# Patient Record
Sex: MALE | Race: BLACK OR AFRICAN AMERICAN | NOT HISPANIC OR LATINO | URBAN - METROPOLITAN AREA
[De-identification: names, ages, dates, MRNs, and addresses within clinical notes are randomized per-mention and may not be internally consistent; named-entity substitution may affect disease eponyms.]

---

## 2018-09-27 ENCOUNTER — EMERGENCY (EMERGENCY)
Facility: HOSPITAL | Age: 38
LOS: 1 days | Discharge: HOME | End: 2018-09-27
Attending: EMERGENCY MEDICINE | Admitting: EMERGENCY MEDICINE
Payer: SELF-PAY

## 2018-09-27 VITALS
TEMPERATURE: 98 F | OXYGEN SATURATION: 99 % | HEART RATE: 84 BPM | DIASTOLIC BLOOD PRESSURE: 77 MMHG | SYSTOLIC BLOOD PRESSURE: 129 MMHG | RESPIRATION RATE: 18 BRPM
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
## 2018-09-27 DIAGNOSIS — F17.210 NICOTINE DEPENDENCE, CIGARETTES, UNCOMPLICATED: ICD-10-CM

## 2018-09-27 DIAGNOSIS — M79.89 OTHER SPECIFIED SOFT TISSUE DISORDERS: ICD-10-CM
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
## 2018-09-27 DIAGNOSIS — B20 HUMAN IMMUNODEFICIENCY VIRUS [HIV] DISEASE: ICD-10-CM
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
## 2018-09-27 DIAGNOSIS — L03.113 CELLULITIS OF RIGHT UPPER LIMB: ICD-10-CM

## 2018-09-27 DIAGNOSIS — L02.511 CUTANEOUS ABSCESS OF RIGHT HAND: ICD-10-CM

## 2018-09-27 LAB
ALBUMIN SERPL ELPH-MCNC: 3.3 G/DL — LOW (ref 3.4–5)
ALP SERPL-CCNC: 93 U/L — SIGNIFICANT CHANGE UP (ref 40–120)
ALT FLD-CCNC: 16 U/L — SIGNIFICANT CHANGE UP (ref 12–42)
ANION GAP SERPL CALC-SCNC: 5 MMOL/L — LOW (ref 9–16)
AST SERPL-CCNC: 13 U/L — LOW (ref 15–37)
BASOPHILS NFR BLD AUTO: 0.2 % — SIGNIFICANT CHANGE UP (ref 0–2)
BILIRUB SERPL-MCNC: 0.2 MG/DL — SIGNIFICANT CHANGE UP (ref 0.2–1.2)
BUN SERPL-MCNC: 8 MG/DL — SIGNIFICANT CHANGE UP (ref 7–23)
CALCIUM SERPL-MCNC: 8.7 MG/DL — SIGNIFICANT CHANGE UP (ref 8.5–10.5)
CHLORIDE SERPL-SCNC: 100 MMOL/L — SIGNIFICANT CHANGE UP (ref 96–108)
CO2 SERPL-SCNC: 30 MMOL/L — SIGNIFICANT CHANGE UP (ref 22–31)
CREAT SERPL-MCNC: 0.82 MG/DL — SIGNIFICANT CHANGE UP (ref 0.5–1.3)
EOSINOPHIL NFR BLD AUTO: 2.8 % — SIGNIFICANT CHANGE UP (ref 0–6)
GLUCOSE SERPL-MCNC: 117 MG/DL — HIGH (ref 70–99)
HCT VFR BLD CALC: 37 % — LOW (ref 39–50)
HGB BLD-MCNC: 11.3 G/DL — LOW (ref 13–17)
IMM GRANULOCYTES NFR BLD AUTO: 0.7 % — SIGNIFICANT CHANGE UP (ref 0–1.5)
LYMPHOCYTES # BLD AUTO: 16 % — SIGNIFICANT CHANGE UP (ref 13–44)
MCHC RBC-ENTMCNC: 26.2 PG — LOW (ref 27–34)
MCHC RBC-ENTMCNC: 30.5 G/DL — LOW (ref 32–36)
MCV RBC AUTO: 85.6 FL — SIGNIFICANT CHANGE UP (ref 80–100)
MONOCYTES NFR BLD AUTO: 4.6 % — SIGNIFICANT CHANGE UP (ref 2–14)
NEUTROPHILS NFR BLD AUTO: 75.7 % — SIGNIFICANT CHANGE UP (ref 43–77)
PLATELET # BLD AUTO: 284 K/UL — SIGNIFICANT CHANGE UP (ref 150–400)
POTASSIUM SERPL-MCNC: 3.5 MMOL/L — SIGNIFICANT CHANGE UP (ref 3.5–5.3)
POTASSIUM SERPL-SCNC: 3.5 MMOL/L — SIGNIFICANT CHANGE UP (ref 3.5–5.3)
PROT SERPL-MCNC: 8.3 G/DL — HIGH (ref 6.4–8.2)
RBC # BLD: 4.32 M/UL — SIGNIFICANT CHANGE UP (ref 4.2–5.8)
RBC # FLD: 13.9 % — SIGNIFICANT CHANGE UP (ref 10.3–14.5)
SODIUM SERPL-SCNC: 135 MMOL/L — SIGNIFICANT CHANGE UP (ref 132–145)
WBC # BLD: 6.1 K/UL — SIGNIFICANT CHANGE UP (ref 3.8–10.5)
WBC # FLD AUTO: 6.1 K/UL — SIGNIFICANT CHANGE UP (ref 3.8–10.5)

## 2018-09-27 PROCEDURE — 99284 EMERGENCY DEPT VISIT MOD MDM: CPT

## 2018-09-27 PROCEDURE — 73130 X-RAY EXAM OF HAND: CPT | Mod: 26,RT

## 2018-09-27 RX ORDER — KETOROLAC TROMETHAMINE 30 MG/ML
30 SYRINGE (ML) INJECTION ONCE
Qty: 0 | Refills: 0 | Status: DISCONTINUED | OUTPATIENT
Start: 2018-09-27 | End: 2018-09-27

## 2018-09-27 RX ORDER — VANCOMYCIN HCL 1 G
1000 VIAL (EA) INTRAVENOUS ONCE
Qty: 0 | Refills: 0 | Status: COMPLETED | OUTPATIENT
Start: 2018-09-27 | End: 2018-09-27

## 2018-09-27 RX ORDER — SODIUM CHLORIDE 9 MG/ML
1000 INJECTION INTRAMUSCULAR; INTRAVENOUS; SUBCUTANEOUS ONCE
Qty: 0 | Refills: 0 | Status: COMPLETED | OUTPATIENT
Start: 2018-09-27 | End: 2018-09-27

## 2018-09-27 RX ORDER — SODIUM CHLORIDE 9 MG/ML
3 INJECTION INTRAMUSCULAR; INTRAVENOUS; SUBCUTANEOUS ONCE
Qty: 0 | Refills: 0 | Status: COMPLETED | OUTPATIENT
Start: 2018-09-27 | End: 2018-09-27

## 2018-09-27 RX ORDER — AMPICILLIN SODIUM AND SULBACTAM SODIUM 250; 125 MG/ML; MG/ML
3 INJECTION, POWDER, FOR SUSPENSION INTRAMUSCULAR; INTRAVENOUS ONCE
Qty: 0 | Refills: 0 | Status: COMPLETED | OUTPATIENT
Start: 2018-09-27 | End: 2018-09-27

## 2018-09-27 RX ADMIN — SODIUM CHLORIDE 3 MILLILITER(S): 9 INJECTION INTRAMUSCULAR; INTRAVENOUS; SUBCUTANEOUS at 21:34

## 2018-09-27 RX ADMIN — Medication 250 MILLIGRAM(S): at 23:13

## 2018-09-27 RX ADMIN — SODIUM CHLORIDE 1000 MILLILITER(S): 9 INJECTION INTRAMUSCULAR; INTRAVENOUS; SUBCUTANEOUS at 21:33

## 2018-09-27 RX ADMIN — Medication 30 MILLIGRAM(S): at 21:47

## 2018-09-27 RX ADMIN — AMPICILLIN SODIUM AND SULBACTAM SODIUM 200 GRAM(S): 250; 125 INJECTION, POWDER, FOR SUSPENSION INTRAMUSCULAR; INTRAVENOUS at 21:34

## 2018-09-27 NOTE — ED PROVIDER NOTE - SKIN, MLM
Swelling and erythema of the right ulnar aspect of the hand predominately lateral to the 5th MCP with purulent drainage from small opening. Very mild pain with passive extension of the Right 5th digit. Good cap refil SLIT.

## 2018-09-27 NOTE — ED ADULT TRIAGE NOTE - CHIEF COMPLAINT QUOTE
Patient to ED s/p stink by either a bee or hornet 2 days PTA.  Patient with moderate swelling and redness to right hand.  Denies any respiratory involvement

## 2018-09-27 NOTE — ED PROVIDER NOTE - PROGRESS NOTE DETAILS
Discussed with Dr. Feldman, who will come to evaluate pt. I&D performed by Dr feldman, IV antibiotics given, will discharge pt with PO abx, strict return precautions, follow up with Dr Feldman as scheduled

## 2018-09-27 NOTE — CONSULT NOTE ADULT - ASSESSMENT
38 year old male HIV+ with right small finger abscess, extensor tendon tenosynovitis, cellulitis  -I+D performed  -F/u cultures  -IV Antibiotics in the ED  -Home on 10 days PO Keflex and Bactrim  -TID soaks and packing for 2-3 days  -Keep hand elevated  -F/u in my office on Wednesday  -Discussed concerning signs such as streaking redness, increased pain, increased pus formation

## 2018-09-27 NOTE — ED PROVIDER NOTE - OBJECTIVE STATEMENT
38 y o male with PMHX of HIV (VL undetectable) presents to the ED for right hand swelling s/p bee sting 2 days ago. Experienced initial pain, however noticed increased swelling, erythema, and drainage from the site of injury on the right 5th digit. Reports pain flares during digit movement. Denies any fevers, chills, hives, SOB, N/V, dizziness, facial swelling, or any other sx.

## 2018-09-27 NOTE — ED ADULT NURSE NOTE - OBJECTIVE STATEMENT
pt aox4. neurologically wnl. no sob or difficulty breathing noted. lung sounds clear bilaterally. skin appropriate for ethnicity, warm and dry. swelling and erythema noted to R hand, particularly in R last finger. cap refill < 2 secs. pulses 2+ and regular. abd rounded soft and nontender.

## 2018-09-27 NOTE — ED PROVIDER NOTE - MEDICAL DECISION MAKING DETAILS
Cellulitis and abscess of right hand. Will send labs and give abx. Consult with hand specialist. Reassess.  Currently not concerned for flexor tendonitis.

## 2018-09-27 NOTE — ED PROVIDER NOTE - CHPI ED SYMPTOMS NEG
no vomiting/no swelling of face, tongue/no blurred vision/no chills/no crying/no difficulty breathing/no body aches/no chest pain/no rash/no nausea/no dizziness

## 2018-09-27 NOTE — CONSULT NOTE ADULT - SUBJECTIVE AND OBJECTIVE BOX
38 year old Male with +HIV c/o right small finger pain, swelling, redness and pus after a bee sting 2 days ago.  Pus "exploded" out of his finger this afternoon.  Denies fevers or chills.  PMH: HIV+  All: NKDA  SH: + smoker  ROS: Negative except for Right hand pain and swelling  PE: Right hand: +swelling and erythema of the small finger, dorsal MCP  1mm opening over the dorsal ulnar border of the small finger proximal phalanx with pus expressed  +Tenderness over the extensor tendon  No Volar/flexor tendon tenderness  no significant pain with passive stretch  Able to gentle extend and flex finger with discomfort at the limits of motion  Sensation intact radial and ulnar border of the digit  Cap refill < 2 secs    Procedure:  1.) Right small finger deep tissue irrigation and excisional debridement of skin, soft tissue and bone  2.) Right small finger extensor tendon tenolysis    Under sterile conditions, 6cc 1% Lidocaine block administered to the small finger  7mm longitudinal incision made over the dorsal ulnar border of the small finger  Pus expressed superficially and sent for culture  Scissors spread deeply proximally and distally and skin, deep soft tissue and ulnar proximal phalanx debrided with the knife, no significant pus expressed deep  Scissors spread along the extensor tendon to break up soft tissue adhesions, extensor tendon tenolysis performed  Wound irrigated with betadine and sterile saline solution  Packing applied and dressing placed over finger

## 2018-09-28 VITALS
RESPIRATION RATE: 20 BRPM | DIASTOLIC BLOOD PRESSURE: 59 MMHG | OXYGEN SATURATION: 99 % | TEMPERATURE: 99 F | SYSTOLIC BLOOD PRESSURE: 124 MMHG | HEART RATE: 115 BPM

## 2018-09-29 LAB
-  AMPICILLIN/SULBACTAM: SIGNIFICANT CHANGE UP
-  CEFAZOLIN: SIGNIFICANT CHANGE UP
-  CLINDAMYCIN: SIGNIFICANT CHANGE UP
-  DAPTOMYCIN: SIGNIFICANT CHANGE UP
-  ERYTHROMYCIN: SIGNIFICANT CHANGE UP
-  GENTAMICIN: SIGNIFICANT CHANGE UP
-  LINEZOLID: SIGNIFICANT CHANGE UP
-  OXACILLIN: SIGNIFICANT CHANGE UP
-  PENICILLIN: SIGNIFICANT CHANGE UP
-  RIFAMPIN: SIGNIFICANT CHANGE UP
-  TETRACYCLINE: SIGNIFICANT CHANGE UP
-  TRIMETHOPRIM/SULFAMETHOXAZOLE: SIGNIFICANT CHANGE UP
-  VANCOMYCIN: SIGNIFICANT CHANGE UP
CULTURE RESULTS: SIGNIFICANT CHANGE UP
METHOD TYPE: SIGNIFICANT CHANGE UP
ORGANISM # SPEC MICROSCOPIC CNT: SIGNIFICANT CHANGE UP
ORGANISM # SPEC MICROSCOPIC CNT: SIGNIFICANT CHANGE UP
SPECIMEN SOURCE: SIGNIFICANT CHANGE UP

## 2018-09-29 RX ORDER — AZTREONAM 2 G
2 VIAL (EA) INJECTION
Qty: 28 | Refills: 0
Start: 2018-09-29 | End: 2018-10-05

## 2018-09-29 RX ORDER — AZTREONAM 2 G
2 VIAL (EA) INJECTION
Qty: 28 | Refills: 0 | OUTPATIENT
Start: 2018-09-29 | End: 2018-10-05

## 2018-09-29 RX ORDER — CEPHALEXIN 500 MG
1 CAPSULE ORAL
Qty: 28 | Refills: 0
Start: 2018-09-29 | End: 2018-10-05

## 2018-09-29 NOTE — ED POST DISCHARGE NOTE - RESULT SUMMARY
bactrim and keflex prescribed - wound + strep pyogenes, sensitivities pending bactrim and keflex prescribed - wound + strep pyogenes, appropriate coverage

## 2018-10-03 LAB
CULTURE RESULTS: SIGNIFICANT CHANGE UP
CULTURE RESULTS: SIGNIFICANT CHANGE UP
SPECIMEN SOURCE: SIGNIFICANT CHANGE UP
SPECIMEN SOURCE: SIGNIFICANT CHANGE UP

## 2019-08-06 ENCOUNTER — INPATIENT (INPATIENT)
Facility: HOSPITAL | Age: 39
LOS: 1 days | Discharge: HOME | DRG: 603 | End: 2019-08-08
Payer: MEDICAID

## 2019-08-06 VITALS
HEIGHT: 73 IN | DIASTOLIC BLOOD PRESSURE: 69 MMHG | OXYGEN SATURATION: 95 % | RESPIRATION RATE: 17 BRPM | TEMPERATURE: 99 F | HEART RATE: 139 BPM | WEIGHT: 160.06 LBS | SYSTOLIC BLOOD PRESSURE: 110 MMHG

## 2019-08-06 DIAGNOSIS — B20 HUMAN IMMUNODEFICIENCY VIRUS [HIV] DISEASE: ICD-10-CM

## 2019-08-06 DIAGNOSIS — F19.10 OTHER PSYCHOACTIVE SUBSTANCE ABUSE, UNCOMPLICATED: ICD-10-CM

## 2019-08-06 DIAGNOSIS — Z72.51 HIGH RISK HETEROSEXUAL BEHAVIOR: ICD-10-CM

## 2019-08-06 DIAGNOSIS — R63.8 OTHER SYMPTOMS AND SIGNS CONCERNING FOOD AND FLUID INTAKE: ICD-10-CM

## 2019-08-06 DIAGNOSIS — N50.9 DISORDER OF MALE GENITAL ORGANS, UNSPECIFIED: ICD-10-CM

## 2019-08-06 DIAGNOSIS — D64.9 ANEMIA, UNSPECIFIED: ICD-10-CM

## 2019-08-06 DIAGNOSIS — Z21 ASYMPTOMATIC HUMAN IMMUNODEFICIENCY VIRUS [HIV] INFECTION STATUS: ICD-10-CM

## 2019-08-06 DIAGNOSIS — R36.9 URETHRAL DISCHARGE, UNSPECIFIED: ICD-10-CM

## 2019-08-06 DIAGNOSIS — Z91.89 OTHER SPECIFIED PERSONAL RISK FACTORS, NOT ELSEWHERE CLASSIFIED: ICD-10-CM

## 2019-08-06 DIAGNOSIS — L03.90 CELLULITIS, UNSPECIFIED: ICD-10-CM

## 2019-08-06 DIAGNOSIS — L02.416 CUTANEOUS ABSCESS OF LEFT LOWER LIMB: ICD-10-CM

## 2019-08-06 DIAGNOSIS — R00.0 TACHYCARDIA, UNSPECIFIED: ICD-10-CM

## 2019-08-06 LAB
ALBUMIN SERPL ELPH-MCNC: 2.7 G/DL — LOW (ref 3.4–5)
ALP SERPL-CCNC: 81 U/L — SIGNIFICANT CHANGE UP (ref 40–120)
ALT FLD-CCNC: 24 U/L — SIGNIFICANT CHANGE UP (ref 12–42)
ANION GAP SERPL CALC-SCNC: 11 MMOL/L — SIGNIFICANT CHANGE UP (ref 9–16)
APPEARANCE UR: CLEAR — SIGNIFICANT CHANGE UP
APTT BLD: 21.7 SEC — LOW (ref 27.5–36.3)
AST SERPL-CCNC: 27 U/L — SIGNIFICANT CHANGE UP (ref 15–37)
BASOPHILS NFR BLD AUTO: 0.2 % — SIGNIFICANT CHANGE UP (ref 0–2)
BILIRUB SERPL-MCNC: 0.3 MG/DL — SIGNIFICANT CHANGE UP (ref 0.2–1.2)
BILIRUB UR-MCNC: NEGATIVE — SIGNIFICANT CHANGE UP
BUN SERPL-MCNC: 10 MG/DL — SIGNIFICANT CHANGE UP (ref 7–23)
C TRACH RRNA SPEC QL NAA+PROBE: SIGNIFICANT CHANGE UP
CALCIUM SERPL-MCNC: 8.4 MG/DL — LOW (ref 8.5–10.5)
CHLORIDE SERPL-SCNC: 95 MMOL/L — LOW (ref 96–108)
CO2 SERPL-SCNC: 27 MMOL/L — SIGNIFICANT CHANGE UP (ref 22–31)
COLOR SPEC: YELLOW — SIGNIFICANT CHANGE UP
CREAT SERPL-MCNC: 0.96 MG/DL — SIGNIFICANT CHANGE UP (ref 0.5–1.3)
CRP SERPL-MCNC: >12 MG/DL — HIGH (ref 0–0.9)
DIFF PNL FLD: ABNORMAL
EOSINOPHIL NFR BLD AUTO: 1.4 % — SIGNIFICANT CHANGE UP (ref 0–6)
GLUCOSE SERPL-MCNC: 129 MG/DL — HIGH (ref 70–99)
GLUCOSE UR QL: NEGATIVE — SIGNIFICANT CHANGE UP
HCT VFR BLD CALC: 34.4 % — LOW (ref 39–50)
HGB BLD-MCNC: 10.9 G/DL — LOW (ref 13–17)
IMM GRANULOCYTES NFR BLD AUTO: 0.8 % — SIGNIFICANT CHANGE UP (ref 0–1.5)
INR BLD: 1.49 — HIGH (ref 0.88–1.16)
KETONES UR-MCNC: NEGATIVE — SIGNIFICANT CHANGE UP
LACTATE SERPL-SCNC: 0.8 MMOL/L — SIGNIFICANT CHANGE UP (ref 0.4–2)
LEUKOCYTE ESTERASE UR-ACNC: ABNORMAL
LYMPHOCYTES # BLD AUTO: 13.8 % — SIGNIFICANT CHANGE UP (ref 13–44)
MCHC RBC-ENTMCNC: 25.1 PG — LOW (ref 27–34)
MCHC RBC-ENTMCNC: 31.7 G/DL — LOW (ref 32–36)
MCV RBC AUTO: 79.1 FL — LOW (ref 80–100)
MONOCYTES NFR BLD AUTO: 6.3 % — SIGNIFICANT CHANGE UP (ref 2–14)
N GONORRHOEA RRNA SPEC QL NAA+PROBE: SIGNIFICANT CHANGE UP
NEUTROPHILS NFR BLD AUTO: 77.5 % — HIGH (ref 43–77)
NITRITE UR-MCNC: POSITIVE
PCP SPEC-MCNC: SIGNIFICANT CHANGE UP
PH UR: 6.5 — SIGNIFICANT CHANGE UP (ref 5–8)
PLATELET # BLD AUTO: 241 K/UL — SIGNIFICANT CHANGE UP (ref 150–400)
POTASSIUM SERPL-MCNC: 4 MMOL/L — SIGNIFICANT CHANGE UP (ref 3.5–5.3)
POTASSIUM SERPL-SCNC: 4 MMOL/L — SIGNIFICANT CHANGE UP (ref 3.5–5.3)
PROT SERPL-MCNC: 7.9 G/DL — SIGNIFICANT CHANGE UP (ref 6.4–8.2)
PROT UR-MCNC: ABNORMAL MG/DL
PROTHROM AB SERPL-ACNC: 16.7 SEC — HIGH (ref 10–12.9)
RBC # BLD: 4.35 M/UL — SIGNIFICANT CHANGE UP (ref 4.2–5.8)
RBC # FLD: 14.9 % — HIGH (ref 10.3–14.5)
SODIUM SERPL-SCNC: 133 MMOL/L — SIGNIFICANT CHANGE UP (ref 132–145)
SP GR SPEC: 1.01 — SIGNIFICANT CHANGE UP (ref 1–1.03)
SPECIMEN SOURCE: SIGNIFICANT CHANGE UP
UROBILINOGEN FLD QL: 1 E.U./DL — SIGNIFICANT CHANGE UP
WBC # BLD: 10.3 K/UL — SIGNIFICANT CHANGE UP (ref 3.8–10.5)
WBC # FLD AUTO: 10.3 K/UL — SIGNIFICANT CHANGE UP (ref 3.8–10.5)

## 2019-08-06 PROCEDURE — 73562 X-RAY EXAM OF KNEE 3: CPT | Mod: 26,LT

## 2019-08-06 PROCEDURE — 99223 1ST HOSP IP/OBS HIGH 75: CPT | Mod: GC

## 2019-08-06 PROCEDURE — 71045 X-RAY EXAM CHEST 1 VIEW: CPT | Mod: 26

## 2019-08-06 PROCEDURE — 99291 CRITICAL CARE FIRST HOUR: CPT | Mod: 25

## 2019-08-06 PROCEDURE — 93010 ELECTROCARDIOGRAM REPORT: CPT

## 2019-08-06 RX ORDER — SODIUM CHLORIDE 9 MG/ML
2300 INJECTION INTRAMUSCULAR; INTRAVENOUS; SUBCUTANEOUS ONCE
Refills: 0 | Status: COMPLETED | OUTPATIENT
Start: 2019-08-06 | End: 2019-08-06

## 2019-08-06 RX ORDER — OXYCODONE AND ACETAMINOPHEN 5; 325 MG/1; MG/1
2 TABLET ORAL EVERY 6 HOURS
Refills: 0 | Status: DISCONTINUED | OUTPATIENT
Start: 2019-08-06 | End: 2019-08-08

## 2019-08-06 RX ORDER — CEFTRIAXONE 500 MG/1
1000 INJECTION, POWDER, FOR SOLUTION INTRAMUSCULAR; INTRAVENOUS ONCE
Refills: 0 | Status: COMPLETED | OUTPATIENT
Start: 2019-08-06 | End: 2019-08-06

## 2019-08-06 RX ORDER — HYDROMORPHONE HYDROCHLORIDE 2 MG/ML
0.5 INJECTION INTRAMUSCULAR; INTRAVENOUS; SUBCUTANEOUS ONCE
Refills: 0 | Status: DISCONTINUED | OUTPATIENT
Start: 2019-08-06 | End: 2019-08-06

## 2019-08-06 RX ORDER — IBUPROFEN 200 MG
600 TABLET ORAL EVERY 6 HOURS
Refills: 0 | Status: DISCONTINUED | OUTPATIENT
Start: 2019-08-06 | End: 2019-08-08

## 2019-08-06 RX ORDER — SODIUM CHLORIDE 9 MG/ML
1000 INJECTION, SOLUTION INTRAVENOUS
Refills: 0 | Status: DISCONTINUED | OUTPATIENT
Start: 2019-08-06 | End: 2019-08-06

## 2019-08-06 RX ORDER — AZITHROMYCIN 500 MG/1
1000 TABLET, FILM COATED ORAL ONCE
Refills: 0 | Status: COMPLETED | OUTPATIENT
Start: 2019-08-06 | End: 2019-08-06

## 2019-08-06 RX ORDER — MORPHINE SULFATE 50 MG/1
4 CAPSULE, EXTENDED RELEASE ORAL ONCE
Refills: 0 | Status: DISCONTINUED | OUTPATIENT
Start: 2019-08-06 | End: 2019-08-06

## 2019-08-06 RX ORDER — VANCOMYCIN HCL 1 G
1500 VIAL (EA) INTRAVENOUS EVERY 12 HOURS
Refills: 0 | Status: DISCONTINUED | OUTPATIENT
Start: 2019-08-07 | End: 2019-08-07

## 2019-08-06 RX ORDER — OXYCODONE AND ACETAMINOPHEN 5; 325 MG/1; MG/1
1 TABLET ORAL ONCE
Refills: 0 | Status: DISCONTINUED | OUTPATIENT
Start: 2019-08-06 | End: 2019-08-06

## 2019-08-06 RX ORDER — IBUPROFEN 200 MG
400 TABLET ORAL EVERY 6 HOURS
Refills: 0 | Status: DISCONTINUED | OUTPATIENT
Start: 2019-08-06 | End: 2019-08-08

## 2019-08-06 RX ORDER — ACETAMINOPHEN 500 MG
975 TABLET ORAL ONCE
Refills: 0 | Status: COMPLETED | OUTPATIENT
Start: 2019-08-06 | End: 2019-08-06

## 2019-08-06 RX ORDER — VANCOMYCIN HCL 1 G
1500 VIAL (EA) INTRAVENOUS ONCE
Refills: 0 | Status: COMPLETED | OUTPATIENT
Start: 2019-08-06 | End: 2019-08-06

## 2019-08-06 RX ORDER — KETOROLAC TROMETHAMINE 30 MG/ML
30 SYRINGE (ML) INJECTION ONCE
Refills: 0 | Status: DISCONTINUED | OUTPATIENT
Start: 2019-08-06 | End: 2019-08-06

## 2019-08-06 RX ADMIN — Medication 300 MILLIGRAM(S): at 03:52

## 2019-08-06 RX ADMIN — Medication 30 MILLIGRAM(S): at 04:00

## 2019-08-06 RX ADMIN — AZITHROMYCIN 1000 MILLIGRAM(S): 500 TABLET, FILM COATED ORAL at 04:00

## 2019-08-06 RX ADMIN — CEFTRIAXONE 1000 MILLIGRAM(S): 500 INJECTION, POWDER, FOR SOLUTION INTRAMUSCULAR; INTRAVENOUS at 03:51

## 2019-08-06 RX ADMIN — Medication 30 MILLIGRAM(S): at 03:33

## 2019-08-06 RX ADMIN — MORPHINE SULFATE 4 MILLIGRAM(S): 50 CAPSULE, EXTENDED RELEASE ORAL at 03:32

## 2019-08-06 RX ADMIN — Medication 300 MILLIGRAM(S): at 16:26

## 2019-08-06 RX ADMIN — Medication 975 MILLIGRAM(S): at 04:00

## 2019-08-06 RX ADMIN — SODIUM CHLORIDE 2300 MILLILITER(S): 9 INJECTION INTRAMUSCULAR; INTRAVENOUS; SUBCUTANEOUS at 03:35

## 2019-08-06 RX ADMIN — SODIUM CHLORIDE 150 MILLILITER(S): 9 INJECTION, SOLUTION INTRAVENOUS at 14:56

## 2019-08-06 RX ADMIN — OXYCODONE AND ACETAMINOPHEN 1 TABLET(S): 5; 325 TABLET ORAL at 14:56

## 2019-08-06 RX ADMIN — SODIUM CHLORIDE 150 MILLILITER(S): 9 INJECTION, SOLUTION INTRAVENOUS at 08:38

## 2019-08-06 RX ADMIN — Medication 1500 MILLIGRAM(S): at 06:00

## 2019-08-06 RX ADMIN — SODIUM CHLORIDE 2300 MILLILITER(S): 9 INJECTION INTRAMUSCULAR; INTRAVENOUS; SUBCUTANEOUS at 05:44

## 2019-08-06 RX ADMIN — Medication 975 MILLIGRAM(S): at 03:30

## 2019-08-06 RX ADMIN — MORPHINE SULFATE 4 MILLIGRAM(S): 50 CAPSULE, EXTENDED RELEASE ORAL at 07:54

## 2019-08-06 RX ADMIN — HYDROMORPHONE HYDROCHLORIDE 0.5 MILLIGRAM(S): 2 INJECTION INTRAMUSCULAR; INTRAVENOUS; SUBCUTANEOUS at 11:46

## 2019-08-06 NOTE — H&P ADULT - NSHPLABSRESULTS_GEN_ALL_CORE
.  LABS:                         10.9   10.3  )-----------( 241      ( 06 Aug 2019 03:28 )             34.4         133  |  95<L>  |  10  ----------------------------<  129<H>  4.0   |  27  |  0.96    Ca    8.4<L>      06 Aug 2019 03:28    TPro  7.9  /  Alb  2.7<L>  /  TBili  0.3  /  DBili  x   /  AST  27  /  ALT  24  /  AlkPhos  81      PT/INR - ( 06 Aug 2019 03:28 )   PT: 16.7 sec;   INR: 1.49          PTT - ( 06 Aug 2019 03:28 )  PTT:21.7 sec  Urinalysis Basic - ( 06 Aug 2019 05:25 )    Color: Yellow / Appearance: Clear / S.010 / pH: x  Gluc: x / Ketone: NEGATIVE  / Bili: NEGATIVE / Urobili: 1.0 E.U./dL   Blood: x / Protein: Trace mg/dL / Nitrite: POSITIVE   Leuk Esterase: Trace / RBC: < 5 /HPF / WBC 5-10 /HPF   Sq Epi: x / Non Sq Epi: 0-5 /HPF / Bacteria: Many /HPF      UTox: positive for amphetamines and opiates    IMAGING:  CXR 2019:   Findings/ impression: Hyperinflation. Heart, lungs, mediastinum and thorax,   unremarkable.     Left Knee X-ray 2019:   Findings:  Soft tissue swelling with no acute fracture, dislocation or   destructive lesion. Medial knee joint narrowing. LABS:              10.9   10.3  )-----------( 241      ( 06 Aug 2019 03:28 )             34.4         133  |  95<L>  |  10  ----------------------------<  129<H>  4.0   |  27  |  0.96    Ca    8.4<L>      06 Aug 2019 03:28    TPro  7.9  /  Alb  2.7<L>  /  TBili  0.3  /  DBili  x   /  AST  27  /  ALT  24  /  AlkPhos  81      PT/INR - ( 06 Aug 2019 03:28 )   PT: 16.7 sec;   INR: 1.49          PTT - ( 06 Aug 2019 03:28 )  PTT:21.7 sec  Urinalysis Basic - ( 06 Aug 2019 05:25 )    Color: Yellow / Appearance: Clear / S.010 / pH: x  Gluc: x / Ketone: NEGATIVE  / Bili: NEGATIVE / Urobili: 1.0 E.U./dL   Blood: x / Protein: Trace mg/dL / Nitrite: POSITIVE   Leuk Esterase: Trace / RBC: < 5 /HPF / WBC 5-10 /HPF   Sq Epi: x / Non Sq Epi: 0-5 /HPF / Bacteria: Many /HPF    UTox: positive for amphetamines and opiates    IMAGING:  CXR 2019:   Findings/ impression: Hyperinflation. Heart, lungs, mediastinum and thorax,   unremarkable.     Left Knee X-ray 2019:   Findings:  Soft tissue swelling with no acute fracture, dislocation or   destructive lesion. Medial knee joint narrowing.    EKG: Sinus tachycardia, no significant ST elevation (computer read) LABS:              10.9   10.3  )-----------( 241      ( 06 Aug 2019 03:28 )             34.4         133  |  95<L>  |  10  ----------------------------<  129<H>  4.0   |  27  |  0.96    Ca    8.4<L>      06 Aug 2019 03:28    TPro  7.9  /  Alb  2.7<L>  /  TBili  0.3  /  DBili  x   /  AST  27  /  ALT  24  /  AlkPhos  81      PT/INR - ( 06 Aug 2019 03:28 )   PT: 16.7 sec;   INR: 1.49          PTT - ( 06 Aug 2019 03:28 )  PTT:21.7 sec  Urinalysis Basic - ( 06 Aug 2019 05:25 )    Color: Yellow / Appearance: Clear / S.010 / pH: x  Gluc: x / Ketone: NEGATIVE  / Bili: NEGATIVE / Urobili: 1.0 E.U./dL   Blood: x / Protein: Trace mg/dL / Nitrite: POSITIVE   Leuk Esterase: Trace / RBC: < 5 /HPF / WBC 5-10 /HPF   Sq Epi: x / Non Sq Epi: 0-5 /HPF / Bacteria: Many /HPF    UTox: positive for amphetamines and opiates    IMAGING:  CXR 2019:   Findings/ impression: Hyperinflation. Heart, lungs, mediastinum and thorax,   unremarkable.     Left Knee X-ray 2019:   Findings:  Soft tissue swelling with no acute fracture, dislocation or   destructive lesion. Medial knee joint narrowing.    EKG: Sinus tachycardia, no significant ST elevation (computer read), pending repeat EKG

## 2019-08-06 NOTE — H&P ADULT - PROBLEM SELECTOR PLAN 7
Patient with microcytic anemia; states he was mild bleeding from drained abscess. Continue to monitor bleeding at the I+D site.   - f/u iron studies   - Ordered for type and screen Patient with microcytic anemia; states he was mild bleeding from drained abscess. Continue to monitor bleeding at the I+D site. Patient states that he has a hx of anemia; unknown if ever work-up. Likely multifactorial with anemia of chronic disease related to long standing untreated HIV and recent bleeding from site of abscess and cellulitis.   - f/u iron studies   - Ordered for type and screen in the AM

## 2019-08-06 NOTE — ED PROVIDER NOTE - PMH
HIV (human immunodeficiency virus infection) Abscess    HIV (human immunodeficiency virus infection) Abscess    HIV (human immunodeficiency virus infection)    IVDU (intravenous drug user)

## 2019-08-06 NOTE — H&P ADULT - PROBLEM SELECTOR PLAN 2
- diagnosed in 2003 but never treated   - patient is willing to start antiretroviral therapy Patient with a hx of HIV - never treated in the past, dx in the ED in early 2000. Open to start treatment given multiple infections and likely suppressed immune system.   - f/u VL   - f/u CD4 count   - Consult HIV in the AM  - f/u A1c, TSH, lipid panel, hepatitis prior to initiating HAART therapy   - Need genotype testing

## 2019-08-06 NOTE — H&P ADULT - PROBLEM SELECTOR PLAN 10
1) PCP Contacted on Admission: (Y/N) --> Name & Phone #: None, will like to follow with HIV team; currently in the process of getting insurance   2) Date of Contact with PCP:  3) PCP Contacted at Discharge: (Y/N)  4) Summary of Handoff Given to PCP:   5) Post-Discharge Appointment Date and Location:

## 2019-08-06 NOTE — H&P ADULT - NSICDXPASTMEDICALHX_GEN_ALL_CORE_FT
PAST MEDICAL HISTORY:  Abscess     HIV (human immunodeficiency virus infection)     IVDU (intravenous drug user) PAST MEDICAL HISTORY:  Abscess     Anemia     HIV (human immunodeficiency virus infection)     IVDU (intravenous drug user)

## 2019-08-06 NOTE — ED PROVIDER NOTE - PROGRESS NOTE DETAILS
Pt was seen immediately upon arrival due to a high probability of imminent or life-threatening deterioration secondary to sepsis 2/2 L knee abscess vs septic joint, which required my direct attention, intervention, and personal management. I have personally provided 45 minutes of critical care time exclusive of time spent on separately billable procedures. Time includes review of laboratory data, radiology results, discussion with consultants, and monitoring for potential decompensation. Interventions were performed as documented above L knee appears to be self draining with purulent dc, site milked and expressed, limited ROM, will not aspirate due to overlying cellulitis and spontaneous drainage of the site already, ?joint involvement vs atypical GC/CG of the joint vs septic joint, will consult ortho case discussed with ortho, given improved ROM s/p analgesic and pt ambulatory, no leukocytosis, unlikely septic joint, rec medicine admission and ortho consult, ortho resident Dr. 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

## 2019-08-06 NOTE — H&P ADULT - PROBLEM SELECTOR PLAN 5
Patient with testicular rash - testicle nontender but w/ skin changes on the left testicle notable for skin slough vs ulceration with no drainage, moist. No signs of Sandra gangrene.   - monitor improvement of rash   - f/u STI screening

## 2019-08-06 NOTE — CONSULT NOTE ADULT - SUBJECTIVE AND OBJECTIVE BOX
Orthopaedic Surgery Consult Note    For Surgeon: Dr. Sanchez      HPI:  39 M with PMH of HIV (never treated, dx 2003, unknown VL and CD4 count), IVDU (uses clean needles), hx of abscesses who presented to the ED with 1 week of worsening left knee pain and swelling. He first noticed the swelling 1 week ago. He believes it began as a bug bite that turned into what he describes like a small "pimple like mass". over the next day he proceeded to pick at the area and noted some pustular discharge but noted it was not significant The day after this event he had severe knee pain which continued to progress until his presentation at [facility]. At this time he has also endorsed fevers and chills. No nausea/vomiting or other signs of illness. Denies any numbness or tingling or weakness however he had difficulty with ambulation secondary to pain. Denies any trauma to the area.     Allergies    No Known Allergies    Intolerances      PAST MEDICAL & SURGICAL HISTORY:  Anemia  IVDU (intravenous drug user)  Abscess  HIV (human immunodeficiency virus infection)  No significant past surgical history    MEDICATIONS  (STANDING):    MEDICATIONS  (PRN):  ibuprofen  Tablet. 400 milliGRAM(s) Oral every 6 hours PRN Mild Pain (1 - 3)  ibuprofen  Tablet. 600 milliGRAM(s) Oral every 6 hours PRN Moderate Pain (4 - 6)  oxyCODONE    5 mG/acetaminophen 325 mG 2 Tablet(s) Oral every 6 hours PRN Severe Pain (7 - 10)      Vital Signs Last 24 Hrs  T(C): 37.1 (06 Aug 2019 21:08), Max: 37.4 (06 Aug 2019 03:05)  T(F): 98.8 (06 Aug 2019 21:08), Max: 99.4 (06 Aug 2019 03:05)  HR: 118 (06 Aug 2019 21:08) (87 - 139)  BP: 106/63 (06 Aug 2019 21:08) (106/63 - 136/86)  BP(mean): --  RR: 19 (06 Aug 2019 21:08) (16 - 20)  SpO2: 100% (06 Aug 2019 21:08) (95% - 100%)    Physical Exam:    General: In bed in NAD  Respiratory: speaking in full sentences no evidence of respiratory distress  LLE:  Swelling noted over knee.   Minimal TTP over knee.   No pain with passive stretch of toes or ankle.  some erythema noted however difficult to asses secondary to patients skin tone.   ROM 0-110 pain much improved from initial presentation  Dressing over medial aspect of knee C/D/I.   SILT over distal limb and symmetric to contralateral side.   2+ DP   5+ EHL/FHL/Ga/Ta                          10.9   10.3  )-----------( 241      ( 06 Aug 2019 03:28 )             34.4     08-06    133  |  95<L>  |  10  ----------------------------<  129<H>  4.0   |  27  |  0.96    Ca    8.4<L>      06 Aug 2019 03:28    TPro  7.9  /  Alb  2.7<L>  /  TBili  0.3  /  DBili  x   /  AST  27  /  ALT  24  /  AlkPhos  81  08-06    PT/INR - ( 06 Aug 2019 03:28 )   PT: 16.7 sec;   INR: 1.49          PTT - ( 06 Aug 2019 03:28 )  PTT:21.7 sec  Imaging:     A/P: 39yMale w a complex medical history admitted secondary to infection of knee in the setting of immunocompromised state  - Care as per primary  - Will follow patient  - Dressing change daily.   - Follow up cultures.     -Discussed with Dr. Sanchez    Ortho Pager [phone]

## 2019-08-06 NOTE — ED PROVIDER NOTE - DIAGNOSTIC INTERPRETATION
Xray (wet reads) interpreted by SIOBHAN SEBASTIAN   Xray chest - Cardiac silhouette, aortic knob, mediastinal and hilar contours appear wnl, no acute consolidation, infiltrate, effusion, or PTX. No bony abnormalities noted   Xray knee - +soft tissue swelling. no acute fx or dislocation, joint space intact, no effusion noted. No foreign body noted

## 2019-08-06 NOTE — H&P ADULT - PROBLEM SELECTOR PLAN 9
F: s/p 2.3L and LR, tolerating PO, encourage PO intake   E: replete PRN   N: Regular diet   DVT ppx: Low risk, no pharmcological ppx need   GI ppx: None   FULL CODE F: s/p 2.3L and LR, tolerating PO, encourage PO intake   E: replete PRN   N: Regular diet   DVT ppx: Low risk, no pharmacological ppx need   GI ppx: None   FULL CODE

## 2019-08-06 NOTE — ED ADULT NURSE NOTE - NSIMPLEMENTINTERV_GEN_ALL_ED
Implemented All Universal Safety Interventions:  [place] to call system. Call bell, personal items and telephone within reach. Instruct patient to call for assistance. Room bathroom lighting operational. Non-slip footwear when patient is off stretcher. Physically safe environment: no spills, clutter or unnecessary equipment. Stretcher in lowest position, wheels locked, appropriate side rails in place.

## 2019-08-06 NOTE — H&P ADULT - PROBLEM SELECTOR PLAN 6
Patient presented w/ tachycardia in the ED. Likely in the setting of pain at the knee joint. EKG: Sinus tachycardia, no significant ST elevation (computer read), possible J-point elevation, pending STAT repeat EKG  - pain control with ibuprofen  Tablet. 400 milliGRAM(s) Oral every 6 hours PRN Mild Pain (1 - 3), ibuprofen  Tablet. 600 milliGRAM(s) Oral every 6 hours PRN Moderate Pain (4 - 6), oxyCODONE    5 mG/acetaminophen 325 mG 2 Tablet(s) Oral every 6 hours PRN Severe Pain (7 - 10)  - f/u EKG

## 2019-08-06 NOTE — ED PROVIDER NOTE - PHYSICAL EXAMINATION
Gen - WDWN, NAD, comfortable and non-toxic appearing  Skin - warm, dry, intact   HEENT - AT/NC, airway patent, neck supple   CV - S1S2, R/R/R  Resp - CTAB, no r/r/w  GI - soft, ND, NT, no CVAT b/l   MS - w/w/p,  Neuro - AxOx3, ambulatory Vital Signs - nursing notes reviewed and confirmed  Gen - WDWN M, NAD, comfortable and non-toxic appearing, speaking in full sentences   Skin - warm, dry, intact  HEENT - AT/NC, PERRL, EOMI, no conjunctival injection, dry oral mucosa, TM intact b/l with good cone of lights, o/p clear with no erythema, edema, or exudate, uvula midline, airway patent, neck supple and NT, FROM  CV - S1S2, R/R/R  Resp - respiration non-labored, CTAB, symmetric bs b/l, no r/r/w  GI - NABS, soft, ND, NT, no rebound or guarding, no CVAT b/l   MS - w/w/p, L knee with +self draining abscess to the medial aspect of the patellar with fluctuance, warmth, edema, and erythema extending to suprapatellar region and proximal LLE, no streaking, compartment soft, slightly restricted ROM, +SILT, symmetric palpable distal pulses   Neuro - AxOx3, no focal neuro deficits, unable to weight bear on the L Vital Signs - nursing notes reviewed and confirmed  Gen - WDWN M, NAD, comfortable and non-toxic appearing, speaking in full sentences   Skin - warm, dry, intact  HEENT - AT/NC, PERRL, EOMI, no conjunctival injection, dry oral mucosa, TM intact b/l with good cone of lights, o/p clear with no erythema, edema, or exudate, uvula midline, airway patent, neck supple and NT, FROM  CV - S1S2, R/R/R  Resp - respiration non-labored, CTAB, symmetric bs b/l, no r/r/w  GI - NABS, soft, ND, NT, no rebound or guarding, no CVAT b/l   MS - w/w/p, L knee with +self draining abscess to the medial aspect of the patellar with fluctuance, warmth, edema, and erythema extending to suprapatellar region and proximal LLE, no streaking, compartment soft, slightly restricted ROM, +SILT, symmetric palpable distal pulses   Neuro - AxOx3, no focal neuro deficits, ambulatory with limp on the L

## 2019-08-06 NOTE — H&P ADULT - PROBLEM SELECTOR PLAN 3
Patient states that 4 sexual partners, does not use condoms, does not engage in anal receptive sex. Given empirin coverage for Chlamydia and GC with azithromycin 1g and ceftriaxone 1g in the ED.   - f/u urine Chlamydia and GC  - F/u Syphilis screen and HSV screening   - if concern for gonorrhea arthritis will need longer duration of ceftriaxone

## 2019-08-06 NOTE — H&P ADULT - PROBLEM SELECTOR PLAN 8
Patient states he uses IV crystal meth; he states he used clean needles.   - Encouraged to discontinue using medication

## 2019-08-06 NOTE — H&P ADULT - NSHPREVIEWOFSYSTEMS_GEN_ALL_CORE
REVIEW OF SYSTEMS:  CONSTITUTIONAL: No weakness, fevers or chills  EYES/ENT: No visual changes;  No vertigo or throat pain   NECK: No pain or stiffness  RESPIRATORY: No cough, wheezing, hemoptysis; No shortness of breath  CARDIOVASCULAR: No chest pain or palpitations  GASTROINTESTINAL: No abdominal or epigastric pain. No nausea, vomiting, or hematemesis; No diarrhea or constipation. No melena or hematochezia.  GENITOURINARY: No dysuria, frequency or hematuria  NEUROLOGICAL: see HPI  SKIN: see HPI As per HPI

## 2019-08-06 NOTE — ED ADULT NURSE NOTE - OBJECTIVE STATEMENT
Pt complaining of left knee pain/injury. Pt states that knee has been bothering him for about a week but today it opened and started to have drainage. Knee swollen and red with purulent drainage leaking out. Pt also has abscess noted to left upper hip that he noticed today.  Pt states "I also most likely have a STD." Pt IV drug user. Pt denies injury to site.

## 2019-08-06 NOTE — H&P ADULT - NSHPPHYSICALEXAM_GEN_ALL_CORE
.  VITAL SIGNS:  T(C): 36.7 (08-06-19 @ 17:38), Max: 37.4 (08-06-19 @ 03:05)  T(F): 98 (08-06-19 @ 17:38), Max: 99.4 (08-06-19 @ 03:05)  HR: 87 (08-06-19 @ 17:38) (87 - 139)  BP: 119/72 (08-06-19 @ 17:38) (110/69 - 136/86)  BP(mean): --  RR: 19 (08-06-19 @ 17:38) (16 - 20)  SpO2: 100% (08-06-19 @ 17:38) (95% - 100%)  Wt(kg): --    PHYSICAL EXAM:    Constitutional: WDWN, resting comfortably in bed, NAD  Head: NC/AT  Eyes: PERRLA, EOMI, clear conjunctiva  ENT: no nasal discharge  Neck: supple; no JVD or thyromegaly  Respiratory: CTA B/L; no W/R/R, no retractions  Cardiac: Tachycardic, +S1/S2; no M/R/G  Gastrointestinal: soft, NT/ND; no rebound or guarding; +BS, no hepatosplenomegaly  Extremities: WWP, left extremity swelling up to knee associated with erythema, 2cm incision site on the medial side of the L knee. no streaking, no significant joint effusion noted  Vascular: peripheral pulses 2+  Lymphatic: no submandibular or cervical LAD  Neurologic: AAOx3; CNII-XII grossly intact; no focal deficits, motor 5/5 in UE and LE  Genital exam: nontender L inguinal LAD, scrotum was normal sized, non-tender, skin changes on the left testicle notable for multiple open ulcers with no drainage VITAL SIGNS:  T(C): 36.7 (08-06-19 @ 17:38), Max: 37.4 (08-06-19 @ 03:05)  T(F): 98 (08-06-19 @ 17:38), Max: 99.4 (08-06-19 @ 03:05)  HR: 87 (08-06-19 @ 17:38) (87 - 139)  BP: 119/72 (08-06-19 @ 17:38) (110/69 - 136/86)  BP(mean): --  RR: 19 (08-06-19 @ 17:38) (16 - 20)  SpO2: 100% (08-06-19 @ 17:38) (95% - 100%)  Wt(kg): --    PHYSICAL EXAM:  Constitutional: WDWN, resting comfortably in bed, NAD  Head: NC/AT  Eyes: PERRLA, EOMI, clear conjunctiva  ENT: no nasal discharge  Neck: supple; no JVD or thyromegaly  Respiratory: CTA B/L; no W/R/R, no retractions  Cardiac: Tachycardic, +S1/S2; no M/R/G  Gastrointestinal: soft, NT/ND; no rebound or guarding; +BS, no hepatosplenomegaly  Extremities: WWP, left extremity swelling up to knee associated with erythema, 2cm incision site on the medial side of the L knee. no streaking, no significant joint effusion noted  Vascular: peripheral pulses 2+  Lymphatic: no submandibular or cervical LAD  Neurologic: AAOx3; CNII-XII grossly intact; no focal deficits, motor 5/5 in UE and LE  Genital exam: nontender L inguinal LAD, scrotum was normal sized, non-tender, skin changes on the left testicle notable for skin slough vs ulceration with no drainage VITAL SIGNS:  T(C): 36.7 (08-06-19 @ 17:38), Max: 37.4 (08-06-19 @ 03:05)  T(F): 98 (08-06-19 @ 17:38), Max: 99.4 (08-06-19 @ 03:05)  HR: 87 (08-06-19 @ 17:38) (87 - 139)  BP: 119/72 (08-06-19 @ 17:38) (110/69 - 136/86)  BP(mean): --  RR: 19 (08-06-19 @ 17:38) (16 - 20)  SpO2: 100% (08-06-19 @ 17:38) (95% - 100%)  Wt(kg): --    PHYSICAL EXAM:  Constitutional: WDWN, resting comfortably in bed, NAD  Head: NC/AT  Eyes: PERRLA, EOMI, clear conjunctiva  ENT: no nasal discharge  Neck: supple; no JVD or thyromegaly  Respiratory: CTA B/L; no W/R/R, no retractions  Cardiac: Tachycardic, +S1/S2; no M/R/G  Gastrointestinal: soft, NT/ND; no rebound or guarding; +BS, no hepatosplenomegaly  Extremities: WWP, left extremity swelling up to knee associated with erythema, 2cm incision site on the medial side of the L knee w/packing in place and purulent drainage.   Vascular: peripheral pulses 2+  Lymphatic: +Left sided inguinal lymphadenopathy   Neurologic: AAOx3; CNII-XII grossly intact; no focal deficits, motor 5/5 in UE and LE  Genital exam: No penile discharge noted, scrotum was normal sized, testicle nontender but w/ skin changes on the left testicle notable for skin slough vs ulceration with no drainage, moist

## 2019-08-06 NOTE — ED PROVIDER NOTE - CLINICAL SUMMARY MEDICAL DECISION MAKING FREE TEXT BOX
pt p/w atraumatic L knee pain, swelling, redness, and now with spontaneous drainage from the region plus surrounding cellulitic skin changes, low grade temp, tachycardiac to 140 on arrival, normotensive, non toxic appearing, sepsis protocol initiated, site milked and expressed with wound cx obtained, empirically covered with vanco/ceftriaxone/azithro (including atypical GC/CG cause of monoarticular involvement), xray with no gas patterns, case discussed with ortho and medicine pt p/w atraumatic L knee pain, swelling, redness, and now with spontaneous drainage from the region plus surrounding cellulitic skin changes, low grade temp, tachycardiac to 140 on arrival, normotensive, non toxic appearing, sepsis protocol initiated, site milked and expressed with wound cx obtained, empirically covered with vanco/ceftriaxone/azithro (including atypical GC/CG cause of monoarticular involvement), xray with no gas patterns, case discussed with ortho and medicine, given low suspicious for septic joint and pt has good ROM s/p analgesics, rec medicine admission and ortho consultation, accepted by  under Dr. Aviles pt p/w atraumatic L knee pain, swelling, redness, and now with spontaneous drainage from the region plus surrounding cellulitic skin changes, low grade temp, tachycardiac to 140 on arrival, normotensive, non toxic appearing, sepsis protocol initiated, site milked and expressed with wound cx obtained, empirically covered with vanco/ceftriaxone/azithro (including atypical GC/CG cause of monoarticular involvement), xray with no gas patterns, presentation concerning for disseminated gonococcal infection, case discussed with ortho and medicine, given low suspicious for septic joint and pt has good ROM s/p analgesics, rec medicine admission and ortho consultation, accepted by  under Dr. Aviles

## 2019-08-06 NOTE — H&P ADULT - PROBLEM SELECTOR PLAN 4
Patient states that he was having penile discharge and pain with ejaculation. Denies dysuria. U/A positive for nitrates and LE.   - f/u urine culture Patient states that he was having penile discharge and pain with ejaculation. Denies dysuria. U/A positive for nitrates and LE.   - f/u urine culture    ADDENDUM: #left inguinal LAD: noted on exam, pt apparently tried to hennessy LAD w/ needle, monitor for signs of infection, appears to have healing abrasion, nontender

## 2019-08-06 NOTE — H&P ADULT - NSHPSOCIALHISTORY_GEN_ALL_CORE
Marital Status:  single  Recent Travel: No recent travel  Occupation:   Mobility: fully mobile   Substance Use (street drugs): meth, GHB, marijuana  Tobacco Usage:  current smoker 1 pack/3 days for 5 years  Alcohol Usage: None Marital Status:  single, sexually active with multiple male partners  Recent Travel: No recent travel  Occupation:   Mobility: fully mobile   Substance Use (street drugs): meth, GHB, marijuana  Tobacco Usage:  current smoker 1 pack/3 days for 5 years  Alcohol Usage: None Sexual hx:  single, sexually active with multiple male partners, does not use protection   Recent Travel: No recent travel  Occupation:   Mobility: fully mobile   Substance Use (street drugs): meth, GHB, marijuana  Tobacco Usage:  current smoker 1 pack/3 days for 5 years  Alcohol Usage: None

## 2019-08-06 NOTE — H&P ADULT - ASSESSMENT
39 y.o. M with PMH of untreated HIV with unknown CD4 and viral load admitted for management of left knee abscess, possible septic arthritis, and possible sepsis. Patient is afebrile with normal white count, and knee pain is much improved after I&D in the ER. 38 yo M with PMH of HIV (never treated, dx 2003, unknown VL and CD4 count), IVDU (uses clean needles), hx of abscesses who presented to the ED with 1 week of worsening left knee pain and swelling. Admitted to the regional medical floors for further work-up. 40 yo M with PMH of HIV (never treated, dx 2003, unknown VL and CD4 count), IVDU (uses clean needles), hx of abscesses who presented to the ED with 1 week of worsening left knee pain and swelling. Admitted to the regional medical floors for further work-up.     Patient presenting with worsening left knee pain and swelling associated with redness, purulent discharge w/ associated sxs fevers and chills. Ambulate with a cane and has full range of motion. X-ray of the left knee showed soft tissue swelling.

## 2019-08-06 NOTE — H&P ADULT - ATTENDING COMMENTS
patient seen and examined  reviewed pertinent data, h&p  pe as above, pt w/ nontender scrotal ulcerations. also w/ nontender Left inguinal LAD w/ evidence of pt piercing LAD w/ needle, noted to have healing abrasion, no signs of infection   medical decision making : high complexity     1. left knee cellulitis / abscess, on vancomycin, followup ctxs, daily dressing changes  2. HIV : followup labs, HIV consult as pt interested in starting HARRT.   3. followup STI testing, monitor scrotal and Left inguinal lesions    rest of plan as above

## 2019-08-06 NOTE — H&P ADULT - PROBLEM SELECTOR PLAN 1
- f/u on culture results  - daily dressing changes Patient presenting with worsening left knee pain and swelling associated with redness, purulent discharge w/ associated sxs fevers and chills. Ambulate with a cane and has full range of motion. X-ray of the left knee showed soft tissue swelling.    - c/w vancomycin 1500mg BID, next trough on 8/7 at 16:00   - f/u orthopedic rec's   - f/u wound care nursing; need daily packing change and patient teaching for packing change Patient presenting with worsening left knee pain and swelling associated with redness, purulent discharge w/ associated sxs fevers and chills. Ambulate with a cane and has full range of motion. X-ray of the left knee showed soft tissue swelling.  - c/w vancomycin 1500mg BID, next trough on 8/7 at 16:00   - f/u orthopedic rec's   - f/u wound care nursing; need daily packing change and patient teaching for packing change  - f/u culture data

## 2019-08-06 NOTE — H&P ADULT - HISTORY OF PRESENT ILLNESS
Patient is a 39 y,o IVDU M with PMH of HIV (last viral load and CD4 count done 3 months ago, unknown), hx of abscesses who presented to the ED with 1 week of worsening left knee pain and swelling. He first noticed the swelling 1 week ago with no precipitating event. At the time pain was 3/10 and swelling associated with a small amount of clear discharge. Pain has progressively gotten worse over the last week, now 8/10 and associated with around 1 cup of purulent discharge with occasional blood clots. Last night he also experienced fevers and chills. Pain is not alleviated with advil and is worsened by movement and touch. He is able to ambulate with a cane and has full range of motion. Denies other joint pain, tingling, trauma, recent travel, recent URI, history of previous knee injury or similar episodes. For the past three days he has also had penile discharge and testicular rash. Denies dysuria but endorses pain with ejaculation. Patient is a 39 y,o IVDU M with PMH of HIV (last viral load and CD4 count done 3 months ago, unknown), hx of abscesses who presented to the ED with 1 week of worsening left knee pain and swelling. He first noticed the swelling 1 week ago with no precipitating event. At the time pain was 3/10 and swelling associated with a small amount of clear discharge. Pain has progressively gotten worse over the last week, now 8/10, swelling extended to his entire left leg with redness, and associated with around 1 cup of purulent discharge with occasional blood clots. Last night he also experienced fevers and chills. Pain is not alleviated with advil and is worsened by movement and touch. He is able to ambulate with a cane and has full range of motion. Denies other joint pain, tingling, numbness, weakness, trauma, recent travel, recent URI, history of previous knee injury or similar episodes. For the past three days he has also had penile discharge and testicular rash. Denies dysuria but endorses pain with ejaculation. Patient is a 39 y,o IVDU M with PMH of HIV (last viral load and CD4 count done 3 months ago, unknown), hx of abscesses who presented to the ED with 1 week of worsening left knee pain and swelling. He first noticed the swelling 1 week ago with no precipitating event. At the time pain was 3/10 and swelling associated with a small amount of clear discharge. Pain has progressively gotten worse over the last week, now 8/10, swelling extended to his entire left leg with redness, and associated with around 1 cup of purulent discharge with occasional blood clots. Last night he also experienced fevers and chills. Pain is not alleviated with advil and is worsened by movement and touch. He is able to ambulate with a cane and has full range of motion. Denies other joint pain, tingling, numbness, weakness, trauma, recent travel, recent URI, history of previous knee injury or similar episodes. For the past three days he has also had penile discharge and testicular rash. Denies dysuria but endorses pain with ejaculation.     In the ED he was tachycardic , T 99.4, /69, RR 17. He received 1L of LR and 2300mL of NS, 1g of ceftriaxone and azithromycin for presumed GC/chlamydia infection, 2 doses of 1500mg of vancomycin for suspected sepsis. CXR showed no acute pulmonary disease, x-ray of the left knee showed soft tissue swelling. Labs showed elevated CRP (>12), no WBC, and hgb of 10.9. He was admitted to 38 yo M with PMH of HIV (never treated, dx 2003, unknown VL and CD4 count), IVDU (uses clean needles), hx of abscesses who presented to the ED with 1 week of worsening left knee pain and swelling. He first noticed the swelling 1 week ago with no precipitating event. At the time pain was 3/10 and swelling associated with a small amount of clear discharge. Pain has progressively gotten worse over the last week, now 8/10, swelling extended to his entire left leg with redness, and associated with around 1 cup of purulent discharge with occasional blood clots. Last night he also experienced fevers and chills. Pain is not alleviated with Advil and is worsened by movement and touch. He is able to ambulate with a cane and has full range of motion. Denies other joint pain, tingling, numbness, weakness, trauma, recent travel, recent URI, history of previous knee injury or similar episodes. For the past three days he has also had penile discharge and testicular rash. Denies dysuria but endorses pain with ejaculation.  Patient is MSM,     In the ED he was tachycardic , T 99.4, /69, RR 17. He received 1L of LR and 2300mL of NS, 1g of ceftriaxone and azithromycin for presumed GC/chlamydia infection, 2 doses of 1500mg of vancomycin for suspected sepsis. CXR showed no acute pulmonary disease, x-ray of the left knee showed soft tissue swelling. Labs showed elevated CRP (>12), no WBC, and hgb of 10.9. He was admitted to 38 yo M with PMH of HIV (never treated, dx 2003, unknown VL and CD4 count), IVDU (uses clean needles), hx of abscesses who presented to the ED with 1 week of worsening left knee pain and swelling. He first noticed the swelling 1 week ago with no precipitating event. At the time pain was 3/10 and swelling associated with a small amount of clear discharge. Pain has progressively gotten worse over the last week, now 8/10, swelling extended to his entire left leg with redness, and associated with around 1 cup of purulent discharge with occasional blood clots. Last night he also experienced fevers and chills. Pain is not alleviated with Advil and is worsened by movement and touch. He is able to ambulate with a cane and has full range of motion. Denies other joint pain, tingling, numbness, weakness, trauma, recent travel, recent URI, history of previous knee injury or similar episodes. For the past three days he has also had penile discharge and testicular rash. Denies dysuria but endorses pain with ejaculation.     In the ED he was tachycardic , T 99.4, /69, RR 17. He received 1L of LR and 2300mL of NS, 1g of ceftriaxone and azithromycin for presumed GC/chlamydia infection, 2 doses of 1500mg of vancomycin for suspected sepsis. CXR showed no acute pulmonary disease, x-ray of the left knee showed soft tissue swelling. Labs showed elevated CRP (>12), no WBC, and hgb of 10.9. 38 yo M with PMH of HIV (never treated, dx 2003, unknown VL and CD4 count), IVDU (uses clean needles), hx of abscesses who presented to the ED with 1 week of worsening left knee pain and swelling. He first noticed the swelling 1 week ago with no precipitating event. At the time pain was 3/10 and swelling associated with a small amount of clear discharge. Pain has progressively gotten worse over the last week, now 8/10, swelling extended to his entire left leg with redness, and associated with around 1 cup of purulent discharge with occasional blood clots. Last night he also experienced fevers and chills. Pain is not alleviated with Advil and is worsened by movement and touch. He is able to ambulate with a cane and has full range of motion. Denies other joint pain, tingling, numbness, weakness, trauma, recent travel, recent URI, history of previous knee injury or similar episodes. For the past three days he has also had penile discharge and testicular rash. Denies dysuria but endorses pain with ejaculation. In the ED he was tachycardic -139, T 99.4, /69, RR 17. He received 2300mL of NS, started on LR @150cc/hr, given 1g of ceftriaxone and azithromycin for presumed GC/chlamydia infection, 2 doses of 1500mg of vancomycin for purulent cellulitis associated with abscess. CXR showed no acute pulmonary disease, x-ray of the left knee showed soft tissue swelling. Labs showed elevated CRP (>12), no WBC, and hgb of 10.9.

## 2019-08-06 NOTE — ED PROVIDER NOTE - DATE/TIME 4
Okay, that's fine! Thank you! I will make sure she signs a release of records at her appointment.
Patient called, said that she does not want to do the same testing over and over again that she knows that she was positive for HPV 6 months ago in [place] when she follow GYN and they did a colposcopy, they advised her to follow up with them in 6 months. I advised patient that the last note I see on the results states that  will discuss these results further at her next visit, that she may be having an ultrasound in the office. But all of this will be decided and discussed at that time. I also advised patient that she should sign a record release at that appointment so that we can obtain those past records so that dr has all of the information of her past and current condition so that all the pieces of the puzzle fit. Patient was more comfortable with this and said that she will discuss all of this at the appointment.
Thank you, Hope Martinez! Once I receive the patient's records, I will have a better understanding of patient's medical conditions, especially in regards to her gynecologic health. She is scheduled for an appointment on Sept. 21 for a transvaginal US with Dr. Karlene Castillo and me for irregular menstrual bleeding. Pap results show low-grade squamous intraepithelial lesion and mild dysplasia. I will discuss patient's concerns with her at her Sept. 21 appointment. If possible, will patient be able to come in before her appointment to sign a release of records? I would like to have as much of her medical record available to me for her next appointment. Thank you!
06-Aug-2019 05:40

## 2019-08-06 NOTE — ED PROVIDER NOTE - CHIEF COMPLAINT
The patient is a 39y Male complaining of knee pain The patient is a 39y Male complaining of L knee pain

## 2019-08-06 NOTE — ED PROVIDER NOTE - OBJECTIVE STATEMENT
40 yo M with PMHx of HIV, last CD4/VL 40 yo M with PMHx of HIV, last CD4/VL unknown, never treated, polysubstance abuse, presenting c/o worsening L knee pain and swelling. Pt reports having atraumatic L knee pain x 1 wk with progressive worsening redness and swelling. Noted site spontaneously opened up today with purulent drainage with worsening pain and subjective fever and chills. Denies trauma, fall, FB sensation, change in sensation, paresthesia, bleeding, insect bite, N/V, HA, dizziness, LOC, CP, SOB, rash, urinary sx, malaise and focal weakness 38 yo M with PMHx of HIV, last CD4/VL unknown, never treated, polysubstance abuse, presenting c/o worsening L knee pain and swelling. Pt reports having atraumatic L knee pain x 1 wk with progressive worsening redness and swelling. Noted site spontaneously opened up today with purulent drainage with worsening pain and subjective fever and chills. Denies trauma, fall, FB sensation, change in sensation, paresthesia, bleeding, insect bite, N/V, HA, dizziness, LOC, CP, SOB, rash, urinary sx, malaise and focal weakness. Last IVDU 2d ago - crystal meth

## 2019-08-07 ENCOUNTER — TRANSCRIPTION ENCOUNTER (OUTPATIENT)
Age: 39
End: 2019-08-07

## 2019-08-07 LAB
4/8 RATIO: 0.46 RATIO — LOW (ref 0.9–3.6)
ABS CD8: 553 /UL — SIGNIFICANT CHANGE UP (ref 142–740)
ALBUMIN SERPL ELPH-MCNC: 2.9 G/DL — LOW (ref 3.3–5)
ALP SERPL-CCNC: 73 U/L — SIGNIFICANT CHANGE UP (ref 40–120)
ALT FLD-CCNC: 20 U/L — SIGNIFICANT CHANGE UP (ref 10–45)
ANION GAP SERPL CALC-SCNC: 9 MMOL/L — SIGNIFICANT CHANGE UP (ref 5–17)
AST SERPL-CCNC: 18 U/L — SIGNIFICANT CHANGE UP (ref 10–40)
BILIRUB SERPL-MCNC: 0.3 MG/DL — SIGNIFICANT CHANGE UP (ref 0.2–1.2)
BUN SERPL-MCNC: 5 MG/DL — LOW (ref 7–23)
CALCIUM SERPL-MCNC: 8.3 MG/DL — LOW (ref 8.4–10.5)
CD16+CD56+ CELLS NFR BLD: 5 % — SIGNIFICANT CHANGE UP (ref 5–23)
CD16+CD56+ CELLS NFR SPEC: 57 /UL — LOW (ref 71–410)
CD19 BLASTS SPEC-ACNC: 11 % — SIGNIFICANT CHANGE UP (ref 6–24)
CD19 BLASTS SPEC-ACNC: 113 /UL — SIGNIFICANT CHANGE UP (ref 84–469)
CD3 BLASTS SPEC-ACNC: 82 % — SIGNIFICANT CHANGE UP (ref 59–83)
CD3 BLASTS SPEC-ACNC: 843 /UL — SIGNIFICANT CHANGE UP (ref 672–1870)
CD4 %: 25 % — LOW (ref 30–62)
CD8 %: 55 % — HIGH (ref 12–36)
CHLORIDE SERPL-SCNC: 98 MMOL/L — SIGNIFICANT CHANGE UP (ref 96–108)
CHOLEST SERPL-MCNC: 82 MG/DL — SIGNIFICANT CHANGE UP (ref 10–199)
CO2 SERPL-SCNC: 27 MMOL/L — SIGNIFICANT CHANGE UP (ref 22–31)
CREAT SERPL-MCNC: 0.67 MG/DL — SIGNIFICANT CHANGE UP (ref 0.5–1.3)
CRP SERPL-MCNC: 11.3 MG/DL — HIGH (ref 0–0.4)
CULTURE RESULTS: NO GROWTH — SIGNIFICANT CHANGE UP
ERYTHROCYTE [SEDIMENTATION RATE] IN BLOOD: 95 MM/HR — HIGH
FERRITIN SERPL-MCNC: 160 NG/ML — SIGNIFICANT CHANGE UP (ref 30–400)
GLUCOSE SERPL-MCNC: 123 MG/DL — HIGH (ref 70–99)
GRAM STN FLD: SIGNIFICANT CHANGE UP
HAV IGM SER-ACNC: SIGNIFICANT CHANGE UP
HBA1C BLD-MCNC: 5 % — SIGNIFICANT CHANGE UP (ref 4–5.6)
HBV CORE IGM SER-ACNC: SIGNIFICANT CHANGE UP
HBV SURFACE AG SER-ACNC: SIGNIFICANT CHANGE UP
HCT VFR BLD CALC: 31.6 % — LOW (ref 39–50)
HCV AB S/CO SERPL IA: 0.1 S/CO — SIGNIFICANT CHANGE UP
HCV AB SERPL-IMP: SIGNIFICANT CHANGE UP
HDLC SERPL-MCNC: 22 MG/DL — LOW
HGB BLD-MCNC: 9.3 G/DL — LOW (ref 13–17)
HSV1 IGG SER-ACNC: 24.8 INDEX — HIGH
HSV1 IGG SERPL QL IA: POSITIVE
HSV2 IGG FLD-ACNC: 0.28 INDEX — SIGNIFICANT CHANGE UP
HSV2 IGG SERPL QL IA: NEGATIVE — SIGNIFICANT CHANGE UP
IRON SATN MFR SERPL: 14 UG/DL — LOW (ref 45–165)
IRON SATN MFR SERPL: 7 % — LOW (ref 16–55)
LIPID PNL WITH DIRECT LDL SERPL: 49 MG/DL — SIGNIFICANT CHANGE UP
MAGNESIUM SERPL-MCNC: 2.1 MG/DL — SIGNIFICANT CHANGE UP (ref 1.6–2.6)
MCHC RBC-ENTMCNC: 24.9 PG — LOW (ref 27–34)
MCHC RBC-ENTMCNC: 29.4 GM/DL — LOW (ref 32–36)
MCV RBC AUTO: 84.5 FL — SIGNIFICANT CHANGE UP (ref 80–100)
NRBC # BLD: 0 /100 WBCS — SIGNIFICANT CHANGE UP (ref 0–0)
PHOSPHATE SERPL-MCNC: 3.5 MG/DL — SIGNIFICANT CHANGE UP (ref 2.5–4.5)
PLATELET # BLD AUTO: 302 K/UL — SIGNIFICANT CHANGE UP (ref 150–400)
POTASSIUM SERPL-MCNC: 3.9 MMOL/L — SIGNIFICANT CHANGE UP (ref 3.5–5.3)
POTASSIUM SERPL-SCNC: 3.9 MMOL/L — SIGNIFICANT CHANGE UP (ref 3.5–5.3)
PROT SERPL-MCNC: 7 G/DL — SIGNIFICANT CHANGE UP (ref 6–8.3)
RBC # BLD: 3.74 M/UL — LOW (ref 4.2–5.8)
RBC # FLD: 14.9 % — HIGH (ref 10.3–14.5)
SODIUM SERPL-SCNC: 134 MMOL/L — LOW (ref 135–145)
SPECIMEN SOURCE: SIGNIFICANT CHANGE UP
SPECIMEN SOURCE: SIGNIFICANT CHANGE UP
T-CELL CD4 SUBSET PNL BLD: 253 /UL — LOW (ref 489–1457)
TIBC SERPL-MCNC: 187 UG/DL — LOW (ref 220–430)
TOTAL CHOLESTEROL/HDL RATIO MEASUREMENT: 3.7 RATIO — SIGNIFICANT CHANGE UP (ref 3.4–9.6)
TRANSFERRIN SERPL-MCNC: 146 MG/DL — LOW (ref 200–360)
TRIGL SERPL-MCNC: 55 MG/DL — SIGNIFICANT CHANGE UP (ref 10–149)
TSH SERPL-MCNC: 2.29 UIU/ML — SIGNIFICANT CHANGE UP (ref 0.35–4.94)
UIBC SERPL-MCNC: 173 UG/DL — SIGNIFICANT CHANGE UP (ref 110–370)
VANCOMYCIN TROUGH SERPL-MCNC: 5.8 UG/ML — LOW (ref 10–20)
WBC # BLD: 6.72 K/UL — SIGNIFICANT CHANGE UP (ref 3.8–10.5)
WBC # FLD AUTO: 6.72 K/UL — SIGNIFICANT CHANGE UP (ref 3.8–10.5)

## 2019-08-07 PROCEDURE — 99233 SBSQ HOSP IP/OBS HIGH 50: CPT | Mod: GC

## 2019-08-07 PROCEDURE — 99232 SBSQ HOSP IP/OBS MODERATE 35: CPT | Mod: GC

## 2019-08-07 RX ORDER — VANCOMYCIN HCL 1 G
2000 VIAL (EA) INTRAVENOUS EVERY 12 HOURS
Refills: 0 | Status: DISCONTINUED | OUTPATIENT
Start: 2019-08-07 | End: 2019-08-08

## 2019-08-07 RX ORDER — FERROUS SULFATE 325(65) MG
325 TABLET ORAL DAILY
Refills: 0 | Status: DISCONTINUED | OUTPATIENT
Start: 2019-08-07 | End: 2019-08-08

## 2019-08-07 RX ADMIN — OXYCODONE AND ACETAMINOPHEN 2 TABLET(S): 5; 325 TABLET ORAL at 09:05

## 2019-08-07 RX ADMIN — Medication 250 MILLIGRAM(S): at 18:36

## 2019-08-07 RX ADMIN — OXYCODONE AND ACETAMINOPHEN 2 TABLET(S): 5; 325 TABLET ORAL at 16:43

## 2019-08-07 RX ADMIN — Medication 325 MILLIGRAM(S): at 15:40

## 2019-08-07 RX ADMIN — Medication 300 MILLIGRAM(S): at 04:27

## 2019-08-07 RX ADMIN — OXYCODONE AND ACETAMINOPHEN 2 TABLET(S): 5; 325 TABLET ORAL at 10:05

## 2019-08-07 RX ADMIN — OXYCODONE AND ACETAMINOPHEN 2 TABLET(S): 5; 325 TABLET ORAL at 00:55

## 2019-08-07 RX ADMIN — OXYCODONE AND ACETAMINOPHEN 2 TABLET(S): 5; 325 TABLET ORAL at 15:43

## 2019-08-07 RX ADMIN — OXYCODONE AND ACETAMINOPHEN 2 TABLET(S): 5; 325 TABLET ORAL at 00:13
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
## 2019-08-07 NOTE — PROGRESS NOTE ADULT - PROBLEM SELECTOR PLAN 2
Patient with a hx of HIV - never treated in the past, dx in the ED in early 2000. Open to start treatment given multiple infections and likely suppressed immune system.   - f/u VL   - f/u CD4 count   - Consult HIV in the AM  - f/u A1c, TSH, lipid panel, hepatitis prior to initiating HAART therapy   - Need genotype testing Patient with a hx of HIV - never treated in the past, dx in the ED in early 2000. Open to start treatment given multiple infections and likely suppressed immune system. A1C 5.0, TSH  2.295, lipid panel WNL except HDL 22, and hepatitis panel negative.   A1c, TSH, lipid panel, hepatitis prior to initiating HAART therapy   - f/u with viral load  - f/u CD4 count   - f/u genotype testing

## 2019-08-07 NOTE — PROGRESS NOTE ADULT - PROBLEM SELECTOR PLAN 6
Patient presented w/ tachycardia in the ED. Likely in the setting of pain at the knee joint. EKG: Sinus tachycardia, no significant ST elevation (computer read), possible J-point elevation, pending STAT repeat EKG  - pain control with ibuprofen  Tablet. 400 milliGRAM(s) Oral every 6 hours PRN Mild Pain (1 - 3), ibuprofen  Tablet. 600 milliGRAM(s) Oral every 6 hours PRN Moderate Pain (4 - 6), oxyCODONE    5 mG/acetaminophen 325 mG 2 Tablet(s) Oral every 6 hours PRN Severe Pain (7 - 10)  - f/u EKG Patient with microcytic anemia; states he was mild bleeding from drained abscess. Continue to monitor bleeding at the I+D site. Patient states that he has a hx of anemia. Likely multifactorial with anemia of chronic disease related to long standing untreated HIV v recent bleeding from site of abscess and cellulitis.  - continues to trend CBC

## 2019-08-07 NOTE — PROGRESS NOTE ADULT - PROBLEM SELECTOR PLAN 1
Had worsening left knee pain and swelling associated with redness, purulent discharge w/ associated sxs fevers and chills. Ambulate with a cane and has full range of motion. X-ray of the left knee showed soft tissue swelling.  - c/w vancomycin 1500mg BID, next trough on 8/7 at 16:00   - f/u orthopedic rec's   - f/u wound care nursing; need daily packing change and patient teaching for packing change  - f/u culture data Had worsening left knee pain and swelling associated with redness, purulent discharge w/ associated sxs fevers and chills. Ambulate with a cane and has full range of motion. X-ray of the left knee showed soft tissue swelling.  - c/w vancomycin 1500mg BID, next trough on 8/7 at 16:00, no more vanc troughs if therapeutic   - f/u orthopedic rec's   - f/u wound care nursing; need daily packing change and patient teaching for packing change  - f/u culture data (blood cx, urine cx, and aspirate cx)

## 2019-08-07 NOTE — DISCHARGE NOTE PROVIDER - CARE PROVIDER_API CALL
Jimena Rowell)  Internal Medicine  [address] [address]  Phone: [phone]  Fax: [phone]  Follow Up Time:

## 2019-08-07 NOTE — PROGRESS NOTE ADULT - PROBLEM SELECTOR PLAN 9
F: s/p 2.3L and LR, tolerating PO, encourage PO intake   E: replete PRN   N: Regular diet   DVT ppx: Low risk, no pharmacological ppx need   GI ppx: None   FULL CODE 1) PCP Contacted on Admission: (Y/N) --> Name & Phone #: None, will like to follow with HIV team; currently in the process of getting insurance   2) Date of Contact with PCP:  3) PCP Contacted at Discharge: (Y/N)  4) Summary of Handoff Given to PCP:   5) Post-Discharge Appointment Date and Location:

## 2019-08-07 NOTE — PROGRESS NOTE ADULT - ASSESSMENT
40 yo M with PMH of HIV (never treated, dx 2003, unknown VL and CD4 count), IVDU (uses clean needles), hx of abscesses, who presented with worsening left knee pain and swelling, s/p I&D, and admitted to the regional medical floors for abx treatment.

## 2019-08-07 NOTE — CONSULT NOTE ADULT - SUBJECTIVE AND OBJECTIVE BOX
HIV CONSULT NOTE  CHIEF COMPLAINT:  Patient is a 39y old  Male who presents with a chief complaint of Left knee pain (07 Aug 2019 08:52)    HPI:  RAMBO ALBERTS is a 39y Male with Hx of HIV (dx , never on medication, VL/CD4 unknown, no history OI's), IVDU with hx of abscess, GC+chlamydia+syphilis 2 years ago, presenting for 1 week of worsening R knee pain accompanied with fluctuance and drainage. Patient also is complaining of three days of testicular pain, penile ulcer, and rash. He has history of syphilis and gonorrhea/chlamydia diagnosed 2 years ago, all of which he states were treated. He has never been on antiretroviral therapy, stating "my friends got worse after they started," but is now willing to begin therapy and follow up.     Outpatient HIV Provider: None  Year of HIV Diagnosis:   T cell vamsi: Unknown  Highest Viral Load: Unknown  Current ARV regimen: None  ARV adherence: None  Previous ARV regimens: N/a  Hx of Past Oppurtunistic Infections: Denies    PAST MEDICAL & SURGICAL HISTORY:  Anemia  IVDU (intravenous drug user)  Abscess  HIV (human immunodeficiency virus infection)  No significant past surgical history      Sexual history:  MSM, 4 partners in last year, intermittent condom use, history of STD's inc gc+chlamydia+syphilis.       REVIEW OF SYSTEMS:  Constitutional: [ ] fevers [x] chills [ ] fatigue [x] malaise [ ] myalgias [ ] arthralgias [x] weight loss  Eyes: [ ] double vision [ ] eye pain  [ ] visual changes  ENT: [ ] sore throat [ ] odynophagia [ ] mouth pain [ ] rhinorrhea  CV: [ ] chest pain [ ] shortness of breath  [ ] edema  Respiratory:  [ ] cough [ ] sputum production [ ] wheezing [ ] shortness of breath  GI: [ ] abdominal pain [ ] nausea [ ] vomiting [ ]  constipation [ ] diarrhea  : [ ] suprapubic pain [ ] dysuria [ ] polyuria [ ] penile/vaginal discharge [x] genital lesions  Extremities: [x] open wound, knee pain  Heme/Lymph: [ ] easy bleeding [ ] swollen lymph nodes  Skin: [ ] rashes [ ] skin lesions  Neuro: [ ] headache [ ] neck tenderness [ ] focal motor weakness [ ] sensory changes [ ] paresthesias    PHYSICAL EXAM:    Vital Signs Last 24 Hrs  T(C): 36.7 (07 Aug 2019 09:07), Max: 37.6 (07 Aug 2019 01:39)  T(F): 98.1 (07 Aug 2019 09:07), Max: 99.7 (07 Aug 2019 01:39)  HR: 100 (07 Aug 2019 09:07) (87 - 118)  BP: 113/72 (07 Aug 2019 09:07) (102/63 - 119/72)  BP(mean): --  RR: 18 (07 Aug 2019 09:07) (18 - 19)  SpO2: 100% (07 Aug 2019 09:07) (95% - 100%)    General: AOx3 thin appearing male in no acute distress lying in bed  HEENT: PERRL/ EOMI, no scleral icterus, MMM, no JVD, no thyromegaly, good dentition, no oropharyngeal lesions, no thrush  Respiratory: CTA b/l, no wheezes, rales or rhonchi  Cardiovascular: Regular rate and rhythm, +S1 + S2, no murmurs rubs or gallops  Abdomen: Soft, NTND, normoactive bowel sounds, no rebound, no guarding, no suprapubic tenderness  : No warts, vesicles, ulcerations, genital lesions, urethral discharge  Rectal: No anal warts, external hemorrhoids, good rectal tone, prostate normal in size and consistency, Stool normal in color and consistency, blood in the vault.  Extremities: No cyanosis, no clubbing, no edema, pulses equal, no calf tenderness  Skin: No rashes, skin lesions, palmar rash, or plantar rash  Lymphatic: No cervical/supraclavicular LAD  Lymph: No lymphadenopathy, enlargement or tenderness detected in the submandibular nodes, anterior cervical nodes, posterior cervical nodes, supraclavicular node, axillary nodes, inguinal nodes  Neurological: CN II-XII grossly intact, follows commands, moves all extremities        MEDICATIONS  (STANDING):  ferrous    sulfate 325 milliGRAM(s) Oral daily  vancomycin  IVPB 1500 milliGRAM(s) IV Intermittent every 12 hours    MEDICATIONS  (PRN):  ibuprofen  Tablet. 400 milliGRAM(s) Oral every 6 hours PRN Mild Pain (1 - 3)  ibuprofen  Tablet. 600 milliGRAM(s) Oral every 6 hours PRN Moderate Pain (4 - 6)  oxyCODONE    5 mG/acetaminophen 325 mG 2 Tablet(s) Oral every 6 hours PRN Severe Pain (7 - 10)      Allergies    No Known Allergies    Intolerances      LABS:                        9.3    6.72  )-----------( 302      ( 07 Aug 2019 05:54 )             31.6                           9.3    6.72  )-----------( 302      ( 07 Aug 2019 05:54 )             31.6     Neutrophils:77.5   Bands:--   Lymphocytes:13.8   Monocytes:6.3   Eosinophils:1.4   Basophils:0.2   - @ 03:28    08-    134<L>  |  98  |  5<L>  ----------------------------<  123<H>  3.9   |  27  |  0.67    Ca    8.3<L>      07 Aug 2019 05:54  Phos  3.5     08-  Mg     2.1     08-    TPro  7.0  /  Alb  2.9<L>  /  TBili  0.3  /  DBili  x   /  AST  18  /  ALT  20  /  AlkPhos  73  08-07    PT/INR - ( 06 Aug 2019 03:28 )   PT: 16.7 sec;   INR: 1.49          PTT - ( 06 Aug 2019 03:28 )  PTT:21.7 sec  Urinalysis Basic - ( 06 Aug 2019 05:25 )    Color: Yellow / Appearance: Clear / S.010 / pH: x  Gluc: x / Ketone: NEGATIVE  / Bili: NEGATIVE / Urobili: 1.0 E.U./dL   Blood: x / Protein: Trace mg/dL / Nitrite: POSITIVE   Leuk Esterase: Trace / RBC: < 5 /HPF / WBC 5-10 /HPF   Sq Epi: x / Non Sq Epi: 0-5 /HPF / Bacteria: Many /HPF        25 %  253 /uL      MICROBIOLOGY:      RADIOLOGY: HIV CONSULT NOTE  CHIEF COMPLAINT:  Patient is a 39y old  Male who presents with a chief complaint of Left knee pain (07 Aug 2019 08:52)    HPI:  RAMBO ALBERTS is a 39y Male with Hx of HIV (dx , never on medication, VL/CD4 unknown, no history OI's), IVDU with hx of abscess, GC+chlamydia+syphilis 2 years ago, presenting for 1 week of worsening R knee pain accompanied with fluctuance and drainage. Patient also is complaining of three days of testicular pain, penile ulcer, and rash. He has history of syphilis and gonorrhea/chlamydia diagnosed 2 years ago, all of which he states were treated. He has never been on antiretroviral therapy, stating "my friends got worse after they started," but is now willing to begin therapy and follow up.     Outpatient HIV Provider: None  Year of HIV Diagnosis:   T cell vamsi: Unknown  Highest Viral Load: Unknown  Current ARV regimen: None  ARV adherence: None  Previous ARV regimens: N/a  Hx of Past Oppurtunistic Infections: Denies    PAST MEDICAL & SURGICAL HISTORY:  Anemia  IVDU (intravenous drug user)  Abscess  HIV (human immunodeficiency virus infection)  No significant past surgical history      Sexual history:  MSM, 4 partners in last year, intermittent condom use, history of STD's inc gc+chlamydia+syphilis.       REVIEW OF SYSTEMS:  Constitutional: [ ] fevers [x] chills [ ] fatigue [x] malaise [ ] myalgias [ ] arthralgias [x] weight loss  Eyes: [ ] double vision [ ] eye pain  [ ] visual changes  ENT: [ ] sore throat [ ] odynophagia [ ] mouth pain [ ] rhinorrhea  CV: [ ] chest pain [ ] shortness of breath  [ ] edema  Respiratory:  [ ] cough [ ] sputum production [ ] wheezing [ ] shortness of breath  GI: [ ] abdominal pain [ ] nausea [ ] vomiting [ ]  constipation [ ] diarrhea  : [ ] suprapubic pain [ ] dysuria [ ] polyuria [ ] penile/vaginal discharge [x] genital lesions  Extremities: [x] open wound, knee pain  Heme/Lymph: [ ] easy bleeding [ ] swollen lymph nodes  Skin: [ ] rashes [ ] skin lesions  Neuro: [ ] headache [ ] neck tenderness [ ] focal motor weakness [ ] sensory changes [ ] paresthesias    PHYSICAL EXAM:    Vital Signs Last 24 Hrs  T(C): 36.7 (07 Aug 2019 09:07), Max: 37.6 (07 Aug 2019 01:39)  T(F): 98.1 (07 Aug 2019 09:07), Max: 99.7 (07 Aug 2019 01:39)  HR: 100 (07 Aug 2019 09:07) (87 - 118)  BP: 113/72 (07 Aug 2019 09:07) (102/63 - 119/72)  BP(mean): --  RR: 18 (07 Aug 2019 09:07) (18 - 19)  SpO2: 100% (07 Aug 2019 09:07) (95% - 100%)    General: AOx3 thin appearing male in no acute distress lying in bed  HEENT: PERRL/ EOMI, no scleral icterus, MMM, no JVD, no thyromegaly, good dentition, no oropharyngeal lesions, no thrush  Respiratory: CTA b/l, no wheezes, rales or rhonchi  Cardiovascular: Regular rate and rhythm, +S1 + S2, no murmurs rubs or gallops  Abdomen: Soft, NTND, normoactive bowel sounds, no rebound, no guarding, no suprapubic tenderness  : Left testicular ulceration which is nontender and without purulence, surrounded by skin changes, penis is tender to palpation without rash, no active discharge. No lymphadenopathy noted.   Extremities: No cyanosis, no clubbing, no edema, pulses equal, no calf tenderness. L knee with 5agn8uv open wound with small purulent drainage, tender, faint surrounding erythema, without fluctuance.   Skin: No palmar rash, or plantar rash. No hyperpigmented areas.   Lymphatic: No cervical/supraclavicular LAD  Lymph: No lymphadenopathy, enlargement or tenderness detected in the submandibular nodes, anterior cervical nodes, posterior cervical nodes, supraclavicular node, axillary nodes, inguinal nodes  Neurological: CN II-XII grossly intact, follows commands, moves all extremities        MEDICATIONS  (STANDING):  ferrous    sulfate 325 milliGRAM(s) Oral daily  vancomycin  IVPB 1500 milliGRAM(s) IV Intermittent every 12 hours    MEDICATIONS  (PRN):  ibuprofen  Tablet. 400 milliGRAM(s) Oral every 6 hours PRN Mild Pain (1 - 3)  ibuprofen  Tablet. 600 milliGRAM(s) Oral every 6 hours PRN Moderate Pain (4 - 6)  oxyCODONE    5 mG/acetaminophen 325 mG 2 Tablet(s) Oral every 6 hours PRN Severe Pain (7 - 10)      Allergies    No Known Allergies    Intolerances      LABS:                        9.3    6.72  )-----------( 302      ( 07 Aug 2019 05:54 )             31.6                           9.3    6.72  )-----------( 302      ( 07 Aug 2019 05:54 )             31.6     Neutrophils:77.5   Bands:--   Lymphocytes:13.8   Monocytes:6.3   Eosinophils:1.4   Basophils:0.2   08- @ 03:28    08-    134<L>  |  98  |  5<L>  ----------------------------<  123<H>  3.9   |  27  |  0.67    Ca    8.3<L>      07 Aug 2019 05:54  Phos  3.5     08-  Mg     2.1     08-    TPro  7.0  /  Alb  2.9<L>  /  TBili  0.3  /  DBili  x   /  AST  18  /  ALT  20  /  AlkPhos  73  08-07    PT/INR - ( 06 Aug 2019 03:28 )   PT: 16.7 sec;   INR: 1.49          PTT - ( 06 Aug 2019 03:28 )  PTT:21.7 sec  Urinalysis Basic - ( 06 Aug 2019 05:25 )    Color: Yellow / Appearance: Clear / S.010 / pH: x  Gluc: x / Ketone: NEGATIVE  / Bili: NEGATIVE / Urobili: 1.0 E.U./dL   Blood: x / Protein: Trace mg/dL / Nitrite: POSITIVE   Leuk Esterase: Trace / RBC: < 5 /HPF / WBC 5-10 /HPF   Sq Epi: x / Non Sq Epi: 0-5 /HPF / Bacteria: Many /HPF        25 %  253 /uL      MICROBIOLOGY:      RADIOLOGY:

## 2019-08-07 NOTE — PROGRESS NOTE ADULT - PROBLEM SELECTOR PLAN 3
Patient states that 4 sexual partners, does not use condoms, does not engage in anal receptive sex. Given empirin coverage for Chlamydia and GC with azithromycin 1g and ceftriaxone 1g in the ED.   - f/u urine Chlamydia and GC  - F/u Syphilis screen and HSV screening   - if concern for gonorrhea arthritis will need longer duration of ceftriaxone Patient states that 4 sexual partners, does not use condoms, does not engage in anal receptive sex. Given empiric coverage for Chlamydia and GC with azithromycin 1g and ceftriaxone 1g in the ED. Urine Chlamydia and GC amplification is negative.   - F/u Syphilis screen and HSV screening   - if concern for gonorrhea arthritis will need longer duration of ceftriaxone

## 2019-08-07 NOTE — PROGRESS NOTE ADULT - PROBLEM SELECTOR PLAN 4
Patient states that he was having penile discharge and pain with ejaculation. Denies dysuria. U/A positive for nitrates and LE.   - f/u urine culture Patient states that he was having penile discharge and pain with ejaculation. Denies dysuria. U/A positive for nitrates and LE.   - f/u urine culture  - urine gonorrhea and chlamydia negative; consult ID about whether a triple screen is necessary

## 2019-08-07 NOTE — DISCHARGE NOTE PROVIDER - NSDCCPCAREPLAN_GEN_ALL_CORE_FT
PRINCIPAL DISCHARGE DIAGNOSIS  Diagnosis: Abscess of knee, left  Assessment and Plan of Treatment: You came in with left knee pain and you left knee swollen with purulent drainage; an incision and drainage performed in the ED; wound is currently packed and wrapped. Blood cultures, urine cultures, and aspirate cultures sent with no growth to date. You were given a dose of ceftriaxone and azithromycin in ED. You received two days of vancomycin and you will continue with bactrim after discharge.      SECONDARY DISCHARGE DIAGNOSES  Diagnosis: Sepsis  Assessment and Plan of Treatment: PRINCIPAL DISCHARGE DIAGNOSIS  Diagnosis: Abscess of knee, left  Assessment and Plan of Treatment: You came in with left knee pain and you left knee swollen with purulent drainage; an incision and drainage performed in the ED; wound is currently packed and wrapped. Blood cultures, urine cultures, and aspirate cultures sent with no growth to date. You were given a dose of ceftriaxone and azithromycin in ED. You received two days of vancomycin and you will continue with bactrim after discharge. Continue wound changes and packing as per nurse instructions.      SECONDARY DISCHARGE DIAGNOSES  Diagnosis: HIV disease  Assessment and Plan of Treatment: Follow up outpatient with Dr. Rowell.

## 2019-08-07 NOTE — DISCHARGE NOTE PROVIDER - HOSPITAL COURSE
40 yo M with PMH of HIV (never treated, dx 2003, unknown VL and CD4 count), IVDU (uses clean needles), hx of abscesses, who presented with worsening left knee pain and swelling, s/p I&D, and admitted to the regional medical floors for abx treatment.         Cellulitis and abscess. Had left knee swollen with purulent drainage; I&D performed in the ED; wound is currently packed and wrapped. Blood cultures, urine cultures, and aspirate cultures sent with no growth to date. Given dose of ceftriaxone and azithromycin in ED. Currently on vancomycin. Afebrile during this admission.         HIV disease.  Was diagnosed with HIV in 2003. No CD4 or VL in the past. Current Cd4 253. ID consult is following and may possibly start patient on HAART therapy.         High risk sexual behavior.  Has 4 sexual partners, does not use condoms, does not engage in anal receptive sex. Given empiric coverage for Chlamydia and GC with azithromycin 1g and ceftriaxone 1g in the ED. Urine Chlamydia and GC amplification is negative. F/u Syphilis screen and HSV screening.          Penile discharge. Patient states that he was having penile discharge and pain with ejaculation. Denies dysuria. U/A positive for nitrates and LE. F/u urine culture.         Testicular abnormality.  Came in with testicular rash. The testicle was nontender, but the scrotum had red lesions that were painful when palpated.         Anemia. Patient has microcytic anemia. There was bleeding at the site of the I&D. Anemia is most likely multifactorial with anemia of chronic disease related to long standing untreated HIV v recent bleeding from site of abscess and cellulitis.        IV drug abuse. Patient states he uses IV crystal meth; he states he used clean needles.         New medications:     Labs to be followed outpatient:     Exam to be followed outpatient: 40 yo M with PMH of HIV (never treated, dx 2003, unknown VL and CD4 count), IVDU (uses clean needles), hx of abscesses, who presented with worsening left knee pain and swelling, s/p I&D, and admitted to the regional medical floors for abx treatment.         Cellulitis and abscess. Had left knee swollen with purulent drainage; I&D performed in the ED; wound is currently packed and wrapped. Blood cultures, urine cultures, and aspirate cultures sent with no growth to date. Given dose of ceftriaxone and azithromycin in ED. Currently on vancomycin. Afebrile during this admission.         HIV disease.  Was diagnosed with HIV in 2003. No CD4 or VL in the past. Current Cd4 253. ID consult is following and may possibly start patient on HAART therapy.         High risk sexual behavior.  Has 4 sexual partners, does not use condoms, does not engage in anal receptive sex. Given empiric coverage for Chlamydia and GC with azithromycin 1g and ceftriaxone 1g in the ED. Urine Chlamydia and GC amplification is negative. F/u Syphilis screen and HSV screening.          Penile discharge. Patient states that he was having penile discharge and pain with ejaculation. Denies dysuria. U/A positive for nitrates and LE. F/u urine culture.         Testicular abnormality.  Came in with testicular rash. The testicle was nontender, but the scrotum had red lesions that were painful when palpated.         Anemia. Patient has microcytic anemia. There was bleeding at the site of the I&D. Anemia is most likely multifactorial with anemia of chronic disease related to long standing untreated HIV v recent bleeding from site of abscess and cellulitis.        IV drug abuse. Patient states he uses IV crystal meth; he states he used clean needles.          New medications:     Labs to be followed outpatient:     Exam to be followed outpatient: 38 yo M with PMH of HIV (never treated, dx 2003, unknown VL and CD4 count), IVDU (uses clean needles), hx of abscesses, who presented with worsening left knee pain and swelling, s/p I&D, and admitted to the regional medical floors for abx treatment.         Cellulitis and abscess. Had left knee swollen with purulent drainage; I&D performed in the ED; wound is currently packed and wrapped. Blood cultures, urine cultures, and aspirate cultures sent with no growth to date. Given dose of ceftriaxone and azithromycin in ED. Currently on vancomycin. Afebrile during this admission.         HIV disease.  Was diagnosed with HIV in 2003. No CD4 or VL in the past. Current Cd4 253. ID consult is following and may possibly start patient on HAART therapy.         High risk sexual behavior.  Has 4 sexual partners, does not use condoms, does not engage in anal receptive sex. Given empiric coverage for Chlamydia and GC with azithromycin 1g and ceftriaxone 1g in the ED. Urine Chlamydia and GC amplification is negative. F/u Syphilis screen and HSV screening.          Penile discharge. Patient states that he was having penile discharge and pain with ejaculation. Denies dysuria. U/A positive for nitrates and LE. F/u urine culture.         Testicular abnormality.  Came in with testicular rash. The testicle was nontender, but the scrotum had red lesions that were painful when palpated.         Anemia. Patient has microcytic anemia. There was bleeding at the site of the I&D. Anemia is most likely multifactorial with anemia of chronic disease related to long standing untreated HIV v recent bleeding from site of abscess and cellulitis.        IV drug abuse. Patient states he uses IV crystal meth; he states he used clean needles.          New medications: Bactrim DS 1 tab twice a day, ferrous sulfate (iron) daily, tylenol for pain as needed    Labs to be followed outpatient:     Exam to be followed outpatient: 40 yo M with PMH of HIV (never treated, dx 2003, unknown VL and CD4 count), IVDU (uses clean needles), hx of abscesses, who presented with worsening left knee pain and swelling, s/p I&D, and admitted to the regional medical floors for abx treatment.         Cellulitis and abscess. Had left knee swollen with purulent drainage; I&D performed in the ED; wound is currently packed and wrapped. Blood cultures, urine cultures, and aspirate cultures sent with no growth to date. Given dose of ceftriaxone and azithromycin in ED. Currently on vancomycin. Afebrile during this admission.         HIV disease.  Was diagnosed with HIV in 2003. No CD4 or VL in the past. Current Cd4 253. Viral Load 43,696. ID consult is following and may possibly start patient on HAART therapy.         High risk sexual behavior.  Has 4 sexual partners, does not use condoms, does not engage in anal receptive sex. Given empiric coverage for Chlamydia and GC with azithromycin 1g and ceftriaxone 1g in the ED. Urine Chlamydia and GC amplification is negative. F/u Syphilis screen and HSV screening.          Penile discharge. Patient states that he was having penile discharge and pain with ejaculation. Denies dysuria. U/A positive for nitrates and LE. F/u urine culture.         Testicular abnormality.  Came in with testicular rash. The testicle was nontender, but the scrotum had red lesions that were painful when palpated.         Anemia. Patient has microcytic anemia. There was bleeding at the site of the I&D. Anemia is most likely multifactorial with anemia of chronic disease related to long standing untreated HIV v recent bleeding from site of abscess and cellulitis.        IV drug abuse. Patient states he uses IV crystal meth; he states he used clean needles.          New medications: Bactrim DS 1 tab twice a day, ferrous sulfate (iron) daily, tylenol for pain as needed    Labs to be followed outpatient:     Exam to be followed outpatient: 40 yo M with PMH of HIV (never treated, dx 2003, unknown VL and CD4 count), IVDU (uses clean needles), hx of abscesses, who presented with worsening left knee pain and swelling, s/p I&D, and admitted to the regional medical floors for abx treatment.         Cellulitis and abscess. Had left knee swollen with purulent drainage; I&D performed in the ED; wound is currently packed and wrapped. Blood cultures, urine cultures, and aspirate cultures sent with no growth to date. Given dose of ceftriaxone and azithromycin in ED. Currently on vancomycin. Afebrile during this admission.         HIV disease.  Was diagnosed with HIV in 2003. No CD4 or VL in the past. Current Cd4 253. Viral Load 43,696. ID consult is following and may possibly start patient on HAART therapy.         High risk sexual behavior.  Has 4 sexual partners, does not use condoms, does not engage in anal receptive sex. Given empiric coverage for Chlamydia and GC with azithromycin 1g and ceftriaxone 1g in the ED. Urine Chlamydia and GC amplification is negative. F/u Syphilis screen and HSV screening.          Penile discharge. Patient states that he was having penile discharge and pain with ejaculation. Denies dysuria. U/A positive for nitrates and LE. F/u urine culture.         Testicular abnormality.  Came in with testicular rash. The testicle was nontender, but the scrotum had red lesions that were painful when palpated.         Anemia. Patient has microcytic anemia. There was bleeding at the site of the I&D. Anemia is most likely multifactorial with anemia of chronic disease related to long standing untreated HIV v recent bleeding from site of abscess and cellulitis.        IV drug abuse. Patient states he uses IV crystal meth; he states he used clean needles.          New medications: Bactrim DS 1 tab twice a day, ferrous sulfate (iron) daily, tylenol for pain as needed    Labs to be followed outpatient: None    Exam to be followed outpatient: None

## 2019-08-07 NOTE — PROGRESS NOTE ADULT - PROBLEM SELECTOR PLAN 8
Patient states he uses IV crystal meth; he states he used clean needles.   - Encouraged to discontinue using medication F: s/p 2.3L and LR, tolerating PO, encourage PO intake   E: replete PRN   N: Regular diet   DVT ppx: Low risk, no pharmacological ppx need   GI ppx: None   FULL CODE

## 2019-08-07 NOTE — DISCHARGE NOTE PROVIDER - NSFOLLOWUPCLINICS_GEN_ALL_ED_FT
[facility]  HIV/AIDS Research & Treatment  [address] [place] [address]  Phone: [phone]  Fax:   Follow Up Time:

## 2019-08-07 NOTE — CONSULT NOTE ADULT - ATTENDING COMMENTS
Saw and examined the patient at bedside  I agree with above  Information provided to FU with us at C upon discharge  3 site STI testing was recommended

## 2019-08-07 NOTE — PROGRESS NOTE ADULT - PROBLEM SELECTOR PLAN 7
Patient with microcytic anemia; states he was mild bleeding from drained abscess. Continue to monitor bleeding at the I+D site. Patient states that he has a hx of anemia; unknown if ever work-up. Likely multifactorial with anemia of chronic disease related to long standing untreated HIV and recent bleeding from site of abscess and cellulitis.   - f/u iron studies   - Ordered for type and screen in the AM Patient states he uses IV crystal meth; he states he used clean needles.   - Encouraged to discontinue using crystal meth

## 2019-08-07 NOTE — PROGRESS NOTE ADULT - ATTENDING COMMENTS
Patient was seen and examined with the resident team today.  I agree with Dr. Triana's assessment and plan with the following exceptions/additions:     Briefly, this is a 38yo gentleman with a PMH of HIV (2003, RF: IVDU, MSM, never treated, unknown VL and CD4 count), active IVDU, hx of Syphilis s/p treatment and skin abscesses who p/w acute onset L knee swelling associated with abscess formation, as well as genital, painful ulcers and inconsistent reporting of penile discharge.  DDx c/f SSTI 2/2 MRSA given reports of purulent discharge; less suspicious for septic joint given localization of abscess and erythema.  As for his gential ulcers, given his risk factors, appears and painful nature, would suspect Chlamydia > Syphilis, less likely HSV as pt denies vesicles.  History and exam less consistent with DGI; however, would consider given the aforemention.    -- c/w Vancomycin with trough this afternoon  -- Ortho following, appreciate assistance  -- f/u microdata, including STI panel   -- would discuss with HIV service GC treatment purely on presumption  -- s/p Azithromycin 1g IV x1 for CT  -- f/u CD4 and VL   -- HIV consult following, appreciate assistance   -- agree with Fe supplementation for iron deficiency anemia on labs   -- DVT PPx - ambulatory  -- Dispo - pending improvement in cellulitis/abscess    Courtney Vail  [phone]

## 2019-08-07 NOTE — PROGRESS NOTE ADULT - PROBLEM SELECTOR PLAN 5
Patient with testicular rash - testicle nontender but w/ skin changes on the left testicle notable for skin slough vs ulceration with no drainage, moist. No signs of Sandra gangrene.   - monitor improvement of rash   - f/u STI screening Patient with testicular rash - testicle nontender but w/ skin changes on the left testicle notable for skin slough vs ulceration with no drainage, moist. No signs of Sandra gangrene.   - monitor improvement of rash   - f/u STI screening  - start HAART therapy pending ID recs

## 2019-08-08 ENCOUNTER — TRANSCRIPTION ENCOUNTER (OUTPATIENT)
Age: 39
End: 2019-08-08

## 2019-08-08 VITALS
DIASTOLIC BLOOD PRESSURE: 79 MMHG | RESPIRATION RATE: 18 BRPM | SYSTOLIC BLOOD PRESSURE: 115 MMHG | HEART RATE: 96 BPM | OXYGEN SATURATION: 100 %

## 2019-08-08 LAB
-  AMPICILLIN/SULBACTAM: SIGNIFICANT CHANGE UP
-  AMPICILLIN/SULBACTAM: SIGNIFICANT CHANGE UP
-  CEFAZOLIN: SIGNIFICANT CHANGE UP
-  CEFAZOLIN: SIGNIFICANT CHANGE UP
-  CLINDAMYCIN: SIGNIFICANT CHANGE UP
-  CLINDAMYCIN: SIGNIFICANT CHANGE UP
-  ERYTHROMYCIN: SIGNIFICANT CHANGE UP
-  ERYTHROMYCIN: SIGNIFICANT CHANGE UP
-  GENTAMICIN: SIGNIFICANT CHANGE UP
-  GENTAMICIN: SIGNIFICANT CHANGE UP
-  OXACILLIN: SIGNIFICANT CHANGE UP
-  OXACILLIN: SIGNIFICANT CHANGE UP
-  RIFAMPIN: SIGNIFICANT CHANGE UP
-  RIFAMPIN: SIGNIFICANT CHANGE UP
-  TETRACYCLINE: SIGNIFICANT CHANGE UP
-  TETRACYCLINE: SIGNIFICANT CHANGE UP
-  TRIMETHOPRIM/SULFAMETHOXAZOLE: SIGNIFICANT CHANGE UP
-  TRIMETHOPRIM/SULFAMETHOXAZOLE: SIGNIFICANT CHANGE UP
-  VANCOMYCIN: SIGNIFICANT CHANGE UP
-  VANCOMYCIN: SIGNIFICANT CHANGE UP
ANION GAP SERPL CALC-SCNC: 10 MMOL/L — SIGNIFICANT CHANGE UP (ref 5–17)
BUN SERPL-MCNC: 6 MG/DL — LOW (ref 7–23)
C TRACH RRNA SPEC QL NAA+PROBE: SIGNIFICANT CHANGE UP
CALCIUM SERPL-MCNC: 8.8 MG/DL — SIGNIFICANT CHANGE UP (ref 8.4–10.5)
CHLORIDE SERPL-SCNC: 103 MMOL/L — SIGNIFICANT CHANGE UP (ref 96–108)
CO2 SERPL-SCNC: 26 MMOL/L — SIGNIFICANT CHANGE UP (ref 22–31)
CREAT SERPL-MCNC: 0.66 MG/DL — SIGNIFICANT CHANGE UP (ref 0.5–1.3)
CRP SERPL-MCNC: 6.3 MG/DL — HIGH (ref 0–0.4)
GLUCOSE SERPL-MCNC: 190 MG/DL — HIGH (ref 70–99)
HCT VFR BLD CALC: 34.6 % — LOW (ref 39–50)
HGB BLD-MCNC: 10.1 G/DL — LOW (ref 13–17)
HIV-1 VIRAL LOAD RESULT: ABNORMAL
HIV1 RNA # SERPL NAA+PROBE: SIGNIFICANT CHANGE UP
HIV1 RNA SER-IMP: SIGNIFICANT CHANGE UP
HIV1 RNA SERPL NAA+PROBE-ACNC: ABNORMAL
HIV1 RNA SERPL NAA+PROBE-LOG#: 4.64 — SIGNIFICANT CHANGE UP
MAGNESIUM SERPL-MCNC: 2 MG/DL — SIGNIFICANT CHANGE UP (ref 1.6–2.6)
MCHC RBC-ENTMCNC: 24.6 PG — LOW (ref 27–34)
MCHC RBC-ENTMCNC: 29.2 GM/DL — LOW (ref 32–36)
MCV RBC AUTO: 84.4 FL — SIGNIFICANT CHANGE UP (ref 80–100)
METHOD TYPE: SIGNIFICANT CHANGE UP
METHOD TYPE: SIGNIFICANT CHANGE UP
NRBC # BLD: 0 /100 WBCS — SIGNIFICANT CHANGE UP (ref 0–0)
PHOSPHATE SERPL-MCNC: 3.5 MG/DL — SIGNIFICANT CHANGE UP (ref 2.5–4.5)
PLATELET # BLD AUTO: 348 K/UL — SIGNIFICANT CHANGE UP (ref 150–400)
POTASSIUM SERPL-MCNC: 3.6 MMOL/L — SIGNIFICANT CHANGE UP (ref 3.5–5.3)
POTASSIUM SERPL-SCNC: 3.6 MMOL/L — SIGNIFICANT CHANGE UP (ref 3.5–5.3)
RBC # BLD: 4.1 M/UL — LOW (ref 4.2–5.8)
RBC # FLD: 14.6 % — HIGH (ref 10.3–14.5)
SODIUM SERPL-SCNC: 139 MMOL/L — SIGNIFICANT CHANGE UP (ref 135–145)
SPECIMEN SOURCE: SIGNIFICANT CHANGE UP
T PALLIDUM AB TITR SER: POSITIVE
WBC # BLD: 5.34 K/UL — SIGNIFICANT CHANGE UP (ref 3.8–10.5)
WBC # FLD AUTO: 5.34 K/UL — SIGNIFICANT CHANGE UP (ref 3.8–10.5)

## 2019-08-08 PROCEDURE — 83036 HEMOGLOBIN GLYCOSYLATED A1C: CPT

## 2019-08-08 PROCEDURE — 87205 SMEAR GRAM STAIN: CPT

## 2019-08-08 PROCEDURE — 86850 RBC ANTIBODY SCREEN: CPT

## 2019-08-08 PROCEDURE — 80053 COMPREHEN METABOLIC PANEL: CPT

## 2019-08-08 PROCEDURE — 84100 ASSAY OF PHOSPHORUS: CPT

## 2019-08-08 PROCEDURE — 86592 SYPHILIS TEST NON-TREP QUAL: CPT

## 2019-08-08 PROCEDURE — 80307 DRUG TEST PRSMV CHEM ANLYZR: CPT

## 2019-08-08 PROCEDURE — 80202 ASSAY OF VANCOMYCIN: CPT

## 2019-08-08 PROCEDURE — 86140 C-REACTIVE PROTEIN: CPT

## 2019-08-08 PROCEDURE — 71045 X-RAY EXAM CHEST 1 VIEW: CPT

## 2019-08-08 PROCEDURE — 84466 ASSAY OF TRANSFERRIN: CPT

## 2019-08-08 PROCEDURE — 86780 TREPONEMA PALLIDUM: CPT

## 2019-08-08 PROCEDURE — 99239 HOSP IP/OBS DSCHRG MGMT >30: CPT | Mod: GC

## 2019-08-08 PROCEDURE — 99285 EMERGENCY DEPT VISIT HI MDM: CPT | Mod: 25

## 2019-08-08 PROCEDURE — 83735 ASSAY OF MAGNESIUM: CPT

## 2019-08-08 PROCEDURE — 85730 THROMBOPLASTIN TIME PARTIAL: CPT

## 2019-08-08 PROCEDURE — 87536 HIV-1 QUANT&REVRSE TRNSCRPJ: CPT

## 2019-08-08 PROCEDURE — 85610 PROTHROMBIN TIME: CPT

## 2019-08-08 PROCEDURE — 85027 COMPLETE CBC AUTOMATED: CPT

## 2019-08-08 PROCEDURE — 82728 ASSAY OF FERRITIN: CPT

## 2019-08-08 PROCEDURE — 87070 CULTURE OTHR SPECIMN AEROBIC: CPT

## 2019-08-08 PROCEDURE — 96375 TX/PRO/DX INJ NEW DRUG ADDON: CPT

## 2019-08-08 PROCEDURE — 96376 TX/PRO/DX INJ SAME DRUG ADON: CPT

## 2019-08-08 PROCEDURE — 87491 CHLMYD TRACH DNA AMP PROBE: CPT

## 2019-08-08 PROCEDURE — 83605 ASSAY OF LACTIC ACID: CPT

## 2019-08-08 PROCEDURE — 86695 HERPES SIMPLEX TYPE 1 TEST: CPT

## 2019-08-08 PROCEDURE — 84443 ASSAY THYROID STIM HORMONE: CPT

## 2019-08-08 PROCEDURE — 87086 URINE CULTURE/COLONY COUNT: CPT

## 2019-08-08 PROCEDURE — 87591 N.GONORRHOEAE DNA AMP PROB: CPT

## 2019-08-08 PROCEDURE — 85652 RBC SED RATE AUTOMATED: CPT

## 2019-08-08 PROCEDURE — 81001 URINALYSIS AUTO W/SCOPE: CPT

## 2019-08-08 PROCEDURE — 83540 ASSAY OF IRON: CPT

## 2019-08-08 PROCEDURE — 87040 BLOOD CULTURE FOR BACTERIA: CPT

## 2019-08-08 PROCEDURE — 36415 COLL VENOUS BLD VENIPUNCTURE: CPT

## 2019-08-08 PROCEDURE — 80048 BASIC METABOLIC PNL TOTAL CA: CPT

## 2019-08-08 PROCEDURE — 86901 BLOOD TYPING SEROLOGIC RH(D): CPT

## 2019-08-08 PROCEDURE — 93005 ELECTROCARDIOGRAM TRACING: CPT

## 2019-08-08 PROCEDURE — 96366 THER/PROPH/DIAG IV INF ADDON: CPT

## 2019-08-08 PROCEDURE — 80061 LIPID PANEL: CPT

## 2019-08-08 PROCEDURE — 96365 THER/PROPH/DIAG IV INF INIT: CPT

## 2019-08-08 PROCEDURE — 86900 BLOOD TYPING SEROLOGIC ABO: CPT

## 2019-08-08 PROCEDURE — 86696 HERPES SIMPLEX TYPE 2 TEST: CPT

## 2019-08-08 PROCEDURE — 73562 X-RAY EXAM OF KNEE 3: CPT

## 2019-08-08 PROCEDURE — 86593 SYPHILIS TEST NON-TREP QUANT: CPT

## 2019-08-08 PROCEDURE — 85025 COMPLETE CBC W/AUTO DIFF WBC: CPT

## 2019-08-08 PROCEDURE — 80074 ACUTE HEPATITIS PANEL: CPT

## 2019-08-08 PROCEDURE — 87186 SC STD MICRODIL/AGAR DIL: CPT

## 2019-08-08 PROCEDURE — 83550 IRON BINDING TEST: CPT

## 2019-08-08 RX ORDER — FERROUS SULFATE 325(65) MG
1 TABLET ORAL
Qty: 30 | Refills: 0
Start: 2019-08-08 | End: 2019-09-06

## 2019-08-08 RX ORDER — POTASSIUM CHLORIDE 20 MEQ
40 PACKET (EA) ORAL ONCE
Refills: 0 | Status: COMPLETED | OUTPATIENT
Start: 2019-08-08 | End: 2019-08-08

## 2019-08-08 RX ORDER — ACETAMINOPHEN 500 MG
2 TABLET ORAL
Qty: 120 | Refills: 0
Start: 2019-08-08 | End: 2019-08-22

## 2019-08-08 RX ORDER — POTASSIUM PHOSPHATE, MONOBASIC POTASSIUM PHOSPHATE, DIBASIC 236; 224 MG/ML; MG/ML
15 INJECTION, SOLUTION INTRAVENOUS ONCE
Refills: 0 | Status: COMPLETED | OUTPATIENT
Start: 2019-08-08 | End: 2019-08-08

## 2019-08-08 RX ORDER — HYDROMORPHONE HYDROCHLORIDE 2 MG/ML
0.25 INJECTION INTRAMUSCULAR; INTRAVENOUS; SUBCUTANEOUS ONCE
Refills: 0 | Status: DISCONTINUED | OUTPATIENT
Start: 2019-08-08 | End: 2019-08-08

## 2019-08-08 RX ADMIN — OXYCODONE AND ACETAMINOPHEN 2 TABLET(S): 5; 325 TABLET ORAL at 03:50

## 2019-08-08 RX ADMIN — Medication 1 TABLET(S): at 19:01

## 2019-08-08 RX ADMIN — HYDROMORPHONE HYDROCHLORIDE 0.25 MILLIGRAM(S): 2 INJECTION INTRAMUSCULAR; INTRAVENOUS; SUBCUTANEOUS at 06:38

## 2019-08-08 RX ADMIN — Medication 250 MILLIGRAM(S): at 06:39

## 2019-08-08 RX ADMIN — OXYCODONE AND ACETAMINOPHEN 2 TABLET(S): 5; 325 TABLET ORAL at 03:20

## 2019-08-08 RX ADMIN — POTASSIUM PHOSPHATE, MONOBASIC POTASSIUM PHOSPHATE, DIBASIC 63.75 MILLIMOLE(S): 236; 224 INJECTION, SOLUTION INTRAVENOUS at 12:08

## 2019-08-08 RX ADMIN — Medication 325 MILLIGRAM(S): at 12:07

## 2019-08-08 RX ADMIN — Medication 40 MILLIEQUIVALENT(S): at 12:07

## 2019-08-08 NOTE — DISCHARGE NOTE NURSING/CASE MANAGEMENT/SOCIAL WORK - NSDCPEEMAIL_GEN_ALL_CORE
[facility] for Tobacco Control email [email or web address]

## 2019-08-08 NOTE — DISCHARGE NOTE NURSING/CASE MANAGEMENT/SOCIAL WORK - NSDCDPATPORTLINK_GEN_ALL_CORE
You can access the [facility] Patient Portal, offered by [facility], by registering with the following website: [email or web address]

## 2019-08-08 NOTE — DISCHARGE NOTE NURSING/CASE MANAGEMENT/SOCIAL WORK - NSDCPEWEB_GEN_ALL_CORE
[place] website --- [email or web address] for Tobacco Control website --- [email or web address]

## 2019-08-08 NOTE — PROGRESS NOTE ADULT - SUBJECTIVE AND OBJECTIVE BOX
Ortho Note    Pt comfortable without complaints, pain controlled  Denies CP, SOB, N/V, numbness/tingling   Dressing changed and wound re-packed.     Vital Signs Last 24 Hrs  T(C): 36.6 (08-07-19 @ 05:39), Max: 37.6 (08-07-19 @ 01:39)  T(F): 97.9 (08-07-19 @ 05:39), Max: 99.7 (08-07-19 @ 01:39)  HR: 91 (08-07-19 @ 05:39) (91 - 116)  BP: 102/63 (08-07-19 @ 05:39) (102/63 - 104/53)  BP(mean): --  RR: 18 (08-07-19 @ 05:39) (18 - 19)  SpO2: 98% (08-07-19 @ 05:39) (95% - 98%)      General: Pt Alert and oriented, NAD  Wound purulent and still daining pus like fluid over medial portion of knee  Pulses: 2+ DP   Sensation: SILT over distal limb and symmetric to contralateral side.   Motor: 5+ EHL/FHL/TA/GS                          10.9   10.3  )-----------( 241      ( 06 Aug 2019 03:28 )             34.4   06 Aug 2019 03:28    133    |  95     |  10     ----------------------------<  129    4.0     |  27     |  0.96       TPro  7.9    /  Alb  2.7    /  TBili  0.3    /  DBili  x      /  AST  27     /  ALT  24     /  AlkPhos  81     06 Aug 2019 03:28      A/P: 39yMale w a complex medical history admitted for treatment of sepsis, HIV and knee infection/abscess  - Care as per primary  - Will follow daily  - dressing change daily.     Ortho Pager [phone]
Ortho Note    Pt comfortable without complaints, pain controlled  Denies CP, SOB, N/V, numbness/tingling   Dressing changed this AM. packed wound with Iodoform, 4x4s and kerlexed wrapped.     Vital Signs Last 24 Hrs  T(C): 36.4 (08-08-19 @ 09:11), Max: 36.4 (08-08-19 @ 09:11)  T(F): 97.5 (08-08-19 @ 09:11), Max: 97.5 (08-08-19 @ 09:11)  HR: 83 (08-08-19 @ 09:11) (83 - 83)  BP: 119/71 (08-08-19 @ 09:11) (119/71 - 119/71)  BP(mean): --  RR: 18 (08-08-19 @ 09:11) (18 - 18)  SpO2: 100% (08-08-19 @ 09:11) (100% - 100%)      General: Pt Alert and oriented, NAD  DSG C/D/I  Pulses: 2+ DP  Sensation: SILT over distal limb and symmetric to contralateral side.   Motor: 5+ EHL/FHL/TA/GS                          10.1   5.34  )-----------( 348      ( 08 Aug 2019 05:41 )             34.6   08 Aug 2019 05:41    139    |  103    |  6      ----------------------------<  190    3.6     |  26     |  0.66     Ca    8.8        08 Aug 2019 05:41  Phos  3.5       08 Aug 2019 05:41  Mg     2.0       08 Aug 2019 05:41    TPro  7.0    /  Alb  2.9    /  TBili  0.3    /  DBili  x      /  AST  18     /  ALT  20     /  AlkPhos  73     07 Aug 2019 05:54      A/P: 39yMale s/p Bedside I&D of medial portion of L knee  - Care as per primary team  - will follow pt daily and change dressing.   Ortho Pager [phone]
RAMBO ALBERTS  39y  Male      Patient is a 39y old  Male who presents with a chief complaint of Left knee pain (07 Aug 2019 08:52)      INTERVAL HPI/OVERNIGHT EVENTS: Seen at bedside this morning.No events overnight. Patient says he was feeling subjective fevers last night. No acute events overnight.     REVIEW OF SYSTEMS: No CP, SOB, n/v/d/c, dysuria, fever/chills, headaches, vision changes. Endorses pain in left knee after having dressing changed. Pain at base of scrotum. Pain at base of penis.     Vital Signs Last 24 Hrs  T(C): 36.7 (07 Aug 2019 09:07), Max: 37.6 (07 Aug 2019 01:39)  T(F): 98.1 (07 Aug 2019 09:07), Max: 99.7 (07 Aug 2019 01:39)  HR: 100 (07 Aug 2019 09:07) (87 - 118)  BP: 113/72 (07 Aug 2019 09:07) (102/63 - 136/86)  BP(mean): --  RR: 18 (07 Aug 2019 09:07) (16 - 19)  SpO2: 100% (07 Aug 2019 09:07) (95% - 100%)    PHYSICAL EXAM:  GENERAL: NAD, well-groomed, well-developed  HEAD:  Atraumatic, Normocephalic  EYES: EOMI, PERRLA, conjunctiva and sclera clear  ENMT: Moist mucous membranes, Good dentition   NECK: Supple, No JVD   NERVOUS SYSTEM:  A&Ox3, Good concentration; Motor Strength 5/5 B/L upper and lower extremities; cranial nerves intact  CHEST/LUNG: Clear to auscultation bilaterally; No rales, rhonchi, wheezing, or rubs  HEART: Regular rate and rhythm; No murmurs, rubs, or gallops  ABDOMEN: Soft, Nontender, Nondistended; Bowel sounds present  : red lesion on base of penis, red lesions on base of scrotum, both penis and testicles are painful to movement, lesion overlying inguinal lymph node on left  EXTREMITIES:  2+ Peripheral Pulses, No clubbing, cyanosis. Wrapped left knee s/p incision and drainage. Right knee non-erythematous, non-swollen.   LYMPH: No lymphadenopathy noted  SKIN: No rashes or lesions      LABS:                        9.3    6.72  )-----------( 302      ( 07 Aug 2019 05:54 )             31.6     08-07    134<L>  |  98  |  5<L>  ----------------------------<  123<H>  3.9   |  27  |  0.67    Ca    8.3<L>      07 Aug 2019 05:54  Phos  3.5     -  Mg     2.1     08-    TPro  7.0  /  Alb  2.9<L>  /  TBili  0.3  /  DBili  x   /  AST  18  /  ALT  20  /  AlkPhos  73  08-07    PT/INR - ( 06 Aug 2019 03:28 )   PT: 16.7 sec;   INR: 1.49          PTT - ( 06 Aug 2019 03:28 )  PTT:21.7 sec  Urinalysis Basic - ( 06 Aug 2019 05:25 )    Color: Yellow / Appearance: Clear / S.010 / pH: x  Gluc: x / Ketone: NEGATIVE  / Bili: NEGATIVE / Urobili: 1.0 E.U./dL   Blood: x / Protein: Trace mg/dL / Nitrite: POSITIVE   Leuk Esterase: Trace / RBC: < 5 /HPF / WBC 5-10 /HPF   Sq Epi: x / Non Sq Epi: 0-5 /HPF / Bacteria: Many /HPF        CAPILLARY BLOOD GLUCOSE          RADIOLOGY & ADDITIONAL TESTS: Reviewed

## 2019-08-09 LAB
HSV1 AB FLD QL: SIGNIFICANT CHANGE UP TITER
HSV2 AB FLD-ACNC: SIGNIFICANT CHANGE UP TITER

## 2019-08-09 RX ORDER — AZTREONAM 2 G
1 VIAL (EA) INJECTION
Qty: 10 | Refills: 0
Start: 2019-08-09 | End: 2019-08-13

## 2019-08-10 LAB
CULTURE RESULTS: SIGNIFICANT CHANGE UP
ORGANISM # SPEC MICROSCOPIC CNT: SIGNIFICANT CHANGE UP
ORGANISM # SPEC MICROSCOPIC CNT: SIGNIFICANT CHANGE UP
SPECIMEN SOURCE: SIGNIFICANT CHANGE UP

## 2019-08-13 DIAGNOSIS — F15.10 OTHER STIMULANT ABUSE, UNCOMPLICATED: ICD-10-CM

## 2019-08-13 DIAGNOSIS — L03.115 CELLULITIS OF RIGHT LOWER LIMB: ICD-10-CM

## 2019-08-13 DIAGNOSIS — R36.9 URETHRAL DISCHARGE, UNSPECIFIED: ICD-10-CM

## 2019-08-13 DIAGNOSIS — R21 RASH AND OTHER NONSPECIFIC SKIN ERUPTION: ICD-10-CM

## 2019-08-13 DIAGNOSIS — B20 HUMAN IMMUNODEFICIENCY VIRUS [HIV] DISEASE: ICD-10-CM

## 2019-08-13 DIAGNOSIS — D50.9 IRON DEFICIENCY ANEMIA, UNSPECIFIED: ICD-10-CM

## 2019-08-13 DIAGNOSIS — L02.416 CUTANEOUS ABSCESS OF LEFT LOWER LIMB: ICD-10-CM

## 2020-01-26 ENCOUNTER — EMERGENCY (EMERGENCY)
Facility: HOSPITAL | Age: 40
LOS: 1 days | Discharge: HOME | End: 2020-01-26
Attending: EMERGENCY MEDICINE | Admitting: EMERGENCY MEDICINE
Payer: MEDICAID

## 2020-01-26 VITALS
DIASTOLIC BLOOD PRESSURE: 67 MMHG | SYSTOLIC BLOOD PRESSURE: 132 MMHG | TEMPERATURE: 98 F | OXYGEN SATURATION: 99 % | RESPIRATION RATE: 16 BRPM | HEART RATE: 90 BPM

## 2020-01-26 VITALS
DIASTOLIC BLOOD PRESSURE: 129 MMHG | OXYGEN SATURATION: 92 % | RESPIRATION RATE: 108 BRPM | SYSTOLIC BLOOD PRESSURE: 129 MMHG | HEART RATE: 88 BPM | HEIGHT: 74 IN | TEMPERATURE: 98 F | WEIGHT: 190.04 LBS

## 2020-01-26 LAB
ALBUMIN SERPL ELPH-MCNC: 2.8 G/DL — LOW (ref 3.4–5)
ALP SERPL-CCNC: 80 U/L — SIGNIFICANT CHANGE UP (ref 40–120)
ALT FLD-CCNC: 19 U/L — SIGNIFICANT CHANGE UP (ref 12–42)
ANION GAP SERPL CALC-SCNC: 12 MMOL/L — SIGNIFICANT CHANGE UP (ref 9–16)
AST SERPL-CCNC: 24 U/L — SIGNIFICANT CHANGE UP (ref 15–37)
BASOPHILS # BLD AUTO: 0.02 K/UL — SIGNIFICANT CHANGE UP (ref 0–0.2)
BASOPHILS NFR BLD AUTO: 0.2 % — SIGNIFICANT CHANGE UP (ref 0–2)
BILIRUB SERPL-MCNC: 0.2 MG/DL — SIGNIFICANT CHANGE UP (ref 0.2–1.2)
BUN SERPL-MCNC: 12 MG/DL — SIGNIFICANT CHANGE UP (ref 7–23)
CALCIUM SERPL-MCNC: 9.1 MG/DL — SIGNIFICANT CHANGE UP (ref 8.5–10.5)
CHLORIDE SERPL-SCNC: 102 MMOL/L — SIGNIFICANT CHANGE UP (ref 96–108)
CO2 SERPL-SCNC: 25 MMOL/L — SIGNIFICANT CHANGE UP (ref 22–31)
CREAT SERPL-MCNC: 0.96 MG/DL — SIGNIFICANT CHANGE UP (ref 0.5–1.3)
EOSINOPHIL # BLD AUTO: 0.03 K/UL — SIGNIFICANT CHANGE UP (ref 0–0.5)
EOSINOPHIL NFR BLD AUTO: 0.3 % — SIGNIFICANT CHANGE UP (ref 0–6)
ETHANOL SERPL-MCNC: <3 MG/DL — SIGNIFICANT CHANGE UP
GLUCOSE SERPL-MCNC: 92 MG/DL — SIGNIFICANT CHANGE UP (ref 70–99)
HCT VFR BLD CALC: 32.8 % — LOW (ref 39–50)
HGB BLD-MCNC: 9.7 G/DL — LOW (ref 13–17)
IMM GRANULOCYTES NFR BLD AUTO: 0.6 % — SIGNIFICANT CHANGE UP (ref 0–1.5)
LYMPHOCYTES # BLD AUTO: 1.07 K/UL — SIGNIFICANT CHANGE UP (ref 1–3.3)
LYMPHOCYTES # BLD AUTO: 10.9 % — LOW (ref 13–44)
MAGNESIUM SERPL-MCNC: 2.3 MG/DL — SIGNIFICANT CHANGE UP (ref 1.6–2.6)
MCHC RBC-ENTMCNC: 23 PG — LOW (ref 27–34)
MCHC RBC-ENTMCNC: 29.6 GM/DL — LOW (ref 32–36)
MCV RBC AUTO: 77.9 FL — LOW (ref 80–100)
MONOCYTES # BLD AUTO: 0.49 K/UL — SIGNIFICANT CHANGE UP (ref 0–0.9)
MONOCYTES NFR BLD AUTO: 5 % — SIGNIFICANT CHANGE UP (ref 2–14)
NEUTROPHILS # BLD AUTO: 8.17 K/UL — HIGH (ref 1.8–7.4)
NEUTROPHILS NFR BLD AUTO: 83 % — HIGH (ref 43–77)
NRBC # BLD: 0 /100 WBCS — SIGNIFICANT CHANGE UP (ref 0–0)
PLATELET # BLD AUTO: 291 K/UL — SIGNIFICANT CHANGE UP (ref 150–400)
POTASSIUM SERPL-MCNC: 3.8 MMOL/L — SIGNIFICANT CHANGE UP (ref 3.5–5.3)
POTASSIUM SERPL-SCNC: 3.8 MMOL/L — SIGNIFICANT CHANGE UP (ref 3.5–5.3)
PROT SERPL-MCNC: 8.9 G/DL — HIGH (ref 6.4–8.2)
RBC # BLD: 4.21 M/UL — SIGNIFICANT CHANGE UP (ref 4.2–5.8)
RBC # FLD: 17.1 % — HIGH (ref 10.3–14.5)
SODIUM SERPL-SCNC: 139 MMOL/L — SIGNIFICANT CHANGE UP (ref 132–145)
WBC # BLD: 9.84 K/UL — SIGNIFICANT CHANGE UP (ref 3.8–10.5)
WBC # FLD AUTO: 9.84 K/UL — SIGNIFICANT CHANGE UP (ref 3.8–10.5)

## 2020-01-26 PROCEDURE — 99284 EMERGENCY DEPT VISIT MOD MDM: CPT

## 2020-01-26 RX ORDER — SODIUM CHLORIDE 9 MG/ML
1000 INJECTION INTRAMUSCULAR; INTRAVENOUS; SUBCUTANEOUS ONCE
Refills: 0 | Status: COMPLETED | OUTPATIENT
Start: 2020-01-26 | End: 2020-01-26

## 2020-01-26 RX ADMIN — SODIUM CHLORIDE 1000 MILLILITER(S): 9 INJECTION INTRAMUSCULAR; INTRAVENOUS; SUBCUTANEOUS at 18:23

## 2020-01-26 NOTE — ED PROVIDER NOTE - OBJECTIVE STATEMENT
40 y/o male with PMHx of HIV (patient admits to being non-compliant with meds, has not taken meds for a while and is unsure of what he was on) BIBA to ED for AMS. Patient's brother is present and assisting with providing history. Patient's brother states that he and the patient have been staying at a hotel for the past few days. Notes that they were checking out of the hotel today and that patient went down to the lobby about a few hours PTA. However, patient's brother reports that patient was found by a hotel worker unconscious on the floor of a hallway and was brought to the ED by EMS. Patient admits to using GHB and crystal meth for the past few days. Currently reports lower back pain, is unsure if he fell. Patient denies any other complaints at this time, no CP, SOB, or abd pain.

## 2020-01-26 NOTE — ED PROVIDER NOTE - PROGRESS NOTE DETAILS
Patient felling better, fully alert and oriented and tolerating PO. Labs are unremarkable. Encouraged patient to stop doing drugs. Patient stable for discharge.

## 2020-01-26 NOTE — ED ADULT NURSE NOTE - CHIEF COMPLAINT QUOTE
BIBA from hotel, called by family for AMS/unresponsiveness known GHB use PTA. Responds to noxious stimuli, able to converse with staff before returning to sleep. BGL on scene 114, PIV 18guage angio to left hand.

## 2020-01-26 NOTE — ED PROVIDER NOTE - CONSTITUTIONAL, MLM
normal... Alert although somnolent, provides history. Oriented to person, place, time/situation and in no apparent distress. Alert although somnolent, provides history. In no apparent distress.

## 2020-01-26 NOTE — ED PROVIDER NOTE - PATIENT PORTAL LINK FT
You can access the FollowMyHealth Patient Portal offered by [facility] by registering at the following website: [email or web address]. By joining [employer]’s FollowMyHealth portal, you will also be able to view your health information using other applications (apps) compatible with our system.

## 2020-01-26 NOTE — ED ADULT NURSE NOTE - CHPI ED NUR SYMPTOMS NEG
no abdominal distension/no weakness/no confusion/no pain/no chills/no fever/no abdominal pain/no vomiting/no disorientation/no nausea

## 2020-01-26 NOTE — ED PROVIDER NOTE - CLINICAL SUMMARY MEDICAL DECISION MAKING FREE TEXT BOX
38 y/o male BIBA for AMS, admits to using crystal meth and GHB for the past few days. Patient is somnolent but responsive to questions, follows commands. Will provide supportive treatment for possible polysubstance abuse, possible dehydration after crystal meth binge for some days. Patient is protecting airway and in no distress.

## 2020-01-26 NOTE — ED PROVIDER NOTE - NSSTREETDRUGTY_GEN_ALL_CORE_SD
GHB, crystal meth I will STOP taking the medications listed below when I get home from the hospital:    calcium acetate 667 mg oral tablet  -- 3 tab(s) by mouth 3 times a day

## 2020-01-26 NOTE — ED ADULT TRIAGE NOTE - CHIEF COMPLAINT QUOTE
BIBA from hotel, called by family for AMS/unresponsiveness known GHB use PTA. Responds to noxious stimuli. BGL on scene 114, PIV 18guage angio to left hand. BIBA from hotel, called by family for AMS/unresponsiveness known GHB use PTA. Responds to noxious stimuli, able to converse with staff before returning to sleep. BGL on scene 114, PIV 18guage angio to left hand.

## 2020-01-27 PROBLEM — F19.90 OTHER PSYCHOACTIVE SUBSTANCE USE, UNSPECIFIED, UNCOMPLICATED: Chronic | Status: ACTIVE | Noted: 2019-08-06

## 2020-01-27 PROBLEM — L02.91 CUTANEOUS ABSCESS, UNSPECIFIED: Chronic | Status: ACTIVE | Noted: 2019-08-06

## 2020-01-27 PROBLEM — D64.9 ANEMIA, UNSPECIFIED: Chronic | Status: ACTIVE | Noted: 2019-08-06

## 2020-02-01 DIAGNOSIS — R41.82 ALTERED MENTAL STATUS, UNSPECIFIED: ICD-10-CM

## 2020-02-01 DIAGNOSIS — F19.10 OTHER PSYCHOACTIVE SUBSTANCE ABUSE, UNCOMPLICATED: ICD-10-CM

## 2020-02-01 DIAGNOSIS — M54.5 LOW BACK PAIN: ICD-10-CM

## 2020-02-01 DIAGNOSIS — F15.10 OTHER STIMULANT ABUSE, UNCOMPLICATED: ICD-10-CM

## 2021-04-27 NOTE — PROGRESS NOTE ADULT - PROBLEM SELECTOR PROBLEM 5
Routing refill request to provider for review/approval because:  Drug not on the [facility] refill protocol     Gillian OCONNELLN, RN     Testicular abnormality

## 2021-07-04 ENCOUNTER — EMERGENCY (EMERGENCY)
Facility: HOSPITAL | Age: 41
LOS: 1 days | Discharge: HOME | End: 2021-07-04
Attending: EMERGENCY MEDICINE | Admitting: EMERGENCY MEDICINE
Payer: MEDICAID

## 2021-07-04 VITALS
TEMPERATURE: 98 F | DIASTOLIC BLOOD PRESSURE: 84 MMHG | HEART RATE: 130 BPM | RESPIRATION RATE: 16 BRPM | OXYGEN SATURATION: 95 % | WEIGHT: 162.92 LBS | HEIGHT: 74 IN | SYSTOLIC BLOOD PRESSURE: 131 MMHG

## 2021-07-04 VITALS
HEART RATE: 98 BPM | DIASTOLIC BLOOD PRESSURE: 86 MMHG | SYSTOLIC BLOOD PRESSURE: 122 MMHG | OXYGEN SATURATION: 97 % | TEMPERATURE: 99 F | RESPIRATION RATE: 16 BRPM

## 2021-07-04 DIAGNOSIS — Z21 ASYMPTOMATIC HUMAN IMMUNODEFICIENCY VIRUS [HIV] INFECTION STATUS: ICD-10-CM

## 2021-07-04 DIAGNOSIS — Z87.898 PERSONAL HISTORY OF OTHER SPECIFIED CONDITIONS: ICD-10-CM

## 2021-07-04 DIAGNOSIS — R21 RASH AND OTHER NONSPECIFIC SKIN ERUPTION: ICD-10-CM

## 2021-07-04 DIAGNOSIS — Z20.2 CONTACT WITH AND (SUSPECTED) EXPOSURE TO INFECTIONS WITH A PREDOMINANTLY SEXUAL MODE OF TRANSMISSION: ICD-10-CM

## 2021-07-04 DIAGNOSIS — N48.9 DISORDER OF PENIS, UNSPECIFIED: ICD-10-CM

## 2021-07-04 PROCEDURE — 99284 EMERGENCY DEPT VISIT MOD MDM: CPT

## 2021-07-04 RX ORDER — CEFTRIAXONE 500 MG/1
500 INJECTION, POWDER, FOR SOLUTION INTRAMUSCULAR; INTRAVENOUS ONCE
Refills: 0 | Status: COMPLETED | OUTPATIENT
Start: 2021-07-04 | End: 2021-07-04

## 2021-07-04 RX ORDER — PENICILLIN G BENZATHINE 1200000 [IU]/2ML
2.4 INJECTION, SUSPENSION INTRAMUSCULAR ONCE
Refills: 0 | Status: COMPLETED | OUTPATIENT
Start: 2021-07-04 | End: 2021-07-04

## 2021-07-04 RX ORDER — VALACYCLOVIR 500 MG/1
1000 TABLET, FILM COATED ORAL ONCE
Refills: 0 | Status: COMPLETED | OUTPATIENT
Start: 2021-07-04 | End: 2021-07-04

## 2021-07-04 RX ORDER — VALACYCLOVIR 500 MG/1
1 TABLET, FILM COATED ORAL
Qty: 7 | Refills: 0
Start: 2021-07-04 | End: 2021-07-10

## 2021-07-04 RX ADMIN — Medication 100 MILLIGRAM(S): at 21:48

## 2021-07-04 RX ADMIN — VALACYCLOVIR 1000 MILLIGRAM(S): 500 TABLET, FILM COATED ORAL at 21:48

## 2021-07-04 RX ADMIN — CEFTRIAXONE 500 MILLIGRAM(S): 500 INJECTION, POWDER, FOR SOLUTION INTRAMUSCULAR; INTRAVENOUS at 22:14

## 2021-07-04 RX ADMIN — PENICILLIN G BENZATHINE 2.4 MILLION UNIT(S): 1200000 INJECTION, SUSPENSION INTRAMUSCULAR at 21:48

## 2021-07-04 NOTE — ED ADULT NURSE NOTE - OBJECTIVE STATEMENT
40 yo M c/o rash. Pt reports a rash began at the base of his penis on Thursday, and there has been some "pus-like" drainage. Pt reports difficulty urinating began today. Denies hematuria or blood in ejaculate. Denies CP, SOB, N/V/D, headache, dizziness, fever/chills, numbness/tingling. Pt speaking in full complete sentences, ambulatory with steady gait.

## 2021-07-04 NOTE — ED PROVIDER NOTE - CLINICAL SUMMARY MEDICAL DECISION MAKING FREE TEXT BOX
plan to empirically treat for all STI, start po doxy, and advised strict follow up with wound check either in ed, or at [facility] or [address], as he currently has no pmd. patient currently with stage one ulceration at penis, likely secondary to syphilis but with vesicles, rule out hsv, started on valtrex and doxy.

## 2021-07-04 NOTE — ED PROVIDER NOTE - CARE PROVIDER_API CALL
Roderick Nails ()  [place] [address], [place] [address]  Phone: [phone]  Fax: [phone]  Follow Up Time:     Lois Gregory  Internal Medicine  [address] [place] [address]  Phone: [phone]  Fax: [phone]  Follow Up Time:

## 2021-07-04 NOTE — ED PROVIDER NOTE - NSFOLLOWUPCLINICS_GEN_ALL_ED_FT
[facility]  HIV/AIDS Research & Treatment  [address] [place] [address]  Phone: [phone]  Fax:

## 2021-07-04 NOTE — ED PROVIDER NOTE - OBJECTIVE STATEMENT
42 yo M, hx of HIV not on HAART, last CD4 counts done 6 months ago were low, hx of multiple STI treated, MSM, Hx IVDA, last use one week ago -crystal meth, notes unprotected anal intercourse one week ago. noticed a painful, burning lesion to R base of penis 3 days ago which is now worsening and oozing yellowish discharge, with painful blisters. denies dysuria. denies hematuria, penile DC, testicular pain or swelling. denies groin pain or swelling. denies abd pain, N/V/D. denies fever, chills or rash elsewhere. denies numbness or paresthesias.

## 2021-07-04 NOTE — ED PROVIDER NOTE - PENIS
circular stage 1 ulceration of R base of penis approximately 3 x 4 cm wide, with ulcerated borders, fibrinous base, and scant yelowish discharge, vesicles visible, nontender on palpation, no crepitus, gangrene, or streaking. no induration or fluctuance./uncircumcised/LESIONS/ULCERATION

## 2021-07-04 NOTE — ED PROVIDER NOTE - PATIENT PORTAL LINK FT
You can access the FollowMyHealth Patient Portal offered by [facility] by registering at the following website: [email or web address]. By joining [employer]’s FollowMyHealth portal, you will also be able to view your health information using other applications (apps) compatible with our system.

## 2021-07-04 NOTE — ED PROVIDER NOTE - CARE PROVIDERS DIRECT ADDRESSES
[email or web address]

## 2021-07-05 LAB
HSV+VZV DNA SPEC QL NAA+PROBE: SIGNIFICANT CHANGE UP
SPECIMEN SOURCE: SIGNIFICANT CHANGE UP

## 2021-10-31 VITALS
HEIGHT: 74 IN | WEIGHT: 160.06 LBS | RESPIRATION RATE: 18 BRPM | DIASTOLIC BLOOD PRESSURE: 80 MMHG | SYSTOLIC BLOOD PRESSURE: 126 MMHG | HEART RATE: 147 BPM | OXYGEN SATURATION: 97 % | TEMPERATURE: 99 F

## 2021-10-31 PROCEDURE — 93010 ELECTROCARDIOGRAM REPORT: CPT

## 2021-10-31 PROCEDURE — 99291 CRITICAL CARE FIRST HOUR: CPT

## 2021-10-31 RX ORDER — SODIUM CHLORIDE 9 MG/ML
2300 INJECTION INTRAMUSCULAR; INTRAVENOUS; SUBCUTANEOUS ONCE
Refills: 0 | Status: COMPLETED | OUTPATIENT
Start: 2021-10-31 | End: 2021-10-31

## 2021-10-31 RX ORDER — CEFTRIAXONE 500 MG/1
1000 INJECTION, POWDER, FOR SOLUTION INTRAMUSCULAR; INTRAVENOUS ONCE
Refills: 0 | Status: COMPLETED | OUTPATIENT
Start: 2021-10-31 | End: 2021-10-31

## 2021-10-31 RX ORDER — VANCOMYCIN HCL 1 G
1250 VIAL (EA) INTRAVENOUS ONCE
Refills: 0 | Status: COMPLETED | OUTPATIENT
Start: 2021-10-31 | End: 2021-10-31

## 2021-10-31 RX ORDER — ACETAMINOPHEN 500 MG
650 TABLET ORAL ONCE
Refills: 0 | Status: COMPLETED | OUTPATIENT
Start: 2021-10-31 | End: 2021-10-31

## 2021-10-31 NOTE — ED PROVIDER NOTE - CRITICAL CARE ATTENDING CONTRIBUTION TO CARE
The patient was seen immediately upon arrival due to a high probability of imminent or life-threatening deterioration secondary to sepsis, which required my direct attention, intervention, and personal management at the bedside. I have personally provided critical care time exclusive of time spent on separately billable procedures. Time includes review of laboratory data, radiology results, discussion with consultants, discussion with the patient's family, and monitoring for potential decompensation.

## 2021-10-31 NOTE — ED PROVIDER NOTE - PHYSICAL EXAMINATION
VITAL SIGNS: I have reviewed nursing notes and confirm.  CONSTITUTIONAL: Thin male in NAD.   SKIN: ~4x4cm abscess with central fluctuance and surrounding induration and surrounding erythema c/w cellulitis.    HEAD: Normocephalic; atraumatic.  EYES: PERRL, EOM intact; conjunctiva and sclera clear.  ENT: No nasal discharge; airway clear.  NECK: Supple; non tender.  CARD: S1, S2 normal; no murmurs, gallops, or rubs. +Tachycardia - regular.   RESP: No wheezes, rales or rhonchi.  ABD: Normal bowel sounds; soft; non-distended; non-tender; no hepatosplenomegaly; see Skin exam.   MSK: Normal ROM. No clubbing, cyanosis or edema.  NEURO: Alert, oriented. Grossly unremarkable.  PSYCH: Cooperative, appropriate.

## 2021-10-31 NOTE — ED PROVIDER NOTE - NSICDXPASTMEDICALHX_GEN_ALL_CORE_FT
PAST MEDICAL HISTORY:  Abscess     Anemia     HIV (human immunodeficiency virus infection)     HIV (human immunodeficiency virus infection)     IVDU (intravenous drug user)

## 2021-10-31 NOTE — ED PROVIDER NOTE - RAPID ASSESSMENT
Pt presents with fever and tachycardia and abd cellulitis/abscess, ED sepsis protocol initiated. Pt presents with fever and tachycardia and abd cellulitis/abscess, ED sepsis protocol initiated.  Pt HIV + for approx 17 years but has not been on meds.  Pt with abscess to ant abd wall for approx 10 days, worsening.

## 2021-10-31 NOTE — ED PROVIDER NOTE - CLINICAL SUMMARY MEDICAL DECISION MAKING FREE TEXT BOX
Pt's VS triggered sepsis alert.  RA performed by evening MD and orders placed.  Will perform cultures, labs, and lactate.  Will performed CT of abd and pelvis to see extent of abscess.  Will drain if superficial.  Pt will require admission given sepsis and immunocompromised state.

## 2021-10-31 NOTE — ED PROVIDER NOTE - PROGRESS NOTE DETAILS
Abscess drained.  Wound culture sent.  See procedure note for details.  Case discussed with Dr. Fall and he accepts case for admission.  Would like admission under Dr. Osorio.

## 2021-10-31 NOTE — ED ADULT NURSE NOTE - OBJECTIVE STATEMENT
abscess to middle abdomen  x 10days unsure of origin. scant purulent drainage on gauze dressing that was placed in triage.  temp 100.4 oral Pt refused rectal temp.

## 2021-10-31 NOTE — ED PROVIDER NOTE - OBJECTIVE STATEMENT
Pt is a 40yo M with a h/o HIV (not currently on HAART) and p/w fevers and abs wall abscess/cellulitis that has been worsening over the past 10 days.  Pt noted small pimple like lesion to LUQ abd wall about ten days ago.  Has progressed to an enlarged abscess that intermittently is draining yellow pus.  +Pain, no radiation, worse with any palpation.  Associated with fevers, chills, and rigors.

## 2021-10-31 NOTE — ED ADULT TRIAGE NOTE - CHIEF COMPLAINT QUOTE
Pt. walked in c/o abscess to abdomen. States started 10 days ago and now abscess is swollen, red with yellowish discharge. Denies fever. Endorsing chills.

## 2021-11-01 ENCOUNTER — INPATIENT (INPATIENT)
Facility: HOSPITAL | Age: 41
LOS: 0 days | Discharge: HOME HEALTH | DRG: 975 | End: 2021-11-02
Payer: MEDICAID

## 2021-11-01 DIAGNOSIS — F19.20 OTHER PSYCHOACTIVE SUBSTANCE DEPENDENCE, UNCOMPLICATED: ICD-10-CM

## 2021-11-01 DIAGNOSIS — Z72.0 TOBACCO USE: ICD-10-CM

## 2021-11-01 DIAGNOSIS — A41.9 SEPSIS, UNSPECIFIED ORGANISM: ICD-10-CM

## 2021-11-01 DIAGNOSIS — L02.211 CUTANEOUS ABSCESS OF ABDOMINAL WALL: ICD-10-CM

## 2021-11-01 DIAGNOSIS — B20 HUMAN IMMUNODEFICIENCY VIRUS [HIV] DISEASE: ICD-10-CM

## 2021-11-01 DIAGNOSIS — Z29.9 ENCOUNTER FOR PROPHYLACTIC MEASURES, UNSPECIFIED: ICD-10-CM

## 2021-11-01 LAB
4/8 RATIO: 0.23 RATIO — LOW (ref 0.9–3.6)
ABS CD8: 566 /UL — SIGNIFICANT CHANGE UP (ref 142–740)
ALBUMIN SERPL ELPH-MCNC: 3.2 G/DL — LOW (ref 3.4–5)
ALP SERPL-CCNC: 92 U/L — SIGNIFICANT CHANGE UP (ref 40–120)
ALT FLD-CCNC: 16 U/L — SIGNIFICANT CHANGE UP (ref 12–42)
ANION GAP SERPL CALC-SCNC: 7 MMOL/L — LOW (ref 9–16)
ANION GAP SERPL CALC-SCNC: 9 MMOL/L — SIGNIFICANT CHANGE UP (ref 5–17)
APPEARANCE UR: CLEAR — SIGNIFICANT CHANGE UP
APTT BLD: 34.1 SEC — SIGNIFICANT CHANGE UP (ref 27.5–35.5)
AST SERPL-CCNC: 14 U/L — LOW (ref 15–37)
BACTERIA # UR AUTO: PRESENT /HPF
BASOPHILS # BLD AUTO: 0.02 K/UL — SIGNIFICANT CHANGE UP (ref 0–0.2)
BASOPHILS NFR BLD AUTO: 0.3 % — SIGNIFICANT CHANGE UP (ref 0–2)
BILIRUB SERPL-MCNC: 0.2 MG/DL — SIGNIFICANT CHANGE UP (ref 0.2–1.2)
BILIRUB UR-MCNC: NEGATIVE — SIGNIFICANT CHANGE UP
BUN SERPL-MCNC: 13 MG/DL — SIGNIFICANT CHANGE UP (ref 7–23)
BUN SERPL-MCNC: 9 MG/DL — SIGNIFICANT CHANGE UP (ref 7–23)
CALCIUM SERPL-MCNC: 8.4 MG/DL — SIGNIFICANT CHANGE UP (ref 8.4–10.5)
CALCIUM SERPL-MCNC: 8.7 MG/DL — SIGNIFICANT CHANGE UP (ref 8.5–10.5)
CD16+CD56+ CELLS NFR BLD: 3 % — LOW (ref 5–23)
CD16+CD56+ CELLS NFR SPEC: 30 /UL — LOW (ref 71–410)
CD19 BLASTS SPEC-ACNC: 140 /UL — SIGNIFICANT CHANGE UP (ref 84–469)
CD19 BLASTS SPEC-ACNC: 15 % — SIGNIFICANT CHANGE UP (ref 6–24)
CD3 BLASTS SPEC-ACNC: 724 /UL — SIGNIFICANT CHANGE UP (ref 672–1870)
CD3 BLASTS SPEC-ACNC: 82 % — SIGNIFICANT CHANGE UP (ref 59–83)
CD4 %: 15 % — LOW (ref 30–62)
CD8 %: 66 % — HIGH (ref 12–36)
CHLORIDE SERPL-SCNC: 103 MMOL/L — SIGNIFICANT CHANGE UP (ref 96–108)
CHLORIDE SERPL-SCNC: 98 MMOL/L — SIGNIFICANT CHANGE UP (ref 96–108)
CO2 SERPL-SCNC: 26 MMOL/L — SIGNIFICANT CHANGE UP (ref 22–31)
CO2 SERPL-SCNC: 34 MMOL/L — HIGH (ref 22–31)
COLOR SPEC: YELLOW — SIGNIFICANT CHANGE UP
COMMENT - URINE: SIGNIFICANT CHANGE UP
CREAT SERPL-MCNC: 0.7 MG/DL — SIGNIFICANT CHANGE UP (ref 0.5–1.3)
CREAT SERPL-MCNC: 1.05 MG/DL — SIGNIFICANT CHANGE UP (ref 0.5–1.3)
DIFF PNL FLD: ABNORMAL
EOSINOPHIL # BLD AUTO: 0.11 K/UL — SIGNIFICANT CHANGE UP (ref 0–0.5)
EOSINOPHIL NFR BLD AUTO: 1.6 % — SIGNIFICANT CHANGE UP (ref 0–6)
EPI CELLS # UR: SIGNIFICANT CHANGE UP /HPF (ref 0–5)
GLUCOSE SERPL-MCNC: 139 MG/DL — HIGH (ref 70–99)
GLUCOSE SERPL-MCNC: 162 MG/DL — HIGH (ref 70–99)
GLUCOSE UR QL: NEGATIVE — SIGNIFICANT CHANGE UP
HAV IGM SER-ACNC: SIGNIFICANT CHANGE UP
HBV CORE AB SER-ACNC: REACTIVE
HBV CORE IGM SER-ACNC: SIGNIFICANT CHANGE UP
HBV SURFACE AB SER-ACNC: SIGNIFICANT CHANGE UP
HBV SURFACE AG SER-ACNC: SIGNIFICANT CHANGE UP
HCT VFR BLD CALC: 36.8 % — LOW (ref 39–50)
HCT VFR BLD CALC: 41 % — SIGNIFICANT CHANGE UP (ref 39–50)
HCV AB S/CO SERPL IA: 0.04 S/CO — SIGNIFICANT CHANGE UP
HCV AB SERPL-IMP: SIGNIFICANT CHANGE UP
HGB BLD-MCNC: 11 G/DL — LOW (ref 13–17)
HGB BLD-MCNC: 12.4 G/DL — LOW (ref 13–17)
IMM GRANULOCYTES NFR BLD AUTO: 0.4 % — SIGNIFICANT CHANGE UP (ref 0–1.5)
INR BLD: 1.26 — HIGH (ref 0.88–1.16)
KETONES UR-MCNC: NEGATIVE — SIGNIFICANT CHANGE UP
LACTATE SERPL-SCNC: 1.7 MMOL/L — SIGNIFICANT CHANGE UP (ref 0.4–2)
LEUKOCYTE ESTERASE UR-ACNC: NEGATIVE — SIGNIFICANT CHANGE UP
LYMPHOCYTES # BLD AUTO: 1.4 K/UL — SIGNIFICANT CHANGE UP (ref 1–3.3)
LYMPHOCYTES # BLD AUTO: 20.5 % — SIGNIFICANT CHANGE UP (ref 13–44)
MCHC RBC-ENTMCNC: 25.5 PG — LOW (ref 27–34)
MCHC RBC-ENTMCNC: 25.8 PG — LOW (ref 27–34)
MCHC RBC-ENTMCNC: 29.9 GM/DL — LOW (ref 32–36)
MCHC RBC-ENTMCNC: 30.2 GM/DL — LOW (ref 32–36)
MCV RBC AUTO: 85.2 FL — SIGNIFICANT CHANGE UP (ref 80–100)
MCV RBC AUTO: 85.2 FL — SIGNIFICANT CHANGE UP (ref 80–100)
MONOCYTES # BLD AUTO: 0.36 K/UL — SIGNIFICANT CHANGE UP (ref 0–0.9)
MONOCYTES NFR BLD AUTO: 5.3 % — SIGNIFICANT CHANGE UP (ref 2–14)
NEUTROPHILS # BLD AUTO: 4.9 K/UL — SIGNIFICANT CHANGE UP (ref 1.8–7.4)
NEUTROPHILS NFR BLD AUTO: 71.9 % — SIGNIFICANT CHANGE UP (ref 43–77)
NITRITE UR-MCNC: NEGATIVE — SIGNIFICANT CHANGE UP
NRBC # BLD: 0 /100 WBCS — SIGNIFICANT CHANGE UP (ref 0–0)
NRBC # BLD: 0 /100 WBCS — SIGNIFICANT CHANGE UP (ref 0–0)
PH UR: 6 — SIGNIFICANT CHANGE UP (ref 5–8)
PLATELET # BLD AUTO: 211 K/UL — SIGNIFICANT CHANGE UP (ref 150–400)
PLATELET # BLD AUTO: 243 K/UL — SIGNIFICANT CHANGE UP (ref 150–400)
POTASSIUM SERPL-MCNC: 3.6 MMOL/L — SIGNIFICANT CHANGE UP (ref 3.5–5.3)
POTASSIUM SERPL-MCNC: 3.8 MMOL/L — SIGNIFICANT CHANGE UP (ref 3.5–5.3)
POTASSIUM SERPL-SCNC: 3.6 MMOL/L — SIGNIFICANT CHANGE UP (ref 3.5–5.3)
POTASSIUM SERPL-SCNC: 3.8 MMOL/L — SIGNIFICANT CHANGE UP (ref 3.5–5.3)
PROT SERPL-MCNC: 8.5 G/DL — HIGH (ref 6.4–8.2)
PROT UR-MCNC: NEGATIVE MG/DL — SIGNIFICANT CHANGE UP
PROTHROM AB SERPL-ACNC: 14.7 SEC — HIGH (ref 10.6–13.6)
RBC # BLD: 4.32 M/UL — SIGNIFICANT CHANGE UP (ref 4.2–5.8)
RBC # BLD: 4.81 M/UL — SIGNIFICANT CHANGE UP (ref 4.2–5.8)
RBC # FLD: 13.6 % — SIGNIFICANT CHANGE UP (ref 10.3–14.5)
RBC # FLD: 13.8 % — SIGNIFICANT CHANGE UP (ref 10.3–14.5)
RBC CASTS # UR COMP ASSIST: < 5 /HPF — SIGNIFICANT CHANGE UP
SARS-COV-2 RNA SPEC QL NAA+PROBE: SIGNIFICANT CHANGE UP
SODIUM SERPL-SCNC: 138 MMOL/L — SIGNIFICANT CHANGE UP (ref 135–145)
SODIUM SERPL-SCNC: 139 MMOL/L — SIGNIFICANT CHANGE UP (ref 132–145)
SP GR SPEC: 1.02 — SIGNIFICANT CHANGE UP (ref 1–1.03)
T-CELL CD4 SUBSET PNL BLD: 130 /UL — LOW (ref 489–1457)
UROBILINOGEN FLD QL: 0.2 E.U./DL — SIGNIFICANT CHANGE UP
WBC # BLD: 3.92 K/UL — SIGNIFICANT CHANGE UP (ref 3.8–10.5)
WBC # BLD: 6.82 K/UL — SIGNIFICANT CHANGE UP (ref 3.8–10.5)
WBC # FLD AUTO: 3.92 K/UL — SIGNIFICANT CHANGE UP (ref 3.8–10.5)
WBC # FLD AUTO: 6.82 K/UL — SIGNIFICANT CHANGE UP (ref 3.8–10.5)
WBC UR QL: < 5 /HPF — SIGNIFICANT CHANGE UP

## 2021-11-01 PROCEDURE — 99223 1ST HOSP IP/OBS HIGH 75: CPT | Mod: GC

## 2021-11-01 PROCEDURE — 71045 X-RAY EXAM CHEST 1 VIEW: CPT | Mod: 26

## 2021-11-01 PROCEDURE — 74177 CT ABD & PELVIS W/CONTRAST: CPT | Mod: 26

## 2021-11-01 RX ORDER — ACETAMINOPHEN 500 MG
650 TABLET ORAL EVERY 6 HOURS
Refills: 0 | Status: DISCONTINUED | OUTPATIENT
Start: 2021-11-01 | End: 2021-11-02

## 2021-11-01 RX ORDER — LANOLIN ALCOHOL/MO/W.PET/CERES
3 CREAM (GRAM) TOPICAL AT BEDTIME
Refills: 0 | Status: DISCONTINUED | OUTPATIENT
Start: 2021-11-01 | End: 2021-11-02

## 2021-11-01 RX ORDER — NICOTINE POLACRILEX 2 MG
1 GUM BUCCAL DAILY
Refills: 0 | Status: DISCONTINUED | OUTPATIENT
Start: 2021-11-01 | End: 2021-11-02

## 2021-11-01 RX ORDER — INFLUENZA VIRUS VACCINE 15; 15; 15; 15 UG/.5ML; UG/.5ML; UG/.5ML; UG/.5ML
0.5 SUSPENSION INTRAMUSCULAR ONCE
Refills: 0 | Status: DISCONTINUED | OUTPATIENT
Start: 2021-11-01 | End: 2021-11-02

## 2021-11-01 RX ORDER — LIDOCAINE HCL 20 MG/ML
20 VIAL (ML) INJECTION ONCE
Refills: 0 | Status: COMPLETED | OUTPATIENT
Start: 2021-11-01 | End: 2021-11-01

## 2021-11-01 RX ORDER — BICTEGRAVIR SODIUM, EMTRICITABINE, AND TENOFOVIR ALAFENAMIDE FUMARATE 30; 120; 15 MG/1; MG/1; MG/1
1 TABLET ORAL DAILY
Refills: 0 | Status: DISCONTINUED | OUTPATIENT
Start: 2021-11-01 | End: 2021-11-02

## 2021-11-01 RX ORDER — ONDANSETRON 8 MG/1
4 TABLET, FILM COATED ORAL EVERY 8 HOURS
Refills: 0 | Status: DISCONTINUED | OUTPATIENT
Start: 2021-11-01 | End: 2021-11-02

## 2021-11-01 RX ORDER — VANCOMYCIN HCL 1 G
1000 VIAL (EA) INTRAVENOUS EVERY 8 HOURS
Refills: 0 | Status: DISCONTINUED | OUTPATIENT
Start: 2021-11-01 | End: 2021-11-01

## 2021-11-01 RX ORDER — VANCOMYCIN HCL 1 G
1000 VIAL (EA) INTRAVENOUS ONCE
Refills: 0 | Status: COMPLETED | OUTPATIENT
Start: 2021-11-01 | End: 2021-11-01

## 2021-11-01 RX ORDER — PIPERACILLIN AND TAZOBACTAM 4; .5 G/20ML; G/20ML
4.5 INJECTION, POWDER, LYOPHILIZED, FOR SOLUTION INTRAVENOUS EVERY 6 HOURS
Refills: 0 | Status: DISCONTINUED | OUTPATIENT
Start: 2021-11-01 | End: 2021-11-01

## 2021-11-01 RX ADMIN — Medication 650 MILLIGRAM(S): at 00:01

## 2021-11-01 RX ADMIN — SODIUM CHLORIDE 2300 MILLILITER(S): 9 INJECTION INTRAMUSCULAR; INTRAVENOUS; SUBCUTANEOUS at 00:01

## 2021-11-01 RX ADMIN — Medication 250 MILLIGRAM(S): at 18:12

## 2021-11-01 RX ADMIN — Medication 1 TABLET(S): at 21:56

## 2021-11-01 RX ADMIN — Medication 1 PATCH: at 18:09

## 2021-11-01 RX ADMIN — PIPERACILLIN AND TAZOBACTAM 200 GRAM(S): 4; .5 INJECTION, POWDER, LYOPHILIZED, FOR SOLUTION INTRAVENOUS at 05:58

## 2021-11-01 RX ADMIN — CEFTRIAXONE 100 MILLIGRAM(S): 500 INJECTION, POWDER, FOR SOLUTION INTRAMUSCULAR; INTRAVENOUS at 00:01

## 2021-11-01 RX ADMIN — Medication 650 MILLIGRAM(S): at 12:15

## 2021-11-01 RX ADMIN — Medication 1 PATCH: at 12:00

## 2021-11-01 RX ADMIN — BICTEGRAVIR SODIUM, EMTRICITABINE, AND TENOFOVIR ALAFENAMIDE FUMARATE 1 TABLET(S): 30; 120; 15 TABLET ORAL at 13:37

## 2021-11-01 RX ADMIN — Medication 250 MILLIGRAM(S): at 08:00

## 2021-11-01 RX ADMIN — Medication 20 MILLILITER(S): at 13:13

## 2021-11-01 RX ADMIN — Medication 650 MILLIGRAM(S): at 11:27

## 2021-11-01 RX ADMIN — Medication 166.67 MILLIGRAM(S): at 00:14

## 2021-11-01 NOTE — H&P ADULT - PROBLEM SELECTOR PLAN 2
Sepsis for fever and tachycardia 2/2 SSTI with abscess. S/p 2.3L NS in ED, tolerating PO  - abx as above  - PO hydration, if tachycardia worsens trial of LR bolus vs adding empiric fungal/viral coverage

## 2021-11-01 NOTE — H&P ADULT - PROBLEM SELECTOR PLAN 3
HIV acquired sexually 17 years ago on no meds, follows with no providers. MSM with 10-12 HIV positive sexual partners so pt potentially at risk for resistant strain.  - f/u CD4 and quantitative RNA  - HIV consult in AM  - discuss follow-up at C  - f/u RPR (has had syphilis before), G/C in urine  - f/u hepatitis panel  - decision to send genotyping per HIV consult  - decision for further treponemal testing or triple site per HIV consult

## 2021-11-01 NOTE — H&P ADULT - HISTORY OF PRESENT ILLNESS
41 MSM with PMHx HIV on no meds [id number], hydradenitis suppurativa, tobacco and polysubstance use presented to [facility] ED for worsening draining fluid collection on abdomen. He noted a lump that was tender to touch around 10 days ago. He has been checking his temperature since then and noted no fevers, but 2 days prior to admission he started having orthostatic hypotension and the day prior to admission he noted the wound to be draining yellow pus with streaks of blood. Around that time he also noted chills, night sweats.  He has had 2 subcutaneous abscesses in the past, both around his buttocks and were drained in the ED then he was given PO antibiotics. The most recent one was in 2016.  He thinks the wound started when he shaved his abdomen, which he does regularly. No other injuries or recent illnesses. No palpitations, dyspnea, cough, LOC, urinary symptoms. He does not that he has had intermittent single episodes of diarrhea off and on for the past week with last one 3 days prior to admission and never more than 2 episodes of diarrhea/day.  Not on HIV medications, when asked why he shrugged. He has not seen a doctor in years, but does occasionally go to free clinics to get tested for STIs. He has had gonorrhea, chlamydia, syphilis. Has not had hepatitis before.  MSM with 10-12 sexual partners in the last year, always men. He has penetrative anal sex but not receptive. He reports that all his partners know about his status and are also HIV positive, and he "sometimes" uses protection.  Smokes 3 cigarettes/day for 10 years, smokes or snorts crystal meth, MDMA, ketamine. Has injected in the past but not for several years.    ED: 38.0, 247->102, 126/80, 18, 97% RA  CT A/P with 5.2cm abscess in subcutaneous tissue.  I&D done in ED with copious output: sent for culture and wound packed  2.3L NS, 1g ceftriaxone, 1.25g vancomycin  EKG: sinus tach with possible right atrial enlargement  CXR: lightly widened aortic knob, increased hilar markings, otherwise unremarkable

## 2021-11-01 NOTE — PROGRESS NOTE ADULT - PROBLEM SELECTOR PLAN 2
HIV acquired sexually 17 years ago on no meds, follows with no providers. MSM with 10-12 HIV positive sexual partners so pt potentially at risk for resistant strain.  - f/u CD4 and quantitative RNA  - f/u RPR (has had syphilis before), G/C in urine, hepatitis panel  - triple site testing ordered per HIV consult   - HIV consulted, waiting for recs HIV acquired sexually 17 years ago on no meds, follows with no providers. MSM with 10-12 HIV positive sexual partners so pt potentially at risk for resistant strain.  - Biktarvy started per HIV consult   - f/u CD4 and quantitative RNA  - f/u RPR (has had syphilis before), G/C in urine, hepatitis panel

## 2021-11-01 NOTE — H&P ADULT - NSHPLABSRESULTS_GEN_ALL_CORE
LABS:                         12.4   6.82  )-----------( 243      ( 2021 00:12 )             41.0         139  |  98  |  13  ----------------------------<  139<H>  3.6   |  34<H>  |  1.05    Ca    8.7      2021 00:12    TPro  8.5<H>  /  Alb  3.2<L>  /  TBili  0.2  /  DBili  x   /  AST  14<L>  /  ALT  16  /  AlkPhos  92      PT/INR - ( 2021 00:12 )   PT: 14.7 sec;   INR: 1.26          PTT - ( 2021 00:12 )  PTT:34.1 sec  Urinalysis Basic - ( 2021 00:05 )    Color: Yellow / Appearance: Clear / S.020 / pH: x  Gluc: x / Ketone: NEGATIVE  / Bili: NEGATIVE / Urobili: 0.2 E.U./dL   Blood: x / Protein: NEGATIVE mg/dL / Nitrite: NEGATIVE   Leuk Esterase: NEGATIVE / RBC: < 5 /HPF / WBC < 5 /HPF   Sq Epi: x / Non Sq Epi: 0-5 /HPF / Bacteria: Present /HPF            Lactate, Blood: 1.7 mmoL/L ( @ 00:12)      RADIOLOGY, EKG & ADDITIONAL TESTS: Reviewed.

## 2021-11-01 NOTE — H&P ADULT - NSHPREVIEWOFSYSTEMS_GEN_ALL_CORE
REVIEW OF SYSTEMS:  CONSTITUTIONAL: No weakness, fevers, chills, changes in weight  EYES/ENT: No visual changes;  No vertigo or throat pain   NECK: No pain or stiffness  RESPIRATORY: No cough, wheezing, hemoptysis; No shortness of breath  CARDIOVASCULAR: no chest pain or palpitations  GASTROINTESTINAL: + abdominal pain. No nausea, vomiting, or hematemesis; No diarrhea or constipation. No melena or hematochezia.  GENITOURINARY: No dysuria, frequency or hematuria  NEUROLOGICAL: No numbness or weakness  SKIN: No itching, rashes

## 2021-11-01 NOTE — PROGRESS NOTE ADULT - SUBJECTIVE AND OBJECTIVE BOX
INTERVAL HPI/OVERNIGHT EVENTS:  Patient was seen and examined at bedside this AM. Patient resting comfortably. He notes feeling dehydrated but otherwise in no pain. Patient denies weakness, HA, changes in vision, CP, palpitations, SOB, cough, N/V/D/C, dysuria, changes in bowel movements, LE edema. ROS otherwise negative.      VITAL SIGNS:  T(F): 98.1 (21 @ 04:40)  HR: 101 (21 @ 04:40)  BP: 120/75 (21 @ 04:40)  RR: 18 (21 @ 04:40)  SpO2: 97% (21 @ 04:40)  Wt(kg): --    PHYSICAL EXAM:    Constitutional: lying in bed comfortably, NAD  HEENT: PERRL, EOMI, sclera anicteric, neck supple, trachea midline, no masses, no JVD, MMM, good dentition  Respiratory: CTA b/l, good air entry b/l; no wheezing, no rhonchi, no rales; without accessory muscle use, no intercostal retractions  Cardiovascular: RRR, normal S1, S2; no M/R/G  Gastrointestinal: soft, NT/ND; no masses palpable; BS normoactive x4 quadrants  Extremities: Warm and well perfused; 2+ pulses equal bilateral upper and lower extremities; no edema, cyanosis, or clubbing  Neurological: AOx3, CN II-XII grossly intact, no focal deficits   Skin: drainage site covered by bandaging, not oozing. Previous drainage site on buttock look well healed. Normal temperature, warm, dry; no rashes noted    MEDICATIONS  (STANDING):  influenza   Vaccine 0.5 milliLiter(s) IntraMuscular once  nicotine -   7 mG/24Hr(s) Patch 1 patch Transdermal daily  vancomycin  IVPB 1000 milliGRAM(s) IV Intermittent every 8 hours    MEDICATIONS  (PRN):  acetaminophen     Tablet .. 650 milliGRAM(s) Oral every 6 hours PRN Temp greater or equal to 38C (100.4F), Mild Pain (1 - 3)  aluminum hydroxide/magnesium hydroxide/simethicone Suspension 30 milliLiter(s) Oral every 4 hours PRN Dyspepsia  melatonin 3 milliGRAM(s) Oral at bedtime PRN Insomnia  ondansetron Injectable 4 milliGRAM(s) IV Push every 8 hours PRN Nausea and/or Vomiting      Allergies    No Known Allergies    Intolerances        LABS:                        11.0   3.92  )-----------( 211      ( 2021 11:05 )             36.8     11    138  |  103  |  9   ----------------------------<  162<H>  3.8   |  26  |  0.70    Ca    8.4      2021 11:05    TPro  8.5<H>  /  Alb  3.2<L>  /  TBili  0.2  /  DBili  x   /  AST  14<L>  /  ALT  16  /  AlkPhos  92      PT/INR - ( 2021 00:12 )   PT: 14.7 sec;   INR: 1.26          PTT - ( 2021 00:12 )  PTT:34.1 sec  Urinalysis Basic - ( 2021 00:05 )    Color: Yellow / Appearance: Clear / S.020 / pH: x  Gluc: x / Ketone: NEGATIVE  / Bili: NEGATIVE / Urobili: 0.2 E.U./dL   Blood: x / Protein: NEGATIVE mg/dL / Nitrite: NEGATIVE   Leuk Esterase: NEGATIVE / RBC: < 5 /HPF / WBC < 5 /HPF   Sq Epi: x / Non Sq Epi: 0-5 /HPF / Bacteria: Present /HPF      CAPILLARY BLOOD GLUCOSE                 INTERVAL HPI/OVERNIGHT EVENTS:  Patient was seen and examined at bedside this AM. Patient resting comfortably. He notes feeling dehydrated but otherwise in no pain. Abdominal incision is causing no discomfort. Patient denies weakness, HA, changes in vision, CP, palpitations, SOB, cough, N/V/D/C, dysuria, changes in bowel movements, LE edema. ROS otherwise negative.      VITAL SIGNS:  T(F): 98.1 (21 @ 04:40)  HR: 101 (21 @ 04:40)  BP: 120/75 (21 @ 04:40)  RR: 18 (21 @ 04:40)  SpO2: 97% (21 @ 04:40)  Wt(kg): --    PHYSICAL EXAM:    Constitutional: lying in bed comfortably, NAD  HEENT: PERRL, EOMI, sclera anicteric, neck supple, trachea midline, no masses, no JVD, MMM, good dentition  Respiratory: CTA b/l, good air entry b/l; no wheezing, no rhonchi, no rales; without accessory muscle use, no intercostal retractions  Cardiovascular: RRR, normal S1, S2; no M/R/G  Gastrointestinal: soft, NT/ND; no masses palpable; BS normoactive x4 quadrants  Extremities: Warm and well perfused; 2+ pulses equal bilateral upper and lower extremities; no edema, cyanosis, or clubbing  Neurological: AOx3, CN II-XII grossly intact, no focal deficits   Skin: Drainage site covered by bandaging, not oozing. Previous drainage site on buttock look well healed. Normal temperature, warm, dry; no rashes noted    MEDICATIONS  (STANDING):  influenza   Vaccine 0.5 milliLiter(s) IntraMuscular once  nicotine -   7 mG/24Hr(s) Patch 1 patch Transdermal daily  vancomycin  IVPB 1000 milliGRAM(s) IV Intermittent every 8 hours    MEDICATIONS  (PRN):  acetaminophen     Tablet .. 650 milliGRAM(s) Oral every 6 hours PRN Temp greater or equal to 38C (100.4F), Mild Pain (1 - 3)  aluminum hydroxide/magnesium hydroxide/simethicone Suspension 30 milliLiter(s) Oral every 4 hours PRN Dyspepsia  melatonin 3 milliGRAM(s) Oral at bedtime PRN Insomnia  ondansetron Injectable 4 milliGRAM(s) IV Push every 8 hours PRN Nausea and/or Vomiting      Allergies    No Known Allergies    Intolerances        LABS:                        11.0   3.92  )-----------( 211      ( 2021 11:05 )             36.8         138  |  103  |  9   ----------------------------<  162<H>  3.8   |  26  |  0.70    Ca    8.4      2021 11:05    TPro  8.5<H>  /  Alb  3.2<L>  /  TBili  0.2  /  DBili  x   /  AST  14<L>  /  ALT  16  /  AlkPhos  92      PT/INR - ( 2021 00:12 )   PT: 14.7 sec;   INR: 1.26          PTT - ( 2021 00:12 )  PTT:34.1 sec  Urinalysis Basic - ( 2021 00:05 )    Color: Yellow / Appearance: Clear / S.020 / pH: x  Gluc: x / Ketone: NEGATIVE  / Bili: NEGATIVE / Urobili: 0.2 E.U./dL   Blood: x / Protein: NEGATIVE mg/dL / Nitrite: NEGATIVE   Leuk Esterase: NEGATIVE / RBC: < 5 /HPF / WBC < 5 /HPF   Sq Epi: x / Non Sq Epi: 0-5 /HPF / Bacteria: Present /HPF      CAPILLARY BLOOD GLUCOSE                 INTERVAL HPI/OVERNIGHT EVENTS:  Patient was seen and examined at bedside this AM. Patient resting comfortably. He notes feeling dehydrated but otherwise in no pain. Abdominal incision causing no discomfort. Patient denies weakness, HA, changes in vision, CP, palpitations, SOB, cough, N/V/D/C, dysuria, changes in bowel movements, LE edema. ROS otherwise negative.      VITAL SIGNS:  T(F): 98.1 (21 @ 04:40)  HR: 101 (21 @ 04:40)  BP: 120/75 (21 @ 04:40)  RR: 18 (21 @ 04:40)  SpO2: 97% (21 @ 04:40)  Wt(kg): --    PHYSICAL EXAM:    Constitutional: lying in bed comfortably, NAD  HEENT: PERRL, EOMI, sclera anicteric, neck supple, trachea midline, no masses, no JVD, MMM, good dentition  Respiratory: CTA b/l, good air entry b/l; no wheezing, no rhonchi, no rales; without accessory muscle use, no intercostal retractions  Cardiovascular: RRR, normal S1, S2; no M/R/G  Gastrointestinal: soft, NT/ND; no masses palpable; BS normoactive x4 quadrants  Extremities: Warm and well perfused; 2+ pulses equal bilateral upper and lower extremities; no edema, cyanosis, or clubbing  Neurological: AOx3, CN II-XII grossly intact, no focal deficits   Skin: Drainage site covered by bandaging, not oozing. Previous drainage site on buttock look well healed. Normal temperature, warm, dry; no rashes noted    MEDICATIONS  (STANDING):  influenza   Vaccine 0.5 milliLiter(s) IntraMuscular once  nicotine -   7 mG/24Hr(s) Patch 1 patch Transdermal daily  vancomycin  IVPB 1000 milliGRAM(s) IV Intermittent every 8 hours    MEDICATIONS  (PRN):  acetaminophen     Tablet .. 650 milliGRAM(s) Oral every 6 hours PRN Temp greater or equal to 38C (100.4F), Mild Pain (1 - 3)  aluminum hydroxide/magnesium hydroxide/simethicone Suspension 30 milliLiter(s) Oral every 4 hours PRN Dyspepsia  melatonin 3 milliGRAM(s) Oral at bedtime PRN Insomnia  ondansetron Injectable 4 milliGRAM(s) IV Push every 8 hours PRN Nausea and/or Vomiting      Allergies    No Known Allergies    Intolerances        LABS:                        11.0   3.92  )-----------( 211      ( 2021 11:05 )             36.8         138  |  103  |  9   ----------------------------<  162<H>  3.8   |  26  |  0.70    Ca    8.4      2021 11:05    TPro  8.5<H>  /  Alb  3.2<L>  /  TBili  0.2  /  DBili  x   /  AST  14<L>  /  ALT  16  /  AlkPhos  92      PT/INR - ( 2021 00:12 )   PT: 14.7 sec;   INR: 1.26          PTT - ( 2021 00:12 )  PTT:34.1 sec  Urinalysis Basic - ( 2021 00:05 )    Color: Yellow / Appearance: Clear / S.020 / pH: x  Gluc: x / Ketone: NEGATIVE  / Bili: NEGATIVE / Urobili: 0.2 E.U./dL   Blood: x / Protein: NEGATIVE mg/dL / Nitrite: NEGATIVE   Leuk Esterase: NEGATIVE / RBC: < 5 /HPF / WBC < 5 /HPF   Sq Epi: x / Non Sq Epi: 0-5 /HPF / Bacteria: Present /HPF      CAPILLARY BLOOD GLUCOSE

## 2021-11-01 NOTE — H&P ADULT - PROBLEM SELECTOR PLAN 4
#Tobacco use  1 pack per week smoker x10 years, smokes or snorts crystal meth, MDMA, ketamine. No opiate use recently. Has injected in the past but not for several years.  - nicotine patch  - counselling in AM

## 2021-11-01 NOTE — H&P ADULT - NSHPPHYSICALEXAM_GEN_ALL_CORE
VITAL SIGNS:  T(C): 36.7 (11-01-21 @ 04:40), Max: 38 (10-31-21 @ 23:40)  T(F): 98.1 (11-01-21 @ 04:40), Max: 100.4 (10-31-21 @ 23:40)  HR: 101 (11-01-21 @ 04:40) (101 - 147)  BP: 120/75 (11-01-21 @ 04:40) (112/69 - 126/80)  BP(mean): --  RR: 18 (11-01-21 @ 04:40) (18 - 18)  SpO2: 97% (11-01-21 @ 04:40) (97% - 99%)  Wt(kg): --    PHYSICAL EXAM:  Constitutional: well groomed young man resting comfortably in bed; NAD  Eyes: PER, anicteric sclera  ENT: no nasal discharge; uvula midline, no oropharyngeal erythema or exudates; MMM; no oral thrush appreciated  Respiratory: CTA B/L; no W/R/R, no retractions  Cardiac: +S1/S2; RRR; no M/R/G  Gastrointestinal: slightly distended, tender incision in epigastrum with packing, nonadherant gauze, and 4x4 gauze partially saturated with off-white purulent material with streaks of blood; other areas tender only to deep palpation with pain referred to incision  Extremities: WWP, no clubbing or cyanosis; no peripheral edema  Musculoskeletal: no joint swelling, tenderness or erythema  Vascular: 2+ radial, DP pulses B/L  Dermatologic: skin warm, dry and intact; no rashes, wounds, or scars  Neurologic: AAOx3; CNII-XII grossly intact; no focal deficits  Psychiatric: affect and characteristics of appearance, verbalizations, behaviors are appropriate

## 2021-11-01 NOTE — PROGRESS NOTE ADULT - PROBLEM SELECTOR PLAN 3
#Tobacco use  1 pack per week smoker x10 years, smokes or snorts crystal meth, MDMA, ketamine. No opiate use recently. Has injected in the past but not for several years. Willing to quit/wean off.   - Nicotine patch ordered   - Awaiting counseling #Tobacco use 1 pack per week smoker x10 years, smokes or snorts crystal meth, MDMA, ketamine. No opiate use recently. Has injected in the past but not for several years. Willing to quit/wean off.   - Nicotine patch ordered   - Awaiting counseling

## 2021-11-01 NOTE — ED ADULT NURSE REASSESSMENT NOTE - NS ED NURSE REASSESS COMMENT FT1
Pt rcvd from LASHA[facility] Reed.  Pt pending labs w/ fluids and abx in progress.  Patient provided for rounding. Patient in no apparent distress. Resting comfortably. Patient provided for emotional support, comfort, safety, and review of plan of care. Will continue to monitor.

## 2021-11-01 NOTE — H&P ADULT - PROBLEM SELECTOR PLAN 1
10d of progressive tender fluid collection now s/p drainage in ED 11/1/21. Given HIV status on no meds, broad spectrum indicated covering MRSA and pseudomonas. Patient thinks he had small superficial injury while shaving, so more likely dermatologic invasion rather than hematologic spread.  - s/p vanc and ceftriaxone in ED  - vanc 1g q8, trough prior to 4th dose  - zosyn 4.5g q6 hours  - change packing daily  - f/u culture data

## 2021-11-01 NOTE — PROCEDURE NOTE - ADDITIONAL PROCEDURE DETAILS
Further incision and drainage of LUQ abdominal wall abscess, cruciate horizontal incision extended, vertical incision created and packing placed.

## 2021-11-01 NOTE — CONSULT NOTE ADULT - ASSESSMENT
Plan discussed with attending and chief resident.   ____________________________________________________  KP Portelli - Resident   Surgery  41 M with PMH HIV not on medication, polysubstance use, and multiple abscesses s/p bedside I&D who presented with 10d worsening LUQ pain, abscess associated with drainage. In ED febrile and tachycardic, CT with 5.c cm abscess superior to L rectus muscle s/p I&D by ED provider with improvement in sx but found to have persistent fluctuance and expression of purulence with surrounding cellulitis. Further I&D performed bedside by surgery team 11/1, wound repacked.     S/p bedside I&D 11/1 cruciate incision, dressing packed, wound check in AM   Continue IV abx   Management immune deficiency per primary team   Team 4c will continue to follow     Plan discussed with attending and chief resident.   ____________________________________________________   Mili - Resident   Surgery  41 M with PMH HIV not on medication, polysubstance use, and multiple abscesses s/p bedside I&D who presented with 10d worsening LUQ pain, abscess associated with drainage. In ED febrile and tachycardic, CT with 5.c cm abscess superior to L rectus muscle s/p I&D by ED provider with improvement in sx but found to have persistent fluctuance and expression of purulence with surrounding cellulitis. Further I&D performed bedside by surgery team 11/1, wound repacked.     S/p bedside I&D 11/1 cruciate incision, dressing packed, wound check in AM   Continue IV abx   Management immune deficiency per primary team   Team 4c will continue to follow     Plan discussed with attending and chief resident.   ____________________________________________________  JOANNA Garces - Resident   Surgery       senior resident addendum:  agree with assessment as above, requires additional I&D for completion to allow remaining abscess to drain, which was done at bedside by Dr. Garces. follow up cultures from ED.

## 2021-11-01 NOTE — H&P ADULT - ATTENDING COMMENTS
Patient was seen and examined with the resident team today.  I agree with Dr. Wilson's assessment and plan with the following exceptions/additions:     Briefly, this is a 40yo gentleman with a PMH of uncontrolled HIV (RF: MSM, not on meds, OI's: syphilis), tobacco dependence and PSA who p/w an abdominal wall abscess, now s/p I&D.    #Abdominal wall abscess - stop Zosyn and c/w Vancomycin, f/u cultures, Surgery consult for management of wound and evaluation of source control  #HIV - not on meds but interested in starting, HIV consult, f/u CD4 count and VL   #Tobacco dependence - c/w nicotine patch  #PSA - monitor for any withdrawal symptoms   #DVT PPx - SCD  #Dispo - home within the next 48 hours    Courtney Vail  [phone] Patient was seen and examined with the resident team today.  I agree with Dr. Wilson's assessment and plan with the following exceptions/additions:     Briefly, this is a 42yo gentleman with a PMH of uncontrolled HIV (RF: MSM, not on meds, OI's: syphilis), tobacco dependence and PSA who p/w an abdominal wall abscess c/b sepsis, now s/p I&D.    #Abdominal wall abscess c/b sepsis- stop Zosyn and c/w Vancomycin, f/u cultures, Surgery consult for management of wound and evaluation of source control  #HIV - not on meds but interested in starting, HIV consult, f/u CD4 count and VL   #Tobacco dependence - c/w nicotine patch  #PSA - monitor for any withdrawal symptoms   #DVT PPx - SCD  #Dispo - home within the next 48 hours    Courtney Vail  [phone]

## 2021-11-01 NOTE — H&P ADULT - NSHPSOCIALHISTORY_GEN_ALL_CORE
Unemployed, lives in apartment now but soon moving  Smokes 3 cigarettes for past 10 years  snorts/smokes crystal meth, MDMA, ketamine, injected in the past but not recently  insertive anal sex with 10-12 male partners in last year with intermittent protection

## 2021-11-01 NOTE — H&P ADULT - NSICDXFAMILYHX_GEN_ALL_CORE_FT
FAMILY HISTORY:  No family history of cardiovascular disease, father and mother    Sibling  Still living? Unknown  FH: HIV infection, Age at diagnosis: Age Unknown

## 2021-11-01 NOTE — PROGRESS NOTE ADULT - ASSESSMENT
41 M with PMHx HIV (without antiretroviral medications), hydradenitis, tobacco and polysubstance use admitted for sepsis 2/2 abdominal abscess.  41 M with PMHx HIV (without previous antiretroviral medications, MSM), hydradenitis, tobacco and polysubstance use admitted for sepsis 2/2 abdominal abscess.

## 2021-11-01 NOTE — CONSULT NOTE ADULT - SUBJECTIVE AND OBJECTIVE BOX
HPI:  41 MSM with PMHx HIV on no meds [id number], hydradenitis suppurativa, tobacco and polysubstance use presented to [facility] ED for worsening draining fluid collection on abdomen. He noted a lump that was tender to touch around 10 days ago. He has been checking his temperature since then and noted no fevers, but 2 days prior to admission he started having orthostatic hypotension and the day prior to admission he noted the wound to be draining yellow pus with streaks of blood. Around that time he also noted chills, night sweats.  He has had 2 subcutaneous abscesses in the past, both around his buttocks and were drained in the ED then he was given PO antibiotics. The most recent one was in 2016.  He thinks the wound started when he shaved his abdomen, which he does regularly. No other injuries or recent illnesses. No palpitations, dyspnea, cough, LOC, urinary symptoms. He does not that he has had intermittent single episodes of diarrhea off and on for the past week with last one 3 days prior to admission and never more than 2 episodes of diarrhea/day.  Not on HIV medications, when asked why he shrugged. He has not seen a doctor in years, but does occasionally go to free clinics to get tested for STIs. He has had gonorrhea, chlamydia, syphilis. Has not had hepatitis before.  MSM with 10-12 sexual partners in the last year, always men. He has penetrative anal sex but not receptive. He reports that all his partners know about his status and are also HIV positive, and he "sometimes" uses protection.  Smokes 3 cigarettes/day for 10 years, smokes or snorts crystal meth, MDMA, ketamine. Has injected in the past but not for several years.    ED: 38.0, 247->102, 126/80, 18, 97% RA  CT A/P with 5.2cm abscess in subcutaneous tissue.  I&D done in ED with copious output: sent for culture and wound packed  2.3L NS, 1g ceftriaxone, 1.25g vancomycin  EKG: sinus tach with possible right atrial enlargement  CXR: lightly widened aortic knob, increased hilar markings, otherwise unremarkable (2021 05:23)    SURGERY ADDENDUM:   41 MSM with PMH HIV not on medicationmeds [id number], hydradenitis suppurativa, tobacco and polysubstance use presented to [facility] ED for worsening draining fluid collection on abdomen. He noted a lump that was tender to touch around 10 days ago. He has been checking his temperature since then and noted no fevers, but 2 days prior to admission he started having orthostatic hypotension and the day prior to admission he noted the wound to be draining yellow pus with streaks of blood. Around that time he also noted chills, night sweats.  He has had 2 subcutaneous abscesses in the past, both around his buttocks and were drained in the ED then he was given PO antibiotics. The most recent one was in 2016.  He thinks the wound started when he shaved his abdomen, which he does regularly. No other injuries or recent illnesses. No palpitations, dyspnea, cough, LOC, urinary symptoms. He does not that he has had intermittent single episodes of diarrhea off and on for the past week with last one 3 days prior to admission and never more than 2 episodes of diarrhea/day.  Not on HIV medications, when asked why he shrugged. He has not seen a doctor in years, but does occasionally go to free clinics to get tested for STIs. He has had gonorrhea, chlamydia, syphilis. Has not had hepatitis before.  MSM with 10-12 sexual partners in the last year, always men. He has penetrative anal sex but not receptive. He reports that all his partners know about his status and are also HIV positive, and he "sometimes" uses protection.  Smokes 3 cigarettes/day for 10 years, smokes or snorts crystal meth, MDMA, ketamine. Has injected in the past but not for several years.      PAST MEDICAL & SURGICAL HISTORY:  HIV (human immunodeficiency virus infection)    Abscess    IVDU (intravenous drug user)    Anemia    HIV (human immunodeficiency virus infection)    No significant past surgical history        REVIEW OF SYSTEMS  Negative except as per HPI.       MEDICATIONS  (STANDING):  bictegravir 50 mG/emtricitabine 200 mG/tenofovir alafenamide 25 mG (BIKTARVY) 1 Tablet(s) Oral daily  influenza   Vaccine 0.5 milliLiter(s) IntraMuscular once  lidocaine 1% Injectable 20 milliLiter(s) Local Injection once  nicotine -   7 mG/24Hr(s) Patch 1 patch Transdermal daily  vancomycin  IVPB 1000 milliGRAM(s) IV Intermittent every 8 hours    MEDICATIONS  (PRN):  acetaminophen     Tablet .. 650 milliGRAM(s) Oral every 6 hours PRN Temp greater or equal to 38C (100.4F), Mild Pain (1 - 3)  aluminum hydroxide/magnesium hydroxide/simethicone Suspension 30 milliLiter(s) Oral every 4 hours PRN Dyspepsia  melatonin 3 milliGRAM(s) Oral at bedtime PRN Insomnia  ondansetron Injectable 4 milliGRAM(s) IV Push every 8 hours PRN Nausea and/or Vomiting      Allergies    No Known Allergies    Intolerances        SOCIAL HISTORY:    FAMILY HISTORY:  No family history of cardiovascular disease  father and mother    FH: HIV infection (Sibling)        Vital Signs Last 24 Hrs  T(C): 36.8 (2021 12:02), Max: 38 (31 Oct 2021 23:40)  T(F): 98.3 (2021 12:02), Max: 100.4 (31 Oct 2021 23:40)  HR: 105 (2021 12:02) (101 - 147)  BP: 149/81 (2021 12:02) (112/69 - 149/81)  BP(mean): --  RR: 16 (2021 12:02) (16 - 18)  SpO2: 97% (2021 12:02) (97% - 99%)    PHYSICAL EXAM:   Gen: NAD, resting comfortably in bed  CV: NSR  Pulm: no respiratory distress on RA   Abd: soft, ND, 8cm area of induration and erythema with central opening, actively draining pus, I&D site with sloughed tissue, TTP around I&D and cellulitis, no TTP remainder of abdomen, no rebound or guarding   Ext: WWP, no edema   Neuro: motor/sensory grossly intact     LABS:                        11.0   3.92  )-----------( 211      ( 2021 11:05 )             36.8     11    138  |  103  |  9   ----------------------------<  162<H>  3.8   |  26  |  0.70    Ca    8.4      2021 11:05    TPro  8.5<H>  /  Alb  3.2<L>  /  TBili  0.2  /  DBili  x   /  AST  14<L>  /  ALT  16  /  AlkPhos  92      LIVER FUNCTIONS - ( 2021 00:12 )  Alb: 3.2 g/dL / Pro: 8.5 g/dL / ALK PHOS: 92 U/L / ALT: 16 U/L / AST: 14 U/L / GGT: x           PT/INR - ( 2021 00:12 )   PT: 14.7 sec;   INR: 1.26          PTT - ( 2021 00:12 )  PTT:34.1 sec      CAPILLARY BLOOD GLUCOSE        Urinalysis Basic - ( 2021 00:05 )    Color: Yellow / Appearance: Clear / S.020 / pH: x  Gluc: x / Ketone: NEGATIVE  / Bili: NEGATIVE / Urobili: 0.2 E.U./dL   Blood: x / Protein: NEGATIVE mg/dL / Nitrite: NEGATIVE   Leuk Esterase: NEGATIVE / RBC: < 5 /HPF / WBC < 5 /HPF   Sq Epi: x / Non Sq Epi: 0-5 /HPF / Bacteria: Present /HPF           RADIOLOGY & ADDITIONAL STUDIES:  < from: CT Abdomen and Pelvis w/ IV Cont (21 @ 01:33) >    EXAM:  CT ABDOMEN AND PELVIS IC                           PROCEDURE DATE:  2021          INTERPRETATION:  CT SCAN OF ABDOMEN AND PELVIS    History: Abscess in the anterior upper abdominal wall.    Technique: CT scan of abdomen and pelvis was performed from lung bases through symphysis pubis. 100 cc of Omnipaque 350 administered intravenously. No oral contrast given. Axial, sagittal and coronal reformatted images were reviewed.    Comparison: None.    Findings:    Lower chest: Normal.    Liver:  Few subcentimeter left lobe hypodensities are too small to characterize.    Gallbladder: No radiopaque stones.    BIle Ducts: Normal.    Spleen:  Normal.    Pancreas:  Normal.    Adrenal glands:  Normal.    Kidneys: Subcentimeter right lower pole hypodensity is too small to characterize but most likely represents a cyst. Kidneys are otherwise normal.    Adenopathy:  No lymphadenopathy in abdomen or pelvis.    Ascites: None.    Gastrointestinal tract: Limited evaluation without oral contrast. Normal bowel caliber. Normal appendix. No pneumoperitoneum. Small hiatal hernia.    Vessels: Normal.    Pelvic organs: Normal bladder and prostate.    Soft tissues: 4.0 x 1.6 x 5.2 cm (trv x ap x cc) rim-enhancing fluid collection within the left upper anterior abdominal wall subcutaneous fat abuts the edematous musculature. Intramuscular extension is not excluded. No intra-abdominal extension. Tiny left inguinal canal lipoma.    Bones: Mild S-shaped thoracolumbar scoliosis.      Impression:  5.2 cm abscess within the left upper ventral wall subcutaneous tissues.        --- End of Report ---          Thank you for the opportunity to participate in the care of this patient.    JOSE MANUEL OSULLIVAN MD; Resident Radiologist  This document has been electronically signed.  MERRITT LOPEZ MD; Attending Radiologist  This document has been electronically signed. 2021  2:18AM    < end of copied text >   HPI:  41 MSM with PMHx HIV on no meds [id number], hydradenitis suppurativa, tobacco and polysubstance use presented to [facility] ED for worsening draining fluid collection on abdomen. He noted a lump that was tender to touch around 10 days ago. He has been checking his temperature since then and noted no fevers, but 2 days prior to admission he started having orthostatic hypotension and the day prior to admission he noted the wound to be draining yellow pus with streaks of blood. Around that time he also noted chills, night sweats.  He has had 2 subcutaneous abscesses in the past, both around his buttocks and were drained in the ED then he was given PO antibiotics. The most recent one was in 2016.  He thinks the wound started when he shaved his abdomen, which he does regularly. No other injuries or recent illnesses. No palpitations, dyspnea, cough, LOC, urinary symptoms. He does not that he has had intermittent single episodes of diarrhea off and on for the past week with last one 3 days prior to admission and never more than 2 episodes of diarrhea/day.  Not on HIV medications, when asked why he shrugged. He has not seen a doctor in years, but does occasionally go to free clinics to get tested for STIs. He has had gonorrhea, chlamydia, syphilis. Has not had hepatitis before.  MSM with 10-12 sexual partners in the last year, always men. He has penetrative anal sex but not receptive. He reports that all his partners know about his status and are also HIV positive, and he "sometimes" uses protection.  Smokes 3 cigarettes/day for 10 years, smokes or snorts crystal meth, MDMA, ketamine. Has injected in the past but not for several years.    ED: 38.0, 247->102, 126/80, 18, 97% RA  CT A/P with 5.2cm abscess in subcutaneous tissue.  I&D done in ED with copious output: sent for culture and wound packed  2.3L NS, 1g ceftriaxone, 1.25g vancomycin  EKG: sinus tach with possible right atrial enlargement  CXR: lightly widened aortic knob, increased hilar markings, otherwise unremarkable (2021 05:23)    SURGERY ADDENDUM:   41 M with PMH HIV not on medication, polysubstance use, and multiple abscesses s/p bedside I&D who presented with 10d worsening LUQ pain, abscess associated with drainage. Presented to [facility] ED with drainage for LUQ and surrounding cellulitis, fever and tachycardia s/p I&D in ED and admission to medicine for further work-up and management of chronic immune deficiency. General surgery consult called for assessment of I&D site and further management. Upon seeing patient, he states the pain is improved, 5/10 when moving or straining the area, continued mild purulent drainage, no fevers overnight. Denies N/V, CP, SOB, palpitations, diaphoresis, changes in bowel/urinary habits.     I&D site inspected and notable for persistent fluctuance and expression of purulence with surrounding cellulitis.     PMH: HIV  PSH: I&D bedside knee abscess, I&D bedside buttock abscess  Meds: None   All: NKDA   FHX: None known, no cancer   SHX: current smoker, 1pack per week, polysubstance use (included meth, marijuana), lives in [place]     PAST MEDICAL & SURGICAL HISTORY:  HIV (human immunodeficiency virus infection)    Abscess    IVDU (intravenous drug user)    Anemia    HIV (human immunodeficiency virus infection)    No significant past surgical history        REVIEW OF SYSTEMS  Negative except as per HPI.       MEDICATIONS  (STANDING):  bictegravir 50 mG/emtricitabine 200 mG/tenofovir alafenamide 25 mG (BIKTARVY) 1 Tablet(s) Oral daily  influenza   Vaccine 0.5 milliLiter(s) IntraMuscular once  lidocaine 1% Injectable 20 milliLiter(s) Local Injection once  nicotine -   7 mG/24Hr(s) Patch 1 patch Transdermal daily  vancomycin  IVPB 1000 milliGRAM(s) IV Intermittent every 8 hours    MEDICATIONS  (PRN):  acetaminophen     Tablet .. 650 milliGRAM(s) Oral every 6 hours PRN Temp greater or equal to 38C (100.4F), Mild Pain (1 - 3)  aluminum hydroxide/magnesium hydroxide/simethicone Suspension 30 milliLiter(s) Oral every 4 hours PRN Dyspepsia  melatonin 3 milliGRAM(s) Oral at bedtime PRN Insomnia  ondansetron Injectable 4 milliGRAM(s) IV Push every 8 hours PRN Nausea and/or Vomiting      Allergies    No Known Allergies    Intolerances        SOCIAL HISTORY:    FAMILY HISTORY:  No family history of cardiovascular disease  father and mother    FH: HIV infection (Sibling)        Vital Signs Last 24 Hrs  T(C): 36.8 (2021 12:02), Max: 38 (31 Oct 2021 23:40)  T(F): 98.3 (2021 12:02), Max: 100.4 (31 Oct 2021 23:40)  HR: 105 (2021 12:02) (101 - 147)  BP: 149/81 (2021 12:02) (112/69 - 149/81)  BP(mean): --  RR: 16 (2021 12:02) (16 - 18)  SpO2: 97% (2021 12:02) (97% - 99%)    PHYSICAL EXAM:   Gen: NAD, resting comfortably in bed  CV: NSR  Pulm: no respiratory distress on RA   Abd: soft, ND, 8cm area of induration and erythema with central opening, actively draining pus, I&D site with sloughed tissue, TTP around I&D and cellulitis, no TTP remainder of abdomen, no rebound or guarding   Ext: WWP, no edema   Neuro: motor/sensory grossly intact     LABS:                        11.0   3.92  )-----------( 211      ( 2021 11:05 )             36.8     11-    138  |  103  |  9   ----------------------------<  162<H>  3.8   |  26  |  0.70    Ca    8.4      2021 11:05    TPro  8.5<H>  /  Alb  3.2<L>  /  TBili  0.2  /  DBili  x   /  AST  14<L>  /  ALT  16  /  AlkPhos  92  11-    LIVER FUNCTIONS - ( 2021 00:12 )  Alb: 3.2 g/dL / Pro: 8.5 g/dL / ALK PHOS: 92 U/L / ALT: 16 U/L / AST: 14 U/L / GGT: x           PT/INR - ( 2021 00:12 )   PT: 14.7 sec;   INR: 1.26          PTT - ( 2021 00:12 )  PTT:34.1 sec      CAPILLARY BLOOD GLUCOSE        Urinalysis Basic - ( 2021 00:05 )    Color: Yellow / Appearance: Clear / S.020 / pH: x  Gluc: x / Ketone: NEGATIVE  / Bili: NEGATIVE / Urobili: 0.2 E.U./dL   Blood: x / Protein: NEGATIVE mg/dL / Nitrite: NEGATIVE   Leuk Esterase: NEGATIVE / RBC: < 5 /HPF / WBC < 5 /HPF   Sq Epi: x / Non Sq Epi: 0-5 /HPF / Bacteria: Present /HPF           RADIOLOGY & ADDITIONAL STUDIES:  < from: CT Abdomen and Pelvis w/ IV Cont (21 @ 01:33) >    EXAM:  CT ABDOMEN AND PELVIS IC                           PROCEDURE DATE:  2021          INTERPRETATION:  CT SCAN OF ABDOMEN AND PELVIS    History: Abscess in the anterior upper abdominal wall.    Technique: CT scan of abdomen and pelvis was performed from lung bases through symphysis pubis. 100 cc of Omnipaque 350 administered intravenously. No oral contrast given. Axial, sagittal and coronal reformatted images were reviewed.    Comparison: None.    Findings:    Lower chest: Normal.    Liver:  Few subcentimeter left lobe hypodensities are too small to characterize.    Gallbladder: No radiopaque stones.    BIle Ducts: Normal.    Spleen:  Normal.    Pancreas:  Normal.    Adrenal glands:  Normal.    Kidneys: Subcentimeter right lower pole hypodensity is too small to characterize but most likely represents a cyst. Kidneys are otherwise normal.    Adenopathy:  No lymphadenopathy in abdomen or pelvis.    Ascites: None.    Gastrointestinal tract: Limited evaluation without oral contrast. Normal bowel caliber. Normal appendix. No pneumoperitoneum. Small hiatal hernia.    Vessels: Normal.    Pelvic organs: Normal bladder and prostate.    Soft tissues: 4.0 x 1.6 x 5.2 cm (trv x ap x cc) rim-enhancing fluid collection within the left upper anterior abdominal wall subcutaneous fat abuts the edematous musculature. Intramuscular extension is not excluded. No intra-abdominal extension. Tiny left inguinal canal lipoma.    Bones: Mild S-shaped thoracolumbar scoliosis.      Impression:  5.2 cm abscess within the left upper ventral wall subcutaneous tissues.        --- End of Report ---          Thank you for the opportunity to participate in the care of this patient.    JOSE MANUEL OSULLIVAN MD; Resident Radiologist  This document has been electronically signed.  MERRITT LOPEZ MD; Attending Radiologist  This document has been electronically signed. 2021  2:18AM    < end of copied text >

## 2021-11-01 NOTE — PROGRESS NOTE ADULT - PROBLEM SELECTOR PLAN 1
Sepsis 2/2 abdominal abscess with fever (38.0 c) and tachycardia. 10d of progressive tender fluid collection now s/p drainage in ED 11/1/21. Patient thinks he had small superficial injury while shaving, so more likely dermatologic invasion rather than hematologic spread. S/p 2.3L NS in ED, tolerating PO and s/p Vanc and ceftriaxone in ED  - Continued on vanc 1g q8  - f/u culture data  - PO hydration +/- LR bolus for tachycardia   - GS consulted for abscess Sepsis 2/2 abdominal abscess with fever (38.0 c) and tachycardia. 10d of progressive tender fluid collection now s/p drainage in ED 11/1/21. Patient thinks he had small superficial injury while shaving, so more likely dermatologic invasion rather than hematologic spread. S/p 2.3L NS in ED, tolerating PO and s/p Vanc and ceftriaxone in ED  - Continued on vanc 1g q8  - f/u culture data  - PO hydration +/- LR bolus for tachycardia   - GS re-drained abscess Sepsis 2/2 abdominal abscess with fever (38.0 c) and tachycardia. 10d of progressive tender fluid collection now s/p drainage in ED 11/1/21. Patient thinks he had small superficial injury while shaving, so more likely dermatologic invasion rather than hematologic spread. S/p 2.3L NS in ED, tolerating PO and s/p Vanc and ceftriaxone in ED  - Continued on vanc 1g q12 (switched from q8)   - f/u culture data  - PO hydration +/- LR bolus for tachycardia   - General Surgery re-drained abscess, will change dressing tomorrow Sepsis 2/2 abdominal abscess with fever (38.0 c) and tachycardia. 10d of progressive tender fluid collection now s/p drainage in ED 11/1/21. Patient thinks he had small superficial injury while shaving, so more likely dermatologic invasion rather than hematologic spread. S/p 2.3L NS in ED, tolerating PO and s/p Vanc and ceftriaxone in ED  - Continued on vanc 1g q12 (switched from q8)   - f/u culture data  - PO hydration +/- LR bolus for tachycardia   - General Surgery redrained and repacked abscess, will change dressing tomorrow

## 2021-11-01 NOTE — CHART NOTE - NSCHARTNOTEFT_GEN_A_CORE
HIV INITIAL CONSULT NOTE:     HPI:   41 MSM with PMH of HIV on no meds x17 years, hydradenitis suppurativa, tobacco and polysubstance use, who initially presented to [facility] for a worsening draining fluid collection on his abdomen. Patient noted a lump that was tender to touch around 10 days ago. He has been checking his temperature since then and noted no fevers, but 2 days prior to admission he started having orthostatic hypotension and the day prior to admission he noted the wound to be draining yellow pus with streaks of blood. Around that time he also developed chills and night sweats. Patient described he has had 2 subcutaneous abscesses in the past, both around his buttocks and were drained in the ED then he was given PO antibiotics. The most recent one was in . He thinks the wound started when he shaved his abdomen, which he does regularly. No other injuries or recent illnesses. No palpitations, dyspnea, cough, LOC, urinary symptoms. Also complained of intermittent single episodes of diarrhea off and on for the past week with last one 3 days prior to admission and never more than 2 episodes of diarrhea/day.    Subjective:   Patient seen and examined at bedside. When asked why he has not been on HIV medications, patient explains that he has not seen a doctor in years. [facility]. Has had gonorrhea, chlamydia, syphilis in the past but never been diagnosed with hepatitis. MSM with 10-12 sexual partners in the last year, always men. He has penetrative anal sex but not receptive. He reports that all his partners know about his status and are also HIV positive, and he "sometimes" uses protection. Smokes 3 cigarettes/day for 10 years, smokes or snorts crystal meth, MDMA, ketamine. Has injected in the past but not for several years. Currently denies n/v/c, fever, chills, SOB, CP, dysuria, or increased urinary frequency.       PAST MEDICAL & SURGICAL HISTORY:  HIV (human immunodeficiency virus infection)    Abscess  IVDU (intravenous drug user)  Anemia  HIV (human immunodeficiency virus infection)  No significant past surgical history    Social Hx:  Sexual History:    Sexual Activity:  Condom Use:  Number of Current Partners:  Number of Lifetime Partners:  History of STI's and Treatments:     REVIEW OF SYSTEMS:  ROS as stated above.     PHYSICAL EXAM:  Vital Signs Last 24 Hrs  T(C): 36.7 (2021 04:40), Max: 38 (31 Oct 2021 23:40)  T(F): 98.1 (2021 04:40), Max: 100.4 (31 Oct 2021 23:40)  HR: 101 (2021 04:40) (101 - 147)  BP: 120/75 (2021 04:40) (112/69 - 126/80)  BP(mean): --  RR: 18 (2021 04:40) (18 - 18)  SpO2: 97% (2021 04:40) (97% - 99%)        General: AO x 3, NAD, Comfortable, Pleasant, Anxious, Agitated, Ill, Frail, Cachectic, Resp distress  HEENT: PERRL/ EOMI, no scleral icterus, MMM, no JVD, no thyromegaly, good dentition, no oropharyngeal lesions, no thrush  Respiratory: CTA b/l, no wheezes, rales or rhonchi  Cardiovascular: Regular rate and rhythm, +S1 + S2, no murmurs rubs or gallops  Abdomen: Soft, NTND, normoactive bowel sounds, no rebound, no guarding, no suprapubic tenderness  : No warts, vesicles, ulcerations, genital lesions, urethral discharge  Rectal: No anal warts, external hemorrhoids, good rectal tone, prostate normal in size and consistency, Stool normal in color and consistency, blood in the vault.  Extremities: No cyanosis, no clubbing, no edema, pulses equal, no calf tenderness  Skin: No rashes, skin lesions, palmar rash, or plantar rash  Lymphatic: No cervical/supraclavicular LAD  Lymph: No lymphadenopathy, enlargement or tenderness detected in the submandibular nodes, anterior cervical nodes, posterior cervical nodes, supraclavicular node, axillary nodes, inguinal nodes  Neurological: CN II-XII grossly intact, follows commands, moves all extremities    MEDICATIONS  (STANDING):  influenza   Vaccine 0.5 milliLiter(s) IntraMuscular once  nicotine -   7 mG/24Hr(s) Patch 1 patch Transdermal daily  vancomycin  IVPB 1000 milliGRAM(s) IV Intermittent every 8 hours    MEDICATIONS  (PRN):  acetaminophen     Tablet .. 650 milliGRAM(s) Oral every 6 hours PRN Temp greater or equal to 38C (100.4F), Mild Pain (1 - 3)  aluminum hydroxide/magnesium hydroxide/simethicone Suspension 30 milliLiter(s) Oral every 4 hours PRN Dyspepsia  melatonin 3 milliGRAM(s) Oral at bedtime PRN Insomnia  ondansetron Injectable 4 milliGRAM(s) IV Push every 8 hours PRN Nausea and/or Vomiting      Allergies    No Known Allergies    Intolerances      LABS:                        11.0   3.92  )-----------( 211      ( 2021 11:05 )             36.8                           11.0   3.92  )-----------( 211      ( 2021 11:05 )             36.8     Neutrophils:71.9   Bands:--   Lymphocytes:20.5   Monocytes:5.3   Eosinophils:1.6   Basophils:0.3    @ 00:12    11    139  |  98  |  13  ----------------------------<  139<H>  3.6   |  34<H>  |  1.05    Ca    8.7      2021 00:12    TPro  8.5<H>  /  Alb  3.2<L>  /  TBili  0.2  /  DBili  x   /  AST  14<L>  /  ALT  16  /  AlkPhos  92      PT/INR - ( 2021 00:12 )   PT: 14.7 sec;   INR: 1.26          PTT - ( 2021 00:12 )  PTT:34.1 sec  Urinalysis Basic - ( 2021 00:05 )    Color: Yellow / Appearance: Clear / S.020 / pH: x  Gluc: x / Ketone: NEGATIVE  / Bili: NEGATIVE / Urobili: 0.2 E.U./dL   Blood: x / Protein: NEGATIVE mg/dL / Nitrite: NEGATIVE   Leuk Esterase: NEGATIVE / RBC: < 5 /HPF / WBC < 5 /HPF   Sq Epi: x / Non Sq Epi: 0-5 /HPF / Bacteria: Present /HPF      INCOMPLETE NOTE!     ASSESSMENT:   41 MSM with PMH of HIV on no meds x17 years, hydradenitis suppurativa, tobacco and polysubstance use, who initially presented to [facility] for a worsening draining fluid collection on his abdomen. Admitted for cellulitis 2/2 to skin abscess and now s/p I&D. HIV team consulted for ARV and STI testing recommendations.     RECOMMENDATIONS:   #HIV:   Does not recall last CD4 or VL. Has not been on any ARV at home. Diagnosed 17 years ago.   - f/u CD4 and VL (studies pending)   - f/u hepatitis panel and RPR (studies pending); send urine gonorrhea/chlamydia     #Abdominal abscess s/p I&D:   - continue with vancomycin 1g IV q8 per primary team; f/u trough before 4th dose   - f/u wound abscess I&D culture   - f/u blood cultures x2 and urine culture HIV INITIAL CONSULT NOTE:     HPI:   41 MSM with PMH of HIV on no meds x17 years, hydradenitis suppurativa, tobacco and polysubstance use, who initially presented to [facility] for a worsening draining fluid collection on his abdomen. Patient noted a lump that was tender to touch around 10 days ago. He has been checking his temperature since then and noted no fevers, but 2 days prior to admission he started having orthostatic hypotension and the day prior to admission he noted the wound to be draining yellow pus with streaks of blood. Around that time he also developed chills and night sweats. Patient described he has had 2 subcutaneous abscesses in the past, both around his buttocks and were drained in the ED then he was given PO antibiotics. The most recent one was in 2016. He thinks the wound started when he shaved his abdomen, which he does regularly. No other injuries or recent illnesses. No palpitations, dyspnea, cough, LOC, urinary symptoms. Also complained of intermittent single episodes of diarrhea off and on for the past week with last one 3 days prior to admission and never more than 2 episodes of diarrhea/day.    Subjective:   Patient seen and examined at bedside. When asked why he has not been on HIV medications, patient explains that he has not seen a doctor in years. [facility]. Has had gonorrhea, chlamydia, syphilis in the past but never been diagnosed with hepatitis. MSM with 10-12 sexual partners in the last year, always men. He has penetrative anal sex but not receptive. He reports that all his partners know about his status and are also HIV positive, and he uses protection <50% of the time. Smokes 3 cigarettes/day for 10 years, smokes or snorts crystal meth, MDMA, ketamine. Has injected in the past but not for several years. Currently denies n/v/c, fever, chills, SOB, CP, dysuria, or increased urinary frequency. Expressing that he wishes to establish care with an HIV provider at this time and feels it would be a good time for him to start treatment with ARV. Has been reluctant to do so in the past, but no longer feels this way [place]       PAST MEDICAL & SURGICAL HISTORY:  HIV (human immunodeficiency virus infection)    Abscess  IVDU (intravenous drug user)  Anemia  HIV (human immunodeficiency virus infection)  No significant past surgical history    Social Hx:  Sexual History:    Sexual Activity:  Condom Use:  Number of Current Partners:  Number of Lifetime Partners:  History of STI's and Treatments:     REVIEW OF SYSTEMS:  ROS as stated above.     PHYSICAL EXAM:  Vital Signs Last 24 Hrs  T(C): 36.7 (2021 04:40), Max: 38 (31 Oct 2021 23:40)  T(F): 98.1 (2021 04:40), Max: 100.4 (31 Oct 2021 23:40)  HR: 101 (2021 04:40) (101 - 147)  BP: 120/75 (2021 04:40) (112/69 - 126/80)  BP(mean): --  RR: 18 (2021 04:40) (18 - 18)  SpO2: 97% (2021 04:40) (97% - 99%)    General: AAOx3, pleasant, resting comfortably in bed, +diaphoretic   HEENT: PERRL/ EOMI, no scleral icterus, MMM, no JVD, no thyromegaly, good dentition, no oropharyngeal lesions, no thrush  Respiratory: CTA b/l, no wheezes, rales or rhonchi  Cardiovascular: Regular rate and rhythm, +S1, +S2, no murmurs rubs or gallops  Abdomen: Soft, non-distended, tender to palpation in center of abdomen, dressing c/d/i  Extremities: No cyanosis, no clubbing, no edema, pulses equal, no calf tenderness  Skin: No rashes, skin lesions, palmar rash, or plantar rash  Lymphatic: No cervical/supraclavicular LAD  Lymph: No lymphadenopathy, enlargement or tenderness detected in the submandibular nodes, anterior cervical nodes, posterior cervical nodes, supraclavicular node, axillary nodes, inguinal nodes  Neurological: CN II-XII grossly intact, follows commands, moves all extremities, strength WNL b/l     MEDICATIONS  (STANDING):  influenza   Vaccine 0.5 milliLiter(s) IntraMuscular once  nicotine -   7 mG/24Hr(s) Patch 1 patch Transdermal daily  vancomycin  IVPB 1000 milliGRAM(s) IV Intermittent every 8 hours    MEDICATIONS  (PRN):  acetaminophen     Tablet .. 650 milliGRAM(s) Oral every 6 hours PRN Temp greater or equal to 38C (100.4F), Mild Pain (1 - 3)  aluminum hydroxide/magnesium hydroxide/simethicone Suspension 30 milliLiter(s) Oral every 4 hours PRN Dyspepsia  melatonin 3 milliGRAM(s) Oral at bedtime PRN Insomnia  ondansetron Injectable 4 milliGRAM(s) IV Push every 8 hours PRN Nausea and/or Vomiting      Allergies    No Known Allergies    Intolerances      LABS:                        11.0   3.92  )-----------( 211      ( 2021 11:05 )             36.8                           11.0   3.92  )-----------( 211      ( 2021 11:05 )             36.8     Neutrophils:71.9   Bands:--   Lymphocytes:20.5   Monocytes:5.3   Eosinophils:1.6   Basophils:0.3    @ 00:12    11    139  |  98  |  13  ----------------------------<  139<H>  3.6   |  34<H>  |  1.05    Ca    8.7      2021 00:12    TPro  8.5<H>  /  Alb  3.2<L>  /  TBili  0.2  /  DBili  x   /  AST  14<L>  /  ALT  16  /  AlkPhos  92      PT/INR - ( 2021 00:12 )   PT: 14.7 sec;   INR: 1.26          PTT - ( 2021 00:12 )  PTT:34.1 sec  Urinalysis Basic - ( 2021 00:05 )    Color: Yellow / Appearance: Clear / S.020 / pH: x  Gluc: x / Ketone: NEGATIVE  / Bili: NEGATIVE / Urobili: 0.2 E.U./dL   Blood: x / Protein: NEGATIVE mg/dL / Nitrite: NEGATIVE   Leuk Esterase: NEGATIVE / RBC: < 5 /HPF / WBC < 5 /HPF   Sq Epi: x / Non Sq Epi: 0-5 /HPF / Bacteria: Present /HPF        ASSESSMENT:   41 MSM with PMH of HIV on no meds x17 years, hydradenitis suppurativa, tobacco and polysubstance use, who initially presented to [facility] for a worsening draining fluid collection on his abdomen. Admitted for cellulitis 2/2 to skin abscess and now s/p I&D. HIV team consulted for ARV and STI testing recommendations.     RECOMMENDATIONS:   #HIV:   Does not recall last CD4 or VL. Has not been on any ARV at home, so is ARV naive at this time. Diagnosed 17 years ago and believes he acquired HIV sexually (MSM). Labs in 2019 when patient was previously admitted showed an HIV VL 43,000 and CD4 253. No history of opportunistic infections but has had multiple STIs in the past per patient (gonorrhea, chlamydia, syphilis).   - start Biktarvy PO daily today   - f/u CD4 and VL (studies pending)   - f/u hepatitis panel and RPR (studies pending); send urine gonorrhea/chlamydia   - f/u HIV genotype (ordered with AM labs on )   - spoke with patient about [facility], and patient stating he wishes to establish care there (can arrange for triple site testing out-patient)     #Abdominal abscess s/p I&D:   Planned with surgery for I&D incision site to be expanded today bedside.   - surgery consulted, recommendations appreciated   - continue with vancomycin 1g IV q8 per primary team; f/u trough before 4th dose   - f/u wound abscess I&D culture   - f/u blood cultures x2 and urine culture    HIV team will continue to follow. HIV INITIAL CONSULT NOTE:     HPI:   41 MSM with PMH of HIV on no meds x17 years, hydradenitis suppurativa, tobacco and polysubstance use, who initially presented to [facility] for a worsening draining fluid collection on his abdomen. Patient noted a lump that was tender to touch around 10 days ago. He has been checking his temperature since then and noted no fevers, but 2 days prior to admission he started having orthostatic hypotension and the day prior to admission he noted the wound to be draining yellow pus with streaks of blood. Around that time he also developed chills and night sweats. Patient described he has had 2 subcutaneous abscesses in the past, both around his buttocks and were drained in the ED then he was given PO antibiotics. The most recent one was in 2016. He thinks the wound started when he shaved his abdomen, which he does regularly. No other injuries or recent illnesses. No palpitations, dyspnea, cough, LOC, urinary symptoms. Also complained of intermittent single episodes of diarrhea off and on for the past week with last one 3 days prior to admission and never more than 2 episodes of diarrhea/day.    Subjective:   Patient seen and examined at bedside. When asked why he has not been on HIV medications, patient explains that he has not seen a doctor in years. [facility] be [facility] for [facility]Is. Has had gonorrhea, chlamydia, syphilis in the past but never been diagnosed with hepatitis. MSM with 10-12 sexual partners in the last year, always men. He has penetrative anal sex but not receptive. He reports that all his partners know about his status and are also HIV positive, and he uses protection <50% of the time. Smokes 3 cigarettes/day for 10 years, smokes or snorts crystal meth, MDMA, ketamine. Has injected in the past but not for several years. Currently denies n/v/c, fever, chills, SOB, CP, dysuria, or increased urinary frequency. Expressing that he wishes to establish care with an HIV provider at this time and feels it would be a good time for him to start treatment with ARV. Has been reluctant to do so in the past, but no longer feels this way [place]       PAST MEDICAL & SURGICAL HISTORY:  HIV (human immunodeficiency virus infection)    Abscess  IVDU (intravenous drug user)  Anemia  HIV (human immunodeficiency virus infection)  No significant past surgical history    Social Hx:  Sexual History:    Sexual Activity:  Condom Use:  Number of Current Partners:  Number of Lifetime Partners:  History of STI's and Treatments:     REVIEW OF SYSTEMS:  ROS as stated above.     PHYSICAL EXAM:  Vital Signs Last 24 Hrs  T(C): 36.7 (2021 04:40), Max: 38 (31 Oct 2021 23:40)  T(F): 98.1 (2021 04:40), Max: 100.4 (31 Oct 2021 23:40)  HR: 101 (2021 04:40) (101 - 147)  BP: 120/75 (2021 04:40) (112/69 - 126/80)  BP(mean): --  RR: 18 (2021 04:40) (18 - 18)  SpO2: 97% (2021 04:40) (97% - 99%)    General: AAOx3, pleasant, resting comfortably in bed, +diaphoretic   HEENT: PERRL/ EOMI, no scleral icterus, MMM, no JVD, no thyromegaly, good dentition, no oropharyngeal lesions, no thrush  Respiratory: CTA b/l, no wheezes, rales or rhonchi  Cardiovascular: Regular rate and rhythm, +S1, +S2, no murmurs rubs or gallops  Abdomen: Soft, non-distended, tender to palpation in center of abdomen, dressing c/d/i  Extremities: No cyanosis, no clubbing, no edema, pulses equal, no calf tenderness  Skin: No rashes, skin lesions, palmar rash, or plantar rash  Lymphatic: No cervical/supraclavicular LAD  Lymph: No lymphadenopathy, enlargement or tenderness detected in the submandibular nodes, anterior cervical nodes, posterior cervical nodes, supraclavicular node, axillary nodes, inguinal nodes  Neurological: CN II-XII grossly intact, follows commands, moves all extremities, strength WNL b/l     MEDICATIONS  (STANDING):  influenza   Vaccine 0.5 milliLiter(s) IntraMuscular once  nicotine -   7 mG/24Hr(s) Patch 1 patch Transdermal daily  vancomycin  IVPB 1000 milliGRAM(s) IV Intermittent every 8 hours    MEDICATIONS  (PRN):  acetaminophen     Tablet .. 650 milliGRAM(s) Oral every 6 hours PRN Temp greater or equal to 38C (100.4F), Mild Pain (1 - 3)  aluminum hydroxide/magnesium hydroxide/simethicone Suspension 30 milliLiter(s) Oral every 4 hours PRN Dyspepsia  melatonin 3 milliGRAM(s) Oral at bedtime PRN Insomnia  ondansetron Injectable 4 milliGRAM(s) IV Push every 8 hours PRN Nausea and/or Vomiting      Allergies    No Known Allergies    Intolerances      LABS:                        11.0   3.92  )-----------( 211      ( 2021 11:05 )             36.8                           11.0   3.92  )-----------( 211      ( 2021 11:05 )             36.8     Neutrophils:71.9   Bands:--   Lymphocytes:20.5   Monocytes:5.3   Eosinophils:1.6   Basophils:0.3    @ 00:12    11    139  |  98  |  13  ----------------------------<  139<H>  3.6   |  34<H>  |  1.05    Ca    8.7      2021 00:12    TPro  8.5<H>  /  Alb  3.2<L>  /  TBili  0.2  /  DBili  x   /  AST  14<L>  /  ALT  16  /  AlkPhos  92      PT/INR - ( 2021 00:12 )   PT: 14.7 sec;   INR: 1.26          PTT - ( 2021 00:12 )  PTT:34.1 sec  Urinalysis Basic - ( 2021 00:05 )    Color: Yellow / Appearance: Clear / S.020 / pH: x  Gluc: x / Ketone: NEGATIVE  / Bili: NEGATIVE / Urobili: 0.2 E.U./dL   Blood: x / Protein: NEGATIVE mg/dL / Nitrite: NEGATIVE   Leuk Esterase: NEGATIVE / RBC: < 5 /HPF / WBC < 5 /HPF   Sq Epi: x / Non Sq Epi: 0-5 /HPF / Bacteria: Present /HPF        ASSESSMENT:   41 MSM with PMH of HIV on no meds x17 years, hydradenitis suppurativa, tobacco and polysubstance use, who initially presented to [facility] for a worsening draining fluid collection on his abdomen. Admitted for cellulitis 2/2 to skin abscess and now s/p I&D. HIV team consulted for ARV and STI testing recommendations.     RECOMMENDATIONS:   #HIV:   Does not recall last CD4 or VL. Has not been on any ARV at home, so is ARV naive at this time. Diagnosed 17 years ago and believes he acquired HIV sexually (MSM). Labs in 2019 when patient was previously admitted showed an HIV VL 43,000 and CD4 253. No history of opportunistic infections but has had multiple STIs in the past per patient (gonorrhea, chlamydia, syphilis).   - start Biktarvy PO daily today   - f/u CD4 and VL (studies pending)   - f/u hepatitis panel and RPR (studies pending); send urine gonorrhea/chlamydia   - f/u HIV genotype (ordered with AM labs on )   - spoke with patient about [facility], and patient stating he wishes to establish care there (can arrange for triple site testing out-patient)     #Abdominal abscess s/p I&D:   Planned with surgery for I&D incision site to be expanded today bedside.   - surgery consulted, recommendations appreciated   - continue with vancomycin 1g IV q8 per primary team; f/u trough before 4th dose   - f/u wound abscess I&D culture   - f/u blood cultures x2 and urine culture    HIV team will continue to follow.    I saw and evaluated the patient at bedside with   I agree with above    Patient low risk for IRIS  CD4- VL pending    HIV genotype  Start Biktarvy  Follow up at [facility]

## 2021-11-01 NOTE — H&P ADULT - PROBLEM SELECTOR PLAN 5
F: none  E: replete K<4.0, Mg<2.0, Phos<2.5  N: Full  DVT prophylaxis: SCDs  Access: PIV  Code Status: full

## 2021-11-01 NOTE — ED PROCEDURE NOTE - CPROC ED INFORMED CONSENT1
Follow-up with your primary care physician, call for an appointment  Return with worsening or persistent symptoms, pain, fever, vomiting, poor oral intake, dizziness, chest pain, shortness of breath, swelling, redness, difficulty moving finger or with any medical concerns  Motrin 600 mg every 6-8 hours as needed for pain, take with food  Tylenol 650 mg every 4-6 hours as needed for pain  Cephalexin 1 tablet 4 times a day for 7 days  Ice and elevate affected extremity 10 to 15 minutes 2-3 times a day       Benefits, risks, and possible complications of procedure explained to patient/caregiver who verbalized understanding and gave verbal consent.

## 2021-11-01 NOTE — PROGRESS NOTE ADULT - ATTENDING COMMENTS
Please see my addendum to today's H&P for additional information and details.    Courtney Vail MD  Hospitalist Attending  [phone]

## 2021-11-02 ENCOUNTER — TRANSCRIPTION ENCOUNTER (OUTPATIENT)
Age: 41
End: 2021-11-02

## 2021-11-02 VITALS
RESPIRATION RATE: 18 BRPM | SYSTOLIC BLOOD PRESSURE: 118 MMHG | HEART RATE: 110 BPM | TEMPERATURE: 98 F | DIASTOLIC BLOOD PRESSURE: 76 MMHG | OXYGEN SATURATION: 98 %

## 2021-11-02 LAB
ANION GAP SERPL CALC-SCNC: 9 MMOL/L — SIGNIFICANT CHANGE UP (ref 5–17)
BUN SERPL-MCNC: 8 MG/DL — SIGNIFICANT CHANGE UP (ref 7–23)
C TRACH RRNA SPEC QL NAA+PROBE: SIGNIFICANT CHANGE UP
CALCIUM SERPL-MCNC: 8.9 MG/DL — SIGNIFICANT CHANGE UP (ref 8.4–10.5)
CHLORIDE SERPL-SCNC: 105 MMOL/L — SIGNIFICANT CHANGE UP (ref 96–108)
CO2 SERPL-SCNC: 25 MMOL/L — SIGNIFICANT CHANGE UP (ref 22–31)
CREAT SERPL-MCNC: 0.71 MG/DL — SIGNIFICANT CHANGE UP (ref 0.5–1.3)
CULTURE RESULTS: SIGNIFICANT CHANGE UP
GLUCOSE SERPL-MCNC: 109 MG/DL — HIGH (ref 70–99)
HBV E AB SER-ACNC: SIGNIFICANT CHANGE UP
HBV E AG SER-ACNC: SIGNIFICANT CHANGE UP
HBV SURFACE AB SER-ACNC: SIGNIFICANT CHANGE UP
HCT VFR BLD CALC: 39.8 % — SIGNIFICANT CHANGE UP (ref 39–50)
HGB BLD-MCNC: 11.8 G/DL — LOW (ref 13–17)
HIV-1 VIRAL LOAD RESULT: ABNORMAL
HIV1 RNA # SERPL NAA+PROBE: SIGNIFICANT CHANGE UP
HIV1 RNA SER-IMP: SIGNIFICANT CHANGE UP
HIV1 RNA SERPL NAA+PROBE-ACNC: ABNORMAL
HIV1 RNA SERPL NAA+PROBE-LOG#: 5.77 — SIGNIFICANT CHANGE UP
MAGNESIUM SERPL-MCNC: 1.9 MG/DL — SIGNIFICANT CHANGE UP (ref 1.6–2.6)
MCHC RBC-ENTMCNC: 25.2 PG — LOW (ref 27–34)
MCHC RBC-ENTMCNC: 29.6 GM/DL — LOW (ref 32–36)
MCV RBC AUTO: 84.9 FL — SIGNIFICANT CHANGE UP (ref 80–100)
N GONORRHOEA RRNA SPEC QL NAA+PROBE: SIGNIFICANT CHANGE UP
NRBC # BLD: 0 /100 WBCS — SIGNIFICANT CHANGE UP (ref 0–0)
PLATELET # BLD AUTO: 236 K/UL — SIGNIFICANT CHANGE UP (ref 150–400)
POTASSIUM SERPL-MCNC: 4 MMOL/L — SIGNIFICANT CHANGE UP (ref 3.5–5.3)
POTASSIUM SERPL-SCNC: 4 MMOL/L — SIGNIFICANT CHANGE UP (ref 3.5–5.3)
RBC # BLD: 4.69 M/UL — SIGNIFICANT CHANGE UP (ref 4.2–5.8)
RBC # FLD: 13.8 % — SIGNIFICANT CHANGE UP (ref 10.3–14.5)
RPR SER-TITR: (no result)
RPR SERPL-ACNC: REACTIVE
SODIUM SERPL-SCNC: 139 MMOL/L — SIGNIFICANT CHANGE UP (ref 135–145)
SPECIMEN SOURCE: SIGNIFICANT CHANGE UP
SPECIMEN SOURCE: SIGNIFICANT CHANGE UP
T PALLIDUM AB TITR SER: POSITIVE
VANCOMYCIN FLD-MCNC: 4.1 UG/ML — SIGNIFICANT CHANGE UP
WBC # BLD: 3.03 K/UL — LOW (ref 3.8–10.5)
WBC # FLD AUTO: 3.03 K/UL — LOW (ref 3.8–10.5)

## 2021-11-02 PROCEDURE — 87340 HEPATITIS B SURFACE AG IA: CPT

## 2021-11-02 PROCEDURE — 85610 PROTHROMBIN TIME: CPT

## 2021-11-02 PROCEDURE — 81001 URINALYSIS AUTO W/SCOPE: CPT

## 2021-11-02 PROCEDURE — 86360 T CELL ABSOLUTE COUNT/RATIO: CPT

## 2021-11-02 PROCEDURE — 96375 TX/PRO/DX INJ NEW DRUG ADDON: CPT

## 2021-11-02 PROCEDURE — 85027 COMPLETE CBC AUTOMATED: CPT

## 2021-11-02 PROCEDURE — 86709 HEPATITIS A IGM ANTIBODY: CPT

## 2021-11-02 PROCEDURE — 87075 CULTR BACTERIA EXCEPT BLOOD: CPT

## 2021-11-02 PROCEDURE — 87906 NFCT AGT GNTYP ALYS HIV1: CPT

## 2021-11-02 PROCEDURE — 87591 N.GONORRHOEAE DNA AMP PROB: CPT

## 2021-11-02 PROCEDURE — 87086 URINE CULTURE/COLONY COUNT: CPT

## 2021-11-02 PROCEDURE — 87186 SC STD MICRODIL/AGAR DIL: CPT

## 2021-11-02 PROCEDURE — 85025 COMPLETE CBC W/AUTO DIFF WBC: CPT

## 2021-11-02 PROCEDURE — 85730 THROMBOPLASTIN TIME PARTIAL: CPT

## 2021-11-02 PROCEDURE — 86780 TREPONEMA PALLIDUM: CPT

## 2021-11-02 PROCEDURE — 86704 HEP B CORE ANTIBODY TOTAL: CPT

## 2021-11-02 PROCEDURE — 87070 CULTURE OTHR SPECIMN AEROBIC: CPT

## 2021-11-02 PROCEDURE — 10060 I&D ABSCESS SIMPLE/SINGLE: CPT

## 2021-11-02 PROCEDURE — 99232 SBSQ HOSP IP/OBS MODERATE 35: CPT | Mod: GC

## 2021-11-02 PROCEDURE — 87536 HIV-1 QUANT&REVRSE TRNSCRPJ: CPT

## 2021-11-02 PROCEDURE — 87901 NFCT AGT GNTYP ALYS HIV1 REV: CPT

## 2021-11-02 PROCEDURE — 36415 COLL VENOUS BLD VENIPUNCTURE: CPT

## 2021-11-02 PROCEDURE — 87040 BLOOD CULTURE FOR BACTERIA: CPT

## 2021-11-02 PROCEDURE — 86705 HEP B CORE ANTIBODY IGM: CPT

## 2021-11-02 PROCEDURE — 80053 COMPREHEN METABOLIC PANEL: CPT

## 2021-11-02 PROCEDURE — 87517 HEPATITIS B DNA QUANT: CPT

## 2021-11-02 PROCEDURE — 86357 NK CELLS TOTAL COUNT: CPT

## 2021-11-02 PROCEDURE — 86706 HEP B SURFACE ANTIBODY: CPT

## 2021-11-02 PROCEDURE — 86355 B CELLS TOTAL COUNT: CPT

## 2021-11-02 PROCEDURE — 86592 SYPHILIS TEST NON-TREP QUAL: CPT

## 2021-11-02 PROCEDURE — 99285 EMERGENCY DEPT VISIT HI MDM: CPT

## 2021-11-02 PROCEDURE — 87205 SMEAR GRAM STAIN: CPT

## 2021-11-02 PROCEDURE — 74177 CT ABD & PELVIS W/CONTRAST: CPT

## 2021-11-02 PROCEDURE — 83605 ASSAY OF LACTIC ACID: CPT

## 2021-11-02 PROCEDURE — 86803 HEPATITIS C AB TEST: CPT

## 2021-11-02 PROCEDURE — 96374 THER/PROPH/DIAG INJ IV PUSH: CPT

## 2021-11-02 PROCEDURE — 80048 BASIC METABOLIC PNL TOTAL CA: CPT

## 2021-11-02 PROCEDURE — 71045 X-RAY EXAM CHEST 1 VIEW: CPT

## 2021-11-02 PROCEDURE — 87635 SARS-COV-2 COVID-19 AMP PRB: CPT

## 2021-11-02 PROCEDURE — 99239 HOSP IP/OBS DSCHRG MGMT >30: CPT | Mod: GC

## 2021-11-02 PROCEDURE — 86769 SARS-COV-2 COVID-19 ANTIBODY: CPT

## 2021-11-02 PROCEDURE — 86593 SYPHILIS TEST NON-TREP QUANT: CPT

## 2021-11-02 PROCEDURE — 87491 CHLMYD TRACH DNA AMP PROBE: CPT

## 2021-11-02 PROCEDURE — 83735 ASSAY OF MAGNESIUM: CPT

## 2021-11-02 PROCEDURE — 80202 ASSAY OF VANCOMYCIN: CPT

## 2021-11-02 PROCEDURE — 86359 T CELLS TOTAL COUNT: CPT

## 2021-11-02 PROCEDURE — 87900 PHENOTYPE INFECT AGENT DRUG: CPT

## 2021-11-02 PROCEDURE — 86707 HEPATITIS BE ANTIBODY: CPT

## 2021-11-02 PROCEDURE — 87350 HEPATITIS BE AG IA: CPT

## 2021-11-02 RX ORDER — OXYCODONE AND ACETAMINOPHEN 5; 325 MG/1; MG/1
1 TABLET ORAL EVERY 6 HOURS
Refills: 0 | Status: DISCONTINUED | OUTPATIENT
Start: 2021-11-02 | End: 2021-11-02

## 2021-11-02 RX ORDER — LIDOCAINE 4 G/100G
1 CREAM TOPICAL
Refills: 0 | Status: DISCONTINUED | OUTPATIENT
Start: 2021-11-02 | End: 2021-11-02

## 2021-11-02 RX ORDER — BICTEGRAVIR SODIUM, EMTRICITABINE, AND TENOFOVIR ALAFENAMIDE FUMARATE 30; 120; 15 MG/1; MG/1; MG/1
1 TABLET ORAL
Qty: 30 | Refills: 0
Start: 2021-11-02 | End: 2021-12-01

## 2021-11-02 RX ORDER — PETROLATUM,WHITE
1 JELLY (GRAM) TOPICAL
Refills: 0 | Status: DISCONTINUED | OUTPATIENT
Start: 2021-11-02 | End: 2021-11-02

## 2021-11-02 RX ADMIN — Medication 1 APPLICATION(S): at 06:12

## 2021-11-02 RX ADMIN — BICTEGRAVIR SODIUM, EMTRICITABINE, AND TENOFOVIR ALAFENAMIDE FUMARATE 1 TABLET(S): 30; 120; 15 TABLET ORAL at 11:29

## 2021-11-02 RX ADMIN — Medication 1 TABLET(S): at 10:05

## 2021-11-02 RX ADMIN — Medication 650 MILLIGRAM(S): at 00:11

## 2021-11-02 RX ADMIN — LIDOCAINE 1 APPLICATION(S): 4 CREAM TOPICAL at 06:10

## 2021-11-02 RX ADMIN — Medication 1 APPLICATION(S): at 11:32

## 2021-11-02 RX ADMIN — LIDOCAINE 1 APPLICATION(S): 4 CREAM TOPICAL at 11:32

## 2021-11-02 RX ADMIN — LIDOCAINE 1 APPLICATION(S): 4 CREAM TOPICAL at 02:51

## 2021-11-02 RX ADMIN — Medication 1 APPLICATION(S): at 02:51

## 2021-11-02 RX ADMIN — Medication 650 MILLIGRAM(S): at 02:00

## 2021-11-02 RX ADMIN — Medication 1 PATCH: at 11:30

## 2021-11-02 NOTE — DISCHARGE NOTE PROVIDER - NSDCCPCAREPLAN_GEN_ALL_CORE_FT
PRINCIPAL DISCHARGE DIAGNOSIS  Diagnosis: Abdominal wall abscess  Assessment and Plan of Treatment:       SECONDARY DISCHARGE DIAGNOSES  Diagnosis: AIDS  Assessment and Plan of Treatment:     Diagnosis: Polysubstance (excluding opioids) dependence, daily use  Assessment and Plan of Treatment:      PRINCIPAL DISCHARGE DIAGNOSIS  Diagnosis: Abdominal wall abscess  Assessment and Plan of Treatment: You came into the ED with a large abdomenal absess that had been growing for the past 10 days. In the ED we made an incision and drained this abscess to expel the contents. We also gave you antibiotics to cover for a systemic infection coming from this abscess. When we admitted you we drained the abscess again and packed it so it will heal nicely. We then switched you from IV antibotics, Vancomycin, to oral antibiotics, Bactrim, that you can take home with you. You will also have an at home nurse to help change the packing and dressing of your abdomenal abscess.      SECONDARY DISCHARGE DIAGNOSES  Diagnosis: AIDS  Assessment and Plan of Treatment: You came into the hospital with a 17 year history of HIV. You mentioned that you have not taken any medications for this before, and while you were here we checked you CD4 count. The results were a CD4 count of 130. This number being below 200 means you are defined to have AIDS, which stands for aquired immunodeficent syndrome. An HIV consultant of ours spoke to you and started you on Biktarvy, a triple therapy medication plan for HIV treatment. Please follow up at an outpatient HIV clinic _________.    Diagnosis: Polysubstance (excluding opioids) dependence, daily use  Assessment and Plan of Treatment: You came into the hospital noting you use tobacco, MDMA, ketamine, crystal meth.     PRINCIPAL DISCHARGE DIAGNOSIS  Diagnosis: Abdominal wall abscess  Assessment and Plan of Treatment: You came into the ED with a large abdomenal absess that had been growing for the past 10 days. In the ED we made an incision and drained this abscess to expel the contents. We also gave you antibiotics to cover for a systemic infection coming from this abscess. When we admitted you we drained the abscess again and packed it so it will heal nicely. We then switched you from IV antibotics, Vancomycin, to oral antibiotics, Bactrim, that you can take home with you. You will also have an at home nurse to help change the packing and dressing of your abdomenal abscess. You have a follow up appointment with  on 11/16.      SECONDARY DISCHARGE DIAGNOSES  Diagnosis: AIDS  Assessment and Plan of Treatment: You came into the hospital with a 17 year history of HIV. You mentioned that you have not taken any medications for this before, and while you were here we checked you CD4 count. The results were a CD4 count of 130. This number being below 200 means you are defined to have AIDS, which stands for aquired immunodeficent syndrome. An HIV consultant of ours spoke to you and started you on Biktarvy, a triple therapy medication plan for HIV treatment. Please follow up at an outpatient HIV clinic  with Dr. Rowell in 1 week.    Diagnosis: Polysubstance (excluding opioids) dependence, daily use  Assessment and Plan of Treatment: You came into the hospital noting you use tobacco, MDMA, ketamine, crystal meth. We gavey ou a nicotine patch here for any tobacco withdrawl.     PRINCIPAL DISCHARGE DIAGNOSIS  Diagnosis: Abdominal wall abscess  Assessment and Plan of Treatment: You came into the ED with a large abdomenal absess that had been growing for the past 10 days. In the ED we made an incision and drained this abscess to expel the contents. We also gave you antibiotics to cover for a systemic infection coming from this abscess. When we admitted you we drained the abscess again and packed it so it will heal nicely. We then switched you from IV antibotics, Vancomycin, to oral antibiotics, Bactrim, that you can take home with you. You will also have an at home nurse to help change the packing and dressing of your abdomenal abscess. You have a follow up appointment with  on 11/23.      SECONDARY DISCHARGE DIAGNOSES  Diagnosis: AIDS  Assessment and Plan of Treatment: You came into the hospital with a 17 year history of HIV. You mentioned that you have not taken any medications for this before, and while you were here we checked you CD4 count. The results were a CD4 count of 130. This number being below 200 means you are defined to have AIDS, which stands for aquired immunodeficent syndrome. An HIV consultant of ours spoke to you and started you on Biktarvy, a triple therapy medication plan for HIV treatment. You have an outpatient appointment with Dr. Rowell on 11/16 to discuss further management going forward.    Diagnosis: Polysubstance (excluding opioids) dependence, daily use  Assessment and Plan of Treatment: You came into the hospital noting you use tobacco, MDMA, ketamine, crystal meth. We gavey ou a nicotine patch here for any tobacco withdrawl.     PRINCIPAL DISCHARGE DIAGNOSIS  Diagnosis: Abdominal wall abscess  Assessment and Plan of Treatment: You came into the ED with a large abdomenal absess that had been growing for the past 10 days. In the ED we made an incision and drained this abscess to expel the contents. We also gave you IV antibiotics to cover for a systemic infection coming from this abscess. When we admitted you we drained the abscess again and packed it so it will heal nicely. We then switched you from IV antibotics, Vancomycin, to an oral antibiotic, Bactrim, that you can take home with you. You will also have an at home nurse to help change the packing of your abdomenal abscess. You have a follow up appointment with  on 11/23 to further evaluate this wound.      SECONDARY DISCHARGE DIAGNOSES  Diagnosis: AIDS  Assessment and Plan of Treatment: You came into the hospital with a 17 year history of HIV. You mentioned that you have not taken any medications for this before, and while you were here we checked you CD4 count. The results showed a CD4 count of 130. This number is less than 200, meeting the defining criteria for AIDs. This stands for aquired immunodeficent syndrome and can be very serious without treatment. An HIV consultant of ours spoke to you and started you on Biktarvy, a triple therapy medication plan for HIV/AIDs treatment. You have an outpatient appointment with Dr. Rowell on 11/16 to discuss further management going forward. Please make sure to bring your ID, insurance card and discharge summary to this apppointment.    Diagnosis: Polysubstance (excluding opioids) dependence, daily use  Assessment and Plan of Treatment: You came into the hospital noting you use tobacco, MDMA, ketamine, crystal meth. We gave you a nicotine patch here for any tobacco withdrawl. You expressed openness to quitting these substances. You can discuss this plan in further detail with Dr. Rowell during your outpatient appointment on 11/16.    Diagnosis: Hepatitis B core antibody positive  Assessment and Plan of Treatment: Your screening tests during admission showed a positive hepatitis B core antibody. This means you have been exposed to hepatitis B at some point. Please follow up with Dr. Rowell to get further screening and discuss avaliable treatment options.     PRINCIPAL DISCHARGE DIAGNOSIS  Diagnosis: Abdominal wall abscess  Assessment and Plan of Treatment: You came into the ED with a large abdominal abscess that had been growing for the past week. In the ED, the doctors performed an incision and drainage of the abscess to expel the contents. We also treated your abscess antibiotics given through an intravenous (IV) tube until we were able to transition you to an oral antibiotic pill. We asked our general surgery team to see you because of how large/deep the abscess looked on CT imaging, and they performed a second incision and drainage and packed it to facilitate healing. Please continue taking your antibiotic pill (Bactrim) as prescribed. You will take the pill twice a day for 6 more days (through 11/8). After that, please continue taking Bactrim once a day for prevention of infections associated with HIV. You will have a visiting nurse to help change the packing of your abdominal abscess. Please go to your follow up appointment with Dr. Andre on 11/23 to check the progress and healing of the wound.      SECONDARY DISCHARGE DIAGNOSES  Diagnosis: AIDS  Assessment and Plan of Treatment: You were diagnosed with HIV in the past. You mentioned that you have not taken any medications for this before, and while you were here we checked you CD4 count. The results showed a CD4 count of 130. This number is less than 200, which does meet criteria for AIDS, which stands for acquired immunodeficent syndrome and can be very serious without treatment. An HIV consultant of ours spoke to you and started you on Biktarvy, a triple therapy medication plan for HIV/AIDs treatment. You have an outpatient appointment with Dr. Rowell on 11/16 to discuss further management going forward. Please make sure to bring your ID, insurance card and discharge summary to this apppointment.    Diagnosis: Polysubstance (excluding opioids) dependence, daily use  Assessment and Plan of Treatment: You came into the hospital noting you use tobacco, MDMA, ketamine, crystal meth. We gave you a nicotine patch here for any tobacco withdrawl. You expressed openness to quitting these substances. You can discuss this plan in further detail with Dr. Rowell during your outpatient appointment on 11/16.    Diagnosis: Hepatitis B core antibody positive  Assessment and Plan of Treatment: Your hepatitis screening tests came back as positive for the hepatitis B core antibody. This most likely means you have been exposed to hepatitis B at some point. Please follow up with Dr. Rowell to discuss your test results and possible treatment options, which are very similar to HIV treatment.

## 2021-11-02 NOTE — DISCHARGE NOTE PROVIDER - CARE PROVIDERS DIRECT ADDRESSES
,DirectAddress_Unknown ,DirectAddress_Unknown,[email or web address]

## 2021-11-02 NOTE — PROGRESS NOTE ADULT - ASSESSMENT
41 M with PMH HIV not on medication, polysubstance use, and multiple abscesses s/p bedside I&D who presented with 10d worsening LUQ pain, abscess associated with drainage. In ED febrile and tachycardic, CT with 5.c cm abscess superior to L rectus muscle s/p I&D by ED provider with improvement in sx but found to have persistent fluctuance and expression of purulence with surrounding cellulitis. Further I&D performed bedside by surgery team 11/1    Patient afebrile, hemodynamically stable. Wound repacked today still purulent.     Recommendation:   - Wound packing WTD 1-2 a day   - Follow up with Dr. Andre in 2 weeks. Phone [phone]. Please schedule.   - Continue IV abx   - Establish management for  immune deficiency per primary team     Plan discussed with attending and chief resident.  41 M with PMH HIV not on medication, polysubstance use, and multiple abscesses s/p bedside I&D who presented with 10d worsening LUQ pain, abscess associated with drainage. In ED febrile and tachycardic, CT with 5.c cm abscess superior to L rectus muscle s/p I&D by ED provider with improvement in sx but found to have persistent fluctuance and expression of purulence with surrounding cellulitis. Further I&D performed bedside by surgery team 11/1    Patient afebrile, hemodynamically stable. Wound repacked today still purulent.     Recommendation:   - Wound packing WTD 1-2 a day   - Follow up with [facility] in 2 weeks. Phone [phone]. Please schedule.   - Continue IV abx   - Establish management for  immune deficiency per primary team     Plan discussed with attending and chief resident.

## 2021-11-02 NOTE — DISCHARGE NOTE PROVIDER - PROVIDER TOKENS
PROVIDER:[TOKEN:[[id number]],SCHEDULEDAPPT:[11/16/2021],SCHEDULEDAPPTTIME:[02:30 PM],ESTABLISHEDPATIENT:[T]] PROVIDER:[TOKEN:[[id number]],SCHEDULEDAPPT:[11/16/2021],SCHEDULEDAPPTTIME:[02:30 PM],ESTABLISHEDPATIENT:[T]],PROVIDER:[TOKEN:[[id number]],FOLLOWUP:[1 week]] PROVIDER:[TOKEN:[[id number]],SCHEDULEDAPPT:[11/23/2021],SCHEDULEDAPPTTIME:[11:45 PM],ESTABLISHEDPATIENT:[T]],PROVIDER:[TOKEN:[[id number]],SCHEDULEDAPPT:[11/16/2021],SCHEDULEDAPPTTIME:[12:00 PM]]

## 2021-11-02 NOTE — DISCHARGE NOTE PROVIDER - NSDCFUADDAPPT_GEN_ALL_CORE_FT
Dr. Andre: General Surgery  11/23/21 at 11:45AM    : Internal Medicine/Infectious Disease  11/16/21 at 12:00PM   You should arrive 15 minutes early and please bring your ID, insurance card and discharge paperwork. Please come by yourself and wearing a mask.

## 2021-11-02 NOTE — DISCHARGE NOTE PROVIDER - CARE PROVIDER_API CALL
Martha Andre  SURGERY  [address], [address]  [place] [address]  Phone: [phone]  Fax: [phone]  Established Patient  Scheduled Appointment: 11/16/2021 02:30 PM   Martha Andre  SURGERY  [address], [address]  [place] [address]  Phone: [phone]  Fax: [phone]  Established Patient  Scheduled Appointment: 11/16/2021 02:30 PM    Jimena Rowell)  Internal Medicine  [address] [address]  Phone: [phone]  Fax: [phone]  Follow Up Time: 1 week   Martha Andre  SURGERY  [address], [address]  [place] [address]  Phone: [phone]  Fax: [phone]  Established Patient  Scheduled Appointment: 11/23/2021 11:45 PM    Jimena Rowell)  Internal Medicine  [address] [address]  Phone: [phone]  Fax: [phone]  Scheduled Appointment: 11/16/2021 12:00 PM

## 2021-11-02 NOTE — DISCHARGE NOTE PROVIDER - HOSPITAL COURSE
#Discharge; do not delete     Patient is ____ y/o M/F with past medical history of _____.     Presented with ____, found to have ____.     Inpatient Treatment Course:     Problem List/Main Diagnoses:     Patient was discharged to:     New Medications:     Change to old medications:     Medications that were stopped:     Items to follow up as outpatient:     Physcial exam at time of discharge:      #Discharge; do not delete     Patient is ____ y/o M/F with past medical history of _____.     Patient is a 42 y/o MSM with PMHx HIV, IVDU tobacco, polysubstance use. He presented with sepsis 2/2 abdominal abscess.     Inpatient Treatment Course:     He first underwent I&D #1, wound cx sent, given vanc+ctx and 2.3LNS in ED. Then he had an HIV consult, started on biktarvy. consulted gen surg for further eval of large abd abscess, underwent I&D #2 by gen surg. isolated Hep B core total Abs positive, will repeat hep B panel and send hep B e Ag/Abs, hep B PCR . Started on PCP ppx given CD4 cound of 130.  ON: Patient with draining perianal abscesses- prescribed lidocaine, petrolleum ointments     Problem List/Main Diagnoses:     Patient was discharged to:     New Medications:     Change to old medications:     Medications that were stopped:     Items to follow up as outpatient:     Physcial exam at time of discharge:      #Discharge; do not delete     Patient is a 40 y/o MSM with PMHx HIV, IVDU tobacco, polysubstance use. He presented with sepsis 2/2 abdominal abscess.     Hospital Course (by problem):       Patient was discharged to: home    New Medications: Bactrim BID    Change to old medications: none    Medications that were stopped: none     Items to follow up as outpatient:     Physcial exam at time of discharge:     Constitutional: lying in bed comfortably, NAD  HEENT: PERRL, EOMI, sclera anicteric, neck supple, trachea midline, no masses, no JVD, MMM, good dentition  Respiratory: CTA b/l, good air entry b/l; no wheezing, no rhonchi, no rales; without accessory muscle use, no intercostal retractions  Cardiovascular: RRR, normal S1, S2; no M/R/G  Gastrointestinal: soft, NT/ND; no masses palpable; BS normoactive x4 quadrants  Extremities: Warm and well perfused; 2+ pulses equal bilateral upper and lower extremities; no edema, cyanosis, or clubbing  Neurological: AOx3, CN II-XII grossly intact, no focal deficits   Skin: Drainage site covered by bandaging, not oozing. Previous drainage site on buttock look well healed. Normal temperature, warm, dry; no rashes noted     #Discharge; do not delete     Patient is a 40 y/o MSM with PMHx HIV, IVDU tobacco, polysubstance use. He presented with sepsis 2/2 abdominal abscess.     Hospital Course (by problem):     #Sepsis  Sepsis 2/2 abdominal abscess with fever (38.0 c) and tachycardia. 10d of progressive tender fluid collection now s/p drainage in ED 11/1/21. Patient thinks he had small superficial injury while shaving, so more likely dermatologic invasion rather than hematologic spread. S/p 2.3L NS in ED, tolerating PO  - s/p Vanc 1g q12 and ceftriaxone 2g   -switched to Bactrim 800 BID   - PO hydration +/- LR bolus for tachycardia   - General Surgery redrained and repacked abscess and changed dressing in AM       #AIDS disease  HIV acquired sexually 17 years ago on no meds, follows with no providers. MSM with 10-12 HIV positive sexual partners so pt potentially at risk for resistant strain (MRSA)  CD4 count is 130 as of 11/1  - Biktarvy started per HIV consult   - HIV consult     #HBV       #Polysubstance (excluding opioids) dependence, daily use.   Tobacco use 1 pack per week smoker x10 years, smokes or snorts crystal meth, MDMA, ketamine. No opiate use recently. Has injected in the past but not for several years. Willing to quit/wean off  - Nicotine patch ordered   -outpatient follow up     #Perianal fistula  -General Surgery       Patient was discharged to: home    New Medications: Bactrim BID    Change to old medications: none    Medications that were stopped: none     Items to follow up as outpatient:     Physcial exam at time of discharge:     Constitutional: lying in bed comfortably, NAD  HEENT: PERRL, EOMI, sclera anicteric, neck supple, trachea midline, no masses, no JVD, MMM, good dentition  Respiratory: CTA b/l, good air entry b/l; no wheezing, no rhonchi, no rales; without accessory muscle use, no intercostal retractions  Cardiovascular: RRR, normal S1, S2; no M/R/G  Gastrointestinal: soft, NT/ND; no masses palpable; BS normoactive x4 quadrants  Extremities: Warm and well perfused; 2+ pulses equal bilateral upper and lower extremities; no edema, cyanosis, or clubbing  Neurological: AOx3, CN II-XII grossly intact, no focal deficits   Skin: Drainage site covered by bandaging, not oozing. Previous drainage site on buttock look well healed. Normal temperature, warm, dry; no rashes noted     #Discharge; do not delete     Patient is a 42 y/o MSM with PMHx HIV, IVDU tobacco, polysubstance use. He presented with sepsis 2/2 abdominal abscess.     Hospital Course (by problem):     #Sepsis  Sepsis 2/2 abdominal abscess with fever (38.0 c) and tachycardia. 10d of progressive tender fluid collection now s/p drainage in ED 11/1/21. Patient thinks he had small superficial injury while shaving, so more likely dermatologic invasion rather than hematologic spread. S/p 2.3L NS in ED, tolerating PO  - s/p Vanc 1g q12 and ceftriaxone 2g   -switched to Bactrim 800 BID   - PO hydration +/- LR bolus for tachycardia   - General Surgery redrained and repacked abscess and changed dressing in AM       #AIDS disease  HIV acquired sexually 17 years ago on no meds, follows with no providers. MSM with 10-12 HIV positive sexual partners so pt potentially at risk for resistant strain (MRSA)  CD4 count is 130 as of 11/1  - Biktarvy started per HIV consult   - HIV clinic ____    #HBV       #Polysubstance (excluding opioids) dependence, daily use.   Tobacco use 1 pack per week smoker x10 years, smokes or snorts crystal meth, MDMA, ketamine. No opiate use recently. Has injected in the past but not for several years. Willing to quit/wean off  - Nicotine patch ordered   -outpatient follow up     #Perianal fistula  -General Surgery ___      Patient was discharged to: home    New Medications: Bactrim BID    Change to old medications: none    Medications that were stopped: none     Items to follow up as outpatient:     Physcial exam at time of discharge:     Constitutional: lying in bed comfortably, NAD  HEENT: PERRL, EOMI, sclera anicteric, neck supple, trachea midline, no masses, no JVD, MMM, good dentition  Respiratory: CTA b/l, good air entry b/l; no wheezing, no rhonchi, no rales; without accessory muscle use, no intercostal retractions  Cardiovascular: RRR, normal S1, S2; no M/R/G  Gastrointestinal: soft, NT/ND; no masses palpable; BS normoactive x4 quadrants  Extremities: Warm and well perfused; 2+ pulses equal bilateral upper and lower extremities; no edema, cyanosis, or clubbing  Neurological: AOx3, CN II-XII grossly intact, no focal deficits   Skin: Drainage site covered by bandaging, not oozing. Previous drainage site on buttock look well healed. Normal temperature, warm, dry; no rashes noted     #Discharge; do not delete     Patient is a 42 y/o MSM with PMHx HIV, IVDU tobacco, polysubstance use. He presented with sepsis 2/2 abdominal abscess.     Hospital Course (by problem):     #Sepsis  Sepsis 2/2 abdominal abscess with fever (38.0 c) and tachycardia. 10d of progressive tender fluid collection now s/p drainage in ED 11/1/21. Patient thinks he had small superficial injury while shaving, so more likely dermatologic invasion rather than hematologic spread. S/p 2.3L NS in ED, tolerating PO  - s/p Vanc 1g q12 and ceftriaxone 2g   -switched to Bactrim 800 BID   - PO hydration +/- LR bolus for tachycardia   - General Surgery redrained and repacked abscess and changed dressing in AM       #AIDS disease  HIV acquired sexually 17 years ago on no meds, follows with no providers. MSM with 10-12 HIV positive sexual partners so pt potentially at risk for resistant strain (MRSA)  CD4 count is 130 as of 11/1  - Biktarvy started per HIV consult   - HIV clinic follow up ____    #HBV   - had reactive Hbc antibody on hepatitis panel, but all other relevant testing was nonreactive  -follow up at outpatient HIV clinic   -Biktarvy covers for HBV infection as well    #Polysubstance (excluding opioids) dependence, daily use.   Tobacco use 1 pack per week smoker x10 years, smokes or snorts crystal meth, MDMA, ketamine. No opiate use recently. Has injected in the past but not for several years. Willing to quit/wean off  - Nicotine patch ordered   -outpatient follow up     #Perianal fistula  -given petroleum jelly and covered with dressing due to yellow and bloody drainage with a bowel movement   -General Surgery made aware and _____      Patient was discharged to: home    New Medications: Bactrim BID    Change to old medications: none    Medications that were stopped: none     Items to follow up as outpatient:     Physcial exam at time of discharge:     Constitutional: lying in bed comfortably, NAD  HEENT: PERRL, EOMI, sclera anicteric, neck supple, trachea midline, no masses, no JVD, MMM, good dentition  Respiratory: CTA b/l, good air entry b/l; no wheezing, no rhonchi, no rales; without accessory muscle use, no intercostal retractions  Cardiovascular: RRR, normal S1, S2; no M/R/G  Gastrointestinal: soft, NT/ND; no masses palpable; BS normoactive x4 quadrants  Extremities: Warm and well perfused; 2+ pulses equal bilateral upper and lower extremities; no edema, cyanosis, or clubbing  Neurological: AOx3, CN II-XII grossly intact, no focal deficits   Skin: Abdomenal abscess site in LR to L renal quadrant is raised and er with clean open incision and well packed. Previous drainage site on buttock bandages and not draining excessively. Normal temperature, warm, dry; no rashes noted     #Discharge; do not delete     Patient is a 40 y/o MSM with PMHx HIV, IVDU tobacco, polysubstance use. He presented with sepsis 2/2 abdominal abscess.     Hospital Course (by problem):     #Sepsis  Sepsis 2/2 abdominal abscess with fever (38.0 c) and tachycardia. 10d of progressive tender fluid collection now s/p drainage in ED 11/1/21. Patient thinks he had small superficial injury while shaving, so more likely dermatologic invasion rather than hematologic spread. S/p 2.3L NS in ED, tolerating PO  - s/p Vanc 1g q12 and ceftriaxone 2g   -switched to Bactrim 800 BID   - PO hydration +/- LR bolus for tachycardia   - General Surgery redrained and repacked abscess and changed dressing in AM   -outpatient nursing home care to repack 1x per day per GS      #AIDS disease  HIV acquired sexually 17 years ago on no meds, follows with no providers. MSM with 10-12 HIV positive sexual partners so pt potentially at risk for resistant strain (MRSA)  CD4 count is 130 as of 11/1  - Biktarvy started per HIV consult   - HIV clinic follow up ____    #HBV   - had reactive Hbc antibody on hepatitis panel, but all other relevant testing was nonreactive  -follow up at outpatient HIV clinic   -Biktarvy covers for HBV infection as well    #Polysubstance (excluding opioids) dependence, daily use.   Tobacco use 1 pack per week smoker x10 years, smokes or snorts crystal meth, MDMA, ketamine. No opiate use recently. Has injected in the past but not for several years. Willing to quit/wean off  - Nicotine patch ordered   -outpatient follow up     #Perianal fistula  -given petroleum jelly and covered with dressing due to yellow and bloody drainage with a bowel movement   -General Surgery made aware and recommended follow up with colorectal       Patient was discharged to: home    New Medications: Bactrim 800mg BID    Change to old medications: none    Medications that were stopped: none     Items to follow up as outpatient:     Physcial exam at time of discharge:     Constitutional: lying in bed comfortably, NAD  HEENT: PERRL, EOMI, sclera anicteric, neck supple, trachea midline, no masses, no JVD, MMM, good dentition  Respiratory: CTA b/l, good air entry b/l; no wheezing, no rhonchi, no rales; without accessory muscle use, no intercostal retractions  Cardiovascular: RRR, normal S1, S2; no M/R/G  Gastrointestinal: soft, NT/ND; no masses palpable; BS normoactive x4 quadrants  Extremities: Warm and well perfused; 2+ pulses equal bilateral upper and lower extremities; no edema, cyanosis, or clubbing  Neurological: AOx3, CN II-XII grossly intact, no focal deficits   Skin: Abdomenal abscess site in LR to L renal quadrant is raised and er with clean open incision and well packed. Previous drainage site on buttock bandages and not draining excessively. Normal temperature, warm, dry; no rashes noted     #Discharge; do not delete     Patient is a 40 y/o MSM with PMHx HIV, IVDU tobacco, polysubstance use. He presented with sepsis 2/2 abdominal abscess.     Hospital Course (by problem):     #Sepsis  Sepsis 2/2 abdominal abscess with fever (38.0 c) and tachycardia. 10d of progressive tender fluid collection now s/p drainage in ED 11/1/21. Patient thinks he had small superficial injury while shaving, so more likely dermatologic invasion rather than hematologic spread. S/p 2.3L NS in ED, tolerating PO  - s/p Vanc 1g q12 and ceftriaxone 2g   -switched to Bactrim 800 BID   - PO hydration +/- LR bolus for tachycardia   - General Surgery redrained and repacked abscess and changed dressing in AM   -outpatient nursing home care to repack 1x per day per GS      #AIDS disease  HIV acquired sexually 17 years ago on no meds, follows with no providers. MSM with 10-12 HIV positive sexual partners so pt potentially at risk for resistant strain (MRSA)  CD4 count is 130 as of 11/1  - Biktarvy started per HIV consult   - HIV clinic follow up with Dr. Rowell     #HBV   - had reactive Hbc antibody on hepatitis panel, but all other relevant testing was nonreactive  -follow up at outpatient HIV clinic   -Biktarvy covers for HBV infection as well    #Polysubstance (excluding opioids) dependence, daily use.   Tobacco use 1 pack per week smoker x10 years, smokes or snorts crystal meth, MDMA, ketamine. No opiate use recently. Has injected in the past but not for several years. Willing to quit/wean off  - Nicotine patch ordered   -outpatient follow up     #Perianal fistula  -given petroleum jelly and covered with dressing due to yellow and bloody drainage with a bowel movement   -General Surgery made aware and recommended follow up with colorectal   -F/u appoint is made with  on 11/16       Patient was discharged to: home    New Medications: Bactrim 800mg BID    Change to old medications: none    Medications that were stopped: none     Items to follow up as outpatient:     Physcial exam at time of discharge:     Constitutional: lying in bed comfortably, NAD  HEENT: PERRL, EOMI, sclera anicteric, neck supple, trachea midline, no masses, no JVD, MMM, good dentition  Respiratory: CTA b/l, good air entry b/l; no wheezing, no rhonchi, no rales; without accessory muscle use, no intercostal retractions  Cardiovascular: RRR, normal S1, S2; no M/R/G  Gastrointestinal: soft, NT/ND; no masses palpable; BS normoactive x4 quadrants  Extremities: Warm and well perfused; 2+ pulses equal bilateral upper and lower extremities; no edema, cyanosis, or clubbing  Neurological: AOx3, CN II-XII grossly intact, no focal deficits   Skin: Abdomenal abscess site in LR to L renal quadrant is raised and er with clean open incision and well packed. Previous drainage site on buttock bandages and not draining excessively. Normal temperature, warm, dry; no rashes noted     #Discharge; do not delete     Patient is a 42 y/o MSM with PMHx HIV, IVDU tobacco, polysubstance use. He presented with sepsis 2/2 abdominal abscess.     Hospital Course (by problem):     #Sepsis  Sepsis 2/2 abdominal abscess with fever (38.0 c) and tachycardia. 10d of progressive tender fluid collection now s/p drainage in ED 11/1/21. Patient thinks he had small superficial injury while shaving, so more likely dermatologic invasion rather than hematologic spread. S/p 2.3L NS in ED, tolerating PO  - s/p Vanc 1g q12 and ceftriaxone 2g   -switched to Bactrim 800 BID x 7 days   - PO hydration +/- LR bolus for tachycardia   - General Surgery redrained and repacked abscess and changed dressing in AM   -outpatient nursing home care to repack 1x per day per GS      #AIDS disease  HIV acquired sexually 17 years ago on no meds, follows with no providers. MSM with 10-12 HIV positive sexual partners so pt potentially at risk for resistant strain (MRSA)  CD4 count is 130 as of 11/1  - Biktarvy started per HIV consult   - HIV clinic follow up with Dr. Rowell     #HBV   - had reactive Hbc antibody on hepatitis panel, but all other relevant testing was nonreactive  -follow up at outpatient HIV clinic   -Biktarvy covers for HBV infection as well    #Polysubstance (excluding opioids) dependence, daily use.   Tobacco use 1 pack per week smoker x10 years, smokes or snorts crystal meth, MDMA, ketamine. No opiate use recently. Has injected in the past but not for several years. Willing to quit/wean off  - Nicotine patch ordered   -outpatient follow up     #Perianal fistula  -given petroleum jelly and covered with dressing due to yellow and bloody drainage with a bowel movement   -General Surgery made aware and recommended follow up with colorectal   -F/u appoint is made with  on 11/16       Patient was discharged to: home    New Medications: Bactrim 800mg BID x 7 days then qd    Change to old medications: none    Medications that were stopped: none     Items to follow up as outpatient:     Physcial exam at time of discharge:     Constitutional: lying in bed comfortably, NAD  HEENT: PERRL, EOMI, sclera anicteric, neck supple, trachea midline, no masses, no JVD, MMM, good dentition  Respiratory: CTA b/l, good air entry b/l; no wheezing, no rhonchi, no rales; without accessory muscle use, no intercostal retractions  Cardiovascular: RRR, normal S1, S2; no M/R/G  Gastrointestinal: soft, NT/ND; no masses palpable; BS normoactive x4 quadrants  Extremities: Warm and well perfused; 2+ pulses equal bilateral upper and lower extremities; no edema, cyanosis, or clubbing  Neurological: AOx3, CN II-XII grossly intact, no focal deficits   Skin: Abdomenal abscess site in LR to L renal quadrant is raised and er with clean open incision and well packed. Previous drainage site on buttock bandages and not draining excessively. Normal temperature, warm, dry; no rashes noted     #Discharge; do not delete     Patient is a 40 y/o MSM with PMHx HIV, IVDU tobacco, polysubstance use. He presented with sepsis 2/2 abdominal abscess.     Hospital Course (by problem):     #Sepsis  Sepsis 2/2 abdominal abscess with fever (38.0 c) and tachycardia. 10d of progressive tender fluid collection now s/p drainage in ED 11/1/21. Patient thinks he had small superficial injury while shaving, so more likely dermatologic invasion rather than hematologic spread. S/p 2.3L NS in ED, tolerating PO  - s/p Vanc 1g q12 and ceftriaxone 2g   -switched to Bactrim 800 BID x 7 days   - PO hydration +/- LR bolus for tachycardia   - General Surgery redrained and repacked abscess and changed dressing in AM   -outpatient nursing home care to repack 1-2x with WTD dressing per day per GS    #AIDS disease  HIV acquired sexually 17 years ago on no meds, follows with no providers. MSM with 10-12 HIV positive sexual partners so pt potentially at risk for resistant strain (MRSA)  CD4 count is 130 as of 11/1  - Biktarvy started per HIV consult   - HIV clinic follow up with Dr. Rowell     #HBV   - had reactive Hbc antibody on hepatitis panel, but all other relevant testing was nonreactive  -follow up at outpatient HIV clinic   -Biktarvy covers for HBV infection as well    #Polysubstance (excluding opioids) dependence, daily use.   Tobacco use 1 pack per week smoker x10 years, smokes or snorts crystal meth, MDMA, ketamine. No opiate use recently. Has injected in the past but not for several years. Willing to quit/wean off  - Nicotine patch ordered   -outpatient follow up     #Perianal fistula  -given petroleum jelly and covered with dressing due to yellow and bloody drainage with a bowel movement   -General Surgery made aware and recommended follow up with colorectal   -F/u appoint is made with  on 11/16       Patient was discharged to: home    New Medications: Bactrim 800mg BID x 7 days then qd    Change to old medications: none    Medications that were stopped: none     Items to follow up as outpatient:     Physcial exam at time of discharge:     Constitutional: lying in bed comfortably, NAD  HEENT: PERRL, EOMI, sclera anicteric, neck supple, trachea midline, no masses, no JVD, MMM, good dentition  Respiratory: CTA b/l, good air entry b/l; no wheezing, no rhonchi, no rales; without accessory muscle use, no intercostal retractions  Cardiovascular: RRR, normal S1, S2; no M/R/G  Gastrointestinal: soft, NT/ND; no masses palpable; BS normoactive x4 quadrants  Extremities: Warm and well perfused; 2+ pulses equal bilateral upper and lower extremities; no edema, cyanosis, or clubbing  Neurological: AOx3, CN II-XII grossly intact, no focal deficits   Skin: Abdomenal abscess site in LR to L renal quadrant is raised and er with clean open incision and well packed. Previous drainage site on buttock bandages and not draining excessively. Normal temperature, warm, dry; no rashes noted     #Discharge; do not delete     Patient is a 40 y/o MSM with PMHx HIV, IVDU tobacco, polysubstance use. He presented with sepsis 2/2 abdominal abscess.     Hospital Course (by problem):     #Sepsis  Sepsis 2/2 abdominal abscess with fever (38.0 c) and tachycardia. 10d of progressive tender fluid collection now s/p drainage in ED 11/1/21. Patient thinks he had small superficial injury while shaving, so more likely dermatologic invasion rather than hematologic spread. S/p 2.3L NS in ED, tolerating PO  - s/p Vanc 1g q12 and ceftriaxone 2g   - switched to Bactrim 800 BID PO   - PO hydration +/- LR bolus for tachycardia   - General Surgery redrained and repacked abscess and changed dressing in AM   -outpatient nursing home care to repack 1-2x with WTD dressing per day per GS    #AIDS disease  HIV acquired sexually 17 years ago on no meds, follows with no providers. MSM with 10-12 HIV positive sexual partners so pt potentially at risk for resistant strain (MRSA)  CD4 count is 130 as of 11/1 defines pt as newly diagnosed AIDS  - Biktarvy started per HIV consult   - PCP ppx with Bactrim PO daily after 7 day treatment course   - HIV clinic follow up with Dr. Rowell     #HBV   - had reactive Hbc antibody on hepatitis panel, but all other relevant testing was nonreactive  - follow up at outpatient HIV clinic   - Biktarvy covers for HBV infection     #Polysubstance (excluding opioids) dependence, daily use.   Tobacco use 1 pack per week smoker x10 years, smokes or snorts crystal meth, MDMA, ketamine. No opiate use recently. Has injected in the past but not for several years. Willing to quit/wean off  - Nicotine patch ordered   -outpatient follow up     #Perianal fistula  -given petroleum jelly and covered with dressing due to yellow and bloody drainage with a bowel movement   -General Surgery made aware and recommended follow up with colorectal   -F/u appoint is made with  on 11/16       Patient was discharged to: home    New Medications: Bactrim 800mg BID x 7 days then continue on Bactrim PO daily   Change to old medications: none    Medications that were stopped: none     Items to follow up as outpatient: follow up with Surgery and Internal Medicine    Physcial exam at time of discharge:     Constitutional: lying in bed comfortably, NAD  HEENT: PERRL, EOMI, sclera anicteric, neck supple, trachea midline, no masses, no JVD, MMM, good dentition  Respiratory: CTA b/l, good air entry b/l; no wheezing, no rhonchi, no rales; without accessory muscle use, no intercostal retractions  Cardiovascular: RRR, normal S1, S2; no M/R/G  Gastrointestinal: soft, NT/ND; no masses palpable; BS normoactive x4 quadrants  Extremities: Warm and well perfused; 2+ pulses equal bilateral upper and lower extremities; no edema, cyanosis, or clubbing  Neurological: AOx3, CN II-XII grossly intact, no focal deficits   Skin: Abdominal abscess site is raised and erythematous with clean open incision and well packed. Perianal fistula drainage site on buttock bandaged and not currently draining. Normal temperature, warm, dry; no rashes noted

## 2021-11-02 NOTE — PROGRESS NOTE ADULT - SUBJECTIVE AND OBJECTIVE BOX
SUBJECTIVE: Patient seen and examined bedside. Decreased discomfort in the abdomen. No fever. Tolerated diet.     bictegravir 50 mG/emtricitabine 200 mG/tenofovir alafenamide 25 mG (BIKTARVY) 1 Tablet(s) Oral daily  trimethoprim  160 mG/sulfamethoxazole 800 mG 1 Tablet(s) Oral every 12 hours      Vital Signs Last 24 Hrs  T(C): 36.8 (2021 12:04), Max: 36.8 (2021 12:04)  T(F): 98.2 (2021 12:04), Max: 98.2 (2021 12:04)  HR: 110 (2021 12:04) (87 - 110)  BP: 118/76 (2021 12:04) (110/73 - 118/76)  BP(mean): --  RR: 18 (2021 12:04) (17 - 18)  SpO2: 98% (2021 12:04) (96% - 98%)  I&O's Detail    2021 07:01  -  2021 07:00  --------------------------------------------------------  IN:  Total IN: 0 mL    OUT:    Voided (mL): 520 mL  Total OUT: 520 mL    Total NET: -520 mL    Gen: NAD, resting comfortably in bed  CV: NSR  Pulm: no respiratory distress on RA   Abd: soft, ND, I&D site removed packing and observed moist base of the wound with some membranous devitalized tissue. Decreased cellulitis.   Ext: WWP, no edema   Neuro: motor/sensory grossly intact         LABS:                        11.8   3.03  )-----------( 236      ( 2021 07:33 )             39.8     11-    139  |  105  |  8   ----------------------------<  109<H>  4.0   |  25  |  0.71    Ca    8.9      2021 07:33  Mg     1.9     11    TPro  8.5<H>  /  Alb  3.2<L>  /  TBili  0.2  /  DBili  x   /  AST  14<L>  /  ALT  16  /  AlkPhos  92      PT/INR - ( 2021 00:12 )   PT: 14.7 sec;   INR: 1.26          PTT - ( 2021 00:12 )  PTT:34.1 sec  Urinalysis Basic - ( 2021 00:05 )    Color: Yellow / Appearance: Clear / S.020 / pH: x  Gluc: x / Ketone: NEGATIVE  / Bili: NEGATIVE / Urobili: 0.2 E.U./dL   Blood: x / Protein: NEGATIVE mg/dL / Nitrite: NEGATIVE   Leuk Esterase: NEGATIVE / RBC: < 5 /HPF / WBC < 5 /HPF   Sq Epi: x / Non Sq Epi: 0-5 /HPF / Bacteria: Present /HPF        RADIOLOGY & ADDITIONAL STUDIES:

## 2021-11-02 NOTE — DISCHARGE NOTE NURSING/CASE MANAGEMENT/SOCIAL WORK - PATIENT PORTAL LINK FT
You can access the FollowMyHealth Patient Portal offered by [facility] by registering at the following website: [email or web address]. By joining [employer]’s FollowMyHealth portal, you will also be able to view your health information using other applications (apps) compatible with our system.

## 2021-11-03 LAB
COVID-19 SPIKE DOMAIN AB INTERP: POSITIVE
COVID-19 SPIKE DOMAIN ANTIBODY RESULT: 171 U/ML — HIGH
HBV DNA # SERPL NAA+PROBE: SIGNIFICANT CHANGE UP IU/ML
HBV DNA SERPL NAA+PROBE-LOG#: SIGNIFICANT CHANGE UP LOG10IU/ML
SARS-COV-2 IGG+IGM SERPL QL IA: 171 U/ML — HIGH
SARS-COV-2 IGG+IGM SERPL QL IA: POSITIVE

## 2021-11-04 LAB
-  AMPICILLIN/SULBACTAM: SIGNIFICANT CHANGE UP
-  CEFAZOLIN: SIGNIFICANT CHANGE UP
-  CLINDAMYCIN: SIGNIFICANT CHANGE UP
-  DAPTOMYCIN: SIGNIFICANT CHANGE UP
-  ERYTHROMYCIN: SIGNIFICANT CHANGE UP
-  GENTAMICIN: SIGNIFICANT CHANGE UP
-  LINEZOLID: SIGNIFICANT CHANGE UP
-  OXACILLIN: SIGNIFICANT CHANGE UP
-  PENICILLIN: SIGNIFICANT CHANGE UP
-  RIFAMPIN: SIGNIFICANT CHANGE UP
-  TETRACYCLINE: SIGNIFICANT CHANGE UP
-  TRIMETHOPRIM/SULFAMETHOXAZOLE: SIGNIFICANT CHANGE UP
-  VANCOMYCIN: SIGNIFICANT CHANGE UP
METHOD TYPE: SIGNIFICANT CHANGE UP

## 2021-11-06 DIAGNOSIS — B20 HUMAN IMMUNODEFICIENCY VIRUS [HIV] DISEASE: ICD-10-CM

## 2021-11-06 DIAGNOSIS — F16.20 HALLUCINOGEN DEPENDENCE, UNCOMPLICATED: ICD-10-CM

## 2021-11-06 DIAGNOSIS — F15.20 OTHER STIMULANT DEPENDENCE, UNCOMPLICATED: ICD-10-CM

## 2021-11-06 DIAGNOSIS — Z86.19 PERSONAL HISTORY OF OTHER INFECTIOUS AND PARASITIC DISEASES: ICD-10-CM

## 2021-11-06 DIAGNOSIS — B96.89 OTHER SPECIFIED BACTERIAL AGENTS AS THE CAUSE OF DISEASES CLASSIFIED ELSEWHERE: ICD-10-CM

## 2021-11-06 DIAGNOSIS — Z79.899 OTHER LONG TERM (CURRENT) DRUG THERAPY: ICD-10-CM

## 2021-11-06 DIAGNOSIS — F17.210 NICOTINE DEPENDENCE, CIGARETTES, UNCOMPLICATED: ICD-10-CM

## 2021-11-06 DIAGNOSIS — R76.0 RAISED ANTIBODY TITER: ICD-10-CM

## 2021-11-06 DIAGNOSIS — L02.11 CUTANEOUS ABSCESS OF NECK: ICD-10-CM

## 2021-11-06 DIAGNOSIS — L73.2 HIDRADENITIS SUPPURATIVA: ICD-10-CM

## 2021-11-06 DIAGNOSIS — K61.0 ANAL ABSCESS: ICD-10-CM

## 2021-11-06 DIAGNOSIS — A41.9 SEPSIS, UNSPECIFIED ORGANISM: ICD-10-CM

## 2021-11-06 DIAGNOSIS — L02.211 CUTANEOUS ABSCESS OF ABDOMINAL WALL: ICD-10-CM

## 2021-11-06 DIAGNOSIS — E86.0 DEHYDRATION: ICD-10-CM

## 2021-11-11 PROBLEM — Z00.00 ENCOUNTER FOR PREVENTIVE HEALTH EXAMINATION: Status: ACTIVE | Noted: 2021-11-11

## 2021-11-15 ENCOUNTER — APPOINTMENT (OUTPATIENT)
Dept: SURGERY | Facility: CLINIC | Age: 41
End: 2021-11-15

## 2021-11-16 ENCOUNTER — MED ADMIN CHARGE (OUTPATIENT)
Age: 41
End: 2021-11-16

## 2021-11-16 ENCOUNTER — APPOINTMENT (OUTPATIENT)
Dept: INFECTIOUS DISEASE | Facility: CLINIC | Age: 41
End: 2021-11-16
Payer: MEDICAID

## 2021-11-16 ENCOUNTER — TRANSCRIPTION ENCOUNTER (OUTPATIENT)
Age: 41
End: 2021-11-16

## 2021-11-16 ENCOUNTER — OUTPATIENT (OUTPATIENT)
Dept: OUTPATIENT SERVICES | Facility: HOSPITAL | Age: 41
LOS: 1 days | End: 2021-11-16

## 2021-11-16 VITALS
RESPIRATION RATE: 14 BRPM | OXYGEN SATURATION: 98 % | TEMPERATURE: 97.2 F | WEIGHT: 160 LBS | BODY MASS INDEX: 21.2 KG/M2 | HEART RATE: 110 BPM | HEIGHT: 73 IN | SYSTOLIC BLOOD PRESSURE: 122 MMHG | DIASTOLIC BLOOD PRESSURE: 78 MMHG

## 2021-11-16 DIAGNOSIS — F15.20 OTHER STIMULANT DEPENDENCE, UNCOMPLICATED: ICD-10-CM

## 2021-11-16 DIAGNOSIS — Z72.51 HIGH RISK HETEROSEXUAL BEHAVIOR: ICD-10-CM

## 2021-11-16 DIAGNOSIS — Z72.0 TOBACCO USE: ICD-10-CM

## 2021-11-16 DIAGNOSIS — K60.3 ANAL FISTULA: ICD-10-CM

## 2021-11-16 DIAGNOSIS — Z86.19 PERSONAL HISTORY OF OTHER INFECTIOUS AND PARASITIC DISEASES: ICD-10-CM

## 2021-11-16 DIAGNOSIS — B20 HUMAN IMMUNODEFICIENCY VIRUS [HIV] DISEASE: ICD-10-CM

## 2021-11-16 DIAGNOSIS — Z83.3 FAMILY HISTORY OF DIABETES MELLITUS: ICD-10-CM

## 2021-11-16 DIAGNOSIS — Z87.2 PERSONAL HISTORY OF DISEASES OF THE SKIN AND SUBCUTANEOUS TISSUE: ICD-10-CM

## 2021-11-16 DIAGNOSIS — Z23 ENCOUNTER FOR IMMUNIZATION: ICD-10-CM

## 2021-11-16 DIAGNOSIS — Z82.49 FAMILY HISTORY OF ISCHEMIC HEART DISEASE AND OTHER DISEASES OF THE CIRCULATORY SYSTEM: ICD-10-CM

## 2021-11-16 LAB
A1C WITH ESTIMATED AVERAGE GLUCOSE RESULT: 5.3 % — SIGNIFICANT CHANGE UP (ref 4–5.6)
ALBUMIN SERPL ELPH-MCNC: 4.1 G/DL — SIGNIFICANT CHANGE UP (ref 3.3–5)
ALP SERPL-CCNC: 93 U/L — SIGNIFICANT CHANGE UP (ref 40–120)
ALT FLD-CCNC: 13 U/L — SIGNIFICANT CHANGE UP (ref 10–45)
ANION GAP SERPL CALC-SCNC: 11 MMOL/L — SIGNIFICANT CHANGE UP (ref 5–17)
APPEARANCE UR: CLEAR — SIGNIFICANT CHANGE UP
AST SERPL-CCNC: 15 U/L — SIGNIFICANT CHANGE UP (ref 10–40)
BASOPHILS # BLD AUTO: 0.01 K/UL — SIGNIFICANT CHANGE UP (ref 0–0.2)
BASOPHILS NFR BLD AUTO: 0.2 % — SIGNIFICANT CHANGE UP (ref 0–2)
BILIRUB SERPL-MCNC: 0.2 MG/DL — SIGNIFICANT CHANGE UP (ref 0.2–1.2)
BILIRUB UR-MCNC: ABNORMAL
BUN SERPL-MCNC: 10 MG/DL — SIGNIFICANT CHANGE UP (ref 7–23)
CALCIUM SERPL-MCNC: 9.2 MG/DL — SIGNIFICANT CHANGE UP (ref 8.4–10.5)
CHLORIDE SERPL-SCNC: 101 MMOL/L — SIGNIFICANT CHANGE UP (ref 96–108)
CHOLEST SERPL-MCNC: 130 MG/DL — SIGNIFICANT CHANGE UP
CO2 SERPL-SCNC: 25 MMOL/L — SIGNIFICANT CHANGE UP (ref 22–31)
COLOR SPEC: YELLOW — SIGNIFICANT CHANGE UP
CREAT SERPL-MCNC: 0.99 MG/DL — SIGNIFICANT CHANGE UP (ref 0.5–1.3)
DIFF PNL FLD: NEGATIVE — SIGNIFICANT CHANGE UP
EOSINOPHIL # BLD AUTO: 0.07 K/UL — SIGNIFICANT CHANGE UP (ref 0–0.5)
EOSINOPHIL NFR BLD AUTO: 1.6 % — SIGNIFICANT CHANGE UP (ref 0–6)
ESTIMATED AVERAGE GLUCOSE: 105 MG/DL — SIGNIFICANT CHANGE UP (ref 68–114)
GLUCOSE SERPL-MCNC: 129 MG/DL — HIGH (ref 70–99)
GLUCOSE UR QL: NEGATIVE — SIGNIFICANT CHANGE UP
HCT VFR BLD CALC: 38 % — LOW (ref 39–50)
HCV AB S/CO SERPL IA: 0.04 S/CO — SIGNIFICANT CHANGE UP
HCV AB SERPL-IMP: SIGNIFICANT CHANGE UP
HDLC SERPL-MCNC: 32 MG/DL — LOW
HGB BLD-MCNC: 11.4 G/DL — LOW (ref 13–17)
IMM GRANULOCYTES NFR BLD AUTO: 0.2 % — SIGNIFICANT CHANGE UP (ref 0–1.5)
KETONES UR-MCNC: ABNORMAL MG/DL
LEUKOCYTE ESTERASE UR-ACNC: NEGATIVE — SIGNIFICANT CHANGE UP
LIPID PNL WITH DIRECT LDL SERPL: 71 MG/DL — SIGNIFICANT CHANGE UP
LYMPHOCYTES # BLD AUTO: 1.17 K/UL — SIGNIFICANT CHANGE UP (ref 1–3.3)
LYMPHOCYTES # BLD AUTO: 26.6 % — SIGNIFICANT CHANGE UP (ref 13–44)
MCHC RBC-ENTMCNC: 25.9 PG — LOW (ref 27–34)
MCHC RBC-ENTMCNC: 30 GM/DL — LOW (ref 32–36)
MCV RBC AUTO: 86.4 FL — SIGNIFICANT CHANGE UP (ref 80–100)
MONOCYTES # BLD AUTO: 0.34 K/UL — SIGNIFICANT CHANGE UP (ref 0–0.9)
MONOCYTES NFR BLD AUTO: 7.7 % — SIGNIFICANT CHANGE UP (ref 2–14)
NEUTROPHILS # BLD AUTO: 2.8 K/UL — SIGNIFICANT CHANGE UP (ref 1.8–7.4)
NEUTROPHILS NFR BLD AUTO: 63.7 % — SIGNIFICANT CHANGE UP (ref 43–77)
NITRITE UR-MCNC: NEGATIVE — SIGNIFICANT CHANGE UP
NON HDL CHOLESTEROL: 98 MG/DL — SIGNIFICANT CHANGE UP
NRBC # BLD: 0 /100 WBCS — SIGNIFICANT CHANGE UP (ref 0–0)
PH UR: 6 — SIGNIFICANT CHANGE UP (ref 5–8)
PLATELET # BLD AUTO: 347 K/UL — SIGNIFICANT CHANGE UP (ref 150–400)
POTASSIUM SERPL-MCNC: 4.2 MMOL/L — SIGNIFICANT CHANGE UP (ref 3.5–5.3)
POTASSIUM SERPL-SCNC: 4.2 MMOL/L — SIGNIFICANT CHANGE UP (ref 3.5–5.3)
PROT SERPL-MCNC: 8.3 G/DL — SIGNIFICANT CHANGE UP (ref 6–8.3)
PROT UR-MCNC: NEGATIVE MG/DL — SIGNIFICANT CHANGE UP
RBC # BLD: 4.4 M/UL — SIGNIFICANT CHANGE UP (ref 4.2–5.8)
RBC # FLD: 14.4 % — SIGNIFICANT CHANGE UP (ref 10.3–14.5)
SODIUM SERPL-SCNC: 137 MMOL/L — SIGNIFICANT CHANGE UP (ref 135–145)
SP GR SPEC: >=1.03 — SIGNIFICANT CHANGE UP (ref 1–1.03)
TRIGL SERPL-MCNC: 135 MG/DL — SIGNIFICANT CHANGE UP
TSH SERPL-MCNC: 0.44 UIU/ML — SIGNIFICANT CHANGE UP (ref 0.27–4.2)
UROBILINOGEN FLD QL: 1 E.U./DL — SIGNIFICANT CHANGE UP
WBC # BLD: 4.4 K/UL — SIGNIFICANT CHANGE UP (ref 3.8–10.5)
WBC # FLD AUTO: 4.4 K/UL — SIGNIFICANT CHANGE UP (ref 3.8–10.5)

## 2021-11-16 PROCEDURE — 99215 OFFICE O/P EST HI 40 MIN: CPT

## 2021-11-16 RX ORDER — BICTEGRAVIR SODIUM, EMTRICITABINE, AND TENOFOVIR ALAFENAMIDE FUMARATE 50; 200; 25 MG/1; MG/1; MG/1
50-200-25 TABLET ORAL
Qty: 30 | Refills: 1 | Status: ACTIVE | COMMUNITY
Start: 2021-11-02 | End: 1900-01-01

## 2021-11-17 DIAGNOSIS — F32.1 MAJOR DEPRESSIVE DISORDER, SINGLE EPISODE, MODERATE: ICD-10-CM

## 2021-11-17 PROBLEM — K60.3 PERIANAL FISTULA: Status: ACTIVE | Noted: 2021-11-17

## 2021-11-17 PROBLEM — F15.20 AMPHETAMINE ADDICTION: Status: ACTIVE | Noted: 2021-11-17

## 2021-11-17 LAB
24R-OH-CALCIDIOL SERPL-MCNC: 15.2 NG/ML — LOW (ref 30–80)
4/8 RATIO: 0.28 RATIO — LOW (ref 0.9–3.6)
ABS CD8: 554 /UL — SIGNIFICANT CHANGE UP (ref 142–740)
C TRACH RRNA SPEC QL NAA+PROBE: SIGNIFICANT CHANGE UP
CD3 BLASTS SPEC-ACNC: 733 /UL — SIGNIFICANT CHANGE UP (ref 672–1870)
CD3 BLASTS SPEC-ACNC: 78 % — SIGNIFICANT CHANGE UP (ref 59–83)
CD4 %: 17 % — LOW (ref 30–62)
CD8 %: 59 % — HIGH (ref 12–36)
HIV-1 VIRAL LOAD RESULT: ABNORMAL
HIV1 RNA # SERPL NAA+PROBE: 117 — SIGNIFICANT CHANGE UP
HIV1 RNA SER-IMP: SIGNIFICANT CHANGE UP
HIV1 RNA SERPL NAA+PROBE-ACNC: ABNORMAL
HIV1 RNA SERPL NAA+PROBE-LOG#: 2.07 — SIGNIFICANT CHANGE UP
N GONORRHOEA RRNA SPEC QL NAA+PROBE: SIGNIFICANT CHANGE UP
SPECIMEN SOURCE: SIGNIFICANT CHANGE UP
T-CELL CD4 SUBSET PNL BLD: 157 /UL — LOW (ref 489–1457)

## 2021-11-18 ENCOUNTER — TRANSCRIPTION ENCOUNTER (OUTPATIENT)
Age: 41
End: 2021-11-18

## 2021-11-18 DIAGNOSIS — Z86.19 PERSONAL HISTORY OF OTHER INFECTIOUS AND PARASITIC DISEASES: ICD-10-CM

## 2021-11-18 DIAGNOSIS — F15.20 OTHER STIMULANT DEPENDENCE, UNCOMPLICATED: ICD-10-CM

## 2021-11-18 DIAGNOSIS — Z82.49 FAMILY HISTORY OF ISCHEMIC HEART DISEASE AND OTHER DISEASES OF THE CIRCULATORY SYSTEM: ICD-10-CM

## 2021-11-18 DIAGNOSIS — K60.3 ANAL FISTULA: ICD-10-CM

## 2021-11-18 DIAGNOSIS — Z72.0 TOBACCO USE: ICD-10-CM

## 2021-11-18 DIAGNOSIS — Z83.3 FAMILY HISTORY OF DIABETES MELLITUS: ICD-10-CM

## 2021-11-18 DIAGNOSIS — L73.2 HIDRADENITIS SUPPURATIVA: ICD-10-CM

## 2021-11-18 DIAGNOSIS — R00.0 TACHYCARDIA, UNSPECIFIED: ICD-10-CM

## 2021-11-18 DIAGNOSIS — Z72.51 HIGH RISK HETEROSEXUAL BEHAVIOR: ICD-10-CM

## 2021-11-18 DIAGNOSIS — D64.9 ANEMIA, UNSPECIFIED: ICD-10-CM

## 2021-11-18 DIAGNOSIS — B20 HUMAN IMMUNODEFICIENCY VIRUS [HIV] DISEASE: ICD-10-CM

## 2021-11-18 LAB
GAMMA INTERFERON BACKGROUND BLD IA-ACNC: 0.01 IU/ML — SIGNIFICANT CHANGE UP
M TB IFN-G BLD-IMP: NEGATIVE — SIGNIFICANT CHANGE UP
M TB IFN-G CD4+ BCKGRND COR BLD-ACNC: 0 IU/ML — SIGNIFICANT CHANGE UP
M TB IFN-G CD4+CD8+ BCKGRND COR BLD-ACNC: 0 IU/ML — SIGNIFICANT CHANGE UP
QUANT TB PLUS MITOGEN MINUS NIL: 1.19 IU/ML — SIGNIFICANT CHANGE UP
RPR SER-TITR: (no result)
RPR SERPL-ACNC: REACTIVE
T PALLIDUM AB TITR SER: POSITIVE

## 2021-11-22 ENCOUNTER — TRANSCRIPTION ENCOUNTER (OUTPATIENT)
Age: 41
End: 2021-11-22

## 2021-11-23 ENCOUNTER — TRANSCRIPTION ENCOUNTER (OUTPATIENT)
Age: 41
End: 2021-11-23

## 2021-11-29 LAB
BICTEGRAVIR RESISTANCE: SIGNIFICANT CHANGE UP
DOLUTEGRAVIR ISLT GENOTYP: SIGNIFICANT CHANGE UP
ELVITEGRAVIR ISLT GENOTYP: SIGNIFICANT CHANGE UP
HIV 1 RNA IN SERPLBLD-SEQ: SIGNIFICANT CHANGE UP
HIV 1 RNA RT + PR + IN SERPLBLD-SEQ: SIGNIFICANT CHANGE UP
HIV RT+PR MUT TESTED ISLT: SIGNIFICANT CHANGE UP
HIV-1 GENOTYPE DRUG RESISTANCE - RESULT: SIGNIFICANT CHANGE UP
HIV-1 INTEGRASE GENOTYPE - RESULT: SIGNIFICANT CHANGE UP
RALTEGRAVIR ISLT GENOTYP: SIGNIFICANT CHANGE UP
VIRAL LOAD DATE: SIGNIFICANT CHANGE UP

## 2021-11-30 ENCOUNTER — NON-APPOINTMENT (OUTPATIENT)
Age: 41
End: 2021-11-30

## 2021-11-30 ENCOUNTER — TRANSCRIPTION ENCOUNTER (OUTPATIENT)
Age: 41
End: 2021-11-30

## 2021-12-06 ENCOUNTER — NON-APPOINTMENT (OUTPATIENT)
Age: 41
End: 2021-12-06

## 2021-12-06 ENCOUNTER — TRANSCRIPTION ENCOUNTER (OUTPATIENT)
Age: 41
End: 2021-12-06

## 2021-12-07 ENCOUNTER — NON-APPOINTMENT (OUTPATIENT)
Age: 41
End: 2021-12-07

## 2021-12-08 ENCOUNTER — TRANSCRIPTION ENCOUNTER (OUTPATIENT)
Age: 41
End: 2021-12-08

## 2021-12-10 ENCOUNTER — TRANSCRIPTION ENCOUNTER (OUTPATIENT)
Age: 41
End: 2021-12-10

## 2021-12-21 ENCOUNTER — APPOINTMENT (OUTPATIENT)
Dept: INFECTIOUS DISEASE | Facility: CLINIC | Age: 41
End: 2021-12-21

## 2021-12-21 ENCOUNTER — NON-APPOINTMENT (OUTPATIENT)
Age: 41
End: 2021-12-21

## 2021-12-27 ENCOUNTER — TRANSCRIPTION ENCOUNTER (OUTPATIENT)
Age: 41
End: 2021-12-27

## 2022-01-06 ENCOUNTER — RX RENEWAL (OUTPATIENT)
Age: 42
End: 2022-01-06

## 2022-01-21 ENCOUNTER — NON-APPOINTMENT (OUTPATIENT)
Age: 42
End: 2022-01-21

## 2022-02-14 ENCOUNTER — RX RENEWAL (OUTPATIENT)
Age: 42
End: 2022-02-14

## 2022-04-25 ENCOUNTER — NON-APPOINTMENT (OUTPATIENT)
Age: 42
End: 2022-04-25

## 2022-04-25 ENCOUNTER — TRANSCRIPTION ENCOUNTER (OUTPATIENT)
Age: 42
End: 2022-04-25

## 2022-06-16 ENCOUNTER — NON-APPOINTMENT (OUTPATIENT)
Age: 42
End: 2022-06-16

## 2022-07-20 ENCOUNTER — NON-APPOINTMENT (OUTPATIENT)
Age: 42
End: 2022-07-20

## 2022-08-01 ENCOUNTER — NON-APPOINTMENT (OUTPATIENT)
Age: 42
End: 2022-08-01

## 2023-01-12 NOTE — ED ADULT NURSE NOTE - NSSEPSISNEWALTERMENTAL_ED_A_ED
Echocardiogram   Exercise stress test   Pantoprazole 40 mg daily x 30 days  Monitor blood pressure intermittently
No

## 2023-03-17 ENCOUNTER — EMERGENCY (EMERGENCY)
Facility: HOSPITAL | Age: 43
LOS: 1 days | Discharge: HOME | End: 2023-03-17
Attending: EMERGENCY MEDICINE | Admitting: EMERGENCY MEDICINE
Payer: MEDICAID

## 2023-03-17 VITALS
SYSTOLIC BLOOD PRESSURE: 150 MMHG | HEART RATE: 115 BPM | DIASTOLIC BLOOD PRESSURE: 100 MMHG | OXYGEN SATURATION: 98 % | RESPIRATION RATE: 20 BRPM | TEMPERATURE: 98 F

## 2023-03-17 VITALS
HEART RATE: 109 BPM | TEMPERATURE: 98 F | SYSTOLIC BLOOD PRESSURE: 144 MMHG | RESPIRATION RATE: 19 BRPM | OXYGEN SATURATION: 99 % | DIASTOLIC BLOOD PRESSURE: 98 MMHG

## 2023-03-17 LAB
AMPHET UR-MCNC: POSITIVE
BARBITURATES UR SCN-MCNC: NEGATIVE — SIGNIFICANT CHANGE UP
BENZODIAZ UR-MCNC: NEGATIVE — SIGNIFICANT CHANGE UP
C TRACH RRNA SPEC QL NAA+PROBE: DETECTED
COCAINE METAB.OTHER UR-MCNC: NEGATIVE — SIGNIFICANT CHANGE UP
METHADONE UR-MCNC: NEGATIVE — SIGNIFICANT CHANGE UP
N GONORRHOEA RRNA SPEC QL NAA+PROBE: SIGNIFICANT CHANGE UP
OPIATES UR-MCNC: NEGATIVE — SIGNIFICANT CHANGE UP
PCP SPEC-MCNC: SIGNIFICANT CHANGE UP
PCP UR-MCNC: NEGATIVE — SIGNIFICANT CHANGE UP
RPR SER-TITR: (no result)
RPR SERPL-ACNC: REACTIVE
SPECIMEN SOURCE: SIGNIFICANT CHANGE UP
T PALLIDUM AB TITR SER: POSITIVE
THC UR QL: NEGATIVE — SIGNIFICANT CHANGE UP

## 2023-03-17 PROCEDURE — 99284 EMERGENCY DEPT VISIT MOD MDM: CPT

## 2023-03-17 RX ORDER — CEFTRIAXONE 500 MG/1
500 INJECTION, POWDER, FOR SOLUTION INTRAMUSCULAR; INTRAVENOUS ONCE
Refills: 0 | Status: COMPLETED | OUTPATIENT
Start: 2023-03-17 | End: 2023-03-17

## 2023-03-17 RX ADMIN — Medication 100 MILLIGRAM(S): at 02:42

## 2023-03-17 RX ADMIN — CEFTRIAXONE 500 MILLIGRAM(S): 500 INJECTION, POWDER, FOR SOLUTION INTRAMUSCULAR; INTRAVENOUS at 02:42

## 2023-03-17 NOTE — ED PROVIDER NOTE - PATIENT PORTAL LINK FT
You can access the FollowMyHealth Patient Portal offered by [facility] by registering at the following website: [email or web address]. By joining [employer]’s FollowMyHealth portal, you will also be able to view your health information using other applications (apps) compatible with our system.

## 2023-03-17 NOTE — ED PROVIDER NOTE - CLINICAL SUMMARY MEDICAL DECISION MAKING FREE TEXT BOX
Patient with penile discharge.  Has male sex partner.  Uses condoms sometimes.  No fever or chills, no back pain.  Takes Biktarvy for HIV, occasional alcohol and smokes.    gc and ctrachomatis and treponema screen sent.  Paper Rx for 2 weeks doxy and IM ceftriax 500mg ordered.  Follow up with PMD.

## 2023-03-17 NOTE — ED PROVIDER NOTE - NSFOLLOWUPINSTRUCTIONS_ED_ALL_ED_FT
Please take the doxycycline 100mg every 12 hours for 2 weeks.  No sex for 2 weeks.  Follow up with your doctor in 1-2 weeks.  Return at any time if you have concerns.

## 2023-03-17 NOTE — ED ADULT TRIAGE NOTE - CHIEF COMPLAINT QUOTE
Walk in pt with complaints of green/white penile discharge x 2 days. Requesting sti testing. Pt tachycardiac and hypertensive in triage, reports he has white coat syndrome and is afraid of needles.

## 2023-03-17 NOTE — ED PROVIDER NOTE - OBJECTIVE STATEMENT
Patient with penile discharge.  Has male sex partner.  Uses condoms sometimes.  No fever or chills, no back pain.  Takes Biktarvy for HIV, occasional alcohol and smokes.

## 2023-03-18 NOTE — ED POST DISCHARGE NOTE - DETAILS
called pt's home and current number. no VM available. discussed results + syphillis and chlamydia. states he has been treated for syphillis in the past but states he lost his most recent prescription. will resend prescription and advised retesting for clearance with his doctor. advised abstinence/barrier protection until cleared. all questions answered.

## 2023-03-20 DIAGNOSIS — B20 HUMAN IMMUNODEFICIENCY VIRUS [HIV] DISEASE: ICD-10-CM

## 2023-03-20 DIAGNOSIS — F17.210 NICOTINE DEPENDENCE, CIGARETTES, UNCOMPLICATED: ICD-10-CM

## 2023-03-20 DIAGNOSIS — N34.2 OTHER URETHRITIS: ICD-10-CM

## 2023-03-20 DIAGNOSIS — R30.0 DYSURIA: ICD-10-CM

## 2023-03-20 DIAGNOSIS — Z86.2 PERSONAL HISTORY OF DISEASES OF THE BLOOD AND BLOOD-FORMING ORGANS AND CERTAIN DISORDERS INVOLVING THE IMMUNE MECHANISM: ICD-10-CM

## 2023-09-27 NOTE — ED ADULT NURSE NOTE - NSSEPSISSUSPECTED_ED_A_ED
Physical Therapy Visit    Visit Type: Daily Treatment Note  Visit: 4  Referring Provider: Latanya De La Vega PA-C  Medical Diagnosis (from order): Diagnosis Information    Diagnosis  M24.272 (ICD-10-CM) - Ankle ligament laxity, left         SUBJECTIVE                                                                                                               Patient feel good. Has taken off boot around house, uses crutches. Will wear boot for longer distances. Working on weight bearing in boot       OBJECTIVE                                                                                                                                       Treatment     Therapeutic Exercise  PROM PF/DF   Gastroc stretch   Hip abd on left in standing  Hip ext on left in standing       Gait Training  Weight shifting side to side  Weight shifting AP bilateral   Discussion on gait, mechanics, process of weaning from boot     Skilled input: verbal instruction/cues and as detailed above    Writer verbally educated and received verbal consent for hand placement, positioning of patient, and techniques to be performed today from patient for clothing adjustments for techniques, hand placement and palpation for techniques and therapist position for techniques as described above and how they are pertinent to the patient's plan of care.  Home Exercise Program  Access Code: [email or web address]  URL: [email or web address]  Date: 09/20/2023  Prepared by: Olivia Carrillo    Exercises  - Seated Ankle Pumps  - 2 x daily - 2 sets - 10 reps  - Seated Ankle Circles  - 2 x daily - 2 sets - 10 reps  - Seated Ankle Alphabet  - 2 x daily - 2 sets - 10 reps        ASSESSMENT                                                                                                            Patient able to tolerate partial weight bearing through right. Discussion on shoes, walking mechanics, therapy plan of care. Will continue to benefit from progressing  weight bearing and process of weaning from the boot.   Education:   - Results of above outlined education: Verbalizes understanding    PLAN                                                                                                                           Suggestions for next session as indicated: Progress per plan of care       Therapy procedure time and total treatment time can be found documented on the Time Entry flowsheet     Yes

## 2023-09-28 ENCOUNTER — EMERGENCY (EMERGENCY)
Facility: HOSPITAL | Age: 43
LOS: 1 days | Discharge: HOME | End: 2023-09-28
Admitting: EMERGENCY MEDICINE
Payer: MEDICAID

## 2023-09-28 VITALS
RESPIRATION RATE: 14 BRPM | HEART RATE: 96 BPM | SYSTOLIC BLOOD PRESSURE: 139 MMHG | OXYGEN SATURATION: 95 % | DIASTOLIC BLOOD PRESSURE: 58 MMHG | TEMPERATURE: 98 F

## 2023-09-28 VITALS
TEMPERATURE: 99 F | HEART RATE: 135 BPM | HEIGHT: 71 IN | RESPIRATION RATE: 18 BRPM | DIASTOLIC BLOOD PRESSURE: 91 MMHG | WEIGHT: 145.06 LBS | SYSTOLIC BLOOD PRESSURE: 134 MMHG | OXYGEN SATURATION: 99 %

## 2023-09-28 DIAGNOSIS — M79.651 PAIN IN RIGHT THIGH: ICD-10-CM

## 2023-09-28 DIAGNOSIS — M25.571 PAIN IN RIGHT ANKLE AND JOINTS OF RIGHT FOOT: ICD-10-CM

## 2023-09-28 DIAGNOSIS — R00.0 TACHYCARDIA, UNSPECIFIED: ICD-10-CM

## 2023-09-28 DIAGNOSIS — N39.0 URINARY TRACT INFECTION, SITE NOT SPECIFIED: ICD-10-CM

## 2023-09-28 DIAGNOSIS — Z21 ASYMPTOMATIC HUMAN IMMUNODEFICIENCY VIRUS [HIV] INFECTION STATUS: ICD-10-CM

## 2023-09-28 LAB
ALBUMIN SERPL ELPH-MCNC: 3.3 G/DL — LOW (ref 3.4–5)
ALP SERPL-CCNC: 76 U/L — SIGNIFICANT CHANGE UP (ref 40–120)
ALT FLD-CCNC: 17 U/L — SIGNIFICANT CHANGE UP (ref 12–42)
ANION GAP SERPL CALC-SCNC: 11 MMOL/L — SIGNIFICANT CHANGE UP (ref 9–16)
APPEARANCE UR: ABNORMAL
AST SERPL-CCNC: 18 U/L — SIGNIFICANT CHANGE UP (ref 15–37)
BACTERIA # UR AUTO: PRESENT /HPF — SIGNIFICANT CHANGE UP
BILIRUB SERPL-MCNC: 0.1 MG/DL — LOW (ref 0.2–1.2)
BILIRUB UR-MCNC: NEGATIVE — SIGNIFICANT CHANGE UP
BUN SERPL-MCNC: 7 MG/DL — SIGNIFICANT CHANGE UP (ref 7–23)
CALCIUM SERPL-MCNC: 9.3 MG/DL — SIGNIFICANT CHANGE UP (ref 8.5–10.5)
CHLORIDE SERPL-SCNC: 99 MMOL/L — SIGNIFICANT CHANGE UP (ref 96–108)
CO2 SERPL-SCNC: 26 MMOL/L — SIGNIFICANT CHANGE UP (ref 22–31)
COLOR SPEC: YELLOW — SIGNIFICANT CHANGE UP
CREAT SERPL-MCNC: 0.76 MG/DL — SIGNIFICANT CHANGE UP (ref 0.5–1.3)
DIFF PNL FLD: NEGATIVE — SIGNIFICANT CHANGE UP
EGFR: 116 ML/MIN/1.73M2 — SIGNIFICANT CHANGE UP
GLUCOSE SERPL-MCNC: 103 MG/DL — HIGH (ref 70–99)
GLUCOSE UR QL: NEGATIVE MG/DL — SIGNIFICANT CHANGE UP
HCT VFR BLD CALC: 32.2 % — LOW (ref 39–50)
HGB BLD-MCNC: 9.7 G/DL — LOW (ref 13–17)
KETONES UR-MCNC: NEGATIVE MG/DL — SIGNIFICANT CHANGE UP
LEUKOCYTE ESTERASE UR-ACNC: ABNORMAL
MCHC RBC-ENTMCNC: 24.9 PG — LOW (ref 27–34)
MCHC RBC-ENTMCNC: 30.1 GM/DL — LOW (ref 32–36)
MCV RBC AUTO: 82.8 FL — SIGNIFICANT CHANGE UP (ref 80–100)
NITRITE UR-MCNC: NEGATIVE — SIGNIFICANT CHANGE UP
NRBC # BLD: 0 /100 WBCS — SIGNIFICANT CHANGE UP (ref 0–0)
PH UR: 6 — SIGNIFICANT CHANGE UP (ref 5–8)
PLATELET # BLD AUTO: 517 K/UL — HIGH (ref 150–400)
POTASSIUM SERPL-MCNC: 3.9 MMOL/L — SIGNIFICANT CHANGE UP (ref 3.5–5.3)
POTASSIUM SERPL-SCNC: 3.9 MMOL/L — SIGNIFICANT CHANGE UP (ref 3.5–5.3)
PROT SERPL-MCNC: 8.1 G/DL — SIGNIFICANT CHANGE UP (ref 6.4–8.2)
PROT UR-MCNC: NEGATIVE MG/DL — SIGNIFICANT CHANGE UP
RBC # BLD: 3.89 M/UL — LOW (ref 4.2–5.8)
RBC # FLD: 15.3 % — HIGH (ref 10.3–14.5)
RBC CASTS # UR COMP ASSIST: 0 /HPF — SIGNIFICANT CHANGE UP (ref 0–4)
SODIUM SERPL-SCNC: 136 MMOL/L — SIGNIFICANT CHANGE UP (ref 132–145)
SP GR SPEC: 1.01 — SIGNIFICANT CHANGE UP (ref 1–1.03)
TROPONIN I, HIGH SENSITIVITY RESULT: 4.2 NG/L — SIGNIFICANT CHANGE UP
UROBILINOGEN FLD QL: 1 MG/DL — SIGNIFICANT CHANGE UP (ref 0.2–1)
WBC # BLD: 8.95 K/UL — SIGNIFICANT CHANGE UP (ref 3.8–10.5)
WBC # FLD AUTO: 8.95 K/UL — SIGNIFICANT CHANGE UP (ref 3.8–10.5)
WBC UR QL: SIGNIFICANT CHANGE UP /HPF (ref 0–5)

## 2023-09-28 PROCEDURE — 73552 X-RAY EXAM OF FEMUR 2/>: CPT | Mod: 26,RT

## 2023-09-28 PROCEDURE — 73610 X-RAY EXAM OF ANKLE: CPT | Mod: 26,RT

## 2023-09-28 PROCEDURE — 99285 EMERGENCY DEPT VISIT HI MDM: CPT

## 2023-09-28 RX ORDER — KETOROLAC TROMETHAMINE 30 MG/ML
15 SYRINGE (ML) INJECTION ONCE
Refills: 0 | Status: DISCONTINUED | OUTPATIENT
Start: 2023-09-28 | End: 2023-09-28

## 2023-09-28 RX ORDER — SODIUM CHLORIDE 9 MG/ML
1000 INJECTION INTRAMUSCULAR; INTRAVENOUS; SUBCUTANEOUS ONCE
Refills: 0 | Status: COMPLETED | OUTPATIENT
Start: 2023-09-28 | End: 2023-09-28

## 2023-09-28 RX ADMIN — Medication 15 MILLIGRAM(S): at 16:13

## 2023-09-28 RX ADMIN — SODIUM CHLORIDE 1000 MILLILITER(S): 9 INJECTION INTRAMUSCULAR; INTRAVENOUS; SUBCUTANEOUS at 16:53

## 2023-09-28 NOTE — ED PROVIDER NOTE - OBJECTIVE STATEMENT
43-year-old male, past medical history of HIV on Biktarvy, presenting to the emergency room with 2 complaints: 1.  Right thigh and ankle pain after a fall 1 month ago.  2.  Dark pungent smelling urine with foam for the past month.  Patient denies any abdominal pain, fevers, chills, dysuria, back pain or headache. 43-year-old male, past medical history of HIV on Biktarvy, presenting to the emergency room with 2 complaints: 1.  Right thigh and ankle pain after a fall 1 month ago.  2.  Dark pungent smelling urine with foam for the past month.  Patient denies any CP, abdominal pain, fevers, chills, dysuria, back pain or headache.

## 2023-09-28 NOTE — ED PROVIDER NOTE - PHYSICAL EXAMINATION
VITAL SIGNS: I have reviewed nursing notes and confirm.  CONSTITUTIONAL: Well-developed; well-nourished; in no acute distress.  SKIN: No acute rash.  HEAD: Normocephalic; atraumatic.  CARD: No extremity cyanosis.  RESP: Speaks in full, clear sentences.  EXT: +mild ttp along the R mid thigh, FROM of the ankle w/ mild lateral malleolus ttp, no swelling. DPI.  NEURO: Alert, oriented. Grossly unremarkable. No focal deficits. Fluent speech.   PSYCH: Cooperative, appropriate. Mood and affect wnl. VITAL SIGNS: I have reviewed nursing notes and confirm.  CONSTITUTIONAL: Well-developed; well-nourished; in no acute distress.  SKIN: No acute rash.  HEAD: Normocephalic; atraumatic.  CARD: No extremity cyanosis. +Tachy rate, s1 s2 noted.  RESP: Speaks in full, clear sentences.  EXT: +mild ttp along the R mid thigh, FROM of the ankle w/ mild lateral malleolus ttp, no swelling. DPI.  NEURO: Alert, oriented. Grossly unremarkable. No focal deficits. Fluent speech.   PSYCH: Cooperative, appropriate. Mood and affect wnl.

## 2023-09-28 NOTE — ED ADULT NURSE NOTE - OBJECTIVE STATEMENT
reports pain with urination and urine consistency to be "soapy and syrupy" x 5 weeks. reports right leg pain x1 month - ambulatory.

## 2023-09-28 NOTE — ED ADULT NURSE NOTE - NSFALLUNIVINTERV_ED_ALL_ED
Bed/Stretcher in lowest position, wheels locked, appropriate side rails in place/Call bell, personal items and telephone in reach/Instruct patient to call for assistance before getting out of bed/chair/stretcher/Non-slip footwear applied when patient is off stretcher/[place] to call system/Physically safe environment - no spills, clutter or unnecessary equipment/Purposeful proactive rounding/Room/bathroom lighting operational, light cord in reach

## 2023-09-28 NOTE — ED PROVIDER NOTE - CLINICAL SUMMARY MEDICAL DECISION MAKING FREE TEXT BOX
43-year-old male, past medical history of HIV on Biktarvy, presenting to the emergency room with 2 complaints: 1.  Right thigh and ankle pain after a fall 1 month ago.  2.  Dark pungent smelling urine with foam for the past month.     +XRs of femur and ankle  +UA, STD panel, UCx  +Toradol for pain relief  Will reassess. 43-year-old male, past medical history of HIV on Biktarvy, presenting to the emergency room with 2 complaints: 1.  Right thigh and ankle pain after a fall 1 month ago.  2.  Dark pungent smelling urine with foam for the past month.     +XRs of femur and ankle  +UA, STD panel, UCx  +Toradol for pain relief  +Tachycardia in triage, denies CP or SOB -- will obtain EKG, IVF, and labs    Will reassess.

## 2023-09-28 NOTE — ED PROVIDER NOTE - PROGRESS NOTE DETAILS
Acute UTI noted on labs.  Patient's heart rate improved after receiving IV fluids.  Denies any chest pain or respiratory symptoms.  We will plan to discharge with Bactrim to treat the UTI.  Patient's STD panel has been sent out and is currently pending.  Patient is stable on discharge and leaves in no acute distress.

## 2023-09-28 NOTE — ED PROVIDER NOTE - CARE PROVIDER_API CALL
Silverio Arias  Orthopaedic Surgery  [address] [place] [address], [address]  [place] [address]  Phone: [phone]  Fax: [phone]  Follow Up Time: 4-6 Days

## 2023-09-28 NOTE — ED ADULT TRIAGE NOTE - CHIEF COMPLAINT QUOTE
Pt walked in c/o right leg pain x 1 month s/p trip and fall. Pt also c/o "pungent" smelling urine that appears soapy. Denies PMH, pt tachycardic in triage.

## 2023-09-28 NOTE — ED PROVIDER NOTE - PATIENT PORTAL LINK FT
You can access the FollowMyHealth Patient Portal offered by Health system by registering at the following website: [email or web address]. By joining [employer]’s FollowMyHealth portal, you will also be able to view your health information using other applications (apps) compatible with our system.

## 2023-09-28 NOTE — ED PROVIDER NOTE - NS ED ROS FT
+foul smelling urine  +leg pain, ankle pain  Denies fevers, chills, nausea, vomiting, diarrhea, constipation, abdominal pain, chest pain, palpitations, shortness of breath, dyspnea on  exertion, syncope/near syncope, cough/URI symptoms, headache, weakness, numbness, focal deficits, visual changes, dizziness

## 2023-09-28 NOTE — ED PROVIDER NOTE - NSFOLLOWUPINSTRUCTIONS_ED_ALL_ED_FT
Urinary Tract Infection    A urinary tract infection (UTI) is an infection of any part of the urinary tract, which includes the kidneys, ureters, bladder, and urethra. Risk factors include ignoring your need to urinate, wiping back to front if female, being an uncircumcised male, and having diabetes or a weak immune system. Symptoms include frequent urination, pain or burning with urination, foul smelling urine, cloudy urine, pain in the lower abdomen, blood in the urine, and fever. If you were prescribed an antibiotic medicine, take it as told by your health care provider. Do not stop taking the antibiotic even if you start to feel better.    SEEK IMMEDIATE MEDICAL CARE IF YOU HAVE ANY OF THE FOLLOWING SYMPTOMS: severe back or abdominal pain, fever, inability to keep fluids or medicine down, dizziness/lightheadedness, or a change in mental status.     Musculoskeletal Pain  Musculoskeletal pain refers to aches and pains in your bones, joints, muscles, and the tissues that surround them. This pain can occur in any part of the body. It can last for a short time (acute) or a long time (chronic).    A physical exam, lab tests, and imaging studies may be done to find the cause of your musculoskeletal pain.    Follow these instructions at home:  Lifestyle    Try to control or lower your stress levels. Stress increases muscle tension and can worsen musculoskeletal pain. It is important to recognize when you are anxious or stressed and learn ways to manage it. This may include:  Meditation or yoga.  Cognitive or behavioral therapy.  Acupuncture or massage therapy.  You may continue all activities unless the activities cause more pain. When the pain gets better, slowly resume your normal activities. Gradually increase the intensity and duration of your activities or exercise.  Managing pain, stiffness, and swelling        Treatment may include medicines for pain and inflammation that are taken by mouth or applied to the skin. Take over-the-counter and prescription medicines only as told by your health care provider.  When your pain is severe, bed rest may be helpful. Lie or sit in any position that is comfortable, but get out of bed and walk around at least every couple of hours.  If directed, apply heat to the affected area as often as told by your health care provider. Use the heat source that your health care provider recommends, such as a moist heat pack or a heating pad.  Place a towel between your skin and the heat source.  Leave the heat on for 20–30 minutes.  Remove the heat if your skin turns bright red. This is especially important if you are unable to feel pain, heat, or cold. You may have a greater risk of getting burned.  If directed, put ice on the painful area. To do this:  Put ice in a plastic bag.  Place a towel between your skin and the bag.  Leave the ice on for 20 minutes, 2–3 times a day.  Remove the ice if your skin turns bright red. This is very important. If you cannot feel pain, heat, or cold, you have a greater risk of damage to the area.  General instructions    Your health care provider may recommend that you see a physical therapist. This person can help you come up with a safe exercise program.  If told by your health care provider, do physical therapy exercises to improve movement and strength in the affected area.  Keep all follow-up visits. This is important. This includes any physical therapy visits.  Contact a health care provider if:  Your pain gets worse.  Medicines do not help ease your pain.  You cannot use the part of your body that hurts, such as your arm, leg, or neck.  You have trouble sleeping.  You have trouble doing your normal activities.  Get help right away if:  You have a new injury and your pain is worse or different.  You feel numb or you have tingling in the painful area.  Summary  Musculoskeletal pain refers to aches and pains in your bones, joints, muscles, and the tissues that surround them.  This pain can occur in any part of the body.  Your health care provider may recommend that you see a physical therapist. This person can help you come up with a safe exercise program. Do any exercises as told by your physical therapist.  Lower your stress level. Stress can worsen musculoskeletal pain. Ways to lower stress may include meditation, yoga, cognitive or behavioral therapy, acupuncture, and massage therapy.  This information is not intended to replace advice given to you by your health care provider. Make sure you discuss any questions you have with your health care provider.

## 2023-09-29 LAB
C TRACH RRNA SPEC QL NAA+PROBE: SIGNIFICANT CHANGE UP
HBV CORE AB SER-ACNC: SIGNIFICANT CHANGE UP
HBV SURFACE AB SER-ACNC: SIGNIFICANT CHANGE UP
HBV SURFACE AG SER-ACNC: SIGNIFICANT CHANGE UP
HCV AB S/CO SERPL IA: 0.14 S/CO — SIGNIFICANT CHANGE UP (ref 0–0.99)
HCV AB SERPL-IMP: SIGNIFICANT CHANGE UP
N GONORRHOEA RRNA SPEC QL NAA+PROBE: SIGNIFICANT CHANGE UP
RPR SER-TITR: (no result)
RPR SERPL-ACNC: REACTIVE
SPECIMEN SOURCE: SIGNIFICANT CHANGE UP
T PALLIDUM AB TITR SER: POSITIVE

## 2023-11-02 ENCOUNTER — APPOINTMENT (OUTPATIENT)
Dept: UROLOGY | Facility: CLINIC | Age: 43
End: 2023-11-02

## 2023-11-09 ENCOUNTER — APPOINTMENT (OUTPATIENT)
Dept: PHYSICAL MEDICINE AND REHAB | Facility: CLINIC | Age: 43
End: 2023-11-09

## 2023-12-09 ENCOUNTER — INPATIENT (INPATIENT)
Facility: HOSPITAL | Age: 43
LOS: 4 days | Discharge: HOME | DRG: 607 | End: 2023-12-14
Attending: STUDENT IN AN ORGANIZED HEALTH CARE EDUCATION/TRAINING PROGRAM | Admitting: STUDENT IN AN ORGANIZED HEALTH CARE EDUCATION/TRAINING PROGRAM
Payer: MEDICAID

## 2023-12-09 ENCOUNTER — EMERGENCY (EMERGENCY)
Facility: HOSPITAL | Age: 43
LOS: 1 days | Discharge: ANOTHER HOSPITAL | End: 2023-12-09
Attending: EMERGENCY MEDICINE | Admitting: EMERGENCY MEDICINE
Payer: MEDICAID

## 2023-12-09 VITALS
SYSTOLIC BLOOD PRESSURE: 128 MMHG | DIASTOLIC BLOOD PRESSURE: 80 MMHG | RESPIRATION RATE: 16 BRPM | HEART RATE: 104 BPM | HEIGHT: 73 IN | OXYGEN SATURATION: 100 % | TEMPERATURE: 98 F | WEIGHT: 149.91 LBS

## 2023-12-09 VITALS
WEIGHT: 164.91 LBS | RESPIRATION RATE: 16 BRPM | DIASTOLIC BLOOD PRESSURE: 82 MMHG | OXYGEN SATURATION: 97 % | SYSTOLIC BLOOD PRESSURE: 129 MMHG | TEMPERATURE: 98 F | HEIGHT: 73 IN | HEART RATE: 130 BPM

## 2023-12-09 VITALS
TEMPERATURE: 98 F | RESPIRATION RATE: 16 BRPM | OXYGEN SATURATION: 98 % | HEART RATE: 103 BPM | SYSTOLIC BLOOD PRESSURE: 121 MMHG | DIASTOLIC BLOOD PRESSURE: 77 MMHG

## 2023-12-09 DIAGNOSIS — L03.317 CELLULITIS OF BUTTOCK: ICD-10-CM

## 2023-12-09 DIAGNOSIS — K61.1 RECTAL ABSCESS: ICD-10-CM

## 2023-12-09 DIAGNOSIS — B20 HUMAN IMMUNODEFICIENCY VIRUS [HIV] DISEASE: ICD-10-CM

## 2023-12-09 DIAGNOSIS — L02.31 CUTANEOUS ABSCESS OF BUTTOCK: ICD-10-CM

## 2023-12-09 DIAGNOSIS — Z29.9 ENCOUNTER FOR PROPHYLACTIC MEASURES, UNSPECIFIED: ICD-10-CM

## 2023-12-09 DIAGNOSIS — D64.9 ANEMIA, UNSPECIFIED: ICD-10-CM

## 2023-12-09 DIAGNOSIS — Z21 ASYMPTOMATIC HUMAN IMMUNODEFICIENCY VIRUS [HIV] INFECTION STATUS: ICD-10-CM

## 2023-12-09 DIAGNOSIS — F19.10 OTHER PSYCHOACTIVE SUBSTANCE ABUSE, UNCOMPLICATED: ICD-10-CM

## 2023-12-09 DIAGNOSIS — A53.0 LATENT SYPHILIS, UNSPECIFIED AS EARLY OR LATE: ICD-10-CM

## 2023-12-09 DIAGNOSIS — R20.0 ANESTHESIA OF SKIN: ICD-10-CM

## 2023-12-09 DIAGNOSIS — K61.0 ANAL ABSCESS: ICD-10-CM

## 2023-12-09 LAB
ALBUMIN SERPL ELPH-MCNC: 2.9 G/DL — LOW (ref 3.4–5)
ALBUMIN SERPL ELPH-MCNC: 2.9 G/DL — LOW (ref 3.4–5)
ALP SERPL-CCNC: 63 U/L — SIGNIFICANT CHANGE UP (ref 40–120)
ALP SERPL-CCNC: 63 U/L — SIGNIFICANT CHANGE UP (ref 40–120)
ALT FLD-CCNC: 13 U/L — SIGNIFICANT CHANGE UP (ref 12–42)
ALT FLD-CCNC: 13 U/L — SIGNIFICANT CHANGE UP (ref 12–42)
ANION GAP SERPL CALC-SCNC: 8 MMOL/L — LOW (ref 9–16)
ANION GAP SERPL CALC-SCNC: 8 MMOL/L — LOW (ref 9–16)
APPEARANCE UR: CLEAR — SIGNIFICANT CHANGE UP
APPEARANCE UR: CLEAR — SIGNIFICANT CHANGE UP
AST SERPL-CCNC: 14 U/L — LOW (ref 15–37)
AST SERPL-CCNC: 14 U/L — LOW (ref 15–37)
BASOPHILS # BLD AUTO: 0.01 K/UL — SIGNIFICANT CHANGE UP (ref 0–0.2)
BASOPHILS # BLD AUTO: 0.01 K/UL — SIGNIFICANT CHANGE UP (ref 0–0.2)
BASOPHILS NFR BLD AUTO: 0.1 % — SIGNIFICANT CHANGE UP (ref 0–2)
BASOPHILS NFR BLD AUTO: 0.1 % — SIGNIFICANT CHANGE UP (ref 0–2)
BILIRUB SERPL-MCNC: 0.2 MG/DL — SIGNIFICANT CHANGE UP (ref 0.2–1.2)
BILIRUB SERPL-MCNC: 0.2 MG/DL — SIGNIFICANT CHANGE UP (ref 0.2–1.2)
BILIRUB UR-MCNC: NEGATIVE — SIGNIFICANT CHANGE UP
BILIRUB UR-MCNC: NEGATIVE — SIGNIFICANT CHANGE UP
BUN SERPL-MCNC: 11 MG/DL — SIGNIFICANT CHANGE UP (ref 7–23)
BUN SERPL-MCNC: 11 MG/DL — SIGNIFICANT CHANGE UP (ref 7–23)
CALCIUM SERPL-MCNC: 9 MG/DL — SIGNIFICANT CHANGE UP (ref 8.5–10.5)
CALCIUM SERPL-MCNC: 9 MG/DL — SIGNIFICANT CHANGE UP (ref 8.5–10.5)
CHLORIDE SERPL-SCNC: 100 MMOL/L — SIGNIFICANT CHANGE UP (ref 96–108)
CHLORIDE SERPL-SCNC: 100 MMOL/L — SIGNIFICANT CHANGE UP (ref 96–108)
CO2 SERPL-SCNC: 28 MMOL/L — SIGNIFICANT CHANGE UP (ref 22–31)
CO2 SERPL-SCNC: 28 MMOL/L — SIGNIFICANT CHANGE UP (ref 22–31)
COLOR SPEC: YELLOW — SIGNIFICANT CHANGE UP
COLOR SPEC: YELLOW — SIGNIFICANT CHANGE UP
CREAT SERPL-MCNC: 0.95 MG/DL — SIGNIFICANT CHANGE UP (ref 0.5–1.3)
CREAT SERPL-MCNC: 0.95 MG/DL — SIGNIFICANT CHANGE UP (ref 0.5–1.3)
DIFF PNL FLD: NEGATIVE — SIGNIFICANT CHANGE UP
DIFF PNL FLD: NEGATIVE — SIGNIFICANT CHANGE UP
EGFR: 103 ML/MIN/1.73M2 — SIGNIFICANT CHANGE UP
EGFR: 103 ML/MIN/1.73M2 — SIGNIFICANT CHANGE UP
EOSINOPHIL # BLD AUTO: 0.11 K/UL — SIGNIFICANT CHANGE UP (ref 0–0.5)
EOSINOPHIL # BLD AUTO: 0.11 K/UL — SIGNIFICANT CHANGE UP (ref 0–0.5)
EOSINOPHIL NFR BLD AUTO: 1 % — SIGNIFICANT CHANGE UP (ref 0–6)
EOSINOPHIL NFR BLD AUTO: 1 % — SIGNIFICANT CHANGE UP (ref 0–6)
GLUCOSE SERPL-MCNC: 125 MG/DL — HIGH (ref 70–99)
GLUCOSE SERPL-MCNC: 125 MG/DL — HIGH (ref 70–99)
GLUCOSE UR QL: NEGATIVE MG/DL — SIGNIFICANT CHANGE UP
GLUCOSE UR QL: NEGATIVE MG/DL — SIGNIFICANT CHANGE UP
HCT VFR BLD CALC: 28.2 % — LOW (ref 39–50)
HCT VFR BLD CALC: 28.2 % — LOW (ref 39–50)
HGB BLD-MCNC: 8.4 G/DL — LOW (ref 13–17)
HGB BLD-MCNC: 8.4 G/DL — LOW (ref 13–17)
IMM GRANULOCYTES NFR BLD AUTO: 0.3 % — SIGNIFICANT CHANGE UP (ref 0–0.9)
IMM GRANULOCYTES NFR BLD AUTO: 0.3 % — SIGNIFICANT CHANGE UP (ref 0–0.9)
KETONES UR-MCNC: ABNORMAL MG/DL
KETONES UR-MCNC: ABNORMAL MG/DL
LEUKOCYTE ESTERASE UR-ACNC: ABNORMAL
LEUKOCYTE ESTERASE UR-ACNC: ABNORMAL
LYMPHOCYTES # BLD AUTO: 1 K/UL — SIGNIFICANT CHANGE UP (ref 1–3.3)
LYMPHOCYTES # BLD AUTO: 1 K/UL — SIGNIFICANT CHANGE UP (ref 1–3.3)
LYMPHOCYTES # BLD AUTO: 9.5 % — LOW (ref 13–44)
LYMPHOCYTES # BLD AUTO: 9.5 % — LOW (ref 13–44)
MCHC RBC-ENTMCNC: 23.8 PG — LOW (ref 27–34)
MCHC RBC-ENTMCNC: 23.8 PG — LOW (ref 27–34)
MCHC RBC-ENTMCNC: 29.8 GM/DL — LOW (ref 32–36)
MCHC RBC-ENTMCNC: 29.8 GM/DL — LOW (ref 32–36)
MCV RBC AUTO: 79.9 FL — LOW (ref 80–100)
MCV RBC AUTO: 79.9 FL — LOW (ref 80–100)
MONOCYTES # BLD AUTO: 0.58 K/UL — SIGNIFICANT CHANGE UP (ref 0–0.9)
MONOCYTES # BLD AUTO: 0.58 K/UL — SIGNIFICANT CHANGE UP (ref 0–0.9)
MONOCYTES NFR BLD AUTO: 5.5 % — SIGNIFICANT CHANGE UP (ref 2–14)
MONOCYTES NFR BLD AUTO: 5.5 % — SIGNIFICANT CHANGE UP (ref 2–14)
NEUTROPHILS # BLD AUTO: 8.77 K/UL — HIGH (ref 1.8–7.4)
NEUTROPHILS # BLD AUTO: 8.77 K/UL — HIGH (ref 1.8–7.4)
NEUTROPHILS NFR BLD AUTO: 83.6 % — HIGH (ref 43–77)
NEUTROPHILS NFR BLD AUTO: 83.6 % — HIGH (ref 43–77)
NITRITE UR-MCNC: NEGATIVE — SIGNIFICANT CHANGE UP
NITRITE UR-MCNC: NEGATIVE — SIGNIFICANT CHANGE UP
NRBC # BLD: 0 /100 WBCS — SIGNIFICANT CHANGE UP (ref 0–0)
NRBC # BLD: 0 /100 WBCS — SIGNIFICANT CHANGE UP (ref 0–0)
PH UR: 6 — SIGNIFICANT CHANGE UP (ref 5–8)
PH UR: 6 — SIGNIFICANT CHANGE UP (ref 5–8)
PLATELET # BLD AUTO: 375 K/UL — SIGNIFICANT CHANGE UP (ref 150–400)
PLATELET # BLD AUTO: 375 K/UL — SIGNIFICANT CHANGE UP (ref 150–400)
POTASSIUM SERPL-MCNC: 3.7 MMOL/L — SIGNIFICANT CHANGE UP (ref 3.5–5.3)
POTASSIUM SERPL-MCNC: 3.7 MMOL/L — SIGNIFICANT CHANGE UP (ref 3.5–5.3)
POTASSIUM SERPL-SCNC: 3.7 MMOL/L — SIGNIFICANT CHANGE UP (ref 3.5–5.3)
POTASSIUM SERPL-SCNC: 3.7 MMOL/L — SIGNIFICANT CHANGE UP (ref 3.5–5.3)
PROT SERPL-MCNC: 7.8 G/DL — SIGNIFICANT CHANGE UP (ref 6.4–8.2)
PROT SERPL-MCNC: 7.8 G/DL — SIGNIFICANT CHANGE UP (ref 6.4–8.2)
PROT UR-MCNC: NEGATIVE MG/DL — SIGNIFICANT CHANGE UP
PROT UR-MCNC: NEGATIVE MG/DL — SIGNIFICANT CHANGE UP
RBC # BLD: 3.53 M/UL — LOW (ref 4.2–5.8)
RBC # BLD: 3.53 M/UL — LOW (ref 4.2–5.8)
RBC # FLD: 17.2 % — HIGH (ref 10.3–14.5)
RBC # FLD: 17.2 % — HIGH (ref 10.3–14.5)
RBC CASTS # UR COMP ASSIST: 0 /HPF — SIGNIFICANT CHANGE UP (ref 0–4)
RBC CASTS # UR COMP ASSIST: 0 /HPF — SIGNIFICANT CHANGE UP (ref 0–4)
SODIUM SERPL-SCNC: 136 MMOL/L — SIGNIFICANT CHANGE UP (ref 132–145)
SODIUM SERPL-SCNC: 136 MMOL/L — SIGNIFICANT CHANGE UP (ref 132–145)
SP GR SPEC: 1.02 — SIGNIFICANT CHANGE UP (ref 1–1.03)
SP GR SPEC: 1.02 — SIGNIFICANT CHANGE UP (ref 1–1.03)
UROBILINOGEN FLD QL: 1 MG/DL — SIGNIFICANT CHANGE UP (ref 0.2–1)
UROBILINOGEN FLD QL: 1 MG/DL — SIGNIFICANT CHANGE UP (ref 0.2–1)
WBC # BLD: 10.5 K/UL — SIGNIFICANT CHANGE UP (ref 3.8–10.5)
WBC # BLD: 10.5 K/UL — SIGNIFICANT CHANGE UP (ref 3.8–10.5)
WBC # FLD AUTO: 10.5 K/UL — SIGNIFICANT CHANGE UP (ref 3.8–10.5)
WBC # FLD AUTO: 10.5 K/UL — SIGNIFICANT CHANGE UP (ref 3.8–10.5)
WBC UR QL: 5 /HPF — SIGNIFICANT CHANGE UP (ref 0–5)
WBC UR QL: 5 /HPF — SIGNIFICANT CHANGE UP (ref 0–5)

## 2023-12-09 PROCEDURE — 74177 CT ABD & PELVIS W/CONTRAST: CPT | Mod: 26

## 2023-12-09 PROCEDURE — 99223 1ST HOSP IP/OBS HIGH 75: CPT

## 2023-12-09 PROCEDURE — 99285 EMERGENCY DEPT VISIT HI MDM: CPT

## 2023-12-09 RX ORDER — KETOROLAC TROMETHAMINE 30 MG/ML
15 SYRINGE (ML) INJECTION ONCE
Refills: 0 | Status: DISCONTINUED | OUTPATIENT
Start: 2023-12-09 | End: 2023-12-09

## 2023-12-09 RX ORDER — CEFTRIAXONE 500 MG/1
500 INJECTION, POWDER, FOR SOLUTION INTRAMUSCULAR; INTRAVENOUS ONCE
Refills: 0 | Status: COMPLETED | OUTPATIENT
Start: 2023-12-09 | End: 2023-12-09

## 2023-12-09 RX ORDER — BICTEGRAVIR SODIUM, EMTRICITABINE, AND TENOFOVIR ALAFENAMIDE FUMARATE 30; 120; 15 MG/1; MG/1; MG/1
1 TABLET ORAL DAILY
Refills: 0 | Status: DISCONTINUED | OUTPATIENT
Start: 2023-12-09 | End: 2023-12-14

## 2023-12-09 RX ORDER — ACETAMINOPHEN 500 MG
650 TABLET ORAL ONCE
Refills: 0 | Status: COMPLETED | OUTPATIENT
Start: 2023-12-09 | End: 2023-12-09

## 2023-12-09 RX ORDER — SODIUM CHLORIDE 9 MG/ML
1000 INJECTION INTRAMUSCULAR; INTRAVENOUS; SUBCUTANEOUS ONCE
Refills: 0 | Status: COMPLETED | OUTPATIENT
Start: 2023-12-09 | End: 2023-12-09

## 2023-12-09 RX ORDER — PIPERACILLIN AND TAZOBACTAM 4; .5 G/20ML; G/20ML
4.5 INJECTION, POWDER, LYOPHILIZED, FOR SOLUTION INTRAVENOUS EVERY 8 HOURS
Refills: 0 | Status: DISCONTINUED | OUTPATIENT
Start: 2023-12-09 | End: 2023-12-10

## 2023-12-09 RX ORDER — ONDANSETRON 8 MG/1
4 TABLET, FILM COATED ORAL EVERY 8 HOURS
Refills: 0 | Status: DISCONTINUED | OUTPATIENT
Start: 2023-12-09 | End: 2023-12-14

## 2023-12-09 RX ORDER — VANCOMYCIN HCL 1 G
1000 VIAL (EA) INTRAVENOUS EVERY 12 HOURS
Refills: 0 | Status: DISCONTINUED | OUTPATIENT
Start: 2023-12-09 | End: 2023-12-10

## 2023-12-09 RX ORDER — VANCOMYCIN HCL 1 G
1250 VIAL (EA) INTRAVENOUS ONCE
Refills: 0 | Status: COMPLETED | OUTPATIENT
Start: 2023-12-09 | End: 2023-12-09

## 2023-12-09 RX ORDER — PIPERACILLIN AND TAZOBACTAM 4; .5 G/20ML; G/20ML
3.38 INJECTION, POWDER, LYOPHILIZED, FOR SOLUTION INTRAVENOUS ONCE
Refills: 0 | Status: COMPLETED | OUTPATIENT
Start: 2023-12-09 | End: 2023-12-09

## 2023-12-09 RX ORDER — PENICILLIN G BENZATHINE 1200000 [IU]/2ML
2.4 INJECTION, SUSPENSION INTRAMUSCULAR ONCE
Refills: 0 | Status: COMPLETED | OUTPATIENT
Start: 2023-12-09 | End: 2023-12-09

## 2023-12-09 RX ORDER — MORPHINE SULFATE 50 MG/1
4 CAPSULE, EXTENDED RELEASE ORAL ONCE
Refills: 0 | Status: DISCONTINUED | OUTPATIENT
Start: 2023-12-09 | End: 2023-12-09

## 2023-12-09 RX ORDER — ACETAMINOPHEN 500 MG
650 TABLET ORAL EVERY 6 HOURS
Refills: 0 | Status: DISCONTINUED | OUTPATIENT
Start: 2023-12-09 | End: 2023-12-14

## 2023-12-09 RX ORDER — ENOXAPARIN SODIUM 100 MG/ML
40 INJECTION SUBCUTANEOUS EVERY 24 HOURS
Refills: 0 | Status: DISCONTINUED | OUTPATIENT
Start: 2023-12-09 | End: 2023-12-10

## 2023-12-09 RX ORDER — LANOLIN ALCOHOL/MO/W.PET/CERES
3 CREAM (GRAM) TOPICAL AT BEDTIME
Refills: 0 | Status: DISCONTINUED | OUTPATIENT
Start: 2023-12-09 | End: 2023-12-13

## 2023-12-09 RX ADMIN — Medication 650 MILLIGRAM(S): at 19:29

## 2023-12-09 RX ADMIN — Medication 650 MILLIGRAM(S): at 20:29

## 2023-12-09 RX ADMIN — Medication 250 MILLIGRAM(S): at 22:56

## 2023-12-09 RX ADMIN — Medication 166.67 MILLIGRAM(S): at 08:47

## 2023-12-09 RX ADMIN — PIPERACILLIN AND TAZOBACTAM 25 GRAM(S): 4; .5 INJECTION, POWDER, LYOPHILIZED, FOR SOLUTION INTRAVENOUS at 19:29

## 2023-12-09 RX ADMIN — Medication 15 MILLIGRAM(S): at 23:48

## 2023-12-09 RX ADMIN — ENOXAPARIN SODIUM 40 MILLIGRAM(S): 100 INJECTION SUBCUTANEOUS at 22:55

## 2023-12-09 RX ADMIN — PIPERACILLIN AND TAZOBACTAM 200 GRAM(S): 4; .5 INJECTION, POWDER, LYOPHILIZED, FOR SOLUTION INTRAVENOUS at 10:39

## 2023-12-09 RX ADMIN — PENICILLIN G BENZATHINE 2.4 MILLION UNIT(S): 1200000 INJECTION, SUSPENSION INTRAMUSCULAR at 08:40

## 2023-12-09 RX ADMIN — MORPHINE SULFATE 4 MILLIGRAM(S): 50 CAPSULE, EXTENDED RELEASE ORAL at 10:58

## 2023-12-09 RX ADMIN — CEFTRIAXONE 500 MILLIGRAM(S): 500 INJECTION, POWDER, FOR SOLUTION INTRAMUSCULAR; INTRAVENOUS at 08:45

## 2023-12-09 RX ADMIN — SODIUM CHLORIDE 1000 MILLILITER(S): 9 INJECTION INTRAMUSCULAR; INTRAVENOUS; SUBCUTANEOUS at 10:39

## 2023-12-09 RX ADMIN — Medication 15 MILLIGRAM(S): at 22:55

## 2023-12-09 RX ADMIN — Medication 1 TABLET(S): at 08:01

## 2023-12-09 RX ADMIN — Medication 15 MILLIGRAM(S): at 06:05

## 2023-12-09 RX ADMIN — Medication 100 MILLIGRAM(S): at 08:39

## 2023-12-09 NOTE — H&P ADULT - NSHPLABSRESULTS_GEN_ALL_CORE
LABS:                         8.4    10.50 )-----------( 375      ( 09 Dec 2023 05:49 )             28.2         136  |  100  |  11  ----------------------------<  125<H>  3.7   |  28  |  0.95    Ca    9.0      09 Dec 2023 05:49    TPro  7.8  /  Alb  2.9<L>  /  TBili  0.2  /  DBili  x   /  AST  14<L>  /  ALT  13  /  AlkPhos  63        Urinalysis Basic - ( 09 Dec 2023 10:36 )    Color: Yellow / Appearance: Clear / S.025 / pH: x  Gluc: x / Ketone: Trace mg/dL  / Bili: Negative / Urobili: 1.0 mg/dL   Blood: x / Protein: Negative mg/dL / Nitrite: Negative   Leuk Esterase: Trace / RBC: 0 /HPF / WBC 5 /HPF   Sq Epi: x / Non Sq Epi: x / Bacteria: x      RADIOLOGY, EKG & ADDITIONAL TESTS:

## 2023-12-09 NOTE — CONSULT NOTE ADULT - ASSESSMENT
The patient is a 43y year old Male with a PMHx of HIV on Biktarvy, anemia and recurrent perianal abscesses with spontaneous drainage and requiring I&Ds and OR drainage who presented with spontaneously draining perianal abscess.   Surgery was consulted for abscess drainage  On exam, perianal tenderness to palpation, draining abscess noted with thick, malodorous purulent fluid, with surrounding induration tracking posteriorly.   Labs with WBC 10 with left shift, Hgb 8.4; CT with Large enhancing phlegmonous structure arising from the right   posterolateral aspect of the thickened proximal and distal rectum, extending through the right sciatic notch and right piriformis muscle into the right gluteus yovanny muscle medially. 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     Recommendations  No acute surgical intervention at this time - abscess is draining spontaneously  Admission to medicine   Med Onc Consult  Would recommend eventual EUA, tissue biopsy, and likely diversion  Rest of care per primary team  Plan discussed with attending and chief resident.   ____________________________________________________

## 2023-12-09 NOTE — ED PROVIDER NOTE - PROGRESS NOTE DETAILS
The patient CT scan shows a large phlegmon in the area that is eroding into the piriformis muscle as well as the sacrum.  The radiologist is also concerned that there could be an underlying malignancy.  The results were explained to the patient.  I discussed the case with Dr. Hernandez of surgery who requested that the patient be transferred to the emergency department at [facility] for surgical evaluation.  He may need to go to the OR today for further debridement.  The patient is agreeable to transfer.  He was accepted for transfer to the emergency department by Dr. Dorantes.  KASSANDRA Duncan MD The patient CT scan shows a large phlegmon in the area that is eroding into the piriformis muscle as well as the sacrum.  The radiologist is also concerned that there could be an underlying malignancy.  The results were explained to the patient.  I discussed the case with Dr. Hernandez of surgery who requested that the patient be transferred to the emergency department at [facility] for surgical evaluation.  He may need to go to the OR today for further debridement.  The patient is agreeable to transfer.  He was accepted for transfer to the emergency department by Dr. Dorantes.  KASSANDRA Duncan MD

## 2023-12-09 NOTE — ED PROVIDER NOTE - CLINICAL SUMMARY MEDICAL DECISION MAKING FREE TEXT BOX
43-year-old male presenting as transfer from [facility], possible gluteal abscess versus mass, surgery aware, already received antibiotics, blood work  at [facility].  Pending surgical evaluation.  Patient otherwise stable. 43-year-old male presenting as transfer from [facility], possible gluteal abscess versus mass, surgery aware, already received antibiotics, blood work  at [facility].  Pending surgical evaluation.  Patient otherwise stable.

## 2023-12-09 NOTE — ED ADULT TRIAGE NOTE - NS ED TRIAGE AVPU SCALE
pcp Alert-The patient is alert, awake and responds to voice. The patient is oriented to time, place, and person. The triage nurse is able to obtain subjective information.

## 2023-12-09 NOTE — ED ADULT NURSE NOTE - OBJECTIVE STATEMENT
Pt is a 43y male c/o abscess. Pt states R buttock abscess [id number] with yellow drainage starting two days ago. reports increasing pain radiating to R leg and inability to sit. Denies fever/chills. Upon exam abscess noted to b/l buttocks with green pus present. PMH HIV.

## 2023-12-09 NOTE — H&P ADULT - PROBLEM SELECTOR PLAN 7
Fluids: None  Electrolytes: Replete to keep K>4 and Mg>2  Nutrition: Regular diet  DVT ppx:   GI ppx: None  Dispo: F Fluids: s/p 1L NS in ED  Electrolytes: Replete to keep K>4 and Mg>2  Nutrition: Regular diet  DVT ppx: Lovenox  GI ppx: None  Dispo: RMF

## 2023-12-09 NOTE — H&P ADULT - PROBLEM SELECTOR PLAN 4
Hb 8.4, baseline 9-11. No active bleed and no blood in stool or from abscess. Reports taking iron supplement once monthly for AARTI    - iron profile  - Transfuse for Hb<7  - Maintain active T&S  - continue iron supplementation Patient had history of syphilis s/p treatment. Denies symptoms. Physical exam elicits RLE numbness from hip to ankle. S/p penicillin G in ED.     - f/u Syphilis screen Patient had history of syphilis s/p treatment. Denies symptoms. Physical exam elicits RLE numbness from hip to ankle. S/p penicillin G in ED.   - f/u Syphilis screen

## 2023-12-09 NOTE — H&P ADULT - NSICDXFAMILYHX_GEN_ALL_CORE_FT
FAMILY HISTORY:  Sibling  Still living? Unknown  FH: HIV infection, Age at diagnosis: Age Unknown

## 2023-12-09 NOTE — CONSULT NOTE ADULT - SUBJECTIVE AND OBJECTIVE BOX
HPI:  43-year-old MSM with past medical history of HIV (Biktarvy, last known CD4  was 130, VL [id number]), polysubstance abuse, syphilis, anemia, and abdominal MRSA , who presented with spontaneous drainage and mild pain from bilateral gluteal abscesses. The discharge and pain started 1 month ago, but worsened over the past few days, now with yellow, smelly discharge flowing spontaneously especially when he urinates or has bowel movements. Also endorses pain with defecation due to the gluteal abscesses. Of note patient has had recurrent perianal I&D in [facility] and [facility], Patient denies fever, change in bowel movement, urinary issue. Patient also reports numbness on the right leg, from perineal area to ankle, sparing the right foot, which is new compared to previous perianal drainage episodes.     ED Course:  Vitals: 98.1F, , /82, RR 16, SpO2 97% on RA  Labs: Hb 8.4, glucose 125, albumin 2.9. 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 Patient states he noticed that drainage yesterday and therefore came to ED for antibiotics and further treatments. He endorses perianal pain to palpation and with defecation, as well as mild lower abdominal pain. He denies any changes in PO intake, n/v, fever/chills, chest pain/shortness of breath, changes in bowel movements - no constipation or diarrhea, no blood with BMs. Patient states since it is already draining he would not like to be "nicked" to open it more at this time.     In the ED he was initially tachycardic to 130, resolved to 104 and then 90s, afebrile, normotensive, satting well on RA.   On exam, perianal tenderness to palpation, draining abscess noted with thick, malodorous purulent fluid, with surrounding induration tracking posteriorly.   Labs with WBC 10 with left shift, Hgb 8.4; CT with Large enhancing phlegmonous structure arising from the right   posterolateral aspect of the thickened proximal and distal rectum, extending through the right sciatic notch and right piriformis muscle into the right gluteus yovanny muscle medially. Lytic destruction of the adjacent sacrum, coccyx, and right ischial spine.     PAST MEDICAL & SURGICAL HISTORY:  HIV (human immunodeficiency virus infection)      Abscess      IVDU (intravenous drug user)      Anemia      No significant past surgical history      MEDICATIONS  (STANDING):  bictegravir 50 mG/emtricitabine 200 mG/tenofovir alafenamide 25 mG (BIKTARVY) 1 Tablet(s) Oral daily  enoxaparin Injectable 40 milliGRAM(s) SubCutaneous every 24 hours  piperacillin/tazobactam IVPB.. 4.5 Gram(s) IV Intermittent every 8 hours  vancomycin  IVPB 1000 milliGRAM(s) IV Intermittent every 12 hours    MEDICATIONS  (PRN):      Allergies    No Known Allergies    Intolerances        SOCIAL HISTORY:    FAMILY HISTORY:  FH: HIV infection (Sibling)        Vital Signs Last 24 Hrs  T(C): 36.7 (09 Dec 2023 14:17), Max: 36.9 (09 Dec 2023 09:45)  T(F): 98 (09 Dec 2023 14:17), Max: 98.4 (09 Dec 2023 09:45)  HR: 99 (09 Dec 2023 14:17) (99 - 130)  BP: 120/66 (09 Dec 2023 14:17) (120/66 - 138/87)  BP(mean): --  RR: 18 (09 Dec 2023 14:17) (16 - 18)  SpO2: 99% (09 Dec 2023 14:17) (97% - 100%)    Parameters below as of 09 Dec 2023 14:17  Patient On (Oxygen Delivery Method): room air        PHYSICAL EXAM:   General: Patient is doing well and lying in bed comfortably  Constitutional: alert and oriented   Pulm: Nonlabored breathing, no respiratory distress  CV: Regular rate and rhythm, normal sinus rhythm  Abd:  soft, nontender, nondistended. No rebound, no guarding.   Rectal: moderate pain to palpation. Spontaneous drainage of malodorous, purulent fluid from approx 8mm opening at right posterior region with induration tracking posteriorly. Unable to perform YOSEPH due to pain.   Extremities: warm, well perfused, no edema    LABS:                        8.4    10.50 )-----------( 375      ( 09 Dec 2023 05:49 )             28.2         136  |  100  |  11  ----------------------------<  125<H>  3.7   |  28  |  0.95    Ca    9.0      09 Dec 2023 05:49    TPro  7.8  /  Alb  2.9<L>  /  TBili  0.2  /  DBili  x   /  AST  14<L>  /  ALT  13  /  AlkPhos  63        Urinalysis Basic - ( 09 Dec 2023 10:36 )    Color: Yellow / Appearance: Clear / S.025 / pH: x  Gluc: x / Ketone: Trace mg/dL  / Bili: Negative / Urobili: 1.0 mg/dL   Blood: x / Protein: Negative mg/dL / Nitrite: Negative   Leuk Esterase: Trace / RBC: 0 /HPF / WBC 5 /HPF   Sq Epi: x / Non Sq Epi: x / Bacteria: x        RADIOLOGY & ADDITIONAL STUDIES:    CT Abdomen and Pelvis w/ IV Cont:   ACC: [id number] EXAM:  CT ABDOMEN AND PELVIS IC   ORDERED BY: DAYNA MAGAÑA     PROCEDURE DATE:  2023          INTERPRETATION:  CLINICAL INFORMATION: Status post drainage of perirectal   abscess. HIV, IVDU.    COMPARISON: 2021.    CONTRAST/COMPLICATIONS:  IV Contrast: Omnipaque 350  100 cc administered   0 cc discarded  Oral Contrast: NONE  Complications: None reported at time of study completion    PROCEDURE:  CT of the Abdomen and Pelvis was performed with the patient prone   position.  Sagittal and coronal reformats were performed.    FINDINGS:  LOWER CHEST: Within normal limits.    LIVER: Again are noted tiny hepatic cysts. Otherwise, within normal   limits.  BILE DUCTS: Normal caliber.  GALLBLADDER: Within normal limits.  SPLEEN: Within normal limits.  PANCREAS: Within normal limits.  ADRENALS: Within normal limits.  KIDNEYS/URETERS: Small bilateral renal cysts. Otherwise, within normal   limits.    BLADDER: Within normal limits.  REPRODUCTIVE ORGANS: The prostate is not enlarged.    BOWEL: No bowel obstruction. Appendix is normal.    Again are noted left and right perianal fistulous tracts extending to the   medial medial buttocks. There is a 9.0 x 8.3 x 10.3 cm phlegmonous   enhancing structure with central irregular fluid density in the right   posterior perirectal space, inseparable from the proximal to mid rectum,   extending through the right sciatic notch and right piriformis muscle   into the right gluteus yovanny muscle medially.  There is a separate   enhancing phlegmonous tract extending into the left ischioanal space at   site of prior fistulous tract. There is moderate concentric wall   thickening of the proximal and mid rectum with severe luminal narrowing.   There is no perirectal adenopathy.  PERITONEUM: No ascites.  VESSELS: Within normal limits.  RETROPERITONEUM/LYMPH NODES: No lymphadenopathy.  ABDOMINAL WALL: Within normal limits.  BONES: There is lytic destruction of the right S4 and S5 levels, coccyx,   and right ischial spine.    IMPRESSION: Large enhancing phlegmonous structure arising from the right   posterolateral aspect of the thickened proximal and distal rectum,   extending through the right sciatic notch and right piriformis muscle   into the right gluteus yovanny muscle medially. Lytic destruction of the   adjacent sacrum, coccyx, and right ischial spine. While these findings   may represent a festering infection related to chronic perianal fistulas,   the mass-like extension, bony destruction, and lack of discrete drainable   abscess, with associated rectal wall thickening, are suspicious for   development of an infiltrating anal squamous cell carcinoma. Results   discussed with Dr. Hughes at time of interpretation.    --- End of Report ---            WILBER GIL MD; Attending Radiologist  This document has been electronically signed. Dec  9 2023  8:22AM (23 @ 07:19)       HPI:  43-year-old MSM with past medical history of HIV (Biktarvy, last known CD4  was 130, VL [id number]), polysubstance abuse, syphilis, anemia, and abdominal MRSA , who presented with spontaneous drainage and mild pain from bilateral gluteal abscesses. The discharge and pain started 1 month ago, but worsened over the past few days, now with yellow, smelly discharge flowing spontaneously especially when he urinates or has bowel movements. Also endorses pain with defecation due to the gluteal abscesses. Of note patient has had recurrent perianal I&D in [facility] and [facility], Patient denies fever, change in bowel movement, urinary issue. Patient also reports numbness on the right leg, from perineal area to ankle, sparing the right foot, which is new compared to previous perianal drainage episodes.     ED Course:  Vitals: 98.1F, , /82, RR 16, SpO2 97% on RA  Labs: Hb 8.4, glucose 125, albumin 2.9. 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 Patient states he noticed that drainage yesterday and therefore came to ED for antibiotics and further treatments. He endorses perianal pain to palpation and with defecation, as well as mild lower abdominal pain. He denies any changes in PO intake, n/v, fever/chills, chest pain/shortness of breath, changes in bowel movements - no constipation or diarrhea, no blood with BMs. Patient states since it is already draining he would not like to be "nicked" to open it more at this time.     In the ED he was initially tachycardic to 130, resolved to 104 and then 90s, afebrile, normotensive, satting well on RA.   On exam, perianal tenderness to palpation, draining abscess noted with thick, malodorous purulent fluid, with surrounding induration tracking posteriorly.   Labs with WBC 10 with left shift, Hgb 8.4; CT with Large enhancing phlegmonous structure arising from the right   posterolateral aspect of the thickened proximal and distal rectum, extending through the right sciatic notch and right piriformis muscle into the right gluteus yovanny muscle medially. Lytic destruction of the adjacent sacrum, coccyx, and right ischial spine.     PAST MEDICAL & SURGICAL HISTORY:  HIV (human immunodeficiency virus infection)      Abscess      IVDU (intravenous drug user)      Anemia      No significant past surgical history      MEDICATIONS  (STANDING):  bictegravir 50 mG/emtricitabine 200 mG/tenofovir alafenamide 25 mG (BIKTARVY) 1 Tablet(s) Oral daily  enoxaparin Injectable 40 milliGRAM(s) SubCutaneous every 24 hours  piperacillin/tazobactam IVPB.. 4.5 Gram(s) IV Intermittent every 8 hours  vancomycin  IVPB 1000 milliGRAM(s) IV Intermittent every 12 hours    MEDICATIONS  (PRN):      Allergies    No Known Allergies    Intolerances        SOCIAL HISTORY:    FAMILY HISTORY:  FH: HIV infection (Sibling)        Vital Signs Last 24 Hrs  T(C): 36.7 (09 Dec 2023 14:17), Max: 36.9 (09 Dec 2023 09:45)  T(F): 98 (09 Dec 2023 14:17), Max: 98.4 (09 Dec 2023 09:45)  HR: 99 (09 Dec 2023 14:17) (99 - 130)  BP: 120/66 (09 Dec 2023 14:17) (120/66 - 138/87)  BP(mean): --  RR: 18 (09 Dec 2023 14:17) (16 - 18)  SpO2: 99% (09 Dec 2023 14:17) (97% - 100%)    Parameters below as of 09 Dec 2023 14:17  Patient On (Oxygen Delivery Method): room air        PHYSICAL EXAM:   General: Patient is doing well and lying in bed comfortably  Constitutional: alert and oriented   Pulm: Nonlabored breathing, no respiratory distress  CV: Regular rate and rhythm, normal sinus rhythm  Abd:  soft, nontender, nondistended. No rebound, no guarding.   Rectal: moderate pain to palpation. Spontaneous drainage of malodorous, purulent fluid from approx 8mm opening at right posterior region with induration tracking posteriorly. Unable to perform YOSEPH due to pain.   Extremities: warm, well perfused, no edema    LABS:                        8.4    10.50 )-----------( 375      ( 09 Dec 2023 05:49 )             28.2         136  |  100  |  11  ----------------------------<  125<H>  3.7   |  28  |  0.95    Ca    9.0      09 Dec 2023 05:49    TPro  7.8  /  Alb  2.9<L>  /  TBili  0.2  /  DBili  x   /  AST  14<L>  /  ALT  13  /  AlkPhos  63        Urinalysis Basic - ( 09 Dec 2023 10:36 )    Color: Yellow / Appearance: Clear / S.025 / pH: x  Gluc: x / Ketone: Trace mg/dL  / Bili: Negative / Urobili: 1.0 mg/dL   Blood: x / Protein: Negative mg/dL / Nitrite: Negative   Leuk Esterase: Trace / RBC: 0 /HPF / WBC 5 /HPF   Sq Epi: x / Non Sq Epi: x / Bacteria: x        RADIOLOGY & ADDITIONAL STUDIES:    CT Abdomen and Pelvis w/ IV Cont:   ACC: [id number] EXAM:  CT ABDOMEN AND PELVIS IC   ORDERED BY: DAYNA MAGAÑA     PROCEDURE DATE:  2023          INTERPRETATION:  CLINICAL INFORMATION: Status post drainage of perirectal   abscess. HIV, IVDU.    COMPARISON: 2021.    CONTRAST/COMPLICATIONS:  IV Contrast: Omnipaque 350  100 cc administered   0 cc discarded  Oral Contrast: NONE  Complications: None reported at time of study completion    PROCEDURE:  CT of the Abdomen and Pelvis was performed with the patient prone   position.  Sagittal and coronal reformats were performed.    FINDINGS:  LOWER CHEST: Within normal limits.    LIVER: Again are noted tiny hepatic cysts. Otherwise, within normal   limits.  BILE DUCTS: Normal caliber.  GALLBLADDER: Within normal limits.  SPLEEN: Within normal limits.  PANCREAS: Within normal limits.  ADRENALS: Within normal limits.  KIDNEYS/URETERS: Small bilateral renal cysts. Otherwise, within normal   limits.    BLADDER: Within normal limits.  REPRODUCTIVE ORGANS: The prostate is not enlarged.    BOWEL: No bowel obstruction. Appendix is normal.    Again are noted left and right perianal fistulous tracts extending to the   medial medial buttocks. There is a 9.0 x 8.3 x 10.3 cm phlegmonous   enhancing structure with central irregular fluid density in the right   posterior perirectal space, inseparable from the proximal to mid rectum,   extending through the right sciatic notch and right piriformis muscle   into the right gluteus yovanny muscle medially.  There is a separate   enhancing phlegmonous tract extending into the left ischioanal space at   site of prior fistulous tract. There is moderate concentric wall   thickening of the proximal and mid rectum with severe luminal narrowing.   There is no perirectal adenopathy.  PERITONEUM: No ascites.  VESSELS: Within normal limits.  RETROPERITONEUM/LYMPH NODES: No lymphadenopathy.  ABDOMINAL WALL: Within normal limits.  BONES: There is lytic destruction of the right S4 and S5 levels, coccyx,   and right ischial spine.    IMPRESSION: Large enhancing phlegmonous structure arising from the right   posterolateral aspect of the thickened proximal and distal rectum,   extending through the right sciatic notch and right piriformis muscle   into the right gluteus yovanny muscle medially. Lytic destruction of the   adjacent sacrum, coccyx, and right ischial spine. While these findings   may represent a festering infection related to chronic perianal fistulas,   the mass-like extension, bony destruction, and lack of discrete drainable   abscess, with associated rectal wall thickening, are suspicious for   development of an infiltrating anal squamous cell carcinoma. Results   discussed with Dr. Hughes at time of interpretation.    --- End of Report ---            WILBER GIL MD; Attending Radiologist  This document has been electronically signed. Dec  9 2023  8:22AM (23 @ 07:19)

## 2023-12-09 NOTE — ED ADULT NURSE NOTE - NSFALLUNIVINTERV_ED_ALL_ED
Bed/Stretcher in lowest position, wheels locked, appropriate side rails in place/Call bell, personal items and telephone in reach/Instruct patient to call for assistance before getting out of bed/chair/stretcher/Non-slip footwear applied when patient is off stretcher/[place] to call system/Physically safe environment - no spills, clutter or unnecessary equipment/Purposeful proactive rounding/Room/bathroom lighting operational, light cord in reach Bed/Stretcher in lowest position, wheels locked, appropriate side rails in place/Call bell, personal items and telephone in reach/Instruct patient to call for assistance before getting out of bed/chair/stretcher/Non-slip footwear applied when patient is off stretcher/[place] to call system/Physically safe environment - no spills, clutter or unnecessary equipment/Purposeful proactive rounding/Room/bathroom lighting operational, light cord in reach

## 2023-12-09 NOTE — ED ADULT NURSE NOTE - CHIEF COMPLAINT QUOTE
Pt bibems from [facility] for surgery consult. Pt c/o worsening abscess on the perineum Pt received 4mg of Morphine, PTA. NS IVF infusing. Pt bibems from [facility] for surgery consult. Pt c/o worsening abscess on the perineum Pt received 4mg of Morphine, PTA. NS IVF infusing.

## 2023-12-09 NOTE — ED PROVIDER NOTE - OBJECTIVE STATEMENT
43-year-old chief complaint of abscess to his gluteus/rectum.  Patient notes that he has been having increasing drainage coming out of his buttock region.  Patient denies having any fever associated with his symptoms is endorsing that he is having drainage though that is started today.  Patient is also having painful bowel movements secondary due to the abscess.  Patient stated that he is also having numbness on the right leg as well but otherwise neurovascular intact

## 2023-12-09 NOTE — CONSULT NOTE ADULT - ATTENDING COMMENTS
Patient is a 43 year old gentleman with history significant for HIV (Biktarvy), anemia, history of recurrent perianal abscesses who presented now to [facility] with complaints of recurrent perianal disease with drainage of purulence concerning for complex perirectal abscess. At time of evaluation, afebrile, hemodynamically stable, sinus tachycardia, perianal tenderness and induration noted with odorous purulent drainage. CT imaging was obtained that demonstrates concern for a large complex phlegmon / abscess adjacent to rectum with extension in surrounding soft tissue and bony structures including gluteus muscle, sacrum, coccyx, and right ischial spine. These findings are concerning for an underlying malignancy including anal squamous cell carcinoma. At this time, there is no role for surgical debridement as the necrotic tissue is spontaneously draining. Patient will likely require exam under anesthesia and tissue biopsy to confirm diagnosis as this will dictate appropriate treatment. Pending findings, may also need to discuss diversion. Will continue to follow and will discuss with Colorectal surgery to determine appropriate timing. Late entry, date of service 12/9/23. Patient is a 43 year old gentleman with history significant for HIV (Biktarvy), anemia, history of recurrent perianal abscesses who presented now to [facility] with complaints of recurrent perianal disease with drainage of purulence concerning for complex perirectal abscess. At time of evaluation, afebrile, hemodynamically stable, sinus tachycardia, perianal tenderness and induration noted with odorous purulent drainage. CT imaging was obtained that demonstrates concern for a large complex phlegmon / abscess adjacent to rectum with extension in surrounding soft tissue and bony structures including gluteus muscle, sacrum, coccyx, and right ischial spine. These findings are concerning for an underlying malignancy including anal squamous cell carcinoma. At this time, there is no role for surgical debridement as the necrotic tissue is spontaneously draining. Patient will likely require exam under anesthesia and tissue biopsy to confirm diagnosis as this will dictate appropriate treatment. Pending findings, may also need to discuss diversion. Will continue to follow and will discuss with Colorectal surgery to determine appropriate timing. Late entry, date of service 12/9/23.

## 2023-12-09 NOTE — ED ADULT NURSE NOTE - NSFALLUNIVINTERV_ED_ALL_ED
Bed/Stretcher in lowest position, wheels locked, appropriate side rails in place/Call bell, personal items and telephone in reach/Instruct patient to call for assistance before getting out of bed/chair/stretcher/Non-slip footwear applied when patient is off stretcher/[place] to call system/Physically safe environment - no spills, clutter or unnecessary equipment/Purposeful proactive rounding/Room/bathroom lighting operational, light cord in reach Bed/Stretcher in lowest position, wheels locked, appropriate side rails in place/Call bell, personal items and telephone in reach/Instruct patient to call for assistance before getting out of bed/chair/stretcher/Non-slip footwear applied when patient is off stretcher/[place] to call system/Physically safe environment - no spills, clutter or unnecessary equipment/Purposeful proactive rounding/Room/bathroom lighting operational, light cord in reach

## 2023-12-09 NOTE — ED PROVIDER NOTE - PHYSICAL EXAMINATION
general: Well appearing, in no acute distress  HEENT: Normocephalic, atraumatic, extraocular movements intact  CV: Regular rate  Pulm: No respiratory distress, no tachypnea  Abd: Flat, no gross distension  Ext: warm and well perfused  Skin: No gross rashes or lesions, gluteal region with purulent discharge  Neuro: Alert and oriented, moving all extremities

## 2023-12-09 NOTE — ED PROVIDER NOTE - PHYSICAL EXAMINATION
GENERAL: Awake, alert, NAD  ABDOMEN: Soft, , non tender, non distended, no rebound, no guarding  rectal exam: chaperone samreen b/l abscess noted also around rectum w/ purulence   EXT: No edema, no calf tenderness, 2+ DP pulses bilaterally, no deformities.  NEURO: A&Ox3. Moving all extremities.  SKIN: Warm and dry. No rash.  PSYCH: Normal affect.

## 2023-12-09 NOTE — H&P ADULT - ATTENDING COMMENTS
Patient seen and examined today at bedside. Chart, labs, vitals, radiology reviewed. Above H&P/notes reviewed and edited where appropriate. Agree with history and physical exam. Agree with assessment and plan.   Briefly, 43-year-old MSM with past medical history of HIV (Biktarvy, last known CD4 2021 was 130, VL [id number]), polysubstance abuse, syphilis, anemia, and abdominal MRSA 2021, who presented with spontaneous drainage and mild pain from bilateral gluteal abscesses. No fevers/chills; Sinus Tachycardia.  Of note patient has had recurrent perianal I&D in [facility] and [facility].   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   #Perianal abscess self drained; Per surgery no further intervention is warranted. Pain control. Gen/surg to follow.   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 May get MRI and GenSurg/oncology.   #Leg numbness no weakness; unclear etiology, can get A1C and repeat syphillis.    #Microcytic anemia, likely in the setting of chronic disease vs/ r/o AARTI and obtain vitamin B12/folic acid.   #Hx HIV cont biktarvy and get VL and CD4/8 .    Mary Copeland MD. Patient seen and examined today at bedside. Chart, labs, vitals, radiology reviewed. Above H&P/notes reviewed and edited where appropriate. Agree with history and physical exam. Agree with assessment and plan.   Briefly, 43-year-old MSM with past medical history of HIV (Biktarvy, last known CD4 2021 was 130, VL [id number]), polysubstance abuse, syphilis, anemia, and abdominal MRSA 2021, who presented with spontaneous drainage and mild pain from bilateral gluteal abscesses. No fevers/chills; Sinus Tachycardia.  Of note patient has had recurrent perianal I&D in [facility] and [facility].   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   #Perianal abscess self drained; Per surgery no further intervention is warranted. Pain control. Gen/surg to follow.   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 May get MRI and GenSurg/oncology.   #Leg numbness no weakness; unclear etiology, can get A1C and repeat syphillis.    #Microcytic anemia, likely in the setting of chronic disease vs/ r/o AARTI and obtain vitamin B12/folic acid.   #Hx HIV cont biktarvy and get VL and CD4/8 .    Mary Copeland MD.

## 2023-12-09 NOTE — H&P ADULT - PROBLEM SELECTOR PLAN 5
Reports taking meth (smokes), alcohol (socially), marijuana    - social work assistance for rehab Hb 8.4, baseline 9-11. No active bleed and no blood in stool or from abscess. Reports taking iron supplement once monthly for AARTI    - iron profile, B12, folic acid  - Transfuse for Hb<7  - Maintain active T&S  - continue iron supplementation Hb 8.4, baseline 9-11. No active bleed and no blood in stool or from abscess. Reports taking iron supplement once monthly for AARTI  - iron profile, B12, folic acid level  - Transfuse for Hb<7  - Maintain active T&S  - continue iron supplementation

## 2023-12-09 NOTE — H&P ADULT - NSHPPHYSICALEXAM_GEN_ALL_CORE
Constitutional: NAD, comfortable in bed.  HEENT: NC/AT, PERRLA, EOMI, no conjunctival pallor or scleral icterus, MMM  Neck: Supple, no JVD  Respiratory: CTA B/L. No w/r/r.   Cardiovascular: RRR, normal S1 and S2, no m/r/g.   Gastrointestinal: +BS, soft NTND, no guarding or rebound tenderness, no palpable masses   Extremities: wwp; no cyanosis, clubbing or edema.   Vascular: Pulses equal and strong throughout.   Neurological: AAOx3, no CN deficits, strength and sensation intact throughout.   Skin: fluctuance and induration to R medial gluteal fold, purulent drainage, no crepitus Constitutional: NAD, comfortable in bed.  HEENT: NC/AT, PERRLA, EOMI, no conjunctival pallor or scleral icterus, MMM  Neck: Supple, no JVD  Respiratory: CTA B/L. No w/r/r.   Cardiovascular: RRR, normal S1 and S2, no m/r/g.   Gastrointestinal: +BS, soft NTND, no guarding or rebound tenderness, no palpable masses   Extremities: wwp; no cyanosis, clubbing or edema.   Vascular: Pulses equal and strong throughout.   Neurological: AAOx3, no CN deficits, RLE numbness from hip to ankle, 5/5 strength in 4 extremities  Skin: fluctuance and induration to R medial gluteal fold, purulent drainage, no crepitus

## 2023-12-09 NOTE — ED PROVIDER NOTE - HIV OFFER
Billy Tinsley received a viral test for COVID-19. They were educated on isolation and quarantine as appropriate. For any symptoms, they were directed to seek care from their PCP, given contact information to establish with a doctor, directed to an urgent care or the emergency room.
Previously Declined (within the last year)

## 2023-12-09 NOTE — ED PROVIDER NOTE - ATTENDING CONTRIBUTION TO CARE
Patient with multiple I&D with perirectal and perianal abscess. Presents with bilateral gluteal abscesses There is region of fluctuance and induration to R medial gluteal fold, able to express copious purelent drainage. There is no crepitus. self draining bilateral gluteal abscess with overlying cellulitis.     Agree with plan for CT A/P IVC imaging, patient will likely require admission if extensive.     Notes well controlled HIV with last unprotected sexual encounter msm weeks ago. amenable to empiric treatment with rocephin, pcn miya, and doxy.    signed out to dr tate pending ct read. I have discussed the case with the resident/mid level provider. I have personally performed a history, physical exam, and my own medical decision making. I have reviewed the note and agree with the findings and plan    Patient with multiple I&D with perirectal and perianal abscess. Presents with bilateral gluteal abscesses There is region of fluctuance and induration to R medial gluteal fold, able to express copious purelent drainage. There is no crepitus. self draining bilateral gluteal abscess with overlying cellulitis.     Agree with plan for CT A/P IVC imaging, patient will likely require admission if extensive.     Notes well controlled HIV with last unprotected sexual encounter msm weeks ago. amenable to empiric treatment with rocephin, pcn miya, and doxy.    signed out to dr tate pending ct read.

## 2023-12-09 NOTE — H&P ADULT - NSHPSOCIALHISTORY_GEN_ALL_CORE
Last unprotected sexual encounter MSM weeks ago Last unprotected sexual encounter MSM 2-3 months ago

## 2023-12-09 NOTE — H&P ADULT - HISTORY OF PRESENT ILLNESS
43-year-old MSM with past medical history of HIV (Biktarvy and Bactrim, last known CD4 2021 was 130, VL [id number]), polysubstance abuse, syphilis, anemia, and abdominal MRSA 2021, who presented with spontaneous drainage and mild pain from bilateral gluteal abscesses. Also endorses pain with defecation due to the gluteal abscesses. Of note patient has had recurrent perianal I&D (in [facility]) years ago, Patient denies fever, change in bowel movement, urinary issue. Patient also reports numbness on the right leg.      amenable to empiric treatment with rocephin, pcn miya, and doxy.    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  EKG:  Consults:  Interventions:   43-year-old MSM with past medical history of HIV (Biktarvy and Bactrim, last known CD4 2021 was 130, VL [id number]), polysubstance abuse, syphilis, anemia, and abdominal MRSA 2021, who presented with spontaneous drainage and mild pain from bilateral gluteal abscesses. Also endorses pain with defecation due to the gluteal abscesses. Of note patient has had recurrent perianal I&D (in [facility]) years ago, Patient denies fever, change in bowel movement, urinary issue. Patient also reports numbness on the right leg.      amenable to empiric treatment with rocephin, pcn miya, and doxy.    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  EKG:  Consults:  Interventions:   43-year-old MSM with past medical history of HIV (Biktarvy and Bactrim, last known CD4 2021 was 130, VL [id number]), polysubstance abuse, syphilis, anemia, and abdominal MRSA 2021, who presented with spontaneous drainage and mild pain from bilateral gluteal abscesses. Also endorses pain with defecation due to the gluteal abscesses. Of note patient has had recurrent perianal I&D (in [facility]) years ago, Patient denies fever, change in bowel movement, urinary issue. Patient also reports numbness on the right leg.      amenable to empiric treatment with rocephin, pcn miya, and doxy.    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  EKG: sinus tachycardia, LVH  Consults: general surgery  Interventions: 1L NS, vanc, zosyn, CTX, penicillin G, doxycyclin, augmentin   43-year-old MSM with past medical history of HIV (Biktarvy and Bactrim, last known CD4 2021 was 130, VL 220641), polysubstance abuse, syphilis, anemia, and abdominal MRSA 2021, who presented with spontaneous drainage and mild pain from bilateral gluteal abscesses. Also endorses pain with defecation due to the gluteal abscesses. Of note patient has had recurrent perianal I&D (in [facility]) years ago, Patient denies fever, change in bowel movement, urinary issue. Patient also reports numbness on the right leg.      amenable to empiric treatment with rocephin, pcn miya, and doxy.    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  EKG: sinus tachycardia, LVH  Consults: general surgery  Interventions: 1L NS, vanc, zosyn, CTX, penicillin G, doxycyclin, augmentin   43-year-old MSM with past medical history of HIV (Biktarvy, last known CD4 2021 was 130, VL [id number]), polysubstance abuse, syphilis, anemia, and abdominal MRSA 2021, who presented with spontaneous drainage and mild pain from bilateral gluteal abscesses. The discharge and pain started 1 month ago, but worsened over the past few days, now with yellow, smelly discharge flowing spontaneously especially when he urinates or has bowel movements. Also endorses pain with defecation due to the gluteal abscesses. Of note patient has had recurrent perianal I&D in [facility] and [facility], Patient denies fever, change in bowel movement, urinary issue. Patient also reports numbness on the right leg, from perineal area to ankle, sparing the right foot, which is new compared to previous perianal drainage episodes.     ED Course:  Vitals: 98.1F, , /82, RR 16, SpO2 97% on RA  Labs: Hb 8.4, glucose 125, albumin 2.9. 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  EKG: sinus tachycardia, LVH  Consults: general surgery  Interventions: 1L NS, vanc, zosyn, CTX, penicillin G, doxycycline, Augmentin   43-year-old MSM with past medical history of HIV (Biktarvy, last known CD4 2021 was 130, VL [id number]), polysubstance abuse, syphilis, anemia, and abdominal MRSA 2021, who presented with spontaneous drainage and mild pain from bilateral gluteal abscesses. The discharge and pain started 1 month ago, but worsened over the past few days, now with yellow, smelly discharge flowing spontaneously especially when he urinates or has bowel movements. Also endorses pain with defecation due to the gluteal abscesses. Of note patient has had recurrent perianal I&D in [facility] and [facility], Patient denies fever, change in bowel movement, urinary issue. Patient also reports numbness on the right leg, from perineal area to ankle, sparing the right foot, which is new compared to previous perianal drainage episodes.     ED Course:  Vitals: 98.1F, , /82, RR 16, SpO2 97% on RA  Labs: Hb 8.4, glucose 125, albumin 2.9. 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  EKG: sinus tachycardia, LVH  Consults: general surgery  Interventions: 1L NS, vanc, zosyn, CTX, penicillin G, doxycycline, Augmentin

## 2023-12-09 NOTE — ED ADULT TRIAGE NOTE - CHIEF COMPLAINT QUOTE
Pt bibems from [facility] for surgery consult. Pt c/o worsening abscess on the perineum Pt received 4mg of Morphine, PTA. NS IVF infusing. Pt bibems from [facility] for surgery consult. Pt c/o worsening abscess on the perineum Pt received 4mg of Morphine, PTA. NS IVF infusing.

## 2023-12-09 NOTE — ED PROVIDER NOTE - NS ED ROS FT
Constitutional: No fever or chills  Eyes: No discharge or drainage  Ears, Nose, Mouth, Throat: No nasal discharge, no sore throat  Cardiovascular: No chest pain, no palpitations  Respiratory: No shortness of breath, no cough  Gastrointestinal: No nausea or vomiting, no abdominal pain, no diarrhea or constipation  Musculoskeletal: No joint pain, no swelling  Skin: + discharge, buttock pain  Neurological: No numbness, weakness, tingling, no headache

## 2023-12-09 NOTE — CHART NOTE - NSCHARTNOTEFT_GEN_A_CORE
Senior surgical resident addendum:    43M PMH HIV (Biktarvy, last known CD4 2021 was 150), anemia, PSHx recurrent perianal i&d (in [facility]) several  yr ago presents w/ spontaneous drainage from perianal area with mild pain. Normal BMs, no blood, pain with defecation. VS mostly wnl, tachy low 100s on arrival. Perianal area moderately ttp, not tolerating rectal exam, draining perianal lesion from opening roughly 1 cm. Pt reports that he does not want debridement at this time. WBC 10. 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 Admit to medicine, med onc consult, no acute surgical intervention. Will need EUA, tissue biopsy, diversion eventually. Sx most likely related to necrotic tumor, currently draining. Seen and discussed w/ Dr. Hernandez, team 4C    Chante Tidwell, PGY-4  General Surgery Senior surgical resident addendum:    43M PMH HIV (Biktarvy, last known CD4 2021 was 150), anemia, PSHx recurrent perianal i&d (in [facility]) several  yr ago presents w/ spontaneous drainage from perianal area with mild pain. Normal BMs, no blood, pain with defecation. VS mostly wnl, tachy low 100s on arrival. Perianal area moderately ttp, not tolerating rectal exam, draining perianal lesion from opening roughly 1 cm. Pt reports that he does not want debridement at this time. WBC 10. 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 Admit to medicine, med onc consult, no acute surgical intervention. Will need EUA, tissue biopsy, diversion eventually. Sx most likely related to necrotic tumor, currently draining. Seen and discussed w/ Dr. Hernandez, team 4C    Chante Tidwell, PGY-4  General Surgery

## 2023-12-09 NOTE — H&P ADULT - PROBLEM SELECTOR PLAN 1
1 month ago gradually developed progressively worsening abscess in right gluteal region now s/p spontaneous drainage. Non-toxic appearing, meets 1/4 SIRS criteria (tachycardic). Initially transferred from [facility] for surgical eval for debridement however no acute surgical intervention per surgery. Received vanc, zosyn, ceftriaxone, penicillin, augmentin and had wound culture collected at [facility] ED. History of MRSA abscess of abd wall    - surgery following, f/u recs  - f/u wound culture, MRSA isolation for now  - c/w vanc, zosyn. Obtain vanc trough 1 month ago gradually developed progressively worsening abscess in right gluteal region now s/p spontaneous drainage. Non-toxic appearing, meets 1/4 SIRS criteria (tachycardic). Initially transferred from [facility] for surgical eval for debridement however no acute surgical intervention per surgery. Received vanc, zosyn, ceftriaxone, penicillin, augmentin and had wound culture collected at [facility] ED. History of MRSA abscess of abd wall    - surgery following, f/u recs  - f/u wound culture, MRSA isolation for now  - c/w vanc, zosyn. Obtain vanc trough 1 month ago gradually developed progressively worsening abscess in right gluteal region now s/p spontaneous drainage. Non-toxic appearing, meets 1/4 SIRS criteria (tachycardic). Initially transferred from [facility] for surgical eval for debridement however no acute surgical intervention per surgery. Received vanc, zosyn, ceftriaxone, penicillin, augmentin and had wound culture collected at [facility] ED. History of MRSA abscess of abd wall. Per gen surg, will need EUA, tissue biopsy, diversion eventually    - surgery following, f/u recs  - f/u wound culture, MRSA isolation for now  - c/w vanc, zosyn. Obtain vanc trough  - MRI pelvis/anus/rectum w/wo contrast 1 month ago gradually developed progressively worsening abscess in right gluteal region now s/p spontaneous drainage. Non-toxic appearing, meets 1/4 SIRS criteria (tachycardic). Initially transferred from [facility] for surgical eval for debridement however no acute surgical intervention per surgery. Received vanc, zosyn, ceftriaxone, penicillin, augmentin and had wound culture collected at [facility] ED. History of MRSA abscess of abd wall. Per gen surg, will need EUA, tissue biopsy, diversion eventually    - surgery following, f/u recs  - f/u wound culture, MRSA isolation for now  - c/w vanc, zosyn. Obtain vanc trough  - MRI pelvis/anus/rectum w/wo contrast 1 month ago gradually developed progressively worsening abscess in right gluteal region now s/p spontaneous drainage. Non-toxic appearing, meets 1/4 SIRS criteria (tachycardic). Initially transferred from [facility] for surgical eval for debridement however no acute surgical intervention per surgery. Received vanc, zosyn, ceftriaxone, penicillin, augmentin and had wound culture collected at [facility] ED. History of MRSA abscess of abd wall. Per gen surg, will need EUA, tissue biopsy, diversion eventually  - surgery following, f/u recs  - f/u wound culture  - MRSA isolation for now  - c/w vanc, zosyn. Obtain vanc trough  - f/u MRI pelvis/anus/rectum w/wo contrast 1 month ago gradually developed progressively worsening abscess in right gluteal region now s/p spontaneous drainage. Non-toxic appearing, meets 1/4 SIRS criteria (tachycardic). Initially transferred from [facility] for surgical eval for debridement however no acute surgical intervention per surgery. Received vanc, zosyn, ceftriaxone, penicillin, augmentin and had wound culture collected at [facility] ED. History of MRSA abscess of abd wall. Per gen surg, will need EUA, tissue biopsy, diversion eventually  - surgery following, f/u recs  - f/u wound culture  - MRSA isolation for now  - c/w vanc, zosyn. Obtain vanc trough  - f/u MRI pelvis/anus/rectum w/wo contrast

## 2023-12-09 NOTE — ED ADULT NURSE REASSESSMENT NOTE - NS ED NURSE REASSESS COMMENT FT1
Pt. is AAOx4. Reports feeling very sleepy and tired. Denies cp/palpitations, SOB, dizziness, HA, n/v/d or abd pain. Reports some relief after drainage of abscess. Pending provider disposition.

## 2023-12-09 NOTE — ED ADULT NURSE NOTE - OBJECTIVE STATEMENT
43M, hx HIV (VL undetectable), presents as transfer from [facility] for surgical evaluation of [place]. As per patient, has had increasing abscess from buttock/ rectum along with painful bowel movements. Denies fevers or chills. appears to be in no obvious distress, resting comfortably in side line position. . Received pain medication prior to transfer 43M, hx HIV (VL undetectable), presents as transfer from [facility] for surgical evaluation of [place]. As per patient, has had increasing abscess from buttock/ rectum along with painful bowel movements. Denies fevers or chills. appears to be in no obvious distress, resting comfortably in side line position. . Received pain medication prior to transfer

## 2023-12-09 NOTE — ED ADULT TRIAGE NOTE - NS ED TRIAGE AVPU SCALE
Addended by: ALEX SAMUEL on: 2/11/2019 01:34 PM     Modules accepted: Orders     Alert-The patient is alert, awake and responds to voice. The patient is oriented to time, place, and person. The triage nurse is able to obtain subjective information.

## 2023-12-09 NOTE — H&P ADULT - PROBLEM SELECTOR PLAN 3
Last MSM encounter 2-3 months ago. Reportedly goes to [facility] HIV clinic q6 months, last checked 6 months ago and reportedly VLUD and CD4 >200. Compliant with Biktarvy and no longer on bactrim    - c/w biktarvy daily  - VL and CD4 Last MSM encounter 2-3 months ago. Reportedly goes to [facility] HIV clinic q6 months, last checked 6 months ago and reportedly VLUD and CD4 >200. Compliant with Biktarvy and no longer on bactrim    - c/w biktarvy daily  - VL and CD4 Last MSM encounter 2-3 months ago. Reportedly goes to [facility] HIV clinic q6 months, last checked 6 months ago and reportedly VLUD and CD4 >200. Compliant with Biktarvy and no longer on bactrim    - c/w Biktarvy daily  - VL and CD4 Last MSM encounter 2-3 months ago. Reportedly goes to [facility] HIV clinic q6 months, last checked 6 months ago and reportedly VLUD and CD4 >200. Compliant with Biktarvy and no longer on bactrim    - c/w Biktarvy daily  - VL and CD4 Last MSM encounter 2-3 months ago. Reportedly goes to [facility] HIV clinic q6 months, last checked 6 months ago and reportedly VLUD and CD4 >200. Compliant with Biktarvy and no longer on bactrim  - c/w Biktarvy daily  - VL and CD4 Last MSM encounter 2-3 months ago. Reportedly goes to [facility] HIV clinic q6 months, last checked 6 months ago and reportedly VLUD and CD4 >200. Compliant with Biktarvy and no longer on bactrim  - c/w Biktarvy daily  - VL and CD4

## 2023-12-09 NOTE — H&P ADULT - ASSESSMENT
43M with PMHx of HIV, presented with  43-year-old MSM with past medical history of HIV (Biktarvy and Bactrim, last known CD4 2021 was 130, VL [id number]), polysubstance abuse, syphilis, anemia, and abdominal MRSA 2021, who presented with spontaneous drainage and mild pain from bilateral gluteal abscesses.  43-year-old MSM with past medical history of HIV (Biktarvy and Bactrim, last known CD4 2021 was 130, VL [id number]), polysubstance abuse, syphilis, anemia, and abdominal MRSA 2021, who presented with spontaneous drainage and mild pain from bilateral gluteal abscesses.  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

## 2023-12-09 NOTE — ED ADULT NURSE NOTE - NSFALLCONCLUSION_ED_ALL_ED
[facility] Initial Evaluation  Subjective:  The history is provided by the patient.   Type of problem:  Lumbar    This is a chronic condition   Problem details: Pt reports a 10 year history of LBP and bilat LE symptoms. Started in 2008 randomly and has been persisting ever since. Currently describes pain in low back across belt line and gets numbness/tingling down lateral thighs in both legs along with hypersensitivity and a lot of tightness in hamstrings. Pain worse with standing within 5-10 min, walking within short distances,  (parking lot to building), sitting for long periods of time, sleeping (wakes about 6x/night), avoids lifting/bending. Pain better with steroid dose pack, doing pool therapy/walking, e-stim. 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             and reported as 7/10 on pain scale. General health as reported by patient is fair. Pertinent medical history includes:  Depression, high blood pressure, mental illness, numbness/tingling, overweight and sleep disorder/apnea.      Current medications:  Anti-depressants, high blood pressure medication, pain medication and steroids.              Patient is Teacher/.                           Objective:  System         Lumbar/SI Evaluation  ROM:    AROM Lumbar:   Flexion:          Mid shin, 6-7/10 pain  Ext:                    100% ROM, 6-7/10   Side Bend:        Left:  To knee, 3-4/10 pain    Right:  To knee, no pain  Rotation:           Left:     Right:   Side Glide:        Left:     Right:           Lumbar Myotomes:  normal                Lumbar Dermtomes:              L5 Left:  Hypo-light touch     L5 Right:  Hypo-light touch    Neural Tension/Mobility:    Left side:  Slump positive.   Right side:   Slump positive.                                                       General     ROS    Assessment/Plan:    Patient is a 44 year old male with lumbar complaints.    Patient has the following significant findings with  corresponding treatment plan.                Diagnosis 1:  LBP  Pain -  hot/cold therapy, US, electric stimulation, mechanical traction, manual therapy, splint/taping/bracing/orthotics, self management, education, directional preference exercise and home program  Decreased ROM/flexibility - manual therapy, therapeutic exercise, therapeutic activity and home program  Decreased joint mobility - manual therapy, therapeutic exercise, therapeutic activity and home program  Decreased strength - therapeutic exercise, therapeutic activities and home program  Impaired muscle performance - neuro re-education and home program  Decreased function - therapeutic activities and home program  Diagnosis 2:  BLE radiculopathy   Pain -  hot/cold therapy, US, electric stimulation, mechanical traction, manual therapy, splint/taping/bracing/orthotics, self management, education, directional preference exercise and home program  Decreased ROM/flexibility - manual therapy, therapeutic exercise, therapeutic activity and home program  Decreased joint mobility - manual therapy, therapeutic exercise, therapeutic activity and home program  Decreased strength - therapeutic exercise, therapeutic activities and home program  Impaired muscle performance - neuro re-education and home program  Decreased function - therapeutic activities and home program    Therapy Evaluation Codes:   1) History comprised of:   Personal factors that impact the plan of care:      None.    Comorbidity factors that impact the plan of care are:      Depression, High blood pressure, Mental illness, Numbness/tingling, Overweight and Sleep disorder/apnea.     Medications impacting care: Anti-depressant, High blood pressure, Pain and Steroids.  2) Examination of Body Systems comprised of:   Body structures and functions that impact the plan of care:      Lumbar spine.   Activity limitations that impact the plan of care are:      Bathing, Driving, Lifting, Sitting,  Squatting/kneeling, Stairs, Standing, Walking, Sleeping and Laying down.  3) Clinical presentation characteristics are:   Stable/Uncomplicated.  4) Decision-Making    Low complexity using standardized patient assessment instrument and/or measureable assessment of functional outcome.  Cumulative Therapy Evaluation is: Low complexity.    Previous and current functional limitations:  (See Goal Flow Sheet for this information)    Short term and Long term goals: (See Goal Flow Sheet for this information)     Communication ability:  Patient appears to be able to clearly communicate and understand verbal and written communication and follow directions correctly.  Treatment Explanation - The following has been discussed with the patient:   RX ordered/plan of care  Anticipated outcomes  Possible risks and side effects  This patient would benefit from PT intervention to resume normal activities.   Rehab potential is good.    Frequency:  1 X week, once daily  Duration:  for 8 weeks  Discharge Plan:  Achieve all LTG.  Independent in home treatment program.  Reach maximal therapeutic benefit.    Please refer to the daily flowsheet for treatment today, total treatment time and time spent performing 1:1 timed codes.        Universal Safety Interventions

## 2023-12-09 NOTE — H&P ADULT - NSICDXPASTMEDICALHX_GEN_ALL_CORE_FT
PAST MEDICAL HISTORY:  Abscess     Anemia     HIV (human immunodeficiency virus infection)     IVDU (intravenous drug user)

## 2023-12-09 NOTE — H&P ADULT - PROBLEM SELECTOR PLAN 6
Fluids: None  Electrolytes: Replete to keep K>4 and Mg>2  Nutrition: Regular diet  DVT ppx:   GI ppx: None  Dispo: F Reports taking meth (smokes), alcohol (socially), marijuana    - social work assistance for rehab  - SBIRT  - [facility] Reports taking meth (smokes), alcohol (socially), marijuana    - social work assistance for rehab  - SBIRT  - [facility] Reports taking meth (smokes), alcohol (socially), marijuana  - social work assistance for rehab  - SBIRT  - [facility] Reports taking meth (smokes), alcohol (socially), marijuana  - social work assistance for rehab  - SBIRT  - [facility]

## 2023-12-09 NOTE — ED PROVIDER NOTE - CLINICAL SUMMARY MEDICAL DECISION MAKING FREE TEXT BOX
Given history and physical we will need to obtain CT scan for tracking into the abdomen/colon.  Patient otherwise HIV positive undetectable levels we will also probably give antibiotics as well given location of abscesses.

## 2023-12-09 NOTE — ED PROVIDER NOTE - OBJECTIVE STATEMENT
43-year-old M with history of HIV, VL undetectable, presenting with possible gluteal/rectal abscess. Pt transfer from [facility]. Endorsing increased drainage from his buttock region, has had multiple abscesses and fistulas in the past. Denies fever, chills. No trauma. Some pain with BMs. Had labs at [facility], CT with phlegmon, possible mass, received augmentin, doxy, IM penicillin, IM ceftriaxone, Vanco, Zosyn at [facility] ED. [facility] discussed with Dr. Hernandez who told pt to be transferred to  ER for surgical evaluation. ROS as above. 43-year-old M with history of HIV, VL undetectable, presenting with possible gluteal/rectal abscess. Pt transfer from [facility]. Endorsing increased drainage from his buttock region, has had multiple abscesses and fistulas in the past. Denies fever, chills. No trauma. Some pain with BMs. Had labs at [facility], CT with phlegmon, possible mass, received augmentin, doxy, IM penicillin, IM ceftriaxone, Vanco, Zosyn at [facility] ED. [facility] discussed with Dr. Hernandez who told pt to be transferred to  ER for surgical evaluation. ROS as above.

## 2023-12-10 LAB
ALBUMIN SERPL ELPH-MCNC: 3.3 G/DL — SIGNIFICANT CHANGE UP (ref 3.3–5)
ALBUMIN SERPL ELPH-MCNC: 3.3 G/DL — SIGNIFICANT CHANGE UP (ref 3.3–5)
ALP SERPL-CCNC: 70 U/L — SIGNIFICANT CHANGE UP (ref 40–120)
ALP SERPL-CCNC: 70 U/L — SIGNIFICANT CHANGE UP (ref 40–120)
ALT FLD-CCNC: 11 U/L — SIGNIFICANT CHANGE UP (ref 10–45)
ALT FLD-CCNC: 11 U/L — SIGNIFICANT CHANGE UP (ref 10–45)
ANION GAP SERPL CALC-SCNC: 9 MMOL/L — SIGNIFICANT CHANGE UP (ref 5–17)
ANION GAP SERPL CALC-SCNC: 9 MMOL/L — SIGNIFICANT CHANGE UP (ref 5–17)
AST SERPL-CCNC: 14 U/L — SIGNIFICANT CHANGE UP (ref 10–40)
AST SERPL-CCNC: 14 U/L — SIGNIFICANT CHANGE UP (ref 10–40)
BASOPHILS # BLD AUTO: 0.04 K/UL — SIGNIFICANT CHANGE UP (ref 0–0.2)
BASOPHILS # BLD AUTO: 0.04 K/UL — SIGNIFICANT CHANGE UP (ref 0–0.2)
BASOPHILS NFR BLD AUTO: 0.6 % — SIGNIFICANT CHANGE UP (ref 0–2)
BASOPHILS NFR BLD AUTO: 0.6 % — SIGNIFICANT CHANGE UP (ref 0–2)
BILIRUB SERPL-MCNC: <0.2 MG/DL — SIGNIFICANT CHANGE UP (ref 0.2–1.2)
BILIRUB SERPL-MCNC: <0.2 MG/DL — SIGNIFICANT CHANGE UP (ref 0.2–1.2)
BLD GP AB SCN SERPL QL: NEGATIVE — SIGNIFICANT CHANGE UP
BLD GP AB SCN SERPL QL: NEGATIVE — SIGNIFICANT CHANGE UP
BUN SERPL-MCNC: 10 MG/DL — SIGNIFICANT CHANGE UP (ref 7–23)
BUN SERPL-MCNC: 10 MG/DL — SIGNIFICANT CHANGE UP (ref 7–23)
CALCIUM SERPL-MCNC: 8.9 MG/DL — SIGNIFICANT CHANGE UP (ref 8.4–10.5)
CALCIUM SERPL-MCNC: 8.9 MG/DL — SIGNIFICANT CHANGE UP (ref 8.4–10.5)
CHLORIDE SERPL-SCNC: 102 MMOL/L — SIGNIFICANT CHANGE UP (ref 96–108)
CHLORIDE SERPL-SCNC: 102 MMOL/L — SIGNIFICANT CHANGE UP (ref 96–108)
CO2 SERPL-SCNC: 27 MMOL/L — SIGNIFICANT CHANGE UP (ref 22–31)
CO2 SERPL-SCNC: 27 MMOL/L — SIGNIFICANT CHANGE UP (ref 22–31)
CREAT SERPL-MCNC: 0.76 MG/DL — SIGNIFICANT CHANGE UP (ref 0.5–1.3)
CREAT SERPL-MCNC: 0.76 MG/DL — SIGNIFICANT CHANGE UP (ref 0.5–1.3)
CULTURE RESULTS: SIGNIFICANT CHANGE UP
CULTURE RESULTS: SIGNIFICANT CHANGE UP
EGFR: 116 ML/MIN/1.73M2 — SIGNIFICANT CHANGE UP
EGFR: 116 ML/MIN/1.73M2 — SIGNIFICANT CHANGE UP
EOSINOPHIL # BLD AUTO: 0.27 K/UL — SIGNIFICANT CHANGE UP (ref 0–0.5)
EOSINOPHIL # BLD AUTO: 0.27 K/UL — SIGNIFICANT CHANGE UP (ref 0–0.5)
EOSINOPHIL NFR BLD AUTO: 3.8 % — SIGNIFICANT CHANGE UP (ref 0–6)
EOSINOPHIL NFR BLD AUTO: 3.8 % — SIGNIFICANT CHANGE UP (ref 0–6)
FERRITIN SERPL-MCNC: 109 NG/ML — SIGNIFICANT CHANGE UP (ref 30–400)
FERRITIN SERPL-MCNC: 109 NG/ML — SIGNIFICANT CHANGE UP (ref 30–400)
FOLATE SERPL-MCNC: 5.5 NG/ML — SIGNIFICANT CHANGE UP
FOLATE SERPL-MCNC: 5.5 NG/ML — SIGNIFICANT CHANGE UP
GLUCOSE SERPL-MCNC: 133 MG/DL — HIGH (ref 70–99)
GLUCOSE SERPL-MCNC: 133 MG/DL — HIGH (ref 70–99)
HCT VFR BLD CALC: 28.6 % — LOW (ref 39–50)
HCT VFR BLD CALC: 28.6 % — LOW (ref 39–50)
HGB BLD-MCNC: 8.4 G/DL — LOW (ref 13–17)
HGB BLD-MCNC: 8.4 G/DL — LOW (ref 13–17)
IMM GRANULOCYTES NFR BLD AUTO: 0.4 % — SIGNIFICANT CHANGE UP (ref 0–0.9)
IMM GRANULOCYTES NFR BLD AUTO: 0.4 % — SIGNIFICANT CHANGE UP (ref 0–0.9)
IRON SATN MFR SERPL: 10 % — LOW (ref 16–55)
IRON SATN MFR SERPL: 10 % — LOW (ref 16–55)
IRON SATN MFR SERPL: 19 UG/DL — LOW (ref 45–165)
IRON SATN MFR SERPL: 19 UG/DL — LOW (ref 45–165)
LYMPHOCYTES # BLD AUTO: 1.11 K/UL — SIGNIFICANT CHANGE UP (ref 1–3.3)
LYMPHOCYTES # BLD AUTO: 1.11 K/UL — SIGNIFICANT CHANGE UP (ref 1–3.3)
LYMPHOCYTES # BLD AUTO: 15.8 % — SIGNIFICANT CHANGE UP (ref 13–44)
LYMPHOCYTES # BLD AUTO: 15.8 % — SIGNIFICANT CHANGE UP (ref 13–44)
MAGNESIUM SERPL-MCNC: 2 MG/DL — SIGNIFICANT CHANGE UP (ref 1.6–2.6)
MAGNESIUM SERPL-MCNC: 2 MG/DL — SIGNIFICANT CHANGE UP (ref 1.6–2.6)
MCHC RBC-ENTMCNC: 23.5 PG — LOW (ref 27–34)
MCHC RBC-ENTMCNC: 23.5 PG — LOW (ref 27–34)
MCHC RBC-ENTMCNC: 29.4 GM/DL — LOW (ref 32–36)
MCHC RBC-ENTMCNC: 29.4 GM/DL — LOW (ref 32–36)
MCV RBC AUTO: 79.9 FL — LOW (ref 80–100)
MCV RBC AUTO: 79.9 FL — LOW (ref 80–100)
MONOCYTES # BLD AUTO: 0.54 K/UL — SIGNIFICANT CHANGE UP (ref 0–0.9)
MONOCYTES # BLD AUTO: 0.54 K/UL — SIGNIFICANT CHANGE UP (ref 0–0.9)
MONOCYTES NFR BLD AUTO: 7.7 % — SIGNIFICANT CHANGE UP (ref 2–14)
MONOCYTES NFR BLD AUTO: 7.7 % — SIGNIFICANT CHANGE UP (ref 2–14)
NEUTROPHILS # BLD AUTO: 5.05 K/UL — SIGNIFICANT CHANGE UP (ref 1.8–7.4)
NEUTROPHILS # BLD AUTO: 5.05 K/UL — SIGNIFICANT CHANGE UP (ref 1.8–7.4)
NEUTROPHILS NFR BLD AUTO: 71.7 % — SIGNIFICANT CHANGE UP (ref 43–77)
NEUTROPHILS NFR BLD AUTO: 71.7 % — SIGNIFICANT CHANGE UP (ref 43–77)
NRBC # BLD: 0 /100 WBCS — SIGNIFICANT CHANGE UP (ref 0–0)
NRBC # BLD: 0 /100 WBCS — SIGNIFICANT CHANGE UP (ref 0–0)
PHOSPHATE SERPL-MCNC: 3.7 MG/DL — SIGNIFICANT CHANGE UP (ref 2.5–4.5)
PHOSPHATE SERPL-MCNC: 3.7 MG/DL — SIGNIFICANT CHANGE UP (ref 2.5–4.5)
PLATELET # BLD AUTO: 372 K/UL — SIGNIFICANT CHANGE UP (ref 150–400)
PLATELET # BLD AUTO: 372 K/UL — SIGNIFICANT CHANGE UP (ref 150–400)
POTASSIUM SERPL-MCNC: 3.7 MMOL/L — SIGNIFICANT CHANGE UP (ref 3.5–5.3)
POTASSIUM SERPL-MCNC: 3.7 MMOL/L — SIGNIFICANT CHANGE UP (ref 3.5–5.3)
POTASSIUM SERPL-SCNC: 3.7 MMOL/L — SIGNIFICANT CHANGE UP (ref 3.5–5.3)
POTASSIUM SERPL-SCNC: 3.7 MMOL/L — SIGNIFICANT CHANGE UP (ref 3.5–5.3)
PROT SERPL-MCNC: 7 G/DL — SIGNIFICANT CHANGE UP (ref 6–8.3)
PROT SERPL-MCNC: 7 G/DL — SIGNIFICANT CHANGE UP (ref 6–8.3)
RBC # BLD: 3.58 M/UL — LOW (ref 4.2–5.8)
RBC # FLD: 16.8 % — HIGH (ref 10.3–14.5)
RBC # FLD: 16.8 % — HIGH (ref 10.3–14.5)
RETICS #: 63 K/UL — SIGNIFICANT CHANGE UP (ref 25–125)
RETICS #: 63 K/UL — SIGNIFICANT CHANGE UP (ref 25–125)
RETICS/RBC NFR: 1.8 % — SIGNIFICANT CHANGE UP (ref 0.5–2.5)
RETICS/RBC NFR: 1.8 % — SIGNIFICANT CHANGE UP (ref 0.5–2.5)
RH IG SCN BLD-IMP: POSITIVE — SIGNIFICANT CHANGE UP
RH IG SCN BLD-IMP: POSITIVE — SIGNIFICANT CHANGE UP
SODIUM SERPL-SCNC: 138 MMOL/L — SIGNIFICANT CHANGE UP (ref 135–145)
SODIUM SERPL-SCNC: 138 MMOL/L — SIGNIFICANT CHANGE UP (ref 135–145)
SPECIMEN SOURCE: SIGNIFICANT CHANGE UP
SPECIMEN SOURCE: SIGNIFICANT CHANGE UP
TIBC SERPL-MCNC: 183 UG/DL — LOW (ref 220–430)
TIBC SERPL-MCNC: 183 UG/DL — LOW (ref 220–430)
UIBC SERPL-MCNC: 164 UG/DL — SIGNIFICANT CHANGE UP (ref 110–370)
UIBC SERPL-MCNC: 164 UG/DL — SIGNIFICANT CHANGE UP (ref 110–370)
VIT B12 SERPL-MCNC: 832 PG/ML — SIGNIFICANT CHANGE UP (ref 232–1245)
VIT B12 SERPL-MCNC: 832 PG/ML — SIGNIFICANT CHANGE UP (ref 232–1245)
WBC # BLD: 7.04 K/UL — SIGNIFICANT CHANGE UP (ref 3.8–10.5)
WBC # BLD: 7.04 K/UL — SIGNIFICANT CHANGE UP (ref 3.8–10.5)
WBC # FLD AUTO: 7.04 K/UL — SIGNIFICANT CHANGE UP (ref 3.8–10.5)
WBC # FLD AUTO: 7.04 K/UL — SIGNIFICANT CHANGE UP (ref 3.8–10.5)

## 2023-12-10 PROCEDURE — 99233 SBSQ HOSP IP/OBS HIGH 50: CPT | Mod: GC

## 2023-12-10 PROCEDURE — 99231 SBSQ HOSP IP/OBS SF/LOW 25: CPT

## 2023-12-10 RX ORDER — OXYCODONE HYDROCHLORIDE 5 MG/1
5 TABLET ORAL ONCE
Refills: 0 | Status: DISCONTINUED | OUTPATIENT
Start: 2023-12-10 | End: 2023-12-10

## 2023-12-10 RX ORDER — ENOXAPARIN SODIUM 100 MG/ML
40 INJECTION SUBCUTANEOUS EVERY 24 HOURS
Refills: 0 | Status: COMPLETED | OUTPATIENT
Start: 2023-12-10 | End: 2023-12-10

## 2023-12-10 RX ADMIN — BICTEGRAVIR SODIUM, EMTRICITABINE, AND TENOFOVIR ALAFENAMIDE FUMARATE 1 TABLET(S): 30; 120; 15 TABLET ORAL at 12:40

## 2023-12-10 RX ADMIN — OXYCODONE HYDROCHLORIDE 5 MILLIGRAM(S): 5 TABLET ORAL at 02:00

## 2023-12-10 RX ADMIN — Medication 650 MILLIGRAM(S): at 02:32

## 2023-12-10 RX ADMIN — Medication 650 MILLIGRAM(S): at 11:30

## 2023-12-10 RX ADMIN — Medication 650 MILLIGRAM(S): at 10:10

## 2023-12-10 RX ADMIN — OXYCODONE HYDROCHLORIDE 5 MILLIGRAM(S): 5 TABLET ORAL at 19:07

## 2023-12-10 RX ADMIN — PIPERACILLIN AND TAZOBACTAM 25 GRAM(S): 4; .5 INJECTION, POWDER, LYOPHILIZED, FOR SOLUTION INTRAVENOUS at 01:05

## 2023-12-10 RX ADMIN — Medication 250 MILLIGRAM(S): at 10:10

## 2023-12-10 RX ADMIN — ENOXAPARIN SODIUM 40 MILLIGRAM(S): 100 INJECTION SUBCUTANEOUS at 18:10

## 2023-12-10 RX ADMIN — Medication 650 MILLIGRAM(S): at 03:48

## 2023-12-10 NOTE — PROGRESS NOTE ADULT - SUBJECTIVE AND OBJECTIVE BOX
SUBJECTIVE:  Patient was evaluated at bedside by the general surgery team. Patient states he continues to pass flatus and have BMs. Patient reports bright red blood occasionally on toilet paper. Patient denies fevers, chills, nausea, vomiting. Patient denies pain.    MEDICATIONS  (STANDING):  bictegravir 50 mG/emtricitabine 200 mG/tenofovir alafenamide 25 mG (BIKTARVY) 1 Tablet(s) Oral daily  enoxaparin Injectable 40 milliGRAM(s) SubCutaneous every 24 hours    MEDICATIONS  (PRN):  acetaminophen     Tablet .. 650 milliGRAM(s) Oral every 6 hours PRN Temp greater or equal to 38C (100.4F), Mild Pain (1 - 3)  aluminum hydroxide/magnesium hydroxide/simethicone Suspension 30 milliLiter(s) Oral every 4 hours PRN Dyspepsia  melatonin 3 milliGRAM(s) Oral at bedtime PRN Insomnia  ondansetron Injectable 4 milliGRAM(s) IV Push every 8 hours PRN Nausea and/or Vomiting      Vital Signs Last 24 Hrs  T(C): 36.8 (10 Dec 2023 05:50), Max: 36.8 (09 Dec 2023 11:47)  T(F): 98.3 (10 Dec 2023 05:50), Max: 98.3 (10 Dec 2023 05:50)  HR: 94 (10 Dec 2023 05:50) (94 - 110)  BP: 130/80 (10 Dec 2023 05:50) (119/78 - 131/89)  BP(mean): 97 (10 Dec 2023 05:50) (97 - 97)  RR: 18 (10 Dec 2023 05:50) (16 - 18)  SpO2: 98% (10 Dec 2023 05:50) (97% - 100%)    Parameters below as of 10 Dec 2023 05:50  Patient On (Oxygen Delivery Method): room air        Physical Exam:  General: NAD, resting comfortably in bed  Pulmonary: Nonlabored breathing, no respiratory distress  Cardiovascular: NSR  Abdominal: soft, NT/ND  : Right perianal region has abscess draining site with stool and mild mucous around area; draining site tracks posteriorly to indurated space; no skin changes or edema noted  Extremities: WWP, normal strength  Neuro: A/O x 3, CNs II-XII grossly intact, no focal deficits, normal motor/sensation; lack of sensitivity at overlying skin or perianal abscess  Pulses: palpable distal pulses    I&O's Summary      LABS:                        8.4    7.04  )-----------( 372      ( 10 Dec 2023 05:30 )             28.6     12-10    138  |  102  |  10  ----------------------------<  133<H>  3.7   |  27  |  0.76    Ca    8.9      10 Dec 2023 05:30  Phos  3.7     12-10  Mg     2.0     12-10    TPro  7.0  /  Alb  3.3  /  TBili  <0.2  /  DBili  x   /  AST  14  /  ALT  11  /  AlkPhos  70  12-10      Urinalysis Basic - ( 10 Dec 2023 05:30 )    Color: x / Appearance: x / SG: x / pH: x  Gluc: 133 mg/dL / Ketone: x  / Bili: x / Urobili: x   Blood: x / Protein: x / Nitrite: x   Leuk Esterase: x / RBC: x / WBC x   Sq Epi: x / Non Sq Epi: x / Bacteria: x      CAPILLARY BLOOD GLUCOSE        LIVER FUNCTIONS - ( 10 Dec 2023 05:30 )  Alb: 3.3 g/dL / Pro: 7.0 g/dL / ALK PHOS: 70 U/L / ALT: 11 U/L / AST: 14 U/L / GGT: x             RADIOLOGY & ADDITIONAL STUDIES:

## 2023-12-10 NOTE — PROGRESS NOTE ADULT - ASSESSMENT
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

## 2023-12-10 NOTE — PROGRESS NOTE ADULT - ASSESSMENT
The patient is a 43y year old Male with a PMHx of HIV on Biktarvy, anemia and recurrent perianal abscesses with spontaneous drainage and requiring I&Ds and OR drainage who presented with spontaneously draining perianal abscess.   Surgery was consulted for abscess drainage  On exam, perianal tenderness to palpation, draining abscess noted with thick, malodorous purulent fluid, with surrounding induration tracking posteriorly.   Labs with WBC 10 with left shift, Hgb 8.4; CT with Large enhancing phlegmonous structure arising from the right   posterolateral aspect of the thickened proximal and distal rectum, extending through the right sciatic notch and right piriformis muscle into the right gluteus yovanny muscle medially. 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     Recommendations    Med Onc Consult  Would recommend eventual EUA, tissue biopsy, and likely diversion  Rest of care per primary team  Plan discussed with attending and chief resident.

## 2023-12-10 NOTE — PROGRESS NOTE ADULT - ATTENDING COMMENTS
Patient is a 43 year old gentleman with history significant for HIV (Biktarvy), anemia, history of recurrent perianal abscesses who presented now to [facility] with complaints of recurrent perianal disease with drainage of purulence concerning for complex perirectal abscess. At time of evaluation, afebrile, hemodynamically stable, sinus tachycardia, perianal tenderness and induration noted with odorous purulent drainage. CT imaging was obtained that demonstrates concern for a large complex phlegmon / abscess adjacent to rectum with extension in surrounding soft tissue and bony structures including gluteus muscle, sacrum, coccyx, and right ischial spine. These findings are concerning for an underlying malignancy including anal squamous cell carcinoma. At this time, there is no role for surgical debridement as the necrotic tissue is spontaneously draining.     Discussed with Colorectal surgery  Plan for exam under anesthesia and tissue biopsy Tuesday 12/12/23  Please keep NPO after midnight 12/11/23 Patient is a 43 year old gentleman with history significant for HIV (Biktarvy), anemia, history of recurrent perianal abscesses who presented now to [facility] with complaints of recurrent perianal disease with drainage of purulence concerning for complex perirectal abscess. At time of evaluation, afebrile, hemodynamically stable, sinus tachycardia, perianal tenderness and induration noted with odorous purulent drainage. CT imaging was obtained that demonstrates concern for a large complex phlegmon / abscess adjacent to rectum with extension in surrounding soft tissue and bony structures including gluteus muscle, sacrum, coccyx, and right ischial spine. These findings are concerning for an underlying malignancy including anal squamous cell carcinoma. At this time, there is no role for surgical debridement as the necrotic tissue is spontaneously draining.     Discussed with Colorectal surgery  Plan for exam under anesthesia and tissue biopsy Tuesday 12/12/23  Please keep NPO after midnight 12/11/23

## 2023-12-10 NOTE — PROGRESS NOTE ADULT - SUBJECTIVE AND OBJECTIVE BOX
Patient is a 43y old  Male who presents with a chief complaint of Perianal drainage (10 Dec 2023 11:27)    INTERVAL EVENTS: NATALIE    SUBJECTIVE:  Patient was seen and examined at bedside. Reports pain. Currently 10/10    Review of systems: Patient denies: fever, chills, dizziness, HA, Changes in vision, CP, dyspnea, nausea or vomiting, dysuria, changes in bowel movements, LE edema. Rest of 12 point Review of systems negative unless otherwise documented elsewhere in note.     Diet, NPO after Midnight:      NPO Start Date: 10-Dec-2023,   NPO Start Time: 23:59  Except Medications (12-10-23 @ 13:23) [Active]  Diet, Regular (12-09-23 @ 13:21) [Active]      MEDICATIONS:  MEDICATIONS  (STANDING):  bictegravir 50 mG/emtricitabine 200 mG/tenofovir alafenamide 25 mG (BIKTARVY) 1 Tablet(s) Oral daily  enoxaparin Injectable 40 milliGRAM(s) SubCutaneous every 24 hours    MEDICATIONS  (PRN):  acetaminophen     Tablet .. 650 milliGRAM(s) Oral every 6 hours PRN Temp greater or equal to 38C (100.4F), Mild Pain (1 - 3)  aluminum hydroxide/magnesium hydroxide/simethicone Suspension 30 milliLiter(s) Oral every 4 hours PRN Dyspepsia  melatonin 3 milliGRAM(s) Oral at bedtime PRN Insomnia  ondansetron Injectable 4 milliGRAM(s) IV Push every 8 hours PRN Nausea and/or Vomiting      Allergies    No Known Allergies    Intolerances        OBJECTIVE:  Vital Signs Last 24 Hrs  T(C): 37.2 (10 Dec 2023 13:10), Max: 37.2 (10 Dec 2023 13:10)  T(F): 99 (10 Dec 2023 13:10), Max: 99 (10 Dec 2023 13:10)  HR: 107 (10 Dec 2023 13:10) (94 - 108)  BP: 121/77 (10 Dec 2023 13:10) (121/77 - 131/89)  BP(mean): 97 (10 Dec 2023 05:50) (97 - 97)  RR: 18 (10 Dec 2023 13:10) (18 - 18)  SpO2: 99% (10 Dec 2023 13:10) (98% - 100%)    Parameters below as of 10 Dec 2023 05:50  Patient On (Oxygen Delivery Method): room air      I&O's Summary      PHYSICAL EXAM:  Gen: Reclining in bed at time of exam, appears stated age  HEENT: NCAT, MMM, clear OP  Neck: supple, trachea at midline  CV: RRR, +S1/S2  Pulm: adequate respiratory effort, no increase in work of breathing  Abd: soft, NTND  Skin: warm and dry, no new rashes vs prior report, drainage of purulent fluid from approx 8mm opening at right posterior region with induration tracking posteriorly  Ext: WWP, no LE edema  Neuro: AOx3, no gross focal neurological deficits  Psych: affect and behavior appropriate, pleasant at time of interview    LABS:                        8.4    7.04  )-----------( 372      ( 10 Dec 2023 05:30 )             28.6     12-10    138  |  102  |  10  ----------------------------<  133<H>  3.7   |  27  |  0.76    Ca    8.9      10 Dec 2023 05:30  Phos  3.7     12-10  Mg     2.0     12-10    TPro  7.0  /  Alb  3.3  /  TBili  <0.2  /  DBili  x   /  AST  14  /  ALT  11  /  AlkPhos  70  12-10    LIVER FUNCTIONS - ( 10 Dec 2023 05:30 )  Alb: 3.3 g/dL / Pro: 7.0 g/dL / ALK PHOS: 70 U/L / ALT: 11 U/L / AST: 14 U/L / GGT: x             CAPILLARY BLOOD GLUCOSE        Urinalysis Basic - ( 10 Dec 2023 05:30 )    Color: x / Appearance: x / SG: x / pH: x  Gluc: 133 mg/dL / Ketone: x  / Bili: x / Urobili: x   Blood: x / Protein: x / Nitrite: x   Leuk Esterase: x / RBC: x / WBC x   Sq Epi: x / Non Sq Epi: x / Bacteria: x        MICRODATA:      RADIOLOGY/OTHER STUDIES:

## 2023-12-10 NOTE — PROGRESS NOTE ADULT - PROBLEM SELECTOR PLAN 1
CT showing with possible malignancy  -Need to d/w surgery regarding possible biopsy  -Oncology/Surg Onc consult s/p procedure

## 2023-12-10 NOTE — PROGRESS NOTE ADULT - PROBLEM SELECTOR PLAN 3
Last MSM encounter 2-3 months ago. Reportedly [place] [facility] [place], last checked 6 months ago and reportedly VLUD and CD4 >200. Compliant with Biktarvy and no longer on bactrim  - c/w Biktarvy daily  - VL and CD4 Last MSM encounter 2-3 months ago. Reportedly goes to [facility] HIV clinic q6 months, last checked 6 months ago and reportedly VLUD and CD4 >200. Compliant with Biktarvy and no longer on bactrim  - c/w Biktarvy daily  - VL and CD4

## 2023-12-10 NOTE — PROGRESS NOTE ADULT - PROBLEM SELECTOR PLAN 6
Reports taking meth (smokes), alcohol (socially), marijuana  - social work assistance for rehab  - SBIRT  - [facility] Reports taking meth (smokes), alcohol (socially), marijuana  - social work assistance for rehab  - SBIRT  - [facility]

## 2023-12-11 ENCOUNTER — TRANSCRIPTION ENCOUNTER (OUTPATIENT)
Age: 43
End: 2023-12-11

## 2023-12-11 LAB
-  AMPICILLIN/SULBACTAM: SIGNIFICANT CHANGE UP
-  AMPICILLIN/SULBACTAM: SIGNIFICANT CHANGE UP
-  CEFAZOLIN: SIGNIFICANT CHANGE UP
-  CEFAZOLIN: SIGNIFICANT CHANGE UP
-  CLINDAMYCIN: SIGNIFICANT CHANGE UP
-  CLINDAMYCIN: SIGNIFICANT CHANGE UP
-  ERYTHROMYCIN: SIGNIFICANT CHANGE UP
-  ERYTHROMYCIN: SIGNIFICANT CHANGE UP
-  GENTAMICIN: SIGNIFICANT CHANGE UP
-  GENTAMICIN: SIGNIFICANT CHANGE UP
-  OXACILLIN: SIGNIFICANT CHANGE UP
-  OXACILLIN: SIGNIFICANT CHANGE UP
-  PENICILLIN: SIGNIFICANT CHANGE UP
-  PENICILLIN: SIGNIFICANT CHANGE UP
-  RIFAMPIN: SIGNIFICANT CHANGE UP
-  RIFAMPIN: SIGNIFICANT CHANGE UP
-  TETRACYCLINE: SIGNIFICANT CHANGE UP
-  TETRACYCLINE: SIGNIFICANT CHANGE UP
-  TRIMETHOPRIM/SULFAMETHOXAZOLE: SIGNIFICANT CHANGE UP
-  TRIMETHOPRIM/SULFAMETHOXAZOLE: SIGNIFICANT CHANGE UP
-  VANCOMYCIN: SIGNIFICANT CHANGE UP
-  VANCOMYCIN: SIGNIFICANT CHANGE UP
4/8 RATIO: 0.45 RATIO — LOW (ref 0.9–3.6)
4/8 RATIO: 0.45 RATIO — LOW (ref 0.9–3.6)
ABS CD8: 591 CELLS/UL — SIGNIFICANT CHANGE UP (ref 142–740)
ABS CD8: 591 CELLS/UL — SIGNIFICANT CHANGE UP (ref 142–740)
ANION GAP SERPL CALC-SCNC: 12 MMOL/L — SIGNIFICANT CHANGE UP (ref 5–17)
ANION GAP SERPL CALC-SCNC: 12 MMOL/L — SIGNIFICANT CHANGE UP (ref 5–17)
APTT BLD: 31.1 SEC — SIGNIFICANT CHANGE UP (ref 24.5–35.6)
APTT BLD: 31.1 SEC — SIGNIFICANT CHANGE UP (ref 24.5–35.6)
BASOPHILS # BLD AUTO: 0.03 K/UL — SIGNIFICANT CHANGE UP (ref 0–0.2)
BASOPHILS # BLD AUTO: 0.03 K/UL — SIGNIFICANT CHANGE UP (ref 0–0.2)
BASOPHILS NFR BLD AUTO: 0.3 % — SIGNIFICANT CHANGE UP (ref 0–2)
BASOPHILS NFR BLD AUTO: 0.3 % — SIGNIFICANT CHANGE UP (ref 0–2)
BLD GP AB SCN SERPL QL: NEGATIVE — SIGNIFICANT CHANGE UP
BLD GP AB SCN SERPL QL: NEGATIVE — SIGNIFICANT CHANGE UP
BUN SERPL-MCNC: 6 MG/DL — LOW (ref 7–23)
BUN SERPL-MCNC: 6 MG/DL — LOW (ref 7–23)
C TRACH RRNA SPEC QL NAA+PROBE: SIGNIFICANT CHANGE UP
C TRACH RRNA SPEC QL NAA+PROBE: SIGNIFICANT CHANGE UP
CALCIUM SERPL-MCNC: 9.1 MG/DL — SIGNIFICANT CHANGE UP (ref 8.4–10.5)
CALCIUM SERPL-MCNC: 9.1 MG/DL — SIGNIFICANT CHANGE UP (ref 8.4–10.5)
CD3 BLASTS SPEC-ACNC: 80 % — SIGNIFICANT CHANGE UP (ref 59–83)
CD3 BLASTS SPEC-ACNC: 80 % — SIGNIFICANT CHANGE UP (ref 59–83)
CD3 BLASTS SPEC-ACNC: 873 CELLS/UL — SIGNIFICANT CHANGE UP (ref 672–1870)
CD3 BLASTS SPEC-ACNC: 873 CELLS/UL — SIGNIFICANT CHANGE UP (ref 672–1870)
CD4 %: 24 % — LOW (ref 30–62)
CD4 %: 24 % — LOW (ref 30–62)
CD8 %: 54 % — HIGH (ref 12–36)
CD8 %: 54 % — HIGH (ref 12–36)
CHLORIDE SERPL-SCNC: 103 MMOL/L — SIGNIFICANT CHANGE UP (ref 96–108)
CHLORIDE SERPL-SCNC: 103 MMOL/L — SIGNIFICANT CHANGE UP (ref 96–108)
CO2 SERPL-SCNC: 22 MMOL/L — SIGNIFICANT CHANGE UP (ref 22–31)
CO2 SERPL-SCNC: 22 MMOL/L — SIGNIFICANT CHANGE UP (ref 22–31)
CREAT SERPL-MCNC: 0.71 MG/DL — SIGNIFICANT CHANGE UP (ref 0.5–1.3)
CREAT SERPL-MCNC: 0.71 MG/DL — SIGNIFICANT CHANGE UP (ref 0.5–1.3)
CULTURE RESULTS: ABNORMAL
CULTURE RESULTS: ABNORMAL
EGFR: 118 ML/MIN/1.73M2 — SIGNIFICANT CHANGE UP
EGFR: 118 ML/MIN/1.73M2 — SIGNIFICANT CHANGE UP
EOSINOPHIL # BLD AUTO: 0.2 K/UL — SIGNIFICANT CHANGE UP (ref 0–0.5)
EOSINOPHIL # BLD AUTO: 0.2 K/UL — SIGNIFICANT CHANGE UP (ref 0–0.5)
EOSINOPHIL NFR BLD AUTO: 1.7 % — SIGNIFICANT CHANGE UP (ref 0–6)
EOSINOPHIL NFR BLD AUTO: 1.7 % — SIGNIFICANT CHANGE UP (ref 0–6)
GLUCOSE SERPL-MCNC: 88 MG/DL — SIGNIFICANT CHANGE UP (ref 70–99)
GLUCOSE SERPL-MCNC: 88 MG/DL — SIGNIFICANT CHANGE UP (ref 70–99)
HCT VFR BLD CALC: 29.4 % — LOW (ref 39–50)
HCT VFR BLD CALC: 29.4 % — LOW (ref 39–50)
HGB BLD-MCNC: 8.7 G/DL — LOW (ref 13–17)
HGB BLD-MCNC: 8.7 G/DL — LOW (ref 13–17)
HIV-1 VIRAL LOAD RESULT: ABNORMAL
HIV-1 VIRAL LOAD RESULT: ABNORMAL
HIV1 RNA # SERPL NAA+PROBE: ABNORMAL COPIES/ML
HIV1 RNA # SERPL NAA+PROBE: ABNORMAL COPIES/ML
HIV1 RNA SER-IMP: SIGNIFICANT CHANGE UP
HIV1 RNA SER-IMP: SIGNIFICANT CHANGE UP
HIV1 RNA SERPL NAA+PROBE-ACNC: ABNORMAL
HIV1 RNA SERPL NAA+PROBE-ACNC: ABNORMAL
HIV1 RNA SERPL NAA+PROBE-LOG#: ABNORMAL LG COP/ML
HIV1 RNA SERPL NAA+PROBE-LOG#: ABNORMAL LG COP/ML
IMM GRANULOCYTES NFR BLD AUTO: 0.5 % — SIGNIFICANT CHANGE UP (ref 0–0.9)
IMM GRANULOCYTES NFR BLD AUTO: 0.5 % — SIGNIFICANT CHANGE UP (ref 0–0.9)
INR BLD: 1.23 — HIGH (ref 0.85–1.18)
INR BLD: 1.23 — HIGH (ref 0.85–1.18)
LYMPHOCYTES # BLD AUTO: 1.47 K/UL — SIGNIFICANT CHANGE UP (ref 1–3.3)
LYMPHOCYTES # BLD AUTO: 1.47 K/UL — SIGNIFICANT CHANGE UP (ref 1–3.3)
LYMPHOCYTES # BLD AUTO: 12.7 % — LOW (ref 13–44)
LYMPHOCYTES # BLD AUTO: 12.7 % — LOW (ref 13–44)
MAGNESIUM SERPL-MCNC: 2 MG/DL — SIGNIFICANT CHANGE UP (ref 1.6–2.6)
MAGNESIUM SERPL-MCNC: 2 MG/DL — SIGNIFICANT CHANGE UP (ref 1.6–2.6)
MCHC RBC-ENTMCNC: 23.8 PG — LOW (ref 27–34)
MCHC RBC-ENTMCNC: 23.8 PG — LOW (ref 27–34)
MCHC RBC-ENTMCNC: 29.6 GM/DL — LOW (ref 32–36)
MCHC RBC-ENTMCNC: 29.6 GM/DL — LOW (ref 32–36)
MCV RBC AUTO: 80.3 FL — SIGNIFICANT CHANGE UP (ref 80–100)
MCV RBC AUTO: 80.3 FL — SIGNIFICANT CHANGE UP (ref 80–100)
METHOD TYPE: SIGNIFICANT CHANGE UP
METHOD TYPE: SIGNIFICANT CHANGE UP
MONOCYTES # BLD AUTO: 0.58 K/UL — SIGNIFICANT CHANGE UP (ref 0–0.9)
MONOCYTES # BLD AUTO: 0.58 K/UL — SIGNIFICANT CHANGE UP (ref 0–0.9)
MONOCYTES NFR BLD AUTO: 5 % — SIGNIFICANT CHANGE UP (ref 2–14)
MONOCYTES NFR BLD AUTO: 5 % — SIGNIFICANT CHANGE UP (ref 2–14)
N GONORRHOEA RRNA SPEC QL NAA+PROBE: SIGNIFICANT CHANGE UP
N GONORRHOEA RRNA SPEC QL NAA+PROBE: SIGNIFICANT CHANGE UP
NEUTROPHILS # BLD AUTO: 9.22 K/UL — HIGH (ref 1.8–7.4)
NEUTROPHILS # BLD AUTO: 9.22 K/UL — HIGH (ref 1.8–7.4)
NEUTROPHILS NFR BLD AUTO: 79.8 % — HIGH (ref 43–77)
NEUTROPHILS NFR BLD AUTO: 79.8 % — HIGH (ref 43–77)
NRBC # BLD: 0 /100 WBCS — SIGNIFICANT CHANGE UP (ref 0–0)
NRBC # BLD: 0 /100 WBCS — SIGNIFICANT CHANGE UP (ref 0–0)
ORGANISM # SPEC MICROSCOPIC CNT: ABNORMAL
ORGANISM # SPEC MICROSCOPIC CNT: ABNORMAL
ORGANISM # SPEC MICROSCOPIC CNT: SIGNIFICANT CHANGE UP
ORGANISM # SPEC MICROSCOPIC CNT: SIGNIFICANT CHANGE UP
PHOSPHATE SERPL-MCNC: 4.3 MG/DL — SIGNIFICANT CHANGE UP (ref 2.5–4.5)
PHOSPHATE SERPL-MCNC: 4.3 MG/DL — SIGNIFICANT CHANGE UP (ref 2.5–4.5)
PLATELET # BLD AUTO: 382 K/UL — SIGNIFICANT CHANGE UP (ref 150–400)
PLATELET # BLD AUTO: 382 K/UL — SIGNIFICANT CHANGE UP (ref 150–400)
POTASSIUM SERPL-MCNC: 4.2 MMOL/L — SIGNIFICANT CHANGE UP (ref 3.5–5.3)
POTASSIUM SERPL-MCNC: 4.2 MMOL/L — SIGNIFICANT CHANGE UP (ref 3.5–5.3)
POTASSIUM SERPL-SCNC: 4.2 MMOL/L — SIGNIFICANT CHANGE UP (ref 3.5–5.3)
POTASSIUM SERPL-SCNC: 4.2 MMOL/L — SIGNIFICANT CHANGE UP (ref 3.5–5.3)
PROTHROM AB SERPL-ACNC: 13.9 SEC — HIGH (ref 9.5–13)
PROTHROM AB SERPL-ACNC: 13.9 SEC — HIGH (ref 9.5–13)
RBC # BLD: 3.66 M/UL — LOW (ref 4.2–5.8)
RBC # BLD: 3.66 M/UL — LOW (ref 4.2–5.8)
RBC # FLD: 17.2 % — HIGH (ref 10.3–14.5)
RBC # FLD: 17.2 % — HIGH (ref 10.3–14.5)
RH IG SCN BLD-IMP: POSITIVE — SIGNIFICANT CHANGE UP
RH IG SCN BLD-IMP: POSITIVE — SIGNIFICANT CHANGE UP
SODIUM SERPL-SCNC: 137 MMOL/L — SIGNIFICANT CHANGE UP (ref 135–145)
SODIUM SERPL-SCNC: 137 MMOL/L — SIGNIFICANT CHANGE UP (ref 135–145)
SPECIMEN SOURCE: SIGNIFICANT CHANGE UP
T-CELL CD4 SUBSET PNL BLD: 266 CELLS/UL — LOW (ref 489–1457)
T-CELL CD4 SUBSET PNL BLD: 266 CELLS/UL — LOW (ref 489–1457)
WBC # BLD: 11.56 K/UL — HIGH (ref 3.8–10.5)
WBC # BLD: 11.56 K/UL — HIGH (ref 3.8–10.5)
WBC # FLD AUTO: 11.56 K/UL — HIGH (ref 3.8–10.5)
WBC # FLD AUTO: 11.56 K/UL — HIGH (ref 3.8–10.5)

## 2023-12-11 PROCEDURE — 99233 SBSQ HOSP IP/OBS HIGH 50: CPT | Mod: GC

## 2023-12-11 RX ORDER — PIPERACILLIN AND TAZOBACTAM 4; .5 G/20ML; G/20ML
3.38 INJECTION, POWDER, LYOPHILIZED, FOR SOLUTION INTRAVENOUS EVERY 8 HOURS
Refills: 0 | Status: DISCONTINUED | OUTPATIENT
Start: 2023-12-12 | End: 2023-12-13

## 2023-12-11 RX ORDER — KETOROLAC TROMETHAMINE 30 MG/ML
15 SYRINGE (ML) INJECTION EVERY 6 HOURS
Refills: 0 | Status: DISCONTINUED | OUTPATIENT
Start: 2023-12-11 | End: 2023-12-12

## 2023-12-11 RX ORDER — KETOROLAC TROMETHAMINE 30 MG/ML
15 SYRINGE (ML) INJECTION EVERY 6 HOURS
Refills: 0 | Status: DISCONTINUED | OUTPATIENT
Start: 2023-12-11 | End: 2023-12-11

## 2023-12-11 RX ORDER — VANCOMYCIN HCL 1 G
1000 VIAL (EA) INTRAVENOUS EVERY 12 HOURS
Refills: 0 | Status: DISCONTINUED | OUTPATIENT
Start: 2023-12-11 | End: 2023-12-13

## 2023-12-11 RX ORDER — PIPERACILLIN AND TAZOBACTAM 4; .5 G/20ML; G/20ML
3.38 INJECTION, POWDER, LYOPHILIZED, FOR SOLUTION INTRAVENOUS ONCE
Refills: 0 | Status: COMPLETED | OUTPATIENT
Start: 2023-12-11 | End: 2023-12-11

## 2023-12-11 RX ORDER — KETOROLAC TROMETHAMINE 30 MG/ML
15 SYRINGE (ML) INJECTION ONCE
Refills: 0 | Status: DISCONTINUED | OUTPATIENT
Start: 2023-12-11 | End: 2023-12-11

## 2023-12-11 RX ORDER — INFLUENZA VIRUS VACCINE 15; 15; 15; 15 UG/.5ML; UG/.5ML; UG/.5ML; UG/.5ML
0.5 SUSPENSION INTRAMUSCULAR ONCE
Refills: 0 | Status: DISCONTINUED | OUTPATIENT
Start: 2023-12-11 | End: 2023-12-14

## 2023-12-11 RX ORDER — ENOXAPARIN SODIUM 100 MG/ML
40 INJECTION SUBCUTANEOUS ONCE
Refills: 0 | Status: COMPLETED | OUTPATIENT
Start: 2023-12-11 | End: 2023-12-11

## 2023-12-11 RX ORDER — KETOROLAC TROMETHAMINE 30 MG/ML
15 SYRINGE (ML) INJECTION EVERY 8 HOURS
Refills: 0 | Status: DISCONTINUED | OUTPATIENT
Start: 2023-12-11 | End: 2023-12-11

## 2023-12-11 RX ADMIN — Medication 650 MILLIGRAM(S): at 05:55

## 2023-12-11 RX ADMIN — BICTEGRAVIR SODIUM, EMTRICITABINE, AND TENOFOVIR ALAFENAMIDE FUMARATE 1 TABLET(S): 30; 120; 15 TABLET ORAL at 12:14

## 2023-12-11 RX ADMIN — Medication 650 MILLIGRAM(S): at 05:56

## 2023-12-11 RX ADMIN — Medication 15 MILLIGRAM(S): at 16:06

## 2023-12-11 RX ADMIN — Medication 250 MILLIGRAM(S): at 18:03

## 2023-12-11 RX ADMIN — Medication 650 MILLIGRAM(S): at 20:00

## 2023-12-11 RX ADMIN — Medication 650 MILLIGRAM(S): at 19:37

## 2023-12-11 RX ADMIN — PIPERACILLIN AND TAZOBACTAM 25 GRAM(S): 4; .5 INJECTION, POWDER, LYOPHILIZED, FOR SOLUTION INTRAVENOUS at 19:33

## 2023-12-11 RX ADMIN — Medication 15 MILLIGRAM(S): at 10:11

## 2023-12-11 RX ADMIN — PIPERACILLIN AND TAZOBACTAM 200 GRAM(S): 4; .5 INJECTION, POWDER, LYOPHILIZED, FOR SOLUTION INTRAVENOUS at 16:36

## 2023-12-11 RX ADMIN — ENOXAPARIN SODIUM 40 MILLIGRAM(S): 100 INJECTION SUBCUTANEOUS at 12:18

## 2023-12-11 RX ADMIN — Medication 15 MILLIGRAM(S): at 10:30

## 2023-12-11 RX ADMIN — Medication 3 MILLIGRAM(S): at 01:07

## 2023-12-11 RX ADMIN — Medication 15 MILLIGRAM(S): at 15:51

## 2023-12-11 NOTE — CONSULT NOTE ADULT - SUBJECTIVE AND OBJECTIVE BOX
HPI:  43-year-old MSM with past medical history of HIV (Biktarvy, last known CD4 2021 was 130, VL [id number]), polysubstance abuse, syphilis, anemia, and abdominal MRSA 2021, who presented with spontaneous drainage and mild pain from bilateral gluteal abscesses. The discharge and pain started 1 month ago, but worsened over the past few days, now with yellow, smelly discharge flowing spontaneously especially when he urinates or has bowel movements. Also endorses pain with defecation due to the gluteal abscesses. Of note patient has had recurrent perianal I&D in [facility] and Replaced by [facility], Patient denies fever, change in bowel movement, urinary issue. Patient also reports numbness on the right leg, from perineal area to ankle, sparing the right foot, which is new compared to previous perianal drainage episodes.     Colorectal Surgery consulted for rectal mass and need for exam under anesthesia/possible biopsy.      PAST MEDICAL & SURGICAL HISTORY:  HIV (human immunodeficiency virus infection)      Abscess      IVDU (intravenous drug user)      Anemia      No significant past surgical history          MEDICATIONS  (STANDING):  bictegravir 50 mG/emtricitabine 200 mG/tenofovir alafenamide 25 mG (BIKTARVY) 1 Tablet(s) Oral daily  influenza   Vaccine 0.5 milliLiter(s) IntraMuscular once    MEDICATIONS  (PRN):  acetaminophen     Tablet .. 650 milliGRAM(s) Oral every 6 hours PRN Temp greater or equal to 38C (100.4F), Mild Pain (1 - 3)  aluminum hydroxide/magnesium hydroxide/simethicone Suspension 30 milliLiter(s) Oral every 4 hours PRN Dyspepsia  ketorolac   Injectable 15 milliGRAM(s) IV Push every 8 hours PRN Severe Pain (7 - 10)  melatonin 3 milliGRAM(s) Oral at bedtime PRN Insomnia  ondansetron Injectable 4 milliGRAM(s) IV Push every 8 hours PRN Nausea and/or Vomiting      Allergies    No Known Allergies    Intolerances        SOCIAL HISTORY:    FAMILY HISTORY:  FH: HIV infection (Sibling)            Physical Exam:  General: NAD, resting comfortably  HEENT: NC/AT, EOMI, normal hearing, no oral lesions, no LAD, neck supple  Pulmonary: normal resp effort, CTA-B  Cardiovascular: NSR, no murmurs  Abdominal: soft, ND/NT, no organomegaly  Rectum: mass palpable on YOSEPH, friable w/ bleeding on palpation   Extremities: WWP, normal strength, no clubbing/cyanosis/edema  Neuro: A/O x 3, CNs II-XII grossly intact, normal sensation, no focal deficits  Pulses: palpable distal pulses    Vital Signs Last 24 Hrs  T(C): 36.9 (11 Dec 2023 06:53), Max: 37.3 (11 Dec 2023 00:55)  T(F): 98.5 (11 Dec 2023 06:53), Max: 99.1 (11 Dec 2023 00:55)  HR: 115 (11 Dec 2023 06:53) (107 - 116)  BP: 134/79 (11 Dec 2023 06:53) (121/77 - 147/84)  BP(mean): --  RR: 17 (11 Dec 2023 06:53) (17 - 18)  SpO2: 98% (11 Dec 2023 06:53) (98% - 100%)    Parameters below as of 11 Dec 2023 06:53  Patient On (Oxygen Delivery Method): room air        I&O's Summary          LABS:                        8.7    11.56 )-----------( 382      ( 11 Dec 2023 05:30 )             29.4     12-11    137  |  103  |  6<L>  ----------------------------<  88  4.2   |  22  |  0.71    Ca    9.1      11 Dec 2023 05:30  Phos  4.3     12-11  Mg     2.0     12-11    TPro  7.0  /  Alb  3.3  /  TBili  <0.2  /  DBili  x   /  AST  14  /  ALT  11  /  AlkPhos  70  12-10    PT/INR - ( 11 Dec 2023 05:30 )   PT: 13.9 sec;   INR: 1.23          PTT - ( 11 Dec 2023 05:30 )  PTT:31.1 sec  Urinalysis Basic - ( 11 Dec 2023 05:30 )    Color: x / Appearance: x / SG: x / pH: x  Gluc: 88 mg/dL / Ketone: x  / Bili: x / Urobili: x   Blood: x / Protein: x / Nitrite: x   Leuk Esterase: x / RBC: x / WBC x   Sq Epi: x / Non Sq Epi: x / Bacteria: x      CAPILLARY BLOOD GLUCOSE        LIVER FUNCTIONS - ( 10 Dec 2023 05:30 )  Alb: 3.3 g/dL / Pro: 7.0 g/dL / ALK PHOS: 70 U/L / ALT: 11 U/L / AST: 14 U/L / GGT: x             Cultures:      RADIOLOGY & ADDITIONAL STUDIES:      Plan:           HPI:  43-year-old MSM with past medical history of HIV (Biktarvy, last known CD4 2021 was 130, VL [id number]), polysubstance abuse, syphilis, anemia, and abdominal MRSA 2021, who presented with spontaneous drainage and mild pain from bilateral gluteal abscesses. The discharge and pain started 1 month ago, but worsened over the past few days, now with yellow, smelly discharge flowing spontaneously especially when he urinates or has bowel movements. Also endorses pain with defecation due to the gluteal abscesses. Of note patient has had [facility], Patient denies fever, change in bowel movement, urinary issue. Patient also reports numbness on the right leg, from perineal area to ankle, sparing the right foot, which is new compared to previous perianal drainage episodes.     Colorectal Surgery consulted for rectal mass and need for exam under anesthesia/possible biopsy.      PAST MEDICAL & SURGICAL HISTORY:  HIV (human immunodeficiency virus infection)      Abscess      IVDU (intravenous drug user)      Anemia      No significant past surgical history          MEDICATIONS  (STANDING):  bictegravir 50 mG/emtricitabine 200 mG/tenofovir alafenamide 25 mG (BIKTARVY) 1 Tablet(s) Oral daily  influenza   Vaccine 0.5 milliLiter(s) IntraMuscular once    MEDICATIONS  (PRN):  acetaminophen     Tablet .. 650 milliGRAM(s) Oral every 6 hours PRN Temp greater or equal to 38C (100.4F), Mild Pain (1 - 3)  aluminum hydroxide/magnesium hydroxide/simethicone Suspension 30 milliLiter(s) Oral every 4 hours PRN Dyspepsia  ketorolac   Injectable 15 milliGRAM(s) IV Push every 8 hours PRN Severe Pain (7 - 10)  melatonin 3 milliGRAM(s) Oral at bedtime PRN Insomnia  ondansetron Injectable 4 milliGRAM(s) IV Push every 8 hours PRN Nausea and/or Vomiting      Allergies    No Known Allergies    Intolerances        SOCIAL HISTORY:    FAMILY HISTORY:  FH: HIV infection (Sibling)            Physical Exam:  General: NAD, resting comfortably  HEENT: NC/AT, EOMI, normal hearing, no oral lesions, no LAD, neck supple  Pulmonary: normal resp effort, CTA-B  Cardiovascular: NSR, no murmurs  Abdominal: soft, ND/NT, no organomegaly  Rectum: mass palpable on YOSEPH, friable w/ bleeding on palpation   Extremities: WWP, normal strength, no clubbing/cyanosis/edema  Neuro: A/O x 3, CNs II-XII grossly intact, normal sensation, no focal deficits  Pulses: palpable distal pulses    Vital Signs Last 24 Hrs  T(C): 36.9 (11 Dec 2023 06:53), Max: 37.3 (11 Dec 2023 00:55)  T(F): 98.5 (11 Dec 2023 06:53), Max: 99.1 (11 Dec 2023 00:55)  HR: 115 (11 Dec 2023 06:53) (107 - 116)  BP: 134/79 (11 Dec 2023 06:53) (121/77 - 147/84)  BP(mean): --  RR: 17 (11 Dec 2023 06:53) (17 - 18)  SpO2: 98% (11 Dec 2023 06:53) (98% - 100%)    Parameters below as of 11 Dec 2023 06:53  Patient On (Oxygen Delivery Method): room air        I&O's Summary          LABS:                        8.7    11.56 )-----------( 382      ( 11 Dec 2023 05:30 )             29.4     12-11    137  |  103  |  6<L>  ----------------------------<  88  4.2   |  22  |  0.71    Ca    9.1      11 Dec 2023 05:30  Phos  4.3     12-11  Mg     2.0     12-11    TPro  7.0  /  Alb  3.3  /  TBili  <0.2  /  DBili  x   /  AST  14  /  ALT  11  /  AlkPhos  70  12-10    PT/INR - ( 11 Dec 2023 05:30 )   PT: 13.9 sec;   INR: 1.23          PTT - ( 11 Dec 2023 05:30 )  PTT:31.1 sec  Urinalysis Basic - ( 11 Dec 2023 05:30 )    Color: x / Appearance: x / SG: x / pH: x  Gluc: 88 mg/dL / Ketone: x  / Bili: x / Urobili: x   Blood: x / Protein: x / Nitrite: x   Leuk Esterase: x / RBC: x / WBC x   Sq Epi: x / Non Sq Epi: x / Bacteria: x      CAPILLARY BLOOD GLUCOSE        LIVER FUNCTIONS - ( 10 Dec 2023 05:30 )  Alb: 3.3 g/dL / Pro: 7.0 g/dL / ALK PHOS: 70 U/L / ALT: 11 U/L / AST: 14 U/L / GGT: x             Cultures:      RADIOLOGY & ADDITIONAL STUDIES:      Plan:

## 2023-12-11 NOTE — CONSULT NOTE ADULT - ASSESSMENT
43-year-old MSM with past medical history of HIV (Biktarvy, last known CD4 2021 was 130, VL [id number]), polysubstance abuse, syphilis, anemia, and abdominal MRSA 2021, who presented with spontaneous drainage and mild pain from bilateral gluteal abscesses. The discharge and pain started 1 month ago, but worsened over the past few days, now with yellow, smelly discharge flowing spontaneously especially when he urinates or has bowel movements. Also endorses pain with defecation due to the gluteal abscesses. Of note patient has had recurrent perianal I&D in [facility] and [facility], Patient denies fever, change in bowel movement, urinary issue. Patient also reports numbness on the right leg, from perineal area to ankle, sparing the right foot, which is new compared to previous perianal drainage episodes.     Will add-on for rectal exam under anesthesia w/ possible biopsy   preop assessment for OR tomorrow   rest of care per primary team   thank you for the consult, appreciate excellent care by primary team  43-year-old MSM with past medical history of HIV (Biktarvy, last known CD4 2021 was 130, VL [id number]), polysubstance abuse, syphilis, anemia, and abdominal MRSA 2021, who presented with spontaneous drainage and mild pain from bilateral gluteal abscesses. The discharge and pain started 1 month ago, but worsened over the past few days, now with yellow, smelly discharge flowing spontaneously especially when he urinates or has bowel movements. Also endorses pain with defecation due to the gluteal abscesses. Of note patient has had recurrent perianal I&D in [facility] and [facility], Patient denies fever, change in bowel movement, urinary issue. Patient also reports numbness on the right leg, from perineal area to ankle, sparing the right foot, which is new compared to previous perianal drainage episodes.     Will add-on for rectal exam under anesthesia w/ possible biopsy   preop assessment for OR tomorrow   rest of care per primary team   thank you for the consult, appreciate excellent care by primary team

## 2023-12-11 NOTE — PROGRESS NOTE ADULT - PROBLEM SELECTOR PLAN 1
-CT showing with possible malignancy  -Surgery consulted, plan to do evaluation under anesthesia, tissue biopsy tomorrow. Pt will be NPO at midnight.  -Oncology/Surg Onc consult s/p procedure.

## 2023-12-11 NOTE — PROGRESS NOTE ADULT - ASSESSMENT
The patient is a 43y year old Male with a PMHx of HIV on Biktarvy, anemia and recurrent perianal abscesses with spontaneous drainage and requiring I&Ds and OR drainage who presented with spontaneously draining perianal abscess.   Surgery was consulted for abscess drainage  On exam, perianal tenderness to palpation, draining abscess noted with thick, malodorous purulent fluid, with surrounding induration tracking posteriorly.   Labs with WBC 10 with left shift, Hgb 8.4; CT with Large enhancing phlegmonous structure arising from the right   posterolateral aspect of the thickened proximal and distal rectum, extending through the right sciatic notch and right piriformis muscle into the right gluteus yovanny muscle medially. 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     Recommendations    Med Onc Consult  EUA, with tissue biopsy to be done in the OR 12/12, please keep NPO at midnight, full preoperative labs the morning of with active type and screen and coagulation panel  Rest of care per primary team  Team 4c will continue to follow, please call if any further questions or concers [phone]  Plan discussed with attending and chief resident.  The patient is a 43y year old Male with a PMHx of HIV on Biktarvy, anemia and recurrent perianal abscesses with spontaneous drainage and requiring I&Ds and OR drainage who presented with spontaneously draining perianal abscess.   Surgery was consulted for abscess drainage  On exam, perianal tenderness to palpation, draining abscess noted with thick, malodorous purulent fluid, with surrounding induration tracking posteriorly.   Labs with WBC 10 with left shift, Hgb 8.4; CT with Large enhancing phlegmonous structure arising from the right   posterolateral aspect of the thickened proximal and distal rectum, extending through the right sciatic notch and right piriformis muscle into the right gluteus yovanny muscle medially. 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     Recommendations    Med Onc Consult  EUA, with tissue biopsy to be done in the OR 12/12, please keep NPO at midnight, full preoperative labs the morning of with active type and screen and coagulation panel  Rest of care per primary team  Team 4c will continue to follow, please call if any further questions or concers [phone]  Plan discussed with attending and chief resident.  The patient is a 43y year old Male with a PMHx of HIV on Biktarvy, anemia and recurrent perianal abscesses with spontaneous drainage and requiring I&Ds and OR drainage who presented with spontaneously draining perianal abscess.   Surgery was consulted for abscess drainage  On exam, perianal tenderness to palpation, draining abscess noted with thick, malodorous purulent fluid, with surrounding induration tracking posteriorly.   Labs with WBC 10 with left shift, Hgb 8.4; CT with Large enhancing phlegmonous structure arising from the right   posterolateral aspect of the thickened proximal and distal rectum, extending through the right sciatic notch and right piriformis muscle into the right gluteus yovanny muscle medially. 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     Recommendations    Med Onc Consult  EUA, with tissue biopsy to be done in the OR 12/12 by Dr. Cortez, please keep NPO at midnight, full preoperative labs the morning of with active type and screen and coagulation panel  Rest of care per primary team  Team 4c will continue to follow, please call if any further questions or concers [phone]  Plan discussed with attending and chief resident.  The patient is a 43y year old Male with a PMHx of HIV on Biktarvy, anemia and recurrent perianal abscesses with spontaneous drainage and requiring I&Ds and OR drainage who presented with spontaneously draining perianal abscess.   Surgery was consulted for abscess drainage  On exam, perianal tenderness to palpation, draining abscess noted with thick, malodorous purulent fluid, with surrounding induration tracking posteriorly.   Labs with WBC 10 with left shift, Hgb 8.4; CT with Large enhancing phlegmonous structure arising from the right   posterolateral aspect of the thickened proximal and distal rectum, extending through the right sciatic notch and right piriformis muscle into the right gluteus yovanny muscle medially. 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     Recommendations    Med Onc Consult  EUA, with tissue biopsy to be done in the OR 12/12 by Dr. Cortez, please keep NPO at midnight, full preoperative labs the morning of with active type and screen and coagulation panel  Rest of care per primary team  Team 4c will continue to follow, please call if any further questions or concers [phone]  Plan discussed with attending and chief resident.

## 2023-12-11 NOTE — PATIENT PROFILE ADULT - AGENT'S NAME
Vitals  Signs   Recorded: 09Oct2018 05:08PM   Height: 5 ft   Weight: 166 lb   BMI Calculated: 32.42  BSA Calculated: 1.72  Blood Pressure: 110 / 78, LUE, Sitting  Heart Rate: 51    Reason For Visit    Patient presents for follow up 6 month .   Accompanied By: alone.       Referred By: Dr Rahul Arora,      Quality    IVD/CAD/CHF CI Height documented, No tobacco use, has feelings of hopelessness (PHQ-2), anhedonia (PHQ-2), not taking medication for depression and no surrogate decision maker documented.      History of Present Illness    He is here for cardiac follow-up. Since the last visit he feels well and denies any major complaints. He did mention that over the weekend he had one episode of sharp left-sided chest discomfort when he was sitting down. This was nonexertional nature and was described as a lancinating type of pain. 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 He is still able to walk and exercise without difficulty. He otherwise appears comfortable in the office.      Review of Systems    Systemic: no fever, no chills and no recent weight change.   Cardiovascular: chest pain . as noted in HPI.   Pulmonary: no chronic cough, no hemoptysis, no new dyspnea and no sputum.   Gastrointestinal: no melena, no bleeding and no hematemesis.   Genitourinary: no hematuria, no urinary urgency and no dysuria.   Hematologic and Lymphatic: no tendency for easy bruising and no tendency for easy bleeding.   Musculoskeletal: no diffuse joint pain and no myalgias.   Neurological: no headache, no dizziness, no fainting,  and no seizures.   Psychiatric: no feelings of hopelessness, no anhedonia and no depression.   Integumentary and Breasts: no rashes, no skin lesions and no skin ulcer.      Past Medical History   1. History of Coronary artery disease (I25.10)   2. History of Acute UTI (N39.0)   3. History of Atypical chest pain (R07.89)   4. History of Atypical chest pain (R07.89)   5. History of  Conjunctival hemorrhage of right eye (H11.31)   6. History of Exacerbation of asthma (J45.901)   7. History of abnormal electrocardiography (Z86.79)   8. History of balanitis (Z87.438)   9. History of blurred vision (Z86.69)   10. History of cystitis (Z87.440)   11. History of dysuria (Z87.898)   12. History of influenza vaccination (Z92.29)   13. History of left inguinal hernia (Z98.890)   14. History of muscle spasm (Z87.39)   15. History of urinary tract infection (Z87.440)   16. History of Intermittent chest pain (R07.9)   17. History of Left foot pain (M79.672)   18. History of Need for Tdap vaccination (Z23)   19. History of Positional vertigo    Surgical History   1. History of Cardiac Cath Lesion 1, 1st Adjunct Treat Device: Stent   2. History of Cholecystectomy Laparoscopic   3. History of Colonoscopy (Fiberoptic)   4. History of Inguinal Hernia Repair    Family History   1. Family history of Diabetes : Mother   2. Family history of Myocardial infarction : Mother    Social History   · Caffeine use (Z78.9)   · Full-time employment   · Lack of exercise (Z72.3)   · Low cholesterol diet   · Low fat diet   ·    · Never a smoker   · No alcohol use   · Well-balanced diet    Allergies   1. No Known Drug Allergies    Presenting Meds   1. Aspirin EC Low Dose 81 MG Oral Tablet Delayed Release; TAKE 1 TABLET BY MOUTH   DAILY;   Therapy: 72Mjg6702 to (Evaluate:42Ckq9927)  Requested for: 84Bwi7196; Last   Rx:[id number] Ordered   2. Atorvastatin Calcium 40 MG Oral Tablet; TAKE 1 TABLET AT BEDTIME;   Therapy: 61Hso2006 to (Evaluate:31Cye9342)  Requested for: 09Gke6841; Last   Rx:[id number] Ordered   3. Clopidogrel Bisulfate 75 MG Oral Tablet; TAKE 1 TABLET BY MOUTH EVERY DAY;   Therapy: 14Qds7501 to (Evaluate:38Nhw1489)  Requested for: 67Nsg0062; Last   Rx:[id number] Ordered   4. Flovent Diskus 50 MCG/BLIST Inhalation Aerosol Powder Breath Activated; INHALE 1   PUFF BY MOUTH TWICE DAILY;   Therapy: 75Akk0593 to  (Evaluate:07Jun2019)  Requested for: 63Hpa0200; Last   Rx:[id number] Ordered   5. Hydrocortisone 2.5 % External Ointment; APPLY SPARINGLY TO THE AFFECTED   AREA(S) TWICE DAILY;   Therapy: 21Jan2017 to (Evaluate:79Lox3782)  Requested for: 21Jan2017; Last   Rx:21Jan2017 Ordered   6. Ibuprofen 600 MG Oral Tablet; TAKE 1 TABLET BY MOUTH THREE TIMES DAILY AS   NEEDED;   Therapy: 87Rpt5393 to (Evaluate:69Oxv0130)  Requested for: 12Nov2017; Last   Rx:12Nov2017 Ordered   7. Lisinopril 20 MG Oral Tablet; TAKE 1 TABLET BY MOUTH TWICE DAILY;   Therapy: 19Ggi8000 to (Evaluate:81Kvu4385)  Requested for: 86Htt2692; Last   Rx:[id number] Ordered   8. Metoprolol Succinate  MG Oral Tablet Extended Release 24 Hour; TAKE 1 TABLET   BY MOUTH DAILY;   Therapy: 48Epi0415 to (Evaluate:02Qpi9927)  Requested for: 42Cfl7731; Last   Rx:[id number] Ordered   9. Montelukast Sodium 10 MG Oral Tablet; TAKE 1 TABLET BY MOUTH DAILY;   Therapy: 38Dgc9609 to (Evaluate:91Xwp0612)  Requested for: 12Ubd7046; Last   Rx:14Ick0796 Ordered   10. Montelukast Sodium 10 MG Oral Tablet;    Therapy: (Recorded:75Cwg6919) to Recorded   11. ProAir  (90 Base) MCG/ACT Inhalation Aerosol Solution; INHALE 2 PUFFS BY    MOUTH EVERY 6 HOURS AS NEEDED;    Therapy: 17Mar2015 to (Evaluate:01Jun2018)  Requested for: 37Eci8972; Last    Rx:[id number] Ordered   12. Tamsulosin HCl - 0.4 MG Oral Capsule; TAKE 1 CAPSULE BEDTIME;    Therapy: 27Mar2018 to (Evaluate:22Mar2019)  Requested for: 27Mar2018; Last    Rx:27Mar2018 Ordered     Verified meds verbally with pt.      Review    Past medical history, problem list, family medical history, surgical history and social history reviewed.      Physical Exam    General Appearance   Not in acute distress.    Head   No trauma, normocephalic.    Neck   No elevated JVP.    Eyes   Conjunctivae not injected, no xanthelasma.    Lungs   Full expansion and clear to auscultation    Cardiovascular   Auscultation of heart: Regular rhythm,  normal S1,S2 without S3. No pathological murmurs.    Carotid pulses: Normal without bruits.    Pedal pulses: Normal.    Examination of extremities for edema and/or varicosities: No peripheral edema.    Abdomen   No masses, tenderness or hepatosplenomegaly.    Rectal Exam: Deferred   Musculoskeletal   Normal gait, normal muscle tone.    Neurologic   Oriented to person, place and time. Normal affect.    Skin   No rashes, lesions or ulcers.       Assessment   1. Atypical chest pain (R07.89)   · Although his symptoms are highly atypical, given his cardiac history I will perform a      routine cardiac workup. This will include a stress test and echocardiogram. EKG in the      office demonstrated sinus bradycardia with no new ischemic ST or T wave changes.   2. Hyperlipidemia (E78.5)   · 2018 - , HDL 43, Tri 88, LDL 47   · Patients lipids are at goal. Continue statin.   3. History of Coronary artery disease (I25.10)   · Stable.   4. Atherosclerotic heart disease of native coronary artery without angina pectoris (I25.10)   · Drug-eluting stent PCI RCA 2015.   · As above.   5. Essential (primary) hypertension (I10)   · Blood pressure is well controlled   6. Obesity (BMI 30-39.9) (E66.9)   · Encourage weight loss, diet and exercise.   7. Prediabetes (R73.03)   · Per PCP.    Orders  Atypical chest pain    · ECHOCARDIOGRAM COMPLETE ADULT; Status:Active; Requested for:09Oct2018;    Perform:In Office; Due:08Nov2018;Ordered; For:Atypical chest pain; Ordered By:CELIA SHEEHAN;   · Follow-up visit in 6 months Follow Up  Follow-up  Status: Active  Requested for:  09Apr2019   Ordered;For: Atypical chest pain; Ordered By: CELIA SHEEHAN Performed:  Due: 09May2019   · NUCLEAR STRESS TEST, EXERCISE MARILEE PROTOCOL; Status:Active; Requested  for:09Oct2018;    Perform:In Office; Due:08Nov2018;Ordered; For:Atypical chest pain; Ordered By:CELIA SHEEHAN;  Hyperlipidemia    · HEPATIC FUNCTION PNL; Status:Active; Requested for:09Oct2018;     Perform:ACL; Due:08Nov2018;Ordered; For:Hyperlipidemia; Ordered By:CELIA SHEEHAN;   · LIPID PNL; Status:Active; Requested for:09Oct2018;    Perform:ACL; Due:08Nov2018; Marked Important;Ordered; For:Hyperlipidemia; Ordered By:CELIA SHEEHAN;   · prc EKG ROUTINE 12 LEAD W/INTERPRETATION & REPORT; Status:Complete;   Done:  09Oct2018   Perform:In Office; Due:08Nov2018; Last Updated By:Nae Merlos; 10/9/2018 5:08:38 PM;Ordered; For:Hyperlipidemia; Ordered By:CELIA SHEEHAN;    Discussion/Summary    I will continue to see him regularly.      Signatures   Electronically signed by : CELIA SHEEHAN MD; Oct  9 2018  6:39PM CST     Alexy Velasquez

## 2023-12-11 NOTE — PATIENT PROFILE ADULT - FALL HARM RISK - HARM RISK INTERVENTIONS
Communicate Risk of Fall with Harm to all staff/Reinforce activity limits and safety measures with patient and family/Tailored Fall Risk Interventions/Visual Cue: Yellow wristband and red socks/Bed in lowest position, wheels locked, appropriate side rails in place/Call bell, personal items and telephone in reach/Instruct patient to call for assistance before getting out of bed or chair/Non-slip footwear when patient is out of bed/[place] to call system/Physically safe environment - no spills, clutter or unnecessary equipment/Purposeful Proactive Rounding/Room/bathroom lighting operational, light cord in reach Communicate Risk of Fall with Harm to all staff/Reinforce activity limits and safety measures with patient and family/Tailored Fall Risk Interventions/Visual Cue: Yellow wristband and red socks/Bed in lowest position, wheels locked, appropriate side rails in place/Call bell, personal items and telephone in reach/Instruct patient to call for assistance before getting out of bed or chair/Non-slip footwear when patient is out of bed/[place] to call system/Physically safe environment - no spills, clutter or unnecessary equipment/Purposeful Proactive Rounding/Room/bathroom lighting operational, light cord in reach

## 2023-12-11 NOTE — PATIENT PROFILE ADULT - CAREGIVER ADDRESS
[address] [place] [address], [address] [address] [address] [place] [address], [address] [address]

## 2023-12-11 NOTE — PROGRESS NOTE ADULT - ATTENDING COMMENTS
Pt is 44 yo man with HIV, AIDS, history of perirectal fistula, admitted for evaluation of perirectal numbness and discharge. Imaging indicated presence of large rectal mass and subcutaneous collection. Imaging reviewed with Radiology and case will be discussed with colorectal surgeons. Pt is planned for rectal mass biopsy tomorrow with possible SC collection drainage. Will start pt on broad spectrum antibiotics as rectal mass can be an infected tumor vs large collection. Pt is 42 yo man with HIV, AIDS, history of perirectal fistula, admitted for evaluation of perirectal numbness and discharge. Imaging indicated presence of large rectal mass and subcutaneous collection. Imaging reviewed with Radiology and case will be discussed with colorectal surgeons. Pt is planned for rectal mass biopsy tomorrow with possible SC collection drainage. Will start pt on broad spectrum antibiotics as rectal mass can be an infected tumor vs large collection.

## 2023-12-11 NOTE — SBIRT NOTE ADULT - NSSBIRTUNABLESCR_GEN_A_CORE
Patient endorsed use of drug and reported that his drug of choice was crystal meth. Patient detailed that he has not use meth in 2-3 years and [facility] for his drug use and said that it really helped him. Patient expressed that he does not need any additional resources for drugs at this time and declined screen./Patient refused Patient endorsed use of drug and reported that his drug of choice was crystal meth. Patient detailed that he has not use meth in 2-3 years and [facility] in [facility] for his drug use and said that it really helped him. Patient expressed that he does not need any additional resources for drugs at this time and declined screen./Patient refused

## 2023-12-11 NOTE — PATIENT PROFILE ADULT - CAREGIVER PHONE NUMBER
Problem: Patient Care Overview  Goal: Plan of Care Review  Saturations were as noted in charting.  No signs of respiratory distress noted.  Will continue to monitor.       [phone]

## 2023-12-11 NOTE — PROGRESS NOTE ADULT - PROBLEM SELECTOR PLAN 6
- Hb 8.4, baseline 9-11. No active bleed and no blood in stool or from abscess. Reports taking iron supplement once monthly for AARTI  - iron profile, B12, folic acid level  - Transfuse for Hb<7  - Maintain active T&S  - continue iron supplementation.

## 2023-12-11 NOTE — PROGRESS NOTE ADULT - SUBJECTIVE AND OBJECTIVE BOX
LEXUS RAMBO  43y  Male    Patient is a 43y old  Male who presents with a chief complaint of Perianal drainage (11 Dec 2023 12:18)    INTERVAL HPI/OVERNIGHT EVENTS: Pt endorses some pain overnight, reportedly previously well controlled with Ketorlac. Pt otherwise offers no complaints or overnight events.    REVIEW OF SYSTEMS:  CONSTITUTIONAL: No fever, weight loss, or fatigue  EYES: No eye pain, visual disturbances, or discharge  RESPIRATORY: No cough, wheezing, chills or hemoptysis; No shortness of breath  CARDIOVASCULAR: No chest pain, palpitations, dizziness, or leg swelling  GASTROINTESTINAL: No abdominal or epigastric pain. No nausea, vomiting, or hematemesis; No diarrhea or constipation. No melena or hematochezia.  GENITOURINARY: No dysuria, frequency, hematuria, or incontinence  NEUROLOGICAL: No headaches, memory loss, loss of strength, numbness, or tremors  SKIN: Perianal Abscess present bilaterally   LYMPH NODES: No enlarged glands  ENDOCRINE: No heat or cold intolerance; No hair loss  MUSCULOSKELETAL: No joint pain or swelling; No muscle, back, or extremity pain  PSYCHIATRIC: No depression, anxiety, mood swings, or difficulty sleeping  HEME/LYMPH: No easy bruising, or bleeding gums  ALLEGRY AND IMMUNOLOGIC: No hives or eczema  FAMILY HISTORY:  HIV infection (Sibling)    No Known Allergies    Objective:  Vitals:  T(C): 36.7 (12-11-23 @ 12:15), Max: 37.3 (12-11-23 @ 00:55)  HR: 99 (12-11-23 @ 12:15) (99 - 116)  BP: 133/75 (12-11-23 @ 12:15) (121/77 - 147/84)  RR: 18 (12-11-23 @ 12:15) (17 - 18)  SpO2: 99% (12-11-23 @ 12:15) (98% - 100%)  Wt(kg): --Vital Signs Last 24 Hrs  T(C): 36.7 (11 Dec 2023 12:15), Max: 37.3 (11 Dec 2023 00:55)  T(F): 98.1 (11 Dec 2023 12:15), Max: 99.1 (11 Dec 2023 00:55)  HR: 99 (11 Dec 2023 12:15) (99 - 116)  BP: 133/75 (11 Dec 2023 12:15) (121/77 - 147/84)  BP(mean): --  RR: 18 (11 Dec 2023 12:15) (17 - 18)  SpO2: 99% (11 Dec 2023 12:15) (98% - 100%)    Parameters below as of 11 Dec 2023 12:15  Patient On (Oxygen Delivery Method): room air    PHYSICAL EXAM:  GENERAL: NAD, well-groomed, well-developed  HEAD:  Atraumatic, Normocephalic  EYES: EOMI, PERRLA, conjunctiva and sclera clear  NERVOUS SYSTEM:  Alert & Oriented X3, Good concentration; Motor Strength 5/5 B/L upper and lower extremities  CHEST/LUNG: Clear to percussion bilaterally; No rales, rhonchi, wheezing, or rubs  HEART: Regular rate and rhythm; No murmurs, rubs, or gallops  ABDOMEN: Soft, Nontender, Nondistended; Bowel sounds present  EXTREMITIES:  2+ Peripheral Pulses, No clubbing, cyanosis, or edema  SKIN: No rashes or lesions    LABS:               8.7    11.56 )-----------( 382      ( 11 Dec 2023 05:30 )             29.4     12-11    137  |  103  |  6<L>  ----------------------------<  88  4.2   |  22  |  0.71    Ca    9.1      11 Dec 2023 05:30  Phos  4.3     12-11  Mg     2.0     12-11    TPro  7.0  /  Alb  3.3  /  TBili  <0.2  /  DBili  x   /  AST  14  /  ALT  11  /  AlkPhos  70  12-10    PT/INR - ( 11 Dec 2023 05:30 )   PT: 13.9 sec;   INR: 1.23     PTT - ( 11 Dec 2023 05:30 )  PTT:31.1 sec    Urinalysis Basic - ( 11 Dec 2023 05:30 )    Color: x / Appearance: x / SG: x / pH: x  Gluc: 88 mg/dL / Ketone: x  / Bili: x / Urobili: x   Blood: x / Protein: x / Nitrite: x   Leuk Esterase: x / RBC: x / WBC x   Sq Epi: x / Non Sq Epi: x / Bacteria: x    Culture - Urine (collected 09 Dec 2023 10:36)  Source: Clean Catch Clean Catch (Midstream)  Final Report (10 Dec 2023 22:41):    <10,000 CFU/mL Normal Urogenital Donna    Culture - Other (collected 09 Dec 2023 06:20)  Source: Wound Wound  Preliminary Report (10 Dec 2023 19:11):    Numerous Staphylococcus aureus    RADIOLOGY & ADDITIONAL TESTS:  ACC: [id number] EXAM:  CT ABDOMEN AND PELVIS IC   ORDERED BY: DAYNA MGAAÑA     PROCEDURE DATE:  12/09/2023      INTERPRETATION:  CLINICAL INFORMATION: Status post drainage of perirectal   abscess. HIV, IVDU.    COMPARISON: November 1, 2021.    CONTRAST/COMPLICATIONS:  IV Contrast: Omnipaque 350  100 cc administered   0 cc discarded  Oral Contrast: NONE  Complications: None reported at time of study completion    PROCEDURE:  CT of the Abdomen and Pelvis was performed with the patient prone   position.  Sagittal and coronal reformats were performed.    FINDINGS:  LOWER CHEST: Within normal limits.    LIVER: Again are noted tiny hepatic cysts. Otherwise, within normal   limits.  BILE DUCTS: Normal caliber.  GALLBLADDER: Within normal limits.  SPLEEN: Within normal limits.  PANCREAS: Within normal limits.  ADRENALS: Within normal limits.  KIDNEYS/URETERS: Small bilateral renal cysts. Otherwise, within normal   limits.    BLADDER: Within normal limits.  REPRODUCTIVE ORGANS: The prostate is not enlarged.    BOWEL: No bowel obstruction. Appendix is normal.    Again are noted left and right perianal fistulous tracts extending to the   medial medial buttocks. There is a 9.0 x 8.3 x 10.3 cm phlegmonous   enhancing structure with central irregular fluid density in the right   posterior perirectal space, inseparable from the proximal to mid rectum,   extending through the right sciatic notch and right piriformis muscle   into the right gluteus yovanny muscle medially.  There is a separate   enhancing phlegmonous tract extending into the left ischioanal space at   site of prior fistulous tract. There is moderate concentric wall   thickening of the proximal and mid rectum with severe luminal narrowing.   There is no perirectal adenopathy.  PERITONEUM: No ascites.  VESSELS: Within normal limits.  RETROPERITONEUM/LYMPH NODES: No lymphadenopathy.  ABDOMINAL WALL: Within normal limits.  BONES: There is lytic destruction of the right S4 and S5 levels, coccyx,   and right ischial spine.    IMPRESSION: Large enhancing phlegmonous structure arising from the right   posterolateral aspect of the thickened proximal and distal rectum,   extending through the right sciatic notch and right piriformis muscle   into the right gluteus yovanny muscle medially. Lytic destruction of the   adjacent sacrum, coccyx, and right ischial spine. While these findings   may represent a festering infection related to chronic perianal fistulas,   the mass-like extension, bony destruction, and lack of discrete drainable   abscess, with associated rectal wall thickening, are suspicious for   development of an infiltrating anal squamous cell carcinoma. Results   discussed with Dr. Hughes at time of interpretation.    --- End of Report ---  WILBER GIL MD; Attending Radiologist  This document has been electronically signed. Dec  9 2023  8:22AM    Imaging Personally Reviewed:  [ ] YES  [ ] NO  acetaminophen     Tablet .. 650 milliGRAM(s) Oral every 6 hours PRN  aluminum hydroxide/magnesium hydroxide/simethicone Suspension 30 milliLiter(s) Oral every 4 hours PRN  bictegravir 50 mG/emtricitabine 200 mG/tenofovir alafenamide 25 mG (BIKTARVY) 1 Tablet(s) Oral daily  influenza   Vaccine 0.5 milliLiter(s) IntraMuscular once  ketorolac   Injectable 15 milliGRAM(s) IV Push every 8 hours PRN  melatonin 3 milliGRAM(s) Oral at bedtime PRN  ondansetron Injectable 4 milliGRAM(s) IV Push every 8 hours PRN      HEALTH ISSUES - PROBLEM Dx:  Perianal abscess    Anemia    HIV (human immunodeficiency virus infection)    Polysubstance abuse    Prophylactic measure    Right leg numbness    Syphilis, latent           LEXUS RAMBO  43y  Male    Patient is a 43y old  Male who presents with a chief complaint of Perianal drainage (11 Dec 2023 12:18)    INTERVAL HPI/OVERNIGHT EVENTS: Pt endorses some pain overnight, reportedly previously well controlled with Ketorlac. Pt otherwise offers no complaints or overnight events.    REVIEW OF SYSTEMS:  CONSTITUTIONAL: No fever, weight loss, or fatigue  EYES: No eye pain, visual disturbances, or discharge  RESPIRATORY: No cough, wheezing, chills or hemoptysis; No shortness of breath  CARDIOVASCULAR: No chest pain, palpitations, dizziness, or leg swelling  GASTROINTESTINAL: No abdominal or epigastric pain. No nausea, vomiting, or hematemesis; No diarrhea or constipation. No melena or hematochezia.  GENITOURINARY: No dysuria, frequency, hematuria, or incontinence  NEUROLOGICAL: No headaches, memory loss, loss of strength, numbness, or tremors  SKIN: Perianal Abscess present bilaterally   LYMPH NODES: No enlarged glands  ENDOCRINE: No heat or cold intolerance; No hair loss  MUSCULOSKELETAL: No joint pain or swelling; No muscle, back, or extremity pain  PSYCHIATRIC: No depression, anxiety, mood swings, or difficulty sleeping  HEME/LYMPH: No easy bruising, or bleeding gums  ALLEGRY AND IMMUNOLOGIC: No hives or eczema  FAMILY HISTORY:  HIV infection (Sibling)    No Known Allergies    Objective:  Vitals:  T(C): 36.7 (12-11-23 @ 12:15), Max: 37.3 (12-11-23 @ 00:55)  HR: 99 (12-11-23 @ 12:15) (99 - 116)  BP: 133/75 (12-11-23 @ 12:15) (121/77 - 147/84)  RR: 18 (12-11-23 @ 12:15) (17 - 18)  SpO2: 99% (12-11-23 @ 12:15) (98% - 100%)  Wt(kg): --Vital Signs Last 24 Hrs  T(C): 36.7 (11 Dec 2023 12:15), Max: 37.3 (11 Dec 2023 00:55)  T(F): 98.1 (11 Dec 2023 12:15), Max: 99.1 (11 Dec 2023 00:55)  HR: 99 (11 Dec 2023 12:15) (99 - 116)  BP: 133/75 (11 Dec 2023 12:15) (121/77 - 147/84)  BP(mean): --  RR: 18 (11 Dec 2023 12:15) (17 - 18)  SpO2: 99% (11 Dec 2023 12:15) (98% - 100%)    Parameters below as of 11 Dec 2023 12:15  Patient On (Oxygen Delivery Method): room air    PHYSICAL EXAM:  GENERAL: NAD, well-groomed, well-developed  HEAD:  Atraumatic, Normocephalic  EYES: EOMI, PERRLA, conjunctiva and sclera clear  NERVOUS SYSTEM:  Alert & Oriented X3, Good concentration; Motor Strength 5/5 B/L upper and lower extremities  CHEST/LUNG: Clear to percussion bilaterally; No rales, rhonchi, wheezing, or rubs  HEART: Regular rate and rhythm; No murmurs, rubs, or gallops  ABDOMEN: Soft, Nontender, Nondistended; Bowel sounds present  EXTREMITIES:  2+ Peripheral Pulses, No clubbing, cyanosis, or edema  SKIN: No rashes or lesions    LABS:               8.7    11.56 )-----------( 382      ( 11 Dec 2023 05:30 )             29.4     12-11    137  |  103  |  6<L>  ----------------------------<  88  4.2   |  22  |  0.71    Ca    9.1      11 Dec 2023 05:30  Phos  4.3     12-11  Mg     2.0     12-11    TPro  7.0  /  Alb  3.3  /  TBili  <0.2  /  DBili  x   /  AST  14  /  ALT  11  /  AlkPhos  70  12-10    PT/INR - ( 11 Dec 2023 05:30 )   PT: 13.9 sec;   INR: 1.23     PTT - ( 11 Dec 2023 05:30 )  PTT:31.1 sec    Urinalysis Basic - ( 11 Dec 2023 05:30 )    Color: x / Appearance: x / SG: x / pH: x  Gluc: 88 mg/dL / Ketone: x  / Bili: x / Urobili: x   Blood: x / Protein: x / Nitrite: x   Leuk Esterase: x / RBC: x / WBC x   Sq Epi: x / Non Sq Epi: x / Bacteria: x    Culture - Urine (collected 09 Dec 2023 10:36)  Source: Clean Catch Clean Catch (Midstream)  Final Report (10 Dec 2023 22:41):    <10,000 CFU/mL Normal Urogenital Donna    Culture - Other (collected 09 Dec 2023 06:20)  Source: Wound Wound  Preliminary Report (10 Dec 2023 19:11):    Numerous Staphylococcus aureus    RADIOLOGY & ADDITIONAL TESTS:  ACC: [id number] EXAM:  CT ABDOMEN AND PELVIS IC   ORDERED BY: DAYNA MAGAÑA     PROCEDURE DATE:  12/09/2023      INTERPRETATION:  CLINICAL INFORMATION: Status post drainage of perirectal   abscess. HIV, IVDU.    COMPARISON: November 1, 2021.    CONTRAST/COMPLICATIONS:  IV Contrast: Omnipaque 350  100 cc administered   0 cc discarded  Oral Contrast: NONE  Complications: None reported at time of study completion    PROCEDURE:  CT of the Abdomen and Pelvis was performed with the patient prone   position.  Sagittal and coronal reformats were performed.    FINDINGS:  LOWER CHEST: Within normal limits.    LIVER: Again are noted tiny hepatic cysts. Otherwise, within normal   limits.  BILE DUCTS: Normal caliber.  GALLBLADDER: Within normal limits.  SPLEEN: Within normal limits.  PANCREAS: Within normal limits.  ADRENALS: Within normal limits.  KIDNEYS/URETERS: Small bilateral renal cysts. Otherwise, within normal   limits.    BLADDER: Within normal limits.  REPRODUCTIVE ORGANS: The prostate is not enlarged.    BOWEL: No bowel obstruction. Appendix is normal.    Again are noted left and right perianal fistulous tracts extending to the   medial medial buttocks. There is a 9.0 x 8.3 x 10.3 cm phlegmonous   enhancing structure with central irregular fluid density in the right   posterior perirectal space, inseparable from the proximal to mid rectum,   extending through the right sciatic notch and right piriformis muscle   into the right gluteus yovanny muscle medially.  There is a separate   enhancing phlegmonous tract extending into the left ischioanal space at   site of prior fistulous tract. There is moderate concentric wall   thickening of the proximal and mid rectum with severe luminal narrowing.   There is no perirectal adenopathy.  PERITONEUM: No ascites.  VESSELS: Within normal limits.  RETROPERITONEUM/LYMPH NODES: No lymphadenopathy.  ABDOMINAL WALL: Within normal limits.  BONES: There is lytic destruction of the right S4 and S5 levels, coccyx,   and right ischial spine.    IMPRESSION: Large enhancing phlegmonous structure arising from the right   posterolateral aspect of the thickened proximal and distal rectum,   extending through the right sciatic notch and right piriformis muscle   into the right gluteus yovanny muscle medially. Lytic destruction of the   adjacent sacrum, coccyx, and right ischial spine. While these findings   may represent a festering infection related to chronic perianal fistulas,   the mass-like extension, bony destruction, and lack of discrete drainable   abscess, with associated rectal wall thickening, are suspicious for   development of an infiltrating anal squamous cell carcinoma. Results   discussed with Dr. Hughes at time of interpretation.    --- End of Report ---  WILBER GIL MD; Attending Radiologist  This document has been electronically signed. Dec  9 2023  8:22AM    Imaging Personally Reviewed:  [ ] YES  [ ] NO  acetaminophen     Tablet .. 650 milliGRAM(s) Oral every 6 hours PRN  aluminum hydroxide/magnesium hydroxide/simethicone Suspension 30 milliLiter(s) Oral every 4 hours PRN  bictegravir 50 mG/emtricitabine 200 mG/tenofovir alafenamide 25 mG (BIKTARVY) 1 Tablet(s) Oral daily  influenza   Vaccine 0.5 milliLiter(s) IntraMuscular once  ketorolac   Injectable 15 milliGRAM(s) IV Push every 8 hours PRN  melatonin 3 milliGRAM(s) Oral at bedtime PRN  ondansetron Injectable 4 milliGRAM(s) IV Push every 8 hours PRN      HEALTH ISSUES - PROBLEM Dx:  Perianal abscess    Anemia    HIV (human immunodeficiency virus infection)    Polysubstance abuse    Prophylactic measure    Right leg numbness    Syphilis, latent

## 2023-12-11 NOTE — PROGRESS NOTE ADULT - PROBLEM SELECTOR PLAN 3
-Reportedly goes to [facility] clinic q6 months, last checked 6 months ago and reportedly VLUD and CD4 >200. Compliant with Biktarvy and no longer on bactrim  - c/w Biktarvy daily  - CD4 266 -Reportedly goes to [facility] HIV clinic q6 months, last checked 6 months ago and reportedly VLUD and CD4 >200. Compliant with Biktarvy and no longer on bactrim  - c/w Biktarvy daily  - CD4 266

## 2023-12-11 NOTE — PROGRESS NOTE ADULT - SUBJECTIVE AND OBJECTIVE BOX
SUBJECTIVE:  This morning, he feels well; endorses pain and drainage, however less  No nausea or vomiting. Passing flatus and having BMs. No acute complaints. Endorses anxiousness about his possible procedure       MEDICATIONS  (STANDING):  bictegravir 50 mG/emtricitabine 200 mG/tenofovir alafenamide 25 mG (BIKTARVY) 1 Tablet(s) Oral daily  enoxaparin Injectable 40 milliGRAM(s) SubCutaneous once  influenza   Vaccine 0.5 milliLiter(s) IntraMuscular once    MEDICATIONS  (PRN):  acetaminophen     Tablet .. 650 milliGRAM(s) Oral every 6 hours PRN Temp greater or equal to 38C (100.4F), Mild Pain (1 - 3)  aluminum hydroxide/magnesium hydroxide/simethicone Suspension 30 milliLiter(s) Oral every 4 hours PRN Dyspepsia  ketorolac   Injectable 15 milliGRAM(s) IV Push every 8 hours PRN Severe Pain (7 - 10)  melatonin 3 milliGRAM(s) Oral at bedtime PRN Insomnia  ondansetron Injectable 4 milliGRAM(s) IV Push every 8 hours PRN Nausea and/or Vomiting      Vital Signs Last 24 Hrs  T(C): 36.9 (11 Dec 2023 06:53), Max: 37.3 (11 Dec 2023 00:55)  T(F): 98.5 (11 Dec 2023 06:53), Max: 99.1 (11 Dec 2023 00:55)  HR: 115 (11 Dec 2023 06:53) (107 - 116)  BP: 134/79 (11 Dec 2023 06:53) (121/77 - 147/84)  BP(mean): --  RR: 17 (11 Dec 2023 06:53) (17 - 18)  SpO2: 98% (11 Dec 2023 06:53) (98% - 100%)    Parameters below as of 11 Dec 2023 06:53  Patient On (Oxygen Delivery Method): room air    Physical Exam:  Physical Exam:  General: NAD, resting comfortably in bed  Pulmonary: Nonlabored breathing, no respiratory distress  Cardiovascular: NSR  Abdominal: soft, NT/ND  : Right perianal region has abscess draining site with stool and mild mucous around area; draining site tracks posteriorly to indurated space; no skin changes or edema noted  Extremities: WWP, normal strength  Neuro: A/O x 3, CNs II-XII grossly intact, no focal deficits, normal motor/sensation; lack of sensitivity at overlying skin or perianal abscess  Pulses: palpable distal pulses      I&O's Summary      LABS:                        8.7    11.56 )-----------( 382      ( 11 Dec 2023 05:30 )             29.4     12-11    137  |  103  |  6<L>  ----------------------------<  88  4.2   |  22  |  0.71    Ca    9.1      11 Dec 2023 05:30  Phos  4.3     12-11  Mg     2.0     12-11    TPro  7.0  /  Alb  3.3  /  TBili  <0.2  /  DBili  x   /  AST  14  /  ALT  11  /  AlkPhos  70  12-10    PT/INR - ( 11 Dec 2023 05:30 )   PT: 13.9 sec;   INR: 1.23          PTT - ( 11 Dec 2023 05:30 )  PTT:31.1 sec  Urinalysis Basic - ( 11 Dec 2023 05:30 )    Color: x / Appearance: x / SG: x / pH: x  Gluc: 88 mg/dL / Ketone: x  / Bili: x / Urobili: x   Blood: x / Protein: x / Nitrite: x   Leuk Esterase: x / RBC: x / WBC x   Sq Epi: x / Non Sq Epi: x / Bacteria: x      CAPILLARY BLOOD GLUCOSE        LIVER FUNCTIONS - ( 10 Dec 2023 05:30 )  Alb: 3.3 g/dL / Pro: 7.0 g/dL / ALK PHOS: 70 U/L / ALT: 11 U/L / AST: 14 U/L / GGT: x             RADIOLOGY & ADDITIONAL STUDIES:

## 2023-12-12 ENCOUNTER — TRANSCRIPTION ENCOUNTER (OUTPATIENT)
Age: 43
End: 2023-12-12

## 2023-12-12 LAB
ANION GAP SERPL CALC-SCNC: 11 MMOL/L — SIGNIFICANT CHANGE UP (ref 5–17)
ANION GAP SERPL CALC-SCNC: 11 MMOL/L — SIGNIFICANT CHANGE UP (ref 5–17)
APTT BLD: 30.3 SEC — SIGNIFICANT CHANGE UP (ref 24.5–35.6)
APTT BLD: 30.3 SEC — SIGNIFICANT CHANGE UP (ref 24.5–35.6)
BLD GP AB SCN SERPL QL: NEGATIVE — SIGNIFICANT CHANGE UP
BLD GP AB SCN SERPL QL: NEGATIVE — SIGNIFICANT CHANGE UP
BUN SERPL-MCNC: 10 MG/DL — SIGNIFICANT CHANGE UP (ref 7–23)
BUN SERPL-MCNC: 10 MG/DL — SIGNIFICANT CHANGE UP (ref 7–23)
CALCIUM SERPL-MCNC: 9.2 MG/DL — SIGNIFICANT CHANGE UP (ref 8.4–10.5)
CALCIUM SERPL-MCNC: 9.2 MG/DL — SIGNIFICANT CHANGE UP (ref 8.4–10.5)
CHLORIDE SERPL-SCNC: 103 MMOL/L — SIGNIFICANT CHANGE UP (ref 96–108)
CHLORIDE SERPL-SCNC: 103 MMOL/L — SIGNIFICANT CHANGE UP (ref 96–108)
CO2 SERPL-SCNC: 23 MMOL/L — SIGNIFICANT CHANGE UP (ref 22–31)
CO2 SERPL-SCNC: 23 MMOL/L — SIGNIFICANT CHANGE UP (ref 22–31)
CREAT SERPL-MCNC: 0.73 MG/DL — SIGNIFICANT CHANGE UP (ref 0.5–1.3)
CREAT SERPL-MCNC: 0.73 MG/DL — SIGNIFICANT CHANGE UP (ref 0.5–1.3)
EGFR: 117 ML/MIN/1.73M2 — SIGNIFICANT CHANGE UP
EGFR: 117 ML/MIN/1.73M2 — SIGNIFICANT CHANGE UP
GLUCOSE SERPL-MCNC: 98 MG/DL — SIGNIFICANT CHANGE UP (ref 70–99)
GLUCOSE SERPL-MCNC: 98 MG/DL — SIGNIFICANT CHANGE UP (ref 70–99)
HCT VFR BLD CALC: 32.3 % — LOW (ref 39–50)
HCT VFR BLD CALC: 32.3 % — LOW (ref 39–50)
HGB BLD-MCNC: 9.2 G/DL — LOW (ref 13–17)
HGB BLD-MCNC: 9.2 G/DL — LOW (ref 13–17)
INR BLD: 1.19 — HIGH (ref 0.85–1.18)
INR BLD: 1.19 — HIGH (ref 0.85–1.18)
MAGNESIUM SERPL-MCNC: 2.3 MG/DL — SIGNIFICANT CHANGE UP (ref 1.6–2.6)
MAGNESIUM SERPL-MCNC: 2.3 MG/DL — SIGNIFICANT CHANGE UP (ref 1.6–2.6)
MCHC RBC-ENTMCNC: 23.3 PG — LOW (ref 27–34)
MCHC RBC-ENTMCNC: 23.3 PG — LOW (ref 27–34)
MCHC RBC-ENTMCNC: 28.5 GM/DL — LOW (ref 32–36)
MCHC RBC-ENTMCNC: 28.5 GM/DL — LOW (ref 32–36)
MCV RBC AUTO: 81.8 FL — SIGNIFICANT CHANGE UP (ref 80–100)
MCV RBC AUTO: 81.8 FL — SIGNIFICANT CHANGE UP (ref 80–100)
NRBC # BLD: 0 /100 WBCS — SIGNIFICANT CHANGE UP (ref 0–0)
NRBC # BLD: 0 /100 WBCS — SIGNIFICANT CHANGE UP (ref 0–0)
PHOSPHATE SERPL-MCNC: 4.5 MG/DL — SIGNIFICANT CHANGE UP (ref 2.5–4.5)
PHOSPHATE SERPL-MCNC: 4.5 MG/DL — SIGNIFICANT CHANGE UP (ref 2.5–4.5)
PLATELET # BLD AUTO: 294 K/UL — SIGNIFICANT CHANGE UP (ref 150–400)
PLATELET # BLD AUTO: 294 K/UL — SIGNIFICANT CHANGE UP (ref 150–400)
POTASSIUM SERPL-MCNC: 4.2 MMOL/L — SIGNIFICANT CHANGE UP (ref 3.5–5.3)
POTASSIUM SERPL-MCNC: 4.2 MMOL/L — SIGNIFICANT CHANGE UP (ref 3.5–5.3)
POTASSIUM SERPL-SCNC: 4.2 MMOL/L — SIGNIFICANT CHANGE UP (ref 3.5–5.3)
POTASSIUM SERPL-SCNC: 4.2 MMOL/L — SIGNIFICANT CHANGE UP (ref 3.5–5.3)
PROTHROM AB SERPL-ACNC: 13.5 SEC — HIGH (ref 9.5–13)
PROTHROM AB SERPL-ACNC: 13.5 SEC — HIGH (ref 9.5–13)
RBC # BLD: 3.95 M/UL — LOW (ref 4.2–5.8)
RBC # BLD: 3.95 M/UL — LOW (ref 4.2–5.8)
RBC # FLD: 17.2 % — HIGH (ref 10.3–14.5)
RBC # FLD: 17.2 % — HIGH (ref 10.3–14.5)
RH IG SCN BLD-IMP: POSITIVE — SIGNIFICANT CHANGE UP
RH IG SCN BLD-IMP: POSITIVE — SIGNIFICANT CHANGE UP
RPR SER-TITR: (no result)
RPR SER-TITR: (no result)
RPR SERPL-ACNC: REACTIVE
RPR SERPL-ACNC: REACTIVE
SODIUM SERPL-SCNC: 137 MMOL/L — SIGNIFICANT CHANGE UP (ref 135–145)
SODIUM SERPL-SCNC: 137 MMOL/L — SIGNIFICANT CHANGE UP (ref 135–145)
T PALLIDUM AB TITR SER: POSITIVE
T PALLIDUM AB TITR SER: POSITIVE
WBC # BLD: 6.93 K/UL — SIGNIFICANT CHANGE UP (ref 3.8–10.5)
WBC # BLD: 6.93 K/UL — SIGNIFICANT CHANGE UP (ref 3.8–10.5)
WBC # FLD AUTO: 6.93 K/UL — SIGNIFICANT CHANGE UP (ref 3.8–10.5)
WBC # FLD AUTO: 6.93 K/UL — SIGNIFICANT CHANGE UP (ref 3.8–10.5)

## 2023-12-12 PROCEDURE — 99233 SBSQ HOSP IP/OBS HIGH 50: CPT | Mod: GC

## 2023-12-12 PROCEDURE — 88305 TISSUE EXAM BY PATHOLOGIST: CPT | Mod: 26

## 2023-12-12 PROCEDURE — 99221 1ST HOSP IP/OBS SF/LOW 40: CPT | Mod: 57

## 2023-12-12 PROCEDURE — 46040 I&D ISCHIORCT&/PERIRCT ABSC: CPT | Mod: GC

## 2023-12-12 DEVICE — SURGICEL FIBRILLAR 4 X 4": Type: IMPLANTABLE DEVICE | Status: FUNCTIONAL

## 2023-12-12 RX ORDER — LANOLIN ALCOHOL/MO/W.PET/CERES
10 CREAM (GRAM) TOPICAL AT BEDTIME
Refills: 0 | Status: DISCONTINUED | OUTPATIENT
Start: 2023-12-12 | End: 2023-12-14

## 2023-12-12 RX ORDER — PSYLLIUM SEED (WITH DEXTROSE)
1 POWDER (GRAM) ORAL DAILY
Refills: 0 | Status: DISCONTINUED | OUTPATIENT
Start: 2023-12-12 | End: 2023-12-14

## 2023-12-12 RX ORDER — KETOROLAC TROMETHAMINE 30 MG/ML
15 SYRINGE (ML) INJECTION ONCE
Refills: 0 | Status: DISCONTINUED | OUTPATIENT
Start: 2023-12-12 | End: 2023-12-12

## 2023-12-12 RX ADMIN — Medication 10 MILLIGRAM(S): at 23:31

## 2023-12-12 RX ADMIN — Medication 15 MILLIGRAM(S): at 12:20

## 2023-12-12 RX ADMIN — Medication 15 MILLIGRAM(S): at 23:32

## 2023-12-12 RX ADMIN — Medication 15 MILLIGRAM(S): at 06:32

## 2023-12-12 RX ADMIN — BICTEGRAVIR SODIUM, EMTRICITABINE, AND TENOFOVIR ALAFENAMIDE FUMARATE 1 TABLET(S): 30; 120; 15 TABLET ORAL at 12:22

## 2023-12-12 RX ADMIN — Medication 15 MILLIGRAM(S): at 00:55

## 2023-12-12 RX ADMIN — Medication 15 MILLIGRAM(S): at 02:18

## 2023-12-12 RX ADMIN — Medication 650 MILLIGRAM(S): at 22:38

## 2023-12-12 RX ADMIN — Medication 250 MILLIGRAM(S): at 18:39

## 2023-12-12 RX ADMIN — Medication 15 MILLIGRAM(S): at 07:32

## 2023-12-12 RX ADMIN — PIPERACILLIN AND TAZOBACTAM 25 GRAM(S): 4; .5 INJECTION, POWDER, LYOPHILIZED, FOR SOLUTION INTRAVENOUS at 13:50

## 2023-12-12 RX ADMIN — Medication 650 MILLIGRAM(S): at 22:08

## 2023-12-12 RX ADMIN — Medication 250 MILLIGRAM(S): at 06:33

## 2023-12-12 RX ADMIN — PIPERACILLIN AND TAZOBACTAM 25 GRAM(S): 4; .5 INJECTION, POWDER, LYOPHILIZED, FOR SOLUTION INTRAVENOUS at 22:08

## 2023-12-12 RX ADMIN — PIPERACILLIN AND TAZOBACTAM 25 GRAM(S): 4; .5 INJECTION, POWDER, LYOPHILIZED, FOR SOLUTION INTRAVENOUS at 06:32

## 2023-12-12 RX ADMIN — Medication 15 MILLIGRAM(S): at 12:24

## 2023-12-12 NOTE — DIETITIAN INITIAL EVALUATION ADULT - PROBLEM SELECTOR PLAN 4
Patient had history of syphilis s/p treatment. Denies symptoms. Physical exam elicits RLE numbness from hip to ankle. S/p penicillin G in ED.   - f/u Syphilis screen

## 2023-12-12 NOTE — PROGRESS NOTE ADULT - SUBJECTIVE AND OBJECTIVE BOX
POST-OPERATIVE NOTE    Procedure: exam under anesthesia + rectal biopsy    Diagnosis/Indication: rectal mass    Surgeon: Dr. Cortez    S: Pt states he is feeling tired but otherwise feeling well. Denies CP, SOB, N/V. Pain controlled with medication.    O:  T(C): 36.1 (12-12-23 @ 19:00), Max: 36.4 (12-12-23 @ 18:01)  T(F): 97 (12-12-23 @ 19:00), Max: 97.6 (12-12-23 @ 18:01)  HR: 77 (12-12-23 @ 19:15) (77 - 96)  BP: 119/77 (12-12-23 @ 19:15) (111/66 - 119/77)  RR: 15 (12-12-23 @ 19:15) (13 - 19)  SpO2: 96% (12-12-23 @ 19:15) (95% - 100%)  Wt(kg): --                        9.2    6.93  )-----------( 294      ( 12 Dec 2023 05:30 )             32.3     12-12    137  |  103  |  10  ----------------------------<  98  4.2   |  23  |  0.73    Ca    9.2      12 Dec 2023 05:30  Phos  4.5     12-12  Mg     2.3     12-12        Gen: NAD, resting comfortably in bed  C/V: NSR  Pulm: Nonlabored breathing, no respiratory distress, on room air  Abd: soft, NT/ND  Rectal: dressing in place -saturated w/ some sang/serosang fluid, no evidence of active bleeding  Extrem: WWP, no edema      A/P: 43y Male s/p above procedure  Diet: Regular  pain/nausea control  sitz baths TID and after every BM  psyllium once a day  hold sqh until am  care per primary team

## 2023-12-12 NOTE — DISCHARGE NOTE PROVIDER - ATTENDING DISCHARGE PHYSICAL EXAMINATION:
I have read and agree with the resident Discharge Note above.  Patient seen and discussed with resident team on the day of discharge.     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 Patient to complete cefpodoxime and flagyl for empiric treatment of possible superinfection per ID recs and follow up with colorectal surgery and oncology for treatment.  Planned for MRI but patient frustrated with repeated delays so will have it performed as outpatient if necessary     Attending exam on day of discharge:   Gen: NAD, lying comfortable in bed  HEENT: NCAT, MMM  Neck: supple, trachea at midline  CV: RRR, no m/g/r appreciated  Pulm: CTA B, no w/r/r, normal work of breathing  Abd: +BS, soft, NTND  : deferred  Ext: WWP, no c/c/e  Neuro: AOx3, no change from baseline  Psych: pleasant, conversant and appropriate    I was physically present for the evaluation and management services provided. I agree with the included history, physical, and plan which I reviewed and edited where appropriate. I spent > 30 minutes with the patient on direct patient care and discharge planning with more than 50% of the visit spent on counseling and/or coordination of care.     Chema Swann  [phone] I have read and agree with the resident Discharge Note above.  Patient seen and discussed with resident team on the day of discharge.     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 Patient to complete cefpodoxime and flagyl for empiric treatment of possible superinfection per ID recs and follow up with colorectal surgery and oncology for treatment.  Planned for MRI but patient frustrated with repeated delays so will have it performed as outpatient if necessary     Attending exam on day of discharge:   Gen: NAD, lying comfortable in bed  HEENT: NCAT, MMM  Neck: supple, trachea at midline  CV: RRR, no m/g/r appreciated  Pulm: CTA B, no w/r/r, normal work of breathing  Abd: +BS, soft, NTND  : deferred  Ext: WWP, no c/c/e  Neuro: AOx3, no change from baseline  Psych: pleasant, conversant and appropriate    I was physically present for the evaluation and management services provided. I agree with the included history, physical, and plan which I reviewed and edited where appropriate. I spent > 30 minutes with the patient on direct patient care and discharge planning with more than 50% of the visit spent on counseling and/or coordination of care.     Chema Swann  [phone]

## 2023-12-12 NOTE — DISCHARGE NOTE PROVIDER - NSDCCPTREATMENT_GEN_ALL_CORE_FT
PRINCIPAL PROCEDURE  Procedure: Anal surgical biopsy  Findings and Treatment: Rectal exam under anesthesia w/ biopsy. Pt placed in prone, armando-knife position. Prepped and draped in sterile fashion. YOSEPH performed w/ notable decreased sphincter tone and partially obliterated internal sphincter. Rigid proctoscope utilized; visualized necrotic mass vs chronic ulceration 6-8cm from anal verge. Biopsy taken w/ laparoscopic tenaculum. Chronic fistula vs mass noted 2cm from anal verge at 9-oclock. Mass vs fistula tract opening excised for biopsy as a 2x3x1.5cm sample w/ electrocautery; hemostasis achieved. Tract irrigated w/ Trevor Luu retractor in anus; no evidence of communication of tract w/ rectum. Rectum and wound packed w/ fibrillar, dry gauze placed over top.

## 2023-12-12 NOTE — DIETITIAN INITIAL EVALUATION ADULT - PROBLEM SELECTOR PLAN 7
Fluids: s/p 1L NS in ED  Electrolytes: Replete to keep K>4 and Mg>2  Nutrition: Regular diet  DVT ppx: Lovenox  GI ppx: None  Dispo: [facility]

## 2023-12-12 NOTE — DIETITIAN INITIAL EVALUATION ADULT - NS FNS DIET ORDER
He can use lovenox 100 mg s.c injection the day of the trip.   Diet, NPO after Midnight:      NPO Start Date: 11-Dec-2023,   NPO Start Time: 23:59  Except Medications (12-11-23 @ 21:26)

## 2023-12-12 NOTE — PROGRESS NOTE ADULT - SUBJECTIVE AND OBJECTIVE BOX
INTERVAL HPI/OVERNIGHT EVENTS:  Patient was seen and examined at bedside. As per nurse and patient, no o/n events, patient resting comfortably. No complaints at this time. Patient denies: fever, chills, lightheadedness, weakness, CP, palpitations, SOB, cough, N/V. ROS otherwise negative.    VITAL SIGNS:  T(F): 98 (12-12-23 @ 06:47)  HR: 100 (12-12-23 @ 06:47)  BP: 158/90 (12-12-23 @ 06:47)  RR: 18 (12-12-23 @ 06:47)  SpO2: 99% (12-12-23 @ 06:47)  Wt(kg): --      12-11-23 @ 07:01  -  12-12-23 @ 07:00  --------------------------------------------------------  IN: 300 mL / OUT: 0 mL / NET: 300 mL    12-12-23 @ 07:01  -  12-12-23 @ 08:57  --------------------------------------------------------  IN: 25 mL / OUT: 0 mL / NET: 25 mL        PHYSICAL EXAM:    Constitutional: resting comfortably in bed; NAD  HEENT: NC/AT, PER, anicteric sclera, no nasal discharge; MMM  Neck: supple; no JVD or thyromegaly  Respiratory: CTA B/L; no W/R/R, no retractions  Cardiac: +S1/S2; RRR; no M/R/G  Gastrointestinal: soft, NT/ND; no rebound or guarding  Back: spine midline, no bony tenderness or step-offs; no CVAT B/L  Extremities: WWP, no clubbing or cyanosis; no peripheral edema  Musculoskeletal: NROM x4; no joint swelling, tenderness or erythema  Vascular: 2+ radial, DP/PT pulses B/L  Dermatologic: skin warm, dry and intact; no rashes, wounds, or scars  Neurologic: AAOx3; CNII-XII grossly intact; no focal deficits  Psychiatric: affect and characteristics of appearance, verbalizations, behaviors are appropriate    MEDICATIONS  (STANDING):  bictegravir 50 mG/emtricitabine 200 mG/tenofovir alafenamide 25 mG (BIKTARVY) 1 Tablet(s) Oral daily  influenza   Vaccine 0.5 milliLiter(s) IntraMuscular once  ketorolac   Injectable 15 milliGRAM(s) IV Push every 6 hours  piperacillin/tazobactam IVPB.. 3.375 Gram(s) IV Intermittent every 8 hours  vancomycin  IVPB 1000 milliGRAM(s) IV Intermittent every 12 hours    MEDICATIONS  (PRN):  acetaminophen     Tablet .. 650 milliGRAM(s) Oral every 6 hours PRN Temp greater or equal to 38C (100.4F), Mild Pain (1 - 3)  aluminum hydroxide/magnesium hydroxide/simethicone Suspension 30 milliLiter(s) Oral every 4 hours PRN Dyspepsia  melatonin 3 milliGRAM(s) Oral at bedtime PRN Insomnia  ondansetron Injectable 4 milliGRAM(s) IV Push every 8 hours PRN Nausea and/or Vomiting      Allergies    No Known Allergies    Intolerances        LABS:                        9.2    6.93  )-----------( 294      ( 12 Dec 2023 05:30 )             32.3     12-12    137  |  103  |  10  ----------------------------<  98  4.2   |  23  |  0.73    Ca    9.2      12 Dec 2023 05:30  Phos  4.5     12-12  Mg     2.3     12-12      PT/INR - ( 12 Dec 2023 05:30 )   PT: 13.5 sec;   INR: 1.19          PTT - ( 12 Dec 2023 05:30 )  PTT:30.3 sec  Urinalysis Basic - ( 12 Dec 2023 05:30 )    Color: x / Appearance: x / SG: x / pH: x  Gluc: 98 mg/dL / Ketone: x  / Bili: x / Urobili: x   Blood: x / Protein: x / Nitrite: x   Leuk Esterase: x / RBC: x / WBC x   Sq Epi: x / Non Sq Epi: x / Bacteria: x        RADIOLOGY & ADDITIONAL TESTS:  Reviewed

## 2023-12-12 NOTE — DIETITIAN INITIAL EVALUATION ADULT - PROBLEM SELECTOR PLAN 1
1 month ago gradually developed progressively worsening abscess in right gluteal region now s/p spontaneous drainage. Non-toxic appearing, meets 1/4 SIRS criteria (tachycardic). Initially transferred from [facility] for surgical eval for debridement however no acute surgical intervention per surgery. Received vanc, zosyn, ceftriaxone, penicillin, augmentin and had wound culture collected at [facility] ED. History of MRSA abscess of abd wall. Per gen surg, will need EUA, tissue biopsy, diversion eventually  - surgery following, f/u recs  - f/u wound culture  - MRSA isolation for now  - c/w vanc, zosyn. Obtain vanc trough  - f/u MRI pelvis/anus/rectum w/wo contrast 1 month ago gradually developed progressively worsening abscess in right gluteal region now s/p spontaneous drainage. Non-toxic appearing, meets 1/4 SIRS criteria (tachycardic). Initially transferred from [facility] for surgical eval for debridement however no acute surgical intervention per surgery. Received vanc, zosyn, ceftriaxone, penicillin, augmentin and had wound culture collected at [facility] ED. History of MRSA abscess of abd wall. Per gen surg, will need EUA, tissue biopsy, diversion eventually  - surgery following, f/u recs  - f/u wound culture  - MRSA isolation for now  - c/w vanc, zosyn. Obtain vanc trough  - f/u MRI pelvis/anus/rectum w/wo contrast

## 2023-12-12 NOTE — DIETITIAN INITIAL EVALUATION ADULT - PROBLEM SELECTOR PLAN 5
Hb 8.4, baseline 9-11. No active bleed and no blood in stool or from abscess. Reports taking iron supplement once monthly for AARTI  - iron profile, B12, folic acid level  - Transfuse for Hb<7  - Maintain active T&S  - continue iron supplementation

## 2023-12-12 NOTE — DIETITIAN INITIAL EVALUATION ADULT - PROBLEM SELECTOR PLAN 3
Last MSM encounter 2-3 months ago. Reportedly goes to [facility] HIV clinic q6 months, last checked 6 months ago and reportedly VLUD and CD4 >200. Compliant with Biktarvy and no longer on bactrim  - c/w Biktarvy daily  - VL and CD4 Last MSM encounter 2-3 months ago. Reportedly goes to [facility] HIV clinic q6 months, last checked 6 months ago and reportedly VLUD and CD4 >200. Compliant with Biktarvy and no longer on bactrim  - c/w Biktarvy daily  - VL and CD4

## 2023-12-12 NOTE — DIETITIAN INITIAL EVALUATION ADULT - ADD RECOMMEND
1. Advance diet to regular as medically feasible, add Ensure Enlive 2x/day (350kcal, 20g protein per serving)   2. Encourage and monitor PO intake, honor preferences as able   >> Consistently meet >75% of estimated needs during admission   3. Monitor labs, wt trends, GI function, and skin integrity   4. Pain and bowel regimen per team   5. RD to remain available for additional nutrition interventions and diet edu as needed

## 2023-12-12 NOTE — BRIEF OPERATIVE NOTE - OPERATION/FINDINGS
Rectal exam under anesthesia w/ biopsy. Pt placed in prone, armando-knife position. Prepped and draped in sterile fashion. YOSEPH performed w/ notable decreased sphincter tone and partially obliterated internal sphincter. Rigid proctoscope utilized; visualized necrotic mass vs chronic ulceration 6-8cm from anal verge. Biopsy taken w/ laparoscopic tenaculum. Chronic fistula vs mass noted 2cm from anal verge at 9-oclock. Mass vs fistula tract opening excised for biopsy as a 2x3x1.5 sample w/ electrocautery; hemostasis achieved. Tract irrigated w/ Trevor Luu retractor in anus; no evidence of communication of tract w/ rectum. Rectum and wound packed w/ fibrillar, dry gauze placed over top. Rectal exam under anesthesia w/ biopsy. Pt placed in prone, armando-knife position. Prepped and draped in sterile fashion. YOSEPH performed w/ notable decreased sphincter tone and partially obliterated internal sphincter. Rigid proctoscope utilized; visualized necrotic mass vs chronic ulceration 6-8cm from anal verge. Biopsy taken w/ laparoscopic tenaculum. Chronic fistula vs mass noted 2cm from anal verge at 9-oclock. Mass vs fistula tract opening excised for biopsy as a 2x3x1.5cm sample w/ electrocautery; hemostasis achieved. Tract irrigated w/ Trevor Luu retractor in anus; no evidence of communication of tract w/ rectum. Rectum and wound packed w/ fibrillar, dry gauze placed over top.

## 2023-12-12 NOTE — DIETITIAN INITIAL EVALUATION ADULT - OTHER CALCULATIONS
Based on Standards of Care pt within % IBW (82%) thus actual body weight used for all calculations (150#). Needs adjusted for age, HIV.

## 2023-12-12 NOTE — DISCHARGE NOTE PROVIDER - NSDCFUSCHEDAPPT_GEN_ALL_CORE_FT
Juan Antonio Cortez  [facility]  [address] [facility]  Scheduled Appointment: 12/21/2023     Juan Antonio Cortez  [facility]  [address] [facility]  Scheduled Appointment: 12/21/2023

## 2023-12-12 NOTE — BRIEF OPERATIVE NOTE - NSICDXBRIEFPROCEDURE_GEN_ALL_CORE_FT
PROCEDURES:  Exam under anesthesia, rectal 12-Dec-2023 18:05:57  Omar Seals  Rectal biopsy 12-Dec-2023 18:06:03  Omar Seals

## 2023-12-12 NOTE — PROGRESS NOTE ADULT - ATTENDING COMMENTS
Pt is 42 yo man with HIV, AIDS, history of perirectal fistulas, admitted for evaluation of perirectal numbness and discharge. Imaging indicated presence of large rectal mass and subcutaneous collection.  Pt is planned for rectal mass biopsy today with possible SC collection drainage.   Pt started on Zosyn as rectal mass can be an infected tumor vs large collection. Will also order MRI of LS spine given bony destruction seen on CT to r/out OM. ID consult will be ordered once cultures from procedure are send and processed. Pt is currently on Zosyn to cover MSSA cultured from the  rectal drainage plus to cover GI tracie. Pt is 44 yo man with HIV, AIDS, history of perirectal fistulas, admitted for evaluation of perirectal numbness and discharge. Imaging indicated presence of large rectal mass and subcutaneous collection.  Pt is planned for rectal mass biopsy today with possible SC collection drainage.   Pt started on Zosyn as rectal mass can be an infected tumor vs large collection. Will also order MRI of LS spine given bony destruction seen on CT to r/out OM. ID consult will be ordered once cultures from procedure are send and processed. Pt is currently on Zosyn to cover MSSA cultured from the  rectal drainage plus to cover GI tracie.

## 2023-12-12 NOTE — DIETITIAN INITIAL EVALUATION ADULT - PERTINENT LABORATORY DATA
No 12-12    137  |  103  |  10  ----------------------------<  98  4.2   |  23  |  0.73    Ca    9.2      12 Dec 2023 05:30  Phos  4.5     12-12  Mg     2.3     12-12

## 2023-12-12 NOTE — DISCHARGE NOTE PROVIDER - NSDCCPCAREPLAN_GEN_ALL_CORE_FT
PRINCIPAL DISCHARGE DIAGNOSIS  Diagnosis: Gluteal abscess  Assessment and Plan of Treatment: Abscess  An abscess is an infected area that contains a collection of pus and debris. It can occur in almost any part of the body and occurs when the tissue gets infection. Symptoms include a painful mass that is red, warm, tender that might break open and have drainage. You were sent home with wound packing instructions and a suture removal kit, so you can use the forceps to pack gauze into the wound and cover it. This needs to be done everyday. Follow up with your pcp for further management.   SEEK MEDICAL CARE IF YOU HAVE THE FOLLOWING SYMPTOMS: chills, fever, muscle aches, or red streaking from the area.        SECONDARY DISCHARGE DIAGNOSES  Diagnosis: Syphilis  Assessment and Plan of Treatment: You were found to have latent syphillis and were treated with Penicilin G on admission. Please follow up with your PCP.     PRINCIPAL DISCHARGE DIAGNOSIS  Diagnosis: Gluteal abscess  Assessment and Plan of Treatment: Abscess  An abscess is an infected area that contains a collection of pus and debris. It can occur in almost any part of the body and occurs when the tissue gets infection. Symptoms include a painful mass that is red, warm, tender that might break open and have drainage. You were sent home with wound packing instructions and a suture removal kit, so you can use the forceps to pack gauze into the wound and cover it. This needs to be done everyday. Please follow up with your scheduled colorectal surgery appointment for your biopsy results and further management, including possibly getting an MRI.   SEEK MEDICAL CARE IF YOU HAVE THE FOLLOWING SYMPTOMS: chills, fever, muscle aches, or red streaking from the area.        SECONDARY DISCHARGE DIAGNOSES  Diagnosis: Syphilis  Assessment and Plan of Treatment: You were found to have latent syphillis and were treated with Penicilin G on admission. Please follow up with your PCP.

## 2023-12-12 NOTE — PROGRESS NOTE ADULT - ASSESSMENT
43-year-old MSM with past medical history of HIV (Biktarvy, last known CD4 2021 was 130, VL [id number]), polysubstance abuse, syphilis, anemia, and abdominal MRSA 2021, who presented with spontaneous drainage and mild pain from bilateral gluteal abscesses. The discharge and pain started 1 month ago, but worsened over the past few days, now with yellow, smelly discharge flowing spontaneously especially when he urinates or has bowel movements. Also endorses pain with defecation due to the gluteal abscesses. Of note patient has had recurrent perianal I&D in [facility] and [facility], Patient denies fever, change in bowel movement, urinary issue. Patient also reports numbness on the right leg, from perineal area to ankle, sparing the right foot, which is new compared to previous perianal drainage episodes.     OR today for rectal exam under anesthesia w/ possible biopsy   rest of care per primary team   thank you for the consult, appreciate excellent care by primary team    43-year-old MSM with past medical history of HIV (Biktarvy, last known CD4 2021 was 130, VL [id number]), polysubstance abuse, syphilis, anemia, and abdominal MRSA 2021, who presented with spontaneous drainage and mild pain from bilateral gluteal abscesses. The discharge and pain started 1 month ago, but worsened over the past few days, now with yellow, smelly discharge flowing spontaneously especially when he urinates or has bowel movements. Also endorses pain with defecation due to the gluteal abscesses. Of note patient has had recurrent perianal I&D in [facility] and [facility], Patient denies fever, change in bowel movement, urinary issue. Patient also reports numbness on the right leg, from perineal area to ankle, sparing the right foot, which is new compared to previous perianal drainage episodes.     OR today for rectal exam under anesthesia w/ possible biopsy   rest of care per primary team   thank you for the consult, appreciate excellent care by primary team

## 2023-12-12 NOTE — DIETITIAN INITIAL EVALUATION ADULT - PERTINENT MEDS FT
MEDICATIONS  (STANDING):  bictegravir 50 mG/emtricitabine 200 mG/tenofovir alafenamide 25 mG (BIKTARVY) 1 Tablet(s) Oral daily  influenza   Vaccine 0.5 milliLiter(s) IntraMuscular once  ketorolac   Injectable 15 milliGRAM(s) IV Push every 6 hours  piperacillin/tazobactam IVPB.. 3.375 Gram(s) IV Intermittent every 8 hours  vancomycin  IVPB 1000 milliGRAM(s) IV Intermittent every 12 hours    MEDICATIONS  (PRN):  acetaminophen     Tablet .. 650 milliGRAM(s) Oral every 6 hours PRN Temp greater or equal to 38C (100.4F), Mild Pain (1 - 3)  aluminum hydroxide/magnesium hydroxide/simethicone Suspension 30 milliLiter(s) Oral every 4 hours PRN Dyspepsia  melatonin 3 milliGRAM(s) Oral at bedtime PRN Insomnia  ondansetron Injectable 4 milliGRAM(s) IV Push every 8 hours PRN Nausea and/or Vomiting

## 2023-12-12 NOTE — DIETITIAN INITIAL EVALUATION ADULT - PROBLEM SELECTOR PLAN 2
Onset of numbness 1 month ago, no weakness. Suspect 2/2 large perianal abscess vs tumor compressing sciatic nerve. 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  - drainage of abscess, f/u surgery  - if persistent leg numbness, can trial gabapentin  - monitor for weakness or worsening numbness

## 2023-12-12 NOTE — DISCHARGE NOTE PROVIDER - NSDCMRMEDTOKEN_GEN_ALL_CORE_FT
amoxicillin-clavulanate 875 mg-125 mg oral tablet: 875 milligram(s) orally 2 times a day  bictegravir/emtricitabine/tenofovir 50 mg-200 mg-25 mg oral tablet: 1 tab(s) orally once a day  sulfamethoxazole-trimethoprim 800 mg-160 mg oral tablet: 1 tab(s) orally 2 times a day   bictegravir/emtricitabine/tenofovir 50 mg-200 mg-25 mg oral tablet: 1 tab(s) orally once a day  sulfamethoxazole-trimethoprim 800 mg-160 mg oral tablet: 1 tab(s) orally 2 times a day   bictegravir/emtricitabine/tenofovir 50 mg-200 mg-25 mg oral tablet: 1 tab(s) orally once a day

## 2023-12-12 NOTE — DIETITIAN INITIAL EVALUATION ADULT - OTHER INFO
43-year-old MSM with past medical history of HIV (Biktarvy, last known CD4 2021 was 130, VL [id number]), polysubstance abuse, syphilis, anemia, and abdominal MRSA 2021, who presented with spontaneous drainage and mild pain from bilateral gluteal abscesses. The discharge and pain started 1 month ago, but worsened over the past few days, now with yellow, smelly discharge flowing spontaneously especially when he urinates or has bowel movements. Also endorses pain with defecation due to the gluteal abscesses. Of note patient has had recurrent perianal I&D in [facility] and [facility], Patient denies fever, change in bowel movement, urinary issue. Patient also reports numbness on the right leg, from perineal area to ankle, sparing the right foot, which is new compared to previous perianal drainage episodes.     Patient seen at bedside for nutrition assessment. Currently NPO for biopsy today, previously ordered for regular diet. Confirms NKFA. No difficulty chewing/swallowing reported. Reports good appetite in-house and PTA. Likes to eat foods such as mac n cheese, vegetable lasagna, pasta. Reports # and denies recent weight loss, however current dosing weight 150#. Weight taken via bedscale by this RD at 155#. Suggests ~10-15# weight loss, timeframe unknown- will continue to monitor weight trends in-house. Patient denies any changes in diet PTA, weight loss may be related to increased needs related to HIV dx. NFPE notable for mild muscle wasting to temples and clavicles. Patient likely at risk for malnutrition however does not meet ASPEN criteria at this time. Discussed increased kcal and protein needs in view of HIV, patient amenable to add Ensure Enlive 2x/day. Educated patient on drinking supplement between meals to optimize PO intake of food at meal times- patient agreeable. No pressure injuries or edema documented. Endorses fluctuating diarrhea/constipation, last BM 12/11 per EMR. Nutrition-related labs WDL, glucose trending  x 48 hours. Meds: abx, biktarvy, zofran. See nutrition recommendations below.  43-year-old MSM with past medical history of HIV (Biktarvy, last known CD4 2021 was 130, VL [id number]), polysubstance abuse, syphilis, anemia, and abdominal MRSA 2021, who presented with spontaneous drainage and mild pain from bilateral gluteal abscesses. The discharge and pain started 1 month ago, but worsened over the past few days, now with yellow, smelly discharge flowing spontaneously especially when he urinates or has bowel movements. Also endorses pain with defecation due to the gluteal abscesses. Of note patient has had recurrent perianal I&D in [facility] and [facility], Patient denies fever, change in bowel movement, urinary issue. Patient also reports numbness on the right leg, from perineal area to ankle, sparing the right foot, which is new compared to previous perianal drainage episodes.     Patient seen at bedside for nutrition assessment. Currently NPO for biopsy today, previously ordered for regular diet. Confirms NKFA. No difficulty chewing/swallowing reported. Reports good appetite in-house and PTA. Likes to eat foods such as mac n cheese, vegetable lasagna, pasta. Reports # and denies recent weight loss, however current dosing weight 150#. Weight taken via bedscale by this RD at 155#. Suggests ~10-15# weight loss, timeframe unknown- will continue to monitor weight trends in-house. Patient denies any changes in diet PTA, weight loss may be related to increased needs related to HIV dx. NFPE notable for mild muscle wasting to temples and clavicles. Patient likely at risk for malnutrition however does not meet ASPEN criteria at this time. Discussed increased kcal and protein needs in view of HIV, patient amenable to add Ensure Enlive 2x/day. Educated patient on drinking supplement between meals to optimize PO intake of food at meal times- patient agreeable. No pressure injuries or edema documented. Endorses fluctuating diarrhea/constipation, last BM 12/11 per EMR. Nutrition-related labs WDL, glucose trending  x 48 hours. Meds: abx, biktarvy, zofran. See nutrition recommendations below.

## 2023-12-12 NOTE — PROGRESS NOTE ADULT - PROBLEM SELECTOR PLAN 3
-Reportedly goes to [facility] HIV clinic q6 months, last checked 6 months ago and reportedly VLUD and CD4 >200. Compliant with Biktarvy and no longer on bactrim  - c/w Biktarvy daily  - CD4 266. -Reportedly goes to [facility] HIV clinic q6 months, last checked 6 months ago and reportedly VLUD and CD4 >200. Compliant with Biktarvy and no longer on bactrim  - c/w Biktarvy daily  - CD4 266.

## 2023-12-12 NOTE — DISCHARGE NOTE PROVIDER - NSDCFUADDAPPT_GEN_ALL_CORE_FT
Juan Antonio Cortez  [facility]  [place] [id number] [address]  Scheduled Appointment: 12/21/2023 at 1:30pm  Juan Antonio Cortez  [facility]  [place] [id number] [address]  Scheduled Appointment: 12/21/2023 at 1:30pm

## 2023-12-12 NOTE — PROGRESS NOTE ADULT - SUBJECTIVE AND OBJECTIVE BOX
SUBJECTIVE:  Pt seen this AM on rounds. Pt endorses feeling well o/n. Pt aware of plan for OR today for EUA w/ poss biopsy.     MEDICATIONS  (STANDING):  bictegravir 50 mG/emtricitabine 200 mG/tenofovir alafenamide 25 mG (BIKTARVY) 1 Tablet(s) Oral daily  influenza   Vaccine 0.5 milliLiter(s) IntraMuscular once  ketorolac   Injectable 15 milliGRAM(s) IV Push every 6 hours  piperacillin/tazobactam IVPB.. 3.375 Gram(s) IV Intermittent every 8 hours  vancomycin  IVPB 1000 milliGRAM(s) IV Intermittent every 12 hours    MEDICATIONS  (PRN):  acetaminophen     Tablet .. 650 milliGRAM(s) Oral every 6 hours PRN Temp greater or equal to 38C (100.4F), Mild Pain (1 - 3)  aluminum hydroxide/magnesium hydroxide/simethicone Suspension 30 milliLiter(s) Oral every 4 hours PRN Dyspepsia  melatonin 3 milliGRAM(s) Oral at bedtime PRN Insomnia  ondansetron Injectable 4 milliGRAM(s) IV Push every 8 hours PRN Nausea and/or Vomiting      Vital Signs Last 24 Hrs  T(C): 36.7 (12 Dec 2023 06:47), Max: 36.7 (11 Dec 2023 12:15)  T(F): 98 (12 Dec 2023 06:47), Max: 98.1 (11 Dec 2023 12:15)  HR: 100 (12 Dec 2023 06:47) (97 - 100)  BP: 158/90 (12 Dec 2023 06:47) (133/75 - 158/90)  BP(mean): --  RR: 18 (12 Dec 2023 06:47) (18 - 18)  SpO2: 99% (12 Dec 2023 06:47) (99% - 99%)    Parameters below as of 12 Dec 2023 06:47  Patient On (Oxygen Delivery Method): room air        Physical Exam:  General: NAD, resting comfortably in bed  Pulmonary: Nonlabored breathing, no respiratory distress  Cardiovascular: NSR  Abdominal: soft, NT/ND  Extremities: WWP, normal strength  Neuro: A/O x 3, CNs II-XII grossly intact, no focal deficits    I&O's Summary    11 Dec 2023 07:01  -  12 Dec 2023 07:00  --------------------------------------------------------  IN: 300 mL / OUT: 0 mL / NET: 300 mL    12 Dec 2023 07:01  -  12 Dec 2023 11:41  --------------------------------------------------------  IN: 25 mL / OUT: 0 mL / NET: 25 mL        LABS:                        9.2    6.93  )-----------( 294      ( 12 Dec 2023 05:30 )             32.3     12-12    137  |  103  |  10  ----------------------------<  98  4.2   |  23  |  0.73    Ca    9.2      12 Dec 2023 05:30  Phos  4.5     12-12  Mg     2.3     12-12      PT/INR - ( 12 Dec 2023 05:30 )   PT: 13.5 sec;   INR: 1.19          PTT - ( 12 Dec 2023 05:30 )  PTT:30.3 sec  Urinalysis Basic - ( 12 Dec 2023 05:30 )    Color: x / Appearance: x / SG: x / pH: x  Gluc: 98 mg/dL / Ketone: x  / Bili: x / Urobili: x   Blood: x / Protein: x / Nitrite: x   Leuk Esterase: x / RBC: x / WBC x   Sq Epi: x / Non Sq Epi: x / Bacteria: x      CAPILLARY BLOOD GLUCOSE            RADIOLOGY & ADDITIONAL STUDIES:

## 2023-12-12 NOTE — DISCHARGE NOTE PROVIDER - HOSPITAL COURSE
#Discharge: do not delete    43-year-old MSM with past medical history of HIV (Biktarvy, last known CD4 2021 was 130, VL [id number]), polysubstance abuse, syphilis, anemia, and abdominal MRSA 2021, who presented with spontaneous drainage and mild pain from bilateral gluteal abscesses. Pt also found the have latent syphillis and treated with penicillin G.  The discharge and pain started 1 month ago, but worsened over the past few days, now with yellow, smelly discharge flowing spontaneously especially when he urinates or has bowel movements. Also endorses pain with defecation due to the gluteal abscesses. Of note patient has had recurrent [facility]ianal [facility], Patient denies fever, change in bowel movement, urinary issue. Patient also reports numbness on the right leg, from perineal area to ankle, sparing the right foot, which is new compared to previous perianal drainage episodes. Pt now s/p evaluation under anesthesia with tissue biopsy sent to pathology.           Problem/Plan - 1:  ·  Problem: Perianal abscess.   ·  Plan: -CT showing with possible malignancy  -Surgery consulted, plan to do evaluation under anesthesia, tissue biopsy tomorrow. Pt will be NPO at midnight.  -Oncology/Surg Onc consult s/p procedure.     Problem/Plan - 2:  ·  Problem: Right leg numbness.   ·  Plan: -Onset of numbness 1 month ago, no weakness. Suspect 2/2 large perianal abscess vs tumor compressing sciatic nerve. 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  - drainage of abscess, f/u surgery  - monitor for weakness or worsening numbness.     Problem/Plan - 3:  ·  Problem: HIV (human immunodeficiency virus infection).   ·  Plan: -Reportedly goes to [facility] HIV clinic q6 months, last checked 6 months ago and reportedly VLUD and CD4 >200. Compliant with Biktarvy and no longer on bactrim  - c/w Biktarvy daily  - CD4 266.     Problem/Plan - 4:  ·  Problem: Polysubstance abuse.   ·  Plan: -Reports taking meth (smokes), alcohol (socially), marijuana  - social work assistance for rehab  - SBIRT consulted, per note pt refused.     Problem/Plan - 5:  ·  Problem: Syphilis, latent.   ·  Plan: -Patient had history of syphilis s/p treatment. Denies symptoms. Physical exam elicits RLE numbness from hip to ankle. S/p penicillin G in ED.     Problem/Plan - 6:  ·  Problem: Anemia.   ·  Plan: - Hb 8.4, baseline 9-11. No active bleed and no blood in stool or from abscess. Reports taking iron supplement once monthly for AARTI  - iron profile, B12, folic acid level  - Transfuse for Hb<7  - Maintain active T&S  - continue iron supplementation.        Patient was discharged to: (home/[facility]/acute rehab/hospice, etc, and with what services – home health PT/RN? Home O2?)  New medications:  Changes to old medications:  Medications that were stopped:   Items to follow up as outpatient:  Physical exam at the time of discharge: #Discharge: do not delete    43-year-old MSM with past medical history of HIV (Biktarvy, last known CD4 2021 was 130, VL [id number]), polysubstance abuse, syphilis, anemia, and abdominal MRSA 2021, who presented with spontaneous drainage and mild pain from bilateral gluteal abscesses. Pt also found the have latent syphillis and treated with penicillin G.  The discharge and pain started 1 month ago, but worsened over the past few days, now with yellow, smelly discharge flowing spontaneously especially when he urinates or has bowel movements. Also endorses pain with defecation due to the gluteal abscesses. Of note patient has had recurrent perianal I&D in [facility] and [facility], Patient denies fever, change in bowel movement, urinary issue. Patient also reports numbness on the right leg, from perineal area to ankle, sparing the right foot, which is new compared to previous perianal drainage episodes. Pt now s/p evaluation under anesthesia with tissue biopsy sent to pathology.           Problem/Plan - 1:  ·  Problem: Perianal abscess.   ·  Plan: -CT showing with possible malignancy  -Surgery consulted, plan to do evaluation under anesthesia, tissue biopsy tomorrow. Pt will be NPO at midnight.  -Oncology/Surg Onc consult s/p procedure.     Problem/Plan - 2:  ·  Problem: Right leg numbness.   ·  Plan: -Onset of numbness 1 month ago, no weakness. Suspect 2/2 large perianal abscess vs tumor compressing sciatic nerve. 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  - drainage of abscess, f/u surgery  - monitor for weakness or worsening numbness.     Problem/Plan - 3:  ·  Problem: HIV (human immunodeficiency virus infection).   ·  Plan: -Reportedly goes to [facility] HIV clinic q6 months, last checked 6 months ago and reportedly VLUD and CD4 >200. Compliant with Biktarvy and no longer on bactrim  - c/w Biktarvy daily  - CD4 266.     Problem/Plan - 4:  ·  Problem: Polysubstance abuse.   ·  Plan: -Reports taking meth (smokes), alcohol (socially), marijuana  - social work assistance for rehab  - SBIRT consulted, per note pt refused.     Problem/Plan - 5:  ·  Problem: Syphilis, latent.   ·  Plan: -Patient had history of syphilis s/p treatment. Denies symptoms. Physical exam elicits RLE numbness from hip to ankle. S/p penicillin G in ED.     Problem/Plan - 6:  ·  Problem: Anemia.   ·  Plan: - Hb 8.4, baseline 9-11. No active bleed and no blood in stool or from abscess. Reports taking iron supplement once monthly for AARTI  - iron profile, B12, folic acid level  - Transfuse for Hb<7  - Maintain active T&S  - continue iron supplementation.        Patient was discharged to: (home/[facility]/acute rehab/hospice, etc, and with what services – home health PT/RN? Home O2?)  New medications:  Changes to old medications:  Medications that were stopped:   Items to follow up as outpatient:  Physical exam at the time of discharge: #Discharge: do not delete    43-year-old MSM with past medical history of HIV (Biktarvy, last known CD4 2021 was 130, VL [id number]), polysubstance abuse, syphilis, anemia, and abdominal MRSA 2021, who presented with spontaneous drainage and mild pain from bilateral gluteal abscesses. Pt also found the have latent syphillis and treated with penicillin G.  The discharge and pain started 1 month ago, but worsened over the past few days, now with yellow, smelly discharge flowing spontaneously especially when he urinates or has bowel movements. Also endorses pain with defecation due to the gluteal abscesses. Of note patient has had recurrent perianal I&D in [facility] and [facility], Patient denies fever, change in bowel movement, urinary issue. Patient also reports numbness on the right leg, from perineal area to ankle, sparing the right foot, which is new compared to previous perianal drainage episodes. Pt now s/p evaluation under anesthesia with tissue biopsy sent to pathology.           Problem/Plan - 1:  ·  Problem: Perianal abscess.   ·  Plan: -CT showing with possible malignancy  -Surgery consulted, plan to do evaluation under anesthesia, tissue biopsy tomorrow. Pt will be NPO at midnight.  -Oncology/Surg Onc consult s/p procedure.     Problem/Plan - 2:  ·  Problem: Right leg numbness.   ·  Plan: -Onset of numbness 1 month ago, no weakness. Suspect 2/2 large perianal abscess vs tumor compressing sciatic nerve. 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  - drainage of abscess, f/u surgery  - monitor for weakness or worsening numbness.     Problem/Plan - 3:  ·  Problem: HIV (human immunodeficiency virus infection).   ·  Plan: -Reportedly goes to [facility] HIV clinic q6 months, last checked 6 months ago and reportedly VLUD and CD4 >200. Compliant with Biktarvy and no longer on bactrim  - c/w Biktarvy daily  - CD4 266.     Problem/Plan - 4:  ·  Problem: Polysubstance abuse.   ·  Plan: -Reports taking meth (smokes), alcohol (socially), marijuana  - social work assistance for rehab  - SBIRT consulted, per note pt refused.     Problem/Plan - 5:  ·  Problem: Syphilis, latent.   ·  Plan: -Patient had history of syphilis s/p treatment. Denies symptoms. Physical exam elicits RLE numbness from hip to ankle. S/p penicillin G in ED.     Problem/Plan - 6:  ·  Problem: Anemia.   ·  Plan: - Hb 8.4, baseline 9-11. No active bleed and no blood in stool or from abscess. Reports taking iron supplement once monthly for AARTI  - iron profile, B12, folic acid level  - Transfuse for Hb<7  - Maintain active T&S  - continue iron supplementation.        Patient was discharged to: home    New medications: none  Changes to old medications: none  Medications that were stopped:     Items to follow up as outpatient: colorectal surgery for biopsy results and follow up      Physical exam at the time of discharge:  Constitutional: NAD, comfortable in bed.  Neck: Supple, no JVD  Respiratory: CTA B/L. No w/r/r.   Cardiovascular: RRR, normal S1 and S2, no m/r/g.   Gastrointestinal: +BS, soft NTND, no guarding or rebound tenderness, no palpable masses   Extremities: wwp; no cyanosis, clubbing or edema.   Neurological: AAOx3, no CN deficits, reports ongoing R leg numbness without weakness.  Skin: B/L Perianal abscess    #Discharge: do not delete    43-year-old MSM with past medical history of HIV (Biktarvy, last known CD4 2021 was 130, VL [id number]), polysubstance abuse, syphilis, anemia, and abdominal MRSA 2021, who presented with spontaneous drainage and mild pain from bilateral gluteal abscesses. Pt also found the have latent syphillis and treated with penicillin G.  The discharge and pain started 1 month ago, but worsened over the past few days, now with yellow, smelly discharge flowing spontaneously especially when he urinates or has bowel movements. Also endorses pain with defecation due to the gluteal abscesses. Of note patient has had recurrent perianal I&D in [facility] and [facility], Patient denies fever, change in bowel movement, urinary issue. Patient also reports numbness on the right leg, from perineal area to ankle, sparing the right foot, which is new compared to previous perianal drainage episodes. Pt now s/p evaluation under anesthesia with tissue biopsy sent to pathology.           Problem/Plan - 1:  ·  Problem: Perianal abscess.   ·  Plan: -CT showing with possible malignancy  -Surgery consulted, plan to do evaluation under anesthesia, tissue biopsy tomorrow. Pt will be NPO at midnight.  -Oncology/Surg Onc consult s/p procedure.     Problem/Plan - 2:  ·  Problem: Right leg numbness.   ·  Plan: -Onset of numbness 1 month ago, no weakness. Suspect 2/2 large perianal abscess vs tumor compressing sciatic nerve. 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  - drainage of abscess, f/u surgery  - monitor for weakness or worsening numbness.     Problem/Plan - 3:  ·  Problem: HIV (human immunodeficiency virus infection).   ·  Plan: -Reportedly goes to [facility] HIV clinic q6 months, last checked 6 months ago and reportedly VLUD and CD4 >200. Compliant with Biktarvy and no longer on bactrim  - c/w Biktarvy daily  - CD4 266.     Problem/Plan - 4:  ·  Problem: Polysubstance abuse.   ·  Plan: -Reports taking meth (smokes), alcohol (socially), marijuana  - social work assistance for rehab  - SBIRT consulted, per note pt refused.     Problem/Plan - 5:  ·  Problem: Syphilis, latent.   ·  Plan: -Patient had history of syphilis s/p treatment. Denies symptoms. Physical exam elicits RLE numbness from hip to ankle. S/p penicillin G in ED.     Problem/Plan - 6:  ·  Problem: Anemia.   ·  Plan: - Hb 8.4, baseline 9-11. No active bleed and no blood in stool or from abscess. Reports taking iron supplement once monthly for AARTI  - iron profile, B12, folic acid level  - Transfuse for Hb<7  - Maintain active T&S  - continue iron supplementation.        Patient was discharged to: home    New medications: none  Changes to old medications: none  Medications that were stopped:     Items to follow up as outpatient: colorectal surgery for biopsy results and follow up      Physical exam at the time of discharge:  Constitutional: NAD, comfortable in bed.  Neck: Supple, no JVD  Respiratory: CTA B/L. No w/r/r.   Cardiovascular: RRR, normal S1 and S2, no m/r/g.   Gastrointestinal: +BS, soft NTND, no guarding or rebound tenderness, no palpable masses   Extremities: wwp; no cyanosis, clubbing or edema.   Neurological: AAOx3, no CN deficits, reports ongoing R leg numbness without weakness.  Skin: B/L Perianal abscess    #Discharge: do not delete    43-year-old MSM with past medical history of HIV (Biktarvy, last known CD4 2021 was 130, VL [id number]), polysubstance abuse, syphilis, anemia, and abdominal MRSA 2021, who presented with spontaneous drainage and mild pain from bilateral gluteal abscesses. Pt also found the have latent syphillis and treated with penicillin G.  The discharge and pain started 1 month ago, but worsened over the past few days, now with yellow, smelly discharge flowing spontaneously especially when he urinates or has bowel movements. Also endorses pain with defecation due to the gluteal abscesses. Of note patient has had recurrent perianal I&D in [facility] and [facility], Patient denies fever, change in bowel movement, urinary issue. Patient also reports numbness on the right leg, from perineal area to ankle, sparing the right foot, which is new compared to previous perianal drainage episodes. Pt now s/p evaluation under anesthesia with tissue biopsy sent to pathology.            Perianal abscess.   ·  Plan: -CT showing with possible malignancy  -MRI to r/o osteomyelitis pending.   -Surgery consulted, Biopsy & EUA performed 12/13.   -Pt requesting to follow up biopsy results and follow with oncology outpatient  -12/13 started toradol 15mg q8 for pain control.     Problem/Plan - 2:  ·  Problem: Right leg numbness.   ·  Plan: -Onset of numbness 1 month ago, no weakness. Suspect 2/2 large perianal abscess vs tumor compressing sciatic nerve. 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  - drainage of abscess, f/u surgery  - monitor for weakness or worsening numbness.     Problem/Plan - 3:  ·  Problem: HIV (human immunodeficiency virus infection).   ·  Plan: -Reportedly goes to [facility] HIV clinic q6 months, last checked 6 months ago and reportedly VLUD and CD4 >200. Compliant with Biktarvy and no longer on bactrim  - c/w Biktarvy daily  - CD4 266.     Problem/Plan - 4:  ·  Problem: Polysubstance abuse.   ·  Plan: -Reports taking meth (smokes), alcohol (socially), marijuana  - social work assistance for rehab  - SBIRT consulted, per note pt refused.     Problem/Plan - 5:  ·  Problem: Syphilis, latent.   ·  Plan: -Patient had history of syphilis s/p treatment. Denies symptoms. Physical exam elicits RLE numbness from hip to ankle. S/p penicillin G in ED.     Problem/Plan - 6:  ·  Problem: Anemia.   ·  Plan: - Hb 8.4, baseline 9-11. No active bleed and no blood in stool or from abscess. Reports taking iron supplement once monthly for AARTI  - iron profile, B12, folic acid level  - Transfuse for Hb<7  - Maintain active T&S  - continue iron supplementation.      Patient was discharged to: home    New medications: none  Changes to old medications: none  Medications that were stopped:     Items to follow up as outpatient: colorectal surgery for biopsy results and follow up      Physical exam at the time of discharge:  Constitutional: NAD, comfortable in bed.  Neck: Supple, no JVD  Respiratory: CTA B/L. No w/r/r.   Cardiovascular: RRR, normal S1 and S2, no m/r/g.   Gastrointestinal: +BS, soft NTND, no guarding or rebound tenderness, no palpable masses   Extremities: wwp; no cyanosis, clubbing or edema.   Neurological: AAOx3, no CN deficits, reports ongoing R leg numbness without weakness.  Skin: B/L Perianal abscess    #Discharge: do not delete    43-year-old MSM with past medical history of HIV (Biktarvy, last known CD4 2021 was 130, VL [id number]), polysubstance abuse, syphilis, anemia, and abdominal MRSA 2021, who presented with spontaneous drainage and mild pain from bilateral gluteal abscesses. Pt also found the have latent syphillis and treated with penicillin G.  The discharge and pain started 1 month ago, but worsened over the past few days, now with yellow, smelly discharge flowing spontaneously especially when he urinates or has bowel movements. Also endorses pain with defecation due to the gluteal abscesses. Of note patient has had recurrent perianal I&D in [facility] and [facility], Patient denies fever, change in bowel movement, urinary issue. Patient also reports numbness on the right leg, from perineal area to ankle, sparing the right foot, which is new compared to previous perianal drainage episodes. Pt now s/p evaluation under anesthesia with tissue biopsy sent to pathology.            Perianal abscess.   ·  Plan: -CT showing with possible malignancy  -MRI to r/o osteomyelitis pending.   -Surgery consulted, Biopsy & EUA performed 12/13.   -Pt requesting to follow up biopsy results and follow with oncology outpatient  -12/13 started toradol 15mg q8 for pain control.     Problem/Plan - 2:  ·  Problem: Right leg numbness.   ·  Plan: -Onset of numbness 1 month ago, no weakness. Suspect 2/2 large perianal abscess vs tumor compressing sciatic nerve. 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  - drainage of abscess, f/u surgery  - monitor for weakness or worsening numbness.     Problem/Plan - 3:  ·  Problem: HIV (human immunodeficiency virus infection).   ·  Plan: -Reportedly goes to [facility] HIV clinic q6 months, last checked 6 months ago and reportedly VLUD and CD4 >200. Compliant with Biktarvy and no longer on bactrim  - c/w Biktarvy daily  - CD4 266.     Problem/Plan - 4:  ·  Problem: Polysubstance abuse.   ·  Plan: -Reports taking meth (smokes), alcohol (socially), marijuana  - social work assistance for rehab  - SBIRT consulted, per note pt refused.     Problem/Plan - 5:  ·  Problem: Syphilis, latent.   ·  Plan: -Patient had history of syphilis s/p treatment. Denies symptoms. Physical exam elicits RLE numbness from hip to ankle. S/p penicillin G in ED.     Problem/Plan - 6:  ·  Problem: Anemia.   ·  Plan: - Hb 8.4, baseline 9-11. No active bleed and no blood in stool or from abscess. Reports taking iron supplement once monthly for AARTI  - iron profile, B12, folic acid level  - Transfuse for Hb<7  - Maintain active T&S  - continue iron supplementation.      Patient was discharged to: home    New medications: none  Changes to old medications: none  Medications that were stopped:     Items to follow up as outpatient: colorectal surgery for biopsy results and follow up      Physical exam at the time of discharge:  Constitutional: NAD, comfortable in bed.  Neck: Supple, no JVD  Respiratory: CTA B/L. No w/r/r.   Cardiovascular: RRR, normal S1 and S2, no m/r/g.   Gastrointestinal: +BS, soft NTND, no guarding or rebound tenderness, no palpable masses   Extremities: wwp; no cyanosis, clubbing or edema.   Neurological: AAOx3, no CN deficits, reports ongoing R leg numbness without weakness.  Skin: B/L Perianal abscess    #Discharge: do not delete    43-year-old MSM with past medical history of HIV (Biktarvy, last known CD4 2021 was 130, VL [id number]), polysubstance abuse, syphilis, anemia, and abdominal MRSA 2021, who presented with spontaneous drainage and mild pain from bilateral gluteal abscesses. Pt also found the have latent syphillis and treated with penicillin G.  The discharge and pain started 1 month ago, but worsened over the past few days, now with yellow, smelly discharge flowing spontaneously especially when he urinates or has bowel movements. Also endorses pain with defecation due to the gluteal abscesses. Of note patient has had recurrent perianal I&D in [facility] and [facility], Patient denies fever, change in bowel movement, urinary issue. Patient also reports numbness on the right leg, from perineal area to ankle, sparing the right foot, which is new compared to previous perianal drainage episodes. Pt now s/p evaluation under anesthesia with tissue biopsy sent to pathology.          #Perianal abscess.   CT showing with possible malignancy. Surgery consulted, Biopsy & EUA performed 12/13.   -f/u biopsy results outpatient  -f/u outpatient MRI      #Right leg numbness.   Onset of numbness 1 month ago, no weakness. Suspect 2/2 large perianal abscess vs tumor compressing sciatic nerve. 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 No weakness throughout hospitalization.  -f/u with surgery    #HIV (human immunodeficiency virus infection).   Reportedly goes to [facility] HIV clinic q6 months, last checked 6 months ago and reportedly VLUD and CD4 >200. Compliant with Biktarvy and no longer on bactrim. Continued with Biktarvy. CD4 266  - c/w Biktarvy daily    #Polysubstance abuse.   Reports taking meth (smokes), alcohol (socially), marijuana; patient refused rehab.       #Syphilis, latent.   Patient had history of syphilis s/p treatment. Denies symptoms. Physical exam elicits RLE numbness from hip to ankle. S/p penicillin G in ED.  -f/u with PCP    #Anemia  Hb 8.4, baseline 9-11. No active bleed and no blood in stool or from abscess. Reports taking iron supplement once monthly for AARTI.  -continue iron supplementation      Patient was discharged to: home    New medications: none  Changes to old medications: none  Medications that were stopped:     Items to follow up as outpatient: colorectal surgery for biopsy results and follow up      Physical exam at the time of discharge:  Constitutional: NAD, comfortable in bed.  Neck: Supple, no JVD  Respiratory: CTA B/L. No w/r/r.   Cardiovascular: RRR, normal S1 and S2, no m/r/g.   Gastrointestinal: +BS, soft NTND, no guarding or rebound tenderness, no palpable masses   Extremities: wwp; no cyanosis, clubbing or edema.   Neurological: AAOx3, no CN deficits, reports ongoing R leg numbness without weakness.  Skin: B/L Perianal abscess    #Discharge: do not delete    43-year-old MSM with past medical history of HIV (Biktarvy, last known CD4 2021 was 130, VL 432136), polysubstance abuse, syphilis, anemia, and abdominal MRSA 2021, who presented with spontaneous drainage and mild pain from bilateral gluteal abscesses. Pt also found the have latent syphillis and treated with penicillin G.  The discharge and pain started 1 month ago, but worsened over the past few days, now with yellow, smelly discharge flowing spontaneously especially when he urinates or has bowel movements. Also endorses pain with defecation due to the gluteal abscesses. Of note patient has had recurrent perianal I&D in [facility] and [facility], Patient denies fever, change in bowel movement, urinary issue. Patient also reports numbness on the right leg, from perineal area to ankle, sparing the right foot, which is new compared to previous perianal drainage episodes. Pt now s/p evaluation under anesthesia with tissue biopsy sent to pathology.          #Perianal abscess.   CT showing with possible malignancy. Surgery consulted, Biopsy & EUA performed 12/13.   -f/u biopsy results outpatient  -f/u outpatient MRI      #Right leg numbness.   Onset of numbness 1 month ago, no weakness. Suspect 2/2 large perianal abscess vs tumor compressing sciatic nerve. 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 No weakness throughout hospitalization.  -f/u with surgery    #HIV (human immunodeficiency virus infection).   Reportedly goes to [facility] HIV clinic q6 months, last checked 6 months ago and reportedly VLUD and CD4 >200. Compliant with Biktarvy and no longer on bactrim. Continued with Biktarvy. CD4 266  - c/w Biktarvy daily    #Polysubstance abuse.   Reports taking meth (smokes), alcohol (socially), marijuana; patient refused rehab.       #Syphilis, latent.   Patient had history of syphilis s/p treatment. Denies symptoms. Physical exam elicits RLE numbness from hip to ankle. S/p penicillin G in ED.  -f/u with PCP    #Anemia  Hb 8.4, baseline 9-11. No active bleed and no blood in stool or from abscess. Reports taking iron supplement once monthly for AARTI.  -continue iron supplementation      Patient was discharged to: home    New medications: none  Changes to old medications: none  Medications that were stopped:     Items to follow up as outpatient: colorectal surgery for biopsy results and follow up      Physical exam at the time of discharge:  Constitutional: NAD, comfortable in bed.  Neck: Supple, no JVD  Respiratory: CTA B/L. No w/r/r.   Cardiovascular: RRR, normal S1 and S2, no m/r/g.   Gastrointestinal: +BS, soft NTND, no guarding or rebound tenderness, no palpable masses   Extremities: wwp; no cyanosis, clubbing or edema.   Neurological: AAOx3, no CN deficits, reports ongoing R leg numbness without weakness.  Skin: B/L Perianal abscess    #Discharge: do not delete    43-year-old MSM with past medical history of HIV (Biktarvy, last known CD4 2021 was 130, VL [id number]), polysubstance abuse, syphilis, anemia, and abdominal MRSA 2021, who presented with spontaneous drainage and mild pain from bilateral gluteal abscesses. Pt also found the have latent syphillis and treated with penicillin G.  The discharge and pain started 1 month ago, but worsened over the past few days, now with yellow, smelly discharge flowing spontaneously especially when he urinates or has bowel movements. Also endorses pain with defecation due to the gluteal abscesses. Of note patient has had recurrent perianal I&D in [facility] and [facility], Patient denies fever, change in bowel movement, urinary issue. Patient also reports numbness on the right leg, from perineal area to ankle, sparing the right foot, which is new compared to previous perianal drainage episodes. Pt now s/p evaluation under anesthesia with tissue biopsy sent to pathology.          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   CT showing with possible malignancy. Surgery consulted, Biopsy & EUA performed 12/13.   -f/u with colorectal surgery  -f/u outpatient MRI      #Right leg numbness.   Onset of numbness 1 month ago, no weakness. Suspect 2/2 large perianal abscess vs tumor compressing sciatic nerve. 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 No weakness throughout hospitalization.  -f/u with surgery    #HIV (human immunodeficiency virus infection).   Reportedly goes to [facility] HIV clinic q6 months, last checked 6 months ago and reportedly VLUD and CD4 >200. Compliant with Biktarvy and no longer on bactrim. Continued with Biktarvy. CD4 266  - c/w Biktarvy daily    #Polysubstance abuse.   Reports taking meth (smokes), alcohol (socially), marijuana; patient refused rehab.       #Syphilis, latent.   Patient had history of syphilis s/p treatment. Denies symptoms. Physical exam elicits RLE numbness from hip to ankle. S/p penicillin G in ED.  -f/u with PCP    #Anemia  Hb 8.4, baseline 9-11. No active bleed and no blood in stool or from abscess. Reports taking iron supplement once monthly for AARTI.  -continue iron supplementation      Patient was discharged to: home    New medications: none  Changes to old medications: none  Medications that were stopped:     Items to follow up as outpatient: colorectal surgery for biopsy results and follow up      Physical exam at the time of discharge:  Constitutional: NAD, comfortable in bed.  Neck: Supple, no JVD  Respiratory: CTA B/L. No w/r/r.   Cardiovascular: RRR, normal S1 and S2, no m/r/g.   Gastrointestinal: +BS, soft NTND, no guarding or rebound tenderness, no palpable masses   Extremities: wwp; no cyanosis, clubbing or edema.   Neurological: AAOx3, no CN deficits, reports ongoing R leg numbness without weakness.  Skin: B/L Perianal abscess    #Discharge: do not delete    43-year-old MSM with past medical history of HIV (Biktarvy, last known CD4 2021 was 130, VL [id number]), polysubstance abuse, syphilis, anemia, and abdominal MRSA 2021, who presented with spontaneous drainage and mild pain from bilateral gluteal abscesses. Pt also found the have latent syphillis and treated with penicillin G.  The discharge and pain started 1 month ago, but worsened over the past few days, now with yellow, smelly discharge flowing spontaneously especially when he urinates or has bowel movements. Also endorses pain with defecation due to the gluteal abscesses. Of note patient has had recurrent perianal I&D in [facility] and [facility], Patient denies fever, change in bowel movement, urinary issue. Patient also reports numbness on the right leg, from perineal area to ankle, sparing the right foot, which is new compared to previous perianal drainage episodes. Pt now s/p evaluation under anesthesia with tissue biopsy sent to pathology.          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   CT showing with possible malignancy. Surgery consulted, Biopsy & EUA performed 12/13.   -f/u with colorectal surgery  -f/u outpatient MRI      #Right leg numbness.   Onset of numbness 1 month ago, no weakness. Suspect 2/2 large perianal abscess vs tumor compressing sciatic nerve. 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 No weakness throughout hospitalization.  -f/u with surgery    #HIV (human immunodeficiency virus infection).   Reportedly goes to [facility] HIV clinic q6 months, last checked 6 months ago and reportedly VLUD and CD4 >200. Compliant with Biktarvy and no longer on bactrim. Continued with Biktarvy. CD4 266  - c/w Biktarvy daily    #Polysubstance abuse.   Reports taking meth (smokes), alcohol (socially), marijuana; patient refused rehab.       #Syphilis, latent.   Patient had history of syphilis s/p treatment. Denies symptoms. Physical exam elicits RLE numbness from hip to ankle. S/p penicillin G in ED.  -f/u with PCP    #Anemia  Hb 8.4, baseline 9-11. No active bleed and no blood in stool or from abscess. Reports taking iron supplement once monthly for AARTI.  -continue iron supplementation      Patient was discharged to: home    New medications: none  Changes to old medications: none  Medications that were stopped:     Items to follow up as outpatient: colorectal surgery for biopsy results and follow up      Physical exam at the time of discharge:  Constitutional: NAD, comfortable in bed.  Neck: Supple, no JVD  Respiratory: CTA B/L. No w/r/r.   Cardiovascular: RRR, normal S1 and S2, no m/r/g.   Gastrointestinal: +BS, soft NTND, no guarding or rebound tenderness, no palpable masses   Extremities: wwp; no cyanosis, clubbing or edema.   Neurological: AAOx3, no CN deficits, reports ongoing R leg numbness without weakness.  Skin: B/L Perianal abscess

## 2023-12-13 LAB
ALBUMIN SERPL ELPH-MCNC: 3.3 G/DL — SIGNIFICANT CHANGE UP (ref 3.3–5)
ALBUMIN SERPL ELPH-MCNC: 3.3 G/DL — SIGNIFICANT CHANGE UP (ref 3.3–5)
ALP SERPL-CCNC: 69 U/L — SIGNIFICANT CHANGE UP (ref 40–120)
ALP SERPL-CCNC: 69 U/L — SIGNIFICANT CHANGE UP (ref 40–120)
ALT FLD-CCNC: 14 U/L — SIGNIFICANT CHANGE UP (ref 10–45)
ALT FLD-CCNC: 14 U/L — SIGNIFICANT CHANGE UP (ref 10–45)
ANION GAP SERPL CALC-SCNC: 8 MMOL/L — SIGNIFICANT CHANGE UP (ref 5–17)
ANION GAP SERPL CALC-SCNC: 8 MMOL/L — SIGNIFICANT CHANGE UP (ref 5–17)
AST SERPL-CCNC: 15 U/L — SIGNIFICANT CHANGE UP (ref 10–40)
AST SERPL-CCNC: 15 U/L — SIGNIFICANT CHANGE UP (ref 10–40)
BILIRUB SERPL-MCNC: <0.2 MG/DL — SIGNIFICANT CHANGE UP (ref 0.2–1.2)
BILIRUB SERPL-MCNC: <0.2 MG/DL — SIGNIFICANT CHANGE UP (ref 0.2–1.2)
BUN SERPL-MCNC: 11 MG/DL — SIGNIFICANT CHANGE UP (ref 7–23)
BUN SERPL-MCNC: 11 MG/DL — SIGNIFICANT CHANGE UP (ref 7–23)
CALCIUM SERPL-MCNC: 8.6 MG/DL — SIGNIFICANT CHANGE UP (ref 8.4–10.5)
CALCIUM SERPL-MCNC: 8.6 MG/DL — SIGNIFICANT CHANGE UP (ref 8.4–10.5)
CHLORIDE SERPL-SCNC: 104 MMOL/L — SIGNIFICANT CHANGE UP (ref 96–108)
CHLORIDE SERPL-SCNC: 104 MMOL/L — SIGNIFICANT CHANGE UP (ref 96–108)
CO2 SERPL-SCNC: 27 MMOL/L — SIGNIFICANT CHANGE UP (ref 22–31)
CO2 SERPL-SCNC: 27 MMOL/L — SIGNIFICANT CHANGE UP (ref 22–31)
CREAT SERPL-MCNC: 0.88 MG/DL — SIGNIFICANT CHANGE UP (ref 0.5–1.3)
CREAT SERPL-MCNC: 0.88 MG/DL — SIGNIFICANT CHANGE UP (ref 0.5–1.3)
EGFR: 111 ML/MIN/1.73M2 — SIGNIFICANT CHANGE UP
EGFR: 111 ML/MIN/1.73M2 — SIGNIFICANT CHANGE UP
GLUCOSE SERPL-MCNC: 173 MG/DL — HIGH (ref 70–99)
GLUCOSE SERPL-MCNC: 173 MG/DL — HIGH (ref 70–99)
HCT VFR BLD CALC: 29.4 % — LOW (ref 39–50)
HCT VFR BLD CALC: 29.4 % — LOW (ref 39–50)
HGB BLD-MCNC: 8.6 G/DL — LOW (ref 13–17)
HGB BLD-MCNC: 8.6 G/DL — LOW (ref 13–17)
MAGNESIUM SERPL-MCNC: 2.3 MG/DL — SIGNIFICANT CHANGE UP (ref 1.6–2.6)
MAGNESIUM SERPL-MCNC: 2.3 MG/DL — SIGNIFICANT CHANGE UP (ref 1.6–2.6)
MCHC RBC-ENTMCNC: 23.4 PG — LOW (ref 27–34)
MCHC RBC-ENTMCNC: 23.4 PG — LOW (ref 27–34)
MCHC RBC-ENTMCNC: 29.3 GM/DL — LOW (ref 32–36)
MCHC RBC-ENTMCNC: 29.3 GM/DL — LOW (ref 32–36)
MCV RBC AUTO: 79.9 FL — LOW (ref 80–100)
MCV RBC AUTO: 79.9 FL — LOW (ref 80–100)
NRBC # BLD: 0 /100 WBCS — SIGNIFICANT CHANGE UP (ref 0–0)
NRBC # BLD: 0 /100 WBCS — SIGNIFICANT CHANGE UP (ref 0–0)
PHOSPHATE SERPL-MCNC: 3.6 MG/DL — SIGNIFICANT CHANGE UP (ref 2.5–4.5)
PHOSPHATE SERPL-MCNC: 3.6 MG/DL — SIGNIFICANT CHANGE UP (ref 2.5–4.5)
PLATELET # BLD AUTO: 436 K/UL — HIGH (ref 150–400)
PLATELET # BLD AUTO: 436 K/UL — HIGH (ref 150–400)
POTASSIUM SERPL-MCNC: 4.2 MMOL/L — SIGNIFICANT CHANGE UP (ref 3.5–5.3)
POTASSIUM SERPL-MCNC: 4.2 MMOL/L — SIGNIFICANT CHANGE UP (ref 3.5–5.3)
POTASSIUM SERPL-SCNC: 4.2 MMOL/L — SIGNIFICANT CHANGE UP (ref 3.5–5.3)
POTASSIUM SERPL-SCNC: 4.2 MMOL/L — SIGNIFICANT CHANGE UP (ref 3.5–5.3)
PROT SERPL-MCNC: 7.1 G/DL — SIGNIFICANT CHANGE UP (ref 6–8.3)
PROT SERPL-MCNC: 7.1 G/DL — SIGNIFICANT CHANGE UP (ref 6–8.3)
RBC # BLD: 3.68 M/UL — LOW (ref 4.2–5.8)
RBC # BLD: 3.68 M/UL — LOW (ref 4.2–5.8)
RBC # FLD: 16.8 % — HIGH (ref 10.3–14.5)
RBC # FLD: 16.8 % — HIGH (ref 10.3–14.5)
SODIUM SERPL-SCNC: 139 MMOL/L — SIGNIFICANT CHANGE UP (ref 135–145)
SODIUM SERPL-SCNC: 139 MMOL/L — SIGNIFICANT CHANGE UP (ref 135–145)
VANCOMYCIN TROUGH SERPL-MCNC: 5.1 UG/ML — LOW (ref 10–20)
VANCOMYCIN TROUGH SERPL-MCNC: 5.1 UG/ML — LOW (ref 10–20)
WBC # BLD: 12.18 K/UL — HIGH (ref 3.8–10.5)
WBC # BLD: 12.18 K/UL — HIGH (ref 3.8–10.5)
WBC # FLD AUTO: 12.18 K/UL — HIGH (ref 3.8–10.5)
WBC # FLD AUTO: 12.18 K/UL — HIGH (ref 3.8–10.5)

## 2023-12-13 PROCEDURE — 99222 1ST HOSP IP/OBS MODERATE 55: CPT

## 2023-12-13 PROCEDURE — 99233 SBSQ HOSP IP/OBS HIGH 50: CPT | Mod: GC

## 2023-12-13 RX ORDER — KETOROLAC TROMETHAMINE 30 MG/ML
15 SYRINGE (ML) INJECTION EVERY 8 HOURS
Refills: 0 | Status: DISCONTINUED | OUTPATIENT
Start: 2023-12-13 | End: 2023-12-14

## 2023-12-13 RX ORDER — METRONIDAZOLE 500 MG
500 TABLET ORAL EVERY 12 HOURS
Refills: 0 | Status: DISCONTINUED | OUTPATIENT
Start: 2023-12-13 | End: 2023-12-14

## 2023-12-13 RX ORDER — ENOXAPARIN SODIUM 100 MG/ML
40 INJECTION SUBCUTANEOUS EVERY 24 HOURS
Refills: 0 | Status: DISCONTINUED | OUTPATIENT
Start: 2023-12-13 | End: 2023-12-14

## 2023-12-13 RX ORDER — VANCOMYCIN HCL 1 G
1250 VIAL (EA) INTRAVENOUS EVERY 12 HOURS
Refills: 0 | Status: DISCONTINUED | OUTPATIENT
Start: 2023-12-13 | End: 2023-12-13

## 2023-12-13 RX ORDER — CEFPODOXIME PROXETIL 100 MG
200 TABLET ORAL EVERY 12 HOURS
Refills: 0 | Status: DISCONTINUED | OUTPATIENT
Start: 2023-12-13 | End: 2023-12-14

## 2023-12-13 RX ORDER — VANCOMYCIN HCL 1 G
250 VIAL (EA) INTRAVENOUS ONCE
Refills: 0 | Status: DISCONTINUED | OUTPATIENT
Start: 2023-12-13 | End: 2023-12-13

## 2023-12-13 RX ADMIN — Medication 15 MILLIGRAM(S): at 08:54

## 2023-12-13 RX ADMIN — BICTEGRAVIR SODIUM, EMTRICITABINE, AND TENOFOVIR ALAFENAMIDE FUMARATE 1 TABLET(S): 30; 120; 15 TABLET ORAL at 13:40

## 2023-12-13 RX ADMIN — Medication 650 MILLIGRAM(S): at 09:44

## 2023-12-13 RX ADMIN — Medication 500 MILLIGRAM(S): at 18:41

## 2023-12-13 RX ADMIN — Medication 15 MILLIGRAM(S): at 09:10

## 2023-12-13 RX ADMIN — Medication 200 MILLIGRAM(S): at 18:41

## 2023-12-13 RX ADMIN — PIPERACILLIN AND TAZOBACTAM 25 GRAM(S): 4; .5 INJECTION, POWDER, LYOPHILIZED, FOR SOLUTION INTRAVENOUS at 07:24

## 2023-12-13 RX ADMIN — Medication 250 MILLIGRAM(S): at 06:10

## 2023-12-13 RX ADMIN — Medication 15 MILLIGRAM(S): at 00:32

## 2023-12-13 RX ADMIN — Medication 650 MILLIGRAM(S): at 08:44

## 2023-12-13 RX ADMIN — Medication 15 MILLIGRAM(S): at 17:33

## 2023-12-13 RX ADMIN — Medication 15 MILLIGRAM(S): at 17:18

## 2023-12-13 RX ADMIN — Medication 1 PACKET(S): at 13:40

## 2023-12-13 RX ADMIN — PIPERACILLIN AND TAZOBACTAM 25 GRAM(S): 4; .5 INJECTION, POWDER, LYOPHILIZED, FOR SOLUTION INTRAVENOUS at 13:40

## 2023-12-13 RX ADMIN — ENOXAPARIN SODIUM 40 MILLIGRAM(S): 100 INJECTION SUBCUTANEOUS at 21:06

## 2023-12-13 RX ADMIN — Medication 10 MILLIGRAM(S): at 21:06

## 2023-12-13 NOTE — PROGRESS NOTE ADULT - PROBLEM SELECTOR PLAN 3
-Reportedly goes to [facility] HIV clinic q6 months, last checked 6 months ago and reportedly VLUD and CD4 >200. Compliant with Biktarvy and no longer on bactrim  - c/w Biktarvy daily  - CD4 266. -Reportedly goes to [facility] HIV clinic q6 months, last checked 6 months ago and reportedly VLUD and CD4 >200. Compliant with Biktarvy and no longer on bactrim  - c/w Biktarvy daily  - CD4 266.

## 2023-12-13 NOTE — CONSULT NOTE ADULT - TIME BILLING
rectal mass, perianal fistula
evaluation and management of perianal soft tissue infection vs. necrotic malignancy, review of labs and imaging, discussion with consultants, documentation

## 2023-12-13 NOTE — PROGRESS NOTE ADULT - ATTENDING COMMENTS
Pt is 42 yo man with HIV, AIDS, history of perirectal fistulas, admitted for evaluation of perirectal numbness and discharge. Imaging indicated presence of large rectal mass and subcutaneous collection.  Pt is planned for rectal mass biopsy today with possible SC collection drainage.   Pt started on Zosyn as rectal mass can be an infected tumor vs large collection.  MRI of LS spine ordered given bony destruction seen on CT to r/out OM. ID consult will be ordered Pt is currently on Zosyn to cover MSSA cultured from the  rectal drainage plus to cover GI tracie but if mass does not appear to be infectious antibiotics can be changed to Unasyn/Augmentin.   Biopsy of the rectal mass was done on 12/12 and accession #[id number]. Currently attempting to find out if tissue can be also send for culture vs preliminary results regarding atypical/malignant cells seen on biopsy. Pt is 44 yo man with HIV, AIDS, history of perirectal fistulas, admitted for evaluation of perirectal numbness and discharge. Imaging indicated presence of large rectal mass and subcutaneous collection.  Pt is planned for rectal mass biopsy today with possible SC collection drainage.   Pt started on Zosyn as rectal mass can be an infected tumor vs large collection.  MRI of LS spine ordered given bony destruction seen on CT to r/out OM. ID consult will be ordered Pt is currently on Zosyn to cover MSSA cultured from the  rectal drainage plus to cover GI tracie but if mass does not appear to be infectious antibiotics can be changed to Unasyn/Augmentin.   Biopsy of the rectal mass was done on 12/12 and accession #[id number]. Currently attempting to find out if tissue can be also send for culture vs preliminary results regarding atypical/malignant cells seen on biopsy.

## 2023-12-13 NOTE — PROGRESS NOTE ADULT - SUBJECTIVE AND OBJECTIVE BOX
SUBJECTIVE:  Pt seen this AM on rounds. Pt tolerating regular diet and pain is well controlled under the current regimen. Denies nausea/vomiting.     MEDICATIONS  (STANDING):  bictegravir 50 mG/emtricitabine 200 mG/tenofovir alafenamide 25 mG (BIKTARVY) 1 Tablet(s) Oral daily  cefpodoxime 200 milliGRAM(s) Oral every 12 hours  enoxaparin Injectable 40 milliGRAM(s) SubCutaneous every 24 hours  influenza   Vaccine 0.5 milliLiter(s) IntraMuscular once  ketorolac   Injectable 15 milliGRAM(s) IV Push every 8 hours  melatonin 10 milliGRAM(s) Oral at bedtime  metroNIDAZOLE    Tablet 500 milliGRAM(s) Oral every 12 hours  psyllium Powder 1 Packet(s) Oral daily    MEDICATIONS  (PRN):  acetaminophen     Tablet .. 650 milliGRAM(s) Oral every 6 hours PRN Temp greater or equal to 38C (100.4F), Mild Pain (1 - 3)  ondansetron Injectable 4 milliGRAM(s) IV Push every 8 hours PRN Nausea and/or Vomiting  oxycodone    5 mG/acetaminophen 325 mG 1 Tablet(s) Oral every 4 hours PRN Moderate Pain (4 - 6)      Vital Signs Last 24 Hrs  T(C): 36.3 (13 Dec 2023 12:31), Max: 37 (13 Dec 2023 06:19)  T(F): 97.3 (13 Dec 2023 12:31), Max: 98.6 (13 Dec 2023 06:19)  HR: 95 (13 Dec 2023 12:31) (77 - 104)  BP: 131/75 (13 Dec 2023 12:31) (109/69 - 137/81)  BP(mean): 93 (12 Dec 2023 19:15) (75 - 93)  RR: 18 (13 Dec 2023 12:31) (13 - 19)  SpO2: 99% (13 Dec 2023 12:31) (95% - 100%)    Parameters below as of 13 Dec 2023 12:31  Patient On (Oxygen Delivery Method): room air        Physical Exam:  General: NAD, resting comfortably in bed  Pulmonary: Nonlabored breathing, no respiratory distress  Cardiovascular: NSR  Abdominal: soft, NT/ND  Rectum: wound clean, no bleeding, dressing changed  Extremities: WWP, normal strength  Neuro: A/O x 3, CNs II-XII grossly intact, no focal deficits    I&O's Summary    12 Dec 2023 07:01  -  13 Dec 2023 07:00  --------------------------------------------------------  IN: 25 mL / OUT: 600 mL / NET: -575 mL        LABS:                        8.6    12.18 )-----------( 436      ( 13 Dec 2023 05:30 )             29.4     12-13    139  |  104  |  11  ----------------------------<  173<H>  4.2   |  27  |  0.88    Ca    8.6      13 Dec 2023 05:30  Phos  3.6     12-13  Mg     2.3     12-13    TPro  7.1  /  Alb  3.3  /  TBili  <0.2  /  DBili  x   /  AST  15  /  ALT  14  /  AlkPhos  69  12-13    PT/INR - ( 12 Dec 2023 05:30 )   PT: 13.5 sec;   INR: 1.19          PTT - ( 12 Dec 2023 05:30 )  PTT:30.3 sec  Urinalysis Basic - ( 13 Dec 2023 05:30 )    Color: x / Appearance: x / SG: x / pH: x  Gluc: 173 mg/dL / Ketone: x  / Bili: x / Urobili: x   Blood: x / Protein: x / Nitrite: x   Leuk Esterase: x / RBC: x / WBC x   Sq Epi: x / Non Sq Epi: x / Bacteria: x      CAPILLARY BLOOD GLUCOSE        LIVER FUNCTIONS - ( 13 Dec 2023 05:30 )  Alb: 3.3 g/dL / Pro: 7.1 g/dL / ALK PHOS: 69 U/L / ALT: 14 U/L / AST: 15 U/L / GGT: x             RADIOLOGY & ADDITIONAL STUDIES:

## 2023-12-13 NOTE — PROGRESS NOTE ADULT - PROBLEM SELECTOR PLAN 1
-CT showing with possible malignancy  -Surgery consulted, plan to do evaluation under anesthesia, tissue biopsy tomorrow. Pt will be NPO at midnight.  -Oncology/Surg Onc consult s/p procedure.  -12/13 started toradol 15mg q8 for pain control

## 2023-12-13 NOTE — PROGRESS NOTE ADULT - NS ATTEST RISK PROBLEM GEN_ALL_CORE FT
Rectal tumor vs perirectal collection

## 2023-12-13 NOTE — PROGRESS NOTE ADULT - SUBJECTIVE AND OBJECTIVE BOX
OVERNIGHT EVENTS: one time 15mg Tordadol given for pain      SUBJECTIVE:  Patient seen and examined at bedside. Patient endorsing increased anal pain after the biopsy and requesting better pain control. Denies lightheadedness, chest pain, shortness of breath, abdominal pain.    Vital Signs Last 12 Hrs  T(F): 98.6 (12-13-23 @ 06:19), Max: 98.6 (12-13-23 @ 06:19)  HR: 95 (12-13-23 @ 06:19) (95 - 95)  BP: 137/81 (12-13-23 @ 06:19) (137/81 - 137/81)  BP(mean): --  RR: 18 (12-13-23 @ 06:19) (18 - 18)  SpO2: 98% (12-13-23 @ 06:19) (98% - 98%)  I&O's Summary    12 Dec 2023 07:01  -  13 Dec 2023 07:00  --------------------------------------------------------  IN: 25 mL / OUT: 600 mL / NET: -575 mL        PHYSICAL EXAM:  Constitutional: NAD, comfortable in bed.  HEENT: NC/AT, PERRLA, EOMI, no conjunctival pallor or scleral icterus, MMM  Neck: Supple  Respiratory: CTA B/L. No w/r/r.   Cardiovascular: RRR, normal S1 and S2, no m/r/g.   Gastrointestinal: +BS, soft NTND, no guarding or rebound tenderness, no palpable masses   Extremities: wwp; no cyanosis, clubbing or edema; IV site on L arm oozing blood  Vascular: Pulses equal and strong throughout.   Neurological: AAOx3, no CN deficits grossly  Skin: No gross skin abnormalities or rashes        LABS:                        8.6    12.18 )-----------( 436      ( 13 Dec 2023 05:30 )             29.4     12-13    139  |  104  |  11  ----------------------------<  173<H>  4.2   |  27  |  0.88    Ca    8.6      13 Dec 2023 05:30  Phos  3.6     12-13  Mg     2.3     12-13    TPro  7.1  /  Alb  3.3  /  TBili  <0.2  /  DBili  x   /  AST  15  /  ALT  14  /  AlkPhos  69  12-13    PT/INR - ( 12 Dec 2023 05:30 )   PT: 13.5 sec;   INR: 1.19          PTT - ( 12 Dec 2023 05:30 )  PTT:30.3 sec  Urinalysis Basic - ( 13 Dec 2023 05:30 )    Color: x / Appearance: x / SG: x / pH: x  Gluc: 173 mg/dL / Ketone: x  / Bili: x / Urobili: x   Blood: x / Protein: x / Nitrite: x   Leuk Esterase: x / RBC: x / WBC x   Sq Epi: x / Non Sq Epi: x / Bacteria: x          RADIOLOGY & ADDITIONAL TESTS:    MEDICATIONS  (STANDING):  bictegravir 50 mG/emtricitabine 200 mG/tenofovir alafenamide 25 mG (BIKTARVY) 1 Tablet(s) Oral daily  influenza   Vaccine 0.5 milliLiter(s) IntraMuscular once  ketorolac   Injectable 15 milliGRAM(s) IV Push every 8 hours  melatonin 10 milliGRAM(s) Oral at bedtime  piperacillin/tazobactam IVPB.. 3.375 Gram(s) IV Intermittent every 8 hours  psyllium Powder 1 Packet(s) Oral daily  vancomycin  IVPB 1250 milliGRAM(s) IV Intermittent every 12 hours  vancomycin  IVPB 250 milliGRAM(s) IV Intermittent once    MEDICATIONS  (PRN):  acetaminophen     Tablet .. 650 milliGRAM(s) Oral every 6 hours PRN Temp greater or equal to 38C (100.4F), Mild Pain (1 - 3)  melatonin 3 milliGRAM(s) Oral at bedtime PRN Insomnia  ondansetron Injectable 4 milliGRAM(s) IV Push every 8 hours PRN Nausea and/or Vomiting

## 2023-12-13 NOTE — CONSULT NOTE ADULT - SUBJECTIVE AND OBJECTIVE BOX
INFECTIOUS DISEASES INITIAL CONSULT NOTE    HPI:  44yo MSM h/o well controlled HIV (LR6=458, 24%, VL<30 in 12/2023), hidradenitis supprativa with recurrent perianal abscess (per chart review), former IVDU p/w perianal pain x 1 month. Patient says he has had recurrent perianal abscess and perianal fistula for 6 years. In the past 1 month, he started to have worsening perianal pain.  Reports 10-15lb unintentional weight loss over 6 months. Has chronic perianal discharge which is not new but thinks more than before. Denied fever/chills, night sweats, n/v/d, abdominal pain, dysuria. Upon arrival, afebrile, tachy, VSS. Lab notable for WBC 10.5, Hgb 8.4, Cr 0.95, UA neg. CT a/p showed Large enhancing phlegmonous structure arising from the right posterolateral aspect of the thickened proximal and distal rectum, extending through the right sciatic notch and right piriformis muscle   into the right gluteus yovanny muscle medially. Lytic destruction of the adjacent sacrum, coccyx, and right ischial spine. While these findings may represent a festering infection related to chronic perianal fistulas, the mass-like extension, bony destruction, and lack of discrete drainable abscess, with associated rectal wall thickening, are suspicious for development of an infiltrating anal squamous cell carcinoma.  Zosyn started, and colorectal surgery was consulted. patient underwent rectal biopsy by Dr. iglesias on 12/12. ID was consulted for abx rec.       PAST MEDICAL & SURGICAL HISTORY:  HIV (human immunodeficiency virus infection)      Abscess      IVDU (intravenous drug user)      Anemia      No significant past surgical history            Review of Systems:   Constitutional, eyes, ENT, cardiovascular, respiratory, gastrointestinal, genitourinary, integumentary, neurological, psychiatric and heme/lymph are otherwise negative other than noted above       ANTIBIOTICS:  MEDICATIONS  (STANDING):  bictegravir 50 mG/emtricitabine 200 mG/tenofovir alafenamide 25 mG (BIKTARVY) 1 Tablet(s) Oral daily  cefpodoxime 200 milliGRAM(s) Oral every 12 hours  enoxaparin Injectable 40 milliGRAM(s) SubCutaneous every 24 hours  influenza   Vaccine 0.5 milliLiter(s) IntraMuscular once  ketorolac   Injectable 15 milliGRAM(s) IV Push every 8 hours  melatonin 10 milliGRAM(s) Oral at bedtime  metroNIDAZOLE    Tablet 500 milliGRAM(s) Oral every 12 hours  psyllium Powder 1 Packet(s) Oral daily    MEDICATIONS  (PRN):  acetaminophen     Tablet .. 650 milliGRAM(s) Oral every 6 hours PRN Temp greater or equal to 38C (100.4F), Mild Pain (1 - 3)  ondansetron Injectable 4 milliGRAM(s) IV Push every 8 hours PRN Nausea and/or Vomiting  oxycodone    5 mG/acetaminophen 325 mG 1 Tablet(s) Oral every 4 hours PRN Moderate Pain (4 - 6)      Allergies    No Known Allergies    Intolerances        SOCIAL HISTORY:  Born and grew up in [place].  Moved to [place] 17 yrs ago.  Works as a .  Former IVDU. Smokes cig, denied EtOH and current illicit. Lives with roommate.  HIV/PMD is Dr. Lang at [facility].       FAMILY HISTORY:  FH: HIV infection (Sibling)     no FH leading to current infection    Vital Signs Last 24 Hrs  T(C): 36.3 (13 Dec 2023 12:31), Max: 37 (13 Dec 2023 06:19)  T(F): 97.3 (13 Dec 2023 12:31), Max: 98.6 (13 Dec 2023 06:19)  HR: 95 (13 Dec 2023 12:31) (95 - 95)  BP: 131/75 (13 Dec 2023 12:31) (131/75 - 137/81)  BP(mean): --  RR: 18 (13 Dec 2023 12:31) (18 - 18)  SpO2: 99% (13 Dec 2023 12:31) (98% - 99%)    Parameters below as of 13 Dec 2023 12:31  Patient On (Oxygen Delivery Method): room air        12-12-23 @ 07:01  -  12-13-23 @ 07:00  --------------------------------------------------------  IN: 25 mL / OUT: 600 mL / NET: -575 mL        PHYSICAL EXAM:  Constitutional: alert, NAD  Eyes: the sclera and conjunctiva were normal.   ENT: the ears and nose were normal in appearance.   Neck: the appearance of the neck was normal and the neck was supple.   Pulmonary: no respiratory distress and lungs were clear to auscultation bilaterally.   Heart: heart rate was normal and rhythm regular, normal S1 and S2  Vascular:. there was no peripheral edema  Abdomen: normal bowel sounds, soft, non-tender, anal fistula       LABS:                        8.6    12.18 )-----------( 436      ( 13 Dec 2023 05:30 )             29.4     12-13    139  |  104  |  11  ----------------------------<  173<H>  4.2   |  27  |  0.88    Ca    8.6      13 Dec 2023 05:30  Phos  3.6     12-13  Mg     2.3     12-13    TPro  7.1  /  Alb  3.3  /  TBili  <0.2  /  DBili  x   /  AST  15  /  ALT  14  /  AlkPhos  69  12-13    PT/INR - ( 12 Dec 2023 05:30 )   PT: 13.5 sec;   INR: 1.19          PTT - ( 12 Dec 2023 05:30 )  PTT:30.3 sec  Urinalysis Basic - ( 13 Dec 2023 05:30 )    Color: x / Appearance: x / SG: x / pH: x  Gluc: 173 mg/dL / Ketone: x  / Bili: x / Urobili: x   Blood: x / Protein: x / Nitrite: x   Leuk Esterase: x / RBC: x / WBC x   Sq Epi: x / Non Sq Epi: x / Bacteria: x        MICROBIOLOGY:  HIV VL <30  GC/chlamydia neg  WCx MSSA    RADIOLOGY & ADDITIONAL STUDIES:  CT a/p   IMPRESSION: Large enhancing phlegmonous structure arising from the right   posterolateral aspect of the thickened proximal and distal rectum,   extending through the right sciatic notch and right piriformis muscle   into the right gluteus yovanny muscle medially. Lytic destruction of the   adjacent sacrum, coccyx, and right ischial spine. While these findings   may represent a festering infection related to chronic perianal fistulas,   the mass-like extension, bony destruction, and lack of discrete drainable   abscess, with associated rectal wall thickening, are suspicious for   development of an infiltrating anal squamous cell carcinoma. Results   discussed with Dr. Hughes at time of interpretation.       INFECTIOUS DISEASES INITIAL CONSULT NOTE    HPI:  44yo MSM h/o well controlled HIV (PP3=221, 24%, VL<30 in 12/2023), hidradenitis supprativa with recurrent perianal abscess (per chart review), former IVDU p/w perianal pain x 1 month. Patient says he has had recurrent perianal abscess and perianal fistula for 6 years. In the past 1 month, he started to have worsening perianal pain.  Reports 10-15lb unintentional weight loss over 6 months. Has chronic perianal discharge which is not new but thinks more than before. Denied fever/chills, night sweats, n/v/d, abdominal pain, dysuria. Upon arrival, afebrile, tachy, VSS. Lab notable for WBC 10.5, Hgb 8.4, Cr 0.95, UA neg. CT a/p showed Large enhancing phlegmonous structure arising from the right posterolateral aspect of the thickened proximal and distal rectum, extending through the right sciatic notch and right piriformis muscle   into the right gluteus yovanny muscle medially. Lytic destruction of the adjacent sacrum, coccyx, and right ischial spine. While these findings may represent a festering infection related to chronic perianal fistulas, the mass-like extension, bony destruction, and lack of discrete drainable abscess, with associated rectal wall thickening, are suspicious for development of an infiltrating anal squamous cell carcinoma.  Zosyn started, and colorectal surgery was consulted. patient underwent rectal biopsy by Dr. iglesias on 12/12. ID was consulted for abx rec.       PAST MEDICAL & SURGICAL HISTORY:  HIV (human immunodeficiency virus infection)      Abscess      IVDU (intravenous drug user)      Anemia      No significant past surgical history            Review of Systems:   Constitutional, eyes, ENT, cardiovascular, respiratory, gastrointestinal, genitourinary, integumentary, neurological, psychiatric and heme/lymph are otherwise negative other than noted above       ANTIBIOTICS:  MEDICATIONS  (STANDING):  bictegravir 50 mG/emtricitabine 200 mG/tenofovir alafenamide 25 mG (BIKTARVY) 1 Tablet(s) Oral daily  cefpodoxime 200 milliGRAM(s) Oral every 12 hours  enoxaparin Injectable 40 milliGRAM(s) SubCutaneous every 24 hours  influenza   Vaccine 0.5 milliLiter(s) IntraMuscular once  ketorolac   Injectable 15 milliGRAM(s) IV Push every 8 hours  melatonin 10 milliGRAM(s) Oral at bedtime  metroNIDAZOLE    Tablet 500 milliGRAM(s) Oral every 12 hours  psyllium Powder 1 Packet(s) Oral daily    MEDICATIONS  (PRN):  acetaminophen     Tablet .. 650 milliGRAM(s) Oral every 6 hours PRN Temp greater or equal to 38C (100.4F), Mild Pain (1 - 3)  ondansetron Injectable 4 milliGRAM(s) IV Push every 8 hours PRN Nausea and/or Vomiting  oxycodone    5 mG/acetaminophen 325 mG 1 Tablet(s) Oral every 4 hours PRN Moderate Pain (4 - 6)      Allergies    No Known Allergies    Intolerances        SOCIAL HISTORY:  Born and grew up in [place].  Moved to [place] 17 yrs ago.  Works as a .  Former IVDU. Smokes cig, denied EtOH and current illicit. Lives with roommate.  HIV/PMD is Dr. Lang at [facility].       FAMILY HISTORY:  FH: HIV infection (Sibling)     no FH leading to current infection    Vital Signs Last 24 Hrs  T(C): 36.3 (13 Dec 2023 12:31), Max: 37 (13 Dec 2023 06:19)  T(F): 97.3 (13 Dec 2023 12:31), Max: 98.6 (13 Dec 2023 06:19)  HR: 95 (13 Dec 2023 12:31) (95 - 95)  BP: 131/75 (13 Dec 2023 12:31) (131/75 - 137/81)  BP(mean): --  RR: 18 (13 Dec 2023 12:31) (18 - 18)  SpO2: 99% (13 Dec 2023 12:31) (98% - 99%)    Parameters below as of 13 Dec 2023 12:31  Patient On (Oxygen Delivery Method): room air        12-12-23 @ 07:01  -  12-13-23 @ 07:00  --------------------------------------------------------  IN: 25 mL / OUT: 600 mL / NET: -575 mL        PHYSICAL EXAM:  Constitutional: alert, NAD  Eyes: the sclera and conjunctiva were normal.   ENT: the ears and nose were normal in appearance.   Neck: the appearance of the neck was normal and the neck was supple.   Pulmonary: no respiratory distress and lungs were clear to auscultation bilaterally.   Heart: heart rate was normal and rhythm regular, normal S1 and S2  Vascular:. there was no peripheral edema  Abdomen: normal bowel sounds, soft, non-tender, anal fistula       LABS:                        8.6    12.18 )-----------( 436      ( 13 Dec 2023 05:30 )             29.4     12-13    139  |  104  |  11  ----------------------------<  173<H>  4.2   |  27  |  0.88    Ca    8.6      13 Dec 2023 05:30  Phos  3.6     12-13  Mg     2.3     12-13    TPro  7.1  /  Alb  3.3  /  TBili  <0.2  /  DBili  x   /  AST  15  /  ALT  14  /  AlkPhos  69  12-13    PT/INR - ( 12 Dec 2023 05:30 )   PT: 13.5 sec;   INR: 1.19          PTT - ( 12 Dec 2023 05:30 )  PTT:30.3 sec  Urinalysis Basic - ( 13 Dec 2023 05:30 )    Color: x / Appearance: x / SG: x / pH: x  Gluc: 173 mg/dL / Ketone: x  / Bili: x / Urobili: x   Blood: x / Protein: x / Nitrite: x   Leuk Esterase: x / RBC: x / WBC x   Sq Epi: x / Non Sq Epi: x / Bacteria: x        MICROBIOLOGY:  HIV VL <30  GC/chlamydia neg  WCx MSSA    RADIOLOGY & ADDITIONAL STUDIES:  CT a/p   IMPRESSION: Large enhancing phlegmonous structure arising from the right   posterolateral aspect of the thickened proximal and distal rectum,   extending through the right sciatic notch and right piriformis muscle   into the right gluteus yovanny muscle medially. Lytic destruction of the   adjacent sacrum, coccyx, and right ischial spine. While these findings   may represent a festering infection related to chronic perianal fistulas,   the mass-like extension, bony destruction, and lack of discrete drainable   abscess, with associated rectal wall thickening, are suspicious for   development of an infiltrating anal squamous cell carcinoma. Results   discussed with Dr. Hughes at time of interpretation.

## 2023-12-13 NOTE — CONSULT NOTE ADULT - ASSESSMENT
44yo MSM h/o well controlled HIV (AJ2=792, 24%, VL<30 in 12/2023), hidradenitis supprativa with recurrent perianal abscess (per chart review), former IVDU p/w perianal pain x 1 month, found to have large enhancing phlegmonous mass at rectum which is invading thomas muscle and adjacent bone, c/f anal SCC.  Case d/w Dr. Alvarenga (radiology attending) and Dr. Cortez. No abscess seen, known fistula tract, and large mass invading into muscle and bone, this is highly c/f anal cancer. Since patient is afebrile, no night sweats/chills and WBC normal, discharge is likely from known fistula and chronic, I do not see indication for IV abx. Current leukocytosis to 12 likely reactive in setting of rectal biopsy. Patient also reports no change in symptoms with IV abx.  Superinfection of the mass cannot be exluded; however, this cannot be sterilized with IV abx and he is not septic.     - stop IV zosyn,  - switch to cefpodoxime 200mg PO q12h + flagyl 500mg PO q12h x 2 more days to complete short empiric abx course for possible superinfection.    - f/u biopsy      Thank you for your consult.  Please re-consult us or call us with questions.  Case d/w primary team.    Miriam Zhu MD, MS  Infectious Disease attending  office phone [phone]  For any questions during evening/weekend/holiday, please page ID on call    42yo MSM h/o well controlled HIV (GC3=701, 24%, VL<30 in 12/2023), hidradenitis supprativa with recurrent perianal abscess (per chart review), former IVDU p/w perianal pain x 1 month, found to have large enhancing phlegmonous mass at rectum which is invading thomas muscle and adjacent bone, c/f anal SCC.  Case d/w Dr. Alvarenga (radiology attending) and Dr. Cortez. No abscess seen, known fistula tract, and large mass invading into muscle and bone, this is highly c/f anal cancer. Since patient is afebrile, no night sweats/chills and WBC normal, discharge is likely from known fistula and chronic, I do not see indication for IV abx. Current leukocytosis to 12 likely reactive in setting of rectal biopsy. Patient also reports no change in symptoms with IV abx.  Superinfection of the mass cannot be exluded; however, this cannot be sterilized with IV abx and he is not septic.     - stop IV zosyn,  - switch to cefpodoxime 200mg PO q12h + flagyl 500mg PO q12h x 2 more days to complete short empiric abx course for possible superinfection.    - f/u biopsy      Thank you for your consult.  Please re-consult us or call us with questions.  Case d/w primary team.    Miriam Zhu MD, MS  Infectious Disease attending  office phone [phone]  For any questions during evening/weekend/holiday, please page ID on call

## 2023-12-13 NOTE — PROGRESS NOTE ADULT - ASSESSMENT
43-year-old MSM with past medical history of HIV (Biktarvy, last known CD4 2021 was 130, VL [id number]), polysubstance abuse, syphilis, anemia, and abdominal MRSA 2021, who presented with spontaneous drainage and mild pain from bilateral gluteal abscesses. The discharge and pain started 1 month ago, but worsened over the past few days, now with yellow, smelly discharge flowing spontaneously especially when he urinates or has bowel movements. Also endorses pain with defecation due to the gluteal abscesses. Of note patient has had recurrent perianal I&D in [facility] and [facility], Patient denies fever, change in bowel movement, urinary issue. Patient also reports numbness on the right leg, from perineal area to ankle, sparing the right foot, which is new compared to previous perianal drainage episodes. s/p rectal EUA + biopsy (12/13)    f/u biopsy results  Sitz baths TID and after every BM  pain control prn   rest of care per primary team   Team 5C will continue to follow  43-year-old MSM with past medical history of HIV (Biktarvy, last known CD4 2021 was 130, VL [id number]), polysubstance abuse, syphilis, anemia, and abdominal MRSA 2021, who presented with spontaneous drainage and mild pain from bilateral gluteal abscesses. The discharge and pain started 1 month ago, but worsened over the past few days, now with yellow, smelly discharge flowing spontaneously especially when he urinates or has bowel movements. Also endorses pain with defecation due to the gluteal abscesses. Of note patient has had recurrent perianal I&D in [facility] and [facility], Patient denies fever, change in bowel movement, urinary issue. Patient also reports numbness on the right leg, from perineal area to ankle, sparing the right foot, which is new compared to previous perianal drainage episodes. s/p rectal EUA + biopsy (12/13)    f/u biopsy results  Sitz baths TID and after every BM  pain control prn   rest of care per primary team   Team 5C will continue to follow

## 2023-12-14 ENCOUNTER — INPATIENT (INPATIENT)
Facility: HOSPITAL | Age: 43
LOS: 0 days | Discharge: LEFT BEFORE DISCHARGE | DRG: 975 | End: 2023-12-15
Attending: STUDENT IN AN ORGANIZED HEALTH CARE EDUCATION/TRAINING PROGRAM | Admitting: STUDENT IN AN ORGANIZED HEALTH CARE EDUCATION/TRAINING PROGRAM
Payer: MEDICAID

## 2023-12-14 ENCOUNTER — TRANSCRIPTION ENCOUNTER (OUTPATIENT)
Age: 43
End: 2023-12-14

## 2023-12-14 VITALS
OXYGEN SATURATION: 100 % | DIASTOLIC BLOOD PRESSURE: 77 MMHG | HEART RATE: 100 BPM | RESPIRATION RATE: 18 BRPM | TEMPERATURE: 98 F | SYSTOLIC BLOOD PRESSURE: 124 MMHG

## 2023-12-14 VITALS
HEART RATE: 134 BPM | OXYGEN SATURATION: 98 % | DIASTOLIC BLOOD PRESSURE: 85 MMHG | TEMPERATURE: 99 F | SYSTOLIC BLOOD PRESSURE: 121 MMHG | HEIGHT: 73 IN | RESPIRATION RATE: 20 BRPM

## 2023-12-14 LAB
ALBUMIN SERPL ELPH-MCNC: 3 G/DL — LOW (ref 3.3–5)
ALBUMIN SERPL ELPH-MCNC: 3 G/DL — LOW (ref 3.3–5)
ALP SERPL-CCNC: 61 U/L — SIGNIFICANT CHANGE UP (ref 40–120)
ALP SERPL-CCNC: 61 U/L — SIGNIFICANT CHANGE UP (ref 40–120)
ALT FLD-CCNC: 14 U/L — SIGNIFICANT CHANGE UP (ref 10–45)
ALT FLD-CCNC: 14 U/L — SIGNIFICANT CHANGE UP (ref 10–45)
ANION GAP SERPL CALC-SCNC: 8 MMOL/L — SIGNIFICANT CHANGE UP (ref 5–17)
ANION GAP SERPL CALC-SCNC: 8 MMOL/L — SIGNIFICANT CHANGE UP (ref 5–17)
AST SERPL-CCNC: 15 U/L — SIGNIFICANT CHANGE UP (ref 10–40)
AST SERPL-CCNC: 15 U/L — SIGNIFICANT CHANGE UP (ref 10–40)
BASOPHILS # BLD AUTO: 0.04 K/UL — SIGNIFICANT CHANGE UP (ref 0–0.2)
BASOPHILS # BLD AUTO: 0.04 K/UL — SIGNIFICANT CHANGE UP (ref 0–0.2)
BASOPHILS NFR BLD AUTO: 0.5 % — SIGNIFICANT CHANGE UP (ref 0–2)
BASOPHILS NFR BLD AUTO: 0.5 % — SIGNIFICANT CHANGE UP (ref 0–2)
BILIRUB SERPL-MCNC: 0.2 MG/DL — SIGNIFICANT CHANGE UP (ref 0.2–1.2)
BILIRUB SERPL-MCNC: 0.2 MG/DL — SIGNIFICANT CHANGE UP (ref 0.2–1.2)
BUN SERPL-MCNC: 10 MG/DL — SIGNIFICANT CHANGE UP (ref 7–23)
BUN SERPL-MCNC: 10 MG/DL — SIGNIFICANT CHANGE UP (ref 7–23)
CALCIUM SERPL-MCNC: 8.5 MG/DL — SIGNIFICANT CHANGE UP (ref 8.4–10.5)
CALCIUM SERPL-MCNC: 8.5 MG/DL — SIGNIFICANT CHANGE UP (ref 8.4–10.5)
CHLORIDE SERPL-SCNC: 106 MMOL/L — SIGNIFICANT CHANGE UP (ref 96–108)
CHLORIDE SERPL-SCNC: 106 MMOL/L — SIGNIFICANT CHANGE UP (ref 96–108)
CO2 SERPL-SCNC: 25 MMOL/L — SIGNIFICANT CHANGE UP (ref 22–31)
CO2 SERPL-SCNC: 25 MMOL/L — SIGNIFICANT CHANGE UP (ref 22–31)
CREAT SERPL-MCNC: 0.76 MG/DL — SIGNIFICANT CHANGE UP (ref 0.5–1.3)
CREAT SERPL-MCNC: 0.76 MG/DL — SIGNIFICANT CHANGE UP (ref 0.5–1.3)
CRP SERPL-MCNC: 10.4 MG/L — HIGH (ref 0–4)
CRP SERPL-MCNC: 10.4 MG/L — HIGH (ref 0–4)
EGFR: 116 ML/MIN/1.73M2 — SIGNIFICANT CHANGE UP
EGFR: 116 ML/MIN/1.73M2 — SIGNIFICANT CHANGE UP
EOSINOPHIL # BLD AUTO: 0.2 K/UL — SIGNIFICANT CHANGE UP (ref 0–0.5)
EOSINOPHIL # BLD AUTO: 0.2 K/UL — SIGNIFICANT CHANGE UP (ref 0–0.5)
EOSINOPHIL NFR BLD AUTO: 2.4 % — SIGNIFICANT CHANGE UP (ref 0–6)
EOSINOPHIL NFR BLD AUTO: 2.4 % — SIGNIFICANT CHANGE UP (ref 0–6)
ERYTHROCYTE [SEDIMENTATION RATE] IN BLOOD: 55 MM/HR — HIGH
ERYTHROCYTE [SEDIMENTATION RATE] IN BLOOD: 55 MM/HR — HIGH
GLUCOSE SERPL-MCNC: 100 MG/DL — HIGH (ref 70–99)
GLUCOSE SERPL-MCNC: 100 MG/DL — HIGH (ref 70–99)
HCT VFR BLD CALC: 29 % — LOW (ref 39–50)
HCT VFR BLD CALC: 29 % — LOW (ref 39–50)
HGB BLD-MCNC: 8.4 G/DL — LOW (ref 13–17)
HGB BLD-MCNC: 8.4 G/DL — LOW (ref 13–17)
IMM GRANULOCYTES NFR BLD AUTO: 0.5 % — SIGNIFICANT CHANGE UP (ref 0–0.9)
IMM GRANULOCYTES NFR BLD AUTO: 0.5 % — SIGNIFICANT CHANGE UP (ref 0–0.9)
LYMPHOCYTES # BLD AUTO: 1.39 K/UL — SIGNIFICANT CHANGE UP (ref 1–3.3)
LYMPHOCYTES # BLD AUTO: 1.39 K/UL — SIGNIFICANT CHANGE UP (ref 1–3.3)
LYMPHOCYTES # BLD AUTO: 16.7 % — SIGNIFICANT CHANGE UP (ref 13–44)
LYMPHOCYTES # BLD AUTO: 16.7 % — SIGNIFICANT CHANGE UP (ref 13–44)
MAGNESIUM SERPL-MCNC: 2 MG/DL — SIGNIFICANT CHANGE UP (ref 1.6–2.6)
MAGNESIUM SERPL-MCNC: 2 MG/DL — SIGNIFICANT CHANGE UP (ref 1.6–2.6)
MCHC RBC-ENTMCNC: 23.5 PG — LOW (ref 27–34)
MCHC RBC-ENTMCNC: 23.5 PG — LOW (ref 27–34)
MCHC RBC-ENTMCNC: 29 GM/DL — LOW (ref 32–36)
MCHC RBC-ENTMCNC: 29 GM/DL — LOW (ref 32–36)
MCV RBC AUTO: 81 FL — SIGNIFICANT CHANGE UP (ref 80–100)
MCV RBC AUTO: 81 FL — SIGNIFICANT CHANGE UP (ref 80–100)
MONOCYTES # BLD AUTO: 0.73 K/UL — SIGNIFICANT CHANGE UP (ref 0–0.9)
MONOCYTES # BLD AUTO: 0.73 K/UL — SIGNIFICANT CHANGE UP (ref 0–0.9)
MONOCYTES NFR BLD AUTO: 8.8 % — SIGNIFICANT CHANGE UP (ref 2–14)
MONOCYTES NFR BLD AUTO: 8.8 % — SIGNIFICANT CHANGE UP (ref 2–14)
NEUTROPHILS # BLD AUTO: 5.9 K/UL — SIGNIFICANT CHANGE UP (ref 1.8–7.4)
NEUTROPHILS # BLD AUTO: 5.9 K/UL — SIGNIFICANT CHANGE UP (ref 1.8–7.4)
NEUTROPHILS NFR BLD AUTO: 71.1 % — SIGNIFICANT CHANGE UP (ref 43–77)
NEUTROPHILS NFR BLD AUTO: 71.1 % — SIGNIFICANT CHANGE UP (ref 43–77)
NRBC # BLD: 0 /100 WBCS — SIGNIFICANT CHANGE UP (ref 0–0)
NRBC # BLD: 0 /100 WBCS — SIGNIFICANT CHANGE UP (ref 0–0)
PHOSPHATE SERPL-MCNC: 4.1 MG/DL — SIGNIFICANT CHANGE UP (ref 2.5–4.5)
PHOSPHATE SERPL-MCNC: 4.1 MG/DL — SIGNIFICANT CHANGE UP (ref 2.5–4.5)
PLATELET # BLD AUTO: 426 K/UL — HIGH (ref 150–400)
PLATELET # BLD AUTO: 426 K/UL — HIGH (ref 150–400)
POTASSIUM SERPL-MCNC: 4.1 MMOL/L — SIGNIFICANT CHANGE UP (ref 3.5–5.3)
POTASSIUM SERPL-MCNC: 4.1 MMOL/L — SIGNIFICANT CHANGE UP (ref 3.5–5.3)
POTASSIUM SERPL-SCNC: 4.1 MMOL/L — SIGNIFICANT CHANGE UP (ref 3.5–5.3)
POTASSIUM SERPL-SCNC: 4.1 MMOL/L — SIGNIFICANT CHANGE UP (ref 3.5–5.3)
PROT SERPL-MCNC: 6.6 G/DL — SIGNIFICANT CHANGE UP (ref 6–8.3)
PROT SERPL-MCNC: 6.6 G/DL — SIGNIFICANT CHANGE UP (ref 6–8.3)
RBC # BLD: 3.58 M/UL — LOW (ref 4.2–5.8)
RBC # BLD: 3.58 M/UL — LOW (ref 4.2–5.8)
RBC # FLD: 17 % — HIGH (ref 10.3–14.5)
RBC # FLD: 17 % — HIGH (ref 10.3–14.5)
SODIUM SERPL-SCNC: 139 MMOL/L — SIGNIFICANT CHANGE UP (ref 135–145)
SODIUM SERPL-SCNC: 139 MMOL/L — SIGNIFICANT CHANGE UP (ref 135–145)
SURGICAL PATHOLOGY STUDY: SIGNIFICANT CHANGE UP
SURGICAL PATHOLOGY STUDY: SIGNIFICANT CHANGE UP
WBC # BLD: 8.3 K/UL — SIGNIFICANT CHANGE UP (ref 3.8–10.5)
WBC # BLD: 8.3 K/UL — SIGNIFICANT CHANGE UP (ref 3.8–10.5)
WBC # FLD AUTO: 8.3 K/UL — SIGNIFICANT CHANGE UP (ref 3.8–10.5)
WBC # FLD AUTO: 8.3 K/UL — SIGNIFICANT CHANGE UP (ref 3.8–10.5)

## 2023-12-14 PROCEDURE — 80048 BASIC METABOLIC PNL TOTAL CA: CPT

## 2023-12-14 PROCEDURE — 87536 HIV-1 QUANT&REVRSE TRNSCRPJ: CPT

## 2023-12-14 PROCEDURE — 80053 COMPREHEN METABOLIC PANEL: CPT

## 2023-12-14 PROCEDURE — 85610 PROTHROMBIN TIME: CPT

## 2023-12-14 PROCEDURE — 83735 ASSAY OF MAGNESIUM: CPT

## 2023-12-14 PROCEDURE — 86359 T CELLS TOTAL COUNT: CPT

## 2023-12-14 PROCEDURE — 80202 ASSAY OF VANCOMYCIN: CPT

## 2023-12-14 PROCEDURE — 88305 TISSUE EXAM BY PATHOLOGIST: CPT

## 2023-12-14 PROCEDURE — 85652 RBC SED RATE AUTOMATED: CPT

## 2023-12-14 PROCEDURE — 86850 RBC ANTIBODY SCREEN: CPT

## 2023-12-14 PROCEDURE — 85025 COMPLETE CBC W/AUTO DIFF WBC: CPT

## 2023-12-14 PROCEDURE — 84100 ASSAY OF PHOSPHORUS: CPT

## 2023-12-14 PROCEDURE — 99285 EMERGENCY DEPT VISIT HI MDM: CPT | Mod: 25

## 2023-12-14 PROCEDURE — 83550 IRON BINDING TEST: CPT

## 2023-12-14 PROCEDURE — 99239 HOSP IP/OBS DSCHRG MGMT >30: CPT | Mod: GC

## 2023-12-14 PROCEDURE — 82728 ASSAY OF FERRITIN: CPT

## 2023-12-14 PROCEDURE — 85045 AUTOMATED RETICULOCYTE COUNT: CPT

## 2023-12-14 PROCEDURE — 82746 ASSAY OF FOLIC ACID SERUM: CPT

## 2023-12-14 PROCEDURE — 99285 EMERGENCY DEPT VISIT HI MDM: CPT

## 2023-12-14 PROCEDURE — 85027 COMPLETE CBC AUTOMATED: CPT

## 2023-12-14 PROCEDURE — 86901 BLOOD TYPING SEROLOGIC RH(D): CPT

## 2023-12-14 PROCEDURE — 86360 T CELL ABSOLUTE COUNT/RATIO: CPT

## 2023-12-14 PROCEDURE — 82607 VITAMIN B-12: CPT

## 2023-12-14 PROCEDURE — C1889: CPT

## 2023-12-14 PROCEDURE — 85730 THROMBOPLASTIN TIME PARTIAL: CPT

## 2023-12-14 PROCEDURE — 86900 BLOOD TYPING SEROLOGIC ABO: CPT

## 2023-12-14 PROCEDURE — 86140 C-REACTIVE PROTEIN: CPT

## 2023-12-14 PROCEDURE — 36415 COLL VENOUS BLD VENIPUNCTURE: CPT

## 2023-12-14 PROCEDURE — 83540 ASSAY OF IRON: CPT

## 2023-12-14 RX ORDER — SODIUM CHLORIDE 9 MG/ML
2500 INJECTION INTRAMUSCULAR; INTRAVENOUS; SUBCUTANEOUS ONCE
Refills: 0 | Status: COMPLETED | OUTPATIENT
Start: 2023-12-14 | End: 2023-12-14

## 2023-12-14 RX ORDER — BICTEGRAVIR SODIUM, EMTRICITABINE, AND TENOFOVIR ALAFENAMIDE FUMARATE 30; 120; 15 MG/1; MG/1; MG/1
1 TABLET ORAL
Qty: 30 | Refills: 11
Start: 2023-12-14 | End: 2024-12-07

## 2023-12-14 RX ORDER — ACETAMINOPHEN 500 MG
650 TABLET ORAL ONCE
Refills: 0 | Status: COMPLETED | OUTPATIENT
Start: 2023-12-14 | End: 2023-12-14

## 2023-12-14 RX ADMIN — Medication 650 MILLIGRAM(S): at 14:22

## 2023-12-14 RX ADMIN — Medication 200 MILLIGRAM(S): at 06:03

## 2023-12-14 RX ADMIN — Medication 200 MILLIGRAM(S): at 16:11

## 2023-12-14 RX ADMIN — Medication 500 MILLIGRAM(S): at 06:03

## 2023-12-14 RX ADMIN — SODIUM CHLORIDE 2500 MILLILITER(S): 9 INJECTION INTRAMUSCULAR; INTRAVENOUS; SUBCUTANEOUS at 23:59

## 2023-12-14 RX ADMIN — Medication 15 MILLIGRAM(S): at 09:10

## 2023-12-14 RX ADMIN — Medication 15 MILLIGRAM(S): at 08:53

## 2023-12-14 RX ADMIN — BICTEGRAVIR SODIUM, EMTRICITABINE, AND TENOFOVIR ALAFENAMIDE FUMARATE 1 TABLET(S): 30; 120; 15 TABLET ORAL at 11:17

## 2023-12-14 RX ADMIN — Medication 1 PACKET(S): at 11:17

## 2023-12-14 RX ADMIN — Medication 500 MILLIGRAM(S): at 16:10

## 2023-12-14 RX ADMIN — Medication 650 MILLIGRAM(S): at 23:59

## 2023-12-14 RX ADMIN — Medication 15 MILLIGRAM(S): at 01:21

## 2023-12-14 RX ADMIN — Medication 650 MILLIGRAM(S): at 13:22

## 2023-12-14 NOTE — PROGRESS NOTE ADULT - PROBLEM SELECTOR PLAN 1
-CT showing with possible malignancy  -Surgery consulted, plan to do evaluation under anesthesia, tissue biopsy tomorrow. Pt will be NPO at midnight.  -Oncology/Surg Onc consult s/p procedure.  -12/13 started toradol 15mg q8 for pain control -CT showing with possible malignancy  -MRI to r/o osteomyelitis pending.   -Surgery consulted, Biopsy & EUA performed 12/13.   -Pt requesting to follow up biopsy results and follow with oncology outpatient  -12/13 started toradol 15mg q8 for pain control

## 2023-12-14 NOTE — DISCHARGE NOTE NURSING/CASE MANAGEMENT/SOCIAL WORK - CAREGIVER ADDRESS
[address] [place] [address], [address] [address] [address] [place] [address], [address] [address]

## 2023-12-14 NOTE — DISCHARGE NOTE NURSING/CASE MANAGEMENT/SOCIAL WORK - PATIENT PORTAL LINK FT
You can access the FollowMyHealth Patient Portal offered by [facility] by registering at the following website: [email or web address]. By joining [facility]’s FollowMyHealth portal, you will also be able to view your health information using other applications (apps) compatible with our system. You can access the FollowMyHealth Patient Portal offered by [facility] by registering at the following website: [email or web address]. By joining [facility]’s FollowMyHealth portal, you will also be able to view your health information using other applications (apps) compatible with our system.

## 2023-12-14 NOTE — PROGRESS NOTE ADULT - PROVIDER SPECIALTY LIST ADULT
Surgery
Surgery
Hospitalist
Internal Medicine
Surgery
Internal Medicine
Surgery
Surgery
Internal Medicine
Internal Medicine

## 2023-12-14 NOTE — ED ADULT TRIAGE NOTE - CHIEF COMPLAINT QUOTE
walk in pt with complaints of severe anal/rectal pain after passing tissue/mass during bm this evening. Recently dc'ed from [facility] today after having biopsy done of questionable mass in rectum 2 days ago. walk in pt with complaints of severe anal/rectal pain after passing tissue/mass during bm this evening. Recently dc'ed from [facility] today after having biopsy done of questionable mass in rectum 2 days ago.

## 2023-12-14 NOTE — PROGRESS NOTE ADULT - PROBLEM SELECTOR PLAN 7
Fluids: s/p 1L NS in ED  Electrolytes: Replete to keep K>4 and Mg>2  Nutrition: Regular diet  DVT ppx: Lovenox  GI ppx: None  Dispo: RMF
Fluids: s/p 1L NS in ED  Electrolytes: Replete to keep K>4 and Mg>2  Nutrition: Regular diet  DVT ppx: Lovenox  GI ppx: None  Dispo: RMF
Fluids: s/p 1L NS in ED  Electrolytes: Replete to keep K>4 and Mg>2  Nutrition: Regular diet  DVT ppx: Lovenox  GI ppx: None  Dispo: RMF.

## 2023-12-14 NOTE — ED ADULT NURSE NOTE - OBJECTIVE STATEMENT
44 y/o male here for eval of rectal pain w/ abscess and difficulty passing bowel movements due to possible rectal mass. patient states he was at  earlier this week and was discharge after being unable to get an MRI due to technical issues with the machine. patient admits to being discharged w/ antibiotics and being biopsied. Patient states that hr prior to arrive to ED patient had BM where foreign mass was discharged from rectum. Patient has specimen w/ him in a plastic cup. patient is alert verbal oriented x3 able to make needs known

## 2023-12-14 NOTE — ED ADULT NURSE NOTE - NSFALLUNIVINTERV_ED_ALL_ED
Bed/Stretcher in lowest position, wheels locked, appropriate side rails in place/Call bell, personal items and telephone in reach/Instruct patient to call for assistance before getting out of bed/chair/stretcher/Non-slip footwear applied when patient is off stretcher/[place] to call system/Physically safe environment - no spills, clutter or unnecessary equipment/Purposeful proactive rounding/Room/bathroom lighting operational, light cord in reach Bed/Stretcher in lowest position, wheels locked, appropriate side rails in place/Call bell, personal items and telephone in reach/Instruct patient to call for assistance before getting out of bed/chair/stretcher/Non-slip footwear applied when patient is off stretcher/[place] to call system/Physically safe environment - no spills, clutter or unnecessary equipment/Purposeful proactive rounding/Room/bathroom lighting operational, light cord in reach

## 2023-12-14 NOTE — ED ADULT NURSE NOTE - CHIEF COMPLAINT QUOTE
walk in pt with complaints of severe anal/rectal pain after passing tissue/mass during bm this evening. Recently dc'ed from [facility] today after having biopsy done of questionable mass in rectum 2 days ago. walk in pt with complaints of severe anal/rectal pain after passing tissue/mass during bm this evening. Recently dc'ed from [facility] today after having biopsy done of questionable mass in rectum 2 days ago.

## 2023-12-14 NOTE — DISCHARGE NOTE NURSING/CASE MANAGEMENT/SOCIAL WORK - NSDCFUADDAPPT_GEN_ALL_CORE_FT
Juan Antonio Cortez  [facility]  [place] [id number] [address]  Scheduled Appointment: 12/21/2023 at 1:30pm  Juan Antonio Cortez  [facility]  [place] [id number] [address]  Scheduled Appointment: 12/21/2023 at 1:30pm

## 2023-12-14 NOTE — PROGRESS NOTE ADULT - SUBJECTIVE AND OBJECTIVE BOX
**INCOMPLETE NOTE    OVERNIGHT EVENTS:    SUBJECTIVE:  Patient seen and examined at bedside.    Vital Signs Last 12 Hrs  T(F): 98.7 (12-14-23 @ 06:09), Max: 98.7 (12-14-23 @ 06:09)  HR: 90 (12-14-23 @ 06:10) (90 - 113)  BP: 127/78 (12-14-23 @ 06:09) (127/78 - 129/69)  BP(mean): --  RR: 18 (12-14-23 @ 06:09) (18 - 18)  SpO2: 99% (12-14-23 @ 06:09) (98% - 99%)  I&O's Summary      PHYSICAL EXAM:  Constitutional: NAD, comfortable in bed.  HEENT: NC/AT, PERRLA, EOMI, no conjunctival pallor or scleral icterus, MMM  Neck: Supple, no JVD  Respiratory: CTA B/L. No w/r/r.   Cardiovascular: RRR, normal S1 and S2, no m/r/g.   Gastrointestinal: +BS, soft NTND, no guarding or rebound tenderness, no palpable masses   Extremities: wwp; no cyanosis, clubbing or edema.   Vascular: Pulses equal and strong throughout.   Neurological: AAOx3, no CN deficits, strength and sensation intact throughout.   Skin: No gross skin abnormalities or rashes        LABS:                        8.4    8.30  )-----------( [id number]      ( 14 Dec 2023 05:30 )             29.0     12-14    139  |  106  |  x   ----------------------------<  x   4.1   |  x   |  x     Ca    8.6      13 Dec 2023 05:30  Phos  4.1     12-14  Mg     2.0     12-14    TPro  7.1  /  Alb  3.3  /  TBili  <0.2  /  DBili  x   /  AST  15  /  ALT  14  /  AlkPhos  69  12-13      Urinalysis Basic - ( 13 Dec 2023 05:30 )    Color: x / Appearance: x / SG: x / pH: x  Gluc: 173 mg/dL / Ketone: x  / Bili: x / Urobili: x   Blood: x / Protein: x / Nitrite: x   Leuk Esterase: x / RBC: x / WBC x   Sq Epi: x / Non Sq Epi: x / Bacteria: x          RADIOLOGY & ADDITIONAL TESTS:    MEDICATIONS  (STANDING):  bictegravir 50 mG/emtricitabine 200 mG/tenofovir alafenamide 25 mG (BIKTARVY) 1 Tablet(s) Oral daily  cefpodoxime 200 milliGRAM(s) Oral every 12 hours  enoxaparin Injectable 40 milliGRAM(s) SubCutaneous every 24 hours  influenza   Vaccine 0.5 milliLiter(s) IntraMuscular once  ketorolac   Injectable 15 milliGRAM(s) IV Push every 8 hours  melatonin 10 milliGRAM(s) Oral at bedtime  metroNIDAZOLE    Tablet 500 milliGRAM(s) Oral every 12 hours  psyllium Powder 1 Packet(s) Oral daily    MEDICATIONS  (PRN):  acetaminophen     Tablet .. 650 milliGRAM(s) Oral every 6 hours PRN Temp greater or equal to 38C (100.4F), Mild Pain (1 - 3)  ondansetron Injectable 4 milliGRAM(s) IV Push every 8 hours PRN Nausea and/or Vomiting  oxycodone    5 mG/acetaminophen 325 mG 1 Tablet(s) Oral every 4 hours PRN Moderate Pain (4 - 6)   **INCOMPLETE NOTE    Subjective:  OVERNIGHT EVENTS: Pt seen and examined at bedside this AM, resting comfortably in bed in no acute distress. Offers no complaints and reports no overnight events.     Objective:  Vitals:  Vital Signs Last 12 Hrs  T(F): 98.7 (12-14-23 @ 06:09), Max: 98.7 (12-14-23 @ 06:09)  HR: 90 (12-14-23 @ 06:10) (90 - 113)  BP: 127/78 (12-14-23 @ 06:09) (127/78 - 129/69)  BP(mean): --  RR: 18 (12-14-23 @ 06:09) (18 - 18)  SpO2: 99% (12-14-23 @ 06:09) (98% - 99%)  I&O's Summary      PHYSICAL EXAM:  Constitutional: NAD, comfortable in bed.  Neck: Supple, no JVD  Respiratory: CTA B/L. No w/r/r.   Cardiovascular: RRR, normal S1 and S2, no m/r/g.   Gastrointestinal: +BS, soft NTND, no guarding or rebound tenderness, no palpable masses   Extremities: wwp; no cyanosis, clubbing or edema.   Neurological: AAOx3, no CN deficits, reports ongoing R leg numbness without weakness.  Skin: B/L Perianal abscess     LABS:                        8.4    8.30  )-----------( 426      ( 14 Dec 2023 05:30 )             29.0     12-14    139  |  106  |  x   ----------------------------<  x   4.1   |  x   |  x     Ca    8.6      13 Dec 2023 05:30  Phos  4.1     12-14  Mg     2.0     12-14    TPro  7.1  /  Alb  3.3  /  TBili  <0.2  /  DBili  x   /  AST  15  /  ALT  14  /  AlkPhos  69  12-13      Urinalysis Basic - ( 13 Dec 2023 05:30 )    Color: x / Appearance: x / SG: x / pH: x  Gluc: 173 mg/dL / Ketone: x  / Bili: x / Urobili: x   Blood: x / Protein: x / Nitrite: x   Leuk Esterase: x / RBC: x / WBC x   Sq Epi: x / Non Sq Epi: x / Bacteria: x    RADIOLOGY & ADDITIONAL TESTS:    MEDICATIONS  (STANDING):  bictegravir 50 mG/emtricitabine 200 mG/tenofovir alafenamide 25 mG (BIKTARVY) 1 Tablet(s) Oral daily  cefpodoxime 200 milliGRAM(s) Oral every 12 hours  enoxaparin Injectable 40 milliGRAM(s) SubCutaneous every 24 hours  influenza   Vaccine 0.5 milliLiter(s) IntraMuscular once  ketorolac   Injectable 15 milliGRAM(s) IV Push every 8 hours  melatonin 10 milliGRAM(s) Oral at bedtime  metroNIDAZOLE    Tablet 500 milliGRAM(s) Oral every 12 hours  psyllium Powder 1 Packet(s) Oral daily    MEDICATIONS  (PRN):  acetaminophen     Tablet .. 650 milliGRAM(s) Oral every 6 hours PRN Temp greater or equal to 38C (100.4F), Mild Pain (1 - 3)  ondansetron Injectable 4 milliGRAM(s) IV Push every 8 hours PRN Nausea and/or Vomiting  oxycodone    5 mG/acetaminophen 325 mG 1 Tablet(s) Oral every 4 hours PRN Moderate Pain (4 - 6)

## 2023-12-14 NOTE — PROGRESS NOTE ADULT - PROBLEM SELECTOR PLAN 4
-Reports taking meth (smokes), alcohol (socially), marijuana  - social work assistance for rehab  - SBIRT consulted, per note pt refused.
-Reports taking meth (smokes), alcohol (socially), marijuana  - social work assistance for rehab  - SBIRT consulted, per note pt refused.
Patient had history of syphilis s/p treatment. Denies symptoms. Physical exam elicits RLE numbness from hip to ankle. S/p penicillin G in ED.   - f/u Syphilis screen
-Reports taking meth (smokes), alcohol (socially), marijuana  - social work assistance for rehab  - SBIRT consulted, per note pt refused.
-Reports taking meth (smokes), alcohol (socially), marijuana  - social work assistance for rehab  - SBIRT consulted, per note pt refused

## 2023-12-14 NOTE — PROGRESS NOTE ADULT - PROBLEM SELECTOR PLAN 3
-Reportedly goes to [facility] HIV clinic q6 months, last checked 6 months ago and reportedly VLUD and CD4 >200. Compliant with Biktarvy and no longer on bactrim  - c/w Biktarvy daily  - CD4 266. -Reportedly goes to [facility] HIV clinic q6 months, last checked 6 months ago and reportedly VLUD and CD4 >200. Compliant with Biktarvy and no longer on bactrim  - c/w Biktarvy daily  - CD4 266.

## 2023-12-14 NOTE — PROGRESS NOTE ADULT - PROBLEM SELECTOR PLAN 2
-Onset of numbness 1 month ago, no weakness. Suspect 2/2 large perianal abscess vs tumor compressing sciatic nerve. 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  - drainage of abscess, f/u surgery  - monitor for weakness or worsening numbness.
-Onset of numbness 1 month ago, no weakness. Suspect 2/2 large perianal abscess vs tumor compressing sciatic nerve. 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  - drainage of abscess, f/u surgery  - monitor for weakness or worsening numbness.
Onset of numbness 1 month ago, no weakness. Suspect 2/2 large perianal abscess vs tumor compressing sciatic nerve. 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  - drainage of abscess, f/u surgery  - if persistent leg numbness, can trial gabapentin  - monitor for weakness or worsening numbness
-Onset of numbness 1 month ago, no weakness. Suspect 2/2 large perianal abscess vs tumor compressing sciatic nerve. 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  - drainage of abscess, f/u surgery  - monitor for weakness or worsening numbness.
-Onset of numbness 1 month ago, no weakness. Suspect 2/2 large perianal abscess vs tumor compressing sciatic nerve. 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  - drainage of abscess, f/u surgery  - monitor for weakness or worsening numbness.

## 2023-12-14 NOTE — DISCHARGE NOTE NURSING/CASE MANAGEMENT/SOCIAL WORK - NSDCPEFALRISK_GEN_ALL_CORE
For information on Fall & Injury Prevention, visit: [email or web address] OR  [email or web address] OR  https://www.cdc.gov/steadi/patient.html For information on Fall & Injury Prevention, visit: [email or web address] OR  [email or web address] OR  https://www.cdc.gov/steadi/patient.html

## 2023-12-14 NOTE — PROGRESS NOTE ADULT - ASSESSMENT
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 Now S/P tissue biopsy and EUA with colorectal surgery, pending results, on cefpodoxime and flagyl for empiric treatment of possible superinfection per ID recs.

## 2023-12-14 NOTE — PROGRESS NOTE ADULT - REASON FOR ADMISSION
Perianal drainage

## 2023-12-14 NOTE — PROGRESS NOTE ADULT - PROBLEM SELECTOR PLAN 5
-Patient had history of syphilis s/p treatment. Denies symptoms. Physical exam elicits RLE numbness from hip to ankle. S/p penicillin G in ED.
Hb 8.4, baseline 9-11. No active bleed and no blood in stool or from abscess. Reports taking iron supplement once monthly for AARTI  - iron profile, B12, folic acid level  - Transfuse for Hb<7  - Maintain active T&S  - continue iron supplementation
-Patient had history of syphilis s/p treatment. Denies symptoms. Physical exam elicits RLE numbness from hip to ankle. S/p penicillin G in ED.

## 2023-12-15 VITALS
HEART RATE: 111 BPM | SYSTOLIC BLOOD PRESSURE: 127 MMHG | DIASTOLIC BLOOD PRESSURE: 80 MMHG | TEMPERATURE: 98 F | OXYGEN SATURATION: 100 %

## 2023-12-15 DIAGNOSIS — A53.0 LATENT SYPHILIS, UNSPECIFIED AS EARLY OR LATE: ICD-10-CM

## 2023-12-15 DIAGNOSIS — B20 HUMAN IMMUNODEFICIENCY VIRUS [HIV] DISEASE: ICD-10-CM

## 2023-12-15 DIAGNOSIS — R20.0 ANESTHESIA OF SKIN: ICD-10-CM

## 2023-12-15 DIAGNOSIS — Z29.9 ENCOUNTER FOR PROPHYLACTIC MEASURES, UNSPECIFIED: ICD-10-CM

## 2023-12-15 DIAGNOSIS — C20 MALIGNANT NEOPLASM OF RECTUM: ICD-10-CM

## 2023-12-15 LAB
ALBUMIN SERPL ELPH-MCNC: 3 G/DL — LOW (ref 3.4–5)
ALBUMIN SERPL ELPH-MCNC: 3 G/DL — LOW (ref 3.4–5)
ALP SERPL-CCNC: 69 U/L — SIGNIFICANT CHANGE UP (ref 40–120)
ALP SERPL-CCNC: 69 U/L — SIGNIFICANT CHANGE UP (ref 40–120)
ALT FLD-CCNC: 18 U/L — SIGNIFICANT CHANGE UP (ref 12–42)
ALT FLD-CCNC: 18 U/L — SIGNIFICANT CHANGE UP (ref 12–42)
ANION GAP SERPL CALC-SCNC: 10 MMOL/L — SIGNIFICANT CHANGE UP (ref 9–16)
ANION GAP SERPL CALC-SCNC: 10 MMOL/L — SIGNIFICANT CHANGE UP (ref 9–16)
APPEARANCE UR: CLEAR — SIGNIFICANT CHANGE UP
APPEARANCE UR: CLEAR — SIGNIFICANT CHANGE UP
APTT BLD: 29.9 SEC — SIGNIFICANT CHANGE UP (ref 24.5–35.6)
APTT BLD: 29.9 SEC — SIGNIFICANT CHANGE UP (ref 24.5–35.6)
AST SERPL-CCNC: 18 U/L — SIGNIFICANT CHANGE UP (ref 15–37)
AST SERPL-CCNC: 18 U/L — SIGNIFICANT CHANGE UP (ref 15–37)
BASOPHILS # BLD AUTO: 0.04 K/UL — SIGNIFICANT CHANGE UP (ref 0–0.2)
BASOPHILS # BLD AUTO: 0.04 K/UL — SIGNIFICANT CHANGE UP (ref 0–0.2)
BASOPHILS NFR BLD AUTO: 0.3 % — SIGNIFICANT CHANGE UP (ref 0–2)
BASOPHILS NFR BLD AUTO: 0.3 % — SIGNIFICANT CHANGE UP (ref 0–2)
BILIRUB SERPL-MCNC: 0.2 MG/DL — SIGNIFICANT CHANGE UP (ref 0.2–1.2)
BILIRUB SERPL-MCNC: 0.2 MG/DL — SIGNIFICANT CHANGE UP (ref 0.2–1.2)
BILIRUB UR-MCNC: NEGATIVE — SIGNIFICANT CHANGE UP
BILIRUB UR-MCNC: NEGATIVE — SIGNIFICANT CHANGE UP
BUN SERPL-MCNC: 12 MG/DL — SIGNIFICANT CHANGE UP (ref 7–23)
BUN SERPL-MCNC: 12 MG/DL — SIGNIFICANT CHANGE UP (ref 7–23)
CALCIUM SERPL-MCNC: 9.1 MG/DL — SIGNIFICANT CHANGE UP (ref 8.5–10.5)
CALCIUM SERPL-MCNC: 9.1 MG/DL — SIGNIFICANT CHANGE UP (ref 8.5–10.5)
CHLORIDE SERPL-SCNC: 102 MMOL/L — SIGNIFICANT CHANGE UP (ref 96–108)
CHLORIDE SERPL-SCNC: 102 MMOL/L — SIGNIFICANT CHANGE UP (ref 96–108)
CO2 SERPL-SCNC: 27 MMOL/L — SIGNIFICANT CHANGE UP (ref 22–31)
CO2 SERPL-SCNC: 27 MMOL/L — SIGNIFICANT CHANGE UP (ref 22–31)
COLOR SPEC: YELLOW — SIGNIFICANT CHANGE UP
COLOR SPEC: YELLOW — SIGNIFICANT CHANGE UP
CREAT SERPL-MCNC: 0.86 MG/DL — SIGNIFICANT CHANGE UP (ref 0.5–1.3)
CREAT SERPL-MCNC: 0.86 MG/DL — SIGNIFICANT CHANGE UP (ref 0.5–1.3)
DIFF PNL FLD: NEGATIVE — SIGNIFICANT CHANGE UP
DIFF PNL FLD: NEGATIVE — SIGNIFICANT CHANGE UP
EGFR: 112 ML/MIN/1.73M2 — SIGNIFICANT CHANGE UP
EGFR: 112 ML/MIN/1.73M2 — SIGNIFICANT CHANGE UP
EOSINOPHIL # BLD AUTO: 0.15 K/UL — SIGNIFICANT CHANGE UP (ref 0–0.5)
EOSINOPHIL # BLD AUTO: 0.15 K/UL — SIGNIFICANT CHANGE UP (ref 0–0.5)
EOSINOPHIL NFR BLD AUTO: 1.2 % — SIGNIFICANT CHANGE UP (ref 0–6)
EOSINOPHIL NFR BLD AUTO: 1.2 % — SIGNIFICANT CHANGE UP (ref 0–6)
FLUAV AG NPH QL: SIGNIFICANT CHANGE UP
FLUAV AG NPH QL: SIGNIFICANT CHANGE UP
FLUBV AG NPH QL: SIGNIFICANT CHANGE UP
FLUBV AG NPH QL: SIGNIFICANT CHANGE UP
GLUCOSE SERPL-MCNC: 89 MG/DL — SIGNIFICANT CHANGE UP (ref 70–99)
GLUCOSE SERPL-MCNC: 89 MG/DL — SIGNIFICANT CHANGE UP (ref 70–99)
GLUCOSE UR QL: NEGATIVE MG/DL — SIGNIFICANT CHANGE UP
GLUCOSE UR QL: NEGATIVE MG/DL — SIGNIFICANT CHANGE UP
HCT VFR BLD CALC: 29.8 % — LOW (ref 39–50)
HCT VFR BLD CALC: 29.8 % — LOW (ref 39–50)
HGB BLD-MCNC: 8.7 G/DL — LOW (ref 13–17)
HGB BLD-MCNC: 8.7 G/DL — LOW (ref 13–17)
IMM GRANULOCYTES NFR BLD AUTO: 0.7 % — SIGNIFICANT CHANGE UP (ref 0–0.9)
IMM GRANULOCYTES NFR BLD AUTO: 0.7 % — SIGNIFICANT CHANGE UP (ref 0–0.9)
INR BLD: 1.25 — HIGH (ref 0.85–1.18)
INR BLD: 1.25 — HIGH (ref 0.85–1.18)
KETONES UR-MCNC: NEGATIVE MG/DL — SIGNIFICANT CHANGE UP
KETONES UR-MCNC: NEGATIVE MG/DL — SIGNIFICANT CHANGE UP
LACTATE BLDV-MCNC: 1.4 MMOL/L — SIGNIFICANT CHANGE UP (ref 0.5–2)
LACTATE BLDV-MCNC: 1.4 MMOL/L — SIGNIFICANT CHANGE UP (ref 0.5–2)
LEUKOCYTE ESTERASE UR-ACNC: NEGATIVE — SIGNIFICANT CHANGE UP
LEUKOCYTE ESTERASE UR-ACNC: NEGATIVE — SIGNIFICANT CHANGE UP
LYMPHOCYTES # BLD AUTO: 1.14 K/UL — SIGNIFICANT CHANGE UP (ref 1–3.3)
LYMPHOCYTES # BLD AUTO: 1.14 K/UL — SIGNIFICANT CHANGE UP (ref 1–3.3)
LYMPHOCYTES # BLD AUTO: 8.7 % — LOW (ref 13–44)
LYMPHOCYTES # BLD AUTO: 8.7 % — LOW (ref 13–44)
MCHC RBC-ENTMCNC: 24.1 PG — LOW (ref 27–34)
MCHC RBC-ENTMCNC: 24.1 PG — LOW (ref 27–34)
MCHC RBC-ENTMCNC: 29.2 GM/DL — LOW (ref 32–36)
MCHC RBC-ENTMCNC: 29.2 GM/DL — LOW (ref 32–36)
MCV RBC AUTO: 82.5 FL — SIGNIFICANT CHANGE UP (ref 80–100)
MCV RBC AUTO: 82.5 FL — SIGNIFICANT CHANGE UP (ref 80–100)
MONOCYTES # BLD AUTO: 0.86 K/UL — SIGNIFICANT CHANGE UP (ref 0–0.9)
MONOCYTES # BLD AUTO: 0.86 K/UL — SIGNIFICANT CHANGE UP (ref 0–0.9)
MONOCYTES NFR BLD AUTO: 6.6 % — SIGNIFICANT CHANGE UP (ref 2–14)
MONOCYTES NFR BLD AUTO: 6.6 % — SIGNIFICANT CHANGE UP (ref 2–14)
NEUTROPHILS # BLD AUTO: 10.75 K/UL — HIGH (ref 1.8–7.4)
NEUTROPHILS # BLD AUTO: 10.75 K/UL — HIGH (ref 1.8–7.4)
NEUTROPHILS NFR BLD AUTO: 82.5 % — HIGH (ref 43–77)
NEUTROPHILS NFR BLD AUTO: 82.5 % — HIGH (ref 43–77)
NITRITE UR-MCNC: NEGATIVE — SIGNIFICANT CHANGE UP
NITRITE UR-MCNC: NEGATIVE — SIGNIFICANT CHANGE UP
NRBC # BLD: 0 /100 WBCS — SIGNIFICANT CHANGE UP (ref 0–0)
NRBC # BLD: 0 /100 WBCS — SIGNIFICANT CHANGE UP (ref 0–0)
PH UR: 7.5 — SIGNIFICANT CHANGE UP (ref 5–8)
PH UR: 7.5 — SIGNIFICANT CHANGE UP (ref 5–8)
PLATELET # BLD AUTO: 456 K/UL — HIGH (ref 150–400)
PLATELET # BLD AUTO: 456 K/UL — HIGH (ref 150–400)
POTASSIUM SERPL-MCNC: 3.7 MMOL/L — SIGNIFICANT CHANGE UP (ref 3.5–5.3)
POTASSIUM SERPL-MCNC: 3.7 MMOL/L — SIGNIFICANT CHANGE UP (ref 3.5–5.3)
POTASSIUM SERPL-SCNC: 3.7 MMOL/L — SIGNIFICANT CHANGE UP (ref 3.5–5.3)
POTASSIUM SERPL-SCNC: 3.7 MMOL/L — SIGNIFICANT CHANGE UP (ref 3.5–5.3)
PROT SERPL-MCNC: 7.7 G/DL — SIGNIFICANT CHANGE UP (ref 6.4–8.2)
PROT SERPL-MCNC: 7.7 G/DL — SIGNIFICANT CHANGE UP (ref 6.4–8.2)
PROT UR-MCNC: NEGATIVE MG/DL — SIGNIFICANT CHANGE UP
PROT UR-MCNC: NEGATIVE MG/DL — SIGNIFICANT CHANGE UP
PROTHROM AB SERPL-ACNC: 13.7 SEC — HIGH (ref 9.5–13)
PROTHROM AB SERPL-ACNC: 13.7 SEC — HIGH (ref 9.5–13)
RBC # BLD: 3.61 M/UL — LOW (ref 4.2–5.8)
RBC # BLD: 3.61 M/UL — LOW (ref 4.2–5.8)
RBC # FLD: 17.2 % — HIGH (ref 10.3–14.5)
RBC # FLD: 17.2 % — HIGH (ref 10.3–14.5)
RSV RNA NPH QL NAA+NON-PROBE: SIGNIFICANT CHANGE UP
RSV RNA NPH QL NAA+NON-PROBE: SIGNIFICANT CHANGE UP
SARS-COV-2 RNA SPEC QL NAA+PROBE: SIGNIFICANT CHANGE UP
SARS-COV-2 RNA SPEC QL NAA+PROBE: SIGNIFICANT CHANGE UP
SODIUM SERPL-SCNC: 139 MMOL/L — SIGNIFICANT CHANGE UP (ref 132–145)
SODIUM SERPL-SCNC: 139 MMOL/L — SIGNIFICANT CHANGE UP (ref 132–145)
SP GR SPEC: 1.01 — SIGNIFICANT CHANGE UP (ref 1–1.03)
SP GR SPEC: 1.01 — SIGNIFICANT CHANGE UP (ref 1–1.03)
TROPONIN I, HIGH SENSITIVITY RESULT: 4.8 NG/L — SIGNIFICANT CHANGE UP
TROPONIN I, HIGH SENSITIVITY RESULT: 4.8 NG/L — SIGNIFICANT CHANGE UP
UROBILINOGEN FLD QL: 0.2 MG/DL — SIGNIFICANT CHANGE UP (ref 0.2–1)
UROBILINOGEN FLD QL: 0.2 MG/DL — SIGNIFICANT CHANGE UP (ref 0.2–1)
WBC # BLD: 13.03 K/UL — HIGH (ref 3.8–10.5)
WBC # BLD: 13.03 K/UL — HIGH (ref 3.8–10.5)
WBC # FLD AUTO: 13.03 K/UL — HIGH (ref 3.8–10.5)
WBC # FLD AUTO: 13.03 K/UL — HIGH (ref 3.8–10.5)

## 2023-12-15 PROCEDURE — 74177 CT ABD & PELVIS W/CONTRAST: CPT | Mod: 26

## 2023-12-15 PROCEDURE — 99223 1ST HOSP IP/OBS HIGH 75: CPT | Mod: GC

## 2023-12-15 PROCEDURE — 71045 X-RAY EXAM CHEST 1 VIEW: CPT | Mod: 26

## 2023-12-15 RX ORDER — VANCOMYCIN HCL 1 G
1000 VIAL (EA) INTRAVENOUS ONCE
Refills: 0 | Status: COMPLETED | OUTPATIENT
Start: 2023-12-15 | End: 2023-12-15

## 2023-12-15 RX ORDER — PIPERACILLIN AND TAZOBACTAM 4; .5 G/20ML; G/20ML
3.38 INJECTION, POWDER, LYOPHILIZED, FOR SOLUTION INTRAVENOUS ONCE
Refills: 0 | Status: DISCONTINUED | OUTPATIENT
Start: 2023-12-15 | End: 2023-12-15

## 2023-12-15 RX ORDER — BICTEGRAVIR SODIUM, EMTRICITABINE, AND TENOFOVIR ALAFENAMIDE FUMARATE 30; 120; 15 MG/1; MG/1; MG/1
1 TABLET ORAL EVERY 24 HOURS
Refills: 0 | Status: DISCONTINUED | OUTPATIENT
Start: 2023-12-15 | End: 2023-12-15

## 2023-12-15 RX ORDER — PIPERACILLIN AND TAZOBACTAM 4; .5 G/20ML; G/20ML
3.38 INJECTION, POWDER, LYOPHILIZED, FOR SOLUTION INTRAVENOUS EVERY 8 HOURS
Refills: 0 | Status: DISCONTINUED | OUTPATIENT
Start: 2023-12-16 | End: 2023-12-15

## 2023-12-15 RX ORDER — ACETAMINOPHEN 500 MG
975 TABLET ORAL EVERY 8 HOURS
Refills: 0 | Status: DISCONTINUED | OUTPATIENT
Start: 2023-12-15 | End: 2023-12-15

## 2023-12-15 RX ORDER — KETOROLAC TROMETHAMINE 30 MG/ML
15 SYRINGE (ML) INJECTION ONCE
Refills: 0 | Status: DISCONTINUED | OUTPATIENT
Start: 2023-12-15 | End: 2023-12-15

## 2023-12-15 RX ORDER — IOHEXOL 300 MG/ML
30 INJECTION, SOLUTION INTRAVENOUS ONCE
Refills: 0 | Status: COMPLETED | OUTPATIENT
Start: 2023-12-15 | End: 2023-12-15

## 2023-12-15 RX ORDER — PIPERACILLIN AND TAZOBACTAM 4; .5 G/20ML; G/20ML
3.38 INJECTION, POWDER, LYOPHILIZED, FOR SOLUTION INTRAVENOUS ONCE
Refills: 0 | Status: COMPLETED | OUTPATIENT
Start: 2023-12-15 | End: 2023-12-15

## 2023-12-15 RX ORDER — HYDROMORPHONE HYDROCHLORIDE 2 MG/ML
0.5 INJECTION INTRAMUSCULAR; INTRAVENOUS; SUBCUTANEOUS ONCE
Refills: 0 | Status: DISCONTINUED | OUTPATIENT
Start: 2023-12-15 | End: 2023-12-15

## 2023-12-15 RX ORDER — HEPARIN SODIUM 5000 [USP'U]/ML
5000 INJECTION INTRAVENOUS; SUBCUTANEOUS EVERY 8 HOURS
Refills: 0 | Status: DISCONTINUED | OUTPATIENT
Start: 2023-12-15 | End: 2023-12-15

## 2023-12-15 RX ORDER — INFLUENZA VIRUS VACCINE 15; 15; 15; 15 UG/.5ML; UG/.5ML; UG/.5ML; UG/.5ML
0.5 SUSPENSION INTRAMUSCULAR ONCE
Refills: 0 | Status: DISCONTINUED | OUTPATIENT
Start: 2023-12-15 | End: 2023-12-15

## 2023-12-15 RX ORDER — KETOROLAC TROMETHAMINE 30 MG/ML
15 SYRINGE (ML) INJECTION EVERY 6 HOURS
Refills: 0 | Status: DISCONTINUED | OUTPATIENT
Start: 2023-12-15 | End: 2023-12-15

## 2023-12-15 RX ORDER — VANCOMYCIN HCL 1 G
1000 VIAL (EA) INTRAVENOUS EVERY 12 HOURS
Refills: 0 | Status: DISCONTINUED | OUTPATIENT
Start: 2023-12-15 | End: 2023-12-15

## 2023-12-15 RX ORDER — MORPHINE SULFATE 50 MG/1
4 CAPSULE, EXTENDED RELEASE ORAL ONCE
Refills: 0 | Status: DISCONTINUED | OUTPATIENT
Start: 2023-12-15 | End: 2023-12-15

## 2023-12-15 RX ADMIN — BICTEGRAVIR SODIUM, EMTRICITABINE, AND TENOFOVIR ALAFENAMIDE FUMARATE 1 TABLET(S): 30; 120; 15 TABLET ORAL at 14:34

## 2023-12-15 RX ADMIN — MORPHINE SULFATE 4 MILLIGRAM(S): 50 CAPSULE, EXTENDED RELEASE ORAL at 00:40

## 2023-12-15 RX ADMIN — HYDROMORPHONE HYDROCHLORIDE 0.5 MILLIGRAM(S): 2 INJECTION INTRAMUSCULAR; INTRAVENOUS; SUBCUTANEOUS at 06:23

## 2023-12-15 RX ADMIN — Medication 15 MILLIGRAM(S): at 12:21

## 2023-12-15 RX ADMIN — Medication 15 MILLIGRAM(S): at 17:45

## 2023-12-15 RX ADMIN — Medication 250 MILLIGRAM(S): at 01:42

## 2023-12-15 RX ADMIN — Medication 15 MILLIGRAM(S): at 12:35

## 2023-12-15 RX ADMIN — PIPERACILLIN AND TAZOBACTAM 200 GRAM(S): 4; .5 INJECTION, POWDER, LYOPHILIZED, FOR SOLUTION INTRAVENOUS at 00:40

## 2023-12-15 RX ADMIN — PIPERACILLIN AND TAZOBACTAM 200 GRAM(S): 4; .5 INJECTION, POWDER, LYOPHILIZED, FOR SOLUTION INTRAVENOUS at 17:28

## 2023-12-15 RX ADMIN — Medication 975 MILLIGRAM(S): at 11:00

## 2023-12-15 RX ADMIN — IOHEXOL 30 MILLILITER(S): 300 INJECTION, SOLUTION INTRAVENOUS at 00:40

## 2023-12-15 RX ADMIN — HEPARIN SODIUM 5000 UNIT(S): 5000 INJECTION INTRAVENOUS; SUBCUTANEOUS at 14:34

## 2023-12-15 RX ADMIN — Medication 15 MILLIGRAM(S): at 17:30

## 2023-12-15 RX ADMIN — Medication 975 MILLIGRAM(S): at 10:11

## 2023-12-15 NOTE — H&P ADULT - HISTORY OF PRESENT ILLNESS
43-year-old MSM with past medical history of HIV (Biktarvy, 12/10/23 CD4 226), polysubstance abuse, syphilis, anemia, and abdominal MRSA 2021, who presented with spontaneous drainage painful and malodorous discharge when urinating or defacating,The discharge and pain started about a 1 month ago. During prior admission this past week, he underwent exam under anesthesia and rectal biopsy with Dr. Cortez on 12/12 consistent with invasive squamous cell carcinoma. Infectious disease consulted and patient discharged on outpatient abx (cefpodoxime 200 mg PO q12 and flagyl  500 mg q12 for two days). Patient re-presenting to [facility] after going home and having a large volume bowel movement after which he felt he passed an unclear/indeterminate piece of matter from his rectum. Patient also reports numbness on the right leg, from perineal area to ankle, sparing the right foot, which is new compared to previous perianal drainage episodes. Presently,       ED course:  Vitals: T 99, , 121/85, RR 20, 98% RA  Labs: WBC 8.30 --> 13.03, Hemoglobin 8.7, ESR 55, CRP 10.4  EKG:   Imaging: CT A/P: Since 12/9/2023, the large enhancing structure with central irregular fluid density within the right posterior perirectal space does not appear substantially changed in size but now contains new gas foci which may be related to recent instrumentation, fistulization with the rectum, or infection. Rectum adjacent to this structure is thickened and narrowed. Rectum and colon proximally is distended with feces, greatest in the rectum, which may represent delayed transit or early obstruction. Additional findings as above.  Interventions: dilaudid 0.5 mg IV, morphine 4 mg IV, zosyn 3.375 g, vancomycin 1g, NS 2.5 L  Consults:  43-year-old MSM with past medical history of HIV (Biktarvy, 12/10/23 CD4 226), polysubstance abuse, syphilis, anemia, and abdominal MRSA 2021, who presented with spontaneous drainage painful and malodorous discharge when urinating or defacating,The discharge and pain started about a 1 month ago. During prior admission this past week, he underwent exam under anesthesia and rectal biopsy with Dr. Cortez on 12/12 consistent with invasive squamous cell carcinoma. Infectious disease consulted and patient discharged on outpatient abx (cefpodoxime 200 mg PO q12 and flagyl  500 mg q12 for two days). Patient re-presenting to [facility] after going home and having a large volume bowel movement after which he felt he passed an unclear/indeterminate piece of matter from his rectum. Patient also reports numbness on the right leg, from perineal area to ankle, sparing the right foot, which is new compared to previous perianal drainage episodes. Presently,       ED course:  Vitals: T 99, , 121/85, RR 20, 98% RA  Labs: WBC 8.30 --> 13.03, Hemoglobin 8.7, ESR 55, CRP 10.4  EKG:   Imaging: CT A/P: Since 12/9/2023, the large enhancing structure with central irregular fluid density within the right posterior perirectal space does not appear substantially changed in size but now contains new gas foci which may be related to recent instrumentation, fistulization with the rectum, or infection. Rectum adjacent to this structure is thickened and narrowed. Rectum and colon proximally is distended with feces, greatest in the rectum, which may represent delayed transit or early obstruction. Additional findings as above.  Interventions: dilaudid 0.5 mg IV, morphine 4 mg IV, zosyn 3.375 g, vancomycin 1g, NS 2.5 L  Consults:  43-year-old MSM with past medical history of HIV (Biktarvy, 12/10/23 CD4 226), polysubstance abuse, syphilis, anemia, and abdominal MRSA 2021, who presented with spontaneous drainage painful and malodorous discharge when urinating or defacating,The discharge and pain started about a 1 month ago. During prior admission this past week, he underwent exam under anesthesia and rectal biopsy with Dr. Cortez on 12/12 consistent with invasive squamous cell carcinoma. Infectious disease consulted and patient discharged on outpatient abx (cefpodoxime 200 mg PO q12 and flagyl  500 mg q12 for two days). Patient re-presenting to [facility] after going home and having a large volume bowel movement after which he felt he passed an unclear/indeterminate piece of matter from his rectum. He brought the matter with him and it appears to packing from the mass. Patient also reports numbness on the right leg, from perineal area to ankle, sparing the right foot, which was relatively new and a concern he had at his previous admission as well. He reports occasionally losing control of his bowels. He is also able to urinate but has to press on his bladder to get the final amount of urine out. Presently, endorses some abdominal pain and anal pain. Denies CP, SOB, N/V/D/C.  Last admission, although recommended patient did not want to stay for MR pelvis/rectal.     ED course:  Vitals: T 99, , 121/85, RR 20, 98% RA  Labs: WBC 8.30 --> 13.03, Hemoglobin 8.7, ESR 55, CRP 10.4  EKG:   Imaging: CT A/P: Since 12/9/2023, the large enhancing structure with central irregular fluid density within the right posterior perirectal space does not appear substantially changed in size but now contains new gas foci which may be related to recent instrumentation, fistulization with the rectum, or infection. Rectum adjacent to this structure is thickened and narrowed. Rectum and colon proximally is distended with feces, greatest in the rectum, which may represent delayed transit or early obstruction. Additional findings as above.  Interventions: Dilaudid 0.5 mg IV, morphine 4 mg IV, zosyn 3.375 g, vancomycin 1g, NS 2.5 L  Consults:  43-year-old MSM with past medical history of HIV (Biktarvy, 12/10/23 CD4 226), polysubstance abuse, syphilis, anemia, and abdominal MRSA 2021, who presented with spontaneous drainage painful and malodorous discharge when urinating or defacating,The discharge and pain started about a 1 month ago. During prior admission this past week, he underwent exam under anesthesia and rectal biopsy with Dr. Cortez on 12/12 consistent with invasive squamous cell carcinoma. Infectious disease consulted and patient discharged on outpatient abx (cefpodoxime 200 mg PO q12 and flagyl  500 mg q12 for two days). Patient re-presenting to [facility] after going home and having a large volume bowel movement after which he felt he passed an unclear/indeterminate piece of matter from his rectum. He brought the matter with him and it appears to packing from the mass. Patient also reports numbness on the right leg, from perineal area to ankle, sparing the right foot, which was relatively new and a concern he had at his previous admission as well. He reports occasionally losing control of his bowels. He is also able to urinate but has to press on his bladder to get the final amount of urine out. Presently, endorses some abdominal pain and anal pain. Denies CP, SOB, N/V/D/C.  Last admission, although recommended patient did not want to stay for MR pelvis/rectal.     ED course:  Vitals: T 99, , 121/85, RR 20, 98% RA  Labs: WBC 8.30 --> 13.03, Hemoglobin 8.7, ESR 55, CRP 10.4  EKG:   Imaging: CT A/P: Since 12/9/2023, the large enhancing structure with central irregular fluid density within the right posterior perirectal space does not appear substantially changed in size but now contains new gas foci which may be related to recent instrumentation, fistulization with the rectum, or infection. Rectum adjacent to this structure is thickened and narrowed. Rectum and colon proximally is distended with feces, greatest in the rectum, which may represent delayed transit or early obstruction. Additional findings as above.  Interventions: Dilaudid 0.5 mg IV, morphine 4 mg IV, zosyn 3.375 g, vancomycin 1g, NS 2.5 L  Consults:  43-year-old MSM with past medical history of HIV (Biktarvy, 12/10/23 CD4 226), polysubstance abuse, syphilis, anemia, and abdominal MRSA 2021, who presented with spontaneous drainage painful and malodorous discharge when urinating or defacating, The discharge and pain started about a 1 month ago. During prior admission this past week, he underwent exam under anesthesia and rectal biopsy with Dr. Cortez on 12/12 consistent with invasive squamous cell carcinoma. Infectious disease consulted and patient discharged on outpatient abx (cefpodoxime 200 mg PO q12 and flagyl  500 mg q12 for two days). Patient re-presenting to [facility] after going home and having a large volume bowel movement after which he felt he passed an unclear/indeterminate piece of matter from his rectum. He brought the matter with him and it appears to packing from the mass. Patient also reports numbness on the right leg, from perineal area to ankle, sparing the right foot, which was relatively new and a concern he had at his previous admission as well. He reports occasionally losing control of his bowels. He is also able to urinate but has to press on his bladder to get the final amount of urine out. Presently, endorses some abdominal pain and anal pain. Denies CP, SOB, N/V/D/C.  Last admission, although recommended patient did not want to stay for MR pelvis/rectal.     ED course:  Vitals: T 99, , 121/85, RR 20, 98% RA  Labs: WBC 8.30 --> 13.03, Hemoglobin 8.7, ESR 55, CRP 10.4  EKG:   Imaging: CT A/P: Since 12/9/2023, the large enhancing structure with central irregular fluid density within the right posterior perirectal space does not appear substantially changed in size but now contains new gas foci which may be related to recent instrumentation, fistulization with the rectum, or infection. Rectum adjacent to this structure is thickened and narrowed. Rectum and colon proximally is distended with feces, greatest in the rectum, which may represent delayed transit or early obstruction. Additional findings as above.  Interventions: Dilaudid 0.5 mg IV, morphine 4 mg IV, zosyn 3.375 g, vancomycin 1g, NS 2.5 L  Consults:  43-year-old MSM with past medical history of HIV (Biktarvy, 12/10/23 CD4 226), polysubstance abuse, syphilis, anemia, and abdominal MRSA 2021, who presented with spontaneous drainage painful and malodorous discharge when urinating or defacating, The discharge and pain started about a 1 month ago. During prior admission this past week, he underwent exam under anesthesia and rectal biopsy with Dr. Cortez on 12/12 consistent with invasive squamous cell carcinoma. Infectious disease consulted and patient discharged on outpatient abx (cefpodoxime 200 mg PO q12 and flagyl  500 mg q12 for two days). Patient re-presenting to [facility] after going home and having a large volume bowel movement after which he felt he passed an unclear/indeterminate piece of matter from his rectum. He brought the matter with him and it appears to packing from the mass. Patient also reports numbness on the right leg, from perineal area to ankle, sparing the right foot, which was relatively new and a concern he had at his previous admission as well. He reports occasionally losing control of his bowels. He is also able to urinate but has to press on his bladder to get the final amount of urine out. Presently, endorses some abdominal pain and anal pain. Denies CP, SOB, N/V/D/C.  Last admission, although recommended patient did not want to stay for MR pelvis/rectal.     ED course:  Vitals: T 99, , 121/85, RR 20, 98% RA  Labs: WBC 8.30 --> 13.03, Hemoglobin 8.7, ESR 55, CRP 10.4  EKG:   Imaging: CT A/P: Since 12/9/2023, the large enhancing structure with central irregular fluid density within the right posterior perirectal space does not appear substantially changed in size but now contains new gas foci which may be related to recent instrumentation, fistulization with the rectum, or infection. Rectum adjacent to this structure is thickened and narrowed. Rectum and colon proximally is distended with feces, greatest in the rectum, which may represent delayed transit or early obstruction. Additional findings as above.  Interventions: Dilaudid 0.5 mg IV, morphine 4 mg IV, zosyn 3.375 g, vancomycin 1g, NS 2.5 L  Consults:  43-year-old MSM with past medical history of HIV (Biktarvy, 12/10/23 CD4 226), polysubstance abuse, syphilis, anemia, and abdominal MRSA 2021, who presented with spontaneous drainage painful and malodorous discharge when urinating or defacating, The discharge and pain started about a 1 month ago. During prior admission this past week, he underwent exam under anesthesia and rectal biopsy with Dr. Cortez on 12/12 consistent with invasive squamous cell carcinoma. Infectious disease consulted and patient discharged on outpatient abx (cefpodoxime 200 mg PO q12 and flagyl  500 mg q12 for two days). Patient re-presenting to [facility] after going home and having a large volume bowel movement after which he felt he passed an unclear/indeterminate piece of matter from his rectum. He brought the matter with him and it appears to packing from the mass. Patient also reports numbness on the right leg, from perineal area to ankle, sparing the right foot, which was relatively new and a concern he had at his previous admission as well. He reports occasionally losing control of his bowels. He is also able to urinate but has to press on his bladder to get the final amount of urine out. Presently, endorses some abdominal pain and anal pain. Denies CP, SOB, N/V/D/C.  Last admission, although recommended patient did not want to stay for MR pelvis/rectal.     ED course:  Vitals: T 99, , 121/85, RR 20, 98% RA  Labs: WBC 8.30 --> 13.03, Hemoglobin 8.7, ESR 55, CRP 10.4  EKG:   Imaging: CT A/P: Since 12/9/2023, the large enhancing structure with central irregular fluid density within the right posterior perirectal space does not appear substantially changed in size but now contains new gas foci which may be related to recent instrumentation, fistulization with the rectum, or infection. Rectum adjacent to this structure is thickened and narrowed. Rectum and colon proximally is distended with feces, greatest in the rectum, which may represent delayed transit or early obstruction. Additional findings as above.  Interventions: Dilaudid 0.5 mg IV, morphine 4 mg IV, zosyn 3.375 g, vancomycin 1g, NS 2.5 L  Consults: hem onc 43-year-old MSM with past medical history of HIV (Biktarvy, 12/10/23 CD4 226), polysubstance abuse, syphilis, anemia, and abdominal MRSA 2021, who presented with spontaneous drainage painful and malodorous discharge when urinating or defacating, The discharge and pain started about a 1 month ago. During prior admission this past week, he underwent exam under anesthesia and rectal biopsy with Dr. Cortez on 12/12 consistent with invasive squamous cell carcinoma. Infectious disease consulted and patient discharged on outpatient abx (cefpodoxime 200 mg PO q12 and flagyl  500 mg q12 for two days). Patient re-presenting to [facility] after going home and having a large volume bowel movement after which he felt he passed an unclear/indeterminate piece of matter from his rectum. He brought the matter with him and it appears to packing from the mass. Patient also reports numbness on the right leg, from perineal area to ankle, sparing the right foot, which was relatively new and a concern he had at his previous admission as well. He reports occasionally losing control of his bowels. He is also able to urinate but has to press on his bladder to get the final amount of urine out. Presently, endorses some abdominal pain and anal pain. Denies CP, SOB, N/V/D/C.  Last admission, although recommended patient did not want to stay for MR pelvis/rectal.     ED course:  Vitals: T 99, , 121/85, RR 20, 98% RA  Labs: WBC 8.30 --> 13.03, Hemoglobin 8.7, ESR 55, CRP 10.4  EKG:   Imaging: CT A/P: Since 12/9/2023, the large enhancing structure with central irregular fluid density within the right posterior perirectal space does not appear substantially changed in size but now contains new gas foci which may be related to recent instrumentation, fistulization with the rectum, or infection. Rectum adjacent to this structure is thickened and narrowed. Rectum and colon proximally is distended with feces, greatest in the rectum, which may represent delayed transit or early obstruction. Additional findings as above.  Interventions: Dilaudid 0.5 mg IV, morphine 4 mg IV, zosyn 3.375 g, vancomycin 1g, NS 2.5 L  Consults: hem onc 43-year-old MSM with past medical history of HIV (Biktarvy, 12/10/23 CD4 266), polysubstance abuse, syphilis, anemia, and abdominal MRSA 2021, who presented with spontaneous drainage painful and malodorous discharge when urinating or defacating, The discharge and pain started about a 1 month ago. During prior admission this past week, he underwent exam under anesthesia and rectal biopsy with Dr. Cortez on 12/12 consistent with invasive squamous cell carcinoma. Infectious disease consulted and patient discharged on outpatient abx (cefpodoxime 200 mg PO q12 and flagyl  500 mg q12 for two days). Patient re-presenting to [facility] after going home and having a large volume bowel movement after which he felt he passed an unclear/indeterminate piece of matter from his rectum. He brought the matter with him and it appears to packing from the mass. Patient also reports numbness on the right leg, from perineal area to ankle, sparing the right foot, which was relatively new and a concern he had at his previous admission as well. He reports occasionally losing control of his bowels. He is also able to urinate but has to press on his bladder to get the final amount of urine out. Presently, endorses some abdominal pain and anal pain. Denies CP, SOB, N/V/D/C.  Last admission, although recommended patient did not want to stay for MR pelvis/rectal.     ED course:  Vitals: T 99, , 121/85, RR 20, 98% RA  Labs: WBC 8.30 --> 13.03, Hemoglobin 8.7, ESR 55, CRP 10.4  EKG:   Imaging: CT A/P: Since 12/9/2023, the large enhancing structure with central irregular fluid density within the right posterior perirectal space does not appear substantially changed in size but now contains new gas foci which may be related to recent instrumentation, fistulization with the rectum, or infection. Rectum adjacent to this structure is thickened and narrowed. Rectum and colon proximally is distended with feces, greatest in the rectum, which may represent delayed transit or early obstruction. Additional findings as above.  Interventions: Dilaudid 0.5 mg IV, morphine 4 mg IV, zosyn 3.375 g, vancomycin 1g, NS 2.5 L  Consults: hem onc 43-year-old MSM with past medical history of HIV (Biktarvy, 12/10/23 CD4 266), polysubstance abuse, syphilis, anemia, and abdominal MRSA 2021, who presented with spontaneous drainage painful and malodorous discharge when urinating or defacating, The discharge and pain started about a 1 month ago. During prior admission this past week, he underwent exam under anesthesia and rectal biopsy with Dr. Cortez on 12/12 consistent with invasive squamous cell carcinoma. Infectious disease consulted and patient discharged on outpatient abx (cefpodoxime 200 mg PO q12 and flagyl  500 mg q12 for two days). Patient re-presenting to [facility] after going home and having a large volume bowel movement after which he felt he passed an unclear/indeterminate piece of matter from his rectum. He brought the matter with him and it appears to packing from the mass. Patient also reports numbness on the right leg, from perineal area to ankle, sparing the right foot, which was relatively new and a concern he had at his previous admission as well. He reports occasionally losing control of his bowels. He is also able to urinate but has to press on his bladder to get the final amount of urine out. Presently, endorses some abdominal pain and anal pain. Denies CP, SOB, N/V/D/C.  Last admission, although recommended patient did not want to stay for MR pelvis/rectal.     ED course:  Vitals: T 99, , 121/85, RR 20, 98% RA  Labs: WBC 8.30 --> 13.03, Hemoglobin 8.7, ESR 55, CRP 10.4  EKG:   Imaging: CT A/P: Since 12/9/2023, the large enhancing structure with central irregular fluid density within the right posterior perirectal space does not appear substantially changed in size but now contains new gas foci which may be related to recent instrumentation, fistulization with the rectum, or infection. Rectum adjacent to this structure is thickened and narrowed. Rectum and colon proximally is distended with feces, greatest in the rectum, which may represent delayed transit or early obstruction. Additional findings as above.  Interventions: Dilaudid 0.5 mg IV, morphine 4 mg IV, zosyn 3.375 g, vancomycin 1g, NS 2.5 L  Consults: hem onc 43-year-old MSM with past medical history of HIV (Biktarvy, 12/10/23 CD4 266), polysubstance abuse, syphilis, anemia, and abdominal MRSA 2021, who presented with spontaneous drainage painful and malodorous discharge when urinating or defacating, The discharge and pain started about a 1 month ago. During prior admission this past week, he underwent exam under anesthesia and rectal biopsy with Dr. Cortez on 12/12 consistent with invasive squamous cell carcinoma. Infectious disease consulted and patient discharged on outpatient abx (cefpodoxime 200 mg PO q12 and flagyl  500 mg q12 for two days). Patient re-presenting to [facility] after going home and having a large volume bowel movement after which he felt he passed an unclear/indeterminate piece of matter from his rectum. He brought the matter with him and it appears to packing from the mass. Patient also reports numbness on the right leg, from perineal area to ankle, sparing the right foot, which was relatively new and a concern he had at his previous admission as well. He reports occasionally losing control of his bowels. He is also able to urinate but has to press on his bladder to get the final amount of urine out. Presently, endorses some abdominal pain and anal pain. Denies CP, SOB, N/V/D/C.  Last admission, although recommended patient did not want to stay for MR pelvis/rectal.     ED course:  Vitals: T 99, , 121/85, RR 20, 98% RA  Labs: WBC 8.30 --> 13.03, Hemoglobin 8.7, ESR 55, CRP 10.4  EKG:   Imaging: CT A/P: Since 12/9/2023, the large enhancing structure with central irregular fluid density within the right posterior perirectal space does not appear substantially changed in size but now contains new gas foci which may be related to recent instrumentation, fistulization with the rectum, or infection. Rectum adjacent to this structure is thickened and narrowed. Rectum and colon proximally is distended with feces, greatest in the rectum, which may represent delayed transit or early obstruction. Additional findings as above.  Interventions: Dilaudid 0.5 mg IV, morphine 4 mg IV, zosyn 3.375 g, vancomycin 1g, NS 2.5 L  Consults: hem onc and surgery 43-year-old MSM with past medical history of HIV (Biktarvy, 12/10/23 CD4 266), polysubstance abuse, syphilis, anemia, and abdominal MRSA 2021, who presented with spontaneous drainage painful and malodorous discharge when urinating or defacating, The discharge and pain started about a 1 month ago. During prior admission this past week, he underwent exam under anesthesia and rectal biopsy with Dr. Cortez on 12/12 consistent with invasive squamous cell carcinoma. Infectious disease consulted and patient discharged on outpatient abx (cefpodoxime 200 mg PO q12 and flagyl  500 mg q12 for two days). Patient re-presenting to [facility] after going home and having a large volume bowel movement after which he felt he passed an unclear/indeterminate piece of matter from his rectum. He brought the matter with him and it appears to packing from the mass. Patient also reports numbness on the right leg, from perineal area to ankle, sparing the right foot, which was relatively new and a concern he had at his previous admission as well. He reports occasionally losing control of his bowels. He is also able to urinate but has to press on his bladder to get the final amount of urine out. Presently, endorses some abdominal pain and anal pain. Denies CP, SOB, N/V/D/C.  Last admission, although recommended patient did not want to stay for MR pelvis/rectal.     ED course:  Vitals: T 99, , 121/85, RR 20, 98% RA  Labs: WBC 8.30 --> 13.03, Hemoglobin 8.7, ESR 55, CRP 10.4  EKG:   Imaging: CT A/P: Since 12/9/2023, the large enhancing structure with central irregular fluid density within the right posterior perirectal space does not appear substantially changed in size but now contains new gas foci which may be related to recent instrumentation, fistulization with the rectum, or infection. Rectum adjacent to this structure is thickened and narrowed. Rectum and colon proximally is distended with feces, greatest in the rectum, which may represent delayed transit or early obstruction. Additional findings as above.  Interventions: Dilaudid 0.5 mg IV, morphine 4 mg IV, zosyn 3.375 g, vancomycin 1g, NS 2.5 L  Consults: hem onc and surgery

## 2023-12-15 NOTE — H&P ADULT - PROBLEM SELECTOR PLAN 4
patient s/p penicillin G in ED at last admission. Patient with numbness in lower extremities from hip to ankle.

## 2023-12-15 NOTE — H&P ADULT - PROBLEM SELECTOR PROBLEM 5
Procedural Sedation Assessment  Consent  Consent for procedure and sedation obtained: Yes  Medical History (complete if no H&P available, or if H&P is greater than 30 days old)  Significant medical/surgical history: Yes  Past Complications with Sedation/Anesthesia: No  Significant Family History: No  Smoking History: No  Alcohol/Drug abuse: No(not currently)  Possible Pregnancy (LMP): Not Applicable  Cardiac History: No  Respiratory History: No  PHYSICAL EXAM (complete on day of procedure, regardless of whether valid H&P is present)  History and Physical Reviewed: H&P completed today  Airway Risk History: No previous history  Airway Anatomy : Class II  Heart : Normal  Lungs : Normal  LOC/Mental Status : Normal  Other Findings  Reviewed current medications and allergies: Yes  Pertinent lab/diagnostic test reviewed: Yes  Sedation Risk Assessment  Risk Status ASA: Class I - Normal, healthy patient  Plan for Sedation: Moderate Sedation  Indications for Procedure/Pre-Procedure Diagnosis and Planned Procedure: right shoulder dislocation reduction  EKG Monitoring: Yes     Prophylactic measure

## 2023-12-15 NOTE — CONSULT NOTE ADULT - ASSESSMENT
ASSESSMENT/ PLAN:  43M with PMHx of HIV, polysubstance abuse and anemia with recurrent perirectal abscesses s/p I&D who is s/p EUA and bx (12/12 - Dr. Cortez - bx proven invasive moderately differentiated SCC) who presents to [facility] after fibrillar packing was removed after first BM. Surgery re-consulted for evaluation of wound. No intervention currently necessary.    Recommendations:   - No acute surgical intervention at this time.  - Agree with pelvic MRI  - Appreciate heme/onc recommendations  - Surgery Team 5C will continue to follow      Attending aware and agrees with plan ASSESSMENT/ PLAN:  43M with PMHx of HIV, polysubstance abuse and anemia with recurrent perirectal abscesses s/p I&D who is s/p EUA and bx (12/12 - Dr. Cortez - bx proven invasive moderately differentiated SCC) who presents to [facility] after fibrillar packing was removed after first BM. Surgery re-consulted for evaluation of wound. No intervention currently necessary.    Recommendations:   - No acute surgical intervention at this time.  - Agree with pelvic MRI  - Appreciate heme/onc recommendations  - Surgery Team 5C will continue to follow      Attending aware and agrees with plan

## 2023-12-15 NOTE — H&P ADULT - NSHPSOCIALHISTORY_GEN_ALL_CORE
Lives with roommate named Alexy and another individual who patient does state can cause trouble sometimes (did not elaborate further); MSM and has had recent unprotected sexual intercourse. Lives with roommate named Alexy and another individual who patient does state can cause trouble sometimes (did not elaborate further); MSM and has had recent unprotected sexual intercourse, treated for syphillis.

## 2023-12-15 NOTE — H&P ADULT - PROBLEM SELECTOR PLAN 5
Plan: Fluids: s/p 2.5L NS in ED  Electrolytes: Replete to keep K>4 and Mg>2  Nutrition: Regular diet  DVT ppx: Lovenox  GI ppx: None  Dispo: [facility]. Plan: Fluids: s/p 2.5L NS in ED  Electrolytes: Replete to keep K>4 and Mg>2  Nutrition: Regular diet  DVT ppx: Lovenox 40 QD  GI ppx: None  Dispo: [facility]. Plan: Fluids: s/p 2.5L NS in ED  Electrolytes: Replete to keep K>4 and Mg>2  Nutrition: Regular diet  DVT ppx: heparin 5000 q8  GI ppx: None  Dispo: [facility].

## 2023-12-15 NOTE — H&P ADULT - PROBLEM SELECTOR PLAN 3
[place] [facility], last checked 6 months ago and reportedly VLUD and CD4 >200. Compliant with Biktarvy and no longer on bactrim; CD4 266.  - c/w Biktarvy daily eportedly goes to [facility] HIV clinic q6 months, last checked 6 months ago and reportedly VLUD and CD4 >200. Compliant with Biktarvy and no longer on bactrim; CD4 266.  - c/w Biktarvy daily

## 2023-12-15 NOTE — CONSULT NOTE ADULT - SUBJECTIVE AND OBJECTIVE BOX
SURGERY CONSULT  ==============================================================================================================  HPI:  43-year-old MSM with past medical history of HIV (Biktarvy, 12/10/23 CD4 266), polysubstance abuse, syphilis, anemia, and abdominal MRSA , who presented with spontaneous drainage painful and malodorous discharge when urinating or defacating, The discharge and pain started about a 1 month ago. During prior admission this past week, he underwent exam under anesthesia and rectal biopsy with Dr. Cortez on  consistent with invasive squamous cell carcinoma. Infectious disease consulted and patient discharged on outpatient abx (cefpodoxime 200 mg PO q12 and flagyl  500 mg q12 for two days). Patient re-presenting to [facility] after going home and having a large volume bowel movement after which he felt he passed an unclear/indeterminate piece of matter from his rectum. He brought the matter with him and it appears to packing from the mass. Patient also reports numbness on the right leg, from perineal area to ankle, sparing the right foot, which was relatively new and a concern he had at his previous admission as well. He reports occasionally losing control of his bowels. He is also able to urinate but has to press on his bladder to get the final amount of urine out. Presently, endorses some abdominal pain and anal pain. Denies CP, SOB, N/V/D/C.  Last admission, although recommended patient did not want to stay for MR pelvis/rectal.     ED course:  Vitals: T 99, , 121/85, RR 20, 98% RA  Labs: WBC 8.30 --> 13.03, Hemoglobin 8.7, ESR 55, CRP 10.4  EKG:   Imaging: CT A/P: Since 2023, the large enhancing structure with central irregular fluid density within the right posterior perirectal space does not appear substantially changed in size but now contains new gas foci which may be related to recent instrumentation, fistulization with the rectum, or infection. Rectum adjacent to this structure is thickened and narrowed. Rectum and colon proximally is distended with feces, greatest in the rectum, which may represent delayed transit or early obstruction. Additional findings as above.  Interventions: Dilaudid 0.5 mg IV, morphine 4 mg IV, zosyn 3.375 g, vancomycin 1g, NS 2.5 L  Consults: hem onc and surgery (15 Dec 2023 07:39)    Surgery Addendum:  Patient is a 43M with PMHx of HIV, polysubstance abuse and anemia with recurrent perirectal abscesses s/p I&D who is s/p EUA and bx ( - Dr. Cortez - bx proven invasive moderately differentiated SCC) who presents to [facility] after fibrillar packing was removed after first BM. Fibrillar was packed for hemostasis post procedure, but patient reports that he got anxious when he saw it with first BM and re-presented. Patient also endorsed RLE weakness from perianal area down R foot which has since resolved since admission. Patient afebrile and HD stable on presentation.  Surgery consulted for evaluation of rectum post procedure. Patient without acute complaints on subjective interview. States he has had a few, nonbloody bowel movements since procedure and denies significant pain with defaecation. Denies urinary symptoms or issues voiding. States he has not been using stool softeners or sitz baths.      PAST MEDICAL & SURGICAL HISTORY:  HIV (human immunodeficiency virus infection)      Abscess      IVDU (intravenous drug user)      Anemia      No significant past surgical history        Home Meds: Home Medications:    Allergies: Allergies    No Known Allergies    Intolerances      Soc:   Advanced Directives: Presumed Full Code     CURRENT MEDICATIONS:   --------------------------------------------------------------------------------------  Neurologic Medications  acetaminophen     Tablet .. 975 milliGRAM(s) Oral every 8 hours PRN Temp greater or equal to 38C (100.4F), Mild Pain (1 - 3)  ketorolac   Injectable 15 milliGRAM(s) IV Push every 6 hours    Respiratory Medications    Cardiovascular Medications    Gastrointestinal Medications    Genitourinary Medications    Hematologic/Oncologic Medications  heparin   Injectable 5000 Unit(s) SubCutaneous every 8 hours    Antimicrobial/Immunologic Medications  bictegravir 50 mG/emtricitabine 200 mG/tenofovir alafenamide 25 mG (BIKTARVY) 1 Tablet(s) Oral every 24 hours  piperacillin/tazobactam IVPB. 3.375 Gram(s) IV Intermittent once  piperacillin/tazobactam IVPB.- 3.375 Gram(s) IV Intermittent once  vancomycin  IVPB 1000 milliGRAM(s) IV Intermittent every 12 hours    Endocrine/Metabolic Medications    Topical/Other Medications    --------------------------------------------------------------------------------------    VITAL SIGNS, INS/OUTS (last 24 hours):  --------------------------------------------------------------------------------------  ICU Vital Signs Last 24 Hrs  T(C): 36.8 (15 Dec 2023 16:14), Max: 37.2 (14 Dec 2023 23:25)  T(F): 98.2 (15 Dec 2023 16:14), Max: 99 (14 Dec 2023 23:25)  HR: 111 (15 Dec 2023 16:14) (93 - 134)  BP: 127/80 (15 Dec 2023 16:14) (115/69 - 151/92)  RR: 18 (15 Dec 2023 10:05) (18 - 20)  SpO2: 100% (15 Dec 2023 16:14) (98% - 100%)    O2 Parameters below as of 15 Dec 2023 16:14  Patient On (Oxygen Delivery Method): room air          I&O's Summary    14 Dec 2023 07:01  -  15 Dec 2023 07:00  --------------------------------------------------------  IN: 2850 mL / OUT: 1300 mL / NET: 1550 mL      --------------------------------------------------------------------------------------    EXAM:  General: Resting in bed, NAD  Neuro: A&Ox3, no focal deficits  CV: NSR  Pulm: Equal chest wall expansion b/l, no respiratory distress  Abdomen: Soft, ND, NT  Rectal: L perirectal induration stable without erythema or drainage. R gluteal I&D site clean with fibirnous exudate - purulence, draiange or erythema. YOSEPH with mildly decreased tone. No blood on glove  Extremities: WWP, no paresthesias     LABS  --------------------------------------------------------------------------------------  Labs:  CAPILLARY BLOOD GLUCOSE                              8.7    13.03 )-----------( 456      ( 14 Dec 2023 23:36 )             29.8       Auto Neutrophil %: 82.5 % (23 @ 23:36)  Auto Immature Granulocyte %: 0.7 % (23 @ 23:36)        139  |  102  |  12  ----------------------------<  89  3.7   |  27  |  0.86      Calcium: 9.1 mg/dL (23 @ 23:36)      LFTs:             7.7  | 0.2  | 18       ------------------[69      ( 14 Dec 2023 23:36 )  3.0  | x    | 18          Lipase:x      Amylase:x         Blood Gas Venous - Lactate: 1.4 mmol/L (23 @ 23:36)      Coags:     13.7   ----< 1.25    ( 14 Dec 2023 23:36 )     29.9                Urinalysis Basic - ( 14 Dec 2023 23:36 )    Color: Yellow / Appearance: Clear / S.008 / pH: x  Gluc: 89 mg/dL / Ketone: Negative mg/dL  / Bili: Negative / Urobili: 0.2 mg/dL   Blood: x / Protein: Negative mg/dL / Nitrite: Negative   Leuk Esterase: Negative / RBC: x / WBC x   Sq Epi: x / Non Sq Epi: x / Bacteria: x             SURGERY CONSULT  ==============================================================================================================  HPI:  43-year-old MSM with past medical history of HIV (Biktarvy, 12/10/23 CD4 266), polysubstance abuse, syphilis, anemia, and abdominal MRSA , who presented with spontaneous drainage painful and malodorous discharge when urinating or defacating, The discharge and pain started about a 1 month ago. During prior admission this past week, he underwent exam under anesthesia and rectal biopsy with Dr. Cortez on  consistent with invasive squamous cell carcinoma. Infectious disease consulted and patient discharged on outpatient abx (cefpodoxime 200 mg PO q12 and flagyl  500 mg q12 for two days). Patient re-presenting to [facility] after going home and having a large volume bowel movement after which he felt he passed an unclear/indeterminate piece of matter from his rectum. He brought the matter with him and it appears to packing from the mass. Patient also reports numbness on the right leg, from perineal area to ankle, sparing the right foot, which was relatively new and a concern he had at his previous admission as well. He reports occasionally losing control of his bowels. He is also able to urinate but has to press on his bladder to get the final amount of urine out. Presently, endorses some abdominal pain and anal pain. Denies CP, SOB, N/V/D/C.  Last admission, although recommended patient did not want to stay for MR pelvis/rectal.     ED course:  Vitals: T 99, , 121/85, RR 20, 98% RA  Labs: WBC 8.30 --> 13.03, Hemoglobin 8.7, ESR 55, CRP 10.4  EKG:   Imaging: CT A/P: Since 2023, the large enhancing structure with central irregular fluid density within the right posterior perirectal space does not appear substantially changed in size but now contains new gas foci which may be related to recent instrumentation, fistulization with the rectum, or infection. Rectum adjacent to this structure is thickened and narrowed. Rectum and colon proximally is distended with feces, greatest in the rectum, which may represent delayed transit or early obstruction. Additional findings as above.  Interventions: Dilaudid 0.5 mg IV, morphine 4 mg IV, zosyn 3.375 g, vancomycin 1g, NS 2.5 L  Consults: hem onc and surgery (15 Dec 2023 07:39)    Surgery Addendum:  Patient is a 43M with PMHx of HIV, polysubstance abuse and anemia with recurrent perirectal abscesses s/p I&D who is s/p EUA and bx ( - Dr. Cortez - bx proven invasive moderately differentiated SCC) who presents to [facility] after fibrillar packing was removed after first BM. Fibrillar was packed for hemostasis post procedure, but patient reports that he got anxious when he saw it with first BM and re-presented. Patient also endorsed RLE weakness from perianal area down R foot which has since resolved since admission. Patient afebrile and HD stable on presentation.  Surgery consulted for evaluation of rectum post procedure. Patient without acute complaints on subjective interview. States he has had a few, nonbloody bowel movements since procedure and denies significant pain with defaecation. Denies urinary symptoms or issues voiding. States he has not been using stool softeners or sitz baths.      PAST MEDICAL & SURGICAL HISTORY:  HIV (human immunodeficiency virus infection)      Abscess      IVDU (intravenous drug user)      Anemia      No significant past surgical history        Home Meds: Home Medications:    Allergies: Allergies    No Known Allergies    Intolerances      Soc:   Advanced Directives: Presumed Full Code     CURRENT MEDICATIONS:   --------------------------------------------------------------------------------------  Neurologic Medications  acetaminophen     Tablet .. 975 milliGRAM(s) Oral every 8 hours PRN Temp greater or equal to 38C (100.4F), Mild Pain (1 - 3)  ketorolac   Injectable 15 milliGRAM(s) IV Push every 6 hours    Respiratory Medications    Cardiovascular Medications    Gastrointestinal Medications    Genitourinary Medications    Hematologic/Oncologic Medications  heparin   Injectable 5000 Unit(s) SubCutaneous every 8 hours    Antimicrobial/Immunologic Medications  bictegravir 50 mG/emtricitabine 200 mG/tenofovir alafenamide 25 mG (BIKTARVY) 1 Tablet(s) Oral every 24 hours  piperacillin/tazobactam IVPB. 3.375 Gram(s) IV Intermittent once  piperacillin/tazobactam IVPB.- 3.375 Gram(s) IV Intermittent once  vancomycin  IVPB 1000 milliGRAM(s) IV Intermittent every 12 hours    Endocrine/Metabolic Medications    Topical/Other Medications    --------------------------------------------------------------------------------------    VITAL SIGNS, INS/OUTS (last 24 hours):  --------------------------------------------------------------------------------------  ICU Vital Signs Last 24 Hrs  T(C): 36.8 (15 Dec 2023 16:14), Max: 37.2 (14 Dec 2023 23:25)  T(F): 98.2 (15 Dec 2023 16:14), Max: 99 (14 Dec 2023 23:25)  HR: 111 (15 Dec 2023 16:14) (93 - 134)  BP: 127/80 (15 Dec 2023 16:14) (115/69 - 151/92)  RR: 18 (15 Dec 2023 10:05) (18 - 20)  SpO2: 100% (15 Dec 2023 16:14) (98% - 100%)    O2 Parameters below as of 15 Dec 2023 16:14  Patient On (Oxygen Delivery Method): room air          I&O's Summary    14 Dec 2023 07:01  -  15 Dec 2023 07:00  --------------------------------------------------------  IN: 2850 mL / OUT: 1300 mL / NET: 1550 mL      --------------------------------------------------------------------------------------    EXAM:  General: Resting in bed, NAD  Neuro: A&Ox3, no focal deficits  CV: NSR  Pulm: Equal chest wall expansion b/l, no respiratory distress  Abdomen: Soft, ND, NT  Rectal: L perirectal induration stable without erythema or drainage. R gluteal I&D site clean with fibirnous exudate - purulence, draiange or erythema. YOSEPH with mildly decreased tone. No blood on glove  Extremities: WWP, no paresthesias     LABS  --------------------------------------------------------------------------------------  Labs:  CAPILLARY BLOOD GLUCOSE                              8.7    13.03 )-----------( 456      ( 14 Dec 2023 23:36 )             29.8       Auto Neutrophil %: 82.5 % (23 @ 23:36)  Auto Immature Granulocyte %: 0.7 % (23 @ 23:36)        139  |  102  |  12  ----------------------------<  89  3.7   |  27  |  0.86      Calcium: 9.1 mg/dL (23 @ 23:36)      LFTs:             7.7  | 0.2  | 18       ------------------[69      ( 14 Dec 2023 23:36 )  3.0  | x    | 18          Lipase:x      Amylase:x         Blood Gas Venous - Lactate: 1.4 mmol/L (23 @ 23:36)      Coags:     13.7   ----< 1.25    ( 14 Dec 2023 23:36 )     29.9                Urinalysis Basic - ( 14 Dec 2023 23:36 )    Color: Yellow / Appearance: Clear / S.008 / pH: x  Gluc: 89 mg/dL / Ketone: Negative mg/dL  / Bili: Negative / Urobili: 0.2 mg/dL   Blood: x / Protein: Negative mg/dL / Nitrite: Negative   Leuk Esterase: Negative / RBC: x / WBC x   Sq Epi: x / Non Sq Epi: x / Bacteria: x

## 2023-12-15 NOTE — H&P ADULT - ATTENDING COMMENTS
43-year-old MSM with past medical history of HIV (Biktarvy, 12/10/23 CD4 226), polysubstance abuse, syphilis, anemia, and abdominal MRSA 2021, with recent rectal biopsy with Dr. Cortez on 12/12 consistent with invasive squamous cell carcinoma and dx with IV abx for suspected Superinfection of the mass presenting to [facility] after going home and having a large volume bowel movement. found to have sepsis 2.2 perianal abscess   sepsis 2.2  perianal abscess:  met 3/4 criteria RR> 20, hr > 90 WBC > 12K . Will cw vanc and zosyn--fu repeat blood cx will extend abx to total 10 days - CT scan shows Since 12/9/2023, the large enhancing structure with central irregular fluid density within the right posterior perirectal space does not appear substantially changed in size but now contains new gas foci which may be related to recent instrumentation, fistulization with the rectum, or   infection. Rectum adjacent to this structure is thickened and narrowed.   will consult colorectal surgery   fu heme onc recs for Rectal SCC  new right leg numbness noticed on last admission will order MRI - pt left prior to getting it on last admission  dvt ppx SQH 43-year-old MSM with past medical history of HIV (Biktarvy, 12/10/23 CD4 226), polysubstance abuse, syphilis, anemia, and abdominal MRSA 2021, with recent rectal biopsy with Dr. Cortez on 12/12 consistent with invasive squamous cell carcinoma and dx with IV abx for suspected Superinfection of the mass presenting to [facility] after going home and having a large volume bowel movement. found to have sepsis 2.2 perianal abscess   sepsis 2.2  perianal abscess:  met 3/4 criteria RR> 20, hr > 90 WBC > 12K . Will cw vanc and zosyn--fu repeat blood cx will extend abx to total 10 days - CT scan shows Since 12/9/2023, the large enhancing structure with central irregular fluid density within the right posterior perirectal space does not appear substantially changed in size but now contains new gas foci which may be related to recent instrumentation, fistulization with the rectum, or   infection. Rectum adjacent to this structure is thickened and narrowed.   will consult colorectal surgery   fu heme onc recs for Rectal SCC  new right leg numbness noticed on last admission will order MRI - pt left prior to getting it on last admission  dvt ppx SQH 43-year-old MSM with past medical history of HIV (Biktarvy, 12/10/23 CD4 226), polysubstance abuse, syphilis, anemia, and abdominal MRSA 2021, with recent rectal biopsy with Dr. Cortez on 12/12 consistent with invasive squamous cell carcinoma and dx with IV abx for suspected Superinfection of the mass presenting to [facility] after going home and having a large volume bowel movement and fibrillar packing came out. found to have sepsis 2.2 perianal abscess   sepsis 2.2  perianal abscess:  met 3/4 criteria RR> 20, hr > 90 WBC > 12K . Will cw vanc and zosyn--fu repeat blood cx will extend abx to total 10 days - CT scan shows Since 12/9/2023, the large enhancing structure with central irregular fluid density within the right posterior perirectal space does not appear substantially changed in size but now contains new gas foci which may be related to recent instrumentation, fistulization with the rectum, or   infection. Rectum adjacent to this structure is thickened and narrowed.   will consult colorectal surgery   fu heme onc recs for Rectal SCC  new right leg numbness noticed on last admission will order MRI - pt left prior to getting it on last admission  dvt ppx SQH 43-year-old MSM with past medical history of HIV (Biktarvy, 12/10/23 CD4 226), polysubstance abuse, syphilis, anemia, and abdominal MRSA 2021, with recent rectal biopsy with Dr. Cortez on 12/12 consistent with invasive squamous cell carcinoma and dx with IV abx for suspected Superinfection of the mass presenting to [facility] after going home and having a large volume bowel movement and fibrillar packing came out. found to have sepsis 2.2 perianal abscess   sepsis 2.2  perianal abscess:  met 3/4 criteria RR> 20, hr > 90 WBC > 12K . Will cw vanc and zosyn--fu repeat blood cx will extend abx to total 10 days - CT scan shows Since 12/9/2023, the large enhancing structure with central irregular fluid density within the right posterior perirectal space does not appear substantially changed in size but now contains new gas foci which may be related to recent instrumentation, fistulization with the rectum, or   infection. Rectum adjacent to this structure is thickened and narrowed.   will consult colorectal surgery   fu heme onc recs for Rectal SCC  new right leg numbness noticed on last admission will order MRI - pt left prior to getting it on last admission  dvt ppx SQH

## 2023-12-15 NOTE — PATIENT PROFILE ADULT - CAREGIVER ADDRESS
[address] [place] [address], [address] [id number] [address] [place] [address], [address] [id number]

## 2023-12-15 NOTE — H&P ADULT - NSHPLABSRESULTS_GEN_ALL_CORE
.  LABS:                         8.7    13.03 )-----------( 456      ( 14 Dec 2023 23:36 )             29.8     12-14    139  |  102  |  12  ----------------------------<  89  3.7   |  27  |  0.86    Ca    9.1      14 Dec 2023 23:36  Phos  4.1     -  Mg     2.0     -    TPro  7.7  /  Alb  3.0<L>  /  TBili  0.2  /  DBili  x   /  AST  18  /  ALT  18  /  AlkPhos  69  12-14    PT/INR - ( 14 Dec 2023 23:36 )   PT: 13.7 sec;   INR: 1.25          PTT - ( 14 Dec 2023 23:36 )  PTT:29.9 sec  Urinalysis Basic - ( 14 Dec 2023 23:36 )    Color: Yellow / Appearance: Clear / S.008 / pH: x  Gluc: 89 mg/dL / Ketone: Negative mg/dL  / Bili: Negative / Urobili: 0.2 mg/dL   Blood: x / Protein: Negative mg/dL / Nitrite: Negative   Leuk Esterase: Negative / RBC: x / WBC x   Sq Epi: x / Non Sq Epi: x / Bacteria: x                RADIOLOGY, EKG & ADDITIONAL TESTS: Reviewed.

## 2023-12-15 NOTE — H&P ADULT - PROBLEM SELECTOR PLAN 1
CT A/P obtained from previous admission was concerning for malignancy, biopsy obtained from exam under anesthesia showed invasive SCC.  - f/up hem onc recs: MR pelvis/rectum. CT chest A/P, MMR testing, f/up with Dr. Julian  - c/w toradol 15 mg q8 PRN for pain control CT A/P obtained from previous admission was concerning for malignancy; biopsy obtained from exam under anesthesia showed invasive SCC; repeat CT A/P this admission shows:  the large enhancing structure with central irregular fluid density within the right posterior perirectal space does not appear substantially changed in size but now contains new gas foci which may be related to recent instrumentation, fistulization with the rectum, or infection  - f/up hem onc recs: MR pelvis/rectum. CT chest A/P, MMR testing, f/up with Dr. Julian  - c/w toradol 15 mg q8 PRN for pain control Patient presenting with elevated WBC and tachycardia. Likely 2/2 to rectal abscess/mass found to be SCC last admission. CT A/P obtained from previous admission was concerning for malignancy; biopsy obtained from exam under anesthesia showed invasive SCC; repeat CT A/P this admission shows:  the large enhancing structure with central irregular fluid density within the right posterior perirectal space does not appear substantially changed in size but now contains new gas foci which may be related to recent instrumentation, fistulization with the rectum, or infection  - f/up hem onc recs: MR pelvis/rectum. CT chest A/P, MMR testing, f/up with Dr. Julian  - f/up surgery consult  - f/up blood cultures  - c/w zosyn 3.375   - c/w vancomycin 1000 mg   - c/w toradol 15 mg q8 PRN for pain control Patient presenting with elevated WBC and tachycardia. Likely 2/2 to rectal abscess/mass found to be SCC last admission. CT A/P obtained from previous admission was concerning for malignancy; biopsy obtained from exam under anesthesia showed invasive SCC; repeat CT A/P this admission shows:  the large enhancing structure with central irregular fluid density within the right posterior perirectal space does not appear substantially changed in size but now contains new gas foci which may be related to recent instrumentation, fistulization with the rectum, or infection  - f/up hem onc recs: MR pelvis/rectum. CT chest A/P, MMR testing, f/up with Dr. Julian  - f/up surgery consult  - f/up blood cultures  - c/w zosyn 3.375 q8  - c/w vancomycin 1000 mg q12  - c/w toradol 15 mg q8 PRN for pain control

## 2023-12-15 NOTE — H&P ADULT - NSHPPHYSICALEXAM_GEN_ALL_CORE
Constitutional: NAD, pleasant and conversant   Head: NC/AT  Eyes: PERRL, EOMI, anicteric sclera  ENT: no nasal discharge; uvula midline, no oropharyngeal erythema or exudates; MMM  Neck: supple; no JVD or thyromegaly  Respiratory: CTA B/L; no W/R/R, no retractions  Cardiac: +S1/S2; RRR; no M/R/G; PMI non-displaced  Gastrointestinal: abdomen soft, mild tenderness to light palpation in the suprapubic area   Extremities: WWP, no clubbing or cyanosis; no peripheral edema, negative straight leg raise on R side  Musculoskeletal: NROM x4; no joint swelling, tenderness or erythema  Vascular: 2+ radial, DP pulses B/L  Dermatologic: skin warm, dry and intact; no rashes, wounds, or scars  Neurologic: AAOx3; CNII-XII grossly intact; no focal deficits  Psychiatric: affect and characteristics of appearance, verbalizations, behaviors are appropriate

## 2023-12-15 NOTE — PATIENT PROFILE ADULT - FALL HARM RISK - HARM RISK INTERVENTIONS
Communicate Risk of Fall with Harm to all staff/Reinforce activity limits and safety measures with patient and family/Tailored Fall Risk Interventions/Visual Cue: Yellow wristband and red socks/Bed in lowest position, wheels locked, appropriate side rails in place/Call bell, personal items and telephone in reach/Instruct patient to call for assistance before getting out of bed or chair/Non-slip footwear when patient is out of bed/[place] to call system/Physically safe environment - no spills, clutter or unnecessary equipment/Purposeful Proactive Rounding/Room/bathroom lighting operational, light cord in reach Communicate Risk of Fall with Harm to all staff/Reinforce activity limits and safety measures with patient and family/Tailored Fall Risk Interventions/Visual Cue: Yellow wristband and red socks/Bed in lowest position, wheels locked, appropriate side rails in place/Call bell, personal items and telephone in reach/Instruct patient to call for assistance before getting out of bed or chair/Non-slip footwear when patient is out of bed/[place] to call system/Physically safe environment - no spills, clutter or unnecessary equipment/Purposeful Proactive Rounding/Room/bathroom lighting operational, light cord in reach

## 2023-12-15 NOTE — ED PROVIDER NOTE - PHYSICAL EXAMINATION
Constitutional: awake and alert, uncomfortable appearing, anxious  HEENT: head normocephalic and atraumatic. moist mucous membranes  Eyes: extraocular movements intact, normal conjunctiva  Neck: supple, normal ROM  Cardiovascular: tachycardic  Pulmonary: no respiratory distress  Gastrointestinal: abdomen flat and nondistended  Rectal (chaperoned by LASHAWN Addison): perianal lesion with granulation tissue, unable to perform YOSEPH 2/2 pain  Skin: warm, dry, normal for ethnicity  Musculoskeletal: no edema, no deformity  Neurological: oriented x4, moving all extremities  Psychiatric: anxious

## 2023-12-15 NOTE — ED PROVIDER NOTE - OBJECTIVE STATEMENT
43-year-old male with HIV, recent admission to [facility] for perianal mass, status postbiopsy revealing squamous cell carcinoma, discharged today, presents with worsening perianal pain and concerns about perianal discharge.  Patient states he had not had a bowel movement since his biopsy.  When he returned home, states he finally felt he could have a bowel movement, and passed a large piece of indeterminate matter from perianal area.  Patient is unsure if this is packing from the biopsy site or tissue from the mass.  No fever or chills.  Patient was discharged with p.o. antibiotics, but he does not remember which ones. 43-year-old male with HIV, recent admission to [facility] for perianal mass, status postbiopsy revealing squamous cell carcinoma, discharged today, presents with worsening perianal pain and concerns about perianal discharge.  Patient states he had not had a bowel movement since his biopsy.  When he returned home, states he finally felt he could have a bowel movement, and passed a large piece of indeterminate matter from perianal area.  Patient is unsure if this is packing from the biopsy site or tissue from the mass.  No fever or chills.  Patient was discharged with p.o. antibiotics, but he does not remember which ones.

## 2023-12-15 NOTE — H&P ADULT - PROBLEM/PLAN-3
Subjective:   Kiara Scott is a 73 y.o. female who presents today for follow up of atrial arrhytmia    Now on flecainide 50 am and 100 pm.  Not feeling any a fib.    She stopped cardizem and started metoprolol.  Off digoxin    Past Medical History:   Diagnosis Date   • Atrial fibrillation (CMS-HCC)    • Hyperlipidemia    • Hypertension    • Pulmonary nodules      Past Surgical History:   Procedure Laterality Date   • FASCIECTOMY Right 5/24/2017    Procedure: FASCIECTOMY - RING FINGER DUPUYTRENS ;  Surgeon: Scotty Chang M.D.;  Location: SURGERY [facility];  Service:    • US-NEEDLE CORE BX-BREAST PANEL  2007   • OTHER  1995    partial face lift   • TONSILLECTOMY       Family History   Problem Relation Age of Onset   • Hyperlipidemia Mother    • Stroke Mother    • Heart Disease Mother      CHF   • Other Father 80     MS   • Hyperlipidemia Sister    • Hyperlipidemia Brother    • Other Brother      History   Smoking Status   • Former Smoker   • Packs/day: 0.50   • Years: 20.00   • Types: Cigarettes   • Quit date: 11/23/1990   Smokeless Tobacco   • Never Used     No Known Allergies  Outpatient Encounter Prescriptions as of 9/25/2017   Medication Sig Dispense Refill   • Cholecalciferol (VITAMIN D3) 2000 units Tab Take  by mouth.     • metoprolol (LOPRESSOR) 25 MG Tab Take 1 Tab by mouth 2 times a day. 60 Tab 11   • apixaban (ELIQUIS) 5 MG Tab tablet Take 1 Tab by mouth 2 Times a Day. 180 Tab 3   • Multiple Vitamins-Minerals (MULTI COMPLETE PO) Take  by mouth.     • acyclovir (ZOVIRAX) 400 MG tablet Take 1 Tab by mouth 2 times a day. 60 Tab 3   • simvastatin (ZOCOR) 20 MG Tab Take 1 Tab by mouth every evening. 90 Tab 3   • potassium chloride ER (KLOR-CON) 10 MEQ tablet Take 1 Tab by mouth 2 times a day. 60 Tab 3   • flecainide (TAMBOCOR) 100 MG Tab Take 100 mg by mouth 2 times a day.     • lisinopril-hydrochlorothiazide (PRINZIDE, ZESTORETIC) 20-25 MG per tablet Take 0.5 Tabs by mouth every day.    "  • kenney   DISPLAY PLAN FREE TEXT

## 2023-12-15 NOTE — CHART NOTE - NSCHARTNOTEFT_GEN_A_CORE
Patient was tearful and upset at his diagnosis and a potential long wait for an MRI and was keen to be home. He verbalized understanding that the risks of not continuing care for his condition included death.  He expressed that he would return to the ED if his condition deteriorated. AMA form signed and given to .
Briefly, this is a 43YM w/ pMHx HIV (on Biktarvy, CD4 266 12/10/23), IVDU, syphilis, anemia presenting w/ pain on defecation, underwent exam under anesthesia w/ bx on 12/12 - c/w invasice squamous cell ca.     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   Per NCCN guidelines, rectal ca workup includes MMR/MSI testing, colonoscopy, CT c/a/p with contrast, cea, pelvic MR w/wo contrast.   Unfortunately, Mr Carolina chose to be discharged prior to work up completion + full evaluation     Will make an appointment with Dr Nabila Julian following the weekend to expedite workup    D/w hematology oncology attending, Dr Prateek Cid

## 2023-12-15 NOTE — H&P ADULT - ASSESSMENT
43-year-old MSM with past medical history of HIV (Biktarvy, 12/10/23 CD4 226), polysubstance abuse, syphilis, anemia, and abdominal MRSA 2021, who found to have large rectal mass draining malodorous discharge, exam under anesthesia noted to be  43-year-old MSM with past medical history of HIV (Biktarvy, 12/10/23 CD4 226), polysubstance abuse, syphilis, anemia, and abdominal MRSA 2021, who found to have large rectal mass draining malodorous discharge, exam under anesthesia noted to be squamous cell carcinoma. Just discharged yesterday, re-presenting after having a BM and passing what appears to be anal packing. Hem Onc consulted.  43-year-old MSM with past medical history of HIV (Biktarvy, 12/10/23 CD4 266), polysubstance abuse, syphilis, anemia, and abdominal MRSA 2021, who found to have large rectal mass draining malodorous discharge, exam under anesthesia noted to be squamous cell carcinoma. Just discharged yesterday, re-presenting after having a BM and passing what appears to be anal packing. Hem Onc consulted.

## 2023-12-15 NOTE — H&P ADULT - NSHPADDITIONALINFOADULT_GEN_ALL_CORE
< from: CT Abdomen and Pelvis w/ Oral Cont and w/ IV Cont (12.15.23 @ 03:03) >      IMPRESSION:  Since 12/9/2023, the large enhancing structure with central irregular   fluid density within the right posterior perirectal space does not appear   substantially changed in size but now contains new gas foci which may be   related to recent instrumentation, fistulization with the rectum, or   infection. Rectum adjacent to this structure is thickened and narrowed.   Rectum and colon proximallyis distended with feces, greatest in the   rectum, which may represent delayed transit or early obstruction.   Additional findings as above.    < end of copied text >

## 2023-12-15 NOTE — ED PROVIDER NOTE - HIV CLINIC
DISCHARGE DIAGNOSIS:  ***    MEDICATIONS:  It is important that you take the medication exactly as they are prescribed. Keep your medication in the bottles provided by the pharmacist and keep a list of the medication names, dosages, and times to be taken in your wallet. Do not take other medications without consulting your doctor. Pain Management: per above medications    What to do at Home    Recommended diet:  {diet:01425}    Recommended activity: {discharge activity:32606}    If you have questions regarding the hospital related prescriptions or hospital related issues please call [address] [place] [address] at . You can always direct your questions to your primary care doctor if you are unable to reach your hospital physician; your PCP works as an extension of your hospital doctor just like your hospital doctor is an extension of your PCP for your time at the hospital [facility], [facility]).     If you experience any of the following symptoms then please call your primary care physician or return to the emergency room if you cannot get hold of your doctor:  Fever, chills, nausea, vomiting, diarrhea, change in mentation, falling, bleeding, shortness of breath, *** normal... Yes

## 2023-12-15 NOTE — ED PROVIDER NOTE - CLINICAL SUMMARY MEDICAL DECISION MAKING FREE TEXT BOX
Pt w hx of HIV, recent admission for perianal lesion s/p biopsy showing SCC, dc'ed earlier today, presents with persistent perianal pain and ejection of a clump of tissue vs. packing material.  On exam afebrile, tachycardic, uncomfortable appearing, with perirectal lesion with granulation tissue.  Labs show increased WBC from prior. Pt w hx of HIV, recent admission for perianal lesion s/p biopsy showing SCC, dc'ed earlier today, presents with persistent perianal pain and ejection of a clump of tissue vs. packing material.  On exam afebrile, tachycardic, uncomfortable appearing, with perirectal lesion with granulation tissue.  Labs show increased WBC from prior.  Pt given empiric IV vanc and zosyn.  CT a/p shows foci of gas in perianal lesion, likely post-procedural.  Requiring multiple rounds of IV analgesia for pain control.  Plan to readmit for pain control and continuation of workup.

## 2023-12-15 NOTE — PATIENT PROFILE ADULT - FALL HARM RISK - CONCLUSION
Addended by: DANITA CHAMPION on: 12/5/2022 11:55 AM     Modules accepted: Orders     Fall with Harm Risk

## 2023-12-15 NOTE — H&P ADULT - PROBLEM SELECTOR PLAN 2
Onset of numbness 1 month ago, no weakness. Suspect 2/2 large perianal abscess vs tumor compressing sciatic nerve. 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 Patient does report occasionally losing control of bowels.   - f/up MR pelvis/rectum  - - monitor for weakness or worsening numbness. Onset of numbness 1 month ago, no weakness. Suspect 2/2 large perianal abscess vs tumor compressing sciatic nerve. 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 Patient does report occasionally losing control of bowels.   - f/up MR pelvis/rectum  - monitor for weakness or worsening numbness.

## 2023-12-19 ENCOUNTER — APPOINTMENT (OUTPATIENT)
Dept: HEMATOLOGY ONCOLOGY | Facility: CLINIC | Age: 43
End: 2023-12-19

## 2023-12-19 PROCEDURE — 71045 X-RAY EXAM CHEST 1 VIEW: CPT

## 2023-12-19 PROCEDURE — 87040 BLOOD CULTURE FOR BACTERIA: CPT

## 2023-12-19 PROCEDURE — 36415 COLL VENOUS BLD VENIPUNCTURE: CPT

## 2023-12-19 PROCEDURE — 85025 COMPLETE CBC W/AUTO DIFF WBC: CPT

## 2023-12-19 PROCEDURE — 83605 ASSAY OF LACTIC ACID: CPT

## 2023-12-19 PROCEDURE — 92526 ORAL FUNCTION THERAPY: CPT

## 2023-12-19 PROCEDURE — 85730 THROMBOPLASTIN TIME PARTIAL: CPT

## 2023-12-19 PROCEDURE — 96374 THER/PROPH/DIAG INJ IV PUSH: CPT

## 2023-12-19 PROCEDURE — 81003 URINALYSIS AUTO W/O SCOPE: CPT

## 2023-12-19 PROCEDURE — 85610 PROTHROMBIN TIME: CPT

## 2023-12-19 PROCEDURE — 84484 ASSAY OF TROPONIN QUANT: CPT

## 2023-12-19 PROCEDURE — 99285 EMERGENCY DEPT VISIT HI MDM: CPT | Mod: 25

## 2023-12-19 PROCEDURE — 80053 COMPREHEN METABOLIC PANEL: CPT

## 2023-12-19 PROCEDURE — 74177 CT ABD & PELVIS W/CONTRAST: CPT

## 2023-12-19 PROCEDURE — 96375 TX/PRO/DX INJ NEW DRUG ADDON: CPT

## 2023-12-19 PROCEDURE — 87637 SARSCOV2&INF A&B&RSV AMP PRB: CPT

## 2023-12-20 DIAGNOSIS — K61.0 ANAL ABSCESS: ICD-10-CM

## 2023-12-20 DIAGNOSIS — M89.8X0 OTHER SPECIFIED DISORDERS OF BONE, MULTIPLE SITES: ICD-10-CM

## 2023-12-20 DIAGNOSIS — R20.0 ANESTHESIA OF SKIN: ICD-10-CM

## 2023-12-20 DIAGNOSIS — Z86.14 PERSONAL HISTORY OF METHICILLIN RESISTANT STAPHYLOCOCCUS AUREUS INFECTION: ICD-10-CM

## 2023-12-20 DIAGNOSIS — F12.10 CANNABIS ABUSE, UNCOMPLICATED: ICD-10-CM

## 2023-12-20 DIAGNOSIS — Z79.2 LONG TERM (CURRENT) USE OF ANTIBIOTICS: ICD-10-CM

## 2023-12-20 DIAGNOSIS — B20 HUMAN IMMUNODEFICIENCY VIRUS [HIV] DISEASE: ICD-10-CM

## 2023-12-20 DIAGNOSIS — D50.9 IRON DEFICIENCY ANEMIA, UNSPECIFIED: ICD-10-CM

## 2023-12-20 DIAGNOSIS — C44.520 SQUAMOUS CELL CARCINOMA OF ANAL SKIN: ICD-10-CM

## 2023-12-20 DIAGNOSIS — Z11.52 ENCOUNTER FOR SCREENING FOR COVID-19: ICD-10-CM

## 2023-12-20 DIAGNOSIS — Z79.899 OTHER LONG TERM (CURRENT) DRUG THERAPY: ICD-10-CM

## 2023-12-20 DIAGNOSIS — F15.10 OTHER STIMULANT ABUSE, UNCOMPLICATED: ICD-10-CM

## 2023-12-20 DIAGNOSIS — A53.0 LATENT SYPHILIS, UNSPECIFIED AS EARLY OR LATE: ICD-10-CM

## 2023-12-20 DIAGNOSIS — Z41.8 ENCOUNTER FOR OTHER PROCEDURES FOR PURPOSES OTHER THAN REMEDYING HEALTH STATE: ICD-10-CM

## 2023-12-20 DIAGNOSIS — F10.10 ALCOHOL ABUSE, UNCOMPLICATED: ICD-10-CM

## 2023-12-20 DIAGNOSIS — F19.10 OTHER PSYCHOACTIVE SUBSTANCE ABUSE, UNCOMPLICATED: ICD-10-CM

## 2023-12-20 LAB
CULTURE RESULTS: SIGNIFICANT CHANGE UP
SPECIMEN SOURCE: SIGNIFICANT CHANGE UP

## 2023-12-21 ENCOUNTER — APPOINTMENT (OUTPATIENT)
Dept: COLORECTAL SURGERY | Facility: CLINIC | Age: 43
End: 2023-12-21

## 2023-12-21 ENCOUNTER — NON-APPOINTMENT (OUTPATIENT)
Age: 43
End: 2023-12-21

## 2023-12-21 VITALS
HEART RATE: 133 BPM | HEIGHT: 73 IN | BODY MASS INDEX: 19.22 KG/M2 | DIASTOLIC BLOOD PRESSURE: 78 MMHG | SYSTOLIC BLOOD PRESSURE: 117 MMHG | TEMPERATURE: 97.7 F | WEIGHT: 145 LBS

## 2023-12-21 DIAGNOSIS — Z86.59 PERSONAL HISTORY OF OTHER MENTAL AND BEHAVIORAL DISORDERS: ICD-10-CM

## 2023-12-21 DIAGNOSIS — Z85.038 PERSONAL HISTORY OF OTHER MALIGNANT NEOPLASM OF LARGE INTESTINE: ICD-10-CM

## 2023-12-21 NOTE — PHYSICAL EXAM
[Alert] : alert [Calm] : calm [JVD] : no jugular venous distention  [de-identified] : Abd is soft, flat, nontender. B/l shotty mobile rubbery lymphadenopathy R>L [de-identified] : Well appearing male in NAD [de-identified] : No increased work of breathing [de-identified] : MMM [de-identified] : ROM WNL, lower extremity strength equal b/l [FreeTextEntry1] : The pt was examined in the prone armando-knife position with a medical assistant present for the entirety of the examination. Visual examination of the anal verge with effacement of the buttocks revealed an open surgical wound in the RP quadrant with granulation bed and without surrounding erythema induration or expressible purulence. Digital examination and anoscopy deferred given recent EUA.  The patient tolerated the exam well.

## 2023-12-21 NOTE — HISTORY OF PRESENT ILLNESS
[FreeTextEntry1] : 43 MSM with a hx of HIV (Biktarvy, 12/10/23 CD4 266), polysubstance abuse, syphilis, anemia, and abdominal MRSA 2021, who originally presented to [facility] with anorectal pain and drainage with CT imaging concerning for a large phlegmonous and infiltrative process of the right pelvic floor and sacrum. He underwent an anorectal exam under anesthesia with biopsy and drainage and the final pathology revealed an anal squamous cell carcinoma. Staging work up was initiated but not completed while hospitalized. He now presents for interval follow up. Of note pt also reports paresthesia on the right leg, from perineal area to ankle, sparing the right foot. He reports that his pain has been improved since his EUA, biopsy and drainage however does continue to have pelvic floor spasming. Warm baths have been the most helpful. Occasional blood but limited. Incontinence was worse when his BMs were liquid and immediately after the procedure however both are much better now. He mostly is able to make it to the bathroom in time and has limited soilage. Denies fevers chills.   PMH: HIV Meds: Biktarvy NKDA PSH: Prior I&D for perianal abscess FH: Denies CRC/IBD Cscope: Never

## 2023-12-21 NOTE — PLAN
[TextEntry] : - Complete staging work up, CT Chest and Pelvic MRI ordered - Needs to see medical and radiation oncology for initial consultation. - Will discuss at GI TB once work up completed. - Continue warm soaks.

## 2023-12-22 ENCOUNTER — NON-APPOINTMENT (OUTPATIENT)
Age: 43
End: 2023-12-22

## 2023-12-26 DIAGNOSIS — R20.0 ANESTHESIA OF SKIN: ICD-10-CM

## 2023-12-26 DIAGNOSIS — Z11.52 ENCOUNTER FOR SCREENING FOR COVID-19: ICD-10-CM

## 2023-12-26 DIAGNOSIS — D64.9 ANEMIA, UNSPECIFIED: ICD-10-CM

## 2023-12-26 DIAGNOSIS — A41.9 SEPSIS, UNSPECIFIED ORGANISM: ICD-10-CM

## 2023-12-26 DIAGNOSIS — B20 HUMAN IMMUNODEFICIENCY VIRUS [HIV] DISEASE: ICD-10-CM

## 2023-12-26 DIAGNOSIS — F19.10 OTHER PSYCHOACTIVE SUBSTANCE ABUSE, UNCOMPLICATED: ICD-10-CM

## 2023-12-26 DIAGNOSIS — K60.3 ANAL FISTULA: ICD-10-CM

## 2023-12-26 DIAGNOSIS — C20 MALIGNANT NEOPLASM OF RECTUM: ICD-10-CM

## 2023-12-26 DIAGNOSIS — K61.0 ANAL ABSCESS: ICD-10-CM

## 2023-12-26 DIAGNOSIS — A53.0 LATENT SYPHILIS, UNSPECIFIED AS EARLY OR LATE: ICD-10-CM

## 2023-12-26 DIAGNOSIS — K62.89 OTHER SPECIFIED DISEASES OF ANUS AND RECTUM: ICD-10-CM

## 2023-12-28 NOTE — ED POST DISCHARGE NOTE - ADDITIONAL DOCUMENTATION
[place] URF submitted electronically, # [id number] [place] Mandatory URF submitted electronically, # [id number]

## 2023-12-31 PROBLEM — Z72.51 RISK FOR SEXUALLY TRANSMITTED DISEASE: Status: ACTIVE | Noted: 2021-11-16

## 2024-01-05 RX ORDER — ALPRAZOLAM 0.25 MG
1 TABLET ORAL
Qty: 5 | Refills: 0
Start: 2024-01-05

## 2024-01-11 ENCOUNTER — NON-APPOINTMENT (OUTPATIENT)
Age: 44
End: 2024-01-11

## 2024-01-12 ENCOUNTER — NON-APPOINTMENT (OUTPATIENT)
Age: 44
End: 2024-01-12

## 2024-02-02 VITALS
OXYGEN SATURATION: 100 % | TEMPERATURE: 99 F | HEIGHT: 73 IN | HEART RATE: 135 BPM | SYSTOLIC BLOOD PRESSURE: 116 MMHG | WEIGHT: 145.06 LBS | RESPIRATION RATE: 18 BRPM | DIASTOLIC BLOOD PRESSURE: 67 MMHG

## 2024-02-02 LAB
ALBUMIN SERPL ELPH-MCNC: 3 G/DL — LOW (ref 3.4–5)
ALP SERPL-CCNC: 72 U/L — SIGNIFICANT CHANGE UP (ref 40–120)
ALT FLD-CCNC: 13 U/L — SIGNIFICANT CHANGE UP (ref 12–42)
ANION GAP SERPL CALC-SCNC: 6 MMOL/L — LOW (ref 9–16)
APTT BLD: 31.5 SEC — SIGNIFICANT CHANGE UP (ref 24.5–35.6)
AST SERPL-CCNC: 12 U/L — LOW (ref 15–37)
BASOPHILS # BLD AUTO: 0.02 K/UL — SIGNIFICANT CHANGE UP (ref 0–0.2)
BASOPHILS NFR BLD AUTO: 0.3 % — SIGNIFICANT CHANGE UP (ref 0–2)
BILIRUB SERPL-MCNC: 0.2 MG/DL — SIGNIFICANT CHANGE UP (ref 0.2–1.2)
BUN SERPL-MCNC: 12 MG/DL — SIGNIFICANT CHANGE UP (ref 7–23)
CALCIUM SERPL-MCNC: 9.3 MG/DL — SIGNIFICANT CHANGE UP (ref 8.5–10.5)
CHLORIDE SERPL-SCNC: 100 MMOL/L — SIGNIFICANT CHANGE UP (ref 96–108)
CO2 SERPL-SCNC: 32 MMOL/L — HIGH (ref 22–31)
CREAT SERPL-MCNC: 1.05 MG/DL — SIGNIFICANT CHANGE UP (ref 0.5–1.3)
EGFR: 91 ML/MIN/1.73M2 — SIGNIFICANT CHANGE UP
EOSINOPHIL # BLD AUTO: 0.09 K/UL — SIGNIFICANT CHANGE UP (ref 0–0.5)
EOSINOPHIL NFR BLD AUTO: 1.1 % — SIGNIFICANT CHANGE UP (ref 0–6)
GLUCOSE SERPL-MCNC: 96 MG/DL — SIGNIFICANT CHANGE UP (ref 70–99)
HCT VFR BLD CALC: 25.9 % — LOW (ref 39–50)
HGB BLD-MCNC: 7.6 G/DL — LOW (ref 13–17)
IMM GRANULOCYTES NFR BLD AUTO: 0.1 % — SIGNIFICANT CHANGE UP (ref 0–0.9)
INR BLD: 1.32 — HIGH (ref 0.85–1.18)
LACTATE BLDV-MCNC: 1.3 MMOL/L — SIGNIFICANT CHANGE UP (ref 0.5–2)
LYMPHOCYTES # BLD AUTO: 1.08 K/UL — SIGNIFICANT CHANGE UP (ref 1–3.3)
LYMPHOCYTES # BLD AUTO: 13.6 % — SIGNIFICANT CHANGE UP (ref 13–44)
MCHC RBC-ENTMCNC: 23.1 PG — LOW (ref 27–34)
MCHC RBC-ENTMCNC: 29.3 GM/DL — LOW (ref 32–36)
MCV RBC AUTO: 78.7 FL — LOW (ref 80–100)
MONOCYTES # BLD AUTO: 0.65 K/UL — SIGNIFICANT CHANGE UP (ref 0–0.9)
MONOCYTES NFR BLD AUTO: 8.2 % — SIGNIFICANT CHANGE UP (ref 2–14)
NEUTROPHILS # BLD AUTO: 6.08 K/UL — SIGNIFICANT CHANGE UP (ref 1.8–7.4)
NEUTROPHILS NFR BLD AUTO: 76.7 % — SIGNIFICANT CHANGE UP (ref 43–77)
NRBC # BLD: 0 /100 WBCS — SIGNIFICANT CHANGE UP (ref 0–0)
PLATELET # BLD AUTO: 430 K/UL — HIGH (ref 150–400)
POTASSIUM SERPL-MCNC: 3.8 MMOL/L — SIGNIFICANT CHANGE UP (ref 3.5–5.3)
POTASSIUM SERPL-SCNC: 3.8 MMOL/L — SIGNIFICANT CHANGE UP (ref 3.5–5.3)
PROT SERPL-MCNC: 7.7 G/DL — SIGNIFICANT CHANGE UP (ref 6.4–8.2)
PROTHROM AB SERPL-ACNC: 14.4 SEC — HIGH (ref 9.5–13)
RBC # BLD: 3.29 M/UL — LOW (ref 4.2–5.8)
RBC # FLD: 16.8 % — HIGH (ref 10.3–14.5)
SODIUM SERPL-SCNC: 138 MMOL/L — SIGNIFICANT CHANGE UP (ref 132–145)
TROPONIN I, HIGH SENSITIVITY RESULT: <4 NG/L — SIGNIFICANT CHANGE UP
WBC # BLD: 7.93 K/UL — SIGNIFICANT CHANGE UP (ref 3.8–10.5)
WBC # FLD AUTO: 7.93 K/UL — SIGNIFICANT CHANGE UP (ref 3.8–10.5)

## 2024-02-02 PROCEDURE — 74174 CTA ABD&PLVS W/CONTRAST: CPT | Mod: 26

## 2024-02-02 PROCEDURE — 99285 EMERGENCY DEPT VISIT HI MDM: CPT

## 2024-02-02 RX ORDER — IOHEXOL 300 MG/ML
30 INJECTION, SOLUTION INTRAVENOUS ONCE
Refills: 0 | Status: DISCONTINUED | OUTPATIENT
Start: 2024-02-02 | End: 2024-02-02

## 2024-02-02 RX ORDER — MORPHINE SULFATE 50 MG/1
4 CAPSULE, EXTENDED RELEASE ORAL ONCE
Refills: 0 | Status: DISCONTINUED | OUTPATIENT
Start: 2024-02-02 | End: 2024-02-02

## 2024-02-02 RX ORDER — SODIUM CHLORIDE 9 MG/ML
1000 INJECTION INTRAMUSCULAR; INTRAVENOUS; SUBCUTANEOUS ONCE
Refills: 0 | Status: COMPLETED | OUTPATIENT
Start: 2024-02-02 | End: 2024-02-02

## 2024-02-02 RX ADMIN — MORPHINE SULFATE 4 MILLIGRAM(S): 50 CAPSULE, EXTENDED RELEASE ORAL at 22:42

## 2024-02-02 RX ADMIN — SODIUM CHLORIDE 1000 MILLILITER(S): 9 INJECTION INTRAMUSCULAR; INTRAVENOUS; SUBCUTANEOUS at 20:22

## 2024-02-02 NOTE — ED ADULT TRIAGE NOTE - CHIEF COMPLAINT QUOTE
reports losing blood sporadically today while moving around to grab something. reports seeing bright red blood. hx recently diagnosed with anal cancer in december. reports that it is normal to have dark red blood in stool. reports feeling weak and fatigue.

## 2024-02-03 ENCOUNTER — INPATIENT (INPATIENT)
Facility: HOSPITAL | Age: 44
LOS: 5 days | Discharge: HOME | DRG: 375 | End: 2024-02-09
Attending: STUDENT IN AN ORGANIZED HEALTH CARE EDUCATION/TRAINING PROGRAM | Admitting: SURGERY
Payer: MEDICAID

## 2024-02-03 DIAGNOSIS — Z98.890 OTHER SPECIFIED POSTPROCEDURAL STATES: Chronic | ICD-10-CM

## 2024-02-03 LAB
ALBUMIN SERPL ELPH-MCNC: 3.1 G/DL — LOW (ref 3.3–5)
ALP SERPL-CCNC: 70 U/L — SIGNIFICANT CHANGE UP (ref 40–120)
ALT FLD-CCNC: 9 U/L — LOW (ref 10–45)
AMPHET UR-MCNC: POSITIVE
ANION GAP SERPL CALC-SCNC: 11 MMOL/L — SIGNIFICANT CHANGE UP (ref 5–17)
APPEARANCE UR: CLEAR — SIGNIFICANT CHANGE UP
AST SERPL-CCNC: 13 U/L — SIGNIFICANT CHANGE UP (ref 10–40)
BARBITURATES UR SCN-MCNC: NEGATIVE — SIGNIFICANT CHANGE UP
BENZODIAZ UR-MCNC: NEGATIVE — SIGNIFICANT CHANGE UP
BILIRUB SERPL-MCNC: <0.2 MG/DL — SIGNIFICANT CHANGE UP (ref 0.2–1.2)
BILIRUB UR-MCNC: NEGATIVE — SIGNIFICANT CHANGE UP
BUN SERPL-MCNC: 9 MG/DL — SIGNIFICANT CHANGE UP (ref 7–23)
CALCIUM SERPL-MCNC: 8.6 MG/DL — SIGNIFICANT CHANGE UP (ref 8.4–10.5)
CHLORIDE SERPL-SCNC: 102 MMOL/L — SIGNIFICANT CHANGE UP (ref 96–108)
CO2 SERPL-SCNC: 25 MMOL/L — SIGNIFICANT CHANGE UP (ref 22–31)
COCAINE METAB.OTHER UR-MCNC: NEGATIVE — SIGNIFICANT CHANGE UP
COLOR SPEC: YELLOW — SIGNIFICANT CHANGE UP
CREAT SERPL-MCNC: 0.75 MG/DL — SIGNIFICANT CHANGE UP (ref 0.5–1.3)
DIFF PNL FLD: NEGATIVE — SIGNIFICANT CHANGE UP
EGFR: 116 ML/MIN/1.73M2 — SIGNIFICANT CHANGE UP
GLUCOSE SERPL-MCNC: 99 MG/DL — SIGNIFICANT CHANGE UP (ref 70–99)
GLUCOSE UR QL: NEGATIVE MG/DL — SIGNIFICANT CHANGE UP
HCT VFR BLD CALC: 17 % — CRITICAL LOW (ref 39–50)
HCT VFR BLD CALC: 23.5 % — LOW (ref 39–50)
HCT VFR BLD CALC: 27.5 % — LOW (ref 39–50)
HGB BLD-MCNC: 4.9 G/DL — CRITICAL LOW (ref 13–17)
HGB BLD-MCNC: 7.2 G/DL — LOW (ref 13–17)
HGB BLD-MCNC: 8.1 G/DL — LOW (ref 13–17)
KETONES UR-MCNC: NEGATIVE MG/DL — SIGNIFICANT CHANGE UP
LEUKOCYTE ESTERASE UR-ACNC: NEGATIVE — SIGNIFICANT CHANGE UP
MAGNESIUM SERPL-MCNC: 2 MG/DL — SIGNIFICANT CHANGE UP (ref 1.6–2.6)
MCHC RBC-ENTMCNC: 23.2 PG — LOW (ref 27–34)
MCHC RBC-ENTMCNC: 23.3 PG — LOW (ref 27–34)
MCHC RBC-ENTMCNC: 23.6 PG — LOW (ref 27–34)
MCHC RBC-ENTMCNC: 28.8 GM/DL — LOW (ref 32–36)
MCHC RBC-ENTMCNC: 29.5 GM/DL — LOW (ref 32–36)
MCHC RBC-ENTMCNC: 30.6 GM/DL — LOW (ref 32–36)
MCV RBC AUTO: 77 FL — LOW (ref 80–100)
MCV RBC AUTO: 79 FL — LOW (ref 80–100)
MCV RBC AUTO: 80.6 FL — SIGNIFICANT CHANGE UP (ref 80–100)
METHADONE UR-MCNC: NEGATIVE — SIGNIFICANT CHANGE UP
NITRITE UR-MCNC: NEGATIVE — SIGNIFICANT CHANGE UP
NRBC # BLD: 0 /100 WBCS — SIGNIFICANT CHANGE UP (ref 0–0)
OPIATES UR-MCNC: POSITIVE
PCP SPEC-MCNC: SIGNIFICANT CHANGE UP
PCP UR-MCNC: NEGATIVE — SIGNIFICANT CHANGE UP
PH UR: 6 — SIGNIFICANT CHANGE UP (ref 5–8)
PHOSPHATE SERPL-MCNC: 4.5 MG/DL — SIGNIFICANT CHANGE UP (ref 2.5–4.5)
PLATELET # BLD AUTO: 274 K/UL — SIGNIFICANT CHANGE UP (ref 150–400)
PLATELET # BLD AUTO: 361 K/UL — SIGNIFICANT CHANGE UP (ref 150–400)
PLATELET # BLD AUTO: 428 K/UL — HIGH (ref 150–400)
POTASSIUM SERPL-MCNC: 3.9 MMOL/L — SIGNIFICANT CHANGE UP (ref 3.5–5.3)
POTASSIUM SERPL-SCNC: 3.9 MMOL/L — SIGNIFICANT CHANGE UP (ref 3.5–5.3)
PROT SERPL-MCNC: 6.8 G/DL — SIGNIFICANT CHANGE UP (ref 6–8.3)
PROT UR-MCNC: NEGATIVE MG/DL — SIGNIFICANT CHANGE UP
RBC # BLD: 2.11 M/UL — LOW (ref 4.2–5.8)
RBC # BLD: 3.05 M/UL — LOW (ref 4.2–5.8)
RBC # BLD: 3.48 M/UL — LOW (ref 4.2–5.8)
RBC # FLD: 16.8 % — HIGH (ref 10.3–14.5)
RBC # FLD: 16.8 % — HIGH (ref 10.3–14.5)
RBC # FLD: 17.1 % — HIGH (ref 10.3–14.5)
SODIUM SERPL-SCNC: 138 MMOL/L — SIGNIFICANT CHANGE UP (ref 135–145)
SP GR SPEC: 1.03 — HIGH (ref 1–1.03)
THC UR QL: POSITIVE
UROBILINOGEN FLD QL: 1 MG/DL — SIGNIFICANT CHANGE UP (ref 0.2–1)
WBC # BLD: 10.37 K/UL — SIGNIFICANT CHANGE UP (ref 3.8–10.5)
WBC # BLD: 10.54 K/UL — HIGH (ref 3.8–10.5)
WBC # BLD: 7.17 K/UL — SIGNIFICANT CHANGE UP (ref 3.8–10.5)
WBC # FLD AUTO: 10.37 K/UL — SIGNIFICANT CHANGE UP (ref 3.8–10.5)
WBC # FLD AUTO: 10.54 K/UL — HIGH (ref 3.8–10.5)
WBC # FLD AUTO: 7.17 K/UL — SIGNIFICANT CHANGE UP (ref 3.8–10.5)

## 2024-02-03 PROCEDURE — 99223 1ST HOSP IP/OBS HIGH 75: CPT

## 2024-02-03 PROCEDURE — 99222 1ST HOSP IP/OBS MODERATE 55: CPT

## 2024-02-03 RX ORDER — MORPHINE SULFATE 50 MG/1
4 CAPSULE, EXTENDED RELEASE ORAL ONCE
Refills: 0 | Status: DISCONTINUED | OUTPATIENT
Start: 2024-02-03 | End: 2024-02-03

## 2024-02-03 RX ORDER — SODIUM CHLORIDE 9 MG/ML
1000 INJECTION, SOLUTION INTRAVENOUS
Refills: 0 | Status: DISCONTINUED | OUTPATIENT
Start: 2024-02-03 | End: 2024-02-04

## 2024-02-03 RX ORDER — ONDANSETRON 8 MG/1
4 TABLET, FILM COATED ORAL EVERY 6 HOURS
Refills: 0 | Status: DISCONTINUED | OUTPATIENT
Start: 2024-02-03 | End: 2024-02-08

## 2024-02-03 RX ORDER — BICTEGRAVIR SODIUM, EMTRICITABINE, AND TENOFOVIR ALAFENAMIDE FUMARATE 30; 120; 15 MG/1; MG/1; MG/1
1 TABLET ORAL DAILY
Refills: 0 | Status: DISCONTINUED | OUTPATIENT
Start: 2024-02-03 | End: 2024-02-09

## 2024-02-03 RX ORDER — OXYCODONE HYDROCHLORIDE 5 MG/1
10 TABLET ORAL EVERY 6 HOURS
Refills: 0 | Status: DISCONTINUED | OUTPATIENT
Start: 2024-02-03 | End: 2024-02-08

## 2024-02-03 RX ORDER — ALPRAZOLAM 0.25 MG
0.5 TABLET ORAL DAILY
Refills: 0 | Status: DISCONTINUED | OUTPATIENT
Start: 2024-02-03 | End: 2024-02-09

## 2024-02-03 RX ORDER — POTASSIUM CHLORIDE 20 MEQ
20 PACKET (EA) ORAL ONCE
Refills: 0 | Status: COMPLETED | OUTPATIENT
Start: 2024-02-03 | End: 2024-02-03

## 2024-02-03 RX ORDER — ACETAMINOPHEN 500 MG
650 TABLET ORAL EVERY 6 HOURS
Refills: 0 | Status: DISCONTINUED | OUTPATIENT
Start: 2024-02-03 | End: 2024-02-09

## 2024-02-03 RX ORDER — OXYCODONE HYDROCHLORIDE 5 MG/1
5 TABLET ORAL EVERY 6 HOURS
Refills: 0 | Status: DISCONTINUED | OUTPATIENT
Start: 2024-02-03 | End: 2024-02-08

## 2024-02-03 RX ADMIN — OXYCODONE HYDROCHLORIDE 10 MILLIGRAM(S): 5 TABLET ORAL at 07:12

## 2024-02-03 RX ADMIN — BICTEGRAVIR SODIUM, EMTRICITABINE, AND TENOFOVIR ALAFENAMIDE FUMARATE 1 TABLET(S): 30; 120; 15 TABLET ORAL at 18:24

## 2024-02-03 RX ADMIN — SODIUM CHLORIDE 110 MILLILITER(S): 9 INJECTION, SOLUTION INTRAVENOUS at 06:52

## 2024-02-03 RX ADMIN — Medication 650 MILLIGRAM(S): at 18:21

## 2024-02-03 RX ADMIN — OXYCODONE HYDROCHLORIDE 10 MILLIGRAM(S): 5 TABLET ORAL at 23:59

## 2024-02-03 RX ADMIN — Medication 650 MILLIGRAM(S): at 12:23

## 2024-02-03 RX ADMIN — OXYCODONE HYDROCHLORIDE 10 MILLIGRAM(S): 5 TABLET ORAL at 13:17

## 2024-02-03 RX ADMIN — Medication 650 MILLIGRAM(S): at 18:58

## 2024-02-03 RX ADMIN — OXYCODONE HYDROCHLORIDE 10 MILLIGRAM(S): 5 TABLET ORAL at 07:59

## 2024-02-03 RX ADMIN — SODIUM CHLORIDE 40 MILLILITER(S): 9 INJECTION, SOLUTION INTRAVENOUS at 20:57

## 2024-02-03 RX ADMIN — OXYCODONE HYDROCHLORIDE 5 MILLIGRAM(S): 5 TABLET ORAL at 18:58

## 2024-02-03 RX ADMIN — SODIUM CHLORIDE 1000 MILLILITER(S): 9 INJECTION INTRAMUSCULAR; INTRAVENOUS; SUBCUTANEOUS at 00:49

## 2024-02-03 RX ADMIN — Medication 20 MILLIEQUIVALENT(S): at 13:41

## 2024-02-03 RX ADMIN — OXYCODONE HYDROCHLORIDE 5 MILLIGRAM(S): 5 TABLET ORAL at 12:30

## 2024-02-03 RX ADMIN — OXYCODONE HYDROCHLORIDE 5 MILLIGRAM(S): 5 TABLET ORAL at 18:21

## 2024-02-03 RX ADMIN — SODIUM CHLORIDE 100 MILLILITER(S): 9 INJECTION, SOLUTION INTRAVENOUS at 05:39

## 2024-02-03 RX ADMIN — Medication 650 MILLIGRAM(S): at 11:06

## 2024-02-03 RX ADMIN — OXYCODONE HYDROCHLORIDE 10 MILLIGRAM(S): 5 TABLET ORAL at 14:30

## 2024-02-03 RX ADMIN — OXYCODONE HYDROCHLORIDE 10 MILLIGRAM(S): 5 TABLET ORAL at 23:25

## 2024-02-03 RX ADMIN — OXYCODONE HYDROCHLORIDE 5 MILLIGRAM(S): 5 TABLET ORAL at 11:07

## 2024-02-03 RX ADMIN — MORPHINE SULFATE 4 MILLIGRAM(S): 50 CAPSULE, EXTENDED RELEASE ORAL at 03:34

## 2024-02-03 NOTE — CONSULT NOTE ADULT - ATTENDING COMMENTS
Delonte Armstrong is a 43 year old male with history of HIV (on biktarvy), polysubstance abuse, and locally advanced anorectal squamous cell carcinoma diagnosed in 12/2023, who is admitted with recurrent hematochezia with acute on chronic blood loss anemia. Unfortunately during his last admission, he left the hospital abruptly after finding out his biopsy was positive for SCC. He did not follow up with Dr. Julian as planned but did see Dr. Cortez and underwent additional imaging. Based on most recent imaging, he has locally advanced disease invading into multiple local structures including musculature and bone, but without invasion into other abdominal organs. There may be involvement of pre-sacral nodes but no clear evidence of para-aortic lymph node involvement or distant metastases.     We had an extensive discussion with the patient to review these findings and discuss potential treatment options, namely the combination of chemotherapy and radiation. The regimen with the most promising outcomes is the combination infusional 5FU (days 1-4 and 29-32) and mitomycin (days 1 and 29). Radiation therapy is typically administered over the course of 4-6 weeks. We explained that despite his cancer eroding into surrounding structures, he still has a reasonable chance at excellent response to combined therapies. There is significant toxicity associated with this regimen however, and we discussed some of the most common toxicities.     After this discussion, he expressed willingness to proceed with this regimen, although he did express significant concern about logistics related to traveling for daily appointments. We will discuss his case with Dr. Julian and Graciela to formulate multidisciplinary treatment plan but given his comorbidities, missed appts and interval progression since last hospitalization, we will tentatively plan to initiate therapy during inpatient hospitalization.     Plan:  -recommend IV iron given blood loss anemia  -reassess HIV status and optimize antiretroviral therapy  -will discuss potential other imaging required for RT planning with rad onc  -will tentatively plan to initiate chemoRT while admitted    We will continue to follow.
43M MSM Hx HIV (CD4 266, VLUD, on Biktarvy), latent syphilis, polysubstance abuse, AARTI, Invasive anal squamous cell carcinoma (Diagnosed 12/2023 however left AMA without further work up), presents with rectal pain and bleeding. Medicine consulted for comanagement.     #Rectal pain/bleeding - symptoms secondary to invasive anal squamous cell carcinoma, would recommend 1 unit PRBC given active bleeding and tachycardia, would also start IV iron (IV sucrose 200mg x5 days)  #Invasive rectal squamous cell carcinoma - diagnosed on biopsy 12/23, heme/onc consulted and following for further work up/management  #HIV - restart home biktarvy      Medicine will continue to follow

## 2024-02-03 NOTE — ED PROVIDER NOTE - NSICDXPASTMEDICALHX_GEN_ALL_CORE_FT
PAST MEDICAL HISTORY:  Abscess     Anemia     HIV (human immunodeficiency virus infection)     IVDU (intravenous drug user)     Rectal cancer

## 2024-02-03 NOTE — H&P ADULT - NSHPPOAPULMEMBOLUS_GEN_A_CORE
GI Physician: Dr Rader Shown    Refill Request for Medication: Pantoprazole    Dose: 40 mg    Quantity: 90    Pharmacy and Location: [facility] no

## 2024-02-03 NOTE — PROGRESS NOTE ADULT - ASSESSMENT
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 He does note he has been having intermittent stabbing rectal pain over the last month. Patient reports at baseline he is tacy to the 120-130s. Labs significant for Hgb 7.6, negative UA, postive THC, amphetamines, and opiates.CT showed No signs of active bleeding. Large lobulated rectal tumor dissecting into adjacent soft tissues and bones overall similar in appearance to the previous study. There is a moderate constipation pattern. Admitted for further evlauation of rectal bleeding and cancer progression.     Plan  Regular diet/IVF  SCDs  Pain and nausea control PRN  Home meds   Hold lovenox

## 2024-02-03 NOTE — CONSULT NOTE ADULT - ASSESSMENT
43M MSM Hx HIV (12/2023 CD4 266 (BALDO likely 130), VLUD, on Biktarvy), Syphilis ([id number] RPR 1:8 serofast), MRSA abdominal abscess 2021, Polysubstance abuse (02/2024 UTox +THC, Amphetamines, Opiates), Invasive anal squamous cell carcinoma (Diagnosed 12/2023, left AMA). P/w x1m intermittent rectal pain & x1d rectal bleeding intermittent stabbing rectal pain over the last month.       Patient reports at baseline he is tacy to the 120-130s. Labs significant for Hgb 7.6, negative UA, postive THC, amphetamines, and opiates.CT showed No signs of active bleeding. Large lobulated rectal tumor dissecting into adjacent soft tissues and bones overall similar in appearance to the previous study. There is a moderate constipation pattern. Admitted for further evlauation of rectal bleeding and cancer progression.  43M MSM Hx HIV (12/2023 CD4 266 (BALDO likely 130), VLUD, on Biktarvy), Latent syphilis ([id number] RPR 1:8 serofast), MRSA abdominal abscess 2021, Polysubstance abuse (02/2024 UTox +THC, Amphetamines, Opiates), Sinus tachycardia, AARTI, Invasive anal squamous cell carcinoma (Diagnosed 12/2023, left AMA). P/w x1m intermittent rectal pain & x1d rectal bleeding intermittent stabbing rectal pain over the last month. Admitted to Surgery for evaluation rectal bleeding and cancer progression.     #Invasive anal squamous cell carcinoma  Diagnosed 12/2023, left AMA. 01/2024 CTAP: No signs of active bleeding. Large lobulated rectal tumor dissecting into adjacent soft tissues and bones overall similar in appearance to the previous study. There is a moderate constipation pattern. No surgical intervention per surgery  - f/u HemeOnc recs  - Pain regimen per surgical team  - Consider bowel regimen for constipation (Miralax, Senna)    #AARTI  Adm hgb 7.2-7.6 (2021 hgb 11.4). 12/2023 Ferritin 109 (likely overstated iso malignancy), Iron sat 10%.  - Active T+S, transfuse to keep hgb >7  - Consider Iron supplementation IV/PO if no    #HIV  12/2023 CD4 266 (BALDO likely 130), VLUD. urine Ch/Gc negative  - c/w home Biktarvy    #Sinus tachycardia  Unclear etiology. Was tachycardic at last admission as well. Adm SIRS 1/4 ()    #Latent syphilis   [id number] RPR 1:8 serofast.  - Outpatient management    #Polysubstance abuse  Adm UTox +THC, Amphetamines, Opiates  - consider SBIRT consult 43M MSM Hx HIV (12/2023 CD4 266 (BALDO likely 130), VLUD, on Biktarvy), Latent syphilis ([id number] RPR 1:8 serofast), MRSA abdominal abscess 2021, Polysubstance abuse (02/2024 UTox +THC, Amphetamines, Opiates), Sinus tachycardia, AARTI, Invasive anal squamous cell carcinoma (Diagnosed 12/2023, left AMA). P/w x1m intermittent rectal pain & x1d rectal bleeding intermittent stabbing rectal pain over the last month. Admitted to Surgery for evaluation rectal bleeding and cancer progression.     #Invasive anal squamous cell carcinoma  Diagnosed 12/2023, left AMA. 01/2024 CTAP: No signs of active bleeding. Large lobulated rectal tumor dissecting into adjacent soft tissues and bones overall similar in appearance to the previous study. There is a moderate constipation pattern. No surgical intervention per surgery  - f/u HemeOnc recs  - Please consult Palliative care per patient request  - Pain regimen per surgical team  - Consider bowel regimen for constipation (Miralax, Senna)    #AARTI  Adm hgb 7.2-7.6 (2021 hgb 11.4). 12/2023 Ferritin 109 (likely overstated iso malignancy), Iron sat 10%.  - Active T+S, transfuse to keep hgb >7  - Consider Iron supplementation IV/PO if no    #HIV  12/2023 CD4 266 (BALDO likely 130), VLUD. urine Ch/Gc negative  - c/w home Biktarvy    #Sinus tachycardia  Unclear etiology. DDx: Anemia, Amphetamine useWas tachycardic at last admission as well. Adm SIRS 1/4 ()    #Latent syphilis   [id number] RPR 1:8 serofast.  - Outpatient management    #Polysubstance abuse  Adm UTox +THC, Amphetamines, Opiates  - consider SBIRT consult 43M MSM Hx HIV (12/2023 CD4 266 (BALDO likely 130), VLUD, on Biktarvy), Latent syphilis ([id number] RPR 1:8 serofast), MRSA abdominal abscess 2021, Polysubstance abuse (02/2024 UTox +THC, Amphetamines, Opiates), Sinus tachycardia, AARTI, Invasive anal squamous cell carcinoma (Diagnosed 12/2023, left AMA). P/w x1m intermittent rectal pain & x1d rectal bleeding intermittent stabbing rectal pain over the last month. Admitted to Surgery for evaluation rectal bleeding and cancer progression.     #Invasive anal squamous cell carcinoma  Diagnosed 12/2023, left AMA. 01/2024 CTAP: No signs of active bleeding. Large lobulated rectal tumor dissecting into adjacent soft tissues and bones overall similar in appearance to the previous study. There is a moderate constipation pattern. No surgical intervention per surgery  - f/u HemeOnc recs  - Please consult Palliative care per patient request  - Pain regimen per surgical team  - Consider bowel regimen for constipation (Miralax, Senna)    #AARTI  Adm hgb 7.2-7.6 (2021 hgb 11.4). 12/2023 Ferritin 109 (likely overstated iso malignancy), Iron sat 10%.  - Active T+S, transfuse to keep hgb >7  - Consider Iron supplementation IV/PO if no    #HIV  12/2023 CD4 266 (BALDO likely 130), VLUD. urine Ch/Gc negative  - c/w home Biktarvy    #Sinus tachycardia  Unclear etiology. DDx: anemia, amphetamine use, sepsis, rectal pain. Was tachycardic at last admission as well. Adm SIRS 1/4 ().  - Monitor. Low threshold to start broad spectrum antibiotics.    #Latent syphilis   [id number] RPR 1:8 serofast.  - Outpatient management    #Polysubstance abuse  Adm UTox +THC, Amphetamines, Opiates  - consider SBIRT consult 43M MSM Hx HIV (12/2023 CD4 266 (BALDO likely 130), VLUD, on Biktarvy), Latent syphilis ([id number] RPR 1:8 serofast), MRSA abdominal abscess 2021, Polysubstance abuse (02/2024 UTox +THC, Amphetamines, Opiates), Sinus tachycardia, AARTI, Invasive anal squamous cell carcinoma (Diagnosed 12/2023, left AMA). P/w x1m intermittent rectal pain & x1d rectal bleeding intermittent stabbing rectal pain over the last month. Admitted to Surgery for evaluation rectal bleeding and cancer progression.     #Invasive anal squamous cell carcinoma  Diagnosed 12/2023, left AMA. 01/2024 CTAP: No signs of active bleeding. Large lobulated rectal tumor dissecting into adjacent soft tissues and bones overall similar in appearance to the previous study. There is a moderate constipation pattern. No surgical intervention per surgery  - f/u HemeOnc recs  - Please consult Palliative care per patient request  - Pain regimen per surgical team  - Consider bowel regimen for constipation (Miralax, Senna)    #AARTI  Adm hgb 7.2-7.6 (2021 hgb 11.4). 12/2023 Ferritin 109 (likely overstated iso malignancy), Iron sat 10%.  - Active T+S, transfuse to keep hgb >7  - Consider Iron supplementation IV/PO if no    #HIV  12/2023 CD4 266 (BALDO likely 130), VLUD. urine Ch/Gc negative  - c/w home Biktarvy    #Sinus tachycardia  Unclear etiology. DDx: anemia, amphetamine use, sepsis, rectal pain. Was tachycardic at last admission as well. Adm SIRS 1/4 (). C/o x2wk no productive cough  - Monitor. Low threshold to start broad spectrum antibiotics.  - Consider CXR, RVP    #Latent syphilis   [id number] RPR 1:8 serofast.  - Outpatient management    #Polysubstance abuse  Adm UTox +THC, Amphetamines, Opiates  - consider SBIRT consult 43M MSM Hx HIV (12/2023 CD4 266 (BALDO likely 130), VLUD, on Biktarvy), Latent syphilis ([id number] RPR 1:8 serofast), MRSA abdominal abscess 2021, Polysubstance abuse (02/2024 UTox +THC, Amphetamines, Opiates), Sinus tachycardia, AARTI, Invasive anal squamous cell carcinoma (Diagnosed 12/2023, left AMA). P/w x1m intermittent rectal pain & x1d rectal bleeding intermittent stabbing rectal pain over the last month. Admitted to Surgery for evaluation rectal bleeding and cancer progression.     #Invasive anal squamous cell carcinoma  Diagnosed 12/2023, left AMA. 01/2024 CTAP: No signs of active bleeding. Large lobulated rectal tumor dissecting into adjacent soft tissues and bones overall similar in appearance to the previous study. There is a moderate constipation pattern. No surgical intervention per surgery  - f/u HemeOnc recs  - Please consult Palliative care per patient request  - Pain regimen per surgical team  - Consider bowel regimen for constipation (Miralax, Senna)    #Symptomatic AARTI  Adm hgb 7.2-7.6 (2021 hgb 11.4). C/o 1mth passing clots per rectum, now BRBPR. 12/2023 Ferritin 109 (likely overstated iso malignancy), Iron sat 10%.  - Active T+S, transfuse to keep hgb >7  - Would recommend transfusing 1 unit pRBCs now iso sinus tachycardia & SOB (symptomatic anemia)  - Given no overt sign of infection, would infusion IV Iron 300md Qd x5d in addition to pRBC transfusion. Consider discharge on PO Iron 65mg QOD w/ bowel regimen    #Sinus tachycardia  Unclear etiology. DDx most likely iso anemia, amphetamine use, rectal pain. Less likely sepsis. Was tachycardic at last admission as well. Adm SIRS 1/4 (). C/o x2wk no productive cough  - Monitor. Low threshold to start broad spectrum antibiotics.  - Consider CXR, RVP    #Polysubstance abuse  Adm UTox +THC, Amphetamines, Opiates.  - Consider SBIRT consult    #HIV  12/2023 CD4 266 (BALDO 130), VLUD. urine Ch/Gc negative.  - c/w home Biktarvy    #Latent syphilis   [id number] RPR 1:8 serofast.  - Outpatient management

## 2024-02-03 NOTE — ED PROVIDER NOTE - OBJECTIVE STATEMENT
Pt is a 43 yr old male with PMHx of HIV (on Biktarvy, VL undetectable, unsure of his last CD4 count), polysubstance abuse, syphilis, iron-deficiency anemia, and recently diagnosed invasive squamous cell carcinoma of his rectum, after being admitted to [facility] in December 2023, when he had a rectal biopsy done.  He has been followed by Dr. Cortez (Colorectal surgeon) and reportedly was going to start radiation therapy soon.  Pt reports that he has somewhat chronically had intermittent dark blood from his rectum ever since the biopsy, but he has never had bright red blood from his rectum until today.  He reports at least a cup of bright red blood were passed as well as blood clots.  Pt states his BMs have been normal.  No melena.  Pt is not on any anticoagulation.  He also reports chronic numbness in the buttocks region as well down the posterior portion of his right thigh.  Pt complains of generalized weakness, fatigue, and dizziness, but he denies any syncope.  No chest pain or SOB.  No abd pain.

## 2024-02-03 NOTE — CONSULT NOTE ADULT - SUBJECTIVE AND OBJECTIVE BOX
INTERNAL MEDICINE SERVICE INITIAL CONSULT NOTE    HPI:  43-year-old MSM with past medical history of HIV (Biktarvy, ) invasive anal squamous cell carcinoma (12/23), polysubstance abuse, syphilis, anemia, and abdominal MRSA 2021, who presenting with rectal bleeding since yesterday morning, Patient reports he was lying in bed changing his underwear when he noticed blood spots on his sheets. Shortly he became lightheaded and nausea asnd decided to go to the ED. He denies any fevers, but does endorse chills. He does note he has been having intermittent stabbing rectal pain over the last month. Patient reports at baseline he is tacy to the 120-130s.     In the ED, patient was tachy to 136, normotensive, afebrile, Labs significant for Hgb 7.6, negative UA, postive THC, amphetamines, and opiates. Ct showed No signs of active bleeding. Large lobulated rectal tumor dissecting into adjacent soft tissues and bones overall similar in appearance to the previous study. There is a moderate constipation pattern.   (03 Feb 2024 06:01)    ADDITIONAL MEDICINE HPI:    REVIEW OF SYSTEMS:   Otherwise negative except as specified in HPI    PAST MEDICAL HISTORY:     PAST SURGICAL HISTORY:    FAMILY HISTORY:    SOCIAL HISTORY:  Tobacco use:  EtOH use:  Illicit drug use:    MEDICATIONS:  MEDICATIONS  (STANDING):  lactated ringers. 1000 milliLiter(s) (110 mL/Hr) IV Continuous <Continuous>    MEDICATIONS  (PRN):  acetaminophen     Tablet .. 650 milliGRAM(s) Oral every 6 hours PRN Mild Pain (1 - 3)  ondansetron Injectable 4 milliGRAM(s) IV Push every 6 hours PRN Nausea and/or Vomiting  oxyCODONE    IR 5 milliGRAM(s) Oral every 6 hours PRN Moderate Pain (4 - 6)  oxyCODONE    IR 10 milliGRAM(s) Oral every 6 hours PRN Severe Pain (7 - 10)    ALLERGIES:  Allergies  No Known Allergies    Intolerances    VITAL SIGNS:  Vital Signs Last 24 Hrs  T(C): 37 (03 Feb 2024 08:45), Max: 37.2 (02 Feb 2024 21:29)  T(F): 98.6 (03 Feb 2024 08:45), Max: 98.9 (02 Feb 2024 21:29)  HR: 108 (03 Feb 2024 08:16) (108 - 135)  BP: 137/79 (03 Feb 2024 08:16) (116/67 - 137/79)  BP(mean): 101 (03 Feb 2024 08:16) (84 - 101)  RR: 18 (03 Feb 2024 08:16) (17 - 18)  SpO2: 98% (03 Feb 2024 08:16) (98% - 100%)    Parameters below as of 03 Feb 2024 08:16  Patient On (Oxygen Delivery Method): room air        02-02-24 @ 07:01  -  02-03-24 @ 07:00  --------------------------------------------------------  IN:    Lactated Ringers: 210 mL  Total IN: 210 mL    OUT:    Voided (mL): 300 mL  Total OUT: 300 mL    Total NET: -90 mL      02-03-24 @ 07:01  -  02-03-24 @ 11:22  --------------------------------------------------------  IN:    Lactated Ringers: 330 mL  Total IN: 330 mL    OUT:  Total OUT: 0 mL    Total NET: 330 mL    PHYSICAL EXAM:  Constitutional: WDWN resting comfortably in bed; NAD  Head: NC/AT  Eyes: PERRL, EOMI, anicteric sclera  ENT: no nasal discharge; uvula midline, no oropharyngeal erythema or exudates; MMM  Neck: supple; no JVD or thyromegaly  Respiratory: CTA B/L; no W/R/R, no retractions  Cardiac: +S1/S2; RRR; no M/R/G; PMI non-displaced  Gastrointestinal: abdomen soft, NT/ND; no rebound or guarding; +BSx4  Genitourinary: normal external genitalia  Back: spine midline, no bony tenderness or step-offs; no CVAT B/L  Extremities: WWP, no clubbing or cyanosis; no peripheral edema  Musculoskeletal: NROM x4; no joint swelling, tenderness or erythema  Vascular: 2+ radial, femoral, DP/PT pulses B/L  Dermatologic: skin warm, dry and intact; no rashes, wounds, or scars  Lymphatic: no submandibular or cervical LAD  Neurologic: AAOx3; CNII-XII grossly intact; no focal deficits  Psychiatric: affect and characteristics of appearance, verbalizations, behaviors are appropriate    LABS:                        7.2    10.54 )-----------( 361      ( 03 Feb 2024 07:05 )             23.5     02-03    138  |  102  |  9   ----------------------------<  99  3.9   |  25  |  0.75    Ca    8.6      03 Feb 2024 07:59  Phos  4.5     02-03  Mg     2.0     02-03    TPro  6.8  /  Alb  3.1<L>  /  TBili  <0.2  /  DBili  x   /  AST  13  /  ALT  9<L>  /  AlkPhos  70  02-03    PT/INR - ( 02 Feb 2024 19:27 )   PT: 14.4 sec;   INR: 1.32       PTT - ( 02 Feb 2024 19:27 )  PTT:31.5 sec  Urinalysis Basic - ( 03 Feb 2024 07:59 )    Color: x / Appearance: x / SG: x / pH: x  Gluc: 99 mg/dL / Ketone: x  / Bili: x / Urobili: x   Blood: x / Protein: x / Nitrite: x   Leuk Esterase: x / RBC: x / WBC x   Sq Epi: x / Non Sq Epi: x / Bacteria: x    CAPILLARY BLOOD GLUCOSE              RADIOLOGY & ADDITIONAL TESTS: Reviewed. INTERNAL MEDICINE SERVICE INITIAL CONSULT NOTE    HPI:  43-year-old MSM with past medical history of HIV (Biktarvy, ) invasive anal squamous cell carcinoma (12/23), polysubstance abuse, syphilis, anemia, and abdominal MRSA 2021, who presenting with rectal bleeding since yesterday morning, Patient reports he was lying in bed changing his underwear when he noticed blood spots on his sheets. Shortly he became lightheaded and nausea asnd decided to go to the ED. He denies any fevers, but does endorse chills. He does note he has been having intermittent stabbing rectal pain over the last month. Patient reports at baseline he is tacy to the 120-130s.     In the ED, patient was tachy to 136, normotensive, afebrile, Labs significant for Hgb 7.6, negative UA, postive THC, amphetamines, and opiates. Ct showed No signs of active bleeding. Large lobulated rectal tumor dissecting into adjacent soft tissues and bones overall similar in appearance to the previous study. There is a moderate constipation pattern.   (03 Feb 2024 06:01)    ADDITIONAL MEDICINE HPI:    REVIEW OF SYSTEMS:   Otherwise negative except as specified in HPI    PAST MEDICAL HISTORY:     PAST SURGICAL HISTORY:    FAMILY HISTORY:    SOCIAL HISTORY:  Tobacco use:  EtOH use:  Illicit drug use:    MEDICATIONS:  MEDICATIONS  (STANDING):  lactated ringers. 1000 milliLiter(s) (110 mL/Hr) IV Continuous <Continuous>    MEDICATIONS  (PRN):  acetaminophen     Tablet .. 650 milliGRAM(s) Oral every 6 hours PRN Mild Pain (1 - 3)  ondansetron Injectable 4 milliGRAM(s) IV Push every 6 hours PRN Nausea and/or Vomiting  oxyCODONE    IR 5 milliGRAM(s) Oral every 6 hours PRN Moderate Pain (4 - 6)  oxyCODONE    IR 10 milliGRAM(s) Oral every 6 hours PRN Severe Pain (7 - 10)    ALLERGIES:  Allergies  No Known Allergies    Intolerances    VITAL SIGNS:  Vital Signs Last 24 Hrs  T(C): 37 (03 Feb 2024 08:45), Max: 37.2 (02 Feb 2024 21:29)  T(F): 98.6 (03 Feb 2024 08:45), Max: 98.9 (02 Feb 2024 21:29)  HR: 108 (03 Feb 2024 08:16) (108 - 135)  BP: 137/79 (03 Feb 2024 08:16) (116/67 - 137/79)  BP(mean): 101 (03 Feb 2024 08:16) (84 - 101)  RR: 18 (03 Feb 2024 08:16) (17 - 18)  SpO2: 98% (03 Feb 2024 08:16) (98% - 100%)    Parameters below as of 03 Feb 2024 08:16  Patient On (Oxygen Delivery Method): room air    02-02-24 @ 07:01  -  02-03-24 @ 07:00  --------------------------------------------------------  IN:    Lactated Ringers: 210 mL  Total IN: 210 mL    OUT:    Voided (mL): 300 mL  Total OUT: 300 mL    Total NET: -90 mL      02-03-24 @ 07:01  -  02-03-24 @ 11:22  --------------------------------------------------------  IN:    Lactated Ringers: 330 mL  Total IN: 330 mL    OUT:  Total OUT: 0 mL    Total NET: 330 mL    PHYSICAL EXAM:  Constitutional: resting comfortably in bed; NAD  Head: NC/AT  Eyes: PERRL, EOMI, anicteric sclera  ENT: no nasal discharge; uvula midline, no oropharyngeal erythema or exudates; MMM  Neck: supple; no JVD or thyromegaly  Respiratory: CTA B/L; no W/R/R, no retractions  Cardiac: +S1/S2; RRR; no M/R/G; PMI non-displaced  Gastrointestinal: abdomen soft, NT/ND; no rebound or guarding; +BSx4  Declined rectal exam  Extremities: WWP, no clubbing or cyanosis; no peripheral edema  Musculoskeletal: NROM x4; no joint swelling, tenderness or erythema  Vascular: 2+ radial, femoral, DP/PT pulses B/L  Dermatologic: skin warm, dry and intact; no rashes, wounds, or scars  Lymphatic: no submandibular or cervical LAD  Neurologic: AAOx3; CNII-XII grossly intact; no focal deficits  Psychiatric: affect and characteristics of appearance, verbalizations, behaviors are appropriate    LABS:                        7.2    10.54 )-----------( 361      ( 03 Feb 2024 07:05 )             23.5     02-03    138  |  102  |  9   ----------------------------<  99  3.9   |  25  |  0.75    Ca    8.6      03 Feb 2024 07:59  Phos  4.5     02-03  Mg     2.0     02-03    TPro  6.8  /  Alb  3.1<L>  /  TBili  <0.2  /  DBili  x   /  AST  13  /  ALT  9<L>  /  AlkPhos  70  02-03    PT/INR - ( 02 Feb 2024 19:27 )   PT: 14.4 sec;   INR: 1.32       PTT - ( 02 Feb 2024 19:27 )  PTT:31.5 sec  Urinalysis Basic - ( 03 Feb 2024 07:59 )    Color: x / Appearance: x / SG: x / pH: x  Gluc: 99 mg/dL / Ketone: x  / Bili: x / Urobili: x   Blood: x / Protein: x / Nitrite: x   Leuk Esterase: x / RBC: x / WBC x   Sq Epi: x / Non Sq Epi: x / Bacteria: x    CAPILLARY BLOOD GLUCOSE              RADIOLOGY & ADDITIONAL TESTS: Reviewed.

## 2024-02-03 NOTE — PROGRESS NOTE ADULT - SUBJECTIVE AND OBJECTIVE BOX
SUBJECTIVE: Patient seen and examined at bedside with chief resident. Patient states that he currently has no acute complaints, he denies any abdominal pain, nausea, or vomiting.          Vital Signs Last 24 Hrs  T(C): 37 (03 Feb 2024 08:45), Max: 37.2 (02 Feb 2024 21:29)  T(F): 98.6 (03 Feb 2024 08:45), Max: 98.9 (02 Feb 2024 21:29)  HR: 123 (03 Feb 2024 03:19) (112 - 135)  BP: 121/62 (03 Feb 2024 04:40) (116/67 - 133/69)  BP(mean): 84 (03 Feb 2024 04:40) (84 - 84)  RR: 17 (03 Feb 2024 04:40) (17 - 18)  SpO2: 99% (03 Feb 2024 04:40) (99% - 100%)    Parameters below as of 03 Feb 2024 04:40  Patient On (Oxygen Delivery Method): room air      I&O's Detail    02 Feb 2024 07:01  -  03 Feb 2024 07:00  --------------------------------------------------------  IN:    Lactated Ringers: 210 mL  Total IN: 210 mL    OUT:    Voided (mL): 300 mL  Total OUT: 300 mL    Total NET: -90 mL      03 Feb 2024 07:01  -  03 Feb 2024 09:23  --------------------------------------------------------  IN:    Lactated Ringers: 110 mL  Total IN: 110 mL    OUT:  Total OUT: 0 mL    Total NET: 110 mL          General: NAD, resting comfortably in bed  C/V: NSR  Pulm: Nonlabored breathing, no respiratory distress  Abd: soft, NT/ND.  Extrem: WWP, no edema, SCDs in place        LABS:                        7.2    10.54 )-----------( 361      ( 03 Feb 2024 07:05 )             23.5     02-03    138  |  102  |  9   ----------------------------<  99  3.9   |  25  |  0.75    Ca    8.6      03 Feb 2024 07:59  Phos  4.5     02-03  Mg     2.0     02-03    TPro  6.8  /  Alb  3.1<L>  /  TBili  <0.2  /  DBili  x   /  AST  13  /  ALT  9<L>  /  AlkPhos  70  02-03    PT/INR - ( 02 Feb 2024 19:27 )   PT: 14.4 sec;   INR: 1.32          PTT - ( 02 Feb 2024 19:27 )  PTT:31.5 sec  Urinalysis Basic - ( 03 Feb 2024 07:59 )    Color: x / Appearance: x / SG: x / pH: x  Gluc: 99 mg/dL / Ketone: x  / Bili: x / Urobili: x   Blood: x / Protein: x / Nitrite: x   Leuk Esterase: x / RBC: x / WBC x   Sq Epi: x / Non Sq Epi: x / Bacteria: x        RADIOLOGY & ADDITIONAL STUDIES:

## 2024-02-03 NOTE — PROVIDER CONTACT NOTE (OTHER) - ASSESSMENT
Pt has a small amount of blood on akhil and has HR between 110-120 beats per minute at baseline, pt complains of 3/10 rectal pain but states it is tolerable
Pt VSS @ 17:49 /66,  and charted on flow sheet, pt ambulated to bathroom after lab draw by phlebotomy with no complaints of dizziness prior to receiving results, pt states he feels fine. Site where phlebotomy alicai labs was right above IV site where IV fluids were running. Possible hemodilution of results.

## 2024-02-03 NOTE — PROVIDER CONTACT NOTE (OTHER) - BACKGROUND
Pt admitted for rectal bleeding and had a bloody stool, was told by night nurse Rebeca that pt is tachy at baseline into the 120's

## 2024-02-03 NOTE — H&P ADULT - NSHPLABSRESULTS_GEN_ALL_CORE
< from: CT Angio Abdomen and Pelvis w/ IV Cont (02.02.24 @ 21:26) >    INTERPRETATION:  [facility] RADIOLOGIST PRELIMINARY REPORT      Initial report created on 2/2/2024 10:26:09 PM EST  PROCEDURE INFORMATION:  Exam: CTA Abdomen and Pelvis With Contrast  Exam date and time: 2/2/2024 9:07 PM  Age: 43 years old  Clinical indication: Rectal bleeding; Recent dx of rectal cancer    TECHNIQUE:  Imaging protocol: Computed tomographic angiography of the abdomen and   pelvis  with contrast, including noncontrast images, if performed. Exam focused   on the  arteries.  3D rendering (Not supervised by radiologist): MIP and/or 3D reconstructed  images were created by the technologist.    COMPARISON:  CT ABDOMEN AND PELVIS WITH ORAL CONTRAST WITH IV CONTRAST 12/15/2023 2:44   AM    FINDINGS:  Aorta: No aortic aneurysm. No aortic dissection.  Celiac trunk and mesenteric arteries: No occlusion or significant   stenosis.  Renal arteries: No occlusion or significant stenosis.  Right iliac arteries: No occlusion or significant stenosis.  Left iliac arteries: No occlusion or significant stenosis.    Liver: No mass.  Gallbladder and bile ducts: Unremarkable. No calcified stones. No ductal  dilation.  Pancreas: Unremarkable. No mass. No ductal dilation.  Spleen: Unremarkable. No splenomegaly.  Adrenal glands: Unremarkable. No mass.  Kidneys and ureters: Unremarkable. No solid mass. No hydronephrosis.  Stomach and bowel: There is a lobulated ill-defined rectal mass with a   large  ill-defined right and posterior exophytic component which is seen invading  adjacent soft tissues and bones. Overall the mass is estimated to measure   9.1  cm AP, 7 cm TRV and 11 cm craniocaudad. In the medial inferior anorectal   margin  there appears to be a cavitary component of the tumor with the stool and   small  bubbles ofair located in this cavitation to the right of midline. There   is a  moderate amount of proximal stool and there are fluid-filled mildly   dilated  small bowel loops with air-fluid levels. No signs of active extravasation   of  contrast. There density seen in the posterior aspect of the lower portion   of  the tumor which are noted on the noncontrast portion of the exam.  Appendix: Normal appendix.  Intraperitoneal space: Unremarkable. No free air. No significant fluid  collection.  Lymph nodes:Unremarkable. No enlarged lymph nodes.    Urinary bladder: Unremarkable. No mass.  Reproductive: The tumor appears to have infiltrative the right lateral and  posterior margins of the prostate as well.  Bones/joints: Mild degenerative changes of the spine. There is an erosive  lesion involving the inferior right acetabulum. There is tumor erosion of   the  right side of the coccyx extending to the tip of the coccyx.  Soft tissues: There is tumor involvement of the right internal obturator   and  right piriformis muscle. The tumor is confluent with the medial margin of   the  gluteal muscles. There is a left-sided posterior component of the tumor  abutting the anterior margin of the left gluteal muscles.    IMPRESSION:  No signs of active bleeding. Large lobulated rectal tumor dissecting into  adjacent soft tissues and bones overall similar in appearance to the   previous  study. There is a moderate constipation pattern.    < end of copied text >

## 2024-02-03 NOTE — PROVIDER CONTACT NOTE (OTHER) - ACTION/TREATMENT ORDERED:
Contacted MD Carreno and was told that she would look into changing the HR parameters, also was told to continue to monitor for rectal bleeding.
Provider Ev DAVISON assessed pt at bedside. Repeat CBC done with result of Hgb 8.1 and Hct 27.5. Care plan continued.

## 2024-02-03 NOTE — CONSULT NOTE ADULT - SUBJECTIVE AND OBJECTIVE BOX
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 Hematology is consulted for recently diagnosed rectoanal squamous cell carcinoma    Patient had recent admission 12/9 - 12/12 for spontaneous drainage and mild pain from bilateral gluteal abscesses. Patient had previously had perianal I and D in [facility] and [facility]. Patient also had R leg numbness, which was though to be 2/2 to anal mass Patietn had was found to have rectoanal mass on imaging,a nd underwent biopsy and EUA 12/13 which confirmed diagnosis of squamous cell carcinoma. Patient was recommended for staging imaging at that time, but left AMA.    In ED, patient was tacchycardic to 136, and labs significant for Hgb 7.6, negative UA, positive THC, amphetamines and opiates. CTA showed no signs of active bleeding.     Patient reports he was unable to follow up with Dr. Julian, as "phone was off," but willing to follow up in outpatient afterwards. He reports he had a large amount of blood in stool yesterday, and reports worsening fatigue (difficult to go up to his 2 story apartment). he also reportedly had intermittent stabbing rectal pain over the last month. He lives in [facility], current smoker (trying to cut down). Denies alcohol other/drug use. Endorses significant pain in rectal area    CT Chest 1/9/24 - mild paraseptal emphysema. no evidence of metastatic disease      MRI Pelvis (1/3/24) - limited exam with only 3 sequences obtained. No evidence for a large infiltrative mass centered in the lower rectum with invasion into the sacrum and musculature      PAST MEDICAL & SURGICAL HISTORY:  HIV (human immunodeficiency virus infection)      Abscess      IVDU (intravenous drug user)      Anemia      Rectal cancer      H/O rectal polypectomy          Allergies:  No Known Allergies      Medications:        Social History:    FAMILY HISTORY:  FH: CAD (coronary artery disease) (Mother)        PHYSICAL EXAM:    T(F): 98.4 (02-03-24 @ 13:04), Max: 98.9 (02-02-24 @ 21:29)  HR: 113 (02-03-24 @ 11:03) (108 - 135)  BP: 131/64 (02-03-24 @ 11:03) (116/67 - 137/79)  RR: 18 (02-03-24 @ 11:03) (17 - 18)  SpO2: 100% (02-03-24 @ 11:03) (98% - 100%)  Wt(kg): --    Daily Height in cm: 185.42 (02 Feb 2024 18:32)    Daily     Gen: well developed, well nourished, comfortable  Cardiovascular: RR, nl S1S2, no murmurs/rubs/gallops  Respiratory: clear air entry b/l  Gastrointestinal: BS+, soft, NT/ND, no masses, no splenomegaly, no hepatomegaly, no evidence for ascites  Extremities: no clubbing/cyanosis, no edema, no calf tenderness  Vascular:  DP/PT 2+ b/l  Neurological: CN 2-12 grossly intact, no focal deficits  Skin: no rash on visible skin  Lymph Nodes:  no cervical/supraclavicular LAD, no axillary/groin LAD  Musculoskeletal:  full ROM  Psychiatric:  mood stable            Labs:                          7.2    10.54 )-----------( 361      ( 03 Feb 2024 07:05 )             23.5     CBC Full  -  ( 03 Feb 2024 07:05 )  WBC Count : 10.54 K/uL  RBC Count : 3.05 M/uL  Hemoglobin : 7.2 g/dL  Hematocrit : 23.5 %  Platelet Count - Automated : 361 K/uL  Mean Cell Volume : 77.0 fl  Mean Cell Hemoglobin : 23.6 pg  Mean Cell Hemoglobin Concentration : 30.6 gm/dL  Auto Neutrophil # : x  Auto Lymphocyte # : x  Auto Monocyte # : x  Auto Eosinophil # : x  Auto Basophil # : x  Auto Neutrophil % : x  Auto Lymphocyte % : x  Auto Monocyte % : x  Auto Eosinophil % : x  Auto Basophil % : x    PT/INR - ( 02 Feb 2024 19:27 )   PT: 14.4 sec;   INR: 1.32          PTT - ( 02 Feb 2024 19:27 )  PTT:31.5 sec    02-03    138  |  102  |  9   ----------------------------<  99  3.9   |  25  |  0.75    Ca    8.6      03 Feb 2024 07:59  Phos  4.5     02-03  Mg     2.0     02-03    TPro  6.8  /  Alb  3.1<L>  /  TBili  <0.2  /  DBili  x   /  AST  13  /  ALT  9<L>  /  AlkPhos  70  02-03      Urinalysis Basic - ( 03 Feb 2024 07:59 )    Color: x / Appearance: x / SG: x / pH: x  Gluc: 99 mg/dL / Ketone: x  / Bili: x / Urobili: x   Blood: x / Protein: x / Nitrite: x   Leuk Esterase: x / RBC: x / WBC x   Sq Epi: x / Non Sq Epi: x / Bacteria: x        Other Labs:    Cultures:    Pathology:    Imaging Studies:       Hematology Oncology Consult Note     RAMBO ALBERTS is a 43y year old MSM with PMH HIV (on biktarvy), 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 Hematology is consulted for recently diagnosed anorectal squamous cell carcinoma    Patient had recent admission 12/9 - 12/12 for spontaneous drainage and mild pain from bilateral gluteal abscesses. Patient had previously had perianal I and D in [facility] and [facility]. Patient also had R leg numbness, which was though to be 2/2 to anal mass Patietn had was found to have rectoanal mass on imaging,a nd underwent biopsy and EUA 12/13 which confirmed diagnosis of squamous cell carcinoma. Patient was recommended for staging imaging at that time, but left AMA.    In ED, patient was tacchycardic to 136, and labs significant for Hgb 7.6, negative UA, positive THC, amphetamines and opiates. CTA showed no signs of active bleeding.     Patient reports he was unable to follow up with Dr. Julian, as "phone was off," but willing to follow up in outpatient afterwards. He reports he had a large amount of blood in stool yesterday, and reports worsening fatigue (difficult to go up to his 2 story apartment). he also reportedly had intermittent stabbing rectal pain over the last month. He lives in [facility], current smoker (trying to cut down). Denies alcohol other/drug use. Endorses significant pain in rectal area    CT Chest 1/9/24 - mild paraseptal emphysema. no evidence of metastatic disease      MRI Pelvis (1/3/24) - limited exam with only 3 sequences obtained. No evidence for a large infiltrative mass centered in the lower rectum with invasion into the sacrum and musculature      PAST MEDICAL & SURGICAL HISTORY:  HIV (human immunodeficiency virus infection)      Abscess      IVDU (intravenous drug user)      Anemia      Rectal cancer      H/O rectal polypectomy          Allergies:  No Known Allergies      Medications:        Social History:    FAMILY HISTORY:  FH: CAD (coronary artery disease) (Mother)        PHYSICAL EXAM:    T(F): 98.4 (02-03-24 @ 13:04), Max: 98.9 (02-02-24 @ 21:29)  HR: 113 (02-03-24 @ 11:03) (108 - 135)  BP: 131/64 (02-03-24 @ 11:03) (116/67 - 137/79)  RR: 18 (02-03-24 @ 11:03) (17 - 18)  SpO2: 100% (02-03-24 @ 11:03) (98% - 100%)  Wt(kg): --    Daily Height in cm: 185.42 (02 Feb 2024 18:32)    Daily     Gen: well developed, well nourished, comfortable  Cardiovascular: RR, nl S1S2, no murmurs/rubs/gallops  Respiratory: clear air entry b/l  Gastrointestinal: BS+, soft, NT/ND, no masses, no splenomegaly, no hepatomegaly, no evidence for ascites  Extremities: no clubbing/cyanosis, no edema, no calf tenderness  Vascular:  DP/PT 2+ b/l  Neurological: CN 2-12 grossly intact, no focal deficits  Skin: no rash on visible skin  Lymph Nodes:  no cervical/supraclavicular LAD, no axillary/groin LAD  Musculoskeletal:  full ROM  Psychiatric:  mood stable            Labs:                          7.2    10.54 )-----------( 361      ( 03 Feb 2024 07:05 )             23.5     CBC Full  -  ( 03 Feb 2024 07:05 )  WBC Count : 10.54 K/uL  RBC Count : 3.05 M/uL  Hemoglobin : 7.2 g/dL  Hematocrit : 23.5 %  Platelet Count - Automated : 361 K/uL  Mean Cell Volume : 77.0 fl  Mean Cell Hemoglobin : 23.6 pg  Mean Cell Hemoglobin Concentration : 30.6 gm/dL  Auto Neutrophil # : x  Auto Lymphocyte # : x  Auto Monocyte # : x  Auto Eosinophil # : x  Auto Basophil # : x  Auto Neutrophil % : x  Auto Lymphocyte % : x  Auto Monocyte % : x  Auto Eosinophil % : x  Auto Basophil % : x    PT/INR - ( 02 Feb 2024 19:27 )   PT: 14.4 sec;   INR: 1.32          PTT - ( 02 Feb 2024 19:27 )  PTT:31.5 sec    02-03    138  |  102  |  9   ----------------------------<  99  3.9   |  25  |  0.75    Ca    8.6      03 Feb 2024 07:59  Phos  4.5     02-03  Mg     2.0     02-03    TPro  6.8  /  Alb  3.1<L>  /  TBili  <0.2  /  DBili  x   /  AST  13  /  ALT  9<L>  /  AlkPhos  70  02-03      Urinalysis Basic - ( 03 Feb 2024 07:59 )    Color: x / Appearance: x / SG: x / pH: x  Gluc: 99 mg/dL / Ketone: x  / Bili: x / Urobili: x   Blood: x / Protein: x / Nitrite: x   Leuk Esterase: x / RBC: x / WBC x   Sq Epi: x / Non Sq Epi: x / Bacteria: x        Other Labs:    Cultures:    Pathology:    Imaging Studies:

## 2024-02-03 NOTE — CONSULT NOTE ADULT - ASSESSMENT
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 Hematology is consulted for hx of rectoanal squamous cell carcinoma.     #Rectoanal squamous cell carcinoma   - anal mass biopsy 12/13/24 - invasive squamous cell carcinoma, moderately differentiated  - CT Chest 1/9/24 - mild paraseptal emphysema. no evidence of metastatic disease    - MRI pelvis 1/3/24 - limited exam with only 3 sequences obtained. Showing invasion into sacrum, right hemipelvis, and invasion posteriorly into gluteal musculature   - CTA 2/2/24 - without signs of local regional metastases  - typically colonoscopy is also part of staging workup - please discuss with GI if colonoscopy is feasible due to bulk of disease   - please obtain CEA  - may consider repeat pelvic MRI given limited study to assess lymph nodes, though would consider patient at least Stage IIIC disease due to invasion into local structures (T4B)  - will need radiation oncology consult - will discuss with radiation if PET scan is needed to assist with decision of radiation  - agree with palliative care consult for symptom management  - patient should follow up with Dr. Julian upon discharge    #Anemia  - % saturation 10, TIBC 183, ferritin 109  - Hgb 7.6 --> 7.2  - likely 2/2 to GI bleeding from rectal mass  - transfuse Hgb > 7, Platelet > 50   - would recommend IV iron supplementation     Discussed with Dr. Cid. Recommendations final after attending attestation RAMBO ALBERTS is a 43y year old MSM with PMH HIV (on biktarvy), invasive anorectal squamous cell carcinoma, polysubstance abuse, syphilis, anemia and abodminal MRSA 2021, who presenting with rectal bleeding since yesterday since one day prior to admission. Hematology is consulted for hx of anorectal squamous cell carcinoma.     #Anorectal squamous cell carcinoma   - anal mass biopsy 12/13/24 - invasive squamous cell carcinoma, moderately differentiated  - CT Chest 1/9/24 - mild paraseptal emphysema. no evidence of metastatic disease    - MRI pelvis 1/3/24 - limited exam with only 3 sequences obtained. Showing invasion into sacrum, right hemipelvis, and invasion posteriorly into gluteal musculature   - CTA 2/2/24 - without signs of local regional metastases  - typically colonoscopy is also part of staging workup - please discuss with GI if colonoscopy is feasible due to bulk of disease   - please obtain CEA  - may consider repeat pelvic MRI given limited study to assess lymph nodes, though would consider patient at least Stage IIIC disease due to invasion into local structures (T4B)  - will need radiation oncology consult - will discuss with radiation if PET scan is needed to assist with decision of radiation  - agree with palliative care consult for symptom management  - patient should follow up with Dr. Julian upon discharge    #Anemia  - % saturation 10, TIBC 183, ferritin 109  - Hgb 7.6 --> 7.2  - likely 2/2 to GI bleeding from rectal mass  - transfuse Hgb > 7, Platelet > 50   - would recommend IV iron supplementation     Discussed with Dr. Cid. Recommendations final after attending attestation RAMBO ALBERTS is a 43y year old MSM with PMH HIV (on biktarvy), invasive anorectal squamous cell carcinoma, polysubstance abuse, syphilis, anemia and abodminal MRSA 2021, who presenting with rectal bleeding since yesterday since one day prior to admission. Hematology is consulted for hx of anorectal squamous cell carcinoma.     #Anorectal squamous cell carcinoma   - anal mass biopsy 12/13/24 - invasive squamous cell carcinoma, moderately differentiated  - CT Chest 1/9/24 - mild paraseptal emphysema. no evidence of metastatic disease    - MRI pelvis 1/3/24 - limited exam with only 3 sequences obtained. Showing invasion into sacrum, right hemipelvis, and invasion posteriorly into gluteal musculature   - CTA 2/2/24 - without signs of local regional metastases  - typically colonoscopy is also part of staging workup - please discuss with GI if colonoscopy is feasible due to bulk of disease, this may be challenging  - please obtain CEA  - may consider repeat pelvic MRI given limited study to assess lymph nodes, though would consider patient at least Stage IIIC disease due to invasion into local structures (T4B)  - will need radiation oncology consult - will discuss with radiation if PET scan is needed to assist with decision of radiation  - agree with palliative care consult for symptom management  - patient should follow up with Dr. Julian upon discharge    #Anemia  - % saturation 10, TIBC 183, ferritin 109  - Hgb 7.6 --> 7.2  - likely 2/2 to GI bleeding from rectal mass  - transfuse Hgb > 7, Platelet > 50   - would recommend IV iron supplementation     Discussed with Dr. Cid. Recommendations final after attending attestation RAMBO ALBERTS is a 43y year old MSM with PMH HIV (on biktarvy), invasive anorectal squamous cell carcinoma, polysubstance abuse, syphilis, anemia and abodminal MRSA 2021, who presenting with rectal bleeding since yesterday since one day prior to admission. Hematology is consulted for hx of anorectal squamous cell carcinoma.     #Anorectal squamous cell carcinoma   - anal mass biopsy 12/13/24 - invasive squamous cell carcinoma, moderately differentiated  - CT Chest 1/9/24 - mild paraseptal emphysema. no evidence of metastatic disease    - MRI pelvis 1/3/24 - limited exam with only 3 sequences obtained. Showing invasion into sacrum, right hemipelvis, and invasion posteriorly into gluteal musculature   - CTA 2/2/24 - without signs of local regional metastases  - typically colonoscopy is also part of staging workup - please discuss with GI if colonoscopy is feasible due to bulk of disease, this may be challenging  - please obtain CEA  - may consider repeat pelvic MRI given limited study to assess lymph nodes, though would consider patient at least Stage IIIB disease due to invasion into local structures (T4)  - will need radiation oncology consult - will discuss with radiation if PET scan is needed to assist with decision of radiation  - agree with palliative care consult for symptom management  - patient should follow up with Dr. Julian upon discharge    #Anemia  - % saturation 10, TIBC 183, ferritin 109  - Hgb 7.6 --> 7.2  - likely 2/2 to GI bleeding from rectal mass  - transfuse Hgb > 7, Platelet > 50   - would recommend IV iron supplementation     Discussed with Dr. Cid. Recommendations final after attending attestation

## 2024-02-03 NOTE — H&P ADULT - NSHPPHYSICALEXAM_GEN_ALL_CORE
General: alerted and oriented, no acute distress  Pulmonary: no respiratory distress  Cardiac: tachy,   Abd: soft, NT, ND  Rectal: blood on gluteus,   Extremities: normal spontaneous movement x 4

## 2024-02-03 NOTE — PROVIDER CONTACT NOTE (OTHER) - RECOMMENDATIONS
Contact provider, repeat CBC.
Contact provider, continue to assess for rectal bleeding, change HR parameters

## 2024-02-03 NOTE — ED PROVIDER NOTE - CARE PLAN
1 Principal Discharge DX:	Rectal bleeding  Secondary Diagnosis:	Rectal cancer  Secondary Diagnosis:	Anemia

## 2024-02-03 NOTE — ED PROVIDER NOTE - CLINICAL SUMMARY MEDICAL DECISION MAKING FREE TEXT BOX
Pt presents with rectal bleeding after recently been diagnosed with rectal cancer.  Mildly tachycardic in ED, but improved after being given 1 L of NS.  Hgb slightly down from baseline (he is chronically around 8-9).  EKG WNL.  CTA abd/pelvis shows no obvious site of acute bleeding, but it does reflect the same size rectal mass as previous imaging.  Discussed case with Dr. Erickson, who agreed to admit patient to Tele and observe him until AM.  Pt hemodynamically stable.

## 2024-02-03 NOTE — H&P ADULT - ASSESSMENT
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 He does note he has been having intermittent stabbing rectal pain over the last month. Patient reports at baseline he is tacy to the 120-130s. Labs significant for Hgb 7.6, negative UA, postive THC, amphetamines, and opiates.CT showed No signs of active bleeding. Large lobulated rectal tumor dissecting into adjacent soft tissues and bones overall similar in appearance to the previous study. There is a moderate constipation pattern. Admitted for further evlauation of rectal bleeding and cancer progression.     Plan  NPO/IVF  SCDs  Pain and nausea control PRN  Home meds   MR Pelvis/Rectum?   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 He does note he has been having intermittent stabbing rectal pain over the last month. Patient reports at baseline he is tacy to the 120-130s. Labs significant for Hgb 7.6, negative UA, postive THC, amphetamines, and opiates.CT showed No signs of active bleeding. Large lobulated rectal tumor dissecting into adjacent soft tissues and bones overall similar in appearance to the previous study. There is a moderate constipation pattern. Admitted for further evlauation of rectal bleeding and cancer progression.     Plan  NPO/IVF  SCDs  Pain and nausea control PRN  Home meds

## 2024-02-03 NOTE — H&P ADULT - HISTORY OF PRESENT ILLNESS
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 Shortly he became lightheaded and nausea asnd decided to go to the ED. He denies any fevers, but does endorse chills. He does note he has been having intermittent stabbing rectal pain over the last month. Patient reports at baseline he is tacy to the 120-130s.     In the ED, patient was tachy to 136, normotensive, afebrile, Labs significant for Hgb 7.6, negative UA, postive THC, amphetamines, and opiates. Ct showed No signs of active bleeding. Large lobulated rectal tumor dissecting into adjacent soft tissues and bones overall similar in appearance to the previous study. There is a moderate constipation pattern.

## 2024-02-04 LAB
ANION GAP SERPL CALC-SCNC: 10 MMOL/L — SIGNIFICANT CHANGE UP (ref 5–17)
BUN SERPL-MCNC: 10 MG/DL — SIGNIFICANT CHANGE UP (ref 7–23)
CALCIUM SERPL-MCNC: 8.9 MG/DL — SIGNIFICANT CHANGE UP (ref 8.4–10.5)
CEA SERPL-MCNC: 192 NG/ML — HIGH (ref 0–3.8)
CHLORIDE SERPL-SCNC: 99 MMOL/L — SIGNIFICANT CHANGE UP (ref 96–108)
CO2 SERPL-SCNC: 27 MMOL/L — SIGNIFICANT CHANGE UP (ref 22–31)
CREAT SERPL-MCNC: 0.71 MG/DL — SIGNIFICANT CHANGE UP (ref 0.5–1.3)
EGFR: 118 ML/MIN/1.73M2 — SIGNIFICANT CHANGE UP
GLUCOSE SERPL-MCNC: 103 MG/DL — HIGH (ref 70–99)
HCT VFR BLD CALC: 25.1 % — LOW (ref 39–50)
HGB BLD-MCNC: 7.5 G/DL — LOW (ref 13–17)
MAGNESIUM SERPL-MCNC: 1.9 MG/DL — SIGNIFICANT CHANGE UP (ref 1.6–2.6)
MCHC RBC-ENTMCNC: 23.4 PG — LOW (ref 27–34)
MCHC RBC-ENTMCNC: 29.9 GM/DL — LOW (ref 32–36)
MCV RBC AUTO: 78.4 FL — LOW (ref 80–100)
NRBC # BLD: 0 /100 WBCS — SIGNIFICANT CHANGE UP (ref 0–0)
PHOSPHATE SERPL-MCNC: 3.9 MG/DL — SIGNIFICANT CHANGE UP (ref 2.5–4.5)
PLATELET # BLD AUTO: 392 K/UL — SIGNIFICANT CHANGE UP (ref 150–400)
POTASSIUM SERPL-MCNC: 4 MMOL/L — SIGNIFICANT CHANGE UP (ref 3.5–5.3)
POTASSIUM SERPL-SCNC: 4 MMOL/L — SIGNIFICANT CHANGE UP (ref 3.5–5.3)
RBC # BLD: 3.2 M/UL — LOW (ref 4.2–5.8)
RBC # FLD: 16.5 % — HIGH (ref 10.3–14.5)
SODIUM SERPL-SCNC: 136 MMOL/L — SIGNIFICANT CHANGE UP (ref 135–145)
WBC # BLD: 9.63 K/UL — SIGNIFICANT CHANGE UP (ref 3.8–10.5)
WBC # FLD AUTO: 9.63 K/UL — SIGNIFICANT CHANGE UP (ref 3.8–10.5)

## 2024-02-04 PROCEDURE — 99233 SBSQ HOSP IP/OBS HIGH 50: CPT

## 2024-02-04 RX ORDER — IRON SUCROSE 20 MG/ML
200 INJECTION, SOLUTION INTRAVENOUS EVERY 24 HOURS
Refills: 0 | Status: COMPLETED | OUTPATIENT
Start: 2024-02-04 | End: 2024-02-08

## 2024-02-04 RX ADMIN — OXYCODONE HYDROCHLORIDE 5 MILLIGRAM(S): 5 TABLET ORAL at 06:24

## 2024-02-04 RX ADMIN — OXYCODONE HYDROCHLORIDE 5 MILLIGRAM(S): 5 TABLET ORAL at 18:30

## 2024-02-04 RX ADMIN — OXYCODONE HYDROCHLORIDE 5 MILLIGRAM(S): 5 TABLET ORAL at 17:48

## 2024-02-04 RX ADMIN — OXYCODONE HYDROCHLORIDE 5 MILLIGRAM(S): 5 TABLET ORAL at 11:34

## 2024-02-04 RX ADMIN — Medication 650 MILLIGRAM(S): at 06:24

## 2024-02-04 RX ADMIN — BICTEGRAVIR SODIUM, EMTRICITABINE, AND TENOFOVIR ALAFENAMIDE FUMARATE 1 TABLET(S): 30; 120; 15 TABLET ORAL at 11:33

## 2024-02-04 RX ADMIN — OXYCODONE HYDROCHLORIDE 5 MILLIGRAM(S): 5 TABLET ORAL at 06:55

## 2024-02-04 RX ADMIN — OXYCODONE HYDROCHLORIDE 5 MILLIGRAM(S): 5 TABLET ORAL at 12:41

## 2024-02-04 RX ADMIN — IRON SUCROSE 110 MILLIGRAM(S): 20 INJECTION, SOLUTION INTRAVENOUS at 17:48

## 2024-02-04 RX ADMIN — Medication 650 MILLIGRAM(S): at 06:55

## 2024-02-04 NOTE — PROGRESS NOTE ADULT - SUBJECTIVE AND OBJECTIVE BOX
(Covering for Dr. Cortez)  Patient seen and examined.  He continues to have bleeding from the known anal squamous cell tumor.  Hb and VS remain stable.  Needs to start chemo/RT ASAP both for ultimate treatment goals, but RT should also mitigate the bleeding as it shrinks the tumor.  Supportive care for now.  Oncology service has seen the patient.  We will ask  RT service to evaluate patient and move forward.  Dr. Cortez back tomorrow AM.

## 2024-02-04 NOTE — PROGRESS NOTE ADULT - ASSESSMENT
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 He does note he has been having intermittent stabbing rectal pain over the last month. Patient reports at baseline he is tacy to the 120-130s. Labs significant for Hgb 7.6, negative UA, postive THC, amphetamines, and opiates.CT showed No signs of active bleeding. Large lobulated rectal tumor dissecting into adjacent soft tissues and bones overall similar in appearance to the previous study. There is a moderate constipation pattern. Admitted for further evaluation of rectal bleeding and cancer progression.       Regular diet/IVF  SCDs  Pain and nausea control PRN  Home meds   Hold lovenox  AM labs  Pending heme/onc, rad/onc recs

## 2024-02-04 NOTE — PROGRESS NOTE ADULT - ASSESSMENT
43M MSM Hx HIV (CD4 266, VLUD, on Biktarvy), latent syphilis, polysubstance abuse, AARTI, Invasive anal squamous cell carcinoma (Diagnosed 12/2023 however left AMA without further work up), presents with rectal pain and bleeding. Medicine consulted for comanagement.      #Rectal pain/bleeding  Patient continues to have active rectal pain and bleeding.    - symptoms secondary to invasive anal squamous cell carcinoma, transfuse for Hgb <7   - recommend starting IV sucrose 200mg x5 days      #Invasive rectal squamous cell carcinoma  diagnosed on biopsy 12/23, heme/onc consulted and following for further work up/management   - Heme onc/ following, patient to undergo rad onc consultation as well as GI consultation for colonoscopy    #HIV   - continue home biktarvy      Dispo: pending

## 2024-02-04 NOTE — PROGRESS NOTE ADULT - SUBJECTIVE AND OBJECTIVE BOX
INTERVAL EVENTS: patient continues to have rectal bleeding    PAST MEDICAL & SURGICAL HISTORY:  HIV (human immunodeficiency virus infection)    Abscess    IVDU (intravenous drug user)    Anemia    HIV (human immunodeficiency virus infection)    Rectal cancer    No significant past surgical history    No significant past surgical history    H/O rectal polypectomy        MEDICATIONS  (STANDING):  bictegravir 50 mG/emtricitabine 200 mG/tenofovir alafenamide 25 mG (BIKTARVY) 1 Tablet(s) Oral daily    MEDICATIONS  (PRN):  acetaminophen     Tablet .. 650 milliGRAM(s) Oral every 6 hours PRN Mild Pain (1 - 3)  ALPRAZolam 0.5 milliGRAM(s) Oral daily PRN anxiety  ondansetron Injectable 4 milliGRAM(s) IV Push every 6 hours PRN Nausea and/or Vomiting  oxyCODONE    IR 5 milliGRAM(s) Oral every 6 hours PRN Moderate Pain (4 - 6)  oxyCODONE    IR 10 milliGRAM(s) Oral every 6 hours PRN Severe Pain (7 - 10)    T(F): 98.7 (02-04-24 @ 08:45), Max: 99.2 (02-04-24 @ 04:48)  HR: 108 (02-04-24 @ 08:22) (104 - 122)  BP: 116/67 (02-04-24 @ 08:22) (116/62 - 149/68)  BP(mean): 83 (02-04-24 @ 08:22) (82 - 104)  ABP: --  ABP(mean): --  RR: 18 (02-04-24 @ 08:22) (16 - 18)  SpO2: 99% (02-04-24 @ 08:22) (96% - 100%)    I/O Detail 24H    03 Feb 2024 07:01  -  04 Feb 2024 07:00  --------------------------------------------------------  IN:    Lactated Ringers: 1720 mL    Oral Fluid: 400 mL  Total IN: 2120 mL    OUT:    Voided (mL): 1150 mL  Total OUT: 1150 mL    Total NET: 970 mL      04 Feb 2024 07:01  -  04 Feb 2024 10:53  --------------------------------------------------------  IN:  Total IN: 0 mL    OUT:    Voided (mL): 250 mL  Total OUT: 250 mL    Total NET: -250 mL          PHYSICAL EXAM:  GEN: NAD  HEENT: EOMI   RESP: CTA b/l  CV: RRR. Normal S1/S2. No m/r/g.  ABD: soft, non-distended  EXT: No edema   NEURO: alert and attentive    LABS:  CBC 02-04-24 @ 06:56                        7.5    9.63  )-----------( 392                   25.1       Hgb trend: 7.5 <-- , 8.1 <-- , 4.9 <-- , 7.2 <-- , 7.6 <--   WBC trend: 9.63 <-- , 10.37 <-- , 7.17 <-- , 10.54 <-- , 7.93 <--       CMP 02-04-24 @ 06:56    136  |  99  |  10  ----------------------------<  103<H>  4.0   |  27  |  0.71    Ca    8.9      02-04-24 @ 06:56  Phos  3.9     02-04  Mg     1.9     02-04    TPro  6.8  /  Alb  3.1<L>  /  TBili  <0.2  /  DBili  x   /  AST  13  /  ALT  9<L>  /  AlkPhos  70  02-03      Serum Cr trend: 0.71 <-- , 0.75 <-- , 1.05 <--     PT/INR - ( 02 Feb 2024 19:27 )   PT: 14.4 sec;   INR: 1.32          PTT - ( 02 Feb 2024 19:27 ):31.5 sec    Cardiac Markers           STUDIES:

## 2024-02-04 NOTE — PROVIDER CONTACT NOTE (OTHER) - SITUATION
Pt had v-tach HR went up to 187 for 2 seconds, Pt asymptomatic, Pt denies chest pain and SOB, MD , MD Syl Carreno made aware, no interventions for now, will continue monitor.
Pt has a small amount of blood on akhil and has HR between 110-120 beats per minute at baseline
Lab called with a critical value for pt with Hgb 4.9 and Hct 17.0

## 2024-02-04 NOTE — PROGRESS NOTE ADULT - SUBJECTIVE AND OBJECTIVE BOX
SUBJECTIVE: Patient examined bedside resting comfortably. Patient stated that he has had multiple bright red bloody BMs ON. Patient denies dizziness, CP, SOB. Denies NV.    Vital Signs Last 24 Hrs  T(C): 37.1 (04 Feb 2024 08:45), Max: 37.3 (04 Feb 2024 04:48)  T(F): 98.7 (04 Feb 2024 08:45), Max: 99.2 (04 Feb 2024 04:48)  HR: 108 (04 Feb 2024 08:22) (104 - 122)  BP: 116/67 (04 Feb 2024 08:22) (116/62 - 149/68)  BP(mean): 83 (04 Feb 2024 08:22) (82 - 104)  RR: 18 (04 Feb 2024 08:22) (16 - 18)  SpO2: 99% (04 Feb 2024 08:22) (96% - 100%)    Parameters below as of 04 Feb 2024 08:22  Patient On (Oxygen Delivery Method): room air        I&O's Summary    03 Feb 2024 07:01  -  04 Feb 2024 07:00  --------------------------------------------------------  IN: 2120 mL / OUT: 1150 mL / NET: 970 mL    04 Feb 2024 07:01  -  04 Feb 2024 11:32  --------------------------------------------------------  IN: 0 mL / OUT: 250 mL / NET: -250 mL        Physical Exam:  General Appearance: Appears well, NAD  Pulmonary: Nonlabored breathing, no respiratory distress  Cardiovascular: NSR  Abdomen: Soft, NTND  Extremities: WWP, SCD's in place     LABS:                        7.5    9.63  )-----------( 392      ( 04 Feb 2024 06:56 )             25.1     02-04    136  |  99  |  10  ----------------------------<  103<H>  4.0   |  27  |  0.71    Ca    8.9      04 Feb 2024 06:56  Phos  3.9     02-04  Mg     1.9     02-04    TPro  6.8  /  Alb  3.1<L>  /  TBili  <0.2  /  DBili  x   /  AST  13  /  ALT  9<L>  /  AlkPhos  70  02-03    PT/INR - ( 02 Feb 2024 19:27 )   PT: 14.4 sec;   INR: 1.32          PTT - ( 02 Feb 2024 19:27 )  PTT:31.5 sec  Urinalysis Basic - ( 04 Feb 2024 06:56 )    Color: x / Appearance: x / SG: x / pH: x  Gluc: 103 mg/dL / Ketone: x  / Bili: x / Urobili: x   Blood: x / Protein: x / Nitrite: x   Leuk Esterase: x / RBC: x / WBC x   Sq Epi: x / Non Sq Epi: x / Bacteria: x

## 2024-02-05 ENCOUNTER — RESULT REVIEW (OUTPATIENT)
Age: 44
End: 2024-02-05

## 2024-02-05 LAB
ANION GAP SERPL CALC-SCNC: 13 MMOL/L — SIGNIFICANT CHANGE UP (ref 5–17)
BLD GP AB SCN SERPL QL: NEGATIVE — SIGNIFICANT CHANGE UP
BUN SERPL-MCNC: 11 MG/DL — SIGNIFICANT CHANGE UP (ref 7–23)
CALCIUM SERPL-MCNC: 9 MG/DL — SIGNIFICANT CHANGE UP (ref 8.4–10.5)
CHLORIDE SERPL-SCNC: 99 MMOL/L — SIGNIFICANT CHANGE UP (ref 96–108)
CO2 SERPL-SCNC: 24 MMOL/L — SIGNIFICANT CHANGE UP (ref 22–31)
CREAT SERPL-MCNC: 0.72 MG/DL — SIGNIFICANT CHANGE UP (ref 0.5–1.3)
EGFR: 118 ML/MIN/1.73M2 — SIGNIFICANT CHANGE UP
GLUCOSE SERPL-MCNC: 132 MG/DL — HIGH (ref 70–99)
HCT VFR BLD CALC: 26.8 % — LOW (ref 39–50)
HGB BLD-MCNC: 7.9 G/DL — LOW (ref 13–17)
MAGNESIUM SERPL-MCNC: 2 MG/DL — SIGNIFICANT CHANGE UP (ref 1.6–2.6)
MCHC RBC-ENTMCNC: 23.2 PG — LOW (ref 27–34)
MCHC RBC-ENTMCNC: 29.5 GM/DL — LOW (ref 32–36)
MCV RBC AUTO: 78.6 FL — LOW (ref 80–100)
NRBC # BLD: 0 /100 WBCS — SIGNIFICANT CHANGE UP (ref 0–0)
PHOSPHATE SERPL-MCNC: 4.1 MG/DL — SIGNIFICANT CHANGE UP (ref 2.5–4.5)
PLATELET # BLD AUTO: 463 K/UL — HIGH (ref 150–400)
POTASSIUM SERPL-MCNC: 3.7 MMOL/L — SIGNIFICANT CHANGE UP (ref 3.5–5.3)
POTASSIUM SERPL-SCNC: 3.7 MMOL/L — SIGNIFICANT CHANGE UP (ref 3.5–5.3)
RBC # BLD: 3.41 M/UL — LOW (ref 4.2–5.8)
RBC # FLD: 16.5 % — HIGH (ref 10.3–14.5)
RH IG SCN BLD-IMP: POSITIVE — SIGNIFICANT CHANGE UP
SODIUM SERPL-SCNC: 136 MMOL/L — SIGNIFICANT CHANGE UP (ref 135–145)
WBC # BLD: 8.99 K/UL — SIGNIFICANT CHANGE UP (ref 3.8–10.5)
WBC # FLD AUTO: 8.99 K/UL — SIGNIFICANT CHANGE UP (ref 3.8–10.5)

## 2024-02-05 RX ORDER — POTASSIUM CHLORIDE 20 MEQ
40 PACKET (EA) ORAL ONCE
Refills: 0 | Status: COMPLETED | OUTPATIENT
Start: 2024-02-05 | End: 2024-02-05

## 2024-02-05 RX ADMIN — OXYCODONE HYDROCHLORIDE 10 MILLIGRAM(S): 5 TABLET ORAL at 21:16

## 2024-02-05 RX ADMIN — OXYCODONE HYDROCHLORIDE 10 MILLIGRAM(S): 5 TABLET ORAL at 00:40

## 2024-02-05 RX ADMIN — OXYCODONE HYDROCHLORIDE 5 MILLIGRAM(S): 5 TABLET ORAL at 07:47

## 2024-02-05 RX ADMIN — IRON SUCROSE 110 MILLIGRAM(S): 20 INJECTION, SOLUTION INTRAVENOUS at 16:46

## 2024-02-05 RX ADMIN — BICTEGRAVIR SODIUM, EMTRICITABINE, AND TENOFOVIR ALAFENAMIDE FUMARATE 1 TABLET(S): 30; 120; 15 TABLET ORAL at 11:15

## 2024-02-05 RX ADMIN — OXYCODONE HYDROCHLORIDE 5 MILLIGRAM(S): 5 TABLET ORAL at 14:06

## 2024-02-05 RX ADMIN — Medication 40 MILLIEQUIVALENT(S): at 11:15

## 2024-02-05 RX ADMIN — OXYCODONE HYDROCHLORIDE 5 MILLIGRAM(S): 5 TABLET ORAL at 07:17

## 2024-02-05 RX ADMIN — OXYCODONE HYDROCHLORIDE 10 MILLIGRAM(S): 5 TABLET ORAL at 00:10

## 2024-02-05 RX ADMIN — Medication 650 MILLIGRAM(S): at 07:17

## 2024-02-05 RX ADMIN — OXYCODONE HYDROCHLORIDE 10 MILLIGRAM(S): 5 TABLET ORAL at 22:06

## 2024-02-05 RX ADMIN — OXYCODONE HYDROCHLORIDE 5 MILLIGRAM(S): 5 TABLET ORAL at 15:00

## 2024-02-05 RX ADMIN — Medication 650 MILLIGRAM(S): at 07:47

## 2024-02-05 NOTE — PROGRESS NOTE ADULT - SUBJECTIVE AND OBJECTIVE BOX
2/5: K repleated,   ON: NATALIE, VSS   2/4: 2 seconds of vtach EKG wnl asym, CEA inc 192, carla diet, 1 bloody BM, HR stable low 100s, asx, started iron sucrose added per medicine       SUBJECTIVE: Patient seen and examined bedside by Surgical resident. +n/v, tolerating PO diet wo complaint. no spontanious blood per rectum overnight.     bictegravir 50 mG/emtricitabine 200 mG/tenofovir alafenamide 25 mG (BIKTARVY) 1 Tablet(s) Oral daily    MEDICATIONS  (PRN):  acetaminophen     Tablet .. 650 milliGRAM(s) Oral every 6 hours PRN Mild Pain (1 - 3)  ALPRAZolam 0.5 milliGRAM(s) Oral daily PRN anxiety  ondansetron Injectable 4 milliGRAM(s) IV Push every 6 hours PRN Nausea and/or Vomiting  oxyCODONE    IR 5 milliGRAM(s) Oral every 6 hours PRN Moderate Pain (4 - 6)  oxyCODONE    IR 10 milliGRAM(s) Oral every 6 hours PRN Severe Pain (7 - 10)      I&O's Detail    04 Feb 2024 07:01  -  05 Feb 2024 07:00  --------------------------------------------------------  IN:    Oral Fluid: 830 mL  Total IN: 830 mL    OUT:    Voided (mL): 1650 mL  Total OUT: 1650 mL    Total NET: -820 mL      05 Feb 2024 07:01  -  05 Feb 2024 12:51  --------------------------------------------------------  IN:  Total IN: 0 mL    OUT:    Voided (mL): 200 mL  Total OUT: 200 mL    Total NET: -200 mL          Vital Signs Last 24 Hrs  T(C): 37.2 (05 Feb 2024 09:05), Max: 37.3 (05 Feb 2024 04:30)  T(F): 99 (05 Feb 2024 09:05), Max: 99.2 (05 Feb 2024 04:30)  HR: 102 (05 Feb 2024 11:20) (100 - 114)  BP: 118/72 (05 Feb 2024 11:20) (118/72 - 133/76)  BP(mean): 87 (05 Feb 2024 11:20) (87 - 97)  RR: 17 (05 Feb 2024 11:20) (17 - 18)  SpO2: 95% (05 Feb 2024 11:20) (95% - 98%)    Parameters below as of 05 Feb 2024 11:20  Patient On (Oxygen Delivery Method): room air        Physical Exam:  General Appearance: Appears well, NAD  Pulmonary: Nonlabored breathing, no respiratory distress  Cardiovascular: NSR  Abdomen: Soft, NTND  Extremities: WWP, SCD's in place     LABS:                        7.9    8.99  )-----------( 463      ( 05 Feb 2024 05:30 )             26.8     02-05    136  |  99  |  11  ----------------------------<  132<H>  3.7   |  24  |  0.72    Ca    9.0      05 Feb 2024 05:30  Phos  4.1     02-05  Mg     2.0     02-05        Urinalysis Basic - ( 05 Feb 2024 05:30 )    Color: x / Appearance: x / SG: x / pH: x  Gluc: 132 mg/dL / Ketone: x  / Bili: x / Urobili: x   Blood: x / Protein: x / Nitrite: x   Leuk Esterase: x / RBC: x / WBC x   Sq Epi: x / Non Sq Epi: x / Bacteria: x        RADIOLOGY & ADDITIONAL STUDIES:

## 2024-02-05 NOTE — PATIENT PROFILE ADULT - NSPRESCRALCAMT_GEN_A_NUR
1 or 2 Mustarde Flap Text: The defect edges were debeveled with a #15 scalpel blade.  Given the size, depth and location of the defect and the proximity to free margins a Mustarde flap was deemed most appropriate.  Using a sterile surgical marker, an appropriate flap was drawn incorporating the defect. The area thus outlined was incised with a #15 scalpel blade.  The skin margins were undermined to an appropriate distance in all directions utilizing iris scissors.

## 2024-02-05 NOTE — PATIENT PROFILE ADULT - .
Problem: Patient Care Overview  Goal: Team Discussion  Team Plan:   Outcome: No Change  BEHAVIORAL TEAM DISCUSSION    Participants: Rosalinda Barboza RN, SAJI Blackman M.T., [facility], Marie Conner  Progress: Meryl continues to report SI thoughts and SIB urges.  She has not acted on them.  She is having a difficult time being on the unit, she is socially awkward.  She is attending milieu activities and groups as well as individual and family therapy sessions.   Continued Stay Criteria/Rationale: Continued reports of SI/SIB urges, and high anxiety.  Medical/Physical:   Lexapro 20 mg daily  Start hydroxyzine 25 mg hs prn for insomnia (parents approved mediations during the family meeting)  Start hydroxyzine 10 mg tid prn for anxiety  Start vitamin D3 2000 units hs      Laboratory/Imaging:  - Vitamin D level 19  Consults:  - - Nutrition consult to evaluate nutritional status given recent eating issues   Precautions:   Behavioral Orders   Procedures     Family Assessment     Plan: Continue with individual therapy, add DBT and psychiatry.   Rationale for change in precautions or plan: na         05-Feb-2024 17:15:12

## 2024-02-05 NOTE — PROGRESS NOTE ADULT - ASSESSMENT
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 He does note he has been having intermittent stabbing rectal pain over the last month. Patient reports at baseline he is tacy to the 120-130s. Labs significant for Hgb 7.6, negative UA, postive THC, amphetamines, and opiates.CT showed No signs of active bleeding. Large lobulated rectal tumor dissecting into adjacent soft tissues and bones overall similar in appearance to the previous study. There is a moderate constipation pattern. Admitted for further evaluation of rectal bleeding and cancer progression.       Regular diet  SCDs  Pain and nausea control PRN  Home meds   Hold lovenox   AM labs  Pending heme/onc, rad/onc recs

## 2024-02-05 NOTE — PROGRESS NOTE ADULT - SUBJECTIVE AND OBJECTIVE BOX
SUBJECTIVE: Patient seen and examined at bedside, resting comfortably in bed, and does not appear to be in any acute distress.       Social History: Patient states that his Father and brother currently live in [place], and his mother had passed away. He also has a brother and sister in [place]. He states he does not have any family in [facility]. States that he has been living in [place] for the past 15 years and has lived in the [facility] with a roommate for the past 6 years.    MEDICATIONS  (STANDING):  bictegravir 50 mG/emtricitabine 200 mG/tenofovir alafenamide 25 mG (BIKTARVY) 1 Tablet(s) Oral daily  iron sucrose IVPB 200 milliGRAM(s) IV Intermittent every 24 hours    MEDICATIONS  (PRN):  acetaminophen     Tablet .. 650 milliGRAM(s) Oral every 6 hours PRN Mild Pain (1 - 3)  ALPRAZolam 0.5 milliGRAM(s) Oral daily PRN anxiety  ondansetron Injectable 4 milliGRAM(s) IV Push every 6 hours PRN Nausea and/or Vomiting  oxyCODONE    IR 5 milliGRAM(s) Oral every 6 hours PRN Moderate Pain (4 - 6)  oxyCODONE    IR 10 milliGRAM(s) Oral every 6 hours PRN Severe Pain (7 - 10)    Vital Signs Last 24 Hrs  T(C): 37.2 (05 Feb 2024 09:05), Max: 37.3 (05 Feb 2024 04:30)  T(F): 99 (05 Feb 2024 09:05), Max: 99.2 (05 Feb 2024 04:30)  HR: 102 (05 Feb 2024 11:20) (100 - 114)  BP: 118/72 (05 Feb 2024 11:20) (118/72 - 133/76)  BP(mean): 87 (05 Feb 2024 11:20) (87 - 97)  RR: 17 (05 Feb 2024 11:20) (17 - 18)  SpO2: 95% (05 Feb 2024 11:20) (95% - 98%)    Parameters below as of 05 Feb 2024 11:20  Patient On (Oxygen Delivery Method): room air    PHYSICAL EXAM:  General: in no acute distress  Eyes: EOMI intact bilaterally  HENT: Moist mucous membranes  Neck: Trachea midline  Lungs: CTA B/L  Cardiovascular: RRR  Abdomen: Soft, non-tender non-distended  Extremities: WWP  Neurological: Alert and oriented  Skin: Warm and dry  LABS:                        7.9    8.99  )-----------( 463      ( 05 Feb 2024 05:30 )             26.8     02-05    136  |  99  |  11  ----------------------------<  132<H>  3.7   |  24  |  0.72    Ca    9.0      05 Feb 2024 05:30  Phos  4.1     02-05  Mg     2.0     02-05        Urinalysis Basic - ( 05 Feb 2024 05:30 )    Color: x / Appearance: x / SG: x / pH: x  Gluc: 132 mg/dL / Ketone: x  / Bili: x / Urobili: x   Blood: x / Protein: x / Nitrite: x   Leuk Esterase: x / RBC: x / WBC x   Sq Epi: x / Non Sq Epi: x / Bacteria: x

## 2024-02-05 NOTE — PATIENT PROFILE ADULT - FALL HARM RISK - HARM RISK INTERVENTIONS

## 2024-02-05 NOTE — PROGRESS NOTE ADULT - ASSESSMENT
RAMBO ALBERTS is a 43y year old MSM with PMH HIV (on biktarvy), invasive anorectal squamous cell carcinoma, polysubstance abuse, syphilis, anemia and abodminal MRSA 2021, who presenting with rectal bleeding since yesterday since one day prior to admission. Hematology is consulted for hx of anorectal squamous cell carcinoma.     #Anorectal squamous cell carcinoma   - anal mass biopsy 12/13/24 - invasive squamous cell carcinoma, moderately differentiated  - CT Chest 1/9/24 - mild paraseptal emphysema. no evidence of metastatic disease    - MRI pelvis 1/3/24 - limited exam with only 3 sequences obtained. Showing invasion into sacrum, right hemipelvis, and invasion posteriorly into gluteal musculature   - CTA 2/2/24 - without signs of local regional metastases  - typically colonoscopy is also part of staging workup - please discuss with GI if colonoscopy is feasible due to bulk of disease, this may be challenging  - CEA elevated at 192.0 prior to treatment  - Discussed with radiation oncology, recommended repeat pelvic MRI given limited study to assess lymph nodes, though would consider patient at least Stage IIIB disease due to invasion into local structures (T4)  - Although chemo/radiation is generally curative in anorectal squamous cell carcinoma, patient presenting with large tumor burden, which may present difficulties, chemo/radiation therapy will be discussed outpatient with Dr. Julian  - agree with palliative care consult for symptom management  - patient should follow up with Dr. Julian upon discharge    #Anemia  - % saturation 10, TIBC 183, ferritin 109  - Hgb 7.6 --> 7.9 (2/2 - 2/5)  - likely 2/2 to GI bleeding from rectal mass  - transfuse Hgb > 7, Platelet > 50   - c/w IV iron supplementation     Discussed with  _____. Recommendations final after attending attestation RAMBO ALBERTS is a 43y year old MSM with PMH HIV (on biktarvy), invasive anorectal squamous cell carcinoma, polysubstance abuse, syphilis, anemia and abodminal MRSA 2021, who presenting with rectal bleeding since yesterday since one day prior to admission. Hematology is consulted for hx of anorectal squamous cell carcinoma.     #Anorectal squamous cell carcinoma   - anal mass biopsy 12/13/24 - invasive squamous cell carcinoma, moderately differentiated  - CT Chest 1/9/24 - mild paraseptal emphysema. no evidence of metastatic disease    - MRI pelvis 1/3/24 - limited exam with only 3 sequences obtained. Showing invasion into sacrum, right hemipelvis, and invasion posteriorly into gluteal musculature   - CTA 2/2/24 - without signs of local regional metastases  - typically colonoscopy is also part of staging workup - please discuss with GI if colonoscopy is feasible due to bulk of disease, this may be challenging  - CEA elevated at 192.0 prior to treatment  - Discussed with radiation oncology, recommended repeat pelvic MRI given limited study to assess lymph nodes, though would consider patient at least Stage IIIB disease due to invasion into local structures (T4)  - Although chemo/radiation is generally curative in anorectal squamous cell carcinoma, patient presenting with large tumor burden, which may present difficulties, chemo/radiation therapy will be discussed outpatient with Dr. Julian  - agree with palliative care consult for symptom management  - patient should follow up with Dr. Julian upon discharge    #Anemia  - % saturation 10, TIBC 183, ferritin 109  - Hgb 7.6 --> 7.9 (2/2 - 2/5)  - likely 2/2 to GI bleeding from rectal mass  - transfuse Hgb > 7, Platelet > 50   - c/w IV iron supplementation     Discussed with Dr. Wasserman. Recommendations final after attending attestation. RAMBO ALBERTS is a 43y year old MSM with PMH HIV (on biktarvy), invasive anorectal squamous cell carcinoma, polysubstance abuse, syphilis, anemia and abodminal MRSA 2021, who presenting with rectal bleeding since yesterday since one day prior to admission. Hematology is consulted for hx of anorectal squamous cell carcinoma.     #Anorectal squamous cell carcinoma   - anal mass biopsy 12/13/24 - invasive squamous cell carcinoma, moderately differentiated  - CT Chest 1/9/24 - mild paraseptal emphysema. no evidence of metastatic disease    - MRI pelvis 1/3/24 - limited exam with only 3 sequences obtained. Showing invasion into sacrum, right hemipelvis, and invasion posteriorly into gluteal musculature   - CTA 2/2/24 - without signs of local regional metastases  - colonoscopy/anoscopy may be difficult due to bulk of disease  - CEA elevated at 192.0 prior to treatment  - Discussed with radiation oncology, recommended repeat pelvic MRI given limited study to assess lymph nodes, though would consider patient at least Stage IIIB disease due to invasion into local structures (T4)  - Although chemo/radiation is generally curative in anorectal squamous cell carcinoma, patient presenting with large tumor burden, which may present difficulties, chemo/radiation therapy will be discussed outpatient with Dr. Julian  - please consult palliative care for symptom management and goals of care  - patient should follow up with Dr. Julian upon discharge    #Anemia  - % saturation 10, TIBC 183, ferritin 109  - Hgb 7.6 --> 7.9 (2/2 - 2/5)  - likely 2/2 to GI bleeding from rectal mass  - transfuse Hgb > 7, Platelet > 50   - c/w IV iron supplementation     Discussed with Dr. Wasserman. Recommendations final after attending attestation. RAMBO ALBERTS is a 43y year old MSM with PMH HIV (on biktarvy), invasive anorectal squamous cell carcinoma, polysubstance abuse, syphilis, anemia and abodminal MRSA 2021, who presenting with rectal bleeding since yesterday since one day prior to admission. Hematology is consulted for hx of anorectal squamous cell carcinoma.     #Anorectal squamous cell carcinoma   - anal mass biopsy 12/13/24 - invasive squamous cell carcinoma, moderately differentiated  - CT Chest 1/9/24 - mild paraseptal emphysema. no evidence of metastatic disease    - MRI pelvis 1/3/24 - limited exam with only 3 sequences obtained. Showing invasion into sacrum, right hemipelvis, and invasion posteriorly into gluteal musculature   - CTA 2/2/24 - without signs of local regional metastases  - colonoscopy/anoscopy may be difficult due to bulk of disease  - CEA elevated at 192.0 prior to treatment  - Discussed with radiation oncology, recommended repeat pelvic MRI given limited study to assess lymph nodes, though would consider patient at least Stage IIIB disease due to invasion into local structures (T4)  - Although chemo/radiation is generally curative in anorectal squamous cell carcinoma, patient presenting with large tumor burden, which may present difficulties, chemo/radiation therapy will be discussed outpatient with Dr. Julian  - please consult palliative care for symptom management and goals of care  - patient should follow up with Dr. Julian upon discharge    #Anemia  - % saturation 10, TIBC 183, ferritin 109  - Hgb 7.6 --> 7.9 (2/2 - 2/5)  - likely 2/2 to GI bleeding from rectal mass  - transfuse Hgb > 7, Platelet > 50   - c/w IV iron supplementation     Discussed with Dr. Arellano. Recommendations final after attending attestation.

## 2024-02-06 LAB
4/8 RATIO: 0.42 RATIO — LOW (ref 0.9–3.6)
ABS CD8: 786 CELLS/UL — HIGH (ref 142–740)
ANION GAP SERPL CALC-SCNC: 11 MMOL/L — SIGNIFICANT CHANGE UP (ref 5–17)
BUN SERPL-MCNC: 11 MG/DL — SIGNIFICANT CHANGE UP (ref 7–23)
CALCIUM SERPL-MCNC: 9 MG/DL — SIGNIFICANT CHANGE UP (ref 8.4–10.5)
CD3 BLASTS SPEC-ACNC: 1135 CELLS/UL — SIGNIFICANT CHANGE UP (ref 672–1870)
CD3 BLASTS SPEC-ACNC: 82 % — SIGNIFICANT CHANGE UP (ref 59–83)
CD4 %: 24 % — LOW (ref 30–62)
CD8 %: 57 % — HIGH (ref 12–36)
CHLORIDE SERPL-SCNC: 100 MMOL/L — SIGNIFICANT CHANGE UP (ref 96–108)
CO2 SERPL-SCNC: 24 MMOL/L — SIGNIFICANT CHANGE UP (ref 22–31)
CREAT SERPL-MCNC: 0.73 MG/DL — SIGNIFICANT CHANGE UP (ref 0.5–1.3)
EGFR: 117 ML/MIN/1.73M2 — SIGNIFICANT CHANGE UP
GLUCOSE SERPL-MCNC: 138 MG/DL — HIGH (ref 70–99)
HCT VFR BLD CALC: 27.7 % — LOW (ref 39–50)
HGB BLD-MCNC: 8.3 G/DL — LOW (ref 13–17)
HIV-1 VIRAL LOAD RESULT: ABNORMAL
HIV1 RNA # SERPL NAA+PROBE: ABNORMAL COPIES/ML
HIV1 RNA SER-IMP: SIGNIFICANT CHANGE UP
HIV1 RNA SERPL NAA+PROBE-ACNC: ABNORMAL
HIV1 RNA SERPL NAA+PROBE-LOG#: ABNORMAL LG COP/ML
MAGNESIUM SERPL-MCNC: 2 MG/DL — SIGNIFICANT CHANGE UP (ref 1.6–2.6)
MCHC RBC-ENTMCNC: 23.6 PG — LOW (ref 27–34)
MCHC RBC-ENTMCNC: 30 GM/DL — LOW (ref 32–36)
MCV RBC AUTO: 78.9 FL — LOW (ref 80–100)
NRBC # BLD: 0 /100 WBCS — SIGNIFICANT CHANGE UP (ref 0–0)
PHOSPHATE SERPL-MCNC: 4.4 MG/DL — SIGNIFICANT CHANGE UP (ref 2.5–4.5)
PLATELET # BLD AUTO: 504 K/UL — HIGH (ref 150–400)
POTASSIUM SERPL-MCNC: 4 MMOL/L — SIGNIFICANT CHANGE UP (ref 3.5–5.3)
POTASSIUM SERPL-SCNC: 4 MMOL/L — SIGNIFICANT CHANGE UP (ref 3.5–5.3)
RBC # BLD: 3.51 M/UL — LOW (ref 4.2–5.8)
RBC # FLD: 16.5 % — HIGH (ref 10.3–14.5)
SODIUM SERPL-SCNC: 135 MMOL/L — SIGNIFICANT CHANGE UP (ref 135–145)
T-CELL CD4 SUBSET PNL BLD: 327 CELLS/UL — LOW (ref 489–1457)
WBC # BLD: 10.32 K/UL — SIGNIFICANT CHANGE UP (ref 3.8–10.5)
WBC # FLD AUTO: 10.32 K/UL — SIGNIFICANT CHANGE UP (ref 3.8–10.5)

## 2024-02-06 PROCEDURE — 99223 1ST HOSP IP/OBS HIGH 75: CPT

## 2024-02-06 PROCEDURE — 99233 SBSQ HOSP IP/OBS HIGH 50: CPT | Mod: GC

## 2024-02-06 RX ORDER — PSYLLIUM SEED (WITH DEXTROSE)
1 POWDER (GRAM) ORAL DAILY
Refills: 0 | Status: DISCONTINUED | OUTPATIENT
Start: 2024-02-06 | End: 2024-02-09

## 2024-02-06 RX ORDER — SODIUM CHLORIDE 9 MG/ML
500 INJECTION INTRAMUSCULAR; INTRAVENOUS; SUBCUTANEOUS ONCE
Refills: 0 | Status: COMPLETED | OUTPATIENT
Start: 2024-02-06 | End: 2024-02-06

## 2024-02-06 RX ADMIN — IRON SUCROSE 110 MILLIGRAM(S): 20 INJECTION, SOLUTION INTRAVENOUS at 15:59

## 2024-02-06 RX ADMIN — OXYCODONE HYDROCHLORIDE 10 MILLIGRAM(S): 5 TABLET ORAL at 04:29

## 2024-02-06 RX ADMIN — BICTEGRAVIR SODIUM, EMTRICITABINE, AND TENOFOVIR ALAFENAMIDE FUMARATE 1 TABLET(S): 30; 120; 15 TABLET ORAL at 12:40

## 2024-02-06 RX ADMIN — Medication 1 PACKET(S): at 12:40

## 2024-02-06 RX ADMIN — OXYCODONE HYDROCHLORIDE 10 MILLIGRAM(S): 5 TABLET ORAL at 11:03

## 2024-02-06 RX ADMIN — OXYCODONE HYDROCHLORIDE 10 MILLIGRAM(S): 5 TABLET ORAL at 05:29

## 2024-02-06 RX ADMIN — OXYCODONE HYDROCHLORIDE 10 MILLIGRAM(S): 5 TABLET ORAL at 12:03

## 2024-02-06 RX ADMIN — OXYCODONE HYDROCHLORIDE 10 MILLIGRAM(S): 5 TABLET ORAL at 18:03

## 2024-02-06 RX ADMIN — OXYCODONE HYDROCHLORIDE 10 MILLIGRAM(S): 5 TABLET ORAL at 19:05

## 2024-02-06 NOTE — PROGRESS NOTE ADULT - ASSESSMENT
RAMBO ALBERTS is a 43y year old MSM with PMH HIV (on biktarvy), invasive anorectal squamous cell carcinoma, polysubstance abuse, syphilis, anemia and abodminal MRSA 2021, who presenting with rectal bleeding since yesterday since one day prior to admission. Hematology is consulted for hx of anorectal squamous cell carcinoma.     #Anorectal squamous cell carcinoma   - anal mass biopsy 12/13/24 - invasive squamous cell carcinoma, moderately differentiated  - CT Chest 1/9/24 - mild paraseptal emphysema. no evidence of metastatic disease    - MRI pelvis 1/3/24 - limited exam with only 3 sequences obtained. Showing invasion into sacrum, right hemipelvis, and invasion posteriorly into gluteal musculature   - CTA 2/2/24 - without signs of local regional metastases  - colonoscopy/anoscopy may be difficult due to bulk of disease  - CEA elevated at 192.0 prior to treatment  - f/u repeat pelvic MRI given limited study to assess lymph nodes on initial MRI, though would consider patient at least Stage IIIB disease due to invasion into local structures (T4)  - Although chemo/radiation is generally curative in anorectal squamous cell carcinoma, patient presenting with large tumor burden, which may present difficulties, chemo/radiation therapy will be discussed outpatient with Dr. Julian  - f/u palliative care for symptom management and goals of care  - patient should follow up with Dr. Julian upon discharge    #Anemia  2/6/26: Received 600mg IV iron total  - % saturation 10, TIBC 183, ferritin 109  - Hgb 7.6 --> 8.3 (2/2 - 2/6)  - likely 2/2 to GI bleeding from rectal mass  - transfuse Hgb > 7, Platelet > 50   - c/w IV iron supplementation     Discussed with Dr. Arellano. Recommendations final after attending attestation.

## 2024-02-06 NOTE — PROGRESS NOTE ADULT - SUBJECTIVE AND OBJECTIVE BOX
INTERVAL HPI/OVERNIGHT EVENTS: summer    SUBJECTIVE: Patient seen and examined at bedside, resting comfortably in bed, and does not appear to be in any acute distress.    Patient states that he follows Dr. Mikayla Lang for his biktarvy, and does not have an infectious diease specialist.    MEDICATIONS  (STANDING):  bictegravir 50 mG/emtricitabine 200 mG/tenofovir alafenamide 25 mG (BIKTARVY) 1 Tablet(s) Oral daily  iron sucrose IVPB 200 milliGRAM(s) IV Intermittent every 24 hours  psyllium Powder 1 Packet(s) Oral daily    MEDICATIONS  (PRN):  acetaminophen     Tablet .. 650 milliGRAM(s) Oral every 6 hours PRN Mild Pain (1 - 3)  ALPRAZolam 0.5 milliGRAM(s) Oral daily PRN anxiety  ondansetron Injectable 4 milliGRAM(s) IV Push every 6 hours PRN Nausea and/or Vomiting  oxyCODONE    IR 5 milliGRAM(s) Oral every 6 hours PRN Moderate Pain (4 - 6)  oxyCODONE    IR 10 milliGRAM(s) Oral every 6 hours PRN Severe Pain (7 - 10)      Vital Signs Last 24 Hrs  T(C): 36.8 (06 Feb 2024 12:02), Max: 37.1 (05 Feb 2024 18:11)  T(F): 98.3 (06 Feb 2024 12:02), Max: 98.7 (05 Feb 2024 18:11)  HR: 113 (06 Feb 2024 12:02) (110 - 122)  BP: 117/74 (06 Feb 2024 12:02) (104/67 - 131/89)  BP(mean): 86 (06 Feb 2024 00:28) (86 - 86)  RR: 17 (06 Feb 2024 12:02) (16 - 17)  SpO2: 96% (06 Feb 2024 12:02) (96% - 100%)    Parameters below as of 06 Feb 2024 12:02  Patient On (Oxygen Delivery Method): room air    PHYSICAL EXAM:  General: in no acute distress  Eyes: EOMI intact bilaterally  HENT: Moist mucous membranes  Neck: Trachea midline  Lungs: CTA B/L  Cardiovascular: RRR  Abdomen: Soft, non-tender non-distended  Extremities: WWP  Neurological: Alert and oriented  Skin: Warm and dry    LABS:                        8.3    10.32 )-----------( 504      ( 06 Feb 2024 04:46 )             27.7     02-06    135  |  100  |  11  ----------------------------<  138<H>  4.0   |  24  |  0.73    Ca    9.0      06 Feb 2024 04:46  Phos  4.4     02-06  Mg     2.0     02-06        Urinalysis Basic - ( 06 Feb 2024 04:46 )    Color: x / Appearance: x / SG: x / pH: x  Gluc: 138 mg/dL / Ketone: x  / Bili: x / Urobili: x   Blood: x / Protein: x / Nitrite: x   Leuk Esterase: x / RBC: x / WBC x   Sq Epi: x / Non Sq Epi: x / Bacteria: x

## 2024-02-06 NOTE — CONSULT NOTE ADULT - SUBJECTIVE AND OBJECTIVE BOX
RAMBO ALBERTS is a 43y year old MSM with PMH HIV (on biktarvy), polysubstance abuse, now with at least cT4 invasive anorectal squamous cell carcinoma. RT is consulted to discuss treatment options.    Patient had recent admission 12/9 - 12/12 for spontaneous drainage and mild pain from bilateral gluteal abscesses. Notably he also had progressing R leg numbness. He had previously had perianal I and D in [facility] and [facility].   Rectoanal mass noted on imaging, and underwent biopsy and EUA 12/13 which confirmed diagnosis of squamous cell carcinoma, moderately differentiated. Patient was recommended for staging imaging at that time, but left AMA. He did not follow up with med onc as scheduled and was unable to be reached. Patient reports this was due to his phone service being shut off.    He presented to the ED after large volume blood per rectum.     MRI Pelvis (1/3/24) - limited exam with only 3 sequences obtained. No evidence  for a large infiltrative mass centered in the lower rectum with invasion into  the sacrum and musculature    CT Chest 1/9/24 - mild paraseptal emphysema. no evidence of metastatic disease    CTA 2/2/24 - without signs of local regional metastases    He lives in [facility], current smoker  (trying to cut down). Denies alcohol other/drug use. Endorses significant pain  in rectal area      On exam:   NAD, A&Ox3, asking appropriate questions  Anal lesion extending from canal along R and L perianal skin ~4cm. Contour of gluteal skin with ulcers 2/2 prior abscess vs underlying tumor. Oozing, no delmy bright red blood.

## 2024-02-06 NOTE — PROGRESS NOTE ADULT - SUBJECTIVE AND OBJECTIVE BOX
OVERNIGHT EVENTS: NAD    SUBJECTIVE / INTERVAL HPI: Patient seen and examined at bedside. Patient reports appropriate pain relief with oxycodone 5 mg PO, says the pain relief lasts about 5 hours and then experiences pain for an hour prior to getting his next dose. Otherwise, reports continue passage of blood clots, wears pads. He had a bowel movement yesterday.    MEDICATIONS  (STANDING):  bictegravir 50 mG/emtricitabine 200 mG/tenofovir alafenamide 25 mG (BIKTARVY) 1 Tablet(s) Oral daily  iron sucrose IVPB 200 milliGRAM(s) IV Intermittent every 24 hours  psyllium Powder 1 Packet(s) Oral daily    MEDICATIONS  (PRN):  acetaminophen     Tablet .. 650 milliGRAM(s) Oral every 6 hours PRN Mild Pain (1 - 3)  ALPRAZolam 0.5 milliGRAM(s) Oral daily PRN anxiety  ondansetron Injectable 4 milliGRAM(s) IV Push every 6 hours PRN Nausea and/or Vomiting  oxyCODONE    IR 10 milliGRAM(s) Oral every 6 hours PRN Severe Pain (7 - 10)  oxyCODONE    IR 5 milliGRAM(s) Oral every 6 hours PRN Moderate Pain (4 - 6)    Allergies    No Known Allergies    Intolerances        VITAL SIGNS:  Vital Signs Last 24 Hrs  T(C): 36.9 (06 Feb 2024 08:45), Max: 37.3 (05 Feb 2024 14:28)  T(F): 98.4 (06 Feb 2024 08:45), Max: 99.2 (05 Feb 2024 14:28)  HR: 118 (06 Feb 2024 08:45) (106 - 122)  BP: 104/67 (06 Feb 2024 08:45) (104/67 - 131/89)  BP(mean): 86 (06 Feb 2024 00:28) (86 - 90)  RR: 16 (06 Feb 2024 08:45) (16 - 18)  SpO2: 98% (06 Feb 2024 08:45) (96% - 100%)    Parameters below as of 06 Feb 2024 08:45  Patient On (Oxygen Delivery Method): room air          02-05-24 @ 07:01  -  02-06-24 @ 07:00  --------------------------------------------------------  IN: 480 mL / OUT: 900 mL / NET: -420 mL    02-06-24 @ 07:01  -  02-06-24 @ 11:55  --------------------------------------------------------  IN: 300 mL / OUT: 0 mL / NET: 300 mL        PHYSICAL EXAM:  General: NAD, Laying comfortably in bed  HEENT: NC/AT, anicteric sclera, MMM  Neck: supple  Cardiovascular: +S1/S2, RRR, No murmurs, rubs, gallops  Respiratory: CTA B/L, no W/R/R  Gastrointestinal: soft, NT/ND, +BSx4  Extremities: WWP, no edema, clubbing or cyanosis  Vascular: 2+ radial, DP/PT pulses B/L  Neurological: AAOx3, no focal deficits      LABS:                        8.3    10.32 )-----------( 504      ( 06 Feb 2024 04:46 )             27.7     02-06    135  |  100  |  11  ----------------------------<  138<H>  4.0   |  24  |  0.73    Ca    9.0      06 Feb 2024 04:46  Phos  4.4     02-06  Mg     2.0     02-06        Urinalysis Basic - ( 06 Feb 2024 04:46 )    Color: x / Appearance: x / SG: x / pH: x  Gluc: 138 mg/dL / Ketone: x  / Bili: x / Urobili: x   Blood: x / Protein: x / Nitrite: x   Leuk Esterase: x / RBC: x / WBC x   Sq Epi: x / Non Sq Epi: x / Bacteria: x      CAPILLARY BLOOD GLUCOSE              RADIOLOGY & ADDITIONAL TESTS: Reviewed.

## 2024-02-06 NOTE — PROGRESS NOTE ADULT - ASSESSMENT
43M MSM Hx HIV (12/2023 CD4 266 (BALDO likely 130), VLUD, on Biktarvy), Latent syphilis ([id number] RPR 1:8 serofast), MRSA abdominal abscess 2021, Polysubstance abuse (02/2024 UTox +THC, Amphetamines, Opiates), Sinus tachycardia, AARTI, Invasive anal squamous cell carcinoma (Diagnosed 12/2023, left AMA). P/w x1m intermittent rectal pain & x1d rectal bleeding intermittent stabbing rectal pain over the last month. Admitted to Surgery for evaluation rectal bleeding and cancer progression.     #Invasive anal squamous cell carcinoma  Diagnosed 12/2023, left AMA. 01/2024 CTAP: No signs of active bleeding. Large lobulated rectal tumor dissecting into adjacent soft tissues and bones overall similar in appearance to the previous study. There is a moderate constipation pattern. No surgical intervention per surgery  - f/u HemeOnc recs  - f/u rad onc recs. Pending pelvic MRI given limited study to assess lymph nodes. Please provide patient with IV dilaudid 1 mg right before his transport to MRI. Patient mentions he is claustrophobic and experiences rectal pain when laying on his back.   - Pain management and goals of care per palliative team   - Continue bowel regimen (Miralax, Senna)    #Symptomatic AARTI  Adm hgb 7.2-7.6 (2021 hgb 11.4). C/o 1mth passing clots per rectum, now BRBPR. 12/2023 Ferritin 109 (likely overstated iso malignancy), Iron sat 10%.  - Active T+S, transfuse to keep hgb >7  -Day 3 of 5 of IV iron (IV sucrose 200mg x5 days). Consider discharge on PO Iron 65mg QOD w/ bowel regimen    #HIV  12/2023 CD4 266 (BALDO 130), VLUD. urine Ch/Gc negative.  - c/w home Biktarvy    #Latent syphilis   [id number] RPR 1:8 serofast.  - Outpatient management    Medicine will continue to follow.

## 2024-02-06 NOTE — PROGRESS NOTE ADULT - ASSESSMENT
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 He does note he has been having intermittent stabbing rectal pain over the last month. Patient reports at baseline he is tacy to the 120-130s. Labs significant for Hgb 7.6, negative UA, postive THC, amphetamines, and opiates.CT showed No signs of active bleeding. Large lobulated rectal tumor dissecting into adjacent soft tissues and bones overall similar in appearance to the previous study. There is a moderate constipation pattern. Admitted for further evaluation of rectal bleeding and cancer progression.     Regular diet  Pain and nausea control PRN  Home meds   SCDs; Hold lovenox   AM labs  Pending heme/onc, rad/onc recs

## 2024-02-06 NOTE — PROGRESS NOTE ADULT - SUBJECTIVE AND OBJECTIVE BOX
INTERVAL HPI/OVERNIGHT EVENTS: NATALIE, VSS, baseline tachy in 110s. 1 episode of dark blood per rectum @ 4am, HR 120s, VSS, stat EKG/labs obtained, abd benign    SUBJECTIVE: Pt seen and examined with resident on AM rounds. Pt endorses pain with bowel movements. Otherwise has no nausea or vomiting and is tolerating his diet. Pt denies lightheadedness/headache/sob/cp.    MEDICATIONS  (STANDING):  bictegravir 50 mG/emtricitabine 200 mG/tenofovir alafenamide 25 mG (BIKTARVY) 1 Tablet(s) Oral daily  iron sucrose IVPB 200 milliGRAM(s) IV Intermittent every 24 hours  psyllium Powder 1 Packet(s) Oral daily    MEDICATIONS  (PRN):  acetaminophen     Tablet .. 650 milliGRAM(s) Oral every 6 hours PRN Mild Pain (1 - 3)  ALPRAZolam 0.5 milliGRAM(s) Oral daily PRN anxiety  ondansetron Injectable 4 milliGRAM(s) IV Push every 6 hours PRN Nausea and/or Vomiting  oxyCODONE    IR 5 milliGRAM(s) Oral every 6 hours PRN Moderate Pain (4 - 6)  oxyCODONE    IR 10 milliGRAM(s) Oral every 6 hours PRN Severe Pain (7 - 10)      Vital Signs Last 24 Hrs  T(C): 36.8 (06 Feb 2024 04:32), Max: 37.3 (05 Feb 2024 14:28)  T(F): 98.3 (06 Feb 2024 04:32), Max: 99.2 (05 Feb 2024 14:28)  HR: 122 (06 Feb 2024 04:32) (102 - 122)  BP: 117/81 (06 Feb 2024 04:32) (117/70 - 131/89)  BP(mean): 86 (06 Feb 2024 00:28) (86 - 90)  RR: 17 (06 Feb 2024 04:32) (16 - 18)  SpO2: 98% (06 Feb 2024 04:32) (95% - 100%)    Parameters below as of 06 Feb 2024 04:32  Patient On (Oxygen Delivery Method): room air      PHYSICAL EXAM:  Constitutional: A&Ox3, NAD  Respiratory: non labored breathing, no respiratory distress  Cardiovascular: NSR, RRR  Gastrointestinal: Abd soft, NT/ND (-) rebound or guarding  Extremities: WWP. SCDs in place, (-) edema        I&O's Detail    05 Feb 2024 07:01  -  06 Feb 2024 07:00  --------------------------------------------------------  IN:    Oral Fluid: 480 mL  Total IN: 480 mL    OUT:    Voided (mL): 900 mL  Total OUT: 900 mL    Total NET: -420 mL          LABS:                        8.3    10.32 )-----------( 504      ( 06 Feb 2024 04:46 )             27.7     02-06    135  |  100  |  11  ----------------------------<  138<H>  4.0   |  24  |  0.73    Ca    9.0      06 Feb 2024 04:46  Phos  4.4     02-06  Mg     2.0     02-06        Urinalysis Basic - ( 06 Feb 2024 04:46 )    Color: x / Appearance: x / SG: x / pH: x  Gluc: 138 mg/dL / Ketone: x  / Bili: x / Urobili: x   Blood: x / Protein: x / Nitrite: x   Leuk Esterase: x / RBC: x / WBC x   Sq Epi: x / Non Sq Epi: x / Bacteria: x        RADIOLOGY & ADDITIONAL STUDIES:

## 2024-02-06 NOTE — CONSULT NOTE ADULT - ASSESSMENT
RAMBO ALBERTS is a 43y year old MSM with PMH HIV (on biktarvy), polysubstance abuse, now with at least cT4 invasive anorectal squamous cell carcinoma. RT is consulted to discuss treatment options.    Even with radiosensitive histology, prognosis is poor given significant local progression. CRT may still offer curative pathway, though treatment will likely be very toxic and uncomfortable with extensive perianal dermatitis. The patient's history of AMA and failure to follow up despite being informed of diagnosis and consequences of delaying treatment has me concerned about ability to complete course, though patient is currently stating that he wants to proceed with definitive treatment. We discussed CRT would be to 54 Gy in 30 daily sessions, with attendance at each session critical to outcome. We discussed how daily chemo adherence, HIV control, and smoking cessation were also vital. Discussed management of skin given high likelihood of painful grade 3 radiation dermatitis.    He consented to treatment. We will discuss sim as inpatient vs outpatient, assuming medical oncology feels patient is a chemo candidate. If chemo not possible, we would likely opt for shorter-course, more palliative regimen.    David Owens  Rad Onc

## 2024-02-07 PROBLEM — C20 MALIGNANT NEOPLASM OF RECTUM: Chronic | Status: ACTIVE | Noted: 2024-02-03

## 2024-02-07 LAB
4/8 RATIO: 0.38 RATIO — LOW (ref 0.9–3.6)
ABS CD8: 855 CELLS/UL — HIGH (ref 142–740)
CD16+CD56+ CELLS NFR BLD: 12 % — SIGNIFICANT CHANGE UP (ref 5–23)
CD16+CD56+ CELLS NFR SPEC: 177 CELLS/UL — SIGNIFICANT CHANGE UP (ref 71–410)
CD19 BLASTS SPEC-ACNC: 10 % — SIGNIFICANT CHANGE UP (ref 6–24)
CD19 BLASTS SPEC-ACNC: 140 CELLS/UL — SIGNIFICANT CHANGE UP (ref 84–469)
CD3 BLASTS SPEC-ACNC: 1166 CELLS/UL — SIGNIFICANT CHANGE UP (ref 672–1870)
CD3 BLASTS SPEC-ACNC: 77 % — SIGNIFICANT CHANGE UP (ref 59–83)
CD4 %: 21 % — LOW (ref 30–62)
CD8 %: 55 % — HIGH (ref 12–36)
T-CELL CD4 SUBSET PNL BLD: 328 CELLS/UL — LOW (ref 489–1457)

## 2024-02-07 PROCEDURE — 99233 SBSQ HOSP IP/OBS HIGH 50: CPT | Mod: GC

## 2024-02-07 RX ORDER — SODIUM CHLORIDE 9 MG/ML
1000 INJECTION, SOLUTION INTRAVENOUS
Refills: 0 | Status: DISCONTINUED | OUTPATIENT
Start: 2024-02-07 | End: 2024-02-09

## 2024-02-07 RX ORDER — SODIUM CHLORIDE 9 MG/ML
500 INJECTION, SOLUTION INTRAVENOUS ONCE
Refills: 0 | Status: DISCONTINUED | OUTPATIENT
Start: 2024-02-07 | End: 2024-02-07

## 2024-02-07 RX ORDER — SODIUM CHLORIDE 9 MG/ML
500 INJECTION INTRAMUSCULAR; INTRAVENOUS; SUBCUTANEOUS ONCE
Refills: 0 | Status: COMPLETED | OUTPATIENT
Start: 2024-02-07 | End: 2024-02-07

## 2024-02-07 RX ORDER — SODIUM CHLORIDE 9 MG/ML
1000 INJECTION, SOLUTION INTRAVENOUS
Refills: 0 | Status: DISCONTINUED | OUTPATIENT
Start: 2024-02-07 | End: 2024-02-07

## 2024-02-07 RX ADMIN — OXYCODONE HYDROCHLORIDE 10 MILLIGRAM(S): 5 TABLET ORAL at 21:20

## 2024-02-07 RX ADMIN — BICTEGRAVIR SODIUM, EMTRICITABINE, AND TENOFOVIR ALAFENAMIDE FUMARATE 1 TABLET(S): 30; 120; 15 TABLET ORAL at 12:37

## 2024-02-07 RX ADMIN — SODIUM CHLORIDE 1000 MILLILITER(S): 9 INJECTION INTRAMUSCULAR; INTRAVENOUS; SUBCUTANEOUS at 21:08

## 2024-02-07 RX ADMIN — OXYCODONE HYDROCHLORIDE 10 MILLIGRAM(S): 5 TABLET ORAL at 14:12

## 2024-02-07 RX ADMIN — OXYCODONE HYDROCHLORIDE 10 MILLIGRAM(S): 5 TABLET ORAL at 08:50

## 2024-02-07 RX ADMIN — OXYCODONE HYDROCHLORIDE 10 MILLIGRAM(S): 5 TABLET ORAL at 02:29

## 2024-02-07 RX ADMIN — OXYCODONE HYDROCHLORIDE 10 MILLIGRAM(S): 5 TABLET ORAL at 01:29

## 2024-02-07 RX ADMIN — Medication 1 PACKET(S): at 12:38

## 2024-02-07 RX ADMIN — SODIUM CHLORIDE 1000 MILLILITER(S): 9 INJECTION INTRAMUSCULAR; INTRAVENOUS; SUBCUTANEOUS at 01:50

## 2024-02-07 RX ADMIN — IRON SUCROSE 110 MILLIGRAM(S): 20 INJECTION, SOLUTION INTRAVENOUS at 16:15

## 2024-02-07 RX ADMIN — SODIUM CHLORIDE 100 MILLILITER(S): 9 INJECTION, SOLUTION INTRAVENOUS at 09:14

## 2024-02-07 RX ADMIN — ONDANSETRON 4 MILLIGRAM(S): 8 TABLET, FILM COATED ORAL at 10:57

## 2024-02-07 RX ADMIN — OXYCODONE HYDROCHLORIDE 10 MILLIGRAM(S): 5 TABLET ORAL at 20:20

## 2024-02-07 RX ADMIN — OXYCODONE HYDROCHLORIDE 10 MILLIGRAM(S): 5 TABLET ORAL at 15:12

## 2024-02-07 RX ADMIN — OXYCODONE HYDROCHLORIDE 10 MILLIGRAM(S): 5 TABLET ORAL at 07:50

## 2024-02-07 NOTE — PROGRESS NOTE ADULT - ASSESSMENT
43M MSM Hx HIV (12/2023 CD4 266 (BALDO likely 130), VLUD, on Biktarvy), Latent syphilis ([id number] RPR 1:8 serofast), MRSA abdominal abscess 2021, Polysubstance abuse (02/2024 UTox +THC, Amphetamines, Opiates), Sinus tachycardia, AARTI, Invasive anal squamous cell carcinoma (Diagnosed 12/2023, left AMA). P/w x1m intermittent rectal pain & x1d rectal bleeding intermittent stabbing rectal pain over the last month. Admitted to Surgery for evaluation rectal bleeding and cancer progression.     #Invasive anal squamous cell carcinoma  Diagnosed 12/2023, left AMA. 01/2024 CTAP: No signs of active bleeding. Large lobulated rectal tumor dissecting into adjacent soft tissues and bones overall similar in appearance to the previous study. There is a moderate constipation pattern. No surgical intervention per surgery  - f/u HemeOnc recs  - f/u rad onc recs. Pending pelvic MRI given limited study to assess lymph nodes. Please provide patient with IV dilaudid 1 mg right before his transport to MRI. Patient mentions he is claustrophobic and experiences rectal pain when laying on his back.   - Pain management and goals of care per palliative team   - Continue bowel regimen (Miralax, Senna)    #Symptomatic AARTI  Adm hgb 7.2-7.6 (2021 hgb 11.4). C/o 1mth passing clots per rectum, now BRBPR. 12/2023 Ferritin 109 (likely overstated iso malignancy), Iron sat 10%.  - Active T+S, transfuse to keep hgb >7  -Day 3 of 5 of IV iron (IV sucrose 200mg x5 days). Consider discharge on PO Iron 65mg QOD w/ bowel regimen    #HIV  12/2023 CD4 266 (BALDO 130), VLUD. urine Ch/Gc negative.  - c/w home Biktarvy    #Latent syphilis   [id number] RPR 1:8 serofast.  - Outpatient management    Medicine will continue to follow.  43M MSM Hx HIV (12/2023 CD4 266 (BALDO likely 130), VLUD, on Biktarvy), Latent syphilis ([id number] RPR 1:8 serofast), MRSA abdominal abscess 2021, Polysubstance abuse (02/2024 UTox +THC, Amphetamines, Opiates), Sinus tachycardia, AARTI, Invasive anal squamous cell carcinoma (Diagnosed 12/2023, left AMA). P/w x1m intermittent rectal pain & x1d rectal bleeding intermittent stabbing rectal pain over the last month. Admitted to Surgery for evaluation rectal bleeding and cancer progression.     #Invasive anal squamous cell carcinoma  Diagnosed 12/2023, left AMA. 01/2024 CTAP: No signs of active bleeding. Large lobulated rectal tumor dissecting into adjacent soft tissues and bones overall similar in appearance to the previous study. There is a moderate constipation pattern. No surgical intervention per surgery  - f/u HemeOnc recs  - f/u rad onc recs. Pending pelvic MRI given limited study to assess lymph nodes. Please provide patient with IV dilaudid 1 mg right before his transport to MRI and/or PET-CT. Patient mentions he is claustrophobic and experiences rectal pain when laying on his back.   - Pain management and goals of care per palliative team   - Continue bowel regimen (Miralax, Senna)    #Symptomatic AARTI  Adm hgb 7.2-7.6 (2021 hgb 11.4). C/o 1mth passing clots per rectum, now BRBPR. 12/2023 Ferritin 109 (likely overstated iso malignancy), Iron sat 10%.  - Active T+S, transfuse to keep hgb >7  -Day 3 of 5 of IV iron (IV sucrose 200mg x5 days). Consider discharge on PO Iron 65mg QOD w/ bowel regimen    #HIV  12/2023 CD4 266 (BALDO 130), VLUD. urine Ch/Gc negative.  - c/w home Biktarvy    #Latent syphilis   [id number] RPR 1:8 serofast.  - Outpatient management    Medicine will continue to follow.

## 2024-02-07 NOTE — PROGRESS NOTE ADULT - ASSESSMENT
RAMBO ALBERTS is a 43y year old MSM with PMH HIV (on biktarvy), invasive anorectal squamous cell carcinoma, polysubstance abuse, syphilis, anemia and abodminal MRSA 2021, who presenting with rectal bleeding since yesterday since one day prior to admission. Hematology is consulted for hx of anorectal squamous cell carcinoma.     #Anorectal squamous cell carcinoma   - anal mass biopsy 12/13/24 - invasive squamous cell carcinoma, moderately differentiated  - CT Chest 1/9/24 - mild paraseptal emphysema. no evidence of metastatic disease    - MRI pelvis 1/3/24 - limited exam with only 3 sequences obtained. Showing invasion into sacrum, right hemipelvis, and invasion posteriorly into gluteal musculature   - CTA 2/2/24 - without signs of local regional metastases  - colonoscopy/anoscopy may be difficult due to bulk of disease  - CEA elevated at 192.0 prior to treatment  -  would consider patient at least Stage IIIB disease due to invasion into local structures (T4); may need repeat pelvic MRI vs PET to assess lymph node disease adequately  - Although chemo/radiation is generally curative in anorectal squamous cell carcinoma, patient presenting with large tumor burden, which may present difficulties, chemo/radiation therapy will be discussed outpatient with Dr. Julian  - appreciate radiation oncology reccs   - f/u palliative care for symptom management and goals of care  - would also discuss with ID regarding optimal HIV management  - patient should follow up with Dr. Julian upon discharge - appointment made 2/13/24 1PM. Address is [address] [place] [address], [address].    #Anemia  2/6/26: Received 600mg IV iron total  - % saturation 10, TIBC 183, ferritin 109  - Hgb 7.6 --> 8.3 (2/2 - 2/6)  - likely 2/2 to GI bleeding from rectal mass  - transfuse Hgb > 7, Platelet > 50   - c/w IV iron supplementation     Discussed with Dr. Arellano. Recommendations final after attending attestation. RAMBO ALBERTS is a 43y year old MSM with PMH HIV (on biktarvy), invasive anorectal squamous cell carcinoma, polysubstance abuse, syphilis, anemia and abodminal MRSA 2021, who presenting with rectal bleeding since yesterday since one day prior to admission. Hematology is consulted for hx of anorectal squamous cell carcinoma.     #Anorectal squamous cell carcinoma   - anal mass biopsy 12/13/24 - invasive squamous cell carcinoma, moderately differentiated  - CT Chest 1/9/24 - mild paraseptal emphysema. no evidence of metastatic disease    - MRI pelvis 1/3/24 - limited exam with only 3 sequences obtained. Showing invasion into sacrum, right hemipelvis, and invasion posteriorly into gluteal musculature   - CTA 2/2/24 - without signs of local regional metastases  - colonoscopy/anoscopy may be difficult due to bulk of disease  - CEA elevated at 192.0 prior to treatment  -  would consider patient at least Stage IIIB disease due to invasion into local structures (T4); may need repeat pelvic MRI vs PET to assess lymph node disease adequately  - Although chemo/radiation is generally curative in anorectal squamous cell carcinoma, patient presenting with large tumor burden, which may present difficulties, chemo/radiation therapy will be discussed outpatient with Dr. Julian  - appreciate radiation oncology reccs   - f/u palliative care for symptom management and goals of care  - would also discuss with ID regarding optimal HIV management  - patient should follow up with Dr. Julian upon discharge - appointment made 2/13/24 1PM. Address is [address] [place] [address], [address]. Please include in discharge instructions.    #Anemia  2/6/26: Received 600mg IV iron total  - % saturation 10, TIBC 183, ferritin 109  - Hgb 7.6 --> 8.3 (2/2 - 2/6)  - likely 2/2 to GI bleeding from rectal mass  - transfuse Hgb > 7, Platelet > 50   - c/w IV iron supplementation     Discussed with Dr. Arellano. Recommendations final after attending attestation.

## 2024-02-07 NOTE — PROGRESS NOTE ADULT - SUBJECTIVE AND OBJECTIVE BOX
INTERVAL HPI/OVERNIGHT EVENTS:    SUBJECTIVE:   No acute events overnight.     MEDICATIONS  (STANDING):  bictegravir 50 mG/emtricitabine 200 mG/tenofovir alafenamide 25 mG (BIKTARVY) 1 Tablet(s) Oral daily  iron sucrose IVPB 200 milliGRAM(s) IV Intermittent every 24 hours  psyllium Powder 1 Packet(s) Oral daily    MEDICATIONS  (PRN):  acetaminophen     Tablet .. 650 milliGRAM(s) Oral every 6 hours PRN Mild Pain (1 - 3)  ALPRAZolam 0.5 milliGRAM(s) Oral daily PRN anxiety  ondansetron Injectable 4 milliGRAM(s) IV Push every 6 hours PRN Nausea and/or Vomiting  oxyCODONE    IR 5 milliGRAM(s) Oral every 6 hours PRN Moderate Pain (4 - 6)  oxyCODONE    IR 10 milliGRAM(s) Oral every 6 hours PRN Severe Pain (7 - 10)      Vital Signs Last 24 Hrs  T(C): 36.8 (06 Feb 2024 12:02), Max: 37.1 (05 Feb 2024 18:11)  T(F): 98.3 (06 Feb 2024 12:02), Max: 98.7 (05 Feb 2024 18:11)  HR: 113 (06 Feb 2024 12:02) (110 - 122)  BP: 117/74 (06 Feb 2024 12:02) (104/67 - 131/89)  BP(mean): 86 (06 Feb 2024 00:28) (86 - 86)  RR: 17 (06 Feb 2024 12:02) (16 - 17)  SpO2: 96% (06 Feb 2024 12:02) (96% - 100%)    Parameters below as of 06 Feb 2024 12:02  Patient On (Oxygen Delivery Method): room air    PHYSICAL EXAM:  General: in no acute distress  Eyes: EOMI intact bilaterally  HENT: Moist mucous membranes  Neck: Trachea midline  Lungs: CTA B/L  Cardiovascular: RRR  Abdomen: Soft, non-tender non-distended  Extremities: WWP  Neurological: Alert and oriented  Skin: Warm and dry    LABS:                        8.3    10.32 )-----------( 504      ( 06 Feb 2024 04:46 )             27.7     02-06    135  |  100  |  11  ----------------------------<  138<H>  4.0   |  24  |  0.73    Ca    9.0      06 Feb 2024 04:46  Phos  4.4     02-06  Mg     2.0     02-06        Urinalysis Basic - ( 06 Feb 2024 04:46 )    Color: x / Appearance: x / SG: x / pH: x  Gluc: 138 mg/dL / Ketone: x  / Bili: x / Urobili: x   Blood: x / Protein: x / Nitrite: x   Leuk Esterase: x / RBC: x / WBC x   Sq Epi: x / Non Sq Epi: x / Bacteria: x

## 2024-02-07 NOTE — PROGRESS NOTE ADULT - SUBJECTIVE AND OBJECTIVE BOX
OVERNIGHT EVENTS:    SUBJECTIVE / INTERVAL HPI: Patient seen and examined at bedside. Denies f/c, n/v, HA, chest pain, SOB, abdominal pain, diarrhea, constipation, melena, hematochezia, hematuria, dysuria    MEDICATIONS  (STANDING):  bictegravir 50 mG/emtricitabine 200 mG/tenofovir alafenamide 25 mG (BIKTARVY) 1 Tablet(s) Oral daily  iron sucrose IVPB 200 milliGRAM(s) IV Intermittent every 24 hours  lactated ringers. 1000 milliLiter(s) (100 mL/Hr) IV Continuous <Continuous>  psyllium Powder 1 Packet(s) Oral daily    MEDICATIONS  (PRN):  acetaminophen     Tablet .. 650 milliGRAM(s) Oral every 6 hours PRN Mild Pain (1 - 3)  ALPRAZolam 0.5 milliGRAM(s) Oral daily PRN anxiety  ondansetron Injectable 4 milliGRAM(s) IV Push every 6 hours PRN Nausea and/or Vomiting  oxyCODONE    IR 5 milliGRAM(s) Oral every 6 hours PRN Moderate Pain (4 - 6)  oxyCODONE    IR 10 milliGRAM(s) Oral every 6 hours PRN Severe Pain (7 - 10)    Allergies    No Known Allergies    Intolerances        VITAL SIGNS:  Vital Signs Last 24 Hrs  T(C): 36.8 (07 Feb 2024 08:30), Max: 37.1 (06 Feb 2024 23:38)  T(F): 98.3 (07 Feb 2024 08:30), Max: 98.7 (06 Feb 2024 23:38)  HR: 109 (07 Feb 2024 08:30) (99 - 127)  BP: 108/60 (07 Feb 2024 08:30) (108/60 - 117/74)  BP(mean): 79 (07 Feb 2024 04:39) (79 - 79)  RR: 16 (07 Feb 2024 08:30) (16 - 17)  SpO2: 95% (07 Feb 2024 08:30) (95% - 99%)    Parameters below as of 07 Feb 2024 08:30  Patient On (Oxygen Delivery Method): room air          02-06-24 @ 07:01  -  02-07-24 @ 07:00  --------------------------------------------------------  IN: 650 mL / OUT: 1700 mL / NET: -1050 mL        PHYSICAL EXAM:  General: NAD, Laying comfortably in bed  HEENT: NC/AT, anicteric sclera, MMM  Neck: supple  Cardiovascular: +S1/S2, RRR, No murmurs, rubs, gallops  Respiratory: CTA B/L, no W/R/R  Gastrointestinal: soft, NT/ND, +BSx4  Extremities: WWP, no edema, clubbing or cyanosis  Vascular: 2+ radial, DP/PT pulses B/L  Neurological: AAOx3, no focal deficits      LABS:                        8.3    10.32 )-----------( 504      ( 06 Feb 2024 04:46 )             27.7     02-06    135  |  100  |  11  ----------------------------<  138<H>  4.0   |  24  |  0.73    Ca    9.0      06 Feb 2024 04:46  Phos  4.4     02-06  Mg     2.0     02-06        Urinalysis Basic - ( 06 Feb 2024 04:46 )    Color: x / Appearance: x / SG: x / pH: x  Gluc: 138 mg/dL / Ketone: x  / Bili: x / Urobili: x   Blood: x / Protein: x / Nitrite: x   Leuk Esterase: x / RBC: x / WBC x   Sq Epi: x / Non Sq Epi: x / Bacteria: x      CAPILLARY BLOOD GLUCOSE              RADIOLOGY & ADDITIONAL TESTS: Reviewed. OVERNIGHT EVENTS: NATALIE    SUBJECTIVE / INTERVAL HPI: Patient seen and examined at bedside. Patient with continued rectal pain requiring all his PRN oxycodone. Otherwise, reports continued passage of blood clots. No other issues.     MEDICATIONS  (STANDING):  bictegravir 50 mG/emtricitabine 200 mG/tenofovir alafenamide 25 mG (BIKTARVY) 1 Tablet(s) Oral daily  iron sucrose IVPB 200 milliGRAM(s) IV Intermittent every 24 hours  lactated ringers. 1000 milliLiter(s) (100 mL/Hr) IV Continuous <Continuous>  psyllium Powder 1 Packet(s) Oral daily    MEDICATIONS  (PRN):  acetaminophen     Tablet .. 650 milliGRAM(s) Oral every 6 hours PRN Mild Pain (1 - 3)  ALPRAZolam 0.5 milliGRAM(s) Oral daily PRN anxiety  ondansetron Injectable 4 milliGRAM(s) IV Push every 6 hours PRN Nausea and/or Vomiting  oxyCODONE    IR 5 milliGRAM(s) Oral every 6 hours PRN Moderate Pain (4 - 6)  oxyCODONE    IR 10 milliGRAM(s) Oral every 6 hours PRN Severe Pain (7 - 10)    Allergies    No Known Allergies    Intolerances        VITAL SIGNS:  Vital Signs Last 24 Hrs  T(C): 36.8 (07 Feb 2024 08:30), Max: 37.1 (06 Feb 2024 23:38)  T(F): 98.3 (07 Feb 2024 08:30), Max: 98.7 (06 Feb 2024 23:38)  HR: 109 (07 Feb 2024 08:30) (99 - 127)  BP: 108/60 (07 Feb 2024 08:30) (108/60 - 117/74)  BP(mean): 79 (07 Feb 2024 04:39) (79 - 79)  RR: 16 (07 Feb 2024 08:30) (16 - 17)  SpO2: 95% (07 Feb 2024 08:30) (95% - 99%)    Parameters below as of 07 Feb 2024 08:30  Patient On (Oxygen Delivery Method): room air          02-06-24 @ 07:01  -  02-07-24 @ 07:00  --------------------------------------------------------  IN: 650 mL / OUT: 1700 mL / NET: -1050 mL        PHYSICAL EXAM:  General: NAD, Laying comfortably in bed  Neck: supple  Extremities: WWP, no edema, clubbing or cyanosis  Vascular: 2+ radial, DP/PT pulses B/L  Neurological: AAOx3, no focal deficits      LABS:                        8.3    10.32 )-----------( 504      ( 06 Feb 2024 04:46 )             27.7     02-06    135  |  100  |  11  ----------------------------<  138<H>  4.0   |  24  |  0.73    Ca    9.0      06 Feb 2024 04:46  Phos  4.4     02-06  Mg     2.0     02-06        Urinalysis Basic - ( 06 Feb 2024 04:46 )    Color: x / Appearance: x / SG: x / pH: x  Gluc: 138 mg/dL / Ketone: x  / Bili: x / Urobili: x   Blood: x / Protein: x / Nitrite: x   Leuk Esterase: x / RBC: x / WBC x   Sq Epi: x / Non Sq Epi: x / Bacteria: x      CAPILLARY BLOOD GLUCOSE              RADIOLOGY & ADDITIONAL TESTS: Reviewed.

## 2024-02-07 NOTE — PROGRESS NOTE ADULT - SUBJECTIVE AND OBJECTIVE BOX
INTERVAL HPI/OVERNIGHT EVENTS: tachy 127 asym x500cc bolus. Repeat  99HR. No more bloody bm     SUBJECTIVE: Patient seen and examined with chief resident on AM rounds. Patient has no acute complaints at this time. He has mild pain well controlled with current pain regimen. -bm. Denies n/v/f/c.    MEDICATIONS  (STANDING):  bictegravir 50 mG/emtricitabine 200 mG/tenofovir alafenamide 25 mG (BIKTARVY) 1 Tablet(s) Oral daily  iron sucrose IVPB 200 milliGRAM(s) IV Intermittent every 24 hours  psyllium Powder 1 Packet(s) Oral daily    MEDICATIONS  (PRN):  acetaminophen     Tablet .. 650 milliGRAM(s) Oral every 6 hours PRN Mild Pain (1 - 3)  ALPRAZolam 0.5 milliGRAM(s) Oral daily PRN anxiety  ondansetron Injectable 4 milliGRAM(s) IV Push every 6 hours PRN Nausea and/or Vomiting  oxyCODONE    IR 10 milliGRAM(s) Oral every 6 hours PRN Severe Pain (7 - 10)  oxyCODONE    IR 5 milliGRAM(s) Oral every 6 hours PRN Moderate Pain (4 - 6)      Vital Signs Last 24 Hrs  T(C): 36.8 (07 Feb 2024 04:39), Max: 37.1 (06 Feb 2024 23:38)  T(F): 98.2 (07 Feb 2024 04:39), Max: 98.7 (06 Feb 2024 23:38)  HR: 99 (07 Feb 2024 04:39) (99 - 127)  BP: 108/64 (07 Feb 2024 04:39) (104/67 - 117/74)  BP(mean): 79 (07 Feb 2024 04:39) (79 - 79)  RR: 17 (07 Feb 2024 04:39) (16 - 17)  SpO2: 98% (07 Feb 2024 04:39) (96% - 99%)    Parameters below as of 07 Feb 2024 04:39  Patient On (Oxygen Delivery Method): room air        PHYSICAL EXAM:  Constitutional: A&Ox3, NAD  Respiratory: non labored breathing, no respiratory distress  Cardiovascular: NSR, RRR  Gastrointestinal: Abd soft, NT/ND (-) rebound or guarding  Extremities: WWP, (-) edema        I&O's Detail    06 Feb 2024 07:01  -  07 Feb 2024 07:00  --------------------------------------------------------  IN:    Oral Fluid: 650 mL  Total IN: 650 mL    OUT:    Voided (mL): 1700 mL  Total OUT: 1700 mL    Total NET: -1050 mL          LABS:                        8.3    10.32 )-----------( 504      ( 06 Feb 2024 04:46 )             27.7     02-06    135  |  100  |  11  ----------------------------<  138<H>  4.0   |  24  |  0.73    Ca    9.0      06 Feb 2024 04:46  Phos  4.4     02-06  Mg     2.0     02-06        Urinalysis Basic - ( 06 Feb 2024 04:46 )    Color: x / Appearance: x / SG: x / pH: x  Gluc: 138 mg/dL / Ketone: x  / Bili: x / Urobili: x   Blood: x / Protein: x / Nitrite: x   Leuk Esterase: x / RBC: x / WBC x   Sq Epi: x / Non Sq Epi: x / Bacteria: x        RADIOLOGY & ADDITIONAL STUDIES:

## 2024-02-08 LAB — HEMATOPATHOLOGY REPORT: SIGNIFICANT CHANGE UP

## 2024-02-08 PROCEDURE — 99232 SBSQ HOSP IP/OBS MODERATE 35: CPT | Mod: GC

## 2024-02-08 RX ORDER — OXYCODONE HYDROCHLORIDE 5 MG/1
10 TABLET ORAL EVERY 6 HOURS
Refills: 0 | Status: DISCONTINUED | OUTPATIENT
Start: 2024-02-08 | End: 2024-02-09

## 2024-02-08 RX ORDER — OXYCODONE HYDROCHLORIDE 5 MG/1
5 TABLET ORAL EVERY 6 HOURS
Refills: 0 | Status: DISCONTINUED | OUTPATIENT
Start: 2024-02-08 | End: 2024-02-09

## 2024-02-08 RX ORDER — ONDANSETRON 8 MG/1
4 TABLET, FILM COATED ORAL EVERY 6 HOURS
Refills: 0 | Status: DISCONTINUED | OUTPATIENT
Start: 2024-02-08 | End: 2024-02-09

## 2024-02-08 RX ORDER — SODIUM CHLORIDE 9 MG/ML
500 INJECTION, SOLUTION INTRAVENOUS ONCE
Refills: 0 | Status: COMPLETED | OUTPATIENT
Start: 2024-02-08 | End: 2024-02-08

## 2024-02-08 RX ADMIN — OXYCODONE HYDROCHLORIDE 10 MILLIGRAM(S): 5 TABLET ORAL at 08:50

## 2024-02-08 RX ADMIN — OXYCODONE HYDROCHLORIDE 10 MILLIGRAM(S): 5 TABLET ORAL at 03:45

## 2024-02-08 RX ADMIN — BICTEGRAVIR SODIUM, EMTRICITABINE, AND TENOFOVIR ALAFENAMIDE FUMARATE 1 TABLET(S): 30; 120; 15 TABLET ORAL at 11:57

## 2024-02-08 RX ADMIN — SODIUM CHLORIDE 100 MILLILITER(S): 9 INJECTION, SOLUTION INTRAVENOUS at 00:08

## 2024-02-08 RX ADMIN — SODIUM CHLORIDE 1000 MILLILITER(S): 9 INJECTION, SOLUTION INTRAVENOUS at 00:27

## 2024-02-08 RX ADMIN — OXYCODONE HYDROCHLORIDE 10 MILLIGRAM(S): 5 TABLET ORAL at 20:35

## 2024-02-08 RX ADMIN — OXYCODONE HYDROCHLORIDE 10 MILLIGRAM(S): 5 TABLET ORAL at 14:26

## 2024-02-08 RX ADMIN — OXYCODONE HYDROCHLORIDE 10 MILLIGRAM(S): 5 TABLET ORAL at 02:45

## 2024-02-08 RX ADMIN — OXYCODONE HYDROCHLORIDE 10 MILLIGRAM(S): 5 TABLET ORAL at 21:35

## 2024-02-08 RX ADMIN — OXYCODONE HYDROCHLORIDE 10 MILLIGRAM(S): 5 TABLET ORAL at 15:26

## 2024-02-08 RX ADMIN — OXYCODONE HYDROCHLORIDE 10 MILLIGRAM(S): 5 TABLET ORAL at 09:50

## 2024-02-08 RX ADMIN — Medication 1 PACKET(S): at 11:57

## 2024-02-08 RX ADMIN — IRON SUCROSE 110 MILLIGRAM(S): 20 INJECTION, SOLUTION INTRAVENOUS at 16:16

## 2024-02-08 NOTE — DIETITIAN INITIAL EVALUATION ADULT - ADD RECOMMEND
1. Continue with current diet order (regular diet)  >>Ensure High Plus protein oral nutrition supplement 3x/day (350 calories, 20g protein per serving)  2. Encourage pt to meet nutritional needs as able  3. Monitor PO intakes, trend weights (weekly), monitor skin integrity, monitor labs (electrolytes, CMP), monitor GI function  4. Encourage adherence to diet education (reinforce as able)  5. Consider micronutrient (i.e. multivitamin) supplementation  6. Pain and bowel regimen per team   7. Will continue to assess/honor preferences as able   8. Align nutrition interventions with goals of care at all times

## 2024-02-08 NOTE — DIETITIAN INITIAL EVALUATION ADULT - OTHER CALCULATIONS
Based on Standards of Care pt within ~% ideal body weight, thus actual body weight used for all calculations. Needs adjusted for age, malnutrition (repletion), cancer status, HIV status.

## 2024-02-08 NOTE — PROGRESS NOTE ADULT - ASSESSMENT
43M MSM Hx HIV (12/2023 CD4 266 (BALDO likely 130), VLUD, on Biktarvy), Latent syphilis ([id number] RPR 1:8 serofast), MRSA abdominal abscess 2021, Polysubstance abuse (02/2024 UTox +THC, Amphetamines, Opiates), Sinus tachycardia, AARTI, Invasive anal squamous cell carcinoma (Diagnosed 12/2023, left AMA). P/w x1m intermittent rectal pain & x1d rectal bleeding intermittent stabbing rectal pain over the last month. Admitted to Surgery for evaluation rectal bleeding and cancer progression.     #Invasive anal squamous cell carcinoma  Diagnosed 12/2023, left AMA. 01/2024 CTAP: No signs of active bleeding. Large lobulated rectal tumor dissecting into adjacent soft tissues and bones overall similar in appearance to the previous study. There is a moderate constipation pattern. No surgical intervention per surgery  - f/u HemeOnc recs  - f/u rad onc recs. Pending pelvic MRI given limited study to assess lymph nodes. Please provide patient with IV dilaudid 1 mg right before his transport to MRI and/or PET-CT. Patient mentions he is claustrophobic and experiences rectal pain when laying on his back.   - Pain management and goals of care per palliative team   - Continue bowel regimen (Miralax, Senna)    #Symptomatic AARTI  Adm hgb 7.2-7.6 (2021 hgb 11.4). C/o 1mth passing clots per rectum, now BRBPR. 12/2023 Ferritin 109 (likely overstated iso malignancy), Iron sat 10%.  - Active T+S, transfuse to keep hgb >7  -Day 3 of 5 of IV iron (IV sucrose 200mg x5 days). Consider discharge on PO Iron 65mg QOD w/ bowel regimen    #HIV  12/2023 CD4 266 (BALDO 130), VLUD. urine Ch/Gc negative.  - c/w home Biktarvy    #Latent syphilis   [id number] RPR 1:8 serofast.  - Outpatient management    Medicine will continue to follow.

## 2024-02-08 NOTE — DIETITIAN INITIAL EVALUATION ADULT - OTHER INFO
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 He does note he has been having intermittent stabbing rectal pain over the last month. Patient reports at baseline he is tacy to the 120-130s. Labs significant for Hgb 7.6, negative UA, postive THC, amphetamines, and opiates.CT showed No signs of active bleeding. Large lobulated rectal tumor dissecting into adjacent soft tissues and bones overall similar in appearance to the previous study. There is a moderate constipation pattern. Admitted for further evaluation of rectal bleeding and cancer progression.    Pt seen in room for nutrition assessment. Pt reports fair appetite PTA and during hospital stay. As per diet recall PTA: pt endorsed Currently on regular diet, tolerating well, noted with % PO intakes overall. No cultural, Church, or ethnic food preferences noted. Nausea noted, no emesis. Denies wt changes, reports wt stability at current wt. Dosing wt: Ideal body weight: pt is % of IBW. Denies nausea, vomiting, diarrhea, constipation, last BM. No edema. Skin: . Fausto: No issues chewing or swallowing noted. Denies pain. Labs reviewed: ; RD to continue to monitor trends. Nutritionally pertinent medications: RD observed pt with no overt signs of muscle or fat wasting. Based on ASPEN guidelines, pt does not meet criteria for malnutrition at this time. Pt amenable to education; RD provided education in regards to the importance of adequate macro and micronutrients, as well as hydration to support ADLs, maintain energy levels and overall functional/nutritional status. General healthful education provided. Nutrient-dense foods promoted. Pt was receptive and verbalized understanding. No additional nutrition-related concerns. Will continue to follow. Additional nutrition recommendations below to follow. 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 He does note he has been having intermittent stabbing rectal pain over the last month. Patient reports at baseline he is tacy to the 120-130s. Labs significant for Hgb 7.6, negative UA, postive THC, amphetamines, and opiates.CT showed No signs of active bleeding. Large lobulated rectal tumor dissecting into adjacent soft tissues and bones overall similar in appearance to the previous study. There is a moderate constipation pattern. Admitted for further evaluation of rectal bleeding and cancer progression.    Pt seen in room for nutrition assessment. Pt reports fair appetite PTA and during hospital stay. As per diet recall PTA: pt endorsed he was eating "okay" before admission, enjoys pastas, sauces, Italian cuisine, various foods. Currently on regular diet, pt was NPO for a PET scan, this procedure has been moved thus pt is on a diet, unable to assess tolerance to diet this morning, RD to follow up in regards to future PO intakes, fair PO intakes prior to NPO status. No cultural, Yazdanism, or ethnic food preferences noted. Nausea noted, no emesis. Pt stated he has had some weight loss in the past 6 months, possibly ~20 pounds. Per [facility] weight history data, pt noted with 12% wt loss (9kg), Dosing wt: 145 pounds, Ideal body weight: 184 pounds, pt is 78% of ideal body weight. Denies nausea, vomiting, diarrhea, constipation, last BM on 2/8/24, noted with rectal discharge. No edema. Skin: flaky, dry skin noted, no pressure ulcers noted. Fausto: 20. No issues chewing or swallowing noted. Noted with pain relief (level 8 to level 3). Labs reviewed: elevated serum Glucose (138); RD to continue to monitor trends. Nutritionally pertinent medications/supplements: IV fluids, psyllium, iron, zofran. RD noted with moderate muscle loss to clavicle region, acromion process region, moderate subcutaneous fat loss per nutrition focused physical exam. Based on ASPEN guidelines, pt does in fact meet criteria for malnutrition at this time. Pt amenable to education; RD provided education in regards to the importance of adequate macro and micronutrients, as well as hydration to support ADLs, maintain energy levels and overall functional/nutritional status. General healthful education provided. Nutrient-dense foods promoted. Pt's elevated nutrient needs discussed. Pt was receptive and verbalized understanding. Pt amenable to nourishments, oral nutrition supplements, such as Ensure for additional calories, protein. No additional nutrition-related concerns. Will continue to follow. Additional nutrition recommendations below to follow.

## 2024-02-08 NOTE — PROGRESS NOTE ADULT - SUBJECTIVE AND OBJECTIVE BOX
INTERVAL HPI/OVERNIGHT EVENTS: tachy 130s asym c/o pain. s/p 500cc bolus x1 & pain meds. Repeat  +f/+bm x1 dark blood. NPO @ [place]. Repeat HR 120s x1 500cc bolus.     SUBJECTIVE: Pt seen and examined at bedside this am by surgery team. No acute complaints. HR now low 100s, VSS. Denies f/n/v/cp/sob.    MEDICATIONS  (STANDING):  bictegravir 50 mG/emtricitabine 200 mG/tenofovir alafenamide 25 mG (BIKTARVY) 1 Tablet(s) Oral daily  iron sucrose IVPB 200 milliGRAM(s) IV Intermittent every 24 hours  lactated ringers. 1000 milliLiter(s) (100 mL/Hr) IV Continuous <Continuous>  psyllium Powder 1 Packet(s) Oral daily    MEDICATIONS  (PRN):  acetaminophen     Tablet .. 650 milliGRAM(s) Oral every 6 hours PRN Mild Pain (1 - 3)  ALPRAZolam 0.5 milliGRAM(s) Oral daily PRN anxiety  ondansetron Injectable 4 milliGRAM(s) IV Push every 6 hours PRN Nausea and/or Vomiting  oxyCODONE    IR 5 milliGRAM(s) Oral every 6 hours PRN Moderate Pain (4 - 6)  oxyCODONE    IR 10 milliGRAM(s) Oral every 6 hours PRN Severe Pain (7 - 10)    Vital Signs Last 24 Hrs  T(C): 36.7 (08 Feb 2024 04:47), Max: 37 (08 Feb 2024 00:14)  T(F): 98 (08 Feb 2024 04:47), Max: 98.6 (08 Feb 2024 00:14)  HR: 104 (08 Feb 2024 04:47) (104 - 132)  BP: 146/76 (08 Feb 2024 04:47) (107/71 - 146/76)  BP(mean): --  RR: 18 (08 Feb 2024 04:47) (16 - 18)  SpO2: 96% (08 Feb 2024 04:47) (95% - 98%)    Parameters below as of 08 Feb 2024 04:47  Patient On (Oxygen Delivery Method): room air      PHYSICAL EXAM:    Constitutional: A&Ox3, NAD    Respiratory: non labored breathing, no respiratory distress    Cardiovascular: NSR, RRR    Gastrointestinal: abdomen soft, nd, nt. No R/G.     Extremities: wwp, no calf tenderness or edema. SCDs in place       I&O's Detail    07 Feb 2024 07:01  -  08 Feb 2024 07:00  --------------------------------------------------------  IN:    Lactated Ringers: 700 mL  Total IN: 700 mL    OUT:    Voided (mL): 950 mL  Total OUT: 950 mL    Total NET: -250 mL          LABS:                RADIOLOGY & ADDITIONAL STUDIES:

## 2024-02-08 NOTE — DIETITIAN INITIAL EVALUATION ADULT - PERSON TAUGHT/METHOD
Pt amenable to education; RD provided education in regards to the importance of adequate macro and micronutrients, as well as hydration to support ADLs, maintain energy levels and overall functional/nutritional status. General healthful education provided. Nutrient-dense foods promoted. Pt's elevated nutrient needs discussed. Pt was receptive and verbalized understanding. Pt amenable to nourishments, oral nutrition supplements, such as Ensure for additional calories, protein./verbal instruction/patient instructed

## 2024-02-08 NOTE — DIETITIAN NUTRITION RISK NOTIFICATION - ADDITIONAL COMMENTS/DIETITIAN RECOMMENDATIONS
Malnutrition of severe degree in the context of chronic illness/injury related to pt's condition/clinical status as evidenced by moderate muscle and subcutaneous fat loss per NFPE, 12% wt loss x 6 months    goals: Pt to consistently meet at least 75% of EEE via tolerated route that is consistent with goals of care and will no longer exhibit s/s of malnutrition during hospital stay     Nutrition Recommendations:   1. Continue with current diet order (regular diet)  >>Ensure High Plus protein oral nutrition supplement 3x/day (350 calories, 20g protein per serving)  2. Encourage pt to meet nutritional needs as able  3. Monitor PO intakes, trend weights (weekly), monitor skin integrity, monitor labs (electrolytes, CMP), monitor GI function  4. Encourage adherence to diet education (reinforce as able)  5. Consider micronutrient (i.e. multivitamin) supplementation  6. Pain and bowel regimen per team   7. Will continue to assess/honor preferences as able   8. Align nutrition interventions with goals of care at all times

## 2024-02-08 NOTE — PROGRESS NOTE ADULT - ASSESSMENT
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 He does note he has been having intermittent stabbing rectal pain over the last month. Patient reports at baseline he is tacy to the 120-130s. Labs significant for Hgb 7.6, negative UA, postive THC, amphetamines, and opiates.CT showed No signs of active bleeding. Large lobulated rectal tumor dissecting into adjacent soft tissues and bones overall similar in appearance to the previous study. There is a moderate constipation pattern. Admitted for further evaluation of rectal bleeding and cancer progression.     NPO and mIVF @mn  Pain/nausea control PRN  Home meds   SCDs; holding lovenox   AM labs  heme/onc, rad/onc consults, appreciate recs  F/u PET

## 2024-02-08 NOTE — DIETITIAN INITIAL EVALUATION ADULT - PERTINENT MEDS FT
MEDICATIONS  (STANDING):  bictegravir 50 mG/emtricitabine 200 mG/tenofovir alafenamide 25 mG (BIKTARVY) 1 Tablet(s) Oral daily  iron sucrose IVPB 200 milliGRAM(s) IV Intermittent every 24 hours  lactated ringers. 1000 milliLiter(s) (100 mL/Hr) IV Continuous <Continuous>  psyllium Powder 1 Packet(s) Oral daily    MEDICATIONS  (PRN):  acetaminophen     Tablet .. 650 milliGRAM(s) Oral every 6 hours PRN Mild Pain (1 - 3)  ALPRAZolam 0.5 milliGRAM(s) Oral daily PRN anxiety  ondansetron Injectable 4 milliGRAM(s) IV Push every 6 hours PRN Nausea and/or Vomiting  oxyCODONE    IR 10 milliGRAM(s) Oral every 6 hours PRN Severe Pain (7 - 10)  oxyCODONE    IR 5 milliGRAM(s) Oral every 6 hours PRN Moderate Pain (4 - 6)

## 2024-02-08 NOTE — PROGRESS NOTE ADULT - SUBJECTIVE AND OBJECTIVE BOX
OVERNIGHT EVENTS:    SUBJECTIVE / INTERVAL HPI: Patient seen and examined at bedside. Denies f/c, n/v, HA, chest pain, SOB, abdominal pain, diarrhea, constipation, melena, hematochezia, hematuria, dysuria    MEDICATIONS  (STANDING):  bictegravir 50 mG/emtricitabine 200 mG/tenofovir alafenamide 25 mG (BIKTARVY) 1 Tablet(s) Oral daily  iron sucrose IVPB 200 milliGRAM(s) IV Intermittent every 24 hours  lactated ringers. 1000 milliLiter(s) (100 mL/Hr) IV Continuous <Continuous>  psyllium Powder 1 Packet(s) Oral daily    MEDICATIONS  (PRN):  acetaminophen     Tablet .. 650 milliGRAM(s) Oral every 6 hours PRN Mild Pain (1 - 3)  ALPRAZolam 0.5 milliGRAM(s) Oral daily PRN anxiety  ondansetron Injectable 4 milliGRAM(s) IV Push every 6 hours PRN Nausea and/or Vomiting  oxyCODONE    IR 5 milliGRAM(s) Oral every 6 hours PRN Moderate Pain (4 - 6)  oxyCODONE    IR 10 milliGRAM(s) Oral every 6 hours PRN Severe Pain (7 - 10)    Allergies    No Known Allergies    Intolerances        VITAL SIGNS:  Vital Signs Last 24 Hrs  T(C): 36.8 (08 Feb 2024 08:47), Max: 37 (08 Feb 2024 00:14)  T(F): 98.3 (08 Feb 2024 08:47), Max: 98.6 (08 Feb 2024 00:14)  HR: 120 (08 Feb 2024 09:45) (104 - 132)  BP: 135/88 (08 Feb 2024 08:47) (107/71 - 146/76)  BP(mean): --  RR: 17 (08 Feb 2024 08:47) (16 - 18)  SpO2: 98% (08 Feb 2024 08:47) (96% - 98%)    Parameters below as of 08 Feb 2024 08:47  Patient On (Oxygen Delivery Method): room air          02-07-24 @ 07:01  -  02-08-24 @ 07:00  --------------------------------------------------------  IN: 700 mL / OUT: 950 mL / NET: -250 mL    02-08-24 @ 07:01  -  02-08-24 @ 09:55  --------------------------------------------------------  IN: 0 mL / OUT: 300 mL / NET: -300 mL        PHYSICAL EXAM:  General: NAD, Laying comfortably in bed  Neck: supple  Extremities: WWP, no edema, clubbing or cyanosis  Vascular: 2+ radial, DP/PT pulses B/L  Neurological: AAOx3, no focal deficits      LABS:              CAPILLARY BLOOD GLUCOSE              RADIOLOGY & ADDITIONAL TESTS: Reviewed. OVERNIGHT EVENTS: NATALIE    SUBJECTIVE / INTERVAL HPI: Patient seen and examined at bedside. Patient with continued rectal pain requiring all his PRN oxycodone. Otherwise, reports continued passage of blood clots. No other issues.     MEDICATIONS  (STANDING):  bictegravir 50 mG/emtricitabine 200 mG/tenofovir alafenamide 25 mG (BIKTARVY) 1 Tablet(s) Oral daily  iron sucrose IVPB 200 milliGRAM(s) IV Intermittent every 24 hours  lactated ringers. 1000 milliLiter(s) (100 mL/Hr) IV Continuous <Continuous>  psyllium Powder 1 Packet(s) Oral daily    MEDICATIONS  (PRN):  acetaminophen     Tablet .. 650 milliGRAM(s) Oral every 6 hours PRN Mild Pain (1 - 3)  ALPRAZolam 0.5 milliGRAM(s) Oral daily PRN anxiety  ondansetron Injectable 4 milliGRAM(s) IV Push every 6 hours PRN Nausea and/or Vomiting  oxyCODONE    IR 5 milliGRAM(s) Oral every 6 hours PRN Moderate Pain (4 - 6)  oxyCODONE    IR 10 milliGRAM(s) Oral every 6 hours PRN Severe Pain (7 - 10)    Allergies    No Known Allergies    Intolerances        VITAL SIGNS:  Vital Signs Last 24 Hrs  T(C): 36.8 (08 Feb 2024 08:47), Max: 37 (08 Feb 2024 00:14)  T(F): 98.3 (08 Feb 2024 08:47), Max: 98.6 (08 Feb 2024 00:14)  HR: 120 (08 Feb 2024 09:45) (104 - 132)  BP: 135/88 (08 Feb 2024 08:47) (107/71 - 146/76)  BP(mean): --  RR: 17 (08 Feb 2024 08:47) (16 - 18)  SpO2: 98% (08 Feb 2024 08:47) (96% - 98%)    Parameters below as of 08 Feb 2024 08:47  Patient On (Oxygen Delivery Method): room air          02-07-24 @ 07:01  -  02-08-24 @ 07:00  --------------------------------------------------------  IN: 700 mL / OUT: 950 mL / NET: -250 mL    02-08-24 @ 07:01  -  02-08-24 @ 09:55  --------------------------------------------------------  IN: 0 mL / OUT: 300 mL / NET: -300 mL        PHYSICAL EXAM:  General: NAD, Laying comfortably in bed  Extremities: WWP, no edema, clubbing or cyanosis  Neurological: AAOx3, no focal deficits      LABS:              CAPILLARY BLOOD GLUCOSE              RADIOLOGY & ADDITIONAL TESTS: Reviewed.

## 2024-02-08 NOTE — DIETITIAN INITIAL EVALUATION ADULT - NUTRITIONGOAL OUTCOME1
Pt to consistently meet at least 75% of EEE via tolerated route that is consistent with goals of care and will no longer exhibit s/s of malnutrition during hospital stay

## 2024-02-08 NOTE — DIETITIAN NUTRITION RISK NOTIFICATION - TREATMENT: THE FOLLOWING DIET HAS BEEN RECOMMENDED
Diet, Regular (02-08-24 @ 09:30) [Active]  Diet, NPO after Midnight:      NPO Start Date: 07-Feb-2024,   NPO Start Time: 23:59 (02-07-24 @ 15:27) [Active]

## 2024-02-08 NOTE — DIETITIAN INITIAL EVALUATION ADULT - NS FNS DIET ORDER
Diet, Regular (02-08-24 @ 09:30)  Diet, NPO after Midnight:      NPO Start Date: 07-Feb-2024,   NPO Start Time: 23:59 (02-07-24 @ 15:27)   Diet, Regular (02-08-24 @ 09:30)  Diet, NPO after Midnight:      NPO Start Date: 07-Feb-2024,   NPO Start Time: 23:59 (02-07-24 @ 15:27)

## 2024-02-09 ENCOUNTER — TRANSCRIPTION ENCOUNTER (OUTPATIENT)
Age: 44
End: 2024-02-09

## 2024-02-09 VITALS
DIASTOLIC BLOOD PRESSURE: 72 MMHG | OXYGEN SATURATION: 97 % | HEART RATE: 120 BPM | TEMPERATURE: 99 F | SYSTOLIC BLOOD PRESSURE: 120 MMHG | RESPIRATION RATE: 17 BRPM

## 2024-02-09 LAB — GLUCOSE BLDC GLUCOMTR-MCNC: 107 MG/DL — HIGH (ref 70–99)

## 2024-02-09 PROCEDURE — 96376 TX/PRO/DX INJ SAME DRUG ADON: CPT

## 2024-02-09 PROCEDURE — 84484 ASSAY OF TROPONIN QUANT: CPT

## 2024-02-09 PROCEDURE — 83735 ASSAY OF MAGNESIUM: CPT

## 2024-02-09 PROCEDURE — 80307 DRUG TEST PRSMV CHEM ANLYZR: CPT

## 2024-02-09 PROCEDURE — 86360 T CELL ABSOLUTE COUNT/RATIO: CPT

## 2024-02-09 PROCEDURE — 86359 T CELLS TOTAL COUNT: CPT

## 2024-02-09 PROCEDURE — 36415 COLL VENOUS BLD VENIPUNCTURE: CPT

## 2024-02-09 PROCEDURE — 80053 COMPREHEN METABOLIC PANEL: CPT

## 2024-02-09 PROCEDURE — 85027 COMPLETE CBC AUTOMATED: CPT

## 2024-02-09 PROCEDURE — 80048 BASIC METABOLIC PNL TOTAL CA: CPT

## 2024-02-09 PROCEDURE — 85730 THROMBOPLASTIN TIME PARTIAL: CPT

## 2024-02-09 PROCEDURE — 86355 B CELLS TOTAL COUNT: CPT

## 2024-02-09 PROCEDURE — 78815 PET IMAGE W/CT SKULL-THIGH: CPT | Mod: 26,PI

## 2024-02-09 PROCEDURE — 85610 PROTHROMBIN TIME: CPT

## 2024-02-09 PROCEDURE — 84100 ASSAY OF PHOSPHORUS: CPT

## 2024-02-09 PROCEDURE — 82378 CARCINOEMBRYONIC ANTIGEN: CPT

## 2024-02-09 PROCEDURE — 82962 GLUCOSE BLOOD TEST: CPT

## 2024-02-09 PROCEDURE — 78815 PET IMAGE W/CT SKULL-THIGH: CPT

## 2024-02-09 PROCEDURE — 81003 URINALYSIS AUTO W/O SCOPE: CPT

## 2024-02-09 PROCEDURE — 86900 BLOOD TYPING SEROLOGIC ABO: CPT

## 2024-02-09 PROCEDURE — 99233 SBSQ HOSP IP/OBS HIGH 50: CPT | Mod: GC

## 2024-02-09 PROCEDURE — 96374 THER/PROPH/DIAG INJ IV PUSH: CPT

## 2024-02-09 PROCEDURE — 87536 HIV-1 QUANT&REVRSE TRNSCRPJ: CPT

## 2024-02-09 PROCEDURE — 86357 NK CELLS TOTAL COUNT: CPT

## 2024-02-09 PROCEDURE — 83605 ASSAY OF LACTIC ACID: CPT

## 2024-02-09 PROCEDURE — 85025 COMPLETE CBC W/AUTO DIFF WBC: CPT

## 2024-02-09 PROCEDURE — 86850 RBC ANTIBODY SCREEN: CPT

## 2024-02-09 PROCEDURE — A9552: CPT

## 2024-02-09 PROCEDURE — 74174 CTA ABD&PLVS W/CONTRAST: CPT

## 2024-02-09 PROCEDURE — 99285 EMERGENCY DEPT VISIT HI MDM: CPT

## 2024-02-09 PROCEDURE — 86901 BLOOD TYPING SEROLOGIC RH(D): CPT

## 2024-02-09 RX ORDER — SODIUM CHLORIDE 9 MG/ML
500 INJECTION, SOLUTION INTRAVENOUS ONCE
Refills: 0 | Status: COMPLETED | OUTPATIENT
Start: 2024-02-09 | End: 2024-02-09

## 2024-02-09 RX ORDER — PSYLLIUM SEED (WITH DEXTROSE)
3.4 POWDER (GRAM) ORAL
Qty: 1 | Refills: 0
Start: 2024-02-09 | End: 2024-03-09

## 2024-02-09 RX ORDER — FERROUS SULFATE 325(65) MG
1 TABLET ORAL
Qty: 14 | Refills: 0
Start: 2024-02-09 | End: 2024-02-22

## 2024-02-09 RX ADMIN — OXYCODONE HYDROCHLORIDE 10 MILLIGRAM(S): 5 TABLET ORAL at 15:21

## 2024-02-09 RX ADMIN — ONDANSETRON 4 MILLIGRAM(S): 8 TABLET, FILM COATED ORAL at 06:29

## 2024-02-09 RX ADMIN — BICTEGRAVIR SODIUM, EMTRICITABINE, AND TENOFOVIR ALAFENAMIDE FUMARATE 1 TABLET(S): 30; 120; 15 TABLET ORAL at 12:33

## 2024-02-09 RX ADMIN — SODIUM CHLORIDE 1000 MILLILITER(S): 9 INJECTION, SOLUTION INTRAVENOUS at 00:24

## 2024-02-09 RX ADMIN — OXYCODONE HYDROCHLORIDE 10 MILLIGRAM(S): 5 TABLET ORAL at 08:54

## 2024-02-09 RX ADMIN — OXYCODONE HYDROCHLORIDE 10 MILLIGRAM(S): 5 TABLET ORAL at 03:36

## 2024-02-09 RX ADMIN — OXYCODONE HYDROCHLORIDE 10 MILLIGRAM(S): 5 TABLET ORAL at 09:54

## 2024-02-09 RX ADMIN — OXYCODONE HYDROCHLORIDE 10 MILLIGRAM(S): 5 TABLET ORAL at 02:36

## 2024-02-09 RX ADMIN — OXYCODONE HYDROCHLORIDE 10 MILLIGRAM(S): 5 TABLET ORAL at 16:21

## 2024-02-09 RX ADMIN — SODIUM CHLORIDE 1000 MILLILITER(S): 9 INJECTION, SOLUTION INTRAVENOUS at 05:47

## 2024-02-09 NOTE — DISCHARGE NOTE PROVIDER - PROVIDER RX CONTACT NUMBER
[phone] Pt. presents to ED for COVID swab. Pt. A&Ox4. Pt. asymptomatic and well-appearing. Pt. denies SOB, headache, fever/chills, abdominal pain, n/v/d, chest pain, weakness/fatigue, loss of smell/taste. VSS. COVID swab collected and sent to lab. Safety and comfort provided.

## 2024-02-09 NOTE — CHART NOTE - NSCHARTNOTEFT_GEN_A_CORE
Discussed case with multidisciplinary oncology team. Agree with plan for MRI and PET to stage disease. Given new adenopathy, concern for distant mets is elevated and would need to be ruled out before initiating any definitive local therapy.    David Owens  Rad Onc
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 Shortly he became lightheaded and nausea asnd decided to go to the ED. He denies any fevers, but does endorse chills. He does note he has been having intermittent stabbing rectal pain over the last month. Patient reports at baseline he is tacy to the 120-130s.     In the ED, patient was tachy to 136, normotensive, afebrile, Labs significant for Hgb 7.6, negative UA, postive THC, amphetamines, and opiates. Ct showed No signs of active bleeding. Large lobulated rectal tumor dissecting into adjacent soft tissues and bones overall similar in appearance to the previous study. There is a moderate constipation pattern.   (03 Feb 2024 06:01)    PERTINENT PM/SXH:   HIV (human immunodeficiency virus infection)    Abscess    IVDU (intravenous drug user)    Anemia    HIV (human immunodeficiency virus infection)    Rectal cancer      No significant past surgical history    No significant past surgical history    H/O rectal polypectomy      FAMILY HISTORY:  FH: CAD (coronary artery disease) (Mother)      ITEMS NOT CHECKED ARE NOT PRESENT    SOCIAL HISTORY:   Significant other/partner:  []  Children:  []   Substance hx:  [x]   Tobacco hx:  []   Alcohol hx: []   Home Opioid hx:  [] I-Stop Reference No:  - no active Rx's / see chart note  Living Situation: [x]Home  []Long term care  []Rehab []Other  Buddhist/Spiritual practice: ; Role of organized [employer]ism [ ] important [ ]x some [ ] unable to assess  Coping: [ ] well [x ] with difficulty [ ] poor coping [ ] unable to assess  Support system: [ ] strong [x ] adequate [ ] inadequate    ADVANCE DIRECTIVES:    []MOLST  []Living Will  DECISION MAKER(s):  [] Health Care Proxy(s)  [] Surrogate(s)  [] Guardian           Name(s)/Phone Number(s): none    BASELINE (I)ADLs (prior to admission):  [place]: [x]Total  [] Moderate []Dependent    ALLERGIES:  No Known Allergies    MEDICATIONS  (STANDING):    MEDICATIONS  (PRN):    PRESENT SYMPTOMS: []Unable to obtain due to poor mentation/encephalopathy  Source if other than patient:  []Family   []Team     Pain: [x ] yes [ ] no  QOL impact - unable to do ADLs  Location -  rectum                  Aggravating Factors - movement  Quality - sharp  Radiation - none  Timing - intermittent  Severity (0-10 scale) - 7  Minimal Acceptable Level (0-10 scale) - 5    PAIN AD Score:  http://geriatrictoolkit.[facility]/cog/painad.pdf (press ctrl +  left click to view)    Dyspnea:                           [ ]Mild  [ ]Moderate [ ]Severe  Anxiety:                             [ ]Mild [ ]Moderate [ ]Severe  Fatigue:                             [ ]Mild [ ]Moderate [ ]Severe  Nausea:                             [ ]Mild [ ]Moderate [ ]Severe  Loss of Appetite:             [ ]Mild [ ]Moderate [ ]Severe  Constipation:                   [ ]Mild [ ]Moderate [ ]Severe    Other Symptoms:  [x ]All other review of systems negative     Palliative Performance Status Version 2: 70-80 %    http://npcrc.org/files/news/palliative_performance_scale_ppsv2.pdf    PHYSICAL EXAM:  GENERAL:  [ x]Alert  [ ]Oriented x   [ ]Lethargic  [ ]Cachexia  [ ]Unarousable  [ ]Verbal  [ ]Non-Verbal  Behavioral:   [ ]Anxiety  [ ]Delirium [ ]Agitation [x ]Cooperative  HEENT:  [ ]Normal   [ x]Dry mouth   [ ]ET Tube/Trach  [ ]Oral lesions  PULMONARY:   [ x]Clear []Tachypnea  []Audible excessive secretions   [ ]Rhonchi        [ ]Right [ ]Left [ ]Bilateral  [ ]Crackles        [ ]Right [ ]Left [ ]Bilateral  [ ]Wheezing     [ ]Right [ ]Left [ ]Bilateral  CARDIOVASCULAR:    [x ]Regular [ ]Irregular [ ]Tachy  [ ]Ethan [ ]Murmur [ ]Other  GASTROINTESTINAL:  [x ]Soft  [ ]Distended   [x ]+BS  [ x]Non tender [ ]Tender  [ ]PEG [ ]OGT/ NGT  Last BM:   GENITOURINARY:  [ x]Normal [ ] Incontinent   [ ]Oliguria/Anuria   [ ][place]  MUSCULOSKELETAL:   [ ]Normal   [ x]Weakness  [ ]Bed/Wheelchair bound [ ]Edema  NEUROLOGIC:   [x ]No focal deficits  [ ]Cognitive impairment  [ ]Dysphagia [ ]Dysarthria [ ]Paresis [ ]Encephalopathic   SKIN:   [ x]Normal   [ ]Pressure ulcer(s)  [ ]Rash    CRITICAL CARE:  [ ]Shock Present  [ ]Septic [ ]Cardiogenic [ ]Neurologic [ ]Hypovolemic  [ ]Vasopressors [ ]Inotropes   [ ]Respiratory failure present [ ]Mechanical Ventilation [ ]Non-invasive ventilatory support [ ]High-Flow  [ ]Acute  [ ]Chronic [ ]Hypoxic  [ ]Hypercarbic  [ ]Other organ failure    Vital Signs Last 24 Hrs  T(C): 36.8 (06 Feb 2024 12:02), Max: 37.1 (05 Feb 2024 18:11)  T(F): 98.3 (06 Feb 2024 12:02), Max: 98.7 (05 Feb 2024 18:11)  HR: 113 (06 Feb 2024 12:02) (110 - 122)  BP: 117/74 (06 Feb 2024 12:02) (104/67 - 131/89)  BP(mean): 86 (06 Feb 2024 00:28) (86 - 86)  RR: 17 (06 Feb 2024 12:02) (16 - 17)  SpO2: 96% (06 Feb 2024 12:02) (96% - 100%)     I&O's Summary      LABS:                        8.3    10.32 )-----------( 504      ( 06 Feb 2024 04:46 )             27.7     02-06    135  |  100  |  11  ----------------------------<  138<H>  4.0   |  24  |  0.73    Ca    9.0      06 Feb 2024 04:46  Phos  4.4     02-06  Mg     2.0     02-06        RADIOLOGY & ADDITIONAL STUDIES:      PROTEIN CALORIE MALNUTRITION PRESENT: [ ]mild [ ]moderate [ ]severe [ ]underweight [ ]morbid obesity  [ ]PPSV2 < or = to 30% [ ]significant weight loss  [ ]poor nutritional intake [ ]catabolic state [ ]anasarca     Artificial Nutrition [ ]     REFERRALS:  [x]Social Work  [ ]Case management [ ]PT/OT [ ]Chaplaincy  [ ]Hospice  [ ]Patient/Family Support    DISCUSSION OF CASE: Family - to obtain additional history and to provide emotional support; ( ) -    AP: 43 M with invasive anal carcinoma, presented with rectal bleeding, neoplasm related pain, encounter for palliative care.    1) Rectal Carcinoma:  - Patient being followed by Oncology.  - Awaiting input from Radiation Medicine.  - Patient with good performance status. Appropriate for disease targeted therapies.  2) Neoplasm Related Pain:  - Oxycodone 5mg PO Q4 hours prn.  - Bowel regimen on discharge.  3) Encounter for palliative care:  - .  Complex medical decision making / symptom management in the setting of locally invasive rectal cancer and neoplasm related pain.    Patient should follow up with Dr. Allison Taylor (Outpatient Palliative Care) upon discharge.    Will continue to follow for ongoing GOC discussion / titration of palliative regimen. Emotional support provided, questions answered.  Active Psychosocial Referrals:  [x]Social Work/Case management []PT/OT []Chaplaincy []Hospice  []Patient/Family Support []Holistic RN []Massage Therapy []Music Therapy []Ethics  Coping: [] well [] with difficulty [] poor coping [] unable to assess  Support system: [] strong [] adequate [] inadequate    For new or uncontrolled symptoms, please call Palliative Care at [phone]). The service is available 24/7 (including nights & weekends) to provide symptom management recommendations over the phone as appropriate

## 2024-02-09 NOTE — PROGRESS NOTE ADULT - SUBJECTIVE AND OBJECTIVE BOX
INTERVAL HPI/OVERNIGHT EVENTS: tachy 134 s/p pain med 120s. EKG sinus tachy. S/p 500cc bolus x1 HR improved to 113. AM  asym x1 500cc    SUBJECTIVE: Pt seen and examined at bedside this am by surgery team. No acute complaints. Axmatic tachycardia, VSS. Pain well controlled. Denies f/n/v/cp/sob.      MEDICATIONS  (STANDING):  bictegravir 50 mG/emtricitabine 200 mG/tenofovir alafenamide 25 mG (BIKTARVY) 1 Tablet(s) Oral daily  lactated ringers. 1000 milliLiter(s) (100 mL/Hr) IV Continuous <Continuous>  psyllium Powder 1 Packet(s) Oral daily      MEDICATIONS  (PRN):  acetaminophen     Tablet .. 650 milliGRAM(s) Oral every 6 hours PRN Mild Pain (1 - 3)  ALPRAZolam 0.5 milliGRAM(s) Oral daily PRN anxiety  ondansetron Injectable 4 milliGRAM(s) IV Push every 6 hours PRN Nausea and/or Vomiting  oxyCODONE    IR 5 milliGRAM(s) Oral every 6 hours PRN Moderate Pain (4 - 6)  oxyCODONE    IR 10 milliGRAM(s) Oral every 6 hours PRN Severe Pain (7 - 10)    Vital Signs Last 24 Hrs  T(C): 36.8 (09 Feb 2024 08:50), Max: 37.2 (08 Feb 2024 16:57)  T(F): 98.3 (09 Feb 2024 08:50), Max: 98.9 (08 Feb 2024 16:57)  HR: 122 (09 Feb 2024 08:50) (113 - 134)  BP: 121/83 (09 Feb 2024 08:50) (114/67 - 145/80)  BP(mean): 93 (09 Feb 2024 05:01) (82 - 93)  RR: 16 (09 Feb 2024 08:50) (16 - 18)  SpO2: 97% (09 Feb 2024 08:50) (94% - 98%)    Parameters below as of 09 Feb 2024 08:50  Patient On (Oxygen Delivery Method): room air      PHYSICAL EXAM:    Constitutional: A&Ox3, NAD    Respiratory: non labored breathing, no respiratory distress    Cardiovascular: NSR, RRR    Gastrointestinal: abdomen soft, nd, nt. No R/G.    Extremities: wwp, no calf tenderness or edema. SCDs in place       I&O's Detail    08 Feb 2024 07:01  -  09 Feb 2024 07:00  --------------------------------------------------------  IN:    IV PiggyBack: 100 mL    Lactated Ringers: 1700 mL    Oral Fluid: 1200 mL  Total IN: 3000 mL    OUT:    Voided (mL): 3100 mL  Total OUT: 3100 mL    Total NET: -100 mL      09 Feb 2024 07:01  -  09 Feb 2024 09:57  --------------------------------------------------------  IN:    Lactated Ringers: 300 mL  Total IN: 300 mL    OUT:    Voided (mL): 300 mL  Total OUT: 300 mL    Total NET: 0 mL          LABS:                RADIOLOGY & ADDITIONAL STUDIES:

## 2024-02-09 NOTE — PROGRESS NOTE ADULT - NUTRITIONAL ASSESSMENT
This patient has been assessed with a concern for Malnutrition and has been determined to have a diagnosis/diagnoses of Severe protein-calorie malnutrition.    This patient is being managed with:   Diet NPO-  Entered: Feb 9 2024  7:25AM

## 2024-02-09 NOTE — PROGRESS NOTE ADULT - ATTENDING COMMENTS
43M MSM Hx HIV (12/2023 CD4 266 (BALDO likely 130), VLUD, on Biktarvy), Latent syphilis ([id number] RPR 1:8 serofast), MRSA abdominal abscess 2021, Polysubstance abuse (02/2024 UTox +THC, Amphetamines, Opiates), Sinus tachycardia, AARTI, Invasive anal squamous cell carcinoma (Diagnosed 12/2023, left AMA). P/w x1m intermittent rectal pain & x1d rectal bleeding intermittent stabbing rectal pain over the last month. Admitted to Surgery ( 2/3) for evaluation rectal bleeding and cancer progression.       CC: Pt seen w. . Reports oxycodone 10mg relief the pain 7-> 1 but only lasts 5 hours. Pt also expresses concerns about pelvic MRI since he has to lay flat on the table that he is requesting adequate pre-medication with pain     #Invasive anal squamous cell carcinoma  Diagnosed 12/2023, left AMA. 01/2024 CTAP: No signs of active bleeding. Large lobulated rectal tumor dissecting into adjacent soft tissues and bones overall similar in appearance to the previous study. There is a moderate constipation pattern. No surgical intervention per surgery  - f/u HemeOnc recs  - f/u rad onc recs. Pending pelvic MRI given limited study to assess lymph nodes. Please provide patient with IV dilaudid 1 mg right before his transport to MRI. Patient mentions he is claustrophobic and experiences rectal pain when laying on his back.   - Pain management and goals of care per palliative team   - Continue bowel regimen (Miralax, Senna)    #Symptomatic AARTI  Adm hgb 7.2-7.6 (2021 hgb 11.4). C/o 1mth passing clots per rectum, now BRBPR. 12/2023 Ferritin 109 (likely overstated iso malignancy), Iron sat 10%.  - Active T+S, transfuse to keep hgb >7  -Day 3 of 5 of IV iron (IV sucrose 200mg x5 days). Consider discharge on PO Iron 65mg QOD w/ bowel regimen    #HIV  12/2023 CD4 266 (BALDO 130), VLUD. urine Ch/Gc negative.  - c/w home Biktarvy    Med consult team will follow pt with you, thank you.
43M MSM Hx HIV (12/2023 CD4 266 (BALDO likely 130), VLUD, on Biktarvy), Latent syphilis ([id number] RPR 1:8 serofast), MRSA abdominal abscess 2021, Polysubstance abuse (02/2024 UTox +THC, Amphetamines, Opiates), Sinus tachycardia, AARTI, Invasive anal squamous cell carcinoma (Diagnosed 12/2023, left AMA). P/w x1m intermittent rectal pain & x1d rectal bleeding intermittent stabbing rectal pain over the last month. Admitted to Surgery ( 2/3) for evaluation rectal bleeding and cancer progression.     CC: Pt seen w. Dr. Watters. pain controlled on oxycodone 10mg q6hr. Awaiting PET/CT scan will need pre-medication     #Invasive anal squamous cell carcinoma  Diagnosed 12/2023, left AMA. 01/2024 CTAP: No signs of active bleeding. Large lobulated rectal tumor dissecting into adjacent soft tissues and bones overall similar in appearance to the previous study. There is a moderate constipation pattern. No surgical intervention per surgery  - f/u Heme Onc recs appreciated   - f/u Rad Onc recs. ( reviewed) Dr. David Hobson, rec: total 54Gy in 30 sessions at outpt   - Pending PET/CT scan for staging. Please provide patient with IV dilaudid 1 mg right before his transport to MRI. Patient mentions he is claustrophobic and experiences rectal pain when laying on his back.   - Pain management and goals of care per palliative team   - Continue bowel regimen (Miralax, Senna)    #Symptomatic AARTI  Adm hgb 7.2-7.6 (2021 hgb 11.4). C/o 1mth passing clots per rectum, now BRBPR. 12/2023 Ferritin 109 (likely overstated iso malignancy), Iron sat 10%.  - trend Hgb 8.3 (2/7), Active T+S, transfuse to keep hgb >7  -Day 4 of 5 of IV iron (IV sucrose 200mg x5 days). Consider discharge on PO Iron 65mg QOD w/ bowel regimen    #HIV  12/2023 CD4 266 (BALDO 130), VLUD. urine Ch/Gc negative.  - c/w home Biktarvy    # Sinus Tachycardia   - s/p 500ml fluid bolus . HR ( ST) 127-> 99       Med consult team will follow pt with you, thank you.
43M MSM Hx HIV (12/2023 CD4 266 (BALDO likely 130), VLUD, on Biktarvy), Latent syphilis ([id number] RPR 1:8 serofast), MRSA abdominal abscess 2021, Polysubstance abuse (02/2024 UTox +THC, Amphetamines, Opiates), Sinus tachycardia, AARTI, Invasive anal squamous cell carcinoma (Diagnosed 12/2023, left AMA). P/w x1m intermittent rectal pain & x1d rectal bleeding intermittent stabbing rectal pain over the last month. Admitted to Surgery ( 2/3) for evaluation rectal bleeding and cancer progression.     CC: Pt seen w. Dr. Watters this am. pain controlled on oxycodone 10mg q6hr. Awaiting PET/CT scan w/ pre-medication this afternoon     #Invasive anal squamous cell carcinoma  Diagnosed 12/2023, left AMA. 01/2024 CTAP: No signs of active bleeding. Large lobulated rectal tumor dissecting into adjacent soft tissues and bones overall similar in appearance to the previous study. There is a moderate constipation pattern. No surgical intervention per surgery  - f/u Heme Onc recs appreciated   - f/u Rad Onc recs. ( reviewed) Dr. David Hobson, rec: total 54Gy in 30 sessions at outpt   - Pending PET/CT scan this afternoon  for staging. Please provide patient with IV dilaudid 1 mg right before his transport to MRI. Patient mentions he is claustrophobic and experiences rectal pain when laying on his back.   - Pain management and goals of care per palliative team   - Continue bowel regimen (Miralax, Senna)    #Symptomatic AARTI  Adm hgb 7.2-7.6 (2021 hgb 11.4). C/o 1mth passing clots per rectum, now BRBPR. 12/2023 Ferritin 109 (likely overstated iso malignancy), Iron sat 10%.  - trend Hgb 8.3 (2/7), Active T+S, transfuse to keep hgb >7  -Day 4 of 5 of IV iron (IV sucrose 200mg x5 days). Consider discharge on PO Iron 65mg QOD w/ bowel regimen    #HIV  12/2023 CD4 266 (BALDO 130), VLUD. urine Ch/Gc negative.  - c/w home Biktarvy    # Sinus Tachycardia   - noted -134, pt attributed from being exciting with friends, denies palpitation, rectal bleed, fever, moderate/severe pain,etc.     Disposition: d/c plan per surgery     Med consult team will follow pt with you, thank you.
43M MSM Hx HIV (12/2023 CD4 266 (BALDO likely 130), VLUD, on Biktarvy), Latent syphilis ([id number] RPR 1:8 serofast), MRSA abdominal abscess 2021, Polysubstance abuse (02/2024 UTox +THC, Amphetamines, Opiates), Sinus tachycardia, AARTI, Invasive anal squamous cell carcinoma (Diagnosed 12/2023, left AMA). P/w x1m intermittent rectal pain & x1d rectal bleeding intermittent stabbing rectal pain over the last month. Admitted to Surgery ( 2/3) for evaluation rectal bleeding and cancer progression.     CC: Pt seen w. Dr. Watters. Reports oxycodone 10mg q6hr relief the pain. Pt now was told he would get PET/CT scan instead of pelvic MRI, but will need pre-medication     #Invasive anal squamous cell carcinoma  Diagnosed 12/2023, left AMA. 01/2024 CTAP: No signs of active bleeding. Large lobulated rectal tumor dissecting into adjacent soft tissues and bones overall similar in appearance to the previous study. There is a moderate constipation pattern. No surgical intervention per surgery  - f/u Heme Onc recs appreciated   - f/u Rad Onc recs. ( reviewed) Dr. David Hobson, rec: total 54Gy in 30 sessions at outpt   - Pending PET/CT scan for staging. Please provide patient with IV dilaudid 1 mg right before his transport to MRI. Patient mentions he is claustrophobic and experiences rectal pain when laying on his back.   - Pain management and goals of care per palliative team   - Continue bowel regimen (Miralax, Senna)    #Symptomatic AARTI  Adm hgb 7.2-7.6 (2021 hgb 11.4). C/o 1mth passing clots per rectum, now BRBPR. 12/2023 Ferritin 109 (likely overstated iso malignancy), Iron sat 10%.  - trend Hgb 8.3 (2/7), Active T+S, transfuse to keep hgb >7  -Day 4 of 5 of IV iron (IV sucrose 200mg x5 days). Consider discharge on PO Iron 65mg QOD w/ bowel regimen    #HIV  12/2023 CD4 266 (BALDO 130), VLUD. urine Ch/Gc negative.  - c/w home Biktarvy    # Sinus Tachycardia   - s/p 500ml fluid bolus . HR ( ST) 127-> 99       Med consult team will follow pt with you, thank you.

## 2024-02-09 NOTE — PROGRESS NOTE ADULT - SUBJECTIVE AND OBJECTIVE BOX
OVERNIGHT EVENTS:    SUBJECTIVE / INTERVAL HPI: Patient seen and examined at bedside. Denies f/c, n/v, HA, chest pain, SOB, abdominal pain, diarrhea, constipation, melena, hematochezia, hematuria, dysuria    MEDICATIONS  (STANDING):  bictegravir 50 mG/emtricitabine 200 mG/tenofovir alafenamide 25 mG (BIKTARVY) 1 Tablet(s) Oral daily  lactated ringers. 1000 milliLiter(s) (100 mL/Hr) IV Continuous <Continuous>  psyllium Powder 1 Packet(s) Oral daily    MEDICATIONS  (PRN):  acetaminophen     Tablet .. 650 milliGRAM(s) Oral every 6 hours PRN Mild Pain (1 - 3)  ALPRAZolam 0.5 milliGRAM(s) Oral daily PRN anxiety  ondansetron Injectable 4 milliGRAM(s) IV Push every 6 hours PRN Nausea and/or Vomiting  oxyCODONE    IR 5 milliGRAM(s) Oral every 6 hours PRN Moderate Pain (4 - 6)  oxyCODONE    IR 10 milliGRAM(s) Oral every 6 hours PRN Severe Pain (7 - 10)    Allergies    No Known Allergies    Intolerances        VITAL SIGNS:  Vital Signs Last 24 Hrs  T(C): 36.8 (09 Feb 2024 08:50), Max: 37.2 (08 Feb 2024 16:57)  T(F): 98.3 (09 Feb 2024 08:50), Max: 98.9 (08 Feb 2024 16:57)  HR: 122 (09 Feb 2024 08:50) (113 - 134)  BP: 121/83 (09 Feb 2024 08:50) (114/67 - 145/80)  BP(mean): 93 (09 Feb 2024 05:01) (82 - 93)  RR: 16 (09 Feb 2024 08:50) (16 - 18)  SpO2: 97% (09 Feb 2024 08:50) (94% - 98%)    Parameters below as of 09 Feb 2024 08:50  Patient On (Oxygen Delivery Method): room air          02-08-24 @ 07:01  -  02-09-24 @ 07:00  --------------------------------------------------------  IN: 3000 mL / OUT: 3100 mL / NET: -100 mL        PHYSICAL EXAM:  General: NAD, Laying comfortably in bed  Extremities: WWP, no edema, clubbing or cyanosis  Neurological: AAOx3, no focal deficits      LABS:              CAPILLARY BLOOD GLUCOSE              RADIOLOGY & ADDITIONAL TESTS: Reviewed. OVERNIGHT EVENTS: NATALIE    SUBJECTIVE / INTERVAL HPI: Patient seen and examined at bedside. Patient reports being up late last night and his rectal pain being less under control. Otherwise, no more rectal bleeding since last night. In good spirits.     MEDICATIONS  (STANDING):  bictegravir 50 mG/emtricitabine 200 mG/tenofovir alafenamide 25 mG (BIKTARVY) 1 Tablet(s) Oral daily  lactated ringers. 1000 milliLiter(s) (100 mL/Hr) IV Continuous <Continuous>  psyllium Powder 1 Packet(s) Oral daily    MEDICATIONS  (PRN):  acetaminophen     Tablet .. 650 milliGRAM(s) Oral every 6 hours PRN Mild Pain (1 - 3)  ALPRAZolam 0.5 milliGRAM(s) Oral daily PRN anxiety  ondansetron Injectable 4 milliGRAM(s) IV Push every 6 hours PRN Nausea and/or Vomiting  oxyCODONE    IR 5 milliGRAM(s) Oral every 6 hours PRN Moderate Pain (4 - 6)  oxyCODONE    IR 10 milliGRAM(s) Oral every 6 hours PRN Severe Pain (7 - 10)    Allergies    No Known Allergies    Intolerances        VITAL SIGNS:  Vital Signs Last 24 Hrs  T(C): 36.8 (09 Feb 2024 08:50), Max: 37.2 (08 Feb 2024 16:57)  T(F): 98.3 (09 Feb 2024 08:50), Max: 98.9 (08 Feb 2024 16:57)  HR: 122 (09 Feb 2024 08:50) (113 - 134)  BP: 121/83 (09 Feb 2024 08:50) (114/67 - 145/80)  BP(mean): 93 (09 Feb 2024 05:01) (82 - 93)  RR: 16 (09 Feb 2024 08:50) (16 - 18)  SpO2: 97% (09 Feb 2024 08:50) (94% - 98%)    Parameters below as of 09 Feb 2024 08:50  Patient On (Oxygen Delivery Method): room air          02-08-24 @ 07:01  -  02-09-24 @ 07:00  --------------------------------------------------------  IN: 3000 mL / OUT: 3100 mL / NET: -100 mL        PHYSICAL EXAM:  General: NAD, Laying comfortably in bed  Extremities: WWP, no edema, clubbing or cyanosis  Neurological: AAOx3, no focal deficits      LABS:              CAPILLARY BLOOD GLUCOSE              RADIOLOGY & ADDITIONAL TESTS: Reviewed.

## 2024-02-09 NOTE — DISCHARGE NOTE PROVIDER - PROVIDER TOKENS
PROVIDER:[TOKEN:[[id number]],SCHEDULEDAPPT:[02/13/2024],SCHEDULEDAPPTTIME:[01:00 PM]],PROVIDER:[TOKEN:[[id number]],FOLLOWUP:[2 weeks]]

## 2024-02-09 NOTE — DISCHARGE NOTE PROVIDER - NSDCFUADDINST_GEN_ALL_CORE_FT
Follow up with Dr. Julian on 2/13 at 12:30pm. You may resume regular diet. You should be urinating at least 3-4x per day. Call the office if you experience increasing abdominal pain, nausea, vomiting, or temperature >101 F.     Follow up with Dr. Cortez within 1-2 weeks.     Warning Signs:  Please call your doctor if you experience the following:  *You experience new chest pain, pressure, squeezing or tightness.  *New or worsening cough, shortness of breath, or wheeze.  *If you are vomiting and cannot keep down fluids or your medications.  *You are getting dehydrated due to continued vomiting, diarrhea, or other reasons. Signs of dehydration include dry mouth, rapid heartbeat, or feeling dizzy or faint when standing.  *You see blood or dark/black material when you vomit or have a bowel movement.  *You experience burning when you urinate, have blood in your urine, or experience a discharge.  *Your pain is not improving within 8-12 hours or is not gone within 24 hours. Call or return immediately if your pain is getting worse, changes location, or moves to your chest or back.  *You have shaking chills, or fever greater than 101.5 degrees Fahrenheit or 38 degrees Celsius.  *Any change in your symptoms, or any new symptoms that concern you.   Follow up with Dr. Julian on 2/13 at 12:30pm. You may resume regular diet. You should be urinating at least 3-4x per day. Call the office if you experience increasing abdominal pain, nausea, vomiting, or temperature >101 F.     Follow up with Dr. Cortez within 1-2 weeks. F/u in office for PET scan results. A PET was obtained on 2/9/24.    Warning Signs:  Please call your doctor if you experience the following:  *You experience new chest pain, pressure, squeezing or tightness.  *New or worsening cough, shortness of breath, or wheeze.  *If you are vomiting and cannot keep down fluids or your medications.  *You are getting dehydrated due to continued vomiting, diarrhea, or other reasons. Signs of dehydration include dry mouth, rapid heartbeat, or feeling dizzy or faint when standing.  *You see blood or dark/black material when you vomit or have a bowel movement.  *You experience burning when you urinate, have blood in your urine, or experience a discharge.  *Your pain is not improving within 8-12 hours or is not gone within 24 hours. Call or return immediately if your pain is getting worse, changes location, or moves to your chest or back.  *You have shaking chills, or fever greater than 101.5 degrees Fahrenheit or 38 degrees Celsius.  *Any change in your symptoms, or any new symptoms that concern you.   Follow up with Dr. Julian on 2/13 at 12:30pm. You may resume regular diet. You should be urinating at least 3-4x per day. Call the office if you experience increasing abdominal pain, nausea, vomiting, or temperature >101 F.     Follow up with Dr. Cortez within 1-2 weeks. F/u in office for PET scan results. A PET was obtained on 2/9/24.    Warning Signs:  Please call your doctor if you experience the following:  *You experience new chest pain, pressure, squeezing or tightness.  *New or worsening cough, shortness of breath, or wheeze.  *If you are vomiting and cannot keep down fluids or your medications.  *You are getting dehydrated due to continued vomiting, diarrhea, or other reasons. Signs of dehydration include dry mouth, rapid heartbeat, or feeling dizzy or faint when standing.  *You see blood or dark/black material when you vomit or have a bowel movement.  *You experience burning when you urinate, have blood in your urine, or experience a discharge.  *Your pain is not improving within 8-12 hours or is not gone within 24 hours. Call or return immediately if your pain is getting worse, changes location, or moves to your chest or back.  *You have shaking chills, or fever greater than 101.5 degrees Fahrenheit or 38 degrees Celsius.  *Any change in your symptoms, or any new symptoms that concern you.    New meds:  Start PO ferrous sulfate as directed  Start metamucil as directed  Take over the counter tylenol 1000mg every 6 hours as needed for moderate to severe pain. Do not exceed 4000mg of tylenol in 24 hours.

## 2024-02-09 NOTE — PROGRESS NOTE ADULT - ASSESSMENT
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 He does note he has been having intermittent stabbing rectal pain over the last month. Patient reports at baseline he is tacy to the 120-130s. Labs significant for Hgb 7.6, negative UA, postive THC, amphetamines, and opiates.CT showed No signs of active bleeding. Large lobulated rectal tumor dissecting into adjacent soft tissues and bones overall similar in appearance to the previous study. There is a moderate constipation pattern. Admitted for further evaluation of rectal bleeding and cancer progression.     NPO for PET today  Pain and nausea control PRN  Home meds   SCDs; Hold lovenox   AM labs  heme/onc, rad/onc consults, appreciate recs

## 2024-02-09 NOTE — DISCHARGE NOTE PROVIDER - CARE PROVIDER_API CALL
Nabila Julian [facility]  [address]  [place] [address]  Phone: [phone]  Fax: [phone]  Scheduled Appointment: 02/13/2024 01:00 PM    Juan Antonio Cortez  Colon/Rectal Surgery  [address] [facility]  [place] [address]  Phone: [phone]  Fax: [phone]  Follow Up Time: 2 weeks

## 2024-02-09 NOTE — PROGRESS NOTE ADULT - REASON FOR ADMISSION
rectal bleeding

## 2024-02-09 NOTE — DISCHARGE NOTE NURSING/CASE MANAGEMENT/SOCIAL WORK - NSDCPEEMAIL_GEN_ALL_CORE
[facility] for Tobacco Control email [email or web address]

## 2024-02-09 NOTE — DISCHARGE NOTE PROVIDER - NSDCCPCAREPLAN_GEN_ALL_CORE_FT
PRINCIPAL DISCHARGE DIAGNOSIS  Diagnosis: Rectal cancer  Assessment and Plan of Treatment:       SECONDARY DISCHARGE DIAGNOSES  Diagnosis: Anemia  Assessment and Plan of Treatment:     Diagnosis: Rectal cancer  Assessment and Plan of Treatment:     Diagnosis: Rectal bleeding  Assessment and Plan of Treatment:

## 2024-02-09 NOTE — PROGRESS NOTE ADULT - PROVIDER SPECIALTY LIST ADULT
MD ATTESTATION NOTE    The CHINYERE and I have discussed this patient's history, physical exam, and treatment plan.  I have reviewed the documentation and personally had a face to face interaction with the patient. I affirm the documentation and agree with the treatment and plan.  The attached note describes my personal findings.      I provided a substantive portion of the care of the patient.  I personally performed the physical exam in its entirety, and below are my findings.        Brief HPI: This patient is a 77-year-old male with a history of CKD, substance abuse, as well as alcohol abuse presenting to the emergency room today with confusion and mental status changes.  He denies headache, chest pain, shortness of breath, or nausea/vomiting.      PHYSICAL EXAM  ED Triage Vitals [02/04/24 0346]   Temp Heart Rate Resp BP SpO2   98.4 °F (36.9 °C) 68 16 147/66 100 %      Temp src Heart Rate Source Patient Position BP Location FiO2 (%)   Tympanic Monitor -- -- --         GENERAL: Resting comfortably and in no acute distress, nontoxic in appearance  HENT: nares patent  EYES: no scleral icterus  CV: regular rhythm, normal rate, no M/R/G  RESPIRATORY: normal effort, lungs clear bilaterally  ABDOMEN: soft, nontender, no rebound or guarding  MUSCULOSKELETAL: no deformity, no edema  NEURO: alert, moves all extremities, follows commands  PSYCH:  calm, cooperative  SKIN: warm, dry    Vital signs and nursing notes reviewed.      Differential diagnosis includes but is not limited to acute CVA, TIA, urinary tract infection, hepatic encephalopathy, alcohol intoxication, alcohol withdrawal, or severe electrolyte disturbance.      Plan: We will obtain labs, urinalysis, head CT, as well as chest x-ray in the ED today for evaluation.  We will monitor and reassess following.      CT scan of the patient's hip was independently interpreted by myself with my interpretation showing no area of acute ischemia/infarct, hemorrhage, or mass 
Surgery
Heme/Onc
Internal Medicine
effect.       Kev Molina MD  02/04/24 0616    
Colorectal Surgery
Internal Medicine
Surgery
Internal Medicine
Surgery

## 2024-02-09 NOTE — DISCHARGE NOTE NURSING/CASE MANAGEMENT/SOCIAL WORK - PATIENT PORTAL LINK FT
You can access the FollowMyHealth Patient Portal offered by [facility] by registering at the following website: [email or web address]. By joining [employer]’s FollowMyHealth portal, you will also be able to view your health information using other applications (apps) compatible with our system.
full weight-bearing

## 2024-02-09 NOTE — DISCHARGE NOTE PROVIDER - NSDCMRMEDTOKEN_GEN_ALL_CORE_FT
ALPRAZolam 0.5 mg oral tablet: 1 tab(s) orally once a day as needed for  anxiety MDD: 1  bictegravir/emtricitabine/tenofovir 50 mg-200 mg-25 mg oral tablet: 1 tab(s) orally once a day   ALPRAZolam 0.5 mg oral tablet: 1 tab(s) orally once a day as needed for  anxiety MDD: 1  bictegravir/emtricitabine/tenofovir 50 mg-200 mg-25 mg oral tablet: 1 tab(s) orally once a day  ferrous sulfate 200 mg (65 mg elemental iron) oral tablet: 1 tab(s) orally once a day  psyllium 3.4 g/5.4 g oral powder for reconstitution: 3.4 gram(s) orally once a day

## 2024-02-09 NOTE — DISCHARGE NOTE PROVIDER - NSDCFUSCHEDAPPT_GEN_ALL_CORE_FT
Nabila Julian  [facility]  [facility] [address]  Scheduled Appointment: 02/13/2024

## 2024-02-09 NOTE — PROGRESS NOTE ADULT - ASSESSMENT
43M MSM Hx HIV (12/2023 CD4 266 (BALDO likely 130), VLUD, on Biktarvy), Latent syphilis ([id number] RPR 1:8 serofast), MRSA abdominal abscess 2021, Polysubstance abuse (02/2024 UTox +THC, Amphetamines, Opiates), Sinus tachycardia, AARTI, Invasive anal squamous cell carcinoma (Diagnosed 12/2023, left AMA). P/w x1m intermittent rectal pain & x1d rectal bleeding intermittent stabbing rectal pain over the last month. Admitted to Surgery for evaluation rectal bleeding and cancer progression.     #Invasive anal squamous cell carcinoma  Diagnosed 12/2023, left AMA. 01/2024 CTAP: No signs of active bleeding. Large lobulated rectal tumor dissecting into adjacent soft tissues and bones overall similar in appearance to the previous study. There is a moderate constipation pattern. No surgical intervention per surgery  - f/u HemeOnc recs  - f/u rad onc recs. Pending pelvic MRI given limited study to assess lymph nodes. Please provide patient with IV dilaudid 1 mg right before his transport to MRI and/or PET-CT. Patient mentions he is claustrophobic and experiences rectal pain when laying on his back.   - Pain management and goals of care per palliative team   - Continue bowel regimen (Miralax, Senna)    #Symptomatic AARTI  Adm hgb 7.2-7.6 (2021 hgb 11.4). C/o 1mth passing clots per rectum, now BRBPR. 12/2023 Ferritin 109 (likely overstated iso malignancy), Iron sat 10%.  - Active T+S, transfuse to keep hgb >7  -Day 3 of 5 of IV iron (IV sucrose 200mg x5 days). Consider discharge on PO Iron 65mg QOD w/ bowel regimen    #HIV  12/2023 CD4 266 (BALDO 130), VLUD. urine Ch/Gc negative.  - c/w home Biktarvy    #Latent syphilis   [id number] RPR 1:8 serofast.  - Outpatient management    Medicine will continue to follow.      43M MSM Hx HIV (12/2023 CD4 266 (BALDO likely 130), VLUD, on Biktarvy), Latent syphilis (9402-1596 RPR 1:8 serofast), MRSA abdominal abscess 2021, Polysubstance abuse (02/2024 UTox +THC, Amphetamines, Opiates), Sinus tachycardia, AARTI, Invasive anal squamous cell carcinoma (Diagnosed 12/2023, left AMA). P/w x1m intermittent rectal pain & x1d rectal bleeding intermittent stabbing rectal pain over the last month. Admitted to Surgery for evaluation rectal bleeding and cancer progression.     #Invasive anal squamous cell carcinoma  Diagnosed 12/2023, left AMA. 01/2024 CTAP: No signs of active bleeding. Large lobulated rectal tumor dissecting into adjacent soft tissues and bones overall similar in appearance to the previous study. There is a moderate constipation pattern. No surgical intervention per surgery  - f/u HemeOnc recs  - f/u rad onc recs. Pending pelvic MRI given limited study to assess lymph nodes. Please provide patient with IV dilaudid 1 mg right before his transport to MRI and/or PET-CT. Patient mentions he is claustrophobic and experiences rectal pain when laying on his back.   - Pain management and goals of care per palliative team   - Continue bowel regimen (Miralax, Senna)    #Symptomatic AARTI  Adm hgb 7.2-7.6 (2021 hgb 11.4). C/o 1mth passing clots per rectum, now BRBPR. 12/2023 Ferritin 109 (likely overstated iso malignancy), Iron sat 10%.  - Active T+S, transfuse to keep hgb >7  - s/p IV sucrose 200mg/qd for 5 days.   - Consider discharge on PO Iron 65mg QOD w/ bowel regimen    #HIV  12/2023 CD4 266 (BALDO 130), VLUD. urine Ch/Gc negative.  - c/w home Biktarvy    #Latent syphilis   [id number] RPR 1:8 serofast.  - Outpatient management    Medicine will continue to follow.

## 2024-02-09 NOTE — DISCHARGE NOTE PROVIDER - HOSPITAL COURSE
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 CT showed No signs of active bleeding. Large lobulated rectal tumor dissecting into adjacent soft tissues and bones overall similar in appearance to the previous study. There is a moderate constipation pattern. Admitted for further evaluation of rectal bleeding and cancer progression. Heme/onc was consulted and recommended PET scan to assess lymph node disease, ID consult for HIV management, and to follow up outpatient for further management. [facility] medicine [facility] His hospital course was unremarkable and received a PET scan on 2/9. On day of discharge patient was stable to be d/c'd home.   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 CT showed No signs of active bleeding. Large lobulated rectal tumor dissecting into adjacent soft tissues and bones overall similar in appearance to the previous study. There is a moderate constipation pattern. Admitted for further evaluation of rectal bleeding and cancer progression. Heme/onc was consulted and recommended PET scan to assess lymph node disease, ID consult for HIV management, and to follow up outpatient for further management. [facility] medicine [facility]. His hospital course was unremarkable and received a PET scan on 2/9. On day of discharge patient was stable to be discharged home.   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 CT showed No signs of active bleeding. Large lobulated rectal tumor dissecting into adjacent soft tissues and bones overall similar in appearance to the previous study. There is a moderate constipation pattern. Admitted for further evaluation of rectal bleeding and cancer progression. Heme/onc was consulted and recommended PET scan to assess lymph node disease, ID consult for HIV management, and to follow up outpatient for further management. [facility] His hospital course was unremarkable and received a PET scan on 2/9. Baseline tachycardia in setting of cancer, patient remained asymptomatic throughout his hospital course, EKG no new ischemic changes, no intervention needed. On day of discharge patient was stable to be discharged home.

## 2024-02-09 NOTE — DISCHARGE NOTE NURSING/CASE MANAGEMENT/SOCIAL WORK - NSDCPEWEB_GEN_ALL_CORE
[facility] for Tobacco Control website --- [email or web address] website --- [email or web address]

## 2024-02-09 NOTE — DISCHARGE NOTE PROVIDER - CARE PROVIDERS DIRECT ADDRESSES
[email or web address]

## 2024-02-13 ENCOUNTER — APPOINTMENT (OUTPATIENT)
Dept: HEMATOLOGY ONCOLOGY | Facility: CLINIC | Age: 44
End: 2024-02-13

## 2024-02-16 DIAGNOSIS — K62.5 HEMORRHAGE OF ANUS AND RECTUM: ICD-10-CM

## 2024-02-16 DIAGNOSIS — F19.99 OTHER PSYCHOACTIVE SUBSTANCE USE, UNSPECIFIED WITH UNSPECIFIED PSYCHOACTIVE SUBSTANCE-INDUCED DISORDER: ICD-10-CM

## 2024-02-16 DIAGNOSIS — A53.0 LATENT SYPHILIS, UNSPECIFIED AS EARLY OR LATE: ICD-10-CM

## 2024-02-16 DIAGNOSIS — D50.9 IRON DEFICIENCY ANEMIA, UNSPECIFIED: ICD-10-CM

## 2024-02-16 DIAGNOSIS — C79.51 SECONDARY MALIGNANT NEOPLASM OF BONE: ICD-10-CM

## 2024-02-16 DIAGNOSIS — B20 HUMAN IMMUNODEFICIENCY VIRUS [HIV] DISEASE: ICD-10-CM

## 2024-02-16 DIAGNOSIS — Z79.899 OTHER LONG TERM (CURRENT) DRUG THERAPY: ICD-10-CM

## 2024-02-16 DIAGNOSIS — C20 MALIGNANT NEOPLASM OF RECTUM: ICD-10-CM

## 2024-02-29 ENCOUNTER — NON-APPOINTMENT (OUTPATIENT)
Age: 44
End: 2024-02-29

## 2024-02-29 ENCOUNTER — APPOINTMENT (OUTPATIENT)
Dept: HEMATOLOGY ONCOLOGY | Facility: CLINIC | Age: 44
End: 2024-02-29
Payer: MEDICAID

## 2024-02-29 VITALS
HEIGHT: 73 IN | BODY MASS INDEX: 18.82 KG/M2 | DIASTOLIC BLOOD PRESSURE: 66 MMHG | WEIGHT: 142 LBS | OXYGEN SATURATION: 100 % | HEART RATE: 85 BPM | TEMPERATURE: 97.1 F | RESPIRATION RATE: 18 BRPM | SYSTOLIC BLOOD PRESSURE: 96 MMHG

## 2024-02-29 DIAGNOSIS — Z59.9 PROBLEM RELATED TO HOUSING AND ECONOMIC CIRCUMSTANCES, UNSPECIFIED: ICD-10-CM

## 2024-02-29 DIAGNOSIS — F12.91 CANNABIS USE, UNSPECIFIED, IN REMISSION: ICD-10-CM

## 2024-02-29 PROCEDURE — 99203 OFFICE O/P NEW LOW 30 MIN: CPT

## 2024-02-29 RX ORDER — SULFAMETHOXAZOLE AND TRIMETHOPRIM 800; 160 MG/1; MG/1
800-160 TABLET ORAL
Qty: 30 | Refills: 1 | Status: DISCONTINUED | COMMUNITY
Start: 2021-11-02 | End: 2024-02-29

## 2024-02-29 SDOH — ECONOMIC STABILITY - INCOME SECURITY: PROBLEM RELATED TO HOUSING AND ECONOMIC CIRCUMSTANCES, UNSPECIFIED: Z59.9

## 2024-03-01 ENCOUNTER — INPATIENT (INPATIENT)
Facility: HOSPITAL | Age: 44
LOS: 4 days | Discharge: HOME | DRG: 374 | End: 2024-03-06
Attending: INTERNAL MEDICINE | Admitting: INTERNAL MEDICINE
Payer: MEDICAID

## 2024-03-01 ENCOUNTER — NON-APPOINTMENT (OUTPATIENT)
Age: 44
End: 2024-03-01

## 2024-03-01 VITALS
TEMPERATURE: 99 F | HEART RATE: 141 BPM | WEIGHT: 141.98 LBS | DIASTOLIC BLOOD PRESSURE: 64 MMHG | OXYGEN SATURATION: 98 % | HEIGHT: 73 IN | SYSTOLIC BLOOD PRESSURE: 109 MMHG | RESPIRATION RATE: 16 BRPM

## 2024-03-01 DIAGNOSIS — Z98.890 OTHER SPECIFIED POSTPROCEDURAL STATES: Chronic | ICD-10-CM

## 2024-03-01 DIAGNOSIS — F19.10 OTHER PSYCHOACTIVE SUBSTANCE ABUSE, UNCOMPLICATED: ICD-10-CM

## 2024-03-01 DIAGNOSIS — C20 MALIGNANT NEOPLASM OF RECTUM: ICD-10-CM

## 2024-03-01 DIAGNOSIS — Z86.79 PERSONAL HISTORY OF OTHER DISEASES OF THE CIRCULATORY SYSTEM: ICD-10-CM

## 2024-03-01 DIAGNOSIS — Z29.9 ENCOUNTER FOR PROPHYLACTIC MEASURES, UNSPECIFIED: ICD-10-CM

## 2024-03-01 DIAGNOSIS — B20 HUMAN IMMUNODEFICIENCY VIRUS [HIV] DISEASE: ICD-10-CM

## 2024-03-01 DIAGNOSIS — F99 MENTAL DISORDER, NOT OTHERWISE SPECIFIED: ICD-10-CM

## 2024-03-01 DIAGNOSIS — D64.9 ANEMIA, UNSPECIFIED: ICD-10-CM

## 2024-03-01 DIAGNOSIS — R65.10 SYSTEMIC INFLAMMATORY RESPONSE SYNDROME (SIRS) OF NON-INFECTIOUS ORIGIN WITHOUT ACUTE ORGAN DYSFUNCTION: ICD-10-CM

## 2024-03-01 LAB
ADD ON TEST-SPECIMEN IN LAB: SIGNIFICANT CHANGE UP
ALBUMIN SERPL ELPH-MCNC: 2.9 G/DL — LOW (ref 3.4–5)
ALP SERPL-CCNC: 74 U/L — SIGNIFICANT CHANGE UP (ref 40–120)
ALT FLD-CCNC: 16 U/L — SIGNIFICANT CHANGE UP (ref 12–42)
AMPHET UR-MCNC: POSITIVE
ANION GAP SERPL CALC-SCNC: 13 MMOL/L — SIGNIFICANT CHANGE UP (ref 5–17)
ANION GAP SERPL CALC-SCNC: 9 MMOL/L — SIGNIFICANT CHANGE UP (ref 9–16)
APPEARANCE UR: CLEAR — SIGNIFICANT CHANGE UP
APTT BLD: 32.7 SEC — SIGNIFICANT CHANGE UP (ref 24.5–35.6)
AST SERPL-CCNC: 16 U/L — SIGNIFICANT CHANGE UP (ref 15–37)
BACTERIA # UR AUTO: ABNORMAL /HPF
BARBITURATES UR SCN-MCNC: NEGATIVE — SIGNIFICANT CHANGE UP
BASOPHILS # BLD AUTO: 0.04 K/UL — SIGNIFICANT CHANGE UP (ref 0–0.2)
BASOPHILS NFR BLD AUTO: 0.3 % — SIGNIFICANT CHANGE UP (ref 0–2)
BENZODIAZ UR-MCNC: NEGATIVE — SIGNIFICANT CHANGE UP
BILIRUB SERPL-MCNC: 0.2 MG/DL — SIGNIFICANT CHANGE UP (ref 0.2–1.2)
BILIRUB UR-MCNC: NEGATIVE — SIGNIFICANT CHANGE UP
BLD GP AB SCN SERPL QL: NEGATIVE — SIGNIFICANT CHANGE UP
BUN SERPL-MCNC: 11 MG/DL — SIGNIFICANT CHANGE UP (ref 7–23)
BUN SERPL-MCNC: 9 MG/DL — SIGNIFICANT CHANGE UP (ref 7–23)
CALCIUM SERPL-MCNC: 9.2 MG/DL — SIGNIFICANT CHANGE UP (ref 8.5–10.5)
CALCIUM SERPL-MCNC: 9.3 MG/DL — SIGNIFICANT CHANGE UP (ref 8.4–10.5)
CHLORIDE SERPL-SCNC: 99 MMOL/L — SIGNIFICANT CHANGE UP (ref 96–108)
CHLORIDE SERPL-SCNC: 99 MMOL/L — SIGNIFICANT CHANGE UP (ref 96–108)
CO2 SERPL-SCNC: 23 MMOL/L — SIGNIFICANT CHANGE UP (ref 22–31)
CO2 SERPL-SCNC: 26 MMOL/L — SIGNIFICANT CHANGE UP (ref 22–31)
COCAINE METAB.OTHER UR-MCNC: NEGATIVE — SIGNIFICANT CHANGE UP
COD CRY URNS QL: SIGNIFICANT CHANGE UP
COLOR SPEC: YELLOW — SIGNIFICANT CHANGE UP
COMMENT - URINE 2: SIGNIFICANT CHANGE UP
COMMENT - URINE: SIGNIFICANT CHANGE UP
CREAT SERPL-MCNC: 0.9 MG/DL — SIGNIFICANT CHANGE UP (ref 0.5–1.3)
CREAT SERPL-MCNC: 0.99 MG/DL — SIGNIFICANT CHANGE UP (ref 0.5–1.3)
DIFF PNL FLD: NEGATIVE — SIGNIFICANT CHANGE UP
EGFR: 110 ML/MIN/1.73M2 — SIGNIFICANT CHANGE UP
EGFR: 98 ML/MIN/1.73M2 — SIGNIFICANT CHANGE UP
EOSINOPHIL # BLD AUTO: 0.04 K/UL — SIGNIFICANT CHANGE UP (ref 0–0.5)
EOSINOPHIL NFR BLD AUTO: 0.3 % — SIGNIFICANT CHANGE UP (ref 0–6)
EPI CELLS # UR: 1 — SIGNIFICANT CHANGE UP
ETHANOL SERPL-MCNC: <3 MG/DL — SIGNIFICANT CHANGE UP
FERRITIN SERPL-MCNC: 304 NG/ML — SIGNIFICANT CHANGE UP (ref 30–400)
FLUAV AG NPH QL: SIGNIFICANT CHANGE UP
FLUBV AG NPH QL: SIGNIFICANT CHANGE UP
GLUCOSE SERPL-MCNC: 110 MG/DL — HIGH (ref 70–99)
GLUCOSE SERPL-MCNC: 110 MG/DL — HIGH (ref 70–99)
GLUCOSE UR QL: NEGATIVE MG/DL — SIGNIFICANT CHANGE UP
GRAN CASTS # UR COMP ASSIST: SIGNIFICANT CHANGE UP
HCT VFR BLD CALC: 24.3 % — LOW (ref 39–50)
HCT VFR BLD CALC: 25.2 % — LOW (ref 39–50)
HGB BLD-MCNC: 7.2 G/DL — LOW (ref 13–17)
HGB BLD-MCNC: 7.4 G/DL — LOW (ref 13–17)
HYALINE CASTS # UR AUTO: 1 — SIGNIFICANT CHANGE UP
IMM GRANULOCYTES NFR BLD AUTO: 0.5 % — SIGNIFICANT CHANGE UP (ref 0–0.9)
INR BLD: 1.33 — HIGH (ref 0.85–1.18)
IRON SATN MFR SERPL: 13 UG/DL — LOW (ref 45–165)
IRON SATN MFR SERPL: 8 % — LOW (ref 16–55)
KETONES UR-MCNC: NEGATIVE MG/DL — SIGNIFICANT CHANGE UP
LACTATE BLDV-MCNC: 1.3 MMOL/L — SIGNIFICANT CHANGE UP (ref 0.5–2)
LEUKOCYTE ESTERASE UR-ACNC: ABNORMAL
LYMPHOCYTES # BLD AUTO: 1.46 K/UL — SIGNIFICANT CHANGE UP (ref 1–3.3)
LYMPHOCYTES # BLD AUTO: 11.2 % — LOW (ref 13–44)
MAGNESIUM SERPL-MCNC: 2.1 MG/DL — SIGNIFICANT CHANGE UP (ref 1.6–2.6)
MCHC RBC-ENTMCNC: 23.2 PG — LOW (ref 27–34)
MCHC RBC-ENTMCNC: 23.9 PG — LOW (ref 27–34)
MCHC RBC-ENTMCNC: 29.4 GM/DL — LOW (ref 32–36)
MCHC RBC-ENTMCNC: 29.6 GM/DL — LOW (ref 32–36)
MCV RBC AUTO: 79 FL — LOW (ref 80–100)
MCV RBC AUTO: 80.7 FL — SIGNIFICANT CHANGE UP (ref 80–100)
METHADONE UR-MCNC: NEGATIVE — SIGNIFICANT CHANGE UP
MONOCYTES # BLD AUTO: 0.89 K/UL — SIGNIFICANT CHANGE UP (ref 0–0.9)
MONOCYTES NFR BLD AUTO: 6.8 % — SIGNIFICANT CHANGE UP (ref 2–14)
NEUTROPHILS # BLD AUTO: 10.58 K/UL — HIGH (ref 1.8–7.4)
NEUTROPHILS NFR BLD AUTO: 80.9 % — HIGH (ref 43–77)
NITRITE UR-MCNC: NEGATIVE — SIGNIFICANT CHANGE UP
NRBC # BLD: 0 /100 WBCS — SIGNIFICANT CHANGE UP (ref 0–0)
NRBC # BLD: 0 /100 WBCS — SIGNIFICANT CHANGE UP (ref 0–0)
OPIATES UR-MCNC: NEGATIVE — SIGNIFICANT CHANGE UP
PCP SPEC-MCNC: SIGNIFICANT CHANGE UP
PCP UR-MCNC: NEGATIVE — SIGNIFICANT CHANGE UP
PH UR: 5.5 — SIGNIFICANT CHANGE UP (ref 5–8)
PHOSPHATE SERPL-MCNC: 4.2 MG/DL — SIGNIFICANT CHANGE UP (ref 2.5–4.5)
PLATELET # BLD AUTO: 423 K/UL — HIGH (ref 150–400)
PLATELET # BLD AUTO: 492 K/UL — HIGH (ref 150–400)
POTASSIUM SERPL-MCNC: 4 MMOL/L — SIGNIFICANT CHANGE UP (ref 3.5–5.3)
POTASSIUM SERPL-MCNC: 4.4 MMOL/L — SIGNIFICANT CHANGE UP (ref 3.5–5.3)
POTASSIUM SERPL-SCNC: 4 MMOL/L — SIGNIFICANT CHANGE UP (ref 3.5–5.3)
POTASSIUM SERPL-SCNC: 4.4 MMOL/L — SIGNIFICANT CHANGE UP (ref 3.5–5.3)
PROCALCITONIN SERPL-MCNC: 0.3 NG/ML — HIGH (ref 0.02–0.1)
PROT SERPL-MCNC: 7.9 G/DL — SIGNIFICANT CHANGE UP (ref 6.4–8.2)
PROT UR-MCNC: NEGATIVE MG/DL — SIGNIFICANT CHANGE UP
PROTHROM AB SERPL-ACNC: 14.5 SEC — HIGH (ref 9.5–13)
RBC # BLD: 3.01 M/UL — LOW (ref 4.2–5.8)
RBC # BLD: 3.19 M/UL — LOW (ref 4.2–5.8)
RBC # FLD: 17.9 % — HIGH (ref 10.3–14.5)
RBC # FLD: 18.2 % — HIGH (ref 10.3–14.5)
RBC CASTS # UR COMP ASSIST: 0 /HPF — SIGNIFICANT CHANGE UP (ref 0–4)
RH IG SCN BLD-IMP: POSITIVE — SIGNIFICANT CHANGE UP
RSV RNA NPH QL NAA+NON-PROBE: SIGNIFICANT CHANGE UP
SARS-COV-2 RNA SPEC QL NAA+PROBE: SIGNIFICANT CHANGE UP
SODIUM SERPL-SCNC: 134 MMOL/L — SIGNIFICANT CHANGE UP (ref 132–145)
SODIUM SERPL-SCNC: 135 MMOL/L — SIGNIFICANT CHANGE UP (ref 135–145)
SP GR SPEC: 1.01 — SIGNIFICANT CHANGE UP (ref 1–1.03)
THC UR QL: POSITIVE
TIBC SERPL-MCNC: 162 UG/DL — LOW (ref 220–430)
TRI-PHOS CRY UR QL COMP ASSIST: SIGNIFICANT CHANGE UP
TROPONIN I, HIGH SENSITIVITY RESULT: 8.1 NG/L — SIGNIFICANT CHANGE UP
UIBC SERPL-MCNC: 149 UG/DL — SIGNIFICANT CHANGE UP (ref 110–370)
URATE CRY FLD QL MICRO: SIGNIFICANT CHANGE UP
UROBILINOGEN FLD QL: 1 MG/DL — SIGNIFICANT CHANGE UP (ref 0.2–1)
WBC # BLD: 12.35 K/UL — HIGH (ref 3.8–10.5)
WBC # BLD: 13.07 K/UL — HIGH (ref 3.8–10.5)
WBC # FLD AUTO: 12.35 K/UL — HIGH (ref 3.8–10.5)
WBC # FLD AUTO: 13.07 K/UL — HIGH (ref 3.8–10.5)
WBC UR QL: 3 /HPF — SIGNIFICANT CHANGE UP (ref 0–5)

## 2024-03-01 PROCEDURE — 99222 1ST HOSP IP/OBS MODERATE 55: CPT | Mod: GC

## 2024-03-01 PROCEDURE — 71275 CT ANGIOGRAPHY CHEST: CPT | Mod: 26,MC

## 2024-03-01 PROCEDURE — 99285 EMERGENCY DEPT VISIT HI MDM: CPT

## 2024-03-01 PROCEDURE — 74177 CT ABD & PELVIS W/CONTRAST: CPT | Mod: 26,MC

## 2024-03-01 RX ORDER — HYDROMORPHONE HYDROCHLORIDE 2 MG/ML
0.5 INJECTION INTRAMUSCULAR; INTRAVENOUS; SUBCUTANEOUS ONCE
Refills: 0 | Status: DISCONTINUED | OUTPATIENT
Start: 2024-03-01 | End: 2024-03-01

## 2024-03-01 RX ORDER — SODIUM CHLORIDE 9 MG/ML
1000 INJECTION INTRAMUSCULAR; INTRAVENOUS; SUBCUTANEOUS ONCE
Refills: 0 | Status: COMPLETED | OUTPATIENT
Start: 2024-03-01 | End: 2024-03-01

## 2024-03-01 RX ORDER — OXYCODONE HYDROCHLORIDE 5 MG/1
5 TABLET ORAL EVERY 6 HOURS
Refills: 0 | Status: DISCONTINUED | OUTPATIENT
Start: 2024-03-01 | End: 2024-03-06

## 2024-03-01 RX ORDER — BICTEGRAVIR SODIUM, EMTRICITABINE, AND TENOFOVIR ALAFENAMIDE FUMARATE 30; 120; 15 MG/1; MG/1; MG/1
1 TABLET ORAL EVERY 24 HOURS
Refills: 0 | Status: DISCONTINUED | OUTPATIENT
Start: 2024-03-02 | End: 2024-03-06

## 2024-03-01 RX ORDER — PIPERACILLIN AND TAZOBACTAM 4; .5 G/20ML; G/20ML
3.38 INJECTION, POWDER, LYOPHILIZED, FOR SOLUTION INTRAVENOUS ONCE
Refills: 0 | Status: COMPLETED | OUTPATIENT
Start: 2024-03-01 | End: 2024-03-01

## 2024-03-01 RX ORDER — ACETAMINOPHEN 500 MG
650 TABLET ORAL EVERY 6 HOURS
Refills: 0 | Status: DISCONTINUED | OUTPATIENT
Start: 2024-03-01 | End: 2024-03-02

## 2024-03-01 RX ORDER — PIPERACILLIN AND TAZOBACTAM 4; .5 G/20ML; G/20ML
3.38 INJECTION, POWDER, LYOPHILIZED, FOR SOLUTION INTRAVENOUS EVERY 8 HOURS
Refills: 0 | Status: DISCONTINUED | OUTPATIENT
Start: 2024-03-02 | End: 2024-03-03

## 2024-03-01 RX ORDER — ONDANSETRON 8 MG/1
4 TABLET, FILM COATED ORAL EVERY 8 HOURS
Refills: 0 | Status: DISCONTINUED | OUTPATIENT
Start: 2024-03-01 | End: 2024-03-06

## 2024-03-01 RX ORDER — OXYCODONE HYDROCHLORIDE 5 MG/1
2.5 TABLET ORAL EVERY 6 HOURS
Refills: 0 | Status: DISCONTINUED | OUTPATIENT
Start: 2024-03-01 | End: 2024-03-06

## 2024-03-01 RX ORDER — IOHEXOL 300 MG/ML
30 INJECTION, SOLUTION INTRAVENOUS ONCE
Refills: 0 | Status: COMPLETED | OUTPATIENT
Start: 2024-03-01 | End: 2024-03-01

## 2024-03-01 RX ORDER — POLYETHYLENE GLYCOL 3350 17 G/17G
17 POWDER, FOR SOLUTION ORAL EVERY 24 HOURS
Refills: 0 | Status: DISCONTINUED | OUTPATIENT
Start: 2024-03-02 | End: 2024-03-06

## 2024-03-01 RX ADMIN — HYDROMORPHONE HYDROCHLORIDE 0.5 MILLIGRAM(S): 2 INJECTION INTRAMUSCULAR; INTRAVENOUS; SUBCUTANEOUS at 10:06

## 2024-03-01 RX ADMIN — PIPERACILLIN AND TAZOBACTAM 200 GRAM(S): 4; .5 INJECTION, POWDER, LYOPHILIZED, FOR SOLUTION INTRAVENOUS at 16:14

## 2024-03-01 RX ADMIN — HYDROMORPHONE HYDROCHLORIDE 0.5 MILLIGRAM(S): 2 INJECTION INTRAMUSCULAR; INTRAVENOUS; SUBCUTANEOUS at 18:23

## 2024-03-01 RX ADMIN — SODIUM CHLORIDE 1000 MILLILITER(S): 9 INJECTION INTRAMUSCULAR; INTRAVENOUS; SUBCUTANEOUS at 10:06

## 2024-03-01 RX ADMIN — IOHEXOL 30 MILLILITER(S): 300 INJECTION, SOLUTION INTRAVENOUS at 10:15

## 2024-03-01 RX ADMIN — SODIUM CHLORIDE 1000 MILLILITER(S): 9 INJECTION INTRAMUSCULAR; INTRAVENOUS; SUBCUTANEOUS at 16:13

## 2024-03-01 RX ADMIN — Medication 650 MILLIGRAM(S): at 20:58

## 2024-03-01 RX ADMIN — PIPERACILLIN AND TAZOBACTAM 25 GRAM(S): 4; .5 INJECTION, POWDER, LYOPHILIZED, FOR SOLUTION INTRAVENOUS at 23:37

## 2024-03-01 RX ADMIN — Medication 650 MILLIGRAM(S): at 22:11

## 2024-03-01 RX ADMIN — HYDROMORPHONE HYDROCHLORIDE 0.5 MILLIGRAM(S): 2 INJECTION INTRAMUSCULAR; INTRAVENOUS; SUBCUTANEOUS at 13:57

## 2024-03-01 NOTE — H&P ADULT - PROBLEM SELECTOR PLAN 2
Pt w known hx of Rectal CA with bony involvement. Has established care w Dr. Julian/Graciela. Rectal mass and abdomen particularly painful at this time. Limited YOSEPH preformed revealing large mass   - consult heme/onc in AM   - zofran for nausea   - Tylenol 650mg PO for mild pain, Oxy 2.5 q6 for moderate Oxy 5mg for severe pain   - bowel regimen w Miralax nightly should patient routinely be using opoids - will avoid prokinetic agents given c/f bowel contractions exacerbating abdominal pain and causing further bloody bowel movements Pt w known hx of Rectal CA with bony involvement. Has established care w Dr. Julian/Graciela. Rectal mass and abdomen particularly painful at this time. Limited YOSEPH preformed revealing large mass   - consult heme/onc in AM   - zofran for nausea   - Tylenol 650mg PO q6 for mild pain, Oxy 2.5 q6 for moderate Oxy 5mg q6 for severe pain   - bowel regimen w Miralax nightly should patient routinely be using opoids - will avoid prokinetic agents given c/f bowel contractions exacerbating abdominal pain and causing further bloody bowel movements Pt presenting w hgb of 7.4 in setting of BRBPR and received 2L of NS in ED. Has received iron transfusions in the past and hovers between 8-9 on prior admissions   - f/u repeat CBC  - maintain active T/S   - maintain 2 large bore IV   - transfuse if hgb <7  - f/u AM iron studies, B12/Folate

## 2024-03-01 NOTE — H&P ADULT - PROBLEM SELECTOR PLAN 8
Fluids: s/p 2L NS   Electrolytes: replete as needed   Diet: Regular  DVT PPx: Hold iso of bleeding mass   Dispo: tele

## 2024-03-01 NOTE — H&P ADULT - ASSESSMENT
44 y/o M pmh of known rectal carcinoma with bony mets, HIV (MSM on biktarvy), Anemia, Tachycardia, current smoking and depression presenting for episode of BRBPR and abdominal pain iso of known malignancy  42 y/o M pmh of known rectal carcinoma with bony mets, HIV (MSM on biktarvy), Anemia, Tachycardia, current smoking and depression admitted to tele for episode of BRBPR and tachycardia

## 2024-03-01 NOTE — H&P ADULT - NSHPLABSRESULTS_GEN_ALL_CORE
LABS:  cret                        7.4    13.07 )-----------( 492      ( 01 Mar 2024 09:37 )             25.2     03-01    134  |  99  |  11  ----------------------------<  110<H>  4.0   |  26  |  0.99    Ca    9.2      01 Mar 2024 09:37    TPro  7.9  /  Alb  2.9<L>  /  TBili  0.2  /  DBili  x   /  AST  16  /  ALT  16  /  AlkPhos  74  03-01    PT/INR - ( 01 Mar 2024 09:37 )   PT: 14.5 sec;   INR: 1.33          PTT - ( 01 Mar 2024 09:37 )  PTT:32.7 sec

## 2024-03-01 NOTE — H&P ADULT - NSHPPHYSICALEXAM_GEN_ALL_CORE
T(C): 36.8 (03-01-24 @ 19:31), Max: 37.4 (03-01-24 @ 09:23)  HR: 132 (03-01-24 @ 19:31) (126 - 142)  BP: 147/74 (03-01-24 @ 19:31) (107/68 - 147/74)  RR: 19 (03-01-24 @ 19:31) (16 - 19)  SpO2: 100% (03-01-24 @ 19:31) (97% - 100%)    CONSTITUTIONAL: anxious, tearful in bed   EYES: PERRLA and symmetric, EOMI, No conjunctival or scleral injection, non-icteric  ENMT: Oral mucosa with moist membranes  NECK: Supple, symmetric and without tracheal deviation   RESP: No respiratory distress, no use of accessory muscles; CTA b/l, no WRR  CV: Tachycardic, no murmurs   GI: tense, nondistendend profusley tender in both upper and lower left quadrants and suprapubic region. On Rectal exam notable mass present with external hemorrhoids, pusuttule also noted within intergluteal folds   SKIN: No rashes or ulcers noted; no subcutaneous nodules or induration palpable  NEURO: CN II-XII intact;   PSYCH: Appropriate insight/judgment; A+O x 3, depressed and teary mood T(C): 36.8 (03-01-24 @ 19:31), Max: 37.4 (03-01-24 @ 09:23)  HR: 132 (03-01-24 @ 19:31) (126 - 142)  BP: 147/74 (03-01-24 @ 19:31) (107/68 - 147/74)  RR: 19 (03-01-24 @ 19:31) (16 - 19)  SpO2: 100% (03-01-24 @ 19:31) (97% - 100%)    CONSTITUTIONAL: anxious, tearful in bed   EYES: PERRLA and symmetric, EOMI, No conjunctival or scleral injection, non-icteric  ENMT: Oral mucosa with moist membranes  NECK: Supple, symmetric and without tracheal deviation   RESP: No respiratory distress, no use of accessory muscles; CTA b/l, no WRR  CV: Tachycardic, no murmurs   GI: tense, nondistended, tender to palpation in both upper and lower left quadrants and suprapubic region. On Rectal exam notable mass present with external hemorrhoids, pustule also noted within intergluteal folds   SKIN: No rashes or ulcers noted; no subcutaneous nodules or induration palpable  NEURO: CN II-XII intact;   PSYCH: Appropriate insight/judgment; A+O x 3, depressed and teary mood

## 2024-03-01 NOTE — H&P ADULT - PROBLEM SELECTOR PLAN 7
pt exhibiting labile mood and is teary, feels depressed and requesting to speak w psychiatric services to begin tx for his depression and anxiety relating to his cancer pt exhibiting labile mood and is teary, feels depressed and requesting to speak w psychiatric services to begin tx for his depression and anxiety relating to his cancer  - AM psych consult

## 2024-03-01 NOTE — H&P ADULT - PROBLEM SELECTOR PLAN 6
known hx of HIV on biktarvy. Complaint with medication. Undetectable at this time   - c/w biktarvy known hx of HIV on biktarvy. Complaint with medication. Undetectable at this time   - c/w biktarvy home dose in AM  - f/u AM viral load/CD4 studies known hx of HIV on biktarvy. Complaint with medication. previously undetecebale   - c/w biktarvy home dose in AM  - f/u AM viral load/CD4 studies known hx of HIV on biktarvy. Complaint with medication. previously undetectable   - c/w biktarvy home dose in AM  - f/u AM viral load/CD4 studies

## 2024-03-01 NOTE — H&P ADULT - HISTORY OF PRESENT ILLNESS
42 y/o M pmh of known rectal carcinoma (dx'd 12/24) with bony mets to sacrum/acetabulum (followed by Eliel/Graciela), HIV (MSM on biktarvy), Anemia, Tachycardia (home readings in the 130s which he attributes to prior heavy drug use (crystal meth, ecstasy), current smoking (several cigarettes every other day) and depression presenting for episode of BRBPR and abdominal pain. Patient states that he has been experiencing bloody bowel movements for some time as a result of his rectal CA and his movements are traditionally bloody, however he had a profusely bloody movements this AM with associated weakness and severe abdominal pain. The patient also endorses penile pain with an inability to fully urinate at the time. He denies fevers, chills but endorses SOB, CP which has now resolved, weakness and fatigue.        44 y/o M pmh of known rectal carcinoma (dx'd 12/24) with bony involvement  to sacrum/acetabulum (followed by Eliel/Graciela), HIV (MSM on biktarvy), Anemia, Tachycardia (home readings in the 130s which he attributes to prior heavy drug use (crystal meth, ecstasy), current smoking (several cigarettes every other day) and depression presenting for episode of BRBPR and abdominal pain. Patient states that he has been experiencing bloody bowel movements for some time as a result of his rectal CA and his movements are traditionally bloody, however he had a profusely bloody movements this AM with associated weakness and severe abdominal pain. The patient also endorses penile pain with an inability to fully urinate at the time. He denies fevers, chills but endorses SOB, CP which has now resolved, weakness and fatigue.     ED Course:  Vitals Afebrile, , /63, Spo2 97, RR17   Labs: WBC 13.087 (neutrophilic predominance), HgB 7.4, , albumin 2.9, PT 14.5, INR 1.33 UA negative,   EKG: Sinus tach   Imaging: CT angio chest, negative for PE, CT A/P  rectal mass with grossly stable osseous  involvement.  Interventions    42 y/o M pmh of known rectal carcinoma (dx'd 12/24) with bony involvement  to sacrum/acetabulum (followed by Eliel/Graciela), HIV (MSM on biktarvy), Anemia, Tachycardia (home readings in the 130s which he attributes to prior heavy drug use (crystal meth, ecstasy), current smoking (several cigarettes every other day) and depression presenting for episode of BRBPR and abdominal pain. Patient states that he has been experiencing bloody bowel movements for some time as a result of his rectal CA and his movements are traditionally bloody, however he had a profusely bloody movements this AM with associated weakness and severe abdominal pain. The patient also endorses penile pain with an inability to fully urinate at the time. He denies fevers, chills but endorses SOB, CP which has now resolved, weakness and fatigue.     ED Course:  Vitals Afebrile, , /63, Spo2 97, RR17   Labs: WBC 13.087 (neutrophilic predominance), HgB 7.4, , albumin 2.9, PT 14.5, INR 1.33 UA negative,   EKG: Sinus tach   Imaging: CT angio chest, negative for PE, CT A/P  rectal mass with grossly stable osseous  involvement.  Interventions: dilaudid 0.5 IV x3, zosyn 3.375 x1, 2L NS

## 2024-03-01 NOTE — ED PROVIDER NOTE - NS ED ROS FT
+abdominal pain  +bloody BM  +SOB/CP  +urinary retention  Denies fevers, chills, nausea, vomiting, diarrhea, constipation, palpitations, dyspnea on exertion, syncope/near syncope, cough/URI symptoms, headache, weakness, numbness, focal deficits, visual changes, gait or balance changes, dizziness

## 2024-03-01 NOTE — ED PROVIDER NOTE - PHYSICAL EXAMINATION
VITAL SIGNS: I have reviewed nursing notes and confirm.  CONSTITUTIONAL: Well-developed; well-nourished; in no acute distress.  SKIN: Skin is warm and dry, no acute rash.  HEAD: Normocephalic; atraumatic.  NECK: Supple; non tender.  CARD: S1, S2 normal; no murmurs, gallops, or rubs. Regular rate and rhythm.  RESP: No wheezes, rales or rhonchi.  ABD: Soft; +ttp along the lower abdomen, left and right quadrants.   RECTAL: +chronic changes noted around the rectum; BRBPR and small clot noted at rectal opening.  : +No penile lesions; +mild ttp noted along the shaft; no penile DC; no scrotal ttp.  EXT: Normal ROM. No clubbing, cyanosis or edema.  NEURO: Alert, oriented. Grossly unremarkable. ARORA, normal tone, no gross motor or sensory changes. Fluent speech.   PSYCH: Cooperative, appropriate. Mood and affect wnl.

## 2024-03-01 NOTE — ED ADULT NURSE NOTE - OBJECTIVE STATEMENT
Pt is a 43y male complaining of rectal bleeding, dizziness, sob and pain to genitalia. Pt with recent diagnosis of stage 3 squamous cell carcinoma. Pt states that he had rectal bleeding with clots time 3 months but states that today he had a very painful bm today. Pt states that he has also been having trouble urinating. Pt states that he saw his oncologist and was told that abd pain and genital pain is due to mass. Pt states that he noticed his increased sob today when walking up a flight of stairs. PT states that he also had chest pain with bm. Pt states that he is to start his treatment on Monday. Pt with history of hiv and anemia. Has never gotten blood transfusion but does get iron infusions. Pt is a 43y male complaining of rectal bleeding, dizziness, sob and pain to genitalia. Pt with recent diagnosis of stage 3 rectal squamous cell carcinoma. Pt states that he had rectal bleeding with clots time 3 months but states that today he had a very painful bm today. Pt states that he has also been having trouble urinating. Pt states that he saw his oncologist and was told that abd pain and genital pain is due to mass. Pt states that he noticed his increased sob today when walking up a flight of stairs. PT states that he also had chest pain with bm. Pt states that he is to start his treatment on Monday. Pt with history of hiv and anemia. Has never gotten blood transfusion but does get iron infusions.

## 2024-03-01 NOTE — H&P ADULT - PROBLEM SELECTOR PLAN 3
Pt claims he has a hx of tachycardia and that his resting HR hovers around 130bpm, however prior admissions demonstrates HR to be WNL. Given the patients hx of prior heavy drug use cannot rule out possibility of a cardiomyopathy. It is also likely this is in response to blood loss or underlying infection   - patient asymptomatic at this time   - obtain TTE   - monitor response after transfusion Pt w known hx of Rectal CA with bony involvement. Has established care w Dr. Julian/Graciela. Rectal mass and abdomen particularly painful at this time. Limited YOSEPH preformed revealing large mass   - consult heme/onc in AM   - zofran for nausea   - Tylenol 650mg PO q6 for mild pain, Oxy 2.5 q6 for moderate Oxy 5mg q6 for severe pain   - bowel regimen w Miralax nightly should patient routinely be using opioids - will avoid prokinetic agents given c/f bowel contractions exacerbating abdominal pain and causing further bloody bowel movements

## 2024-03-01 NOTE — ED PROVIDER NOTE - OBJECTIVE STATEMENT
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  Patient also endorses having an episode of shortness of breath with ambulating 2 blocks, which is abnormal for him.  Patient is currently under care for rectal cancer; with his chemo and radiation scheduled to begin this coming Monday.  Patient has had multiple visits to the emergency department with subsequent admissions to [facility] for further management due to similar complaints of rectal bleeding.  He reports his rectal bleeding occurs very frequent, however the lower abdominal pain with his bowel movement today was new.  Patient also reports feeling lightheaded and some anterior chest pain after the bloody bowel movement.  The lower abdominal pain however has persisted.  Patient states he had been unable to urinate all of yesterday up until this morning.  He also endorses penile pain as well but denies any discharge or penile lesions.  Denies nausea, vomiting, fevers or chills as well.

## 2024-03-01 NOTE — H&P ADULT - PROBLEM SELECTOR PLAN 1
Pt meeting 2/4 SIRS criteria on admission w Tachycardia and WBC of 13.07 w neutrophil predominance. Currently no obvious source of infectious process and likely iso of bloodloss vs superimposed infx on rectal mass vs uti as patient has had episodes of penil shaft pain/dysuria,  will empircally tx with abx until cultures arise.   - f/u blood trevor   - f/u urine cx   - tx with 3.375mg of zosyn  - trend AM CBC   - f/u procal

## 2024-03-01 NOTE — ED ADULT NURSE REASSESSMENT NOTE - NS ED NURSE REASSESS COMMENT FT1
Pt verbalizes that he is depressed since getting his cancer diagnosis. Pt denies SI at this time. Verbalizes that he would like to speak to psychiatrist. HEATHER Ham made aware. Pt verbalizes that he is depressed since getting his cancer diagnosis. Pt states that he has had suicidal thoughts, but is not actively suicidal at this time. Verbalizes that he would like to speak to psychiatrist. HEATHER Ham made aware. No one to one to be ordered at this time.

## 2024-03-01 NOTE — ED ADULT TRIAGE NOTE - CHIEF COMPLAINT QUOTE
Pt BIBA from home with C/O rectal bleeding. Hx colorectal CA. Tachycardic on arrival. Reports wounds to buttocks 2/2 biopsies

## 2024-03-01 NOTE — ED PROVIDER NOTE - CLINICAL SUMMARY MEDICAL DECISION MAKING FREE TEXT BOX
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 Patient is noted to be tachycardic upon arriving to the emergency department with a heart rate in the 140s.  EKG shows a sinus tachycardia rhythm without any obvious evidence of ischemia.  He reports his heart rate is typically within the 130s, so this 140 finding is abnormal for him.  He is noted to have lower abdominal tenderness on exam as well.  For the digital rectal exam, I did not have to insert a full finger to know small evidence of bright red blood and a blood clot.  Patient also was very uncomfortable and did not tolerate a full YOSEPH, so the blood was noted only at the rectal opening.  Plan to obtain medical labs, CT abdomen and pelvis with oral and IV contrast, and CT angio chest to rule out a PE. Will give pain relief. Likely plan to admit patient.  Will reassess and dispo pending medical workup.

## 2024-03-01 NOTE — ED ADULT NURSE NOTE - NSFALLRISKINTERV_ED_ALL_ED

## 2024-03-01 NOTE — H&P ADULT - PROBLEM SELECTOR PLAN 4
Pt presenting w hgb of 7.4 in setting of BRBPR and received 2L of NS in ED. Has received iron transfusions in the past and hovers between 8-9 on prior admissions   - f/u repeat CBC  - maintain active T/S   - maintain 2 large bore IV   - transfuse if hgb <7 Pt presenting w hgb of 7.4 in setting of BRBPR and received 2L of NS in ED. Has received iron transfusions in the past and hovers between 8-9 on prior admissions   - f/u repeat CBC  - maintain active T/S   - maintain 2 large bore IV   - transfuse if hgb <7  - f/u AM iron studies, B12/Folate Pt claims he has a hx of tachycardia and that his resting HR hovers around 130bpm, however prior admissions demonstrates HR to be 100-110. Given the patients hx of prior heavy drug use cannot rule out possibility of a cardiomyopathy. It is also possible this is in response to blood loss or underlying infection   EKG normal sinus rhythm without ischemic changes  - patient asymptomatic at this time   - obtain TTE

## 2024-03-01 NOTE — H&P ADULT - PROBLEM SELECTOR PLAN 5
hx of drug abuse with meth, IV drug use, cocaine, ecstasy and marijuan a  - f/u Utoxiciaoloy   - consider SBIRT consult   - monitor for signs of withdrawal hx of drug abuse with meth, IV drug use, cocaine, ecstasy and marijuana a  - f/u Utox  - consider SBIRT consult   - monitor for signs of withdrawal hx of drug abuse with meth, IV drug use, cocaine, ecstasy and marijuana  - f/u Utox  - SBIRT consult   - monitor for signs of withdrawal

## 2024-03-01 NOTE — H&P ADULT - ATTENDING COMMENTS
43 M with rectal carcinoma (dx'd 12/24) with bony involvement  to sacrum/acetabulum (followed by Eliel/Graciela), HIV (MSM on Biktarvy), and anemia presents with bloody bowel movement, incontinence, pelvic/rectal pain, and fever; he had penile pain, which resolved. Physical exam as  above. CT c/a/p showed large pelvic mass. He was positive for amphetamines.   1. Rectal Ca  2. Sepsis  3. Rectal bleeding  4. Anemia  5. HIV  - Zosyn  - blood cultures  - GI consult   - cont ART  - Onc consult  - pain management  - iv fluids 43 M with rectal carcinoma (dx'd 12/24) with bony involvement  to sacrum/acetabulum (followed by Eliel/Graciela), HIV (MSM on Biktarvy), and anemia presents with bloody bowel movement, incontinence, pelvic/rectal pain, and fever; he had penile pain, which resolved. Physical exam as  above. CT c/a/p showed large pelvic mass. He was positive for amphetamines.   1. Rectal Ca  2. Sepsis  3. Rectal bleeding  4. Anemia  5. HIV  6. Malnutrition  - Zosyn  - blood cultures  - GI consult   - cont ART  - Onc consult  - pain management  - iv fluids

## 2024-03-02 DIAGNOSIS — D50.9 IRON DEFICIENCY ANEMIA, UNSPECIFIED: ICD-10-CM

## 2024-03-02 DIAGNOSIS — D62 ACUTE POSTHEMORRHAGIC ANEMIA: ICD-10-CM

## 2024-03-02 LAB
4/8 RATIO: 0.31 RATIO — LOW (ref 0.9–3.6)
ABS CD8: 817 CELLS/UL — HIGH (ref 142–740)
ADD ON TEST-SPECIMEN IN LAB: SIGNIFICANT CHANGE UP
ALBUMIN SERPL ELPH-MCNC: 3.5 G/DL — SIGNIFICANT CHANGE UP (ref 3.3–5)
ALP SERPL-CCNC: 77 U/L — SIGNIFICANT CHANGE UP (ref 40–120)
ALT FLD-CCNC: 9 U/L — LOW (ref 10–45)
ANION GAP SERPL CALC-SCNC: 11 MMOL/L — SIGNIFICANT CHANGE UP (ref 5–17)
AST SERPL-CCNC: 12 U/L — SIGNIFICANT CHANGE UP (ref 10–40)
BASOPHILS # BLD AUTO: 0.02 K/UL — SIGNIFICANT CHANGE UP (ref 0–0.2)
BASOPHILS NFR BLD AUTO: 0.2 % — SIGNIFICANT CHANGE UP (ref 0–2)
BILIRUB SERPL-MCNC: 0.3 MG/DL — SIGNIFICANT CHANGE UP (ref 0.2–1.2)
BUN SERPL-MCNC: 8 MG/DL — SIGNIFICANT CHANGE UP (ref 7–23)
CALCIUM SERPL-MCNC: 9.2 MG/DL — SIGNIFICANT CHANGE UP (ref 8.4–10.5)
CD3 BLASTS SPEC-ACNC: 1092 CELLS/UL — SIGNIFICANT CHANGE UP (ref 672–1870)
CD3 BLASTS SPEC-ACNC: 78 % — SIGNIFICANT CHANGE UP (ref 59–83)
CD4 %: 18 % — LOW (ref 30–62)
CD8 %: 59 % — HIGH (ref 12–36)
CHLORIDE SERPL-SCNC: 99 MMOL/L — SIGNIFICANT CHANGE UP (ref 96–108)
CO2 SERPL-SCNC: 24 MMOL/L — SIGNIFICANT CHANGE UP (ref 22–31)
CREAT SERPL-MCNC: 0.78 MG/DL — SIGNIFICANT CHANGE UP (ref 0.5–1.3)
CULTURE RESULTS: SIGNIFICANT CHANGE UP
EGFR: 115 ML/MIN/1.73M2 — SIGNIFICANT CHANGE UP
EOSINOPHIL # BLD AUTO: 0.07 K/UL — SIGNIFICANT CHANGE UP (ref 0–0.5)
EOSINOPHIL NFR BLD AUTO: 0.6 % — SIGNIFICANT CHANGE UP (ref 0–6)
FOLATE SERPL-MCNC: 9.9 NG/ML — SIGNIFICANT CHANGE UP
GLUCOSE SERPL-MCNC: 104 MG/DL — HIGH (ref 70–99)
HCOV PNL SPEC NAA+PROBE: DETECTED
HCT VFR BLD CALC: 24 % — LOW (ref 39–50)
HCT VFR BLD CALC: 27.4 % — LOW (ref 39–50)
HGB BLD-MCNC: 7.1 G/DL — LOW (ref 13–17)
HGB BLD-MCNC: 8.3 G/DL — LOW (ref 13–17)
IMM GRANULOCYTES NFR BLD AUTO: 0.5 % — SIGNIFICANT CHANGE UP (ref 0–0.9)
LYMPHOCYTES # BLD AUTO: 0.8 K/UL — LOW (ref 1–3.3)
LYMPHOCYTES # BLD AUTO: 7.3 % — LOW (ref 13–44)
MAGNESIUM SERPL-MCNC: 2.8 MG/DL — HIGH (ref 1.6–2.6)
MCHC RBC-ENTMCNC: 23.4 PG — LOW (ref 27–34)
MCHC RBC-ENTMCNC: 24.5 PG — LOW (ref 27–34)
MCHC RBC-ENTMCNC: 29.6 GM/DL — LOW (ref 32–36)
MCHC RBC-ENTMCNC: 30.3 GM/DL — LOW (ref 32–36)
MCV RBC AUTO: 79.2 FL — LOW (ref 80–100)
MCV RBC AUTO: 80.8 FL — SIGNIFICANT CHANGE UP (ref 80–100)
MONOCYTES # BLD AUTO: 0.71 K/UL — SIGNIFICANT CHANGE UP (ref 0–0.9)
MONOCYTES NFR BLD AUTO: 6.5 % — SIGNIFICANT CHANGE UP (ref 2–14)
NEUTROPHILS # BLD AUTO: 9.28 K/UL — HIGH (ref 1.8–7.4)
NEUTROPHILS NFR BLD AUTO: 84.9 % — HIGH (ref 43–77)
NRBC # BLD: 0 /100 WBCS — SIGNIFICANT CHANGE UP (ref 0–0)
NRBC # BLD: 0 /100 WBCS — SIGNIFICANT CHANGE UP (ref 0–0)
PHOSPHATE SERPL-MCNC: 4.6 MG/DL — HIGH (ref 2.5–4.5)
PLATELET # BLD AUTO: 378 K/UL — SIGNIFICANT CHANGE UP (ref 150–400)
PLATELET # BLD AUTO: 396 K/UL — SIGNIFICANT CHANGE UP (ref 150–400)
POTASSIUM SERPL-MCNC: 3.9 MMOL/L — SIGNIFICANT CHANGE UP (ref 3.5–5.3)
POTASSIUM SERPL-SCNC: 3.9 MMOL/L — SIGNIFICANT CHANGE UP (ref 3.5–5.3)
PROT SERPL-MCNC: 7.1 G/DL — SIGNIFICANT CHANGE UP (ref 6–8.3)
RAPID RVP RESULT: DETECTED
RBC # BLD: 3.03 M/UL — LOW (ref 4.2–5.8)
RBC # BLD: 3.39 M/UL — LOW (ref 4.2–5.8)
RBC # FLD: 17.9 % — HIGH (ref 10.3–14.5)
RBC # FLD: 18 % — HIGH (ref 10.3–14.5)
SARS-COV-2 RNA SPEC QL NAA+PROBE: SIGNIFICANT CHANGE UP
SODIUM SERPL-SCNC: 134 MMOL/L — LOW (ref 135–145)
SPECIMEN SOURCE: SIGNIFICANT CHANGE UP
T-CELL CD4 SUBSET PNL BLD: 251 CELLS/UL — LOW (ref 489–1457)
TSH SERPL-MCNC: 0.39 UIU/ML — SIGNIFICANT CHANGE UP (ref 0.27–4.2)
VIT B12 SERPL-MCNC: 936 PG/ML — SIGNIFICANT CHANGE UP (ref 232–1245)
WBC # BLD: 10.94 K/UL — HIGH (ref 3.8–10.5)
WBC # BLD: 12.77 K/UL — HIGH (ref 3.8–10.5)
WBC # FLD AUTO: 10.94 K/UL — HIGH (ref 3.8–10.5)
WBC # FLD AUTO: 12.77 K/UL — HIGH (ref 3.8–10.5)

## 2024-03-02 PROCEDURE — 99233 SBSQ HOSP IP/OBS HIGH 50: CPT

## 2024-03-02 PROCEDURE — 99233 SBSQ HOSP IP/OBS HIGH 50: CPT | Mod: GC

## 2024-03-02 RX ORDER — LANOLIN ALCOHOL/MO/W.PET/CERES
3 CREAM (GRAM) TOPICAL AT BEDTIME
Refills: 0 | Status: DISCONTINUED | OUTPATIENT
Start: 2024-03-02 | End: 2024-03-06

## 2024-03-02 RX ORDER — THIAMINE MONONITRATE (VIT B1) 100 MG
100 TABLET ORAL EVERY 24 HOURS
Refills: 0 | Status: DISCONTINUED | OUTPATIENT
Start: 2024-03-02 | End: 2024-03-06

## 2024-03-02 RX ORDER — ACETAMINOPHEN 500 MG
650 TABLET ORAL EVERY 6 HOURS
Refills: 0 | Status: DISCONTINUED | OUTPATIENT
Start: 2024-03-02 | End: 2024-03-02

## 2024-03-02 RX ORDER — SODIUM CHLORIDE 9 MG/ML
1000 INJECTION, SOLUTION INTRAVENOUS ONCE
Refills: 0 | Status: COMPLETED | OUTPATIENT
Start: 2024-03-02 | End: 2024-03-02

## 2024-03-02 RX ORDER — ACETAMINOPHEN 500 MG
650 TABLET ORAL EVERY 6 HOURS
Refills: 0 | Status: DISCONTINUED | OUTPATIENT
Start: 2024-03-02 | End: 2024-03-04

## 2024-03-02 RX ORDER — ACETAMINOPHEN 500 MG
1000 TABLET ORAL ONCE
Refills: 0 | Status: COMPLETED | OUTPATIENT
Start: 2024-03-02 | End: 2024-03-02

## 2024-03-02 RX ADMIN — OXYCODONE HYDROCHLORIDE 5 MILLIGRAM(S): 5 TABLET ORAL at 16:03

## 2024-03-02 RX ADMIN — Medication 1000 MILLIGRAM(S): at 07:25

## 2024-03-02 RX ADMIN — OXYCODONE HYDROCHLORIDE 5 MILLIGRAM(S): 5 TABLET ORAL at 05:15

## 2024-03-02 RX ADMIN — OXYCODONE HYDROCHLORIDE 5 MILLIGRAM(S): 5 TABLET ORAL at 17:45

## 2024-03-02 RX ADMIN — OXYCODONE HYDROCHLORIDE 2.5 MILLIGRAM(S): 5 TABLET ORAL at 01:45

## 2024-03-02 RX ADMIN — Medication 650 MILLIGRAM(S): at 21:28

## 2024-03-02 RX ADMIN — PIPERACILLIN AND TAZOBACTAM 25 GRAM(S): 4; .5 INJECTION, POWDER, LYOPHILIZED, FOR SOLUTION INTRAVENOUS at 16:04

## 2024-03-02 RX ADMIN — OXYCODONE HYDROCHLORIDE 5 MILLIGRAM(S): 5 TABLET ORAL at 04:04

## 2024-03-02 RX ADMIN — OXYCODONE HYDROCHLORIDE 5 MILLIGRAM(S): 5 TABLET ORAL at 10:26

## 2024-03-02 RX ADMIN — SODIUM CHLORIDE 1000 MILLILITER(S): 9 INJECTION, SOLUTION INTRAVENOUS at 07:34

## 2024-03-02 RX ADMIN — Medication 650 MILLIGRAM(S): at 22:28

## 2024-03-02 RX ADMIN — PIPERACILLIN AND TAZOBACTAM 25 GRAM(S): 4; .5 INJECTION, POWDER, LYOPHILIZED, FOR SOLUTION INTRAVENOUS at 07:34

## 2024-03-02 RX ADMIN — BICTEGRAVIR SODIUM, EMTRICITABINE, AND TENOFOVIR ALAFENAMIDE FUMARATE 1 TABLET(S): 30; 120; 15 TABLET ORAL at 10:27

## 2024-03-02 RX ADMIN — Medication 650 MILLIGRAM(S): at 16:03

## 2024-03-02 RX ADMIN — OXYCODONE HYDROCHLORIDE 5 MILLIGRAM(S): 5 TABLET ORAL at 12:30

## 2024-03-02 RX ADMIN — Medication 650 MILLIGRAM(S): at 10:27

## 2024-03-02 RX ADMIN — Medication 650 MILLIGRAM(S): at 12:29

## 2024-03-02 RX ADMIN — Medication 400 MILLIGRAM(S): at 05:46

## 2024-03-02 RX ADMIN — Medication 3 MILLIGRAM(S): at 23:45

## 2024-03-02 RX ADMIN — Medication 100 MILLIGRAM(S): at 07:36

## 2024-03-02 RX ADMIN — Medication 650 MILLIGRAM(S): at 17:47

## 2024-03-02 RX ADMIN — PIPERACILLIN AND TAZOBACTAM 25 GRAM(S): 4; .5 INJECTION, POWDER, LYOPHILIZED, FOR SOLUTION INTRAVENOUS at 23:56

## 2024-03-02 RX ADMIN — OXYCODONE HYDROCHLORIDE 2.5 MILLIGRAM(S): 5 TABLET ORAL at 02:15

## 2024-03-02 RX ADMIN — POLYETHYLENE GLYCOL 3350 17 GRAM(S): 17 POWDER, FOR SOLUTION ORAL at 10:33

## 2024-03-02 NOTE — PROGRESS NOTE ADULT - PROBLEM SELECTOR PLAN 8
Fluids: s/p 2L NS   Electrolytes: replete as needed   Diet: Regular  DVT PPx: Hold iso of bleeding mass   Dispo: tele Pt exhibiting labile mood and is teary, feels depressed and requesting to speak w psychiatric services to begin tx for his depression and anxiety relating to his cancer  - f/up palliative consult  - will consider psych consult as appropriate

## 2024-03-02 NOTE — PROGRESS NOTE ADULT - PROBLEM SELECTOR PLAN 6
known hx of HIV on biktarvy. Complaint with medication. previously undetectable   - c/w biktarvy home dose in AM  - f/u AM viral load/CD4 studies hx of drug abuse with meth, IV drug use, cocaine, ecstasy and marijuana; UTOX positive for meth  - SBIRT consult   - monitor for signs of withdrawal

## 2024-03-02 NOTE — PROGRESS NOTE ADULT - PROBLEM SELECTOR PLAN 5
hx of drug abuse with meth, IV drug use, cocaine, ecstasy and marijuana  - f/u Utox  - SBIRT consult   - monitor for signs of withdrawal Pt claims he has a hx of tachycardia and that his resting HR hovers around 130bpm as of late. Given the patients hx of prior heavy drug use cannot rule out possibility of a cardiomyopathy. It is also possible this is in response to blood loss or underlying infection   EKG normal sinus rhythm without ischemic changes  - patient asymptomatic at this time  - f/up TSH

## 2024-03-02 NOTE — PROGRESS NOTE ADULT - PROBLEM SELECTOR PLAN 7
pt exhibiting labile mood and is teary, feels depressed and requesting to speak w psychiatric services to begin tx for his depression and anxiety relating to his cancer  - AM psych consult known hx of HIV on biktarvy. Complaint with medication; CD4 328; HIV detectable.   - c/w biktarvy home dose in AM

## 2024-03-02 NOTE — PROGRESS NOTE ADULT - PROBLEM SELECTOR PLAN 1
Pt meeting 2/4 SIRS criteria on admission w Tachycardia and WBC of 13.07 w neutrophil predominance. Currently no obvious source of infectious process and likely iso of bloodloss vs superimposed infx on rectal mass vs uti as patient has had episodes of penil shaft pain/dysuria,  will empircally tx with abx until cultures arise.   - f/u blood trevor   - f/u urine cx   - tx with 3.375mg of zosyn  - trend AM CBC   - f/u procal Pt meeting 2/4 SIRS criteria on admission w Tachycardia and WBC of 13.07 w neutrophil predominance. Likely iso of blood loss and coronavirus positive. Also concern for superimposed infx on rectal mass vs uti as patient has had episodes of penil sehaft pain/dysuria,  will empircally tx with abx until cultures arise.   - f/u blood trevor   - f/u urine cx   - tx with 3.375mg of zosyn  - s/p 1 U pRBC; f/up CBC

## 2024-03-02 NOTE — PROGRESS NOTE ADULT - ASSESSMENT
44 y/o M pmh of known rectal carcinoma with bony mets, HIV (MSM on biktarvy), Anemia, Tachycardia, current smoking and depression admitted to tele for episode of BRBPR and tachycardia  44 y/o M pmh of known rectal carcinoma with bony mets, HIV (MSM on biktarvy), Reports meth use due to cancer pain. Has been tachycardic 2/2 to meth use. Also smoking and admits depression given diagnosis and social situation. Has iron deficiency anemia noting hemoglobin of 7.1. Ordered unit of pRBC. Admitted to tele for episode of BRBPR and tachycardia. Surgery and hem onc following.

## 2024-03-02 NOTE — PROGRESS NOTE ADULT - SUBJECTIVE AND OBJECTIVE BOX
ADELIA ALBERTSAR  43y  Male      Patient is a 43y old  Male who presents with a chief complaint of rectal mass/bleeding (02 Mar 2024 12:10)      INTERVAL HPI/OVERNIGHT EVENTS: Patient admitted overnight. had fever of 102.9 this am which improved with tylenol. Noted to be positive for coronavirus 229E, HKU1, NL63, OC43. This morning, he feels cold and diaphoretic. Reports chills. Denies any CP, SOB, N/V/D/C or abdominal pain. However, has significant  rectal pain from invasive SCC of rectum. Hemoglobin this am, 7.1, given blood loss from BM given 1 u pRBC this am.,     Vital Signs Last 24 Hrs  T(C): 36.9 (02 Mar 2024 14:00), Max: 39.4 (02 Mar 2024 05:15)  T(F): 98.4 (02 Mar 2024 14:00), Max: 102.9 (02 Mar 2024 05:15)  HR: 110 (02 Mar 2024 14:00) (110 - 136)  BP: 120/69 (02 Mar 2024 14:00) (107/68 - 147/74)  BP(mean): 89 (02 Mar 2024 14:00) (87 - 96)  RR: 18 (02 Mar 2024 14:00) (16 - 19)  SpO2: 100% (02 Mar 2024 14:00) (91% - 100%)    Parameters below as of 02 Mar 2024 14:00  Patient On (Oxygen Delivery Method): room air        PHYSICAL EXAM:  GENERAL: NAD, well-groomed, well-developed  HEAD:  Atraumatic, Normocephalic  EYES: EOMI, PERRLA, conjunctiva and sclera clear  ENMT: Moist mucous membranes, Good dentition, No lesions  NECK: Supple, No JVD, Normal thyroid  NERVOUS SYSTEM:  Alert & Oriented X3, Good concentration; Motor Strength 5/5 B/L upper and lower extremities; DTRs 2+ intact and symmetric  CHEST/LUNG: Clear to auscultation bilaterally; No rales, rhonchi, wheezing, or rubs  HEART: Regular rate and rhythm; No murmurs, rubs, or gallops  ABDOMEN: Soft, Nontender, Nondistended; Bowel sounds present  EXTREMITIES:  2+ Peripheral Pulses, No clubbing, cyanosis, or edema  LYMPH: No lymphadenopathy noted  SKIN: No rashes or lesions    Consultant(s) Notes Reviewed:  [x ] YES  [ ] NO  Care Discussed with Consultants/Other Providers [ x] YES  [ ] NO    LABS:                        7.1    10.94 )-----------( 396      ( 02 Mar 2024 05:30 )             24.0     03-02    134<L>  |  99  |  8   ----------------------------<  104<H>  3.9   |  24  |  0.78    Ca    9.2      02 Mar 2024 05:30  Phos  4.6     03-02  Mg     2.8     03-02    TPro  7.1  /  Alb  3.5  /  TBili  0.3  /  DBili  x   /  AST  12  /  ALT  9<L>  /  AlkPhos  77  03-02    PT/INR - ( 01 Mar 2024 09:37 )   PT: 14.5 sec;   INR: 1.33          PTT - ( 01 Mar 2024 09:37 )  PTT:32.7 sec  Urinalysis Basic - ( 02 Mar 2024 05:30 )    Color: x / Appearance: x / SG: x / pH: x  Gluc: 104 mg/dL / Ketone: x  / Bili: x / Urobili: x   Blood: x / Protein: x / Nitrite: x   Leuk Esterase: x / RBC: x / WBC x   Sq Epi: x / Non Sq Epi: x / Bacteria: x        CAPILLARY BLOOD GLUCOSE          RADIOLOGY & ADDITIONAL TESTS:    Imaging Personally Reviewed:  [ ] YES  [ ] NO BRIDGERALOKSARAYRAMBO FITZGERALD  43y  Male      Patient is a 43y old  Male who presents with a chief complaint of rectal mass/bleeding (02 Mar 2024 12:10)      INTERVAL HPI/OVERNIGHT EVENTS: Patient admitted overnight. Had fever of 102.9 this am which improved with tylenol. Noted to be positive for coronavirus 229E, HKU1, NL63, OC43. This morning, he feels cold and diaphoretic. Reports chills and reduction in appetite. Denies any CP, SOB, N/V/D/C or abdominal pain. However, has significant rectal pain from invasive SCC of rectum. Hemoglobin this am, 7.1, given blood loss from BM was given 1 u pRBC this am. Reports recent meth use, said he has been taking meth to self-medicate as he has been in a lot  of pain from cancer and hasn't been able to go to follow-up appointments to get appropriate pain management.     Vital Signs Last 24 Hrs  T(C): 36.9 (02 Mar 2024 14:00), Max: 39.4 (02 Mar 2024 05:15)  T(F): 98.4 (02 Mar 2024 14:00), Max: 102.9 (02 Mar 2024 05:15)  HR: 110 (02 Mar 2024 14:00) (110 - 136)  BP: 120/69 (02 Mar 2024 14:00) (107/68 - 147/74)  BP(mean): 89 (02 Mar 2024 14:00) (87 - 96)  RR: 18 (02 Mar 2024 14:00) (16 - 19)  SpO2: 100% (02 Mar 2024 14:00) (91% - 100%)    Parameters below as of 02 Mar 2024 14:00  Patient On (Oxygen Delivery Method): room air      PHYSICAL EXAM:  GENERAL: ill-appearing, cachectic, conversant and alert   HEAD:  Atraumatic, Normocephalic  EYES: EOMI  ENMT: Moist mucous membranes, Good dentition, No lesions  NECK: Supple, No JVD, Normal thyroid  NERVOUS SYSTEM:  Alert & Oriented X3, Good concentration  CHEST/LUNG: Clear to auscultation bilaterally; No rales, rhonchi, wheezing, or rubs  HEART: Tachycardic with regular rhythm; No murmurs, rubs, or gallops  ABDOMEN: Soft, Nontender, Nondistended; Bowel sounds present; examined buttocks and rectum; left buttock firm and indurated; no overt blood seen on inspection   EXTREMITIES:  2+ Peripheral Pulses, No clubbing, cyanosis, or edema    Consultant(s) Notes Reviewed:  [x ] YES  [ ] NO  Care Discussed with Consultants/Other Providers [ x] YES  [ ] NO    LABS:                        7.1    10.94 )-----------( 396      ( 02 Mar 2024 05:30 )             24.0     03-02    134<L>  |  99  |  8   ----------------------------<  104<H>  3.9   |  24  |  0.78    Ca    9.2      02 Mar 2024 05:30  Phos  4.6     03-02  Mg     2.8     03-02    TPro  7.1  /  Alb  3.5  /  TBili  0.3  /  DBili  x   /  AST  12  /  ALT  9<L>  /  AlkPhos  77  03-02    PT/INR - ( 01 Mar 2024 09:37 )   PT: 14.5 sec;   INR: 1.33          PTT - ( 01 Mar 2024 09:37 )  PTT:32.7 sec  Urinalysis Basic - ( 02 Mar 2024 05:30 )    Color: x / Appearance: x / SG: x / pH: x  Gluc: 104 mg/dL / Ketone: x  / Bili: x / Urobili: x   Blood: x / Protein: x / Nitrite: x   Leuk Esterase: x / RBC: x / WBC x   Sq Epi: x / Non Sq Epi: x / Bacteria: x        CAPILLARY BLOOD GLUCOSE          RADIOLOGY & ADDITIONAL TESTS:    Imaging Personally Reviewed:  [ ] YES  [ ] NO

## 2024-03-02 NOTE — PROGRESS NOTE ADULT - PROBLEM SELECTOR PLAN 3
Pt w known hx of Rectal CA with bony involvement. Has established care w Dr. Julian/Graciela. Rectal mass and abdomen particularly painful at this time. Limited YOSEPH preformed revealing large mass   - consult heme/onc in AM   - zofran for nausea   - Tylenol 650mg PO q6 for mild pain, Oxy 2.5 q6 for moderate Oxy 5mg q6 for severe pain   - bowel regimen w Miralax nightly should patient routinely be using opioids - will avoid prokinetic agents given c/f bowel contractions exacerbating abdominal pain and causing further bloody bowel movements Pt presenting w hgb of 7.4 in setting of BRBPR and received 2L of NS in ED. Has received iron transfusions in the past and hovers between 8-9 on prior admissions   - f/u repeat CBC  - maintain active T/S   - maintain 2 large bore IV   - transfuse if hgb <7  - f/u AM iron studies, B12/Folate

## 2024-03-02 NOTE — PROGRESS NOTE ADULT - PROBLEM SELECTOR PLAN 2
Pt presenting w hgb of 7.4 in setting of BRBPR and received 2L of NS in ED. Has received iron transfusions in the past and hovers between 8-9 on prior admissions   - f/u repeat CBC  - maintain active T/S   - maintain 2 large bore IV   - transfuse if hgb <7  - f/u AM iron studies, B12/Folate Pt presenting w hgb of 7.4 in setting of BRBPR and received 2L of NS in ED. Has received iron transfusions in the past and hovers between 8-9 on prior admissions. Iron studies consistent with AARTI.   - ordered 1 u pRBC   - maintain active T/S   - maintain 2 large bore IV   - transfuse if hgb <7

## 2024-03-02 NOTE — PROGRESS NOTE ADULT - PROBLEM SELECTOR PLAN 4
Pt claims he has a hx of tachycardia and that his resting HR hovers around 130bpm, however prior admissions demonstrates HR to be 100-110. Given the patients hx of prior heavy drug use cannot rule out possibility of a cardiomyopathy. It is also possible this is in response to blood loss or underlying infection   EKG normal sinus rhythm without ischemic changes  - patient asymptomatic at this time   - obtain TTE Pt w known hx of Rectal CA with bony involvement. Has established care w Dr. Julian/Graciela. Rectal mass and abdomen particularly painful at this time. Limited YOSEPH preformed revealing large mass.   - f/up hem onc recs --> patient requires radiation; plan for simulation on Wednesday 3/6/24  - zofran for nausea   - Tylenol 650mg PO q6 for mild pain, Oxy 2.5 q6 for moderate Oxy 5mg q6 for severe pain   - bowel regimen w Miralax nightly  - f/up palliative recs as patient cancer progressing with associated weight loss and depression; he reports continued meth use given rectal pain

## 2024-03-02 NOTE — CONSULT NOTE ADULT - ASSESSMENT
43 M, MSM with PMH HIV (on Biktarvy), polysubstance abuse, syphilis, anemia invasive anorectal squamous cell carcinoma who is admitted to medical service with penile pain and blood per rectum. Febrile to 102, tachycardic, normotensive. Non tender on exam. Labs on admission demonstrate WBC 13.07 K/uL (now 10), Hgb 7.4 g/dL (now 7.1, from 8.3 prior to discharge), BUN/Cr at baseline, Non COVID19 corona positive, Utox positive for amphetamines and THC. UA negative.      INCOMPLETE         43 M, MSM with PMH HIV (on Biktarvy), polysubstance abuse, syphilis, anemia invasive anorectal squamous cell carcinoma who is admitted to medical service with penile pain and blood per rectum. Febrile to 102, tachycardic, normotensive. Non tender on exam. Labs on admission demonstrate WBC 13.07 K/uL (now 10), Hgb 7.4 g/dL (now 7.1, from 8.3 prior to discharge), BUN/Cr at baseline, Non COVID19 corona positive, Utox positive for amphetamines and THC. UA negative.  Clinically non obstructed.       - No acute surgical intervention  - no current indication for diversion  - f/u heme/onc, radonc recommendations  - Transfuse as necessary, large bore IV access, active type and screen  - Colorectal Surgery to follow

## 2024-03-02 NOTE — CONSULT NOTE ADULT - ASSESSMENT
Mr. Rojo is a 43 year old male MSM with PMH HIV (on biktarvy), invasive anorectal squamous cell carcinoma, polysubstance abuse, who presents for rectal bleeding and penile pain. Hematology/Oncology consulted for history of invasive squamous cell carcinoma.     # Invasive squamous cell carcinoma   - anal mass biopsy 12/13/24 - invasive squamous cell carcinoma, moderately differentiated  - CT Chest 1/9/24 - mild paraseptal emphysema. no evidence of metastatic disease  - MRI pelvis 1/3/24 - limited exam with only 3 sequences obtained. Showing invasion into sacrum, right hemipelvis, and invasion posteriorly into gluteal musculature  - CTA 2/2/24 - without signs of local regional metastases, large lobulated rectal tumor dissecting into adjacent soft tissues and bones overall similar in appearance to the previo  - colonoscopy/anoscopy difficult due to bulk of disease  - CEA elevated at 192.0 prior to treatment  - PET/CT (2/9/24): Intense uptake is seen in the patient's large pelvic mass known to be an invasive squamous cell carcinoma of the anus. There is extension into the left gluteal region, but no evidence of FDG avid local or distant metastatic disease.    Plan   -- Overall concerning for bulky locally advanced disease invading into gluteal region, no distant regional lymph node involvement or distant metastatic disease. Clinical course complicated by delays in follow up. Discussed importance of initiating treatment. Given extent of disease, would benefit from local control with concurrent chemotherapy and radiation therapy. Favor Capecitabine + mitomycin + RT  - Overall treatment plan was to pursue concurrent chemoradiation as above, though unfortunately patient has experienced significant delays in care due to noncompliance with appointments and with insurance difficulties. He is in the process of changing insurance (reports friend is helping him at home, while he is hospitalized). If he is unable to switch insurances, patient may need to be redirected another institution which can support him with his insurance ([facility]/[place]). Radiation simulation currently on hold until insurance approval is finalized.  - Please consult palliative care for assistance with pain control - of note, patient reports he has been self medicating with amphetamines and THC as he was unable to follow up with palliative care. Patient has an appointment on 3/13 with Dr. Taylor, though may not be able to attending if insurance is not approved.  - Please consult social work at patient's request to assist with insurance difficulties  - appreciate colorectal involvement  - will notify radiation oncology and Dr. Julian of patient's admission as well.    #Coronavirus positive  - supportive care PRN  ?  # Anemia  Likely 2/2 to bleeding from rectal mass.   2/6/26: Received 600mg IV iron total. % saturation 10, TIBC 183, ferritin 109.  - Hgb this admission 7.2 --> 7.1  - Iron studies - total iron 149, percent saturation 8, ferritin 325  - per team, transfusing 1 unit pRBC  - c/w PO iron supplementation  ?  # HIV  - Stressed importance of compliance with Biktarvy  - f/u PCP    Discussed with Dr. Patel. Recommendations are considered final after attending attestation Mr. Rojo is a 43 year old male MSM with PMH HIV (on biktarvy), invasive anorectal squamous cell carcinoma, polysubstance abuse, who presents for rectal bleeding and penile pain. Hematology/Oncology consulted for history of invasive squamous cell carcinoma.     # Invasive squamous cell carcinoma   - anal mass biopsy 12/13/24 - invasive squamous cell carcinoma, moderately differentiated  - CT Chest 1/9/24 - mild paraseptal emphysema. no evidence of metastatic disease  - MRI pelvis 1/3/24 - limited exam with only 3 sequences obtained. Showing invasion into sacrum, right hemipelvis, and invasion posteriorly into gluteal musculature  - CTA 2/2/24 - without signs of local regional metastases, large lobulated rectal tumor dissecting into adjacent soft tissues and bones overall similar in appearance to the previo  - colonoscopy/anoscopy difficult due to bulk of disease  - CEA elevated at 192.0 prior to treatment  - PET/CT (2/9/24): Intense uptake is seen in the patient's large pelvic mass known to be an invasive squamous cell carcinoma of the anus. There is extension into the left gluteal region, but no evidence of FDG avid local or distant metastatic disease.    Plan   -- Overall concerning for bulky locally advanced disease invading into gluteal region, no distant regional lymph node involvement or distant metastatic disease. Clinical course complicated by delays in follow up. Discussed importance of initiating treatment. Given extent of disease, would benefit from local control with concurrent chemotherapy and radiation therapy. Favor Capecitabine + mitomycin + RT  - Overall treatment plan was to pursue concurrent chemoradiation as above, though unfortunately patient has experienced significant delays in care due to noncompliance with appointments and with insurance difficulties. He is in the process of changing insurance (reports friend is helping him at home, while he is hospitalized). If he is unable to switch insurances, patient may need to be redirected another institution which can support him with his insurance ([facility]/[place]). Radiation simulation currently on hold until insurance approval is finalized.  - Please consult palliative care for assistance with pain control - of note, patient reports he has been self medicating with amphetamines and THC as he was unable to follow up with palliative care. Patient has an appointment on 3/13 with Dr. Taylor, though may not be able to attend if insurance is not approved.  - Please consult social work at patient's request to assist with insurance difficulties  - appreciate colorectal involvement  - will notify radiation oncology and Dr. Julian of patient's admission as well.    #Coronavirus positive  - supportive care PRN  ?  # Anemia  Likely 2/2 to bleeding from rectal mass.   2/6/26: Received 600mg IV iron total. % saturation 10, TIBC 183, ferritin 109.  - Hgb this admission 7.2 --> 7.1  - Iron studies - total iron 149, percent saturation 8, ferritin 325  - per team, transfusing 1 unit pRBC  - c/w PO iron supplementation  ?  # HIV  - Stressed importance of compliance with Biktarvy  - f/u PCP    Discussed with Dr. Patel. Recommendations are considered final after attending attestation

## 2024-03-02 NOTE — PROGRESS NOTE ADULT - PROBLEM SELECTOR PLAN 9
Fluids: s/p 2L NS; 1 U pRBC  Electrolytes: replete as needed   Diet: Regular  DVT PPx: Hold iso of bleeding mass   Dispo: tele

## 2024-03-03 LAB
ANION GAP SERPL CALC-SCNC: 11 MMOL/L — SIGNIFICANT CHANGE UP (ref 5–17)
BASOPHILS # BLD AUTO: 0.03 K/UL — SIGNIFICANT CHANGE UP (ref 0–0.2)
BASOPHILS NFR BLD AUTO: 0.2 % — SIGNIFICANT CHANGE UP (ref 0–2)
BUN SERPL-MCNC: 9 MG/DL — SIGNIFICANT CHANGE UP (ref 7–23)
CALCIUM SERPL-MCNC: 9.3 MG/DL — SIGNIFICANT CHANGE UP (ref 8.4–10.5)
CHLORIDE SERPL-SCNC: 97 MMOL/L — SIGNIFICANT CHANGE UP (ref 96–108)
CO2 SERPL-SCNC: 25 MMOL/L — SIGNIFICANT CHANGE UP (ref 22–31)
CREAT SERPL-MCNC: 0.78 MG/DL — SIGNIFICANT CHANGE UP (ref 0.5–1.3)
EGFR: 115 ML/MIN/1.73M2 — SIGNIFICANT CHANGE UP
EOSINOPHIL # BLD AUTO: 0.12 K/UL — SIGNIFICANT CHANGE UP (ref 0–0.5)
EOSINOPHIL NFR BLD AUTO: 0.9 % — SIGNIFICANT CHANGE UP (ref 0–6)
GLUCOSE SERPL-MCNC: 118 MG/DL — HIGH (ref 70–99)
HCT VFR BLD CALC: 28.2 % — LOW (ref 39–50)
HGB BLD-MCNC: 8.5 G/DL — LOW (ref 13–17)
IMM GRANULOCYTES NFR BLD AUTO: 0.5 % — SIGNIFICANT CHANGE UP (ref 0–0.9)
LYMPHOCYTES # BLD AUTO: 0.83 K/UL — LOW (ref 1–3.3)
LYMPHOCYTES # BLD AUTO: 6.2 % — LOW (ref 13–44)
MAGNESIUM SERPL-MCNC: 2.3 MG/DL — SIGNIFICANT CHANGE UP (ref 1.6–2.6)
MCHC RBC-ENTMCNC: 24.6 PG — LOW (ref 27–34)
MCHC RBC-ENTMCNC: 30.1 GM/DL — LOW (ref 32–36)
MCV RBC AUTO: 81.7 FL — SIGNIFICANT CHANGE UP (ref 80–100)
MONOCYTES # BLD AUTO: 0.95 K/UL — HIGH (ref 0–0.9)
MONOCYTES NFR BLD AUTO: 7.1 % — SIGNIFICANT CHANGE UP (ref 2–14)
NEUTROPHILS # BLD AUTO: 11.47 K/UL — HIGH (ref 1.8–7.4)
NEUTROPHILS NFR BLD AUTO: 85.1 % — HIGH (ref 43–77)
NRBC # BLD: 0 /100 WBCS — SIGNIFICANT CHANGE UP (ref 0–0)
OSMOLALITY UR: 584 MOSM/KG — SIGNIFICANT CHANGE UP (ref 300–900)
PHOSPHATE SERPL-MCNC: 3.5 MG/DL — SIGNIFICANT CHANGE UP (ref 2.5–4.5)
PLATELET # BLD AUTO: 377 K/UL — SIGNIFICANT CHANGE UP (ref 150–400)
POTASSIUM SERPL-MCNC: 4.3 MMOL/L — SIGNIFICANT CHANGE UP (ref 3.5–5.3)
POTASSIUM SERPL-SCNC: 4.3 MMOL/L — SIGNIFICANT CHANGE UP (ref 3.5–5.3)
RBC # BLD: 3.45 M/UL — LOW (ref 4.2–5.8)
RBC # FLD: 17.4 % — HIGH (ref 10.3–14.5)
SODIUM SERPL-SCNC: 133 MMOL/L — LOW (ref 135–145)
SODIUM UR-SCNC: 140 MMOL/L — SIGNIFICANT CHANGE UP
WBC # BLD: 13.47 K/UL — HIGH (ref 3.8–10.5)
WBC # FLD AUTO: 13.47 K/UL — HIGH (ref 3.8–10.5)

## 2024-03-03 PROCEDURE — 71045 X-RAY EXAM CHEST 1 VIEW: CPT | Mod: 26

## 2024-03-03 PROCEDURE — 99233 SBSQ HOSP IP/OBS HIGH 50: CPT | Mod: GC

## 2024-03-03 RX ORDER — PIPERACILLIN AND TAZOBACTAM 4; .5 G/20ML; G/20ML
4.5 INJECTION, POWDER, LYOPHILIZED, FOR SOLUTION INTRAVENOUS EVERY 8 HOURS
Refills: 0 | Status: DISCONTINUED | OUTPATIENT
Start: 2024-03-03 | End: 2024-03-06

## 2024-03-03 RX ORDER — ACETAMINOPHEN 500 MG
650 TABLET ORAL EVERY 6 HOURS
Refills: 0 | Status: DISCONTINUED | OUTPATIENT
Start: 2024-03-03 | End: 2024-03-04

## 2024-03-03 RX ADMIN — OXYCODONE HYDROCHLORIDE 2.5 MILLIGRAM(S): 5 TABLET ORAL at 16:10

## 2024-03-03 RX ADMIN — OXYCODONE HYDROCHLORIDE 2.5 MILLIGRAM(S): 5 TABLET ORAL at 22:30

## 2024-03-03 RX ADMIN — PIPERACILLIN AND TAZOBACTAM 25 GRAM(S): 4; .5 INJECTION, POWDER, LYOPHILIZED, FOR SOLUTION INTRAVENOUS at 23:59

## 2024-03-03 RX ADMIN — Medication 650 MILLIGRAM(S): at 17:00

## 2024-03-03 RX ADMIN — Medication 3 MILLIGRAM(S): at 21:43

## 2024-03-03 RX ADMIN — OXYCODONE HYDROCHLORIDE 5 MILLIGRAM(S): 5 TABLET ORAL at 18:00

## 2024-03-03 RX ADMIN — OXYCODONE HYDROCHLORIDE 5 MILLIGRAM(S): 5 TABLET ORAL at 17:16

## 2024-03-03 RX ADMIN — Medication 650 MILLIGRAM(S): at 10:07

## 2024-03-03 RX ADMIN — OXYCODONE HYDROCHLORIDE 2.5 MILLIGRAM(S): 5 TABLET ORAL at 21:43

## 2024-03-03 RX ADMIN — OXYCODONE HYDROCHLORIDE 5 MILLIGRAM(S): 5 TABLET ORAL at 02:15

## 2024-03-03 RX ADMIN — BICTEGRAVIR SODIUM, EMTRICITABINE, AND TENOFOVIR ALAFENAMIDE FUMARATE 1 TABLET(S): 30; 120; 15 TABLET ORAL at 10:07

## 2024-03-03 RX ADMIN — PIPERACILLIN AND TAZOBACTAM 25 GRAM(S): 4; .5 INJECTION, POWDER, LYOPHILIZED, FOR SOLUTION INTRAVENOUS at 16:01

## 2024-03-03 RX ADMIN — OXYCODONE HYDROCHLORIDE 2.5 MILLIGRAM(S): 5 TABLET ORAL at 14:12

## 2024-03-03 RX ADMIN — OXYCODONE HYDROCHLORIDE 5 MILLIGRAM(S): 5 TABLET ORAL at 12:15

## 2024-03-03 RX ADMIN — Medication 650 MILLIGRAM(S): at 12:15

## 2024-03-03 RX ADMIN — Medication 650 MILLIGRAM(S): at 16:00

## 2024-03-03 RX ADMIN — Medication 650 MILLIGRAM(S): at 05:54

## 2024-03-03 RX ADMIN — PIPERACILLIN AND TAZOBACTAM 25 GRAM(S): 4; .5 INJECTION, POWDER, LYOPHILIZED, FOR SOLUTION INTRAVENOUS at 07:35

## 2024-03-03 RX ADMIN — OXYCODONE HYDROCHLORIDE 5 MILLIGRAM(S): 5 TABLET ORAL at 10:07

## 2024-03-03 RX ADMIN — Medication 650 MILLIGRAM(S): at 04:33

## 2024-03-03 RX ADMIN — Medication 100 MILLIGRAM(S): at 05:43

## 2024-03-03 RX ADMIN — OXYCODONE HYDROCHLORIDE 5 MILLIGRAM(S): 5 TABLET ORAL at 01:43

## 2024-03-03 NOTE — PROGRESS NOTE ADULT - ASSESSMENT
42 y/o M pmh of known rectal carcinoma with bony mets, HIV (MSM on biktarvy), Reports meth use due to cancer pain. Has been tachycardic 2/2 to meth use. Also smoking and admits depression given diagnosis and social situation. Has iron deficiency anemia noting hemoglobin of 7.1. Ordered unit of pRBC. Admitted to tele for episode of BRBPR and tachycardia. Surgery and hem onc following.

## 2024-03-03 NOTE — PROGRESS NOTE ADULT - PROBLEM SELECTOR PLAN 4
Pt w known hx of Rectal CA with bony involvement. Has established care w Dr. Julian/Graciela. Rectal mass and abdomen particularly painful at this time. Limited YOSEPH preformed revealing large mass.   - f/up hem onc recs --> patient requires radiation; plan for simulation on Wednesday 3/6/24  - zofran for nausea   - Tylenol 650mg PO q6 for mild pain, Oxy 2.5 q6 for moderate Oxy 5mg q6 for severe pain   - bowel regimen w Miralax nightly  - f/up palliative recs as patient cancer progressing with associated weight loss and depression; he reports continued meth use given rectal pain

## 2024-03-03 NOTE — PROGRESS NOTE ADULT - SUBJECTIVE AND OBJECTIVE BOX
RAMBO ALBERTS  43y, Male    Patient is a 43y old  Male who presents with a chief complaint of rectal mass/bleeding (03 Mar 2024 09:39)      INTERVAL HPI/OVERNIGHT EVENTS: Febrile to 101 in the AM, blood cultures drawn and tylenol given. CXR without signs of new infiltrate. Patient seen and examined at bedside. Denies any new symptoms, feels tired but no other complaints.     ROS: otherwise negative      T(C): 36.9 (03-03-24 @ 14:16), Max: 38.4 (03-03-24 @ 06:00)  HR: 112 (03-03-24 @ 12:15) (112 - 126)  BP: 119/71 (03-03-24 @ 12:15) (115/61 - 132/63)  RR: 16 (03-03-24 @ 12:15) (16 - 16)  SpO2: 98% (03-03-24 @ 12:15) (97% - 99%)  Wt(kg): --Vital Signs Last 24 Hrs  T(C): 36.9 (03 Mar 2024 14:16), Max: 38.4 (03 Mar 2024 06:00)  T(F): 98.5 (03 Mar 2024 14:16), Max: 101.1 (03 Mar 2024 06:00)  HR: 112 (03 Mar 2024 12:15) (112 - 126)  BP: 119/71 (03 Mar 2024 12:15) (115/61 - 132/63)  BP(mean): 88 (03 Mar 2024 12:15) (81 - 91)  RR: 16 (03 Mar 2024 12:15) (16 - 16)  SpO2: 98% (03 Mar 2024 12:15) (97% - 99%)    Parameters below as of 03 Mar 2024 12:15  Patient On (Oxygen Delivery Method): room air        PHYSICAL EXAM:  GENERAL: ill-appearing, cachectic, sleepy but easily arousable and conversant  HEAD:  Atraumatic, Normocephalic  EYES: EOMI  ENMT: Moist mucous membranes, Good dentition, No lesions  NECK: Supple, No JVD, Normal thyroid  NERVOUS SYSTEM:  Alert & Oriented X3, Good concentration  CHEST/LUNG: Clear to auscultation bilaterally; No rales, rhonchi, wheezing, or rubs  HEART: Tachycardic with regular rhythm; S1/S2+ with S4 gallop ausculated  ABDOMEN: Soft, Nontender, Nondistended; Bowel sounds present   EXTREMITIES:  2+ Peripheral Pulses, No clubbing, cyanosis, or edema      LABS:                        8.5    13.47 )-----------( 377      ( 03 Mar 2024 05:30 )             28.2     03-03    133<L>  |  97  |  9   ----------------------------<  118<H>  4.3   |  25  |  0.78    Ca    9.3      03 Mar 2024 05:30  Phos  3.5     03-03  Mg     2.3     03-03    TPro  7.1  /  Alb  3.5  /  TBili  0.3  /  DBili  x   /  AST  12  /  ALT  9<L>  /  AlkPhos  77  03-02    Iron 13, TIBC 162, %Sat 8, Ferritin 304/ 03-01-24 @ 21:58      Urinalysis Basic - ( 03 Mar 2024 05:30 )    Color: x / Appearance: x / SG: x / pH: x  Gluc: 118 mg/dL / Ketone: x  / Bili: x / Urobili: x   Blood: x / Protein: x / Nitrite: x   Leuk Esterase: x / RBC: x / WBC x   Sq Epi: x / Non Sq Epi: x / Bacteria: x      CAPILLARY BLOOD GLUCOSE            Urinalysis Basic - ( 03 Mar 2024 05:30 )    Color: x / Appearance: x / SG: x / pH: x  Gluc: 118 mg/dL / Ketone: x  / Bili: x / Urobili: x   Blood: x / Protein: x / Nitrite: x   Leuk Esterase: x / RBC: x / WBC x   Sq Epi: x / Non Sq Epi: x / Bacteria: x        MEDICATIONS  (STANDING):  acetaminophen     Tablet .. 650 milliGRAM(s) Oral every 6 hours  bictegravir 50 mG/emtricitabine 200 mG/tenofovir alafenamide 25 mG (BIKTARVY) 1 Tablet(s) Oral every 24 hours  melatonin 3 milliGRAM(s) Oral at bedtime  piperacillin/tazobactam IVPB.. 4.5 Gram(s) IV Intermittent every 8 hours  polyethylene glycol 3350 17 Gram(s) Oral every 24 hours  thiamine 100 milliGRAM(s) Oral every 24 hours    MEDICATIONS  (PRN):  ondansetron Injectable 4 milliGRAM(s) IV Push every 8 hours PRN Nausea and/or Vomiting  oxyCODONE    IR 5 milliGRAM(s) Oral every 6 hours PRN Severe Pain (7 - 10)  oxyCODONE    IR 2.5 milliGRAM(s) Oral every 6 hours PRN Moderate Pain (4 - 6)      RADIOLOGY & ADDITIONAL TESTS: Reviewed

## 2024-03-03 NOTE — PROGRESS NOTE ADULT - PROBLEM SELECTOR PLAN 5
Pt claims he has a hx of tachycardia and that his resting HR hovers around 130bpm as of late. Given the patients hx of prior heavy drug use cannot rule out possibility of a cardiomyopathy. It is also possible this is in response to blood loss or underlying infection   EKG normal sinus rhythm without ischemic changes  - patient asymptomatic at this time  - TSH wnl

## 2024-03-03 NOTE — PROGRESS NOTE ADULT - PROBLEM SELECTOR PLAN 3
Pt presenting w hgb of 7.4 in setting of BRBPR and received 2L of NS in ED. Has received iron transfusions in the past and hovers between 8-9 on prior admissions   - fs/p 1u pRBC  - maintain active T/S   - maintain 2 large bore IV   - transfuse if hgb <7  - serum iron low 13, % sat 8, ferritin 304, B12/folate wnl

## 2024-03-03 NOTE — PROGRESS NOTE ADULT - PROBLEM SELECTOR PLAN 2
Pt presenting w hgb of 7.4 in setting of BRBPR and received 2L of NS in ED. Has received iron transfusions in the past and hovers between 8-9 on prior admissions. Iron studies consistent with AARTI.   - s/p 1 u pRBC with appropriate increase in Hgb  - maintain active T/S   - maintain 2 large bore IV   - transfuse if hgb <7

## 2024-03-03 NOTE — PROGRESS NOTE ADULT - PROBLEM SELECTOR PLAN 6
hx of drug abuse with meth, IV drug use, cocaine, ecstasy and marijuana; UTOX positive for meth  - SBIRT consult   - monitor for signs of withdrawal

## 2024-03-03 NOTE — PROGRESS NOTE ADULT - PROBLEM SELECTOR PLAN 8
Pt exhibiting labile mood and is teary, feels depressed and requesting to speak w psychiatric services to begin tx for his depression and anxiety relating to his cancer  - f/up palliative consult  - will consider psych consult as appropriate

## 2024-03-03 NOTE — PROGRESS NOTE ADULT - PROBLEM SELECTOR PLAN 1
Pt meeting 2/4 SIRS criteria on admission w Tachycardia and WBC of 13.07 w neutrophil predominance. Likely iso of blood loss and coronavirus positive. Also concern for superimposed infx on rectal mass vs uti as patient has had episodes of penil sehaft pain/dysuria,  will empirically tx with abx until cultures arise. Pt spiked temp to 101 on 3/3 (not a new Tmax). Surveillance cultures sent, CXR not concerning, no urinary symptoms. Likely spiking iso coronavirus infection.   - f/u blood cx --> NGTD  - increase to zosyn 4.5g q8h to cover for pseudomonas in immunocompromised patient

## 2024-03-04 ENCOUNTER — RESULT REVIEW (OUTPATIENT)
Age: 44
End: 2024-03-04

## 2024-03-04 DIAGNOSIS — G89.3 NEOPLASM RELATED PAIN (ACUTE) (CHRONIC): ICD-10-CM

## 2024-03-04 DIAGNOSIS — Z51.5 ENCOUNTER FOR PALLIATIVE CARE: ICD-10-CM

## 2024-03-04 LAB
ANION GAP SERPL CALC-SCNC: 9 MMOL/L — SIGNIFICANT CHANGE UP (ref 5–17)
BASOPHILS # BLD AUTO: 0.03 K/UL — SIGNIFICANT CHANGE UP (ref 0–0.2)
BASOPHILS NFR BLD AUTO: 0.3 % — SIGNIFICANT CHANGE UP (ref 0–2)
BLD GP AB SCN SERPL QL: NEGATIVE — SIGNIFICANT CHANGE UP
BUN SERPL-MCNC: 10 MG/DL — SIGNIFICANT CHANGE UP (ref 7–23)
CALCIUM SERPL-MCNC: 9 MG/DL — SIGNIFICANT CHANGE UP (ref 8.4–10.5)
CHLORIDE SERPL-SCNC: 99 MMOL/L — SIGNIFICANT CHANGE UP (ref 96–108)
CO2 SERPL-SCNC: 27 MMOL/L — SIGNIFICANT CHANGE UP (ref 22–31)
CREAT SERPL-MCNC: 0.75 MG/DL — SIGNIFICANT CHANGE UP (ref 0.5–1.3)
EGFR: 116 ML/MIN/1.73M2 — SIGNIFICANT CHANGE UP
EOSINOPHIL # BLD AUTO: 0.12 K/UL — SIGNIFICANT CHANGE UP (ref 0–0.5)
EOSINOPHIL NFR BLD AUTO: 1.1 % — SIGNIFICANT CHANGE UP (ref 0–6)
GLUCOSE SERPL-MCNC: 140 MG/DL — HIGH (ref 70–99)
HCT VFR BLD CALC: 30.4 % — LOW (ref 39–50)
HGB BLD-MCNC: 9.1 G/DL — LOW (ref 13–17)
HIV-1 VIRAL LOAD RESULT: SIGNIFICANT CHANGE UP
HIV1 RNA # SERPL NAA+PROBE: SIGNIFICANT CHANGE UP COPIES/ML
HIV1 RNA SER-IMP: SIGNIFICANT CHANGE UP
HIV1 RNA SERPL NAA+PROBE-ACNC: SIGNIFICANT CHANGE UP
HIV1 RNA SERPL NAA+PROBE-LOG#: SIGNIFICANT CHANGE UP LG COP/ML
IMM GRANULOCYTES NFR BLD AUTO: 0.5 % — SIGNIFICANT CHANGE UP (ref 0–0.9)
LYMPHOCYTES # BLD AUTO: 1.31 K/UL — SIGNIFICANT CHANGE UP (ref 1–3.3)
LYMPHOCYTES # BLD AUTO: 11.6 % — LOW (ref 13–44)
MAGNESIUM SERPL-MCNC: 2.8 MG/DL — HIGH (ref 1.6–2.6)
MCHC RBC-ENTMCNC: 24.5 PG — LOW (ref 27–34)
MCHC RBC-ENTMCNC: 29.9 GM/DL — LOW (ref 32–36)
MCV RBC AUTO: 81.7 FL — SIGNIFICANT CHANGE UP (ref 80–100)
MONOCYTES # BLD AUTO: 0.96 K/UL — HIGH (ref 0–0.9)
MONOCYTES NFR BLD AUTO: 8.5 % — SIGNIFICANT CHANGE UP (ref 2–14)
NEUTROPHILS # BLD AUTO: 8.77 K/UL — HIGH (ref 1.8–7.4)
NEUTROPHILS NFR BLD AUTO: 78 % — HIGH (ref 43–77)
NRBC # BLD: 0 /100 WBCS — SIGNIFICANT CHANGE UP (ref 0–0)
PHOSPHATE SERPL-MCNC: 3.5 MG/DL — SIGNIFICANT CHANGE UP (ref 2.5–4.5)
PLATELET # BLD AUTO: 409 K/UL — HIGH (ref 150–400)
POTASSIUM SERPL-MCNC: 3.6 MMOL/L — SIGNIFICANT CHANGE UP (ref 3.5–5.3)
POTASSIUM SERPL-SCNC: 3.6 MMOL/L — SIGNIFICANT CHANGE UP (ref 3.5–5.3)
RBC # BLD: 3.72 M/UL — LOW (ref 4.2–5.8)
RBC # FLD: 17.3 % — HIGH (ref 10.3–14.5)
RH IG SCN BLD-IMP: POSITIVE — SIGNIFICANT CHANGE UP
SODIUM SERPL-SCNC: 135 MMOL/L — SIGNIFICANT CHANGE UP (ref 135–145)
WBC # BLD: 11.25 K/UL — HIGH (ref 3.8–10.5)
WBC # FLD AUTO: 11.25 K/UL — HIGH (ref 3.8–10.5)

## 2024-03-04 PROCEDURE — 93306 TTE W/DOPPLER COMPLETE: CPT | Mod: 26

## 2024-03-04 PROCEDURE — 99222 1ST HOSP IP/OBS MODERATE 55: CPT

## 2024-03-04 PROCEDURE — 99233 SBSQ HOSP IP/OBS HIGH 50: CPT | Mod: GC

## 2024-03-04 PROCEDURE — 99223 1ST HOSP IP/OBS HIGH 75: CPT

## 2024-03-04 RX ORDER — MORPHINE SULFATE 50 MG/1
15 CAPSULE, EXTENDED RELEASE ORAL EVERY 12 HOURS
Refills: 0 | Status: DISCONTINUED | OUTPATIENT
Start: 2024-03-04 | End: 2024-03-06

## 2024-03-04 RX ORDER — POTASSIUM CHLORIDE 20 MEQ
40 PACKET (EA) ORAL ONCE
Refills: 0 | Status: COMPLETED | OUTPATIENT
Start: 2024-03-04 | End: 2024-03-04

## 2024-03-04 RX ORDER — ACETAMINOPHEN 500 MG
650 TABLET ORAL EVERY 6 HOURS
Refills: 0 | Status: DISCONTINUED | OUTPATIENT
Start: 2024-03-04 | End: 2024-03-06

## 2024-03-04 RX ADMIN — OXYCODONE HYDROCHLORIDE 5 MILLIGRAM(S): 5 TABLET ORAL at 08:48

## 2024-03-04 RX ADMIN — OXYCODONE HYDROCHLORIDE 5 MILLIGRAM(S): 5 TABLET ORAL at 14:45

## 2024-03-04 RX ADMIN — Medication 100 MILLIGRAM(S): at 06:02

## 2024-03-04 RX ADMIN — PIPERACILLIN AND TAZOBACTAM 25 GRAM(S): 4; .5 INJECTION, POWDER, LYOPHILIZED, FOR SOLUTION INTRAVENOUS at 16:02

## 2024-03-04 RX ADMIN — Medication 650 MILLIGRAM(S): at 10:18

## 2024-03-04 RX ADMIN — Medication 40 MILLIEQUIVALENT(S): at 12:21

## 2024-03-04 RX ADMIN — Medication 650 MILLIGRAM(S): at 21:56

## 2024-03-04 RX ADMIN — Medication 650 MILLIGRAM(S): at 01:09

## 2024-03-04 RX ADMIN — MORPHINE SULFATE 15 MILLIGRAM(S): 50 CAPSULE, EXTENDED RELEASE ORAL at 19:45

## 2024-03-04 RX ADMIN — OXYCODONE HYDROCHLORIDE 5 MILLIGRAM(S): 5 TABLET ORAL at 03:15

## 2024-03-04 RX ADMIN — Medication 650 MILLIGRAM(S): at 11:45

## 2024-03-04 RX ADMIN — OXYCODONE HYDROCHLORIDE 5 MILLIGRAM(S): 5 TABLET ORAL at 09:45

## 2024-03-04 RX ADMIN — BICTEGRAVIR SODIUM, EMTRICITABINE, AND TENOFOVIR ALAFENAMIDE FUMARATE 1 TABLET(S): 30; 120; 15 TABLET ORAL at 10:17

## 2024-03-04 RX ADMIN — OXYCODONE HYDROCHLORIDE 5 MILLIGRAM(S): 5 TABLET ORAL at 13:54

## 2024-03-04 RX ADMIN — OXYCODONE HYDROCHLORIDE 5 MILLIGRAM(S): 5 TABLET ORAL at 02:32

## 2024-03-04 RX ADMIN — Medication 650 MILLIGRAM(S): at 22:58

## 2024-03-04 RX ADMIN — PIPERACILLIN AND TAZOBACTAM 25 GRAM(S): 4; .5 INJECTION, POWDER, LYOPHILIZED, FOR SOLUTION INTRAVENOUS at 06:02

## 2024-03-04 RX ADMIN — Medication 650 MILLIGRAM(S): at 06:50

## 2024-03-04 RX ADMIN — Medication 650 MILLIGRAM(S): at 06:06

## 2024-03-04 RX ADMIN — MORPHINE SULFATE 15 MILLIGRAM(S): 50 CAPSULE, EXTENDED RELEASE ORAL at 18:44

## 2024-03-04 RX ADMIN — Medication 3 MILLIGRAM(S): at 21:56

## 2024-03-04 RX ADMIN — Medication 650 MILLIGRAM(S): at 00:04

## 2024-03-04 NOTE — PHYSICAL EXAM
[Restricted in physically strenuous activity but ambulatory and able to carry out work of a light or sedentary nature] : Status 1- Restricted in physically strenuous activity but ambulatory and able to carry out work of a light or sedentary nature, e.g., light house work, office work [Thin] : thin [Normal] : affect appropriate [de-identified] : uncomfortable appearing [de-identified] : CTAB [de-identified] : RRR [de-identified] : sponatenously moving all extremities

## 2024-03-04 NOTE — ASSESSMENT
[FreeTextEntry1] : 43 M, MSM with PMH HIV (on biktarvy), invasive anorectal squamous cell carcinoma, polysubstance abuse, syphilis, anemia and abodminal MRSA 2021, who presented to [facility] ED with rectal bleeding early February 2024. Here for initial oncologic evaluation:  # Invasive squamous cell carcinoma -- Reviewed available imaging, pathology, diagnosis, and treatment with patient -- Overall concerning for bulky locally advanced disease invading into gluteal region, no distant regional lymph node involvement or distant metastatic disease. Clinical course complicated by delays in follow up. Discussed importance of initiating treatment. Given extent of disease, would benefit from local control with concurrent chemotherapy and radiation therapy. Favor Capecitabine + mitomycin + RT -- Patient to schedule simulation with radiation oncology (Dr. Owens) -- Chemotherapy to be planned once simulation completed -- Discussed if barrier to receiving care due to financial/insurance issues, patient should follow up at [facility]. All questions answered. Patient verbalized understanding and agreement to plan above -  outreach today for insurance and housing support - Palliative care referral for symptom management and illness understanding  # Anemia Likely 2/2 to bleeding from rectal mass. 2/6/26: Received 600mg IV iron total. % saturation 10, TIBC 183, ferritin 109. - Hgb 7.6 --> 8.3 (2/2 - 2/6) - c/w PO iron supplementation  - Pre-chemo labs once simulation completed  # HIV - Stressed importance of compliance with Biktarvy - f/u PCP  RTC once completes radiation simulation to discuss chemotherapy CBC, CMP, ferritin/iron panel, hepatitis B and C serologies (unless in HIE)  please see attending physician addendum below

## 2024-03-04 NOTE — CONSULT NOTE ADULT - SUBJECTIVE AND OBJECTIVE BOX
Surgery Consult    Consulting Surgical Team:   [ ] Team 1 - Breast/Surgical Oncology ([phone])    [ ] Team 2 - MIS/Bariatric Surgery ([phone])     [ ] Team 4 - Acute Care Surgery ([phone])     [ x ] Team 5 - Colorectal Surgery/ General Surgery/Pediatric Surgery ([phone])    Consulting Attending: Dr. Cortze    43 M, MSM with PMH HIV (on Biktarvy), invasive anorectal squamous cell carcinoma, polysubstance abuse, syphilis, anemia and abdominal MRSA 2021, who presents as transfer fro [facility] to [facility] admitted to medicine service with complaint of penile pain and blood per rectum. Patient known to service with dx of invasive SCC with large cT4 tumor scheduled to begin RT simulation this week. States chief complaint that brought him to hospital was penile pain now resolved, as well as continued blood per rectum stable since discharge from hospital on 2/9. Denies penile discharge or new lesions. No dysuria. Prior to admission denies fevers, chills, chest pain, dyspnea, abdominal pain, rectal pain. Passing flatus with bowel movements.       Oncologic History:  - anal mass biopsy 12/13/24 - invasive squamous cell carcinoma, moderately differentiated  - CT Chest 1/9/24 - mild paraseptal emphysema. no evidence of metastatic disease  - MRI pelvis 1/3/24 - limited exam with only 3 sequences obtained. Showing invasion into sacrum, right hemipelvis, and invasion posteriorly into gluteal musculature  - CTA 2/2/24 - without signs of local regional metastases, large lobulated rectal tumor dissecting into adjacent soft tissues and bones overall similar in appearance to the previo  - CEA elevated at 192.0 prior to treatment  - PET/CT (2/9/24): Intense uptake is seen in the patient's large pelvic mass known to be an invasive squamous cell carcinoma of the anus. There is extension into the left gluteal region, but no evidence of FDG avid local or distant metastatic disease.       PHYSICAL EXAM:  Physical Exam  General: NAD, resting comfortably in bed  Pulm: Nonlabored breathing, no respiratory distress  Abd: soft, non distended, incisions c/d/i, non tender  Extrem: WWP, no edema,  Neuro: A/O x 3, CNs II-XII grossly intact, no focal deficits, normal sensation  Pulses: palpable distal pulses     I&o's:  I&O's Summary    01 Mar 2024 07:01  -  02 Mar 2024 07:00  --------------------------------------------------------  IN: 227 mL / OUT: 1140 mL / NET: -913 mL    02 Mar 2024 07:01  -  02 Mar 2024 11:34  --------------------------------------------------------  IN: 25 mL / OUT: 500 mL / NET: -475 mL        LABS:                        7.1    10.94 )-----------( 396      ( 02 Mar 2024 05:30 )             24.0     03-02    134<L>  |  99  |  8   ----------------------------<  104<H>  3.9   |  24  |  0.78    Ca    9.2      02 Mar 2024 05:30  Phos  4.6     03-02  Mg     2.8     03-02    TPro  7.1  /  Alb  3.5  /  TBili  0.3  /  DBili  x   /  AST  12  /  ALT  9<L>  /  AlkPhos  77  03-02    Lactate:    PT/INR - ( 01 Mar 2024 09:37 )   PT: 14.5 sec;   INR: 1.33          PTT - ( 01 Mar 2024 09:37 )  PTT:32.7 sec          Urinalysis Basic - ( 02 Mar 2024 05:30 )    Color: x / Appearance: x / SG: x / pH: x  Gluc: 104 mg/dL / Ketone: x  / Bili: x / Urobili: x   Blood: x / Protein: x / Nitrite: x   Leuk Esterase: x / RBC: x / WBC x   Sq Epi: x / Non Sq Epi: x / Bacteria: x        MICRO:     IMAGING:  
[facility]  Russell Gorman, Palliative Care Attending  Contact Info: Call [phone] (HEAL Line) or message on Microsoft Teams    HPI:  42 y/o M pmh of known rectal carcinoma (dx'd 12/24) with bony involvement  to sacrum/acetabulum (followed by Eliel/Graciela), HIV (MSM on biktarvy), Anemia, Tachycardia (home readings in the 130s which he attributes to prior heavy drug use (crystal meth, ecstasy), current smoking (several cigarettes every other day) and depression presenting for episode of BRBPR and abdominal pain. Patient states that he has been experiencing bloody bowel movements for some time as a result of his rectal CA and his movements are traditionally bloody, however he had a profusely bloody movements this AM with associated weakness and severe abdominal pain. The patient also endorses penile pain with an inability to fully urinate at the time. He denies fevers, chills but endorses SOB, CP which has now resolved, weakness and fatigue.     ED Course:  Vitals Afebrile, , /63, Spo2 97, RR17   Labs: WBC 13.087 (neutrophilic predominance), HgB 7.4, , albumin 2.9, PT 14.5, INR 1.33 UA negative,   EKG: Sinus tach   Imaging: CT angio chest, negative for PE, CT A/P  rectal mass with grossly stable osseous  involvement.  Interventions: dilaudid 0.5 IV x3, zosyn 3.375 x1, 2L NS   (01 Mar 2024 20:36)    PERTINENT PM/SXH:   HIV (human immunodeficiency virus infection)    Abscess    IVDU (intravenous drug user)    Anemia    HIV (human immunodeficiency virus infection)    Rectal cancer      No significant past surgical history    No significant past surgical history    H/O rectal polypectomy      FAMILY HISTORY:  FH: CAD (coronary artery disease) (Mother)      ITEMS NOT CHECKED ARE NOT PRESENT    SOCIAL HISTORY:   Significant other/partner:  []  Children:  []   Substance hx:  []   Tobacco hx:  []   Alcohol hx: []   Home Opioid hx:  [] I-Stop Reference No:  - no active Rx's / see chart note  Living Situation: [x]Home  []Long term care  []Rehab []Other  Buddhist/Spiritual practice: ; Role of organized Anabaptism [ ] important [x ] some [ ] unable to assess  Coping: [ ] well [ x] with difficulty [ ] poor coping [ ] unable to assess  Support system: [ ] strong [x ] adequate [ ] inadequate    ADVANCE DIRECTIVES:    []MOLST  []Living Will  DECISION MAKER(s):  [] Health Care Proxy(s)  [x] Surrogate(s)  [] Guardian           Name(s)/Phone Number(s): Alexy Velasquez (friend)    BASELINE (I)ADLs (prior to admission):  [place]: [x]Total  [] Moderate []Dependent    ALLERGIES:  No Known Allergies    MEDICATIONS  (STANDING):  bictegravir 50 mG/emtricitabine 200 mG/tenofovir alafenamide 25 mG (BIKTARVY) 1 Tablet(s) Oral every 24 hours  melatonin 3 milliGRAM(s) Oral at bedtime  piperacillin/tazobactam IVPB.. 4.5 Gram(s) IV Intermittent every 8 hours  polyethylene glycol 3350 17 Gram(s) Oral every 24 hours  thiamine 100 milliGRAM(s) Oral every 24 hours    MEDICATIONS  (PRN):  acetaminophen     Tablet .. 650 milliGRAM(s) Oral every 6 hours PRN Temp greater or equal to 38C (100.4F), Mild Pain (1 - 3)  ondansetron Injectable 4 milliGRAM(s) IV Push every 8 hours PRN Nausea and/or Vomiting  oxyCODONE    IR 5 milliGRAM(s) Oral every 6 hours PRN Severe Pain (7 - 10)  oxyCODONE    IR 2.5 milliGRAM(s) Oral every 6 hours PRN Moderate Pain (4 - 6)    PRESENT SYMPTOMS: []Unable to obtain due to poor mentation/encephalopathy  Source if other than patient:  []Family   []Team     Pain: [ x] yes [ ] no  QOL impact - unable to do ADLs  Location - rectum                   Aggravating Factors - movement  Quality - sharp  Radiation - none  Timing - intermittent  Severity (0-10 scale) - 8  Minimal Acceptable Level (0-10 scale) -4    PAIN AD Score:  [email or web address] (press ctrl +  left click to view)    Dyspnea:                           [ ]Mild  [ ]Moderate [ ]Severe  Anxiety:                             [ ]Mild [x ]Moderate [ ]Severe  Fatigue:                             [ x]Mild [ ]Moderate [ ]Severe  Nausea:                             [ ]Mild [ ]Moderate [ ]Severe  Loss of Appetite:             [ ]Mild [x ]Moderate [ ]Severe  Constipation:                   [ ]Mild [ ]Moderate [ ]Severe    Other Symptoms:  [x ]All other review of systems negative     Palliative Performance Status Version 2: 60-70 %    [email or web address]_[email or web address]    PHYSICAL EXAM:  GENERAL:  [x ]Alert  [x ]Oriented x   3[ ]Lethargic  [ ]Cachexia  [ ]Unarousable  [ ]Verbal  [ ]Non-Verbal  Behavioral:   [ ]Anxiety  [ ]Delirium [ ]Agitation [x ]Cooperative  HEENT:  [ ]Normal   [x ]Dry mouth   [ ]ET Tube/Trach  [ ]Oral lesions  PULMONARY:   [x ]Clear []Tachypnea  []Audible excessive secretions   [ ]Rhonchi        [ ]Right [ ]Left [ ]Bilateral  [ ]Crackles        [ ]Right [ ]Left [ ]Bilateral  [ ]Wheezing     [ ]Right [ ]Left [ ]Bilateral  CARDIOVASCULAR:    [x ]Regular [ ]Irregular [ ]Tachy  [ ]Ethan [ ]Murmur [ ]Other  GASTROINTESTINAL:  [ x]Soft  [ ]Distended   [ x]+BS  [x ]Non tender [ ]Tender  [ ]PEG [ ]OGT/ NGT  Last BM:     GENITOURINARY:  [x ]Normal [ ] Incontinent   [ ]Oliguria/Anuria   [ ][place]on  MUSCULOSKELETAL:   [ ]Normal   [ x]Weakness  [ ]Bed/Wheelchair bound [ ]Edema  NEUROLOGIC:   [ x]No focal deficits  [ ]Cognitive impairment  [ ]Dysphagia [ ]Dysarthria [ ]Paresis [ ]Encephalopathic   SKIN:   x[ ]Normal   [ ]Pressure ulcer(s)  [ ]Rash    CRITICAL CARE:  [ ]Shock Present  [ ]Septic [ ]Cardiogenic [ ]Neurologic [ ]Hypovolemic  [ ]Vasopressors [ ]Inotropes   [ ]Respiratory failure present [ ]Mechanical Ventilation [ ]Non-invasive ventilatory support [ ]High-Flow  [ ]Acute  [ ]Chronic [ ]Hypoxic  [ ]Hypercarbic  [ ]Other organ failure    Vital Signs Last 24 Hrs  T(C): 37.1 (04 Mar 2024 13:52), Max: 39.3 (04 Mar 2024 00:30)  T(F): 98.7 (04 Mar 2024 13:52), Max: 102.7 (04 Mar 2024 00:30)  HR: 114 (04 Mar 2024 14:10) (114 - 140)  BP: 131/82 (04 Mar 2024 14:10) (123/57 - 139/83)  BP(mean): 99 (04 Mar 2024 14:10) (82 - 103)  RR: 16 (04 Mar 2024 14:10) (16 - 16)  SpO2: 98% (04 Mar 2024 14:10) (95% - 98%)    Parameters below as of 04 Mar 2024 14:10  Patient On (Oxygen Delivery Method): room air     I&O's Summary    03 Mar 2024 07:01  -  04 Mar 2024 07:00  --------------------------------------------------------  IN: 100 mL / OUT: 800 mL / NET: -700 mL    04 Mar 2024 07:01  -  04 Mar 2024 15:10  --------------------------------------------------------  IN: 0 mL / OUT: 350 mL / NET: -350 mL        LABS:                        9.1    11.25 )-----------( 409      ( 04 Mar 2024 09:23 )             30.4   03-04    135  |  99  |  10  ----------------------------<  140<H>  3.6   |  27  |  0.75    Ca    9.0      04 Mar 2024 09:23  Phos  3.5     03-04  Mg     2.8     03-04      Urinalysis Basic - ( 04 Mar 2024 09:23 )    Color: x / Appearance: x / SG: x / pH: x  Gluc: 140 mg/dL / Ketone: x  / Bili: x / Urobili: x   Blood: x / Protein: x / Nitrite: x   Leuk Esterase: x / RBC: x / WBC x   Sq Epi: x / Non Sq Epi: x / Bacteria: x      RADIOLOGY & ADDITIONAL STUDIES:      PROTEIN CALORIE MALNUTRITION PRESENT: [ ]mild [ ]moderate [ ]severe [ ]underweight [ ]morbid obesity  [ ]PPSV2 < or = to 30% [ ]significant weight loss  [ ]poor nutritional intake [ ]catabolic state [ ]anasarca     Artificial Nutrition [ ]     REFERRALS:  [x]Social Work  [ ]Case management [ ]PT/OT [ ]Chaplaincy  [ ]Hospice  [ ]Patient/Family Support    DISCUSSION OF CASE: Family - to obtain additional history and to provide emotional support; ( ) -     
Hematology Oncology Consult Note     Mr. Rojo is a 43 year old male MSM with PMH HIV (on biktarvy), invasive anorectal squamous cell carcinoma, polysubstance abuse, who presents for rectal bleeding and penile pain. Hematology/Oncology consulted for history of invasive squamous cell carcinoma.     Patient reports that he had significant bloody bowel movement and abdoimnal pain and reports that he has been experiencing bloody bowel movements since discharge 2/9/24, though worsened yesterday AM to profusely blood bowel movement. Deneis current chest pain, endorses mild SOB, weakness, fatigue. Also endorsed penile pain, though now resolved. Denies dysuria. Denies fevers, though reports he has been feeling very cold, and is shaking on evaluation. Ongoing insurance difficulties have made treatment difficult, though patient reports he is motivated to pursue treatment at this time.       Labs significant for WBC 12.35 on admission --> 10.94  Hgb 7.2 --> 7.1  Platelet 423 --> 396  Iron studies - total iron 149, percent saturation 8, ferritin 325  B12 936  Folate 9.9  Urine tox positive for THC and amphetamine   Coronavirus positive    Oncologic History:  - anal mass biopsy 12/13/24 - invasive squamous cell carcinoma, moderately differentiated  - CT Chest 1/9/24 - mild paraseptal emphysema. no evidence of metastatic disease  - MRI pelvis 1/3/24 - limited exam with only 3 sequences obtained. Showing invasion into sacrum, right hemipelvis, and invasion posteriorly into gluteal musculature  - CTA 2/2/24 - without signs of local regional metastases, large lobulated rectal tumor dissecting into adjacent soft tissues and bones overall similar in appearance to the previo  - colonoscopy/anoscopy may be difficult due to bulk of disease  - CEA elevated at 192.0 prior to treatment  - PET/CT (2/9/24): Intense uptake is seen in the patient's large pelvic mass known to be an invasive squamous cell carcinoma of the anus. There is extension into the left gluteal region, but no evidence of FDG avid local or distant metastatic disease.      PAST MEDICAL & SURGICAL HISTORY:  HIV (human immunodeficiency virus infection)      Abscess      IVDU (intravenous drug user)      Anemia      Rectal cancer      H/O rectal polypectomy          Allergies:  No Known Allergies      Medications:        Social History:    FAMILY HISTORY:  FH: CAD (coronary artery disease) (Mother)        PHYSICAL EXAM:    T(F): 98.8 (03-02-24 @ 11:15), Max: 102.9 (03-02-24 @ 05:15)  HR: 116 (03-02-24 @ 11:15) (114 - 136)  BP: 124/74 (03-02-24 @ 11:15) (107/68 - 147/74)  RR: 18 (03-02-24 @ 11:15) (16 - 19)  SpO2: 95% (03-02-24 @ 11:15) (91% - 100%)  Wt(kg): --    Daily     Daily     Gen: well developed, well nourished, comfortable  HEENT: normocephalic/atraumatic, no conjunctival pallor, no scleral icterus, no oral thrush/mucosal bleeding/mucositis  Neck: supple, no masses, no JVD  Breasts: deferred  Back: nontender  Cardiovascular: RR, nl S1S2, no murmurs/rubs/gallops  Respiratory: clear air entry b/l  Gastrointestinal: BS+, soft, NT/ND, no masses, no splenomegaly, no hepatomegaly, no evidence for ascites  Genitourinary: deferred  Rectal: deferred  Extremities: no clubbing/cyanosis, no edema, no calf tenderness  Vascular:  DP/PT 2+ b/l  Neurological: CN 2-12 grossly intact, no focal deficits  Skin: no rash on visible skin  Lymph Nodes:  no cervical/supraclavicular LAD, no axillary/groin LAD  Musculoskeletal:  full ROM  Psychiatric:  mood stable            Labs:                          7.1    10.94 )-----------( 396      ( 02 Mar 2024 05:30 )             24.0     CBC Full  -  ( 02 Mar 2024 05:30 )  WBC Count : 10.94 K/uL  RBC Count : 3.03 M/uL  Hemoglobin : 7.1 g/dL  Hematocrit : 24.0 %  Platelet Count - Automated : 396 K/uL  Mean Cell Volume : 79.2 fl  Mean Cell Hemoglobin : 23.4 pg  Mean Cell Hemoglobin Concentration : 29.6 gm/dL  Auto Neutrophil # : 9.28 K/uL  Auto Lymphocyte # : 0.80 K/uL  Auto Monocyte # : 0.71 K/uL  Auto Eosinophil # : 0.07 K/uL  Auto Basophil # : 0.02 K/uL  Auto Neutrophil % : 84.9 %  Auto Lymphocyte % : 7.3 %  Auto Monocyte % : 6.5 %  Auto Eosinophil % : 0.6 %  Auto Basophil % : 0.2 %    PT/INR - ( 01 Mar 2024 09:37 )   PT: 14.5 sec;   INR: 1.33          PTT - ( 01 Mar 2024 09:37 )  PTT:32.7 sec    03-02    134<L>  |  99  |  8   ----------------------------<  104<H>  3.9   |  24  |  0.78    Ca    9.2      02 Mar 2024 05:30  Phos  4.6     03-02  Mg     2.8     03-02    TPro  7.1  /  Alb  3.5  /  TBili  0.3  /  DBili  x   /  AST  12  /  ALT  9<L>  /  AlkPhos  77  03-02      Urinalysis Basic - ( 02 Mar 2024 05:30 )    Color: x / Appearance: x / SG: x / pH: x  Gluc: 104 mg/dL / Ketone: x  / Bili: x / Urobili: x   Blood: x / Protein: x / Nitrite: x   Leuk Esterase: x / RBC: x / WBC x   Sq Epi: x / Non Sq Epi: x / Bacteria: x        Other Labs:    Cultures:    Pathology:    Imaging Studies:      
Consultation Requested by:    Patient is a 43y old  Male who presents with a chief complaint of rectal mass/bleeding (04 Mar 2024 09:23)    HPI:  42 y/o M pmh of known rectal carcinoma (dx'd 12/24) with bony involvement  to sacrum/acetabulum (followed by Eliel/Graciela), HIV (MSM on biktarvy), Anemia, Tachycardia (home readings in the 130s which he attributes to prior heavy drug use (crystal meth, ecstasy), current smoking (several cigarettes every other day) and depression presenting for episode of BRBPR and abdominal pain. Patient states that he has been experiencing bloody bowel movements for some time as a result of his rectal CA and his movements are traditionally bloody, however he had a profusely bloody movements this AM with associated weakness and severe abdominal pain. The patient also endorses penile pain with an inability to fully urinate at the time. He denies fevers, chills but endorses SOB, CP which has now resolved, weakness and fatigue.       ID HPI: Mr. Sanchez is a 43 year old male with a history of well controlled HIV on Biktarvy, recently diagnosed rectal carcinoma with bony involvement of the sacrum/acetabulum, depression, anemia, and reported chronic tachycardia who presented to the hospital for BRBPR and abdominal pain, found to have significant anemia. Patient reports that for the past 1 month he has been having nighttime fevers, reports that he is able to feel the fevers and is uncomfortable during them. Patient reports that he is planned for chemo and radiation for ongoing cancer but has been having insurance issues which have been delaying care. For his HIV he follows with Dr. Mikayla Lang M.D.     At this time the patient denies cough, shortness of breath, abdominal pain, diarrhea, dysuria.     Allergies    No Known Allergies    Intolerances      Antimicrobials:  bictegravir 50 mG/emtricitabine 200 mG/tenofovir alafenamide 25 mG (BIKTARVY) 1 Tablet(s) Oral every 24 hours  piperacillin/tazobactam IVPB.. 4.5 Gram(s) IV Intermittent every 8 hours      Other Medications:  acetaminophen     Tablet .. 650 milliGRAM(s) Oral every 6 hours PRN  melatonin 3 milliGRAM(s) Oral at bedtime  ondansetron Injectable 4 milliGRAM(s) IV Push every 8 hours PRN  oxyCODONE    IR 2.5 milliGRAM(s) Oral every 6 hours PRN  oxyCODONE    IR 5 milliGRAM(s) Oral every 6 hours PRN  polyethylene glycol 3350 17 Gram(s) Oral every 24 hours  thiamine 100 milliGRAM(s) Oral every 24 hours      FAMILY HISTORY:  FH: CAD (coronary artery disease) (Mother)      PAST MEDICAL & SURGICAL HISTORY:  HIV (human immunodeficiency virus infection)      Abscess      IVDU (intravenous drug user)      Anemia      Rectal cancer      H/O rectal polypectomy        SOCIAL HISTORY:    VITAL SIGNS:  T(C): 37.1 (03-04-24 @ 13:52), Max: 39.3 (03-04-24 @ 00:30)  T(F): 98.7 (03-04-24 @ 13:52), Max: 102.7 (03-04-24 @ 00:30)  HR: 114 (03-04-24 @ 14:10) (114 - 140)  BP: 131/82 (03-04-24 @ 14:10) (123/57 - 139/83)  BP(mean): 99 (03-04-24 @ 14:10) (82 - 103)  RR: 16 (03-04-24 @ 14:10) (16 - 16)  SpO2: 98% (03-04-24 @ 14:10) (95% - 98%)  Wt(kg): --    PHYSICAL EXAM:  Constitutional: Patient is in no acute distress, resting comfortably in bed.  Respiratory: Clear to auscultation bilaterally; no Wheezing/Crackles/Ronchi.  Cardiac: Tachycardic rate, regular rhythm.  Gastrointestinal: abdomen soft, non-tender and non-distended;  Extremities: Warm and Well perfused.  Vascular: 2+ radial, Dorsalis pedis and posterior tibial pulses bilaterally.  Neurologic: AAOx3;     Lab Results:                        9.1    11.25 )-----------( 409      ( 04 Mar 2024 09:23 )             30.4     03-04    135  |  99  |  10  ----------------------------<  140<H>  3.6   |  27  |  0.75    Ca    9.0      04 Mar 2024 09:23  Phos  3.5     03-04  Mg     2.8     03-04          Urinalysis Basic - ( 04 Mar 2024 09:23 )    Color: x / Appearance: x / SG: x / pH: x  Gluc: 140 mg/dL / Ketone: x  / Bili: x / Urobili: x   Blood: x / Protein: x / Nitrite: x   Leuk Esterase: x / RBC: x / WBC x   Sq Epi: x / Non Sq Epi: x / Bacteria: x

## 2024-03-04 NOTE — END OF VISIT
[] : Fellow [FreeTextEntry3] :  I evaluated the patient with the Oncology fellow, Dr Holland.  The patient finally presents to establish outpatient heme/onc care, after missing the three previously scheduled appointments.  He initially presented to [facility] and was diagnosed with anal cancer in 12/2023.  He has had extensive imaging - CT scans, pelvic MRI, Pet-CT.  He has a bulky, locally invasive anal cancer that has invaded several local structures including the prostate, gluteal musculature, and coccyx/sacrum.  No distant metastatic disease has been detected.  From a psychosocial standpoint, the patient has additional complicating factors including housing insecurity, poor support/no caregiver, substance abuse, and mental illness.    At this visit, the patient is attentive and engaged in discussion, taking notes on the topics discussed.  He is asking pertinent questions regarding the care plan.  He acknowledges that he has been noncompliant with follow up but emphatically states that he intends to pursue care now.  Plan: 1.  bulky, locally advanced anal cancer  --case has been discussed in multidisciplinary format - both in conference and separately w/ the involved physicians colorectal surgeon Dr Cortez and radiation oncologist Dr Owens --potential role for diverting ostomy has been discussed, not recommended at this time by CRS --favor upfront treatment with concurrent chemoradiation which probably offers the patient the best opportunity for local control and palliation of symptoms.  f/u with Rad Onc for simultation.  Would utilize mitomycin-C + capecitabine.  Avoid central line given risk of myelosuppression, risk of systemic infection from contaminated malignant wound.  Would need CBC + diff every 1-2 weeks to monitor for significant myelosuppression.  Discussed risks, side effects of chemo at length during this visit.   --RTC once CT sim completed for final chemo planning  2.  HIV/AIDS --100% adherence to ART advised.  Educated pt about the importance of ART in attaining the best oncologic outcomes. --? who is managing this?  he says he has refills for Biktarvy.  Ensure continuity of care for HIV/AIDS, refer to [facility] team if uncertain when he follows up.  3.  pain --involve palliative care physician for pain mgmt, emotional support, care planning --pt does not have distant metastatic disease, but he has a very locally advanced cancer, likelihood of cure is probably very slim.  4.  psychosocial support --consult SW --consult oncology navigators --pt may have problem w/ insurance, request assistance w/ navigation team w/ sorting this out.  Emphasized ot the patient that he needs treatment ASA, if it cannot be offered through [facility] due to insurance then he should establish with another health system or [facility] ASAP. --may need to involve psycho-oncologist as well, if pt proceeds w/ treatment through [facility].

## 2024-03-04 NOTE — CONSULT NOTE ADULT - ATTENDING COMMENTS
42 yo M with HIV on Biktarvy and anal SCC who has not started chemoRT yet due to insurance delays, here for rectal bleeding.  His pain is uncontrolled outpatient and he has resorted to illicit drug use to attempt pain control.  Hopefully this can be clarified by Monday. Strongly suggest pain management. Consult palliative care as outlined above.  Transfuse PRBC PRN.  Check iron panel. IV iron if ongoing blood loss.    Total time spent on encounter, including but not limited to counseling/coordination of care: 35 mis.
I suspect the fevers are related to large tumor burden in rectum.  Given leukocytosis it's possible that the mass has become superinfected with colonic tracie.  For this it is reasonable to continue Zosyn.  I doubt fevers are unrelated to HIV-related illness given that his CD4 > 200 and his VL is undetectable on ART.  Doubt the Coronavirus is contributing to fevers given that he reports the fever have been present for 4 weeks

## 2024-03-04 NOTE — CONSULT NOTE ADULT - PROVIDER SPECIALTY LIST ADULT
Colorectal Surgery
Palliative Care
Refill Request - Controlled Substance    CONFIRM preferrred pharmacy with the patient. If Mail Order Rx - Pend for 90 day refill. Last Seen Department: 1/4/2023    Last Seen by PCP: 1/4/2023    Last Written: 1/19/23 30 tablet 0 refills    Last UDS: n/a    Med Agreement Signed On: n/a    If no future appointment scheduled, route STAFF MESSAGE with patient name to the [facility] for scheduling. CONFIRM preferrred pharmacy with the patient. Next Appointment: 7/10/23  Future Appointments   Date Time Provider Levi Fisher   7/10/2023 10:00 AM DO PAHM Buckner [place] - DYD       Message sent to TM to schedule appt with patient? NO      Requested Prescriptions     Pending Prescriptions Disp Refills    HYDROcodone-acetaminophen (NORCO) 5-325 MG per tablet 30 tablet 0     Sig: Take 1 tablet by mouth daily for 30 days.
Heme/Onc
Infectious Disease

## 2024-03-04 NOTE — HISTORY OF PRESENT ILLNESS
[de-identified] : 43 M, MSM with PMH HIV (on biktarvy), invasive anorectal squamous cell carcinoma, polysubstance abuse, syphilis, anemia and abdominal MRSA 2021, who presented to [facility] ED with rectal bleeding early February. Here for initial oncologic evaluation:  Oncologic History: - anal mass biopsy 12/13/24 - invasive squamous cell carcinoma, moderately differentiated - CT Chest 1/9/24 - mild paraseptal emphysema. no evidence of metastatic disease   - MRI pelvis 1/3/24 - limited exam with only 3 sequences obtained. Showing invasion into sacrum, right hemipelvis, and invasion posteriorly into gluteal musculature  - CTA 2/2/24 - without signs of local regional metastases, large lobulated rectal tumor dissecting into adjacent soft tissues and bones overall similar in appearance to the previo - colonoscopy/anoscopy may be difficult due to bulk of disease - CEA elevated at 192.0 prior to treatment - PET/CT (2/9/24): Intense uptake is seen in the patient's large pelvic mass known to be an invasive squamous cell carcinoma of the anus. There is extension into the left gluteal region, but no evidence of FDG avid local or distant metastatic disease.  HIV: CD4 count 328 in 2/2024 HIV VL low positive 12/2023  [de-identified] : Patient presents for initial oncologic follow up after hospitalization. Compliant with Biktvary. ROS positive for several symptoms including occasional blood clots in stool and with wiping, intermittent headache, slight nausea without vomiting, lightheadedness, fatigue (low energy), chronic pain (backside) with difficulty finding comfortable positions, occasional abd discomfort, insomnia from pain/anxiety, difficulty urinating, low appeitite. No EtoH, No tobacco, marijuana no other drugs. Currently living in [facility], living with someone else, but house situation possibly going to be evicted.

## 2024-03-04 NOTE — PROGRESS NOTE ADULT - ASSESSMENT
44 y/o M pmh of known rectal carcinoma with bony mets, HIV (MSM on biktarvy), Reports meth use due to cancer pain. Has been tachycardic 2/2 to meth use. Also smoking and admits depression given diagnosis and social situation. Has iron deficiency anemia noting hemoglobin of 7.1. s/p unit of pRBC. Admitted to tele for episode of BRBPR and tachycardia. Surgery and hem onc following.

## 2024-03-04 NOTE — CONSULT NOTE ADULT - PROBLEM SELECTOR RECOMMENDATION 2
- Patient has responded well to Oxycodone 5mg PO Q4 hours prn.   - Has used 4 doses in 24 hours.  - Would recommend MS Contin 15mg PO BID, in addition to the Oxycodone prn.   - Bowel regimen given short and long acting opiates.

## 2024-03-04 NOTE — CONSULT NOTE ADULT - PROBLEM SELECTOR RECOMMENDATION 9
- Bulky locally advanced disease.  - PET scan with no overt distant spread.   - Patient was supposed to received chemotherapy and radiation as an outpatient, but was unable to due to insurance issues.

## 2024-03-04 NOTE — REVIEW OF SYSTEMS
[Fatigue] : fatigue [Negative] : Psychiatric [Fever] : no fever [Chills] : no chills [FreeTextEntry2] : occassional headache [FreeTextEntry7] : blood clots with stool, blood on toliet paper [FreeTextEntry8] : intermittent difficulty urinating [FreeTextEntry9] : low back/sacral pain [de-identified] : large anal mass

## 2024-03-04 NOTE — PROGRESS NOTE ADULT - PROBLEM SELECTOR PLAN 1
Pt meeting 2/4 SIRS criteria on admission w Tachycardia and WBC of 13.07 w neutrophil predominance. Likely iso of blood loss and coronavirus positive. Also concern for superimposed infx on rectal mass vs uti as patient has had episodes of penile shaft pain/dysuria,  will empirically tx with abx until cultures arise. Pt spiked temp to 101 on 3/3 (not a new Tmax). Surveillance cultures sent, CXR not concerning, no urinary symptoms. Likely spiking iso coronavirus infection. New Tmax 102.7 3/4  - f/u blood cx --> NGTD  - increase to zosyn 4.5g q8h to cover for pseudomonas in immunocompromised patient Pt meeting 2/4 SIRS criteria on admission w Tachycardia and WBC of 13.07 w neutrophil predominance. Likely iso of blood loss and coronavirus positive. Also concern for superimposed infx on rectal mass vs uti as patient has had episodes of penile shaft pain/dysuria,  will empirically tx with abx until cultures arise. Pt spiked temp to 101 on 3/3 (not a new Tmax). Surveillance cultures sent, CXR not concerning, no urinary symptoms. Likely spiking iso coronavirus infection. New Tmax 102.7 3/4  - f/u blood cx --> NGTD  - increase to zosyn 4.5g q8h to cover for pseudomonas in immunocompromised patient  - ID consulted for persistent fevers

## 2024-03-04 NOTE — CONSULT NOTE ADULT - ASSESSMENT
Mr. Sanchez is a 43 year old male with a history of well controlled HIV on Biktarvy, recently diagnosed rectal carcinoma with bony involvement of the sacrum/acetabulum, depression, anemia, and reported chronic tachycardia who presented to the hospital for BRBPR and abdominal pain, found to have significant anemia. ID consulted for intermittent fevers and assistance with infectious workup, currently noted coronavirus positive. As patient presents with known rectal mass and GIB, current differential includes rectal cancer with superimposed bacterial infection vs tumor fevers vs coronavirus infection. At this time the patient has a new leukocytosis and recurrent fevers worsening concern for superimposed bacterial infection. Fevers noted to be in early AM and daily but otherwise symptomatic. HIV controlled for many years so no concern for OI at this time.     Plan:  - Continue Zosyn 4.5 g IVPB via EI.   - Follow blood cultures.   - Assess for localizing symptoms outside of GI system.   - Continue home Biktarvy, CD4 reviewed.     ID team 1 to follow.

## 2024-03-04 NOTE — CONSULT NOTE ADULT - PROBLEM SELECTOR RECOMMENDATION 3
.  Complex medical decision making / symptom management in the setting of rectal cancer with high symptom burden.    Patient has an appointment with Dr. Allison Taylor on 2/13/24 as an outpatient if insurance issues can be resolved.     Will continue to follow for ongoing GOC discussion / titration of palliative regimen. Emotional support provided, questions answered.  Active Psychosocial Referrals:  [x]Social Work/Case management []PT/OT []Chaplaincy []Hospice  []Patient/Family Support []Holistic RN []Massage Therapy []Music Therapy []Ethics  Coping: [] well [] with difficulty [] poor coping [] unable to assess  Support system: [] strong [] adequate [] inadequate    For new or uncontrolled symptoms, please call Palliative Care at [phone]). The service is available 24/7 (including nights & weekends) to provide symptom management recommendations over the phone as appropriate

## 2024-03-04 NOTE — PROGRESS NOTE ADULT - SUBJECTIVE AND OBJECTIVE BOX
Hospital course:  Mr. Rojo is a 43 year old male MSM with PMH HIV (on biktarvy), invasive anorectal squamous cell carcinoma, polysubstance abuse, who presents for rectal bleeding and penile pain. Reports meth use due to cancer pain. Has been tachycardic 2/2 to meth use. Also admits to smoking and admits depression given diagnosis and social situation. Has iron deficiency anemia with hemoglobin of 7.1. s/p 1 unit pRBC. Admitted to tele for BRBPR and tachycardia. Ongoing insurance difficulties have made treatment difficult, though patient reports he is motivated to pursue treatment at this time. Colorectal surgery - no acute surgical intervention at this time. SW consulted.    INTERVAL HPI/OVERNIGHT EVENTS:  As per night team, temp 102.4, , given tylenol 650 with improvement in temp to 98 but reamins sinus tachycardic to 130s, improved over time. Patient seen and examined at bedside. No acute complaints this morning. States he had a bowel movement overnight without presence of blood. Patient claims he has had a known history of tachycardia for the last 10 years. Patient denies fever, chills, dizziness, weakness, HA, CP, SOB, N/V/D/C    VITALS  Vital Signs Last 24 Hrs  T(C): 37.2 (04 Mar 2024 06:21), Max: 39.3 (04 Mar 2024 00:30)  T(F): 98.9 (04 Mar 2024 06:21), Max: 102.7 (04 Mar 2024 00:30)  HR: 119 (04 Mar 2024 08:30) (112 - 140)  BP: 123/57 (04 Mar 2024 08:30) (119/71 - 139/83)  BP(mean): 82 (04 Mar 2024 08:30) (82 - 103)  RR: 16 (04 Mar 2024 08:30) (16 - 16)  SpO2: 98% (04 Mar 2024 04:00) (95% - 98%)    Parameters below as of 04 Mar 2024 08:30  Patient On (Oxygen Delivery Method): room air    PHYSICAL EXAM  GENERAL: ill-appearing, cachectic, conversant and alert   HEAD:  Atraumatic, Normocephalic  EYES: EOMI  ENMT: Moist mucous membranes, Good dentition, No lesions  NECK: Supple, No JVD, Normal thyroid  NERVOUS SYSTEM:  Alert & Oriented X3, Good concentration  CHEST/LUNG: Clear to auscultation bilaterally; No rales, rhonchi, wheezing, or rubs  HEART: Tachycardic with regular rhythm; No murmurs, rubs, or gallops  ABDOMEN: Soft, Nontender, Nondistended; Bowel sounds present  EXTREMITIES:  2+ Peripheral Pulses, No clubbing, cyanosis, or edema    MEDICATIONS  (STANDING):  acetaminophen     Tablet .. 650 milliGRAM(s) Oral every 6 hours  bictegravir 50 mG/emtricitabine 200 mG/tenofovir alafenamide 25 mG (BIKTARVY) 1 Tablet(s) Oral every 24 hours  melatonin 3 milliGRAM(s) Oral at bedtime  piperacillin/tazobactam IVPB.. 4.5 Gram(s) IV Intermittent every 8 hours  polyethylene glycol 3350 17 Gram(s) Oral every 24 hours  thiamine 100 milliGRAM(s) Oral every 24 hours    MEDICATIONS  (PRN):  ondansetron Injectable 4 milliGRAM(s) IV Push every 8 hours PRN Nausea and/or Vomiting  oxyCODONE    IR 2.5 milliGRAM(s) Oral every 6 hours PRN Moderate Pain (4 - 6)  oxyCODONE    IR 5 milliGRAM(s) Oral every 6 hours PRN Severe Pain (7 - 10)      No Known Allergies      LABS                        8.5    13.47 )-----------( 377      ( 03 Mar 2024 05:30 )             28.2     03-03    133<L>  |  97  |  9   ----------------------------<  118<H>  4.3   |  25  |  0.78    Ca    9.3      03 Mar 2024 05:30  Phos  3.5     03-03  Mg     2.3     03-03        Urinalysis Basic - ( 03 Mar 2024 05:30 )    Color: x / Appearance: x / SG: x / pH: x  Gluc: 118 mg/dL / Ketone: x  / Bili: x / Urobili: x   Blood: x / Protein: x / Nitrite: x   Leuk Esterase: x / RBC: x / WBC x   Sq Epi: x / Non Sq Epi: x / Bacteria: x      RADIOLOGY & ADDITIONAL TESTS: Reviewed Hospital course:  Mr. Rojo is a 43 year old male MSM with PMH HIV (on biktarvy), invasive anorectal squamous cell carcinoma, polysubstance abuse, who presents for rectal bleeding and penile pain. Reports meth use due to cancer pain. Has been tachycardic 2/2 to meth use. Also admits to smoking and admits depression given diagnosis and social situation. Has iron deficiency anemia with hemoglobin of 7.1. s/p 1 unit pRBC. Admitted to tele for BRBPR and tachycardia. Ongoing insurance difficulties have made treatment difficult, though patient reports he is motivated to pursue treatment at this time. Colorectal surgery - no acute surgical intervention at this time. Patient developing persistent fevers overnight - ID consulted.    INTERVAL HPI/OVERNIGHT EVENTS:  As per night team, temp 102.4, , given tylenol 650 with improvement in temp to 98 but reamins sinus tachycardic to 130s, improved over time. Patient seen and examined at bedside. No acute complaints this morning. States he had a bowel movement overnight without presence of blood. Patient claims he has had a known history of tachycardia for the last 10 years. Patient denies fever, chills, dizziness, weakness, HA, CP, SOB, N/V/D/C    VITALS  Vital Signs Last 24 Hrs  T(C): 37.2 (04 Mar 2024 06:21), Max: 39.3 (04 Mar 2024 00:30)  T(F): 98.9 (04 Mar 2024 06:21), Max: 102.7 (04 Mar 2024 00:30)  HR: 119 (04 Mar 2024 08:30) (112 - 140)  BP: 123/57 (04 Mar 2024 08:30) (119/71 - 139/83)  BP(mean): 82 (04 Mar 2024 08:30) (82 - 103)  RR: 16 (04 Mar 2024 08:30) (16 - 16)  SpO2: 98% (04 Mar 2024 04:00) (95% - 98%)    Parameters below as of 04 Mar 2024 08:30  Patient On (Oxygen Delivery Method): room air    PHYSICAL EXAM  GENERAL: ill-appearing, cachectic, conversant and alert   HEAD:  Atraumatic, Normocephalic  EYES: EOMI  ENMT: Moist mucous membranes, Good dentition, No lesions  NECK: Supple, No JVD, Normal thyroid  NERVOUS SYSTEM:  Alert & Oriented X3, Good concentration  CHEST/LUNG: Clear to auscultation bilaterally; No rales, rhonchi, wheezing, or rubs  HEART: Tachycardic with regular rhythm; No murmurs, rubs, or gallops  ABDOMEN: Soft, Nontender, Nondistended; Bowel sounds present  EXTREMITIES:  2+ Peripheral Pulses, No clubbing, cyanosis, or edema    MEDICATIONS  (STANDING):  acetaminophen     Tablet .. 650 milliGRAM(s) Oral every 6 hours  bictegravir 50 mG/emtricitabine 200 mG/tenofovir alafenamide 25 mG (BIKTARVY) 1 Tablet(s) Oral every 24 hours  melatonin 3 milliGRAM(s) Oral at bedtime  piperacillin/tazobactam IVPB.. 4.5 Gram(s) IV Intermittent every 8 hours  polyethylene glycol 3350 17 Gram(s) Oral every 24 hours  thiamine 100 milliGRAM(s) Oral every 24 hours    MEDICATIONS  (PRN):  ondansetron Injectable 4 milliGRAM(s) IV Push every 8 hours PRN Nausea and/or Vomiting  oxyCODONE    IR 2.5 milliGRAM(s) Oral every 6 hours PRN Moderate Pain (4 - 6)  oxyCODONE    IR 5 milliGRAM(s) Oral every 6 hours PRN Severe Pain (7 - 10)      No Known Allergies      LABS                        8.5    13.47 )-----------( 377      ( 03 Mar 2024 05:30 )             28.2     03-03    133<L>  |  97  |  9   ----------------------------<  118<H>  4.3   |  25  |  0.78    Ca    9.3      03 Mar 2024 05:30  Phos  3.5     03-03  Mg     2.3     03-03        Urinalysis Basic - ( 03 Mar 2024 05:30 )    Color: x / Appearance: x / SG: x / pH: x  Gluc: 118 mg/dL / Ketone: x  / Bili: x / Urobili: x   Blood: x / Protein: x / Nitrite: x   Leuk Esterase: x / RBC: x / WBC x   Sq Epi: x / Non Sq Epi: x / Bacteria: x      RADIOLOGY & ADDITIONAL TESTS: Reviewed

## 2024-03-04 NOTE — PROGRESS NOTE ADULT - PROBLEM SELECTOR PLAN 2
Pt presenting w hgb of 7.4 in setting of BRBPR and received 2L of NS in ED. Has received iron transfusions in the past and hovers between 8-9 on prior admissions. Iron studies consistent with AARTI.   - s/p 1 u pRBC with appropriate increase in Hgb  - maintain active T/S   - maintain 2 large bore IV   - transfuse if hgb <7 Pt presenting w hgb of 7.4 in setting of BRBPR and received 2L of NS in ED. Has received iron transfusions in the past and hovers between 8-9 on prior admissions. Iron studies consistent with AARTI.   - s/p 1 u pRBC with appropriate increase in Hgb  - maintain active T/S   - maintain 2 large bore IV   - transfuse if hgb <7  - Hgb stable

## 2024-03-04 NOTE — PROGRESS NOTE ADULT - SUBJECTIVE AND OBJECTIVE BOX
24 hr events:    SUBJECTIVE:    Vital Signs Last 24 Hrs  T(C): 37.2 (04 Mar 2024 06:21), Max: 39.3 (04 Mar 2024 00:30)  T(F): 98.9 (04 Mar 2024 06:21), Max: 102.7 (04 Mar 2024 00:30)  HR: 119 (04 Mar 2024 08:30) (112 - 140)  BP: 123/57 (04 Mar 2024 08:30) (119/71 - 139/83)  BP(mean): 82 (04 Mar 2024 08:30) (82 - 103)  RR: 16 (04 Mar 2024 08:30) (16 - 16)  SpO2: 98% (04 Mar 2024 04:00) (95% - 98%)    Parameters below as of 04 Mar 2024 08:30  Patient On (Oxygen Delivery Method): room air        PHYSICAL EXAM:  General: NAD, resting comfortably in bed  Pulm: Nonlabored breathing, no respiratory distress  Abd: soft, non distended, non tender, no rebound or guarding  Extrem: WWP, no edema,  Neuro: A/O x 3, CNs II-XII grossly intact, no focal deficits, normal sensation  Pulses: palpable distal pulses    Lines/drains/tubes:    I&O's Summary    03 Mar 2024 07:01  -  04 Mar 2024 07:00  --------------------------------------------------------  IN: 100 mL / OUT: 800 mL / NET: -700 mL        LABS:                        8.5    13.47 )-----------( 377      ( 03 Mar 2024 05:30 )             28.2     03-03    133<L>  |  97  |  9   ----------------------------<  118<H>  4.3   |  25  |  0.78    Ca    9.3      03 Mar 2024 05:30  Phos  3.5     03-03  Mg     2.3     03-03        Urinalysis Basic - ( 03 Mar 2024 05:30 )    Color: x / Appearance: x / SG: x / pH: x  Gluc: 118 mg/dL / Ketone: x  / Bili: x / Urobili: x   Blood: x / Protein: x / Nitrite: x   Leuk Esterase: x / RBC: x / WBC x   Sq Epi: x / Non Sq Epi: x / Bacteria: x      CAPILLARY BLOOD GLUCOSE            RADIOLOGY & ADDITIONAL STUDIES:     24 hr events:    SUBJECTIVE: Patient seen at bedside in no acute distress. Sleeping peacefully. Passing gas and having bowel movements. Tolerating regular diet.     Vital Signs Last 24 Hrs  T(C): 37.2 (04 Mar 2024 06:21), Max: 39.3 (04 Mar 2024 00:30)  T(F): 98.9 (04 Mar 2024 06:21), Max: 102.7 (04 Mar 2024 00:30)  HR: 119 (04 Mar 2024 08:30) (112 - 140)  BP: 123/57 (04 Mar 2024 08:30) (119/71 - 139/83)  BP(mean): 82 (04 Mar 2024 08:30) (82 - 103)  RR: 16 (04 Mar 2024 08:30) (16 - 16)  SpO2: 98% (04 Mar 2024 04:00) (95% - 98%)    Parameters below as of 04 Mar 2024 08:30  Patient On (Oxygen Delivery Method): room air        PHYSICAL EXAM:  General: NAD, resting comfortably in bed  Pulm: Nonlabored breathing, no respiratory distress  Abd: soft, non distended, non tender, no rebound or guarding  Extrem: WWP, no edema,  Neuro: A/O x 3, CNs II-XII grossly intact, no focal deficits, normal sensation  Pulses: palpable distal pulses    Lines/drains/tubes:    I&O's Summary    03 Mar 2024 07:01  -  04 Mar 2024 07:00  --------------------------------------------------------  IN: 100 mL / OUT: 800 mL / NET: -700 mL        LABS:                        8.5    13.47 )-----------( 377      ( 03 Mar 2024 05:30 )             28.2     03-03    133<L>  |  97  |  9   ----------------------------<  118<H>  4.3   |  25  |  0.78    Ca    9.3      03 Mar 2024 05:30  Phos  3.5     03-03  Mg     2.3     03-03        Urinalysis Basic - ( 03 Mar 2024 05:30 )    Color: x / Appearance: x / SG: x / pH: x  Gluc: 118 mg/dL / Ketone: x  / Bili: x / Urobili: x   Blood: x / Protein: x / Nitrite: x   Leuk Esterase: x / RBC: x / WBC x   Sq Epi: x / Non Sq Epi: x / Bacteria: x      CAPILLARY BLOOD GLUCOSE            RADIOLOGY & ADDITIONAL STUDIES:

## 2024-03-05 ENCOUNTER — NON-APPOINTMENT (OUTPATIENT)
Age: 44
End: 2024-03-05

## 2024-03-05 LAB
ANION GAP SERPL CALC-SCNC: 13 MMOL/L — SIGNIFICANT CHANGE UP (ref 5–17)
BASOPHILS # BLD AUTO: 0.03 K/UL — SIGNIFICANT CHANGE UP (ref 0–0.2)
BASOPHILS NFR BLD AUTO: 0.3 % — SIGNIFICANT CHANGE UP (ref 0–2)
BUN SERPL-MCNC: 12 MG/DL — SIGNIFICANT CHANGE UP (ref 7–23)
CALCIUM SERPL-MCNC: 9.4 MG/DL — SIGNIFICANT CHANGE UP (ref 8.4–10.5)
CHLORIDE SERPL-SCNC: 96 MMOL/L — SIGNIFICANT CHANGE UP (ref 96–108)
CO2 SERPL-SCNC: 26 MMOL/L — SIGNIFICANT CHANGE UP (ref 22–31)
CREAT SERPL-MCNC: 0.73 MG/DL — SIGNIFICANT CHANGE UP (ref 0.5–1.3)
EGFR: 117 ML/MIN/1.73M2 — SIGNIFICANT CHANGE UP
EOSINOPHIL # BLD AUTO: 0.05 K/UL — SIGNIFICANT CHANGE UP (ref 0–0.5)
EOSINOPHIL NFR BLD AUTO: 0.4 % — SIGNIFICANT CHANGE UP (ref 0–6)
GLUCOSE SERPL-MCNC: 211 MG/DL — HIGH (ref 70–99)
HCT VFR BLD CALC: 31.1 % — LOW (ref 39–50)
HGB BLD-MCNC: 9.2 G/DL — LOW (ref 13–17)
IMM GRANULOCYTES NFR BLD AUTO: 0.5 % — SIGNIFICANT CHANGE UP (ref 0–0.9)
LYMPHOCYTES # BLD AUTO: 1.35 K/UL — SIGNIFICANT CHANGE UP (ref 1–3.3)
LYMPHOCYTES # BLD AUTO: 11.7 % — LOW (ref 13–44)
MAGNESIUM SERPL-MCNC: 2 MG/DL — SIGNIFICANT CHANGE UP (ref 1.6–2.6)
MCHC RBC-ENTMCNC: 24.1 PG — LOW (ref 27–34)
MCHC RBC-ENTMCNC: 29.6 GM/DL — LOW (ref 32–36)
MCV RBC AUTO: 81.4 FL — SIGNIFICANT CHANGE UP (ref 80–100)
MONOCYTES # BLD AUTO: 0.49 K/UL — SIGNIFICANT CHANGE UP (ref 0–0.9)
MONOCYTES NFR BLD AUTO: 4.2 % — SIGNIFICANT CHANGE UP (ref 2–14)
NEUTROPHILS # BLD AUTO: 9.6 K/UL — HIGH (ref 1.8–7.4)
NEUTROPHILS NFR BLD AUTO: 82.9 % — HIGH (ref 43–77)
NRBC # BLD: 0 /100 WBCS — SIGNIFICANT CHANGE UP (ref 0–0)
PHOSPHATE SERPL-MCNC: 4.2 MG/DL — SIGNIFICANT CHANGE UP (ref 2.5–4.5)
PLATELET # BLD AUTO: 470 K/UL — HIGH (ref 150–400)
POTASSIUM SERPL-MCNC: 4.1 MMOL/L — SIGNIFICANT CHANGE UP (ref 3.5–5.3)
POTASSIUM SERPL-SCNC: 4.1 MMOL/L — SIGNIFICANT CHANGE UP (ref 3.5–5.3)
RBC # BLD: 3.82 M/UL — LOW (ref 4.2–5.8)
RBC # FLD: 17.7 % — HIGH (ref 10.3–14.5)
SODIUM SERPL-SCNC: 135 MMOL/L — SIGNIFICANT CHANGE UP (ref 135–145)
WBC # BLD: 11.58 K/UL — HIGH (ref 3.8–10.5)
WBC # FLD AUTO: 11.58 K/UL — HIGH (ref 3.8–10.5)

## 2024-03-05 PROCEDURE — 99233 SBSQ HOSP IP/OBS HIGH 50: CPT | Mod: GC

## 2024-03-05 PROCEDURE — 99233 SBSQ HOSP IP/OBS HIGH 50: CPT

## 2024-03-05 PROCEDURE — 99232 SBSQ HOSP IP/OBS MODERATE 35: CPT

## 2024-03-05 RX ORDER — IRON SUCROSE 20 MG/ML
300 INJECTION, SOLUTION INTRAVENOUS EVERY 24 HOURS
Refills: 0 | Status: DISCONTINUED | OUTPATIENT
Start: 2024-03-05 | End: 2024-03-06

## 2024-03-05 RX ORDER — ESCITALOPRAM OXALATE 10 MG/1
1 TABLET, FILM COATED ORAL
Qty: 60 | Refills: 0
Start: 2024-03-05 | End: 2024-05-03

## 2024-03-05 RX ORDER — ENOXAPARIN SODIUM 100 MG/ML
40 INJECTION SUBCUTANEOUS EVERY 24 HOURS
Refills: 0 | Status: DISCONTINUED | OUTPATIENT
Start: 2024-03-05 | End: 2024-03-06

## 2024-03-05 RX ADMIN — ENOXAPARIN SODIUM 40 MILLIGRAM(S): 100 INJECTION SUBCUTANEOUS at 10:40

## 2024-03-05 RX ADMIN — MORPHINE SULFATE 15 MILLIGRAM(S): 50 CAPSULE, EXTENDED RELEASE ORAL at 20:23

## 2024-03-05 RX ADMIN — Medication 650 MILLIGRAM(S): at 06:50

## 2024-03-05 RX ADMIN — MORPHINE SULFATE 15 MILLIGRAM(S): 50 CAPSULE, EXTENDED RELEASE ORAL at 05:00

## 2024-03-05 RX ADMIN — PIPERACILLIN AND TAZOBACTAM 25 GRAM(S): 4; .5 INJECTION, POWDER, LYOPHILIZED, FOR SOLUTION INTRAVENOUS at 07:01

## 2024-03-05 RX ADMIN — MORPHINE SULFATE 15 MILLIGRAM(S): 50 CAPSULE, EXTENDED RELEASE ORAL at 18:16

## 2024-03-05 RX ADMIN — IRON SUCROSE 176.67 MILLIGRAM(S): 20 INJECTION, SOLUTION INTRAVENOUS at 22:39

## 2024-03-05 RX ADMIN — OXYCODONE HYDROCHLORIDE 2.5 MILLIGRAM(S): 5 TABLET ORAL at 12:18

## 2024-03-05 RX ADMIN — Medication 100 MILLIGRAM(S): at 05:17

## 2024-03-05 RX ADMIN — BICTEGRAVIR SODIUM, EMTRICITABINE, AND TENOFOVIR ALAFENAMIDE FUMARATE 1 TABLET(S): 30; 120; 15 TABLET ORAL at 10:22

## 2024-03-05 RX ADMIN — PIPERACILLIN AND TAZOBACTAM 25 GRAM(S): 4; .5 INJECTION, POWDER, LYOPHILIZED, FOR SOLUTION INTRAVENOUS at 00:00

## 2024-03-05 RX ADMIN — PIPERACILLIN AND TAZOBACTAM 25 GRAM(S): 4; .5 INJECTION, POWDER, LYOPHILIZED, FOR SOLUTION INTRAVENOUS at 16:25

## 2024-03-05 RX ADMIN — OXYCODONE HYDROCHLORIDE 2.5 MILLIGRAM(S): 5 TABLET ORAL at 10:40

## 2024-03-05 RX ADMIN — MORPHINE SULFATE 15 MILLIGRAM(S): 50 CAPSULE, EXTENDED RELEASE ORAL at 06:00

## 2024-03-05 RX ADMIN — OXYCODONE HYDROCHLORIDE 2.5 MILLIGRAM(S): 5 TABLET ORAL at 16:24

## 2024-03-05 RX ADMIN — Medication 650 MILLIGRAM(S): at 05:26

## 2024-03-05 NOTE — PROGRESS NOTE ADULT - PROBLEM SELECTOR PLAN 9
Fluids: s/p 2L NS; 1 U pRBC  Electrolytes: replete as needed   Diet: Regular  DVT PPx: Lovenox 40mg qd  Dispo: tele Fluids: s/p 2L NS; 1 U pRBC  Electrolytes: replete as needed   Diet: Regular  DVT PPx: Lovenox 40mg qd  Dispo: tele  ---  CODE: FULL

## 2024-03-05 NOTE — PROGRESS NOTE ADULT - PROBLEM SELECTOR PLAN 6
RESOLVED  hx of drug abuse with meth, IV drug use, cocaine, ecstasy and marijuana; UTOX positive for meth  - SBIRT consult   - monitor for signs of withdrawal

## 2024-03-05 NOTE — PROGRESS NOTE ADULT - PROBLEM SELECTOR PLAN 1
Pt meeting 2/4 SIRS criteria on admission w Tachycardia and WBC of 13.07 w neutrophil predominance. Likely iso of blood loss and coronavirus positive. Also concern for superimposed infx on rectal mass vs uti as patient has had episodes of penile shaft pain/dysuria,  will empirically tx with abx until cultures arise. Pt spiked temp to 101 on 3/3 (not a new Tmax). Surveillance cultures sent, CXR not concerning, no urinary symptoms. Likely spiking iso coronavirus infection. New Tmax 102.7 3/4  - f/u blood cx --> NGTD @ 72 hrs and Surveillance @ 1 day  - increase to zosyn 4.5g q8h to cover for pseudomonas in immunocompromised patient  - ID following for persistent fevers Pt meeting 2/4 SIRS criteria on admission w Tachycardia and WBC of 13.07 w neutrophil predominance. Likely iso of blood loss and coronavirus positive. Also concern for superimposed infx on rectal mass vs uti as patient has had episodes of penile shaft pain/dysuria,  will empirically tx with abx until cultures arise. Pt spiked temp to 101 on 3/3 (not a new Tmax). Surveillance cultures sent, CXR not concerning, no urinary symptoms. Likely spiking iso coronavirus infection. New Tmax 102.7 3/4       - blood cx 03/03: NGTD       - Last fever: 03/03, TMax 102.9 on 03/02    - F/u final BCx read 03/03  - C/w zosyn 4.5g q8h to cover for pseudomonas given h/o immunocompromise  - ID following for persistent fevers Pt meeting 2/4 SIRS criteria on admission w Tachycardia and WBC of 13.07 w neutrophil predominance. Likely iso of blood loss and coronavirus positive. Also concern for superimposed infx on rectal mass vs uti as patient has had episodes of penile shaft pain/dysuria,  will empirically tx with abx until cultures arise. Pt spiked temp to 101 on 3/3 (not a new Tmax). Surveillance cultures sent, CXR not concerning, no urinary symptoms. Likely spiking iso coronavirus infection. New Tmax 102.7 3/4       - blood cx 03/03: NGTD       - Last fever: 03/03, TMax 102.9 on 03/02    - C/w zosyn 4.5g q8h to cover for pseudomonas given h/o immunocompromise  - F/u final BCx read 03/03  - ID following for persistent fevers Pt meeting 2/4 SIRS criteria on admission w Tachycardia and WBC of 13.07 w neutrophil predominance. Likely iso of blood loss and coronavirus positive. Also concern for superimposed infx on rectal mass vs uti as patient has had episodes of penile shaft pain/dysuria,  will empirically tx with abx until cultures arise. Pt spiked temp to 101 on 3/3 (not a new Tmax). Surveillance cultures sent, CXR not concerning, no urinary symptoms. Likely spiking iso coronavirus infection. New Tmax 102.7 3/4       - blood cx 03/03: NGTD       - Last fever: 03/03, TMax 102.9 on 03/02    - C/w Zosyn 4.5g q8h to cover for pseudomonas given h/o immunocompromise  - F/u final BCx read 03/03  - ID following for persistent fevers Likely 2/2 malignancy given cyclical nature vs. infection of unknown origin, less likely non-COVID19 coronavirus infection given asx for resp. sx | Pt meeting 2/4 SIRS criteria on admission w Tachycardia and WBC of 13.07 w neutrophil predominance. Also concern for superimposed infx on rectal mass vs uti as patient has had episodes of penile shaft pain/dysuria.        - Blood cx 03/03: NGTD       - Last fever: 03/03, TMax 102.9 on 03/02    - C/w Zosyn 4.5g q8h to cover for pseudomonas given h/o immunocompromise  - F/u final BCx read 03/03  - ID following for persistent fevers

## 2024-03-05 NOTE — PROGRESS NOTE ADULT - ASSESSMENT
Mr. Rojo is a 43 year old male MSM with PMH HIV (on biktarvy), invasive anorectal squamous cell carcinoma, polysubstance abuse, who presents for rectal bleeding and penile pain. Hematology/Oncology consulted for history of invasive squamous cell carcinoma.     # Invasive squamous cell carcinoma   - anal mass biopsy 12/13/24 - invasive squamous cell carcinoma, moderately differentiated  - CT Chest 1/9/24 - mild paraseptal emphysema. no evidence of metastatic disease  - MRI pelvis 1/3/24 - limited exam with only 3 sequences obtained. Showing invasion into sacrum, right hemipelvis, and invasion posteriorly into gluteal musculature  - CTA 2/2/24 - without signs of local regional metastases, large lobulated rectal tumor dissecting into adjacent soft tissues and bones overall similar in appearance to the previo  - colonoscopy/anoscopy difficult due to bulk of disease  - CEA elevated at 192.0 prior to treatment  - PET/CT (2/9/24): Intense uptake is seen in the patient's large pelvic mass known to be an invasive squamous cell carcinoma of the anus. There is extension into the left gluteal region, but no evidence of FDG avid local or distant metastatic disease.    Plan   -- Overall concerning for bulky locally advanced disease invading into gluteal region, no distant regional lymph node involvement or distant metastatic disease. Clinical course complicated by delays in follow up. Discussed importance of initiating treatment. Given extent of disease, would benefit from local control with concurrent chemotherapy and radiation therapy. Favor Capecitabine + mitomycin + RT  - Overall treatment plan was to pursue concurrent chemoradiation as above, though unfortunately patient has experienced significant delays in care due to noncompliance with appointments and with insurance difficulties. He is in the process of changing insurance (reports friend is helping him at home, while he is hospitalized).   - patient reports his insurance will not be active for a few more weeks, discussed with patient that follow up should be pursued at [place] or other safety [facility] hospital while he is uninsured and can always switch institutions when insurance is active.   -Radiation simulation currently on hold until insurance approval is finalized.  - Please consult palliative care for assistance with pain control - of note, patient reports he has been self medicating with amphetamines and THC as he was unable to follow up with palliative care. Patient has an appointment on 3/13 with Dr. Taylor, though may not be able to attend if insurance is not approved.  - appreciate colorectal involvement  ?  # Anemia  Likely 2/2 to bleeding from rectal mass.   2/6/26: Received 600mg IV iron total. % saturation 10, TIBC 183, ferritin 109.  - Iron studies - total iron 149, percent saturation 8, ferritin 325  - per team, transfusing 1 unit pRBC  - c/w PO iron supplementation  ?  # HIV  - Stressed importance of compliance with Biktarvy  - f/u PCP      Recommendations are considered final after attending attestation   Mr. Rojo is a 43 year old male MSM with PMH HIV (on biktarvy), invasive anorectal squamous cell carcinoma, polysubstance abuse, who presents for rectal bleeding and penile pain. Hematology/Oncology consulted for history of invasive squamous cell carcinoma.     # Invasive squamous cell carcinoma   - anal mass biopsy 12/13/24 - invasive squamous cell carcinoma, moderately differentiated  - CT Chest 1/9/24 - mild paraseptal emphysema. no evidence of metastatic disease  - MRI pelvis 1/3/24 - limited exam with only 3 sequences obtained. Showing invasion into sacrum, right hemipelvis, and invasion posteriorly into gluteal musculature  - CTA 2/2/24 - without signs of local regional metastases, large lobulated rectal tumor dissecting into adjacent soft tissues and bones overall similar in appearance to the previo  - colonoscopy/anoscopy difficult due to bulk of disease  - CEA elevated at 192.0 prior to treatment  - PET/CT (2/9/24): Intense uptake is seen in the patient's large pelvic mass known to be an invasive squamous cell carcinoma of the anus. There is extension into the left gluteal region, but no evidence of FDG avid local or distant metastatic disease.    Plan   -- Overall concerning for bulky locally advanced disease invading into gluteal region, no distant regional lymph node involvement or distant metastatic disease. Clinical course complicated by delays in follow up. Discussed importance of initiating treatment. Given extent of disease, would benefit from local control with concurrent chemotherapy and radiation therapy. Favor Capecitabine + mitomycin + RT  - Overall treatment plan was to pursue concurrent chemoradiation as above, though unfortunately patient has experienced significant delays in care due to noncompliance with appointments and with insurance difficulties. He is in the process of changing insurance (reports friend is helping him at home, while he is hospitalized).   - patient reports his insurance will not be active for a few more weeks, discussed with patient that follow up should be pursued at [place] or other safety [facility] hospital while he is uninsured and can always switch institutions when insurance is active.   -patient mohit call [place] [facility]erral [facility] at [phone] to make an appointment with a specialist  -Radiation simulation currently on hold until insurance approval is finalized.  - Please consult palliative care for assistance with pain control - of note, patient reports he has been self medicating with amphetamines and THC as he was unable to follow up with palliative care. Patient has an appointment on 3/13 with Dr. Taylor, though may not be able to attend if insurance is not approved.  - appreciate colorectal involvement  ?  # Anemia  Likely 2/2 to bleeding from rectal mass.   2/6/26: Received 600mg IV iron total. % saturation 10, TIBC 183, ferritin 109.  - Iron studies - total iron 149, percent saturation 8, ferritin 325  - per team, transfusing 1 unit pRBC  - c/w PO iron supplementation  -would start IV iron in addition to PO supplementation 300mg IV x3 days, discuss risk of anaphylaxis with patient   ?  # HIV  - Stressed importance of compliance with Biktarvy  - f/u PCP      Recommendations are considered final after attending attestation

## 2024-03-05 NOTE — PROGRESS NOTE ADULT - PROBLEM SELECTOR PLAN 8
Pt exhibiting labile mood and is teary, feels depressed and requesting to speak w psychiatric services to begin tx for his depression and anxiety relating to his cancer  - f/up palliative recs

## 2024-03-05 NOTE — PROGRESS NOTE ADULT - PROBLEM SELECTOR PLAN 4
Pt w known hx of Rectal CA with bony involvement. Has established care w Dr. Julian/Graciela. Rectal mass and abdomen particularly painful at this time. Limited YOSEPH preformed revealing large mass.   - f/up hem onc recs --> patient requires radiation; plan for simulation on Wednesday 3/6/24 (on hold as per Heme/Onc)  - zofran for nausea   - Tylenol 650mg PO q6 for mild pain, Oxy 2.5 q6 for moderate Oxy 5mg q6 for severe pain   - bowel regimen w Miralax nightly  - Palliative pain recs - MS Contin 15mg PO BID,  - Colorectal surgery- No acute surgical intervention

## 2024-03-05 NOTE — PROGRESS NOTE ADULT - ASSESSMENT
44 y/o M pmh of known rectal carcinoma with bony mets, HIV (MSM on biktarvy), Reports meth use due to cancer pain. Has iron deficiency anemia noting hemoglobin of 7.1. s/p unit of pRBC. Admitted to tele for episode of BRBPR and tachycardia. Surgery, Palliative and Heme/Onc following.

## 2024-03-05 NOTE — PROGRESS NOTE ADULT - PROBLEM SELECTOR PLAN 1
Pt meeting 2/4 SIRS criteria on admission w Tachycardia and WBC of 13.07 w neutrophil predominance. Likely iso of blood loss and coronavirus positive. Also concern for superimposed infx on rectal mass vs uti as patient has had episodes of penile shaft pain/dysuria,  will empirically tx with abx until cultures arise. Pt spiked temp to 101 on 3/3 (not a new Tmax). Surveillance cultures sent, CXR not concerning, no urinary symptoms. Likely spiking iso coronavirus infection. New Tmax 102.7 3/4  - f/u blood cx --> NGTD @ 72 hrs and Surveillance @ 1 day  - increase to zosyn 4.5g q8h to cover for pseudomonas in immunocompromised patient  - ID following for persistent fevers

## 2024-03-05 NOTE — PROGRESS NOTE ADULT - PROBLEM SELECTOR PLAN 5
Pt claims he has a hx of tachycardia and that his resting HR hovers around 130bpm as of late. Given the patients hx of prior heavy drug use cannot rule out possibility of a cardiomyopathy. It is also possible this is in response to blood loss or underlying infection   EKG sinus rhythm without ischemic changes  - patient asymptomatic at this time  - TSH wnl  - TTE - normal systolic function

## 2024-03-05 NOTE — PROGRESS NOTE ADULT - PROBLEM SELECTOR PLAN 7
known hx of HIV on biktarvy. Complaint with medication; CD4 328; HIV detectable.   - c/w biktarvy known hx of HIV on biktarvy. Complaint with medication; CD4 328; HIV detectable.     - c/w home biktarvy Pt exhibiting labile mood and is teary, feels depressed and requesting to speak w psychiatric services to begin tx for his depression and anxiety relating to his cancer    - F/u [facility] price check for escitalopram generic (Good Rx: $18.73 per month. at [facility])

## 2024-03-05 NOTE — PROGRESS NOTE ADULT - PROBLEM SELECTOR PLAN 8
Pt exhibiting labile mood and is teary, feels depressed and requesting to speak w psychiatric services to begin tx for his depression and anxiety relating to his cancer  - f/up palliative recs Pt exhibiting labile mood and is teary, feels depressed and requesting to speak w psychiatric services to begin tx for his depression and anxiety relating to his cancer    - f/up palliative recs Pt exhibiting labile mood and is teary, feels depressed and requesting to speak w psychiatric services to begin tx for his depression and anxiety relating to his cancer    - F/u [facility]O price check for escitalopram (Good Rx: $18.73/mo. at [facility])  - f/up palliative recs Fluids: ---  Electrolytes: replete as needed   Diet: Regular  DVT PPx: Lovenox 40mg qd  Dispo: [facility]  ---  CODE: FULL

## 2024-03-05 NOTE — PROGRESS NOTE ADULT - PROBLEM SELECTOR PLAN 2
Pt presenting w hgb of 7.4 in setting of BRBPR and received 2L of NS in ED. Has received iron transfusions in the past and hovers between 8-9 on prior admissions. Iron studies consistent with AARTI.   - s/p 1 u pRBC with appropriate increase in Hgb  - maintain active T/S   - maintain 2 large bore IV   - transfuse if hgb <7  - Hgb stable Pt presenting w hgb of 7.4 in setting of BRBPR and received 2L of NS in ED. Has received iron transfusions in the past and hovers between 8-9 on prior admissions. Iron studies consistent with AARTI.   - s/p 1 u pRBC with appropriate increase in Hgb    - maintain active T/S   - maintain 2 large bore IV   - transfuse if hgb <7  - Hgb stable #Chronic AARTI  2/2 BRBPR, component of chronic LGIB 2/2 rectal CA also probable. | P/w HGB 7.4 >> 7.1 vamsi (baseline 8.0-9.0). S/p 1u pRBC total since admission. H/o prior transfusions, admission iron studies c/w AARTI.     - Closely monitor H/H  - Transfuse for HGB <7.0  - Ensure 2 large-bore IVs, active T&S

## 2024-03-05 NOTE — PROGRESS NOTE ADULT - SUBJECTIVE AND OBJECTIVE BOX
24 hr events: o/n: 101.4 fever at approx 2200, given tylenol. T 100.4 at 5am, given tylenol.  3/5: dvt ppx    SUBJECTIVE: Patient seen at bedside in no acute distress. Denies any abdominal pain, passing gas, having bowel movements, tolerating diet.     Vital Signs Last 24 Hrs  T(C): 36.8 (05 Mar 2024 10:57), Max: 38.6 (04 Mar 2024 21:49)  T(F): 98.2 (05 Mar 2024 10:57), Max: 101.4 (04 Mar 2024 21:49)  HR: 110 (05 Mar 2024 12:29) (110 - 126)  BP: 122/71 (05 Mar 2024 12:29) (107/74 - 138/80)  BP(mean): 90 (05 Mar 2024 12:29) (80 - 99)  RR: 18 (05 Mar 2024 12:29) (16 - 18)  SpO2: 98% (05 Mar 2024 12:29) (96% - 99%)    Parameters below as of 05 Mar 2024 12:29  Patient On (Oxygen Delivery Method): room air        PHYSICAL EXAM:  General: NAD, resting comfortably in bed  Pulm: Nonlabored breathing, no respiratory distress  Abd: soft, non distended, non tender, no rebound or guarding  Extrem: WWP, no edema,  Neuro: A/O x 3, CNs II-XII grossly intact, no focal deficits, normal sensation  Pulses: palpable distal pulses  Lines/drains/tubes:    I&O's Summary    04 Mar 2024 07:01  -  05 Mar 2024 07:00  --------------------------------------------------------  IN: 600 mL / OUT: 1030 mL / NET: -430 mL        LABS:                        9.2    11.58 )-----------( 470      ( 05 Mar 2024 05:30 )             31.1     03-05    135  |  96  |  12  ----------------------------<  211<H>  4.1   |  26  |  0.73    Ca    9.4      05 Mar 2024 05:30  Phos  4.2     03-05  Mg     2.0     03-05        Urinalysis Basic - ( 05 Mar 2024 05:30 )    Color: x / Appearance: x / SG: x / pH: x  Gluc: 211 mg/dL / Ketone: x  / Bili: x / Urobili: x   Blood: x / Protein: x / Nitrite: x   Leuk Esterase: x / RBC: x / WBC x   Sq Epi: x / Non Sq Epi: x / Bacteria: x      CAPILLARY BLOOD GLUCOSE            RADIOLOGY & ADDITIONAL STUDIES:

## 2024-03-05 NOTE — PROGRESS NOTE ADULT - PROBLEM SELECTOR PLAN 5
Pt claims he has a hx of tachycardia and that his resting HR hovers around 130bpm as of late. Given the patients hx of prior heavy drug use cannot rule out possibility of a cardiomyopathy. It is also possible this is in response to blood loss or underlying infection   EKG sinus rhythm without ischemic changes  - patient asymptomatic at this time  - TSH wnl  - TTE - normal systolic function Pt claims he has a hx of tachycardia and that his resting HR hovers around 130bpm as of late. Given the patients hx of prior heavy drug use cannot rule out possibility of a cardiomyopathy. It is also possible this is in response to blood loss or underlying infection   EKG sinus rhythm without ischemic changes    - patient asymptomatic at this time  - TSH wnl  - TTE - normal systolic function Pt claims he has a hx of tachycardia and that his resting HR hovers around 130bpm as of late. Given the patients hx of prior heavy drug use cannot rule out possibility of a cardiomyopathy. It is also possible this is in response to blood loss or underlying infection        - EKG sinus rhythm w/o ischemic changes, TTE uremarkable, TSH wnl    - Continue to monitor for symptoms Pt claims he has a hx of tachycardia and that his resting HR hovers around 130bpm as of late. Given the patients hx of prior heavy drug use cannot rule out possibility of a cardiomyopathy. It is also possible this is in response to blood loss or underlying infection        - EKG sinus rhythm w/o ischemic changes, TTE unremarkable, TSH wnl    - Continue to monitor for symptoms hx of drug abuse with meth, IV drug use, cocaine, ecstasy and marijuana; UTOX positive for meth       - Per pt, does not experience meth use; last meth use 02/29.    - SBIRT consult   - monitor for signs of withdrawal hx of drug abuse with meth, IV drug use, cocaine, ecstasy and marijuana; UTOX positive for meth       - Per pt, does not experience meth use; last meth use 02/29.    - [facility] price check for Narcan Kit in AM        - If price a barrier, can discharge with list of free Narcan suppliers via the [facility]id Overdose Prevention [facility]  - SBIRT consult   - monitor for signs of withdrawal

## 2024-03-05 NOTE — PROGRESS NOTE ADULT - SUBJECTIVE AND OBJECTIVE BOX
No acute events, patient reports overall feeling improved, no new complaints.     ANTIBIOTICS/RELEVANT:    MEDICATIONS  (STANDING):  bictegravir 50 mG/emtricitabine 200 mG/tenofovir alafenamide 25 mG (BIKTARVY) 1 Tablet(s) Oral every 24 hours  enoxaparin Injectable 40 milliGRAM(s) SubCutaneous every 24 hours  melatonin 3 milliGRAM(s) Oral at bedtime  morphine ER Tablet 15 milliGRAM(s) Oral every 12 hours  piperacillin/tazobactam IVPB.. 4.5 Gram(s) IV Intermittent every 8 hours  polyethylene glycol 3350 17 Gram(s) Oral every 24 hours  thiamine 100 milliGRAM(s) Oral every 24 hours    MEDICATIONS  (PRN):  acetaminophen     Tablet .. 650 milliGRAM(s) Oral every 6 hours PRN Temp greater or equal to 38C (100.4F), Mild Pain (1 - 3)  ondansetron Injectable 4 milliGRAM(s) IV Push every 8 hours PRN Nausea and/or Vomiting  oxyCODONE    IR 5 milliGRAM(s) Oral every 6 hours PRN Severe Pain (7 - 10)  oxyCODONE    IR 2.5 milliGRAM(s) Oral every 6 hours PRN Moderate Pain (4 - 6)      Vital Signs Last 24 Hrs  T(C): 36.8 (05 Mar 2024 10:57), Max: 38.6 (04 Mar 2024 21:49)  T(F): 98.2 (05 Mar 2024 10:57), Max: 101.4 (04 Mar 2024 21:49)  HR: 110 (05 Mar 2024 12:29) (110 - 126)  BP: 122/71 (05 Mar 2024 12:29) (107/74 - 138/80)  BP(mean): 90 (05 Mar 2024 12:29) (80 - 99)  RR: 18 (05 Mar 2024 12:29) (16 - 18)  SpO2: 98% (05 Mar 2024 12:29) (96% - 99%)    Parameters below as of 05 Mar 2024 12:29  Patient On (Oxygen Delivery Method): room air        PHYSICAL EXAM:  General: in no acute distress  Eyes: EOMI intact bilaterally. Anicteric sclerae, moist conjunctivae  HENT: Moist mucous membranes  Neck: Trachea midline, supple  Lungs: CTA B/L. No wheezes, rales, or rhonchi  Cardiovascular: RRR. No murmurs, rubs, or gallops  Abdomen: Soft, non-tender non-distended; No rebound or guarding  Extremities: WWP, No clubbing, cyanosis or edema  Neurological: Alert and oriented  Skin: Warm and dry. No obvious rash     LABS:                        9.2    11.58 )-----------( 470      ( 05 Mar 2024 05:30 )             31.1     03-05    135  |  96  |  12  ----------------------------<  211<H>  4.1   |  26  |  0.73    Ca    9.4      05 Mar 2024 05:30  Phos  4.2     03-05  Mg     2.0     03-05

## 2024-03-05 NOTE — PROGRESS NOTE ADULT - SUBJECTIVE AND OBJECTIVE BOX
Hospital course:  Mr. Rojo is a 43 year old male MSM with PMH HIV (on biktarvy), invasive anorectal squamous cell carcinoma, polysubstance abuse, who presents for rectal bleeding and penile pain. Reports meth use due to cancer pain. Has been tachycardic 2/2 to meth use. Also admits to smoking and admits depression given diagnosis and social situation. Has iron deficiency anemia with hemoglobin of 7.1. s/p 1 unit pRBC. Admitted to tele for BRBPR and tachycardia. TTE showed normal systolic function. Ongoing insurance difficulties have made carcinoma treatment difficult. Colorectal surgery - no acute surgical intervention at this time. Heme/Onc following and patient to receive radiation simulation; however on hold pending insurance approval. Patient developing persistent fevers overnight - ID consulted for infectious work-up. Patient is coronavirus positive; fevers likely due to tumor fever. Continuing with empiric zosyn pending cultures. Palliative following for pain control. Started on Lovenox for DVT prophylaxis given no episodes of bleeding. Stable for stepdown from [facility] to [facility].     INTERVAL HPI/OVERNIGHT EVENTS:  As per night team, 101.4 fever at approx 2200, given tylenol. Patient seen and examined at bedside. No acute complaints this AM. States he tried to change his insurance yesterday. Had a nonbloody bowel movement overnight. Patient denies fever, chills, dizziness, weakness, HA, CP, SOB, N/V/D/C    VITALS  Vital Signs Last 24 Hrs  T(C): 36.8 (05 Mar 2024 10:57), Max: 38.6 (04 Mar 2024 21:49)  T(F): 98.2 (05 Mar 2024 10:57), Max: 101.4 (04 Mar 2024 21:49)  HR: 120 (05 Mar 2024 08:24) (114 - 126)  BP: 107/74 (05 Mar 2024 08:24) (107/74 - 138/80)  BP(mean): 80 (05 Mar 2024 08:24) (80 - 99)  RR: 18 (05 Mar 2024 08:24) (16 - 18)  SpO2: 98% (05 Mar 2024 08:24) (96% - 99%)    Parameters below as of 05 Mar 2024 08:24  Patient On (Oxygen Delivery Method): room air    PHYSICAL EXAM  GENERAL: ill-appearing, cachectic, sleepy  HEAD:  Atraumatic, Normocephalic  EYES: EOMI  ENMT: Moist mucous membranes, Good dentition, No lesions  NECK: Supple, No JVD, Normal thyroid  NERVOUS SYSTEM:  Alert & Oriented X3, Good concentration  CHEST/LUNG: Clear to auscultation bilaterally; No rales, rhonchi, wheezing, or rubs  HEART: Tachycardic with regular rhythm; S4 gallop auscultated. No murmurs, rubs  ABDOMEN: Soft, Nontender, Nondistended; Bowel sounds present  EXTREMITIES:  2+ Peripheral Pulses, No clubbing, cyanosis, or edema    MEDICATIONS  (STANDING):  bictegravir 50 mG/emtricitabine 200 mG/tenofovir alafenamide 25 mG (BIKTARVY) 1 Tablet(s) Oral every 24 hours  enoxaparin Injectable 40 milliGRAM(s) SubCutaneous every 24 hours  melatonin 3 milliGRAM(s) Oral at bedtime  morphine ER Tablet 15 milliGRAM(s) Oral every 12 hours  piperacillin/tazobactam IVPB.. 4.5 Gram(s) IV Intermittent every 8 hours  polyethylene glycol 3350 17 Gram(s) Oral every 24 hours  thiamine 100 milliGRAM(s) Oral every 24 hours    MEDICATIONS  (PRN):  acetaminophen     Tablet .. 650 milliGRAM(s) Oral every 6 hours PRN Temp greater or equal to 38C (100.4F), Mild Pain (1 - 3)  ondansetron Injectable 4 milliGRAM(s) IV Push every 8 hours PRN Nausea and/or Vomiting  oxyCODONE    IR 5 milliGRAM(s) Oral every 6 hours PRN Severe Pain (7 - 10)  oxyCODONE    IR 2.5 milliGRAM(s) Oral every 6 hours PRN Moderate Pain (4 - 6)      No Known Allergies      LABS                        9.2    11.58 )-----------( 470      ( 05 Mar 2024 05:30 )             31.1     03-05    135  |  96  |  12  ----------------------------<  211<H>  4.1   |  26  |  0.73    Ca    9.4      05 Mar 2024 05:30  Phos  4.2     03-05  Mg     2.0     03-05        Urinalysis Basic - ( 05 Mar 2024 05:30 )    Color: x / Appearance: x / SG: x / pH: x  Gluc: 211 mg/dL / Ketone: x  / Bili: x / Urobili: x   Blood: x / Protein: x / Nitrite: x   Leuk Esterase: x / RBC: x / WBC x   Sq Epi: x / Non Sq Epi: x / Bacteria: x            RADIOLOGY & ADDITIONAL TESTS: Reviewed

## 2024-03-05 NOTE — PROGRESS NOTE ADULT - PROBLEM SELECTOR PLAN 6
RESOLVED  hx of drug abuse with meth, IV drug use, cocaine, ecstasy and marijuana; UTOX positive for meth  - SBIRT consult   - monitor for signs of withdrawal hx of drug abuse with meth, IV drug use, cocaine, ecstasy and marijuana; UTOX positive for meth       - Per pt, does not experience meth use; last meth use 02/29.    - SBIRT consult   - monitor for signs of withdrawal known hx of HIV on biktarvy. Complaint with medication; CD4 328; HIV detectable.     - c/w home biktarvy

## 2024-03-05 NOTE — PROGRESS NOTE ADULT - ASSESSMENT
Mr. Sanchez is a 43 year old male with a history of well controlled HIV on Biktarvy, recently diagnosed rectal carcinoma with bony involvement of the sacrum/acetabulum, depression, anemia, and reported chronic tachycardia who presented to the hospital for BRBPR and abdominal pain, found to have significant anemia. ID consulted for intermittent fevers and assistance with infectious workup, currently noted coronavirus positive. As patient presents with known rectal mass and GIB, current ddx includes rectal cancer with superimposed bacterial infection vs tumor fevers vs coronavirus infection. At this time the patient has a new leukocytosis but fevers seem to be cyclical occurring in early AM, could be central fevers in relation to malignancy. HIV controlled for many years so no concern for OI at this time.     Plan:  - Continue Zosyn 4.5g IVPB q8h empirically  - Follow blood cultures, currently NGTD  - Assess for localizing symptoms outside of GI system.   - Continue home Biktarvy, CD4 reviewed.     ID team 1 to follow.

## 2024-03-05 NOTE — PROGRESS NOTE ADULT - ASSESSMENT
42 y/o M pmh of known rectal carcinoma with bony mets, HIV (MSM on biktarvy), Reports meth use due to cancer pain. Has iron deficiency anemia noting hemoglobin of 7.1. s/p unit of pRBC. Admitted to tele for episode of BRBPR and tachycardia. Surgery, Palliative and Heme/Onc following.

## 2024-03-05 NOTE — PROGRESS NOTE ADULT - PROBLEM SELECTOR PLAN 3
Pt presenting w hgb of 7.4 in setting of BRBPR and received 2L of NS in ED. Has received iron transfusions in the past and hovers between 8-9 on prior admissions   - fs/p 1u pRBC  - maintain active T/S   - maintain 2 large bore IV   - transfuse if hgb <7  - serum iron low 13, % sat 8, ferritin 304, B12/folate wnl Pt w known hx of Rectal CA with bony involvement. Has established care w Dr. Julian/Graciela. Rectal mass and abdomen particularly painful at this time. Limited YOSEPH preformed revealing large mass.     - Per heme/onc, would benefit from radiation; however, pt would require insurance prior to therapy.  - C/w zofran for nausea   - Pain regimen as follows:       - C/w Tylenol 650mg PO q6 for mild pain       - C/w Oxy 2.5 q6 PRN for moderate pain       - C/w Oxy 5mg q6 PRN for severe pain        - C/w MS Contin 15mg po q12h per palliative  - C/w bowel regimen while on opiods  - Colorectal surgery consulted - no acute surgical intervention indicated at this time  - Palliative on board, appreciate recs  - Heme/onc on board, appreciate recs

## 2024-03-05 NOTE — PROGRESS NOTE ADULT - SUBJECTIVE AND OBJECTIVE BOX
Infectious Disease Progress Note:    INTERVAL HPI/OVERNIGHT EVENTS:  Patient was seen and examined at bedside. Case discussed with attending physician during morning rounds.     Overnight, patient with fever of 101.4 and sinus tachycardia at 120s. Pt without acute complaints at the time. Still no complaints today.     PAST MEDICAL HISTORY:  RECTAL BLEED      HIV (human immunodeficiency virus infection)    Anemia    Rectal cancer    Systemic inflammatory response syndrome (SIRS)    H/O sinus tachycardia    Prophylactic measure    Psychiatric complaint    Polysubstance abuse    Acute blood loss anemia    Iron deficiency anemia    Neoplasm related pain    Encounter for palliative care        ROS:  CONSTITUTIONAL:  Negative fever or chills, feels well, good appetite  EYES:  Negative  blurry vision or double vision  CARDIOVASCULAR:  Negative for chest pain or palpitations  RESPIRATORY:  Negative for cough, wheezing, or SOB   GASTROINTESTINAL:  Negative for nausea, vomiting, diarrhea, constipation, or abdominal pain  GENITOURINARY:  Negative frequency, urgency or dysuria  NEUROLOGIC:  No headache, confusion, dizziness, lightheadedness    Allergies    No Known Allergies    Intolerances        ANTIBIOTICS/RELEVANT:  antimicrobials  bictegravir 50 mG/emtricitabine 200 mG/tenofovir alafenamide 25 mG (BIKTARVY) 1 Tablet(s) Oral every 24 hours  piperacillin/tazobactam IVPB.. 4.5 Gram(s) IV Intermittent every 8 hours    immunologic:    OTHER:  acetaminophen     Tablet .. 650 milliGRAM(s) Oral every 6 hours PRN  enoxaparin Injectable 40 milliGRAM(s) SubCutaneous every 24 hours  melatonin 3 milliGRAM(s) Oral at bedtime  morphine ER Tablet 15 milliGRAM(s) Oral every 12 hours  ondansetron Injectable 4 milliGRAM(s) IV Push every 8 hours PRN  oxyCODONE    IR 2.5 milliGRAM(s) Oral every 6 hours PRN  oxyCODONE    IR 5 milliGRAM(s) Oral every 6 hours PRN  polyethylene glycol 3350 17 Gram(s) Oral every 24 hours  thiamine 100 milliGRAM(s) Oral every 24 hours      Objective:  Vital Signs Last 24 Hrs  T(C): 36.8 (05 Mar 2024 10:57), Max: 38.6 (04 Mar 2024 21:49)  T(F): 98.2 (05 Mar 2024 10:57), Max: 101.4 (04 Mar 2024 21:49)  HR: 110 (05 Mar 2024 12:29) (110 - 126)  BP: 122/71 (05 Mar 2024 12:29) (107/74 - 138/80)  BP(mean): 90 (05 Mar 2024 12:29) (80 - 97)  RR: 18 (05 Mar 2024 12:29) (16 - 18)  SpO2: 98% (05 Mar 2024 12:29) (96% - 99%)    Parameters below as of 05 Mar 2024 12:29  Patient On (Oxygen Delivery Method): room air      PHYSICAL EXAM:  Constitutional: cachectic appearing male, resting comfortably; NAD  Eyes: NC/AT, anicteric sclera, MMM  Ear/Nose/Throat: no oral lesions or erythema  Neck: no lymphadenopathy, supple  Respiratory: CTA b/l, no W/R/R, no retractions  Cardiovascular: +S1/S2; RRR  Gastrointestinal: soft, (+) BS, NT/ND  Extremities: no joint swelling, edema or erythema  Dermatologic: warm, dry, intact; no rashes  Neurologic: AAOx3, no focal deficits  Psychiatric: calm, cooperative    LABS:                        9.2    11.58 )-----------( 470      ( 05 Mar 2024 05:30 )             31.1     03-05    135  |  96  |  12  ----------------------------<  211<H>  4.1   |  26  |  0.73    Ca    9.4      05 Mar 2024 05:30  Phos  4.2     03-05  Mg     2.0     03-05        Urinalysis Basic - ( 05 Mar 2024 05:30 )    Color: x / Appearance: x / SG: x / pH: x  Gluc: 211 mg/dL / Ketone: x  / Bili: x / Urobili: x   Blood: x / Protein: x / Nitrite: x   Leuk Esterase: x / RBC: x / WBC x   Sq Epi: x / Non Sq Epi: x / Bacteria: x      MICROBIOLOGY:  3/01: BCx NGTD x2  3/03: BCx NGTD      RADIOLOGY & ADDITIONAL STUDIES: reviewed

## 2024-03-05 NOTE — PROGRESS NOTE ADULT - SUBJECTIVE AND OBJECTIVE BOX
Medicine Progress Note    Hospital Course  Mr. 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 meth use,       Reports ongoing insurance difficulties as barrier to carcinoma treatment.     Has iron deficiency anemia with hemoglobin of 7.1. s/p 1 unit pRBC. TTE showed normal systolic function. Colorectal surgery - no acute surgical intervention at this time. Heme/Onc following and patient to receive radiation simulation; however on hold pending insurance approval. Patient developing persistent fevers overnight - ID consulted for infectious work-up. Patient is coronavirus positive; fevers likely due to tumor fever. Continuing with empiric zosyn pending cultures. Palliative following for pain control. Started on Lovenox for DVT prophylaxis given no episodes of bleeding. Stable for stepdown from [facility] to [facility].     INTERVAL EVENTS:   No acute events overnight.    SUBJECTIVE:   Patient seen and examined at bedside. Condition largely unchanged from yesterday. No acute complaints at this time.    ROS:  Negative unless otherwise stated above.    PHYSICAL EXAM:  General: Alert and oriented x 3, able to follow conversations and commands. No acute distress.   Eyes: EOMI. Anicteric.  HEENT: Moist mucous membranes. No scleral icterus. No cervical lymphadenopathy.  Cardiovascular: Regular rate and rhythm. No murmur. No JVD.  Lungs: Clear to auscultation bilaterally. No accessory muscle use.  Abdomen: Soft, non-tender and non-distended. No palpable masses.  Extremities: No edema. Non-tender.  Skin: No rashes or lesions. Warm.  Neurologic: No apparent focal neurological deficits. CN II-XII grossly intact, but not individually tested.  Psychiatric: Cooperative. Appropriate mood and affect.    VITAL SIGNS:  Vital Signs Last 24 Hrs  T(C): 36.8 (05 Mar 2024 10:57), Max: 38.6 (04 Mar 2024 21:49)  T(F): 98.2 (05 Mar 2024 10:57), Max: 101.4 (04 Mar 2024 21:49)  HR: 110 (05 Mar 2024 12:29) (110 - 126)  BP: 122/71 (05 Mar 2024 12:29) (107/74 - 138/80)  BP(mean): 90 (05 Mar 2024 12:29) (80 - 99)  RR: 18 (05 Mar 2024 12:29) (16 - 18)  SpO2: 98% (05 Mar 2024 12:29) (96% - 99%)    Parameters below as of 05 Mar 2024 12:29  Patient On (Oxygen Delivery Method): room air      INPATIENT MEDICATIONS:   MEDICATIONS  (STANDING):  bictegravir 50 mG/emtricitabine 200 mG/tenofovir alafenamide 25 mG (BIKTARVY) 1 Tablet(s) Oral every 24 hours  enoxaparin Injectable 40 milliGRAM(s) SubCutaneous every 24 hours  melatonin 3 milliGRAM(s) Oral at bedtime  morphine ER Tablet 15 milliGRAM(s) Oral every 12 hours  piperacillin/tazobactam IVPB.. 4.5 Gram(s) IV Intermittent every 8 hours  polyethylene glycol 3350 17 Gram(s) Oral every 24 hours  thiamine 100 milliGRAM(s) Oral every 24 hours    MEDICATIONS  (PRN):  acetaminophen     Tablet .. 650 milliGRAM(s) Oral every 6 hours PRN Temp greater or equal to 38C (100.4F), Mild Pain (1 - 3)  ondansetron Injectable 4 milliGRAM(s) IV Push every 8 hours PRN Nausea and/or Vomiting  oxyCODONE    IR 2.5 milliGRAM(s) Oral every 6 hours PRN Moderate Pain (4 - 6)  oxyCODONE    IR 5 milliGRAM(s) Oral every 6 hours PRN Severe Pain (7 - 10)    HOME MEDICATIONS  bictegravir/emtricitabine/tenofovir 50 mg-200 mg-25 mg oral tablet: 1 tab(s) orally once a day  ferrous sulfate 200 mg (65 mg elemental iron) oral tablet: 1 tab(s) orally once a day  psyllium 3.4 g/5.4 g oral powder for reconstitution: 3.4 gram(s) orally once a day    ALLERGIES:  Allergies    No Known Allergies    Intolerances    LABS:                       9.2    11.58 )-----------( 470      ( 05 Mar 2024 05:30 )             31.1     03-05    135  |  96  |  12  ----------------------------<  211<H>  4.1   |  26  |  0.73    Ca    9.4      05 Mar 2024 05:30  Phos  4.2     03-05  Mg     2.0     03-05        Urinalysis Basic - ( 05 Mar 2024 05:30 )    Color: x / Appearance: x / SG: x / pH: x  Gluc: 211 mg/dL / Ketone: x  / Bili: x / Urobili: x   Blood: x / Protein: x / Nitrite: x   Leuk Esterase: x / RBC: x / WBC x   Sq Epi: x / Non Sq Epi: x / Bacteria: x      CAPILLARY BLOOD GLUCOSE        RADIOLOGY & ADDITIONAL TESTS: Reviewed. Medicine Progress Note    Hospital Course  Mr. 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 meth use, found anemic to HGB 7.1, received total  pRBC x1 since admission. Per colorectal surgery - no acute surgical intervention at this time. Heme/Onc following and patient to receive radiation simulation; however on hold pending insurance approval. Palliative following for pain control ISO malignancy.    C/C/B persistent fevers 03/02 - ID consulted for FUO. Pt then found coronavirus positive; fevers now thought to be more likely 2/2 tumor fever. Continuing with empiric PsA zosyn given immunocompromise for total 7d course, pending final BCx culture read.    Started on Lovenox for DVT prophylaxis on 03/05 given no episodes of repeat bleed. Stable for stepdown from [facility] to [facility].     INTERVAL EVENTS:   No acute events overnight.    SUBJECTIVE:   Patient seen and examined at bedside. Condition largely unchanged from yesterday. No acute complaints at this time.    ROS:  Negative unless otherwise stated above.    PHYSICAL EXAM:  General: Alert and oriented x 3, able to follow conversations and commands. No acute distress.   Eyes: EOMI. Anicteric.  HEENT: Moist mucous membranes. No scleral icterus. No cervical lymphadenopathy.  Cardiovascular: Regular rate and rhythm. No murmur. No JVD.  Lungs: Clear to auscultation bilaterally. No accessory muscle use.  Abdomen: Soft, non-tender and non-distended. No palpable masses.  Extremities: No edema. Non-tender.  Skin: No rashes or lesions. Warm.  Neurologic: No apparent focal neurological deficits. CN II-XII grossly intact, but not individually tested.  Psychiatric: Cooperative. Appropriate mood and affect.    VITAL SIGNS:  Vital Signs Last 24 Hrs  T(C): 36.8 (05 Mar 2024 10:57), Max: 38.6 (04 Mar 2024 21:49)  T(F): 98.2 (05 Mar 2024 10:57), Max: 101.4 (04 Mar 2024 21:49)  HR: 110 (05 Mar 2024 12:29) (110 - 126)  BP: 122/71 (05 Mar 2024 12:29) (107/74 - 138/80)  BP(mean): 90 (05 Mar 2024 12:29) (80 - 99)  RR: 18 (05 Mar 2024 12:29) (16 - 18)  SpO2: 98% (05 Mar 2024 12:29) (96% - 99%)    Parameters below as of 05 Mar 2024 12:29  Patient On (Oxygen Delivery Method): room air      INPATIENT MEDICATIONS:   MEDICATIONS  (STANDING):  bictegravir 50 mG/emtricitabine 200 mG/tenofovir alafenamide 25 mG (BIKTARVY) 1 Tablet(s) Oral every 24 hours  enoxaparin Injectable 40 milliGRAM(s) SubCutaneous every 24 hours  melatonin 3 milliGRAM(s) Oral at bedtime  morphine ER Tablet 15 milliGRAM(s) Oral every 12 hours  piperacillin/tazobactam IVPB.. 4.5 Gram(s) IV Intermittent every 8 hours  polyethylene glycol 3350 17 Gram(s) Oral every 24 hours  thiamine 100 milliGRAM(s) Oral every 24 hours    MEDICATIONS  (PRN):  acetaminophen     Tablet .. 650 milliGRAM(s) Oral every 6 hours PRN Temp greater or equal to 38C (100.4F), Mild Pain (1 - 3)  ondansetron Injectable 4 milliGRAM(s) IV Push every 8 hours PRN Nausea and/or Vomiting  oxyCODONE    IR 2.5 milliGRAM(s) Oral every 6 hours PRN Moderate Pain (4 - 6)  oxyCODONE    IR 5 milliGRAM(s) Oral every 6 hours PRN Severe Pain (7 - 10)    HOME MEDICATIONS  bictegravir/emtricitabine/tenofovir 50 mg-200 mg-25 mg oral tablet: 1 tab(s) orally once a day  ferrous sulfate 200 mg (65 mg elemental iron) oral tablet: 1 tab(s) orally once a day  psyllium 3.4 g/5.4 g oral powder for reconstitution: 3.4 gram(s) orally once a day    ALLERGIES:  Allergies    No Known Allergies    Intolerances    LABS:                       9.2    11.58 )-----------( 470      ( 05 Mar 2024 05:30 )             31.1     03-05    135  |  96  |  12  ----------------------------<  211<H>  4.1   |  26  |  0.73    Ca    9.4      05 Mar 2024 05:30  Phos  4.2     03-05  Mg     2.0     03-05        Urinalysis Basic - ( 05 Mar 2024 05:30 )    Color: x / Appearance: x / SG: x / pH: x  Gluc: 211 mg/dL / Ketone: x  / Bili: x / Urobili: x   Blood: x / Protein: x / Nitrite: x   Leuk Esterase: x / RBC: x / WBC x   Sq Epi: x / Non Sq Epi: x / Bacteria: x      CAPILLARY BLOOD GLUCOSE        RADIOLOGY & ADDITIONAL TESTS: Reviewed. Medicine Progress Note    Hospital Course  Mr. 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 meth use, found anemic to HGB 7.1, received total  pRBC x1 since admission. Per colorectal surgery - no acute surgical intervention at this time. Heme/Onc following and patient to receive radiation simulation; however on hold pending insurance approval. Palliative following for pain control ISO malignancy.    C/C/B asx tachycardia to 110-140s. Per pt, baseline -130s. TTE unremarkable, TSH wnl.  C/C/B persistent fevers 03/02 - ID consulted for FUO. Pt then found coronavirus positive; fevers now thought to be more likely 2/2 tumor fever. Continuing with empiric PsA zosyn given immunocompromise for total 7d course, pending final BCx culture read.    Started on Lovenox for DVT prophylaxis on 03/05 given no episodes of repeat bleed. Stable for stepdown from [facility] to [facility].     INTERVAL EVENTS:   No acute events overnight.    SUBJECTIVE:   Patient seen and examined at bedside. Condition largely unchanged from yesterday. No acute complaints at this time.    ROS:  Negative unless otherwise stated above.    PHYSICAL EXAM:  General: Alert and oriented x 3, able to follow conversations and commands. No acute distress.   Eyes: EOMI. Anicteric.  HEENT: Moist mucous membranes. No scleral icterus. No cervical lymphadenopathy.  Cardiovascular: Tachycardic, regular rhythm. No murmur. No JVD.  Lungs: Clear to auscultation bilaterally. No accessory muscle use.  Abdomen: Soft, non-tender and non-distended. No palpable masses.  Extremities: No edema. Non-tender.  Skin: No rashes or lesions. Warm.  Neurologic: No apparent focal neurological deficits. CN II-XII grossly intact, but not individually tested.  Psychiatric: Cooperative. Appropriate mood and affect.    VITAL SIGNS:  Vital Signs Last 24 Hrs  T(C): 36.8 (05 Mar 2024 10:57), Max: 38.6 (04 Mar 2024 21:49)  T(F): 98.2 (05 Mar 2024 10:57), Max: 101.4 (04 Mar 2024 21:49)  HR: 110 (05 Mar 2024 12:29) (110 - 126)  BP: 122/71 (05 Mar 2024 12:29) (107/74 - 138/80)  BP(mean): 90 (05 Mar 2024 12:29) (80 - 99)  RR: 18 (05 Mar 2024 12:29) (16 - 18)  SpO2: 98% (05 Mar 2024 12:29) (96% - 99%)    Parameters below as of 05 Mar 2024 12:29  Patient On (Oxygen Delivery Method): room air      INPATIENT MEDICATIONS:   MEDICATIONS  (STANDING):  bictegravir 50 mG/emtricitabine 200 mG/tenofovir alafenamide 25 mG (BIKTARVY) 1 Tablet(s) Oral every 24 hours  enoxaparin Injectable 40 milliGRAM(s) SubCutaneous every 24 hours  melatonin 3 milliGRAM(s) Oral at bedtime  morphine ER Tablet 15 milliGRAM(s) Oral every 12 hours  piperacillin/tazobactam IVPB.. 4.5 Gram(s) IV Intermittent every 8 hours  polyethylene glycol 3350 17 Gram(s) Oral every 24 hours  thiamine 100 milliGRAM(s) Oral every 24 hours    MEDICATIONS  (PRN):  acetaminophen     Tablet .. 650 milliGRAM(s) Oral every 6 hours PRN Temp greater or equal to 38C (100.4F), Mild Pain (1 - 3)  ondansetron Injectable 4 milliGRAM(s) IV Push every 8 hours PRN Nausea and/or Vomiting  oxyCODONE    IR 2.5 milliGRAM(s) Oral every 6 hours PRN Moderate Pain (4 - 6)  oxyCODONE    IR 5 milliGRAM(s) Oral every 6 hours PRN Severe Pain (7 - 10)    HOME MEDICATIONS  bictegravir/emtricitabine/tenofovir 50 mg-200 mg-25 mg oral tablet: 1 tab(s) orally once a day  ferrous sulfate 200 mg (65 mg elemental iron) oral tablet: 1 tab(s) orally once a day  psyllium 3.4 g/5.4 g oral powder for reconstitution: 3.4 gram(s) orally once a day    ALLERGIES:  Allergies    No Known Allergies    Intolerances    LABS:                       9.2    11.58 )-----------( 470      ( 05 Mar 2024 05:30 )             31.1     03-05    135  |  96  |  12  ----------------------------<  211<H>  4.1   |  26  |  0.73    Ca    9.4      05 Mar 2024 05:30  Phos  4.2     03-05  Mg     2.0     03-05        Urinalysis Basic - ( 05 Mar 2024 05:30 )    Color: x / Appearance: x / SG: x / pH: x  Gluc: 211 mg/dL / Ketone: x  / Bili: x / Urobili: x   Blood: x / Protein: x / Nitrite: x   Leuk Esterase: x / RBC: x / WBC x   Sq Epi: x / Non Sq Epi: x / Bacteria: x      CAPILLARY BLOOD GLUCOSE        RADIOLOGY & ADDITIONAL TESTS: Reviewed. Medicine Progress Note    Hospital Course  Mr. 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 meth use, found anemic to HGB 7.1, received total  pRBC x1 since admission. Per colorectal surgery - no acute surgical intervention at this time. Heme/Onc following, pt to receive radiation simulation; however on hold pending insurance approval. Palliative following for pain control ISO malignancy.    C/C/B asx tachycardia to 110-140s. Per pt, baseline -130s. TTE unremarkable, TSH wnl.  C/C/B persistent fevers 03/02 - ID consulted for FUO. Pt then found non-COVID19 coronavirus positive; fevers now thought to be more likely 2/2 tumor fever due to apparent cyclical pattern. Continuing with empiric PsA zosyn given immunocompromise for total 7d course, pending final BCx culture read.    Started on Lovenox for DVT prophylaxis on 03/05 given no episodes of repeat bleed. Stable for stepdown from [facility] to [facility].     INTERVAL EVENTS:   No acute events overnight.    SUBJECTIVE:   Patient seen and examined at bedside. Condition largely unchanged from yesterday. No acute complaints at this time.    ROS:  Negative unless otherwise stated above.    PHYSICAL EXAM:  General: Alert and oriented x 3, able to follow conversations and commands. No acute distress.   Eyes: EOMI. Anicteric.  HEENT: Moist mucous membranes. No scleral icterus. No cervical lymphadenopathy.  Cardiovascular: Tachycardic, regular rhythm. No murmur. No JVD.  Lungs: Clear to auscultation bilaterally. No accessory muscle use.  Abdomen: Soft, non-tender and non-distended. No palpable masses.  Extremities: No edema. Non-tender.  Skin: No rashes or lesions. Warm.  Neurologic: No apparent focal neurological deficits. CN II-XII grossly intact, but not individually tested.  Psychiatric: Cooperative. Appropriate mood and affect.    VITAL SIGNS:  Vital Signs Last 24 Hrs  T(C): 36.8 (05 Mar 2024 10:57), Max: 38.6 (04 Mar 2024 21:49)  T(F): 98.2 (05 Mar 2024 10:57), Max: 101.4 (04 Mar 2024 21:49)  HR: 110 (05 Mar 2024 12:29) (110 - 126)  BP: 122/71 (05 Mar 2024 12:29) (107/74 - 138/80)  BP(mean): 90 (05 Mar 2024 12:29) (80 - 99)  RR: 18 (05 Mar 2024 12:29) (16 - 18)  SpO2: 98% (05 Mar 2024 12:29) (96% - 99%)    Parameters below as of 05 Mar 2024 12:29  Patient On (Oxygen Delivery Method): room air      INPATIENT MEDICATIONS:   MEDICATIONS  (STANDING):  bictegravir 50 mG/emtricitabine 200 mG/tenofovir alafenamide 25 mG (BIKTARVY) 1 Tablet(s) Oral every 24 hours  enoxaparin Injectable 40 milliGRAM(s) SubCutaneous every 24 hours  melatonin 3 milliGRAM(s) Oral at bedtime  morphine ER Tablet 15 milliGRAM(s) Oral every 12 hours  piperacillin/tazobactam IVPB.. 4.5 Gram(s) IV Intermittent every 8 hours  polyethylene glycol 3350 17 Gram(s) Oral every 24 hours  thiamine 100 milliGRAM(s) Oral every 24 hours    MEDICATIONS  (PRN):  acetaminophen     Tablet .. 650 milliGRAM(s) Oral every 6 hours PRN Temp greater or equal to 38C (100.4F), Mild Pain (1 - 3)  ondansetron Injectable 4 milliGRAM(s) IV Push every 8 hours PRN Nausea and/or Vomiting  oxyCODONE    IR 2.5 milliGRAM(s) Oral every 6 hours PRN Moderate Pain (4 - 6)  oxyCODONE    IR 5 milliGRAM(s) Oral every 6 hours PRN Severe Pain (7 - 10)    HOME MEDICATIONS  bictegravir/emtricitabine/tenofovir 50 mg-200 mg-25 mg oral tablet: 1 tab(s) orally once a day  ferrous sulfate 200 mg (65 mg elemental iron) oral tablet: 1 tab(s) orally once a day  psyllium 3.4 g/5.4 g oral powder for reconstitution: 3.4 gram(s) orally once a day    ALLERGIES:  Allergies    No Known Allergies    Intolerances    LABS:                       9.2    11.58 )-----------( 470      ( 05 Mar 2024 05:30 )             31.1     03-05    135  |  96  |  12  ----------------------------<  211<H>  4.1   |  26  |  0.73    Ca    9.4      05 Mar 2024 05:30  Phos  4.2     03-05  Mg     2.0     03-05        Urinalysis Basic - ( 05 Mar 2024 05:30 )    Color: x / Appearance: x / SG: x / pH: x  Gluc: 211 mg/dL / Ketone: x  / Bili: x / Urobili: x   Blood: x / Protein: x / Nitrite: x   Leuk Esterase: x / RBC: x / WBC x   Sq Epi: x / Non Sq Epi: x / Bacteria: x      CAPILLARY BLOOD GLUCOSE        RADIOLOGY & ADDITIONAL TESTS: Reviewed. Medicine Progress Note    Hospital Course  Mr. 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 meth use, found anemic to HGB 7.1, received total pRBC x1 since admission. Per colorectal surgery - no acute surgical intervention at this time. Heme/Onc following, pt to receive radiation simulation; however on hold pending insurance approval. Palliative following for pain control ISO malignancy.    C/C/B asx tachycardia to 110-140s. Per pt, baseline -130s. TTE unremarkable, TSH wnl.  C/C/B persistent fevers 03/02 - ID consulted for FUO. Pt then found non-COVID19 coronavirus positive; fevers now thought to be more likely 2/2 tumor fever due to apparent cyclical pattern. Continuing with empiric PsA zosyn given immunocompromise for total 7d course, pending final BCx culture read.    Started on Lovenox for DVT prophylaxis on 03/05 given no episodes of repeat bleed. Stable for stepdown from [facility] to [facility].     INTERVAL EVENTS:   No acute events overnight.    SUBJECTIVE:   Patient seen and examined at bedside. Condition largely unchanged from yesterday. No acute complaints at this time.    ROS:  Negative unless otherwise stated above.    PHYSICAL EXAM:  General: Alert and oriented x 3, able to follow conversations and commands. No acute distress.   Eyes: EOMI. Anicteric.  HEENT: Moist mucous membranes. No scleral icterus. No cervical lymphadenopathy.  Cardiovascular: Tachycardic, regular rhythm. No murmur. No JVD.  Lungs: Clear to auscultation bilaterally. No accessory muscle use.  Abdomen: Soft, non-tender and non-distended. No palpable masses.  Extremities: No edema. Non-tender.  Skin: No rashes or lesions. Warm.  Neurologic: No apparent focal neurological deficits. CN II-XII grossly intact, but not individually tested.  Psychiatric: Cooperative. Appropriate mood and affect.    VITAL SIGNS:  Vital Signs Last 24 Hrs  T(C): 36.8 (05 Mar 2024 10:57), Max: 38.6 (04 Mar 2024 21:49)  T(F): 98.2 (05 Mar 2024 10:57), Max: 101.4 (04 Mar 2024 21:49)  HR: 110 (05 Mar 2024 12:29) (110 - 126)  BP: 122/71 (05 Mar 2024 12:29) (107/74 - 138/80)  BP(mean): 90 (05 Mar 2024 12:29) (80 - 99)  RR: 18 (05 Mar 2024 12:29) (16 - 18)  SpO2: 98% (05 Mar 2024 12:29) (96% - 99%)    Parameters below as of 05 Mar 2024 12:29  Patient On (Oxygen Delivery Method): room air      INPATIENT MEDICATIONS:   MEDICATIONS  (STANDING):  bictegravir 50 mG/emtricitabine 200 mG/tenofovir alafenamide 25 mG (BIKTARVY) 1 Tablet(s) Oral every 24 hours  enoxaparin Injectable 40 milliGRAM(s) SubCutaneous every 24 hours  melatonin 3 milliGRAM(s) Oral at bedtime  morphine ER Tablet 15 milliGRAM(s) Oral every 12 hours  piperacillin/tazobactam IVPB.. 4.5 Gram(s) IV Intermittent every 8 hours  polyethylene glycol 3350 17 Gram(s) Oral every 24 hours  thiamine 100 milliGRAM(s) Oral every 24 hours    MEDICATIONS  (PRN):  acetaminophen     Tablet .. 650 milliGRAM(s) Oral every 6 hours PRN Temp greater or equal to 38C (100.4F), Mild Pain (1 - 3)  ondansetron Injectable 4 milliGRAM(s) IV Push every 8 hours PRN Nausea and/or Vomiting  oxyCODONE    IR 2.5 milliGRAM(s) Oral every 6 hours PRN Moderate Pain (4 - 6)  oxyCODONE    IR 5 milliGRAM(s) Oral every 6 hours PRN Severe Pain (7 - 10)    HOME MEDICATIONS  bictegravir/emtricitabine/tenofovir 50 mg-200 mg-25 mg oral tablet: 1 tab(s) orally once a day  ferrous sulfate 200 mg (65 mg elemental iron) oral tablet: 1 tab(s) orally once a day  psyllium 3.4 g/5.4 g oral powder for reconstitution: 3.4 gram(s) orally once a day    ALLERGIES:  Allergies    No Known Allergies    Intolerances    LABS:                       9.2    11.58 )-----------( 470      ( 05 Mar 2024 05:30 )             31.1     03-05    135  |  96  |  12  ----------------------------<  211<H>  4.1   |  26  |  0.73    Ca    9.4      05 Mar 2024 05:30  Phos  4.2     03-05  Mg     2.0     03-05        Urinalysis Basic - ( 05 Mar 2024 05:30 )    Color: x / Appearance: x / SG: x / pH: x  Gluc: 211 mg/dL / Ketone: x  / Bili: x / Urobili: x   Blood: x / Protein: x / Nitrite: x   Leuk Esterase: x / RBC: x / WBC x   Sq Epi: x / Non Sq Epi: x / Bacteria: x      CAPILLARY BLOOD GLUCOSE        RADIOLOGY & ADDITIONAL TESTS: Reviewed.

## 2024-03-05 NOTE — PROGRESS NOTE ADULT - PROBLEM SELECTOR PLAN 2
Pt presenting w hgb of 7.4 in setting of BRBPR and received 2L of NS in ED. Has received iron transfusions in the past and hovers between 8-9 on prior admissions. Iron studies consistent with AARTI.   - s/p 1 u pRBC with appropriate increase in Hgb  - maintain active T/S   - maintain 2 large bore IV   - transfuse if hgb <7  - Hgb stable

## 2024-03-05 NOTE — PROGRESS NOTE ADULT - ASSESSMENT
43 M, MSM with PMH HIV (on Biktarvy), polysubstance abuse, syphilis, anemia invasive anorectal squamous cell carcinoma who is admitted to medical service with penile pain and blood per rectum. Febrile to 102, tachycardic, normotensive. Non tender on exam. Labs on admission demonstrate WBC 13.07 K/uL (now 10), Hgb 7.4 g/dL (now 7.1, from 8.3 prior to discharge), BUN/Cr at baseline, Non COVID19 corona positive, Utox positive for amphetamines and THC. UA negative.  Remains clinically non obstructed.     Recommendations:   - No acute surgical intervention  - no current indication for diversion, as will not resolve pain.  - Patient would likely benefit most from radiation therapy  - Recommend frequent showers/sitz baths for perianal wounds  - f/u heme/onc, rad onc recommendations  - Transfuse as necessary, large bore IV access, active type and screen  - Colorectal Surgery to follow

## 2024-03-05 NOTE — PROGRESS NOTE ADULT - PROBLEM SELECTOR PLAN 4
Pt w known hx of Rectal CA with bony involvement. Has established care w Dr. Julian/Graciela. Rectal mass and abdomen particularly painful at this time. Limited YOSEPH preformed revealing large mass.   - f/up hem onc recs --> patient requires radiation; plan for simulation on Wednesday 3/6/24 (on hold as per Heme/Onc)  - zofran for nausea   - Tylenol 650mg PO q6 for mild pain, Oxy 2.5 q6 for moderate Oxy 5mg q6 for severe pain   - bowel regimen w Miralax nightly  - Palliative pain recs - MS Contin 15mg PO BID,  - Colorectal surgery- No acute surgical intervention Pt w known hx of Rectal CA with bony involvement. Has established care w Dr. Julian/Graciela. Rectal mass and abdomen particularly painful at this time. Limited YOSEPH preformed revealing large mass.     - f/up hem onc recs --> patient requires radiation; plan for simulation on Wednesday 3/6/24 (on hold as per Heme/Onc)  - zofran for nausea   - Tylenol 650mg PO q6 for mild pain, Oxy 2.5 q6 for moderate Oxy 5mg q6 for severe pain   - bowel regimen w Miralax nightly  - Palliative pain recs - MS Contin 15mg PO BID,  - Colorectal surgery- No acute surgical intervention Pt claims he has a hx of tachycardia and that his resting HR hovers around 130bpm as of late. Given the patients hx of prior heavy drug use cannot rule out possibility of a cardiomyopathy. It is also possible this is in response to blood loss or underlying infection        - EKG sinus rhythm w/o ischemic changes, TTE unremarkable, TSH wnl    - Continue to monitor for symptoms

## 2024-03-05 NOTE — PROGRESS NOTE ADULT - PROBLEM SELECTOR PLAN 9
Fluids: s/p 2L NS; 1 U pRBC  Electrolytes: replete as needed   Diet: Regular  DVT PPx: Lovenox 40mg qd  Dispo: tele

## 2024-03-06 ENCOUNTER — TRANSCRIPTION ENCOUNTER (OUTPATIENT)
Age: 44
End: 2024-03-06

## 2024-03-06 VITALS — WEIGHT: 141.98 LBS

## 2024-03-06 LAB
ANION GAP SERPL CALC-SCNC: 11 MMOL/L — SIGNIFICANT CHANGE UP (ref 5–17)
BASOPHILS # BLD AUTO: 0.04 K/UL — SIGNIFICANT CHANGE UP (ref 0–0.2)
BASOPHILS NFR BLD AUTO: 0.3 % — SIGNIFICANT CHANGE UP (ref 0–2)
BLD GP AB SCN SERPL QL: NEGATIVE — SIGNIFICANT CHANGE UP
BUN SERPL-MCNC: 12 MG/DL — SIGNIFICANT CHANGE UP (ref 7–23)
CALCIUM SERPL-MCNC: 9.4 MG/DL — SIGNIFICANT CHANGE UP (ref 8.4–10.5)
CHLORIDE SERPL-SCNC: 96 MMOL/L — SIGNIFICANT CHANGE UP (ref 96–108)
CO2 SERPL-SCNC: 24 MMOL/L — SIGNIFICANT CHANGE UP (ref 22–31)
CREAT SERPL-MCNC: 0.78 MG/DL — SIGNIFICANT CHANGE UP (ref 0.5–1.3)
CULTURE RESULTS: SIGNIFICANT CHANGE UP
CULTURE RESULTS: SIGNIFICANT CHANGE UP
EGFR: 115 ML/MIN/1.73M2 — SIGNIFICANT CHANGE UP
EOSINOPHIL # BLD AUTO: 0.04 K/UL — SIGNIFICANT CHANGE UP (ref 0–0.5)
EOSINOPHIL NFR BLD AUTO: 0.3 % — SIGNIFICANT CHANGE UP (ref 0–6)
GLUCOSE SERPL-MCNC: 95 MG/DL — SIGNIFICANT CHANGE UP (ref 70–99)
HCT VFR BLD CALC: 31.1 % — LOW (ref 39–50)
HGB BLD-MCNC: 9.3 G/DL — LOW (ref 13–17)
IMM GRANULOCYTES NFR BLD AUTO: 0.5 % — SIGNIFICANT CHANGE UP (ref 0–0.9)
LYMPHOCYTES # BLD AUTO: 1.57 K/UL — SIGNIFICANT CHANGE UP (ref 1–3.3)
LYMPHOCYTES # BLD AUTO: 12.5 % — LOW (ref 13–44)
MAGNESIUM SERPL-MCNC: 2.1 MG/DL — SIGNIFICANT CHANGE UP (ref 1.6–2.6)
MCHC RBC-ENTMCNC: 24 PG — LOW (ref 27–34)
MCHC RBC-ENTMCNC: 29.9 GM/DL — LOW (ref 32–36)
MCV RBC AUTO: 80.2 FL — SIGNIFICANT CHANGE UP (ref 80–100)
MONOCYTES # BLD AUTO: 1.07 K/UL — HIGH (ref 0–0.9)
MONOCYTES NFR BLD AUTO: 8.5 % — SIGNIFICANT CHANGE UP (ref 2–14)
NEUTROPHILS # BLD AUTO: 9.83 K/UL — HIGH (ref 1.8–7.4)
NEUTROPHILS NFR BLD AUTO: 77.9 % — HIGH (ref 43–77)
NRBC # BLD: 0 /100 WBCS — SIGNIFICANT CHANGE UP (ref 0–0)
PHOSPHATE SERPL-MCNC: 4.4 MG/DL — SIGNIFICANT CHANGE UP (ref 2.5–4.5)
PLATELET # BLD AUTO: 465 K/UL — HIGH (ref 150–400)
POTASSIUM SERPL-MCNC: 4.1 MMOL/L — SIGNIFICANT CHANGE UP (ref 3.5–5.3)
POTASSIUM SERPL-SCNC: 4.1 MMOL/L — SIGNIFICANT CHANGE UP (ref 3.5–5.3)
RBC # BLD: 3.88 M/UL — LOW (ref 4.2–5.8)
RBC # FLD: 17.5 % — HIGH (ref 10.3–14.5)
RH IG SCN BLD-IMP: POSITIVE — SIGNIFICANT CHANGE UP
SODIUM SERPL-SCNC: 131 MMOL/L — LOW (ref 135–145)
SPECIMEN SOURCE: SIGNIFICANT CHANGE UP
SPECIMEN SOURCE: SIGNIFICANT CHANGE UP
WBC # BLD: 12.61 K/UL — HIGH (ref 3.8–10.5)
WBC # FLD AUTO: 12.61 K/UL — HIGH (ref 3.8–10.5)

## 2024-03-06 PROCEDURE — 96376 TX/PRO/DX INJ SAME DRUG ADON: CPT

## 2024-03-06 PROCEDURE — 86900 BLOOD TYPING SEROLOGIC ABO: CPT

## 2024-03-06 PROCEDURE — 83935 ASSAY OF URINE OSMOLALITY: CPT

## 2024-03-06 PROCEDURE — 87086 URINE CULTURE/COLONY COUNT: CPT

## 2024-03-06 PROCEDURE — 83735 ASSAY OF MAGNESIUM: CPT

## 2024-03-06 PROCEDURE — 83605 ASSAY OF LACTIC ACID: CPT

## 2024-03-06 PROCEDURE — 86850 RBC ANTIBODY SCREEN: CPT

## 2024-03-06 PROCEDURE — 71275 CT ANGIOGRAPHY CHEST: CPT | Mod: MC

## 2024-03-06 PROCEDURE — 99233 SBSQ HOSP IP/OBS HIGH 50: CPT

## 2024-03-06 PROCEDURE — 85730 THROMBOPLASTIN TIME PARTIAL: CPT

## 2024-03-06 PROCEDURE — 36430 TRANSFUSION BLD/BLD COMPNT: CPT

## 2024-03-06 PROCEDURE — 80307 DRUG TEST PRSMV CHEM ANLYZR: CPT

## 2024-03-06 PROCEDURE — 86360 T CELL ABSOLUTE COUNT/RATIO: CPT

## 2024-03-06 PROCEDURE — 36415 COLL VENOUS BLD VENIPUNCTURE: CPT

## 2024-03-06 PROCEDURE — 74177 CT ABD & PELVIS W/CONTRAST: CPT | Mod: MC

## 2024-03-06 PROCEDURE — 84100 ASSAY OF PHOSPHORUS: CPT

## 2024-03-06 PROCEDURE — 87536 HIV-1 QUANT&REVRSE TRNSCRPJ: CPT

## 2024-03-06 PROCEDURE — 71045 X-RAY EXAM CHEST 1 VIEW: CPT

## 2024-03-06 PROCEDURE — 84443 ASSAY THYROID STIM HORMONE: CPT

## 2024-03-06 PROCEDURE — 87040 BLOOD CULTURE FOR BACTERIA: CPT

## 2024-03-06 PROCEDURE — 93306 TTE W/DOPPLER COMPLETE: CPT

## 2024-03-06 PROCEDURE — 99285 EMERGENCY DEPT VISIT HI MDM: CPT | Mod: 25

## 2024-03-06 PROCEDURE — 83550 IRON BINDING TEST: CPT

## 2024-03-06 PROCEDURE — 82746 ASSAY OF FOLIC ACID SERUM: CPT

## 2024-03-06 PROCEDURE — 84145 PROCALCITONIN (PCT): CPT

## 2024-03-06 PROCEDURE — 82728 ASSAY OF FERRITIN: CPT

## 2024-03-06 PROCEDURE — 86901 BLOOD TYPING SEROLOGIC RH(D): CPT

## 2024-03-06 PROCEDURE — 84300 ASSAY OF URINE SODIUM: CPT

## 2024-03-06 PROCEDURE — 85027 COMPLETE CBC AUTOMATED: CPT

## 2024-03-06 PROCEDURE — 93005 ELECTROCARDIOGRAM TRACING: CPT

## 2024-03-06 PROCEDURE — 80053 COMPREHEN METABOLIC PANEL: CPT

## 2024-03-06 PROCEDURE — 86359 T CELLS TOTAL COUNT: CPT

## 2024-03-06 PROCEDURE — 82607 VITAMIN B-12: CPT

## 2024-03-06 PROCEDURE — 99232 SBSQ HOSP IP/OBS MODERATE 35: CPT

## 2024-03-06 PROCEDURE — 84484 ASSAY OF TROPONIN QUANT: CPT

## 2024-03-06 PROCEDURE — 86923 COMPATIBILITY TEST ELECTRIC: CPT

## 2024-03-06 PROCEDURE — 99239 HOSP IP/OBS DSCHRG MGMT >30: CPT

## 2024-03-06 PROCEDURE — 87637 SARSCOV2&INF A&B&RSV AMP PRB: CPT

## 2024-03-06 PROCEDURE — 81001 URINALYSIS AUTO W/SCOPE: CPT

## 2024-03-06 PROCEDURE — 0225U NFCT DS DNA&RNA 21 SARSCOV2: CPT

## 2024-03-06 PROCEDURE — 96374 THER/PROPH/DIAG INJ IV PUSH: CPT

## 2024-03-06 PROCEDURE — 80048 BASIC METABOLIC PNL TOTAL CA: CPT

## 2024-03-06 PROCEDURE — 85025 COMPLETE CBC W/AUTO DIFF WBC: CPT

## 2024-03-06 PROCEDURE — 83540 ASSAY OF IRON: CPT

## 2024-03-06 PROCEDURE — P9016: CPT

## 2024-03-06 PROCEDURE — 85610 PROTHROMBIN TIME: CPT

## 2024-03-06 RX ORDER — METRONIDAZOLE 500 MG
1 TABLET ORAL
Qty: 22 | Refills: 0
Start: 2024-03-06 | End: 2024-03-16

## 2024-03-06 RX ORDER — CEFDINIR 250 MG/5ML
1 POWDER, FOR SUSPENSION ORAL
Qty: 22 | Refills: 0
Start: 2024-03-06 | End: 2024-03-16

## 2024-03-06 RX ORDER — CEFPODOXIME PROXETIL 100 MG
2 TABLET ORAL
Qty: 44 | Refills: 0
Start: 2024-03-06 | End: 2024-03-16

## 2024-03-06 RX ADMIN — OXYCODONE HYDROCHLORIDE 5 MILLIGRAM(S): 5 TABLET ORAL at 11:15

## 2024-03-06 RX ADMIN — OXYCODONE HYDROCHLORIDE 5 MILLIGRAM(S): 5 TABLET ORAL at 05:10

## 2024-03-06 RX ADMIN — OXYCODONE HYDROCHLORIDE 5 MILLIGRAM(S): 5 TABLET ORAL at 04:08

## 2024-03-06 RX ADMIN — PIPERACILLIN AND TAZOBACTAM 25 GRAM(S): 4; .5 INJECTION, POWDER, LYOPHILIZED, FOR SOLUTION INTRAVENOUS at 08:00

## 2024-03-06 RX ADMIN — Medication 3 MILLIGRAM(S): at 00:00

## 2024-03-06 RX ADMIN — MORPHINE SULFATE 15 MILLIGRAM(S): 50 CAPSULE, EXTENDED RELEASE ORAL at 06:42

## 2024-03-06 RX ADMIN — ENOXAPARIN SODIUM 40 MILLIGRAM(S): 100 INJECTION SUBCUTANEOUS at 11:12

## 2024-03-06 RX ADMIN — Medication 100 MILLIGRAM(S): at 06:42

## 2024-03-06 RX ADMIN — MORPHINE SULFATE 15 MILLIGRAM(S): 50 CAPSULE, EXTENDED RELEASE ORAL at 07:32

## 2024-03-06 RX ADMIN — BICTEGRAVIR SODIUM, EMTRICITABINE, AND TENOFOVIR ALAFENAMIDE FUMARATE 1 TABLET(S): 30; 120; 15 TABLET ORAL at 11:12

## 2024-03-06 RX ADMIN — PIPERACILLIN AND TAZOBACTAM 25 GRAM(S): 4; .5 INJECTION, POWDER, LYOPHILIZED, FOR SOLUTION INTRAVENOUS at 00:35

## 2024-03-06 NOTE — PROGRESS NOTE ADULT - ATTENDING COMMENTS
Patient with low grade fevers, usually between 12 am-6am suggestive of tumor fever.  It's possible that the mass is superinfected there can continue Zosyn for now. No ID contraindication to chemo or radiation.  Continue ART
43M with PMH Of metastatic rectal cancer and HIV, presenting with rectal bleeding, anemia and tachycardia, requiring stay on telemetry unit.  Now on RMF.  PE ruled out on CTA.  Further workup pending.     Plan  - started on IV iron  - follow up with ID and psychiatry  - palliative care following, appreciate input as well  - monitor CBC - Hb has been stable for the past few lab draws  - no fevers on review of flowsheet for past 24 hours
Metastatic rectal cancer, HIV acute on chronic blood loss anemia, substance abuse  physical as above  transfuse  pain control  await echocardiogram re tachycardia  check TFT  empiric zosyn pending cultures
Hb stable and no further rectal bleeding. Infectious work up neg and f/u with ID emperic course of Zosyn duration. Suspect tumor fever. Continue pain control. Onc f/u.
Metastatic rectal cancer with acute blood loss anemia, methamphetamine abuse, sinus tachycardia  physical as above  H/H stable  await echo  surgery, oncology f/u  palliative care consult but pain so far appears controlled
Persistent fevers with tachycardia. No obvious source of sepsis and concerned for tumor fevers or tumor necosis/infection. Less likely HIV related. ID to see and colorectal to f/u. Continue Zosyn and f/u Cx. P{ain control. Onc f/u appreciated.
Fever Due to Rectal Cancer   will discuss with ID in am and consider DC Of IV zosyn and watch off anbx   Psychiatry consultation as patient reports being depressed and taking Meth to help with Depression   AARTI -once of IV anbx , will start IV Iron 300mg x 3 over 24-48 hours   F/u with Rad onc Outpatient

## 2024-03-06 NOTE — DIETITIAN INITIAL EVALUATION ADULT - PERTINENT MEDS FT
MEDICATIONS  (STANDING):  bictegravir 50 mG/emtricitabine 200 mG/tenofovir alafenamide 25 mG (BIKTARVY) 1 Tablet(s) Oral every 24 hours  enoxaparin Injectable 40 milliGRAM(s) SubCutaneous every 24 hours  iron sucrose IVPB 300 milliGRAM(s) IV Intermittent every 24 hours  melatonin 3 milliGRAM(s) Oral at bedtime  morphine ER Tablet 15 milliGRAM(s) Oral every 12 hours  piperacillin/tazobactam IVPB.. 4.5 Gram(s) IV Intermittent every 8 hours  polyethylene glycol 3350 17 Gram(s) Oral every 24 hours  thiamine 100 milliGRAM(s) Oral every 24 hours    MEDICATIONS  (PRN):  acetaminophen     Tablet .. 650 milliGRAM(s) Oral every 6 hours PRN Temp greater or equal to 38C (100.4F), Mild Pain (1 - 3)  ondansetron Injectable 4 milliGRAM(s) IV Push every 8 hours PRN Nausea and/or Vomiting  oxyCODONE    IR 2.5 milliGRAM(s) Oral every 6 hours PRN Moderate Pain (4 - 6)  oxyCODONE    IR 5 milliGRAM(s) Oral every 6 hours PRN Severe Pain (7 - 10)

## 2024-03-06 NOTE — DIETITIAN INITIAL EVALUATION ADULT - ADD RECOMMEND
- Continue with regular diet. Recommend supplementing with Ensure Max Protein 1x/day (150 kcal, 30 g protein per serving)   - Encourage and monitor PO intake, honor preferences as able   - Monitor labs, weight trends, GI function, and skin integrity    - Pain and bowel regimen per team   - Monitor adherence to diet education   - RD to remain available and follow-up

## 2024-03-06 NOTE — DIETITIAN INITIAL EVALUATION ADULT - PROBLEM SELECTOR PLAN 3
Pt w known hx of Rectal CA with bony involvement. Has established care w Dr. Julian/Graciela. Rectal mass and abdomen particularly painful at this time. Limited YOSEPH preformed revealing large mass   - consult heme/onc in AM   - zofran for nausea   - Tylenol 650mg PO q6 for mild pain, Oxy 2.5 q6 for moderate Oxy 5mg q6 for severe pain   - bowel regimen w Miralax nightly should patient routinely be using opioids - will avoid prokinetic agents given c/f bowel contractions exacerbating abdominal pain and causing further bloody bowel movements

## 2024-03-06 NOTE — PROGRESS NOTE ADULT - PROBLEM SELECTOR PLAN 6
known hx of HIV on biktarvy. Complaint with medication; CD4 328; HIV detectable.     PLAN:  - c/w home biktarvy

## 2024-03-06 NOTE — PROGRESS NOTE ADULT - TIME BILLING
evaluation of fevers
evaluation of fever
Bedside exam and interview    Review of hospital course, labs, vitals, imaging ,  medical records.   Reviewing outside records   Discharge planning on Interdisciplinary rounds   Discussion with consultants and Primary team   Documenting the encounter.

## 2024-03-06 NOTE — DIETITIAN INITIAL EVALUATION ADULT - SIGNS/SYMPTOMS
as evidenced by significant weight loss (12% x 3 months) and low PO intake ( <75% x 1 month) as evidenced by significant weight loss (12% x 3 months) and inadequate PO intake (<75% x 1 month)

## 2024-03-06 NOTE — CHART NOTE - NSCHARTNOTEFT_GEN_A_CORE
Provider Specialty:  Palliative Care.    Referral/Consultation:    Initial Consult:  · Requested by Name:	Ruthy  · Date/Time:	04-Mar-2024 15:08  · Reason for Referral/Consultation:	Neoplasm related pain  · Reason for Admission	rectal mass/bleeding      · Subjective and Objective:   [facility] Geriatrics and Palliative Care  Russell Gorman, Palliative Care Attending  Contact Info: Call [phone] (HEAL Line) or message on Microsoft Teams    HPI:  42 y/o M pmh of known rectal carcinoma (dx'd 12/24) with bony involvement  to sacrum/acetabulum (followed by Eliel/Graciela), HIV (MSM on biktarvy), Anemia, Tachycardia (home readings in the 130s which he attributes to prior heavy drug use (crystal meth, ecstasy), current smoking (several cigarettes every other day) and depression presenting for episode of BRBPR and abdominal pain. Patient states that he has been experiencing bloody bowel movements for some time as a result of his rectal CA and his movements are traditionally bloody, however he had a profusely bloody movements this AM with associated weakness and severe abdominal pain. The patient also endorses penile pain with an inability to fully urinate at the time. He denies fevers, chills but endorses SOB, CP which has now resolved, weakness and fatigue.     ED Course:  Vitals Afebrile, , /63, Spo2 97, RR17   Labs: WBC 13.087 (neutrophilic predominance), HgB 7.4, , albumin 2.9, PT 14.5, INR 1.33 UA negative,   EKG: Sinus tach   Imaging: CT angio chest, negative for PE, CT A/P  rectal mass with grossly stable osseous  involvement.  Interventions: dilaudid 0.5 IV x3, zosyn 3.375 x1, 2L NS   (01 Mar 2024 20:36)    PERTINENT PM/SXH:   HIV (human immunodeficiency virus infection)    Abscess    IVDU (intravenous drug user)    Anemia    HIV (human immunodeficiency virus infection)    Rectal cancer      No significant past surgical history    No significant past surgical history    H/O rectal polypectomy      FAMILY HISTORY:  FH: CAD (coronary artery disease) (Mother)      ITEMS NOT CHECKED ARE NOT PRESENT    SOCIAL HISTORY:   Significant other/partner:  []  Children:  []   Substance hx:  []   Tobacco hx:  []   Alcohol hx: []   Home Opioid hx:  [] I-Stop Reference No:  - no active Rx's / see chart note  Living Situation: [x]Home  []Long term care  []Rehab []Other  Sikhism/Spiritual practice: ; Role of organized Gnosticist [ ] important [x ] some [ ] unable to assess  Coping: [ ] well [ x] with difficulty [ ] poor coping [ ] unable to assess  Support system: [ ] strong [x ] adequate [ ] inadequate    ADVANCE DIRECTIVES:    []MOLST  []Living Will  DECISION MAKER(s):  [] Health Care Proxy(s)  [x] Surrogate(s)  [] Guardian           Name(s)/Phone Number(s): Alexy Velasquez (friend)    BASELINE (I)ADLs (prior to admission):  [place]: [x]Total  [] Moderate []Dependent    ALLERGIES:  No Known Allergies    MEDICATIONS  (STANDING):  bictegravir 50 mG/emtricitabine 200 mG/tenofovir alafenamide 25 mG (BIKTARVY) 1 Tablet(s) Oral every 24 hours  melatonin 3 milliGRAM(s) Oral at bedtime  piperacillin/tazobactam IVPB.. 4.5 Gram(s) IV Intermittent every 8 hours  polyethylene glycol 3350 17 Gram(s) Oral every 24 hours  thiamine 100 milliGRAM(s) Oral every 24 hours    MEDICATIONS  (PRN):  acetaminophen     Tablet .. 650 milliGRAM(s) Oral every 6 hours PRN Temp greater or equal to 38C (100.4F), Mild Pain (1 - 3)  ondansetron Injectable 4 milliGRAM(s) IV Push every 8 hours PRN Nausea and/or Vomiting  oxyCODONE    IR 5 milliGRAM(s) Oral every 6 hours PRN Severe Pain (7 - 10)  oxyCODONE    IR 2.5 milliGRAM(s) Oral every 6 hours PRN Moderate Pain (4 - 6)    PRESENT SYMPTOMS: []Unable to obtain due to poor mentation/encephalopathy  Source if other than patient:  []Family   []Team     Pain: [ x] yes [ ] no  QOL impact - unable to do ADLs  Location - rectum                   Aggravating Factors - movement  Quality - sharp  Radiation - none  Timing - intermittent  Severity (0-10 scale) - 8  Minimal Acceptable Level (0-10 scale) -4    PAIN AD Score:  [email or web address] (press ctrl +  left click to view)    Dyspnea:                           [ ]Mild  [ ]Moderate [ ]Severe  Anxiety:                             [ ]Mild [x ]Moderate [ ]Severe  Fatigue:                             [ x]Mild [ ]Moderate [ ]Severe  Nausea:                             [ ]Mild [ ]Moderate [ ]Severe  Loss of Appetite:             [ ]Mild [x ]Moderate [ ]Severe  Constipation:                   [ ]Mild [ ]Moderate [ ]Severe    Other Symptoms:  [x ]All other review of systems negative     Palliative Performance Status Version 2: 60-70 %    [email or web address]    PHYSICAL EXAM:  GENERAL:  [x ]Alert  [x ]Oriented x   3[ ]Lethargic  [ ]Cachexia  [ ]Unarousable  [ ]Verbal  [ ]Non-Verbal  Behavioral:   [ ]Anxiety  [ ]Delirium [ ]Agitation [x ]Cooperative  HEENT:  [ ]Normal   [x ]Dry mouth   [ ]ET Tube/Trach  [ ]Oral lesions  PULMONARY:   [x ]Clear []Tachypnea  []Audible excessive secretions   [ ]Rhonchi        [ ]Right [ ]Left [ ]Bilateral  [ ]Crackles        [ ]Right [ ]Left [ ]Bilateral  [ ]Wheezing     [ ]Right [ ]Left [ ]Bilateral  CARDIOVASCULAR:    [x ]Regular [ ]Irregular [ ]Tachy  [ ]Ethan [ ]Murmur [ ]Other  GASTROINTESTINAL:  [ x]Soft  [ ]Distended   [ x]+BS  [x ]Non tender [ ]Tender  [ ]PEG [ ]OGT/ NGT  Last BM:     GENITOURINARY:  [x ]Normal [ ] Incontinent   [ ]Oliguria/Anuria   [ ]Calderon  MUSCULOSKELETAL:   [ ]Normal   [ x]Weakness  [ ]Bed/Wheelchair bound [ ]Edema  NEUROLOGIC:   [ x]No focal deficits  [ ]Cognitive impairment  [ ]Dysphagia [ ]Dysarthria [ ]Paresis [ ]Encephalopathic   SKIN:   x[ ]Normal   [ ]Pressure ulcer(s)  [ ]Rash    CRITICAL CARE:  [ ]Shock Present  [ ]Septic [ ]Cardiogenic [ ]Neurologic [ ]Hypovolemic  [ ]Vasopressors [ ]Inotropes   [ ]Respiratory failure present [ ]Mechanical Ventilation [ ]Non-invasive ventilatory support [ ]High-Flow  [ ]Acute  [ ]Chronic [ ]Hypoxic  [ ]Hypercarbic  [ ]Other organ failure    Vital Signs Last 24 Hrs    T(C): 36.8 (05 Mar 2024 10:57), Max: 38.6 (04 Mar 2024 21:49)  T(F): 98.2 (05 Mar 2024 10:57), Max: 101.4 (04 Mar 2024 21:49)  HR: 110 (05 Mar 2024 12:29) (110 - 126)  BP: 122/71 (05 Mar 2024 12:29) (107/74 - 138/80)  BP(mean): 90 (05 Mar 2024 12:29) (80 - 99)  RR: 18 (05 Mar 2024 12:29) (16 - 18)  SpO2: 98% (05 Mar 2024 12:29) (96% - 99%)        LABS:                        9.1    11.25 )-----------( 409      ( 04 Mar 2024 09:23 )             30.4   03-04    135  |  99  |  10  ----------------------------<  140<H>  3.6   |  27  |  0.75    Ca    9.0      04 Mar 2024 09:23  Phos  3.5     03-04  Mg     2.8     03-04      Urinalysis Basic - ( 04 Mar 2024 09:23 )    Color: x / Appearance: x / SG: x / pH: x  Gluc: 140 mg/dL / Ketone: x  / Bili: x / Urobili: x   Blood: x / Protein: x / Nitrite: x   Leuk Esterase: x / RBC: x / WBC x   Sq Epi: x / Non Sq Epi: x / Bacteria: x      RADIOLOGY & ADDITIONAL STUDIES:      PROTEIN CALORIE MALNUTRITION PRESENT: [ ]mild [ ]moderate [ ]severe [ ]underweight [ ]morbid obesity  [ ]PPSV2 < or = to 30% [ ]significant weight loss  [ ]poor nutritional intake [ ]catabolic state [ ]anasarca     Artificial Nutrition [ ]     REFERRALS:  [x]Social Work  [ ]Case management [ ]PT/OT [ ]Chaplaincy  [ ]Hospice  [ ]Patient/Family Support    DISCUSSION OF CASE: Family - to obtain additional history and to provide emotional support; ( ) -           Assessment and Recommendation:   · Assessment	  43 M with rectal cancer, neoplasm related pain, encounter for palliative care.        Problem/Recommendation - 1:  ·  Problem: Rectal cancer.   ·  Recommendation: - Bulky locally advanced disease.  - PET scan with no overt distant spread.   - Patient was supposed to received chemotherapy and radiation as an outpatient, but was unable to due to insurance issues.     Problem/Recommendation - 2:  ·  Problem: Neoplasm related pain.   ·  Recommendation: - Oxycodone 5mg PO Q4 hours prn.   - MS Contin 15mg PO BID, in addition to the Oxycodone prn.   - Bowel regimen given short and long acting opiates.     Problem/Recommendation - 3:  ·  Problem: Encounter for palliative care.   ·  Recommendation: .  Complex medical decision making / symptom management in the setting of rectal cancer with high symptom burden.    Patient has an appointment with Dr. Allison Taylor on 2/13/24 as an outpatient if insurance issues can be resolved.     Will continue to follow for ongoing GOC discussion / titration of palliative regimen. Emotional support provided, questions answered.  Active Psychosocial Referrals:  [x]Social Work/Case management []PT/OT []Chaplaincy []Hospice  []Patient/Family Support []Holistic RN []Massage Therapy []Music Therapy []Ethics  Coping: [] well [] with difficulty [] poor coping [] unable to assess  Support system: [] strong [] adequate [] inadequate    For new or uncontrolled symptoms, please call Palliative Care at [phone]). The service is available 24/7 (including nights & weekends) to provide symptom management recommendations over the phone as appropriate.
Provider Specialty:  Palliative Care.    · Subjective and Objective:   [facility] Geriatrics and Palliative Care  Russell Gorman Palliative Care Attending  Contact Info: Call [phone] (HEAL Line) or message on Microsoft Teams    HPI:  42 y/o M pmh of known rectal carcinoma (dx'd 12/24) with bony involvement  to sacrum/acetabulum (followed by Eliel/Graciela), HIV (MSM on biktarvy), Anemia, Tachycardia (home readings in the 130s which he attributes to prior heavy drug use (crystal meth, ecstasy), current smoking (several cigarettes every other day) and depression presenting for episode of BRBPR and abdominal pain. Patient states that he has been experiencing bloody bowel movements for some time as a result of his rectal CA and his movements are traditionally bloody, however he had a profusely bloody movements this AM with associated weakness and severe abdominal pain. The patient also endorses penile pain with an inability to fully urinate at the time. He denies fevers, chills but endorses SOB, CP which has now resolved, weakness and fatigue.     ED Course:  Vitals Afebrile, , /63, Spo2 97, RR17   Labs: WBC 13.087 (neutrophilic predominance), HgB 7.4, , albumin 2.9, PT 14.5, INR 1.33 UA negative,   EKG: Sinus tach   Imaging: CT angio chest, negative for PE, CT A/P  rectal mass with grossly stable osseous  involvement.  Interventions: dilaudid 0.5 IV x3, zosyn 3.375 x1, 2L NS   (01 Mar 2024 20:36)    PERTINENT PM/SXH:   HIV (human immunodeficiency virus infection)    Abscess    IVDU (intravenous drug user)    Anemia    HIV (human immunodeficiency virus infection)    Rectal cancer      No significant past surgical history    No significant past surgical history    H/O rectal polypectomy      FAMILY HISTORY:  FH: CAD (coronary artery disease) (Mother)      ITEMS NOT CHECKED ARE NOT PRESENT    SOCIAL HISTORY:   Significant other/partner:  []  Children:  []   Substance hx:  []   Tobacco hx:  []   Alcohol hx: []   Home Opioid hx:  [] I-Stop Reference No:  - no active Rx's / see chart note  Living Situation: [x]Home  []Long term care  []Rehab []Other  Anabaptism/Spiritual practice: ; Role of organized Jew [ ] important [x ] some [ ] unable to assess  Coping: [ ] well [ x] with difficulty [ ] poor coping [ ] unable to assess  Support system: [ ] strong [x ] adequate [ ] inadequate    ADVANCE DIRECTIVES:    []MOLST  []Living Will  DECISION MAKER(s):  [] Health Care Proxy(s)  [x] Surrogate(s)  [] Guardian           Name(s)/Phone Number(s): Alexy Velasquez (friend)    BASELINE (I)ADLs (prior to admission):  [place]: [x]Total  [] Moderate []Dependent    ALLERGIES:  No Known Allergies    MEDICATIONS  (STANDING):  bictegravir 50 mG/emtricitabine 200 mG/tenofovir alafenamide 25 mG (BIKTARVY) 1 Tablet(s) Oral every 24 hours  melatonin 3 milliGRAM(s) Oral at bedtime  piperacillin/tazobactam IVPB.. 4.5 Gram(s) IV Intermittent every 8 hours  polyethylene glycol 3350 17 Gram(s) Oral every 24 hours  thiamine 100 milliGRAM(s) Oral every 24 hours    MEDICATIONS  (PRN):  acetaminophen     Tablet .. 650 milliGRAM(s) Oral every 6 hours PRN Temp greater or equal to 38C (100.4F), Mild Pain (1 - 3)  ondansetron Injectable 4 milliGRAM(s) IV Push every 8 hours PRN Nausea and/or Vomiting  oxyCODONE    IR 5 milliGRAM(s) Oral every 6 hours PRN Severe Pain (7 - 10)  oxyCODONE    IR 2.5 milliGRAM(s) Oral every 6 hours PRN Moderate Pain (4 - 6)    PRESENT SYMPTOMS: []Unable to obtain due to poor mentation/encephalopathy  Source if other than patient:  []Family   []Team     Pain: [ x] yes [ ] no  QOL impact - unable to do ADLs  Location - rectum                   Aggravating Factors - movement  Quality - sharp  Radiation - none  Timing - intermittent  Severity (0-10 scale) - 8  Minimal Acceptable Level (0-10 scale) -4    PAIN AD Score:  [email or web address] (press ctrl +  left click to view)    Dyspnea:                           [ ]Mild  [ ]Moderate [ ]Severe  Anxiety:                             [ ]Mild [x ]Moderate [ ]Severe  Fatigue:                             [ x]Mild [ ]Moderate [ ]Severe  Nausea:                             [ ]Mild [ ]Moderate [ ]Severe  Loss of Appetite:             [ ]Mild [x ]Moderate [ ]Severe  Constipation:                   [ ]Mild [ ]Moderate [ ]Severe    Other Symptoms:  [x ]All other review of systems negative     Palliative Performance Status Version 2: 60-70 %    [email or web address]    PHYSICAL EXAM:  GENERAL:  [x ]Alert  [x ]Oriented x   3[ ]Lethargic  [ ]Cachexia  [ ]Unarousable  [ ]Verbal  [ ]Non-Verbal  Behavioral:   [ ]Anxiety  [ ]Delirium [ ]Agitation [x ]Cooperative  HEENT:  [ ]Normal   [x ]Dry mouth   [ ]ET Tube/Trach  [ ]Oral lesions  PULMONARY:   [x ]Clear []Tachypnea  []Audible excessive secretions   [ ]Rhonchi        [ ]Right [ ]Left [ ]Bilateral  [ ]Crackles        [ ]Right [ ]Left [ ]Bilateral  [ ]Wheezing     [ ]Right [ ]Left [ ]Bilateral  CARDIOVASCULAR:    [x ]Regular [ ]Irregular [ ]Tachy  [ ]Ethan [ ]Murmur [ ]Other  GASTROINTESTINAL:  [ x]Soft  [ ]Distended   [ x]+BS  [x ]Non tender [ ]Tender  [ ]PEG [ ]OGT/ NGT  Last BM:     GENITOURINARY:  [x ]Normal [ ] Incontinent   [ ]Oliguria/Anuria   [ ]Calderon  MUSCULOSKELETAL:   [ ]Normal   [ x]Weakness  [ ]Bed/Wheelchair bound [ ]Edema  NEUROLOGIC:   [ x]No focal deficits  [ ]Cognitive impairment  [ ]Dysphagia [ ]Dysarthria [ ]Paresis [ ]Encephalopathic   SKIN:   x[ ]Normal   [ ]Pressure ulcer(s)  [ ]Rash    CRITICAL CARE:  [ ]Shock Present  [ ]Septic [ ]Cardiogenic [ ]Neurologic [ ]Hypovolemic  [ ]Vasopressors [ ]Inotropes   [ ]Respiratory failure present [ ]Mechanical Ventilation [ ]Non-invasive ventilatory support [ ]High-Flow  [ ]Acute  [ ]Chronic [ ]Hypoxic  [ ]Hypercarbic  [ ]Other organ failure    Vital Signs Last 24 Hrs    T(C): 36.9 (06 Mar 2024 06:02), Max: 37.7 (05 Mar 2024 23:50)  T(F): 98.4 (06 Mar 2024 06:02), Max: 99.8 (05 Mar 2024 23:50)  HR: 125 (06 Mar 2024 06:02) (110 - 127)  BP: 116/75 (06 Mar 2024 06:02) (107/74 - 122/71)  BP(mean): 89 (06 Mar 2024 06:02) (80 - 90)  RR: 18 (06 Mar 2024 06:02) (16 - 18)  SpO2: 95% (06 Mar 2024 06:02) (95% - 98%)        LABS:                        9.2    11.58 )-----------( 470      ( 05 Mar 2024 05:30 )             31.1     03-05    135  |  96  |  12  ----------------------------<  211<H>  4.1   |  26  |  0.73    Ca    9.4      05 Mar 2024 05:30  Phos  4.2     03-05  Mg     2.0     03-05      RADIOLOGY & ADDITIONAL STUDIES:      PROTEIN CALORIE MALNUTRITION PRESENT: [ ]mild [ ]moderate [ ]severe [ ]underweight [ ]morbid obesity  [ ]PPSV2 < or = to 30% [ ]significant weight loss  [ ]poor nutritional intake [ ]catabolic state [ ]anasarca     Artificial Nutrition [ ]     REFERRALS:  [x]Social Work  [ ]Case management [ ]PT/OT [ ]Chaplaincy  [ ]Hospice  [ ]Patient/Family Support    DISCUSSION OF CASE: Family - to obtain additional history and to provide emotional support; ( ) -           Assessment and Recommendation:   · Assessment	  43 M with rectal cancer, neoplasm related pain, encounter for palliative care.        Problem/Recommendation - 1:  ·  Problem: Rectal cancer.   ·  Recommendation: - Bulky locally advanced disease.  - PET scan with no overt distant spread.   - Patient was supposed to received chemotherapy and radiation as an outpatient, but was unable to due to insurance issues.     Problem/Recommendation - 2:  ·  Problem: Neoplasm related pain.   ·  Recommendation: - Oxycodone 5mg PO Q4 hours prn.   - MS Contin 15mg PO BID, in addition to the Oxycodone prn.   - Bowel regimen given short and long acting opiates.     Problem/Recommendation - 3:  ·  Problem: Encounter for palliative care.   ·  Recommendation: .  Complex medical decision making / symptom management in the setting of rectal cancer with high symptom burden.    Patient has an appointment with Dr. Allison Taylor on 2/13/24 as an outpatient if insurance issues can be resolved.     Will continue to follow for ongoing GOC discussion / titration of palliative regimen. Emotional support provided, questions answered.  Active Psychosocial Referrals:  [x]Social Work/Case management []PT/OT []Chaplaincy []Hospice  []Patient/Family Support []Holistic RN []Massage Therapy []Music Therapy []Ethics  Coping: [] well [] with difficulty [] poor coping [] unable to assess  Support system: [] strong [] adequate [] inadequate    For new or uncontrolled symptoms, please call Palliative Care at [phone] The service is available 24/7 (including nights & weekends) to provide symptom management recommendations over the phone as appropriate.

## 2024-03-06 NOTE — DIETITIAN INITIAL EVALUATION ADULT - NSPROEDALEARNPREF_GEN_A_NUR
verbal instruction Education provided to maintain adequate PO intake, emphasizing intake of proteins and proper hydration. Educated to consume vegetables with each meal and add fruits to the diet to have adequate nutrition, caretaking his elevated needs/verbal instruction Education provided to maintain adequate PO intake, emphasizing intake of proteins and proper hydration. Educated on fruits and vegetables to increase micronutrient intake/diet variety, caretaking his elevated needs. Pt aware RD remains available for additional questions/concerns./verbal instruction

## 2024-03-06 NOTE — PROGRESS NOTE ADULT - PROBLEM SELECTOR PLAN 4
Pt claims he has a hx of tachycardia and that his resting HR hovers around 130bpm as of late. Given the patients hx of prior heavy drug use cannot rule out possibility of a cardiomyopathy. It is also possible this is in response to blood loss or underlying infection. Patient remains asymptomatic.        - EKG sinus rhythm w/o ischemic changes, TTE unremarkable, TSH wnl    PLAN:  - Continue to monitor for symptoms

## 2024-03-06 NOTE — PROGRESS NOTE ADULT - PROBLEM SELECTOR PLAN 5
hx of drug abuse with meth, IV drug use, cocaine, ecstasy and marijuana; UTOX positive for meth       - Per pt, does not experience meth use; last meth use 02/29.    - VIVO price check for Narcan Kit in AM        - If price a barrier, can discharge with list of free Narcan suppliers via the [facility]id Overdose Prevention [facility]  - SBIRT consult   - monitor for signs of withdrawal

## 2024-03-06 NOTE — DIETITIAN INITIAL EVALUATION ADULT - OTHER CALCULATIONS
Based on Standards of Care pt <80% Ideal body weight (77% Ideal Body Weight), thus Current Body Weight used for all calculations. Needs adjusted for age, HIV, and moderate malnutrition.  Estimated needs based on dosing wt as <120% IBW (77%) and per RD judgement. Needs adjusted for age, HIV, and moderate malnutrition.

## 2024-03-06 NOTE — PROGRESS NOTE ADULT - PROBLEM SELECTOR PLAN 8
Fluids: ---  Electrolytes: replete as needed   Diet: Regular  DVT PPx: Lovenox 40mg qd  Dispo: [facility]  ---  CODE: FULL

## 2024-03-06 NOTE — DIETITIAN INITIAL EVALUATION ADULT - NUTRITIONGOAL OUTCOME1
Reduce/resolve signs and symptoms of protein calorie malnutrition  Reduce/resolve signs and symptoms of protein calorie malnutrition. Pt to consistently meet >75% nutrient needs.

## 2024-03-06 NOTE — DISCHARGE NOTE NURSING/CASE MANAGEMENT/SOCIAL WORK - NSDCPEWEB_GEN_ALL_CORE
[facility] for Tobacco Control website --- [email or web address] website --- [email or web address]

## 2024-03-06 NOTE — DIETITIAN INITIAL EVALUATION ADULT - PERTINENT LABORATORY DATA
03-06    131<L>  |  96  |  12  ----------------------------<  95  4.1   |  24  |  0.78    Ca    9.4      06 Mar 2024 05:30  Phos  4.4     03-06  Mg     2.1     03-06

## 2024-03-06 NOTE — DIETITIAN INITIAL EVALUATION ADULT - PROBLEM SELECTOR PLAN 5
hx of drug abuse with meth, IV drug use, cocaine, ecstasy and marijuana  - f/u Utox  - SBIRT consult   - monitor for signs of withdrawal

## 2024-03-06 NOTE — DIETITIAN NUTRITION RISK NOTIFICATION - ADDITIONAL COMMENTS/DIETITIAN RECOMMENDATIONS
- Recommend continue with current diet  - Encourage and monitor PO intake, honor preferences as able   - Monitor labs, weight trends, GI function, and skin integrity    - Pain and bowel regimen per team   - Monitor adherence to diet education   - RD to remain available and follow-up - Continue with regular diet. Recommend supplementing with Ensure Max Protein 1x/day (150 kcal, 30 g protein per serving)   - Encourage and monitor PO intake, honor preferences as able   - Monitor labs, weight trends, GI function, and skin integrity    - Pain and bowel regimen per team   - Monitor adherence to diet education   - RD to remain available and follow-up

## 2024-03-06 NOTE — PROGRESS NOTE ADULT - ASSESSMENT
IMPRESSION:  43 year old male with a history of well controlled HIV on Biktarvy, recently diagnosed rectal carcinoma with bony involvement of the sacrum/acetabulum, depression, anemia, and reported chronic tachycardia who presented to the hospital for BRBPR and abdominal pain, found to have significant anemia. ID consulted for intermittent fevers and assistance with infectious workup, currently noted coronavirus positive. As patient presents with known rectal mass and GIB, current ddx includes rectal cancer with superimposed bacterial infection vs tumor fevers vs coronavirus infection. At this time the patient has a new leukocytosis but fevers seem to be cyclical occurring in early AM, could be central fevers in relation to malignancy. HIV controlled for many years so no concern for OI at this time.       Recommend:  1.  Can continue Zosyn for now while in patient  2.  On discharge he can be transitioned to Cefpodoxime 400 mg PO q12 + Metronidazole 500 mg PO q12 to complete 14 day course  3. Continue Biktarvy  4.  No ID contraindication to chemo or radiation    ID team 1 will sign off.  Reconsult as needed

## 2024-03-06 NOTE — DISCHARGE NOTE PROVIDER - NSDCFUSCHEDAPPT_GEN_ALL_CORE_FT
Nabila Julian  [facility] Physician [facility]  [facility] [address]  Scheduled Appointment: 03/13/2024    [facility]  PALLIATIVE [address]  Scheduled Appointment: 03/13/2024

## 2024-03-06 NOTE — PROGRESS NOTE ADULT - PROBLEM SELECTOR PLAN 2
#Chronic AARTI  2/2 BRBPR, component of chronic LGIB 2/2 rectal CA also probable. | P/w HGB 7.4 >> 7.1 vamsi (baseline 8.0-9.0). S/p 1u pRBC total since admission. H/o prior transfusions, admission iron studies c/w AARTI.     PLANL  - Closely monitor H/H  - Transfuse for HGB <7.0  - Ensure 2 large-bore IVs, active T&S

## 2024-03-06 NOTE — PROGRESS NOTE ADULT - PROBLEM SELECTOR PLAN 7
Pt exhibiting labile mood and is teary, feels depressed and requesting to speak w psychiatric services to begin tx for his depression and anxiety relating to his cancer    PLAN:  - F/u [facility]O price check for escitalopram generic (Good Rx: $18.73 per month. at [facility])

## 2024-03-06 NOTE — DISCHARGE NOTE PROVIDER - NSDCMRMEDTOKEN_GEN_ALL_CORE_FT
bictegravir/emtricitabine/tenofovir 50 mg-200 mg-25 mg oral tablet: 1 tab(s) orally once a day  cefpodoxime 200 mg oral tablet: 2 tab(s) orally 2 times a day  escitalopram 5 mg oral tablet: 1 tab(s) orally once a day  ferrous sulfate 200 mg (65 mg elemental iron) oral tablet: 1 tab(s) orally once a day  metroNIDAZOLE 500 mg oral tablet: 1 tab(s) orally 2 times a day  psyllium 3.4 g/5.4 g oral powder for reconstitution: 3.4 gram(s) orally once a day   bictegravir/emtricitabine/tenofovir 50 mg-200 mg-25 mg oral tablet: 1 tab(s) orally once a day  cefdinir 300 mg oral capsule: 1 cap(s) orally 2 times a day  escitalopram 5 mg oral tablet: 1 tab(s) orally once a day  ferrous sulfate 200 mg (65 mg elemental iron) oral tablet: 1 tab(s) orally once a day  metroNIDAZOLE 500 mg oral tablet: 1 tab(s) orally 2 times a day  psyllium 3.4 g/5.4 g oral powder for reconstitution: 3.4 gram(s) orally once a day

## 2024-03-06 NOTE — DISCHARGE NOTE PROVIDER - HOSPITAL COURSE
#Discharge: do not delete  Mr. 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 meth use, found anemic to HGB 7.1, received total pRBC x1 since admission. Per colorectal surgery - no acute surgical intervention at this time. Heme/Onc following, pt to receive radiation simulation; however on hold pending insurance approval. Palliative following for pain control ISO malignancy.  C/C/B asx tachycardia to 110-140s. Per pt, baseline -130s. TTE unremarkable, TSH wnl.  C/C/B persistent fevers 03/02 - ID consulted for FUO. Pt then found non-COVID19 coronavirus positive; fevers now thought to be more likely 2/2 tumor fever due to apparent cyclical pattern.     #Rectal cancer  known hx of Rectal CA with bony involvement. Has established care w Dr. Julian/Graciela  - Per heme.onc would benefit from radiation however patient uninsured.  - Paliative consulted: Has outpatient follow w/ Dr. Allison Taylor if insurance issue resolves.  - Colorectal surgery consulted and no acute surgical intervention indicated at this time.   PLAN:  - f/u heme/onc  - f/u palliative    #SIRS  Likely 2/2 tumor fever. Tested positive for non-Covid19 coronavirus. Less likely 2/2 coronavirus more likely 2/2 tumor fever.   - Bcx NGTD 03/30   - Tmax 102.9 on 03/02.   - Treated w/ Zosyn 4.5g q8 3/3 to cover for PsA given h/o immunocompromise.  PLAN:  - discharge on Cefpodoxime 400mg po q12 + Flagyl 500mg po q12 x 11days to complete a total of 14 days of Abx       #Acute blood loss anemia  2/2 BRBPR, component of chronic LGIB 2/2 rectal CA also probable  P/w HGB 7.4 >> 7.1 vamsi (baseline 8.0-9.0). S/p 1u pRBC total since admission.  Hgb upon discharge stable 9.3   PLAN:  - f/u outpatient     #HIV/AIDS  Known Hx of HIV and on biktarvy.   CD4 328; HIV <30   PLAN:  -c/w home biktarvy     New medications/therapies: Cefpodoxime, flagyl   Outpatient f/u: Heme/onc, and palliative     Discharge plan: discharge to home    Physical Exam Upon Discharge:  T(C): 36.9 (03-06-24 @ 06:02), Max: 37.7 (03-05-24 @ 23:50)  HR: 125 (03-06-24 @ 06:02) (110 - 127)  BP: 116/75 (03-06-24 @ 06:02) (112/64 - 122/71)  RR: 18 (03-06-24 @ 06:02) (16 - 18)  SpO2: 95% (03-06-24 @ 06:02) (95% - 98%)    General: NAD, laying in bed, speaking in full sentences  HEENT: head NC/AT, no conjunctival injection, EOMI, MMM  Neck: supple, no JVD  Cardio: RRR, +S1/S2, no M/R/G  Resp: lungs CTAB, no cough/wheezes/rales/rhonchi  Abdo: soft, NT, ND, +bowel sounds x4, no organomegaly or palpable mass    Extremities: WWP, no edema/cyanosis/clubbing   Vasc: 2+ radial and DP pulses b/l  Neuro: A&Ox3  Psych: speech non-pressured, thoughts goal-oriented  Skin: dry, intact, no visible jaundice   MSK: no joint swelling         #Discharge: do not delete  Mr. 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 meth use, found anemic to HGB 7.1, received total pRBC x1 since admission. Per colorectal surgery - no acute surgical intervention at this time. Heme/Onc following, pt to receive radiation simulation; however on hold pending insurance approval. Palliative following for pain control ISO malignancy.  C/C/B asx tachycardia to 110-140s. Per pt, baseline -130s. TTE unremarkable, TSH wnl.  C/C/B persistent fevers 03/02 - ID consulted for FUO. Pt then found non-COVID19 coronavirus positive; fevers now thought to be more likely 2/2 tumor fever due to apparent cyclical pattern.     #Rectal cancer  known hx of Rectal CA with bony involvement. Has established care w Dr. Julian/Graciela  - Per heme.onc would benefit from radiation however patient uninsured.  - Paliative consulted: Has outpatient follow w/ Dr. Allison Taylor if insurance issue resolves.  - Colorectal surgery consulted and no acute surgical intervention indicated at this time.   PLAN:  - f/u heme/onc  - f/u palliative    #SIRS  Likely 2/2 tumor fever. Tested positive for non-Covid19 coronavirus. Less likely 2/2 coronavirus more likely 2/2 tumor fever.   - Bcx NGTD 03/30   - Tmax 102.9 on 03/02.   - Treated w/ Zosyn 4.5g q8 3/3 to cover for PsA given h/o immunocompromise.  PLAN:  - discharge on cefdinir 300mg BID + Flagyl 500mg po q12 x 11days to complete a total of 14 days of Abx   - Cefpodoxime was preferred however informed by pharmacy that patient would have to pay out of pocket >~$300      #Acute blood loss anemia  2/2 BRBPR, component of chronic LGIB 2/2 rectal CA also probable  P/w HGB 7.4 >> 7.1 vamsi (baseline 8.0-9.0). S/p 1u pRBC total since admission.  Hgb upon discharge stable 9.3   PLAN:  - f/u outpatient     #HIV/AIDS  Known Hx of HIV and on biktarvy.   CD4 328; HIV <30   PLAN:  -c/w home biktarvy     New medications/therapies: cefdinir, flagyl   Outpatient f/u: Heme/onc, and palliative     Discharge plan: discharge to home    Physical Exam Upon Discharge:  T(C): 36.9 (03-06-24 @ 06:02), Max: 37.7 (03-05-24 @ 23:50)  HR: 125 (03-06-24 @ 06:02) (110 - 127)  BP: 116/75 (03-06-24 @ 06:02) (112/64 - 122/71)  RR: 18 (03-06-24 @ 06:02) (16 - 18)  SpO2: 95% (03-06-24 @ 06:02) (95% - 98%)    General: NAD, laying in bed, speaking in full sentences  HEENT: head NC/AT, no conjunctival injection, EOMI, MMM  Neck: supple, no JVD  Cardio: RRR, +S1/S2, no M/R/G  Resp: lungs CTAB, no cough/wheezes/rales/rhonchi  Abdo: soft, NT, ND, +bowel sounds x4, no organomegaly or palpable mass    Extremities: WWP, no edema/cyanosis/clubbing   Vasc: 2+ radial and DP pulses b/l  Neuro: A&Ox3  Psych: speech non-pressured, thoughts goal-oriented  Skin: dry, intact, no visible jaundice   MSK: no joint swelling

## 2024-03-06 NOTE — DIETITIAN INITIAL EVALUATION ADULT - OTHER INFO
42 y/o M pmh of known rectal carcinoma with bony mets (not on treatment), HIV (CD>200, VLUD, MSM on biktarvy), Reports meth use due to cancer pain. Has iron deficiency anemia noting hemoglobin of 7.1. s/p unit of pRBC. Admitted to tele for episode of BRBPR and tachycardia. Surgery, Palliative and Heme/Onc following.    42 y/o M pmh of known rectal carcinoma with bony mets (not on treatment), HIV (CD>200, VLUD, MSM on biktarvy), Reports meth use due to cancer pain. Has iron deficiency anemia noting hemoglobin of 7.1. s/p unit of pRBC. Admitted to [facility] for episode of BRBPR and tachycardia. Surgery, Palliative and Heme/Onc following. 3/2: palliative consulted. 3/3: repleted K, 3/4: started IV iron x3d.  Pt seen at bedside for _____assessment.   Currently on ____ diet.   No known food allergies.   No difficulty chewing/swallowing reported.   Reports ____appetite. Pt consumed ____% of breakfast which included ______.   PTA, patient’s usual meals consisted of__________.   Reports Usual Body Weight of ____ pounds.  ___ pressure injuries documented.   ___edema documented.   Nausea, vomiting, diarrhea, or constipation.   Labs:____   Medications: _____.   ***No overt signs and symptoms of muscle or fat wasting observed. Based on ASPEN guidelines, patient __does not**does__ currently meet criteria for malnutrition.   No cultural, ethnic, [employer] food preferences noted.   See nutrition recommendations below.            Nutrition education  44 y/o M pmh of known rectal carcinoma with bony mets (not on treatment), HIV (CD>200, VLUD, MSM on biktarvy), Reports meth use due to cancer pain. Has iron deficiency anemia noting hemoglobin of 7.1. s/p unit of pRBC. Admitted to tele for episode of BRBPR and tachycardia. Surgery, Palliative and Heme/Onc following. 3/2: palliative consulted. 3/3: repleted K, 3/4: started IV iron x3d.     Pt seen at bedside for nutrition assessment, wakes up at name addressal. Pt evident with fatigue and somnolence. Currently on regular diet, reports no known food allergies, or any difficulty chewing/swallowing. Pt reports fair appetite, consuming 70-80% of his last meal. PTA, pt’s meals likely consisted of cereal for breakfast, Chinese takeout for lunch, and leftover food from a friend’s workplace. Pt reports and having decreased appetite past 2 months, able to consume <50% of his meals. Reports having a Usual Body Weight of 165 pounds and losing significant weight recently (20 pounds x 2months,12%). Based on ASPEN guidelines, pt meets the criteria for malnutrition currently, with significant weight loss and decreased PO intake. Reports no nausea or vomiting today, though mentions having episodes of emesis 1-2x weekly. Also, states having constipation usually PTA(Last BM 3/5). Education provided to maintain adequate PO intake, emphasizing intake of proteins and proper hydration.   Pt educated to consume vegetables with each meal and add fruits to the diet to have adequate nutrition caretaking his elevated needs. Pt declines needing help managing his meals. No edema or pressure injuries documented. Labs to note: Glucose 140-211 x 24 hours. Medications: Morphine, MiraLAX, thiamine, ondansetron, oxycodone, iron sucrose. No cultural, ethnic, [employer] food preferences noted. See nutrition recommendations below.                     44 y/o M pmh of known rectal carcinoma with bony mets (not on treatment), HIV (CD>200, VLUD, MSM on biktarvy), Reports meth use due to cancer pain. Has iron deficiency anemia noting hemoglobin of 7.1. s/p unit of pRBC. Admitted to [facility] for episode of BRBPR and tachycardia. Surgery, Palliative and Heme/Onc following. 3/2: palliative consulted. 3/3: repleted K, 3/4: started IV iron x3d.     Pt seen at bedside for nutrition assessment. Pt evident with fatigue. Currently on regular diet, reports no known food allergies and difficulty chewing/swallowing. Pt reports fair appetite in-house, consuming 70-80% of his last meal. PTA, pt reports meals likely consisted of cereal for breakfast, Chinese takeout for lunch, and leftover food from a friend’s workplace for dinner. Pt reports having decreased appetite x2 months, able to consume <50% of his meals. Reports having a Usual Body Weight of 165 pounds and losing significant weight recently (20 pounds x 2months, 12%). Based on ASPEN guidelines, pt meets the criteria for severe malnutrition - see nutrition risk notification. Reports no nausea or vomiting today, though mentions having episodes of emesis 1-2x weekly. Endorses constipation usually PTA (reports last BM 3/5). Education provided to maintain adequate PO intake, emphasizing intake of proteins and proper hydration. Pt educated on nutrition therapy for bowel regularity. Pt declines needing help managing his meals. No edema or pressure injuries documented. RD offered oral nutrition supplement in setting of inconsistent intake, pt receptive to Ensure. Labs to note: Glucose 140-211 x 24 hours. Medications: Morphine, MiraLAX, thiamine, ondansetron, oxycodone, iron sucrose. No cultural, ethnic, [place] food preferences noted. See nutrition recommendations below.

## 2024-03-06 NOTE — PROGRESS NOTE ADULT - PROBLEM SELECTOR PLAN 3
Pt w known hx of Rectal CA with bony involvement. Has established care w Dr. Julian/Graciela. Rectal mass and abdomen particularly painful at this time. Limited YOSEPH preformed revealing large mass.     - Per heme/onc, would benefit from radiation; however, pt would require insurance prior to therapy.  - C/w zofran for nausea   - Pain regimen as follows:       - C/w Tylenol 650mg PO q6 for mild pain       - C/w Oxy 2.5 q6 PRN for moderate pain       - C/w Oxy 5mg q6 PRN for severe pain        - C/w MS Contin 15mg po q12h per palliative  - C/w bowel regimen while on opiods  - Colorectal surgery consulted - no acute surgical intervention indicated at this time  - Palliative on board, appreciate recs  - Heme/onc on board, appreciate recs

## 2024-03-06 NOTE — DIETITIAN INITIAL EVALUATION ADULT - PROBLEM SELECTOR PLAN 6
RECORDS RECEIVED FROM: Status post left knee replacement /Deformity of left knee joint /Chronic pain of left knee /Sofia Ballesteros PA-C in  PRIMARY CARE/ no new images/ [facility]/ ortho con   DATE RECEIVED: Jan 14, 2021   NOTES STATUS DETAILS   OFFICE NOTE from referring provider Care Everywhere    OFFICE NOTE from other specialist N/A    DISCHARGE SUMMARY from hospital Care Everywhere    DISCHARGE REPORT from the ER N/A    OPERATIVE REPORT Care Everywhere 7/20/17  8/25/11  6/30/11  6/9/11  4/4/11  3/3/11   MEDICATION LIST Care Everywhere    IMPLANT RECORD/STICKER Care Everywhere    LABS     CBC/DIFF N/A    CULTURES N/A    INJECTIONS DONE IN RADIOLOGY N/A    MRI N/A    CT SCAN N/A    XRAYS (IMAGES & REPORTS) In process    TUMOR     PATHOLOGY  Slides & report N/A    01/13/21   2:51 PM   Images resolved in PACS  Complete  Danielle Tim CMA    01/13/21   2:14 PM   Faxed STAT request to [facility] for images  Danielle Tim CMA    12/16/20   9:39 AM   Request sent to  for images  Danielle Tim CMA    Action Bailey 12/28/20 12:41PM   Action Taken CSS faxed to [facility] for images to be pushed to  PACS           known hx of HIV on biktarvy. Complaint with medication. previously undetectable   - c/w biktarvy home dose in AM  - f/u AM viral load/CD4 studies

## 2024-03-06 NOTE — DIETITIAN INITIAL EVALUATION ADULT - PROBLEM SELECTOR PLAN 2
Pt presenting w hgb of 7.4 in setting of BRBPR and received 2L of NS in ED. Has received iron transfusions in the past and hovers between 8-9 on prior admissions   - f/u repeat CBC  - maintain active T/S   - maintain 2 large bore IV   - transfuse if hgb <7  - f/u AM iron studies, B12/Folate

## 2024-03-06 NOTE — PROGRESS NOTE ADULT - PROBLEM SELECTOR PROBLEM 1
Systemic inflammatory response syndrome (SIRS)

## 2024-03-06 NOTE — PROGRESS NOTE ADULT - NS ATTEST RISK PROBLEM GEN_ALL_CORE FT
presence of metastatic rectal cancer with bleeding and tachycardia requiring close monitoring in telemetry, personal review of CXR, review of outpatient notes, review of labs, trending of CBC

## 2024-03-06 NOTE — PROGRESS NOTE ADULT - SUBJECTIVE AND OBJECTIVE BOX
*****INCOMPLETE NOTE*****    INTERVAL HPI/OVERNIGHT EVENTS:    SUBJECTIVE: Pt seen and examined at bedside. NATALIE.   Denies f/c/n/v/d, abd pain, SOB, CP,  sxs,     ANTIBIOTICS/RELEVANT:    MEDICATIONS  (STANDING):  bictegravir 50 mG/emtricitabine 200 mG/tenofovir alafenamide 25 mG (BIKTARVY) 1 Tablet(s) Oral every 24 hours  enoxaparin Injectable 40 milliGRAM(s) SubCutaneous every 24 hours  iron sucrose IVPB 300 milliGRAM(s) IV Intermittent every 24 hours  melatonin 3 milliGRAM(s) Oral at bedtime  morphine ER Tablet 15 milliGRAM(s) Oral every 12 hours  piperacillin/tazobactam IVPB.. 4.5 Gram(s) IV Intermittent every 8 hours  polyethylene glycol 3350 17 Gram(s) Oral every 24 hours  thiamine 100 milliGRAM(s) Oral every 24 hours    MEDICATIONS  (PRN):  acetaminophen     Tablet .. 650 milliGRAM(s) Oral every 6 hours PRN Temp greater or equal to 38C (100.4F), Mild Pain (1 - 3)  ondansetron Injectable 4 milliGRAM(s) IV Push every 8 hours PRN Nausea and/or Vomiting  oxyCODONE    IR 2.5 milliGRAM(s) Oral every 6 hours PRN Moderate Pain (4 - 6)  oxyCODONE    IR 5 milliGRAM(s) Oral every 6 hours PRN Severe Pain (7 - 10)      Vital Signs Last 24 Hrs  T(C): 36.9 (06 Mar 2024 06:02), Max: 37.7 (05 Mar 2024 23:50)  T(F): 98.4 (06 Mar 2024 06:02), Max: 99.8 (05 Mar 2024 23:50)  HR: 125 (06 Mar 2024 06:02) (110 - 127)  BP: 116/75 (06 Mar 2024 06:02) (107/74 - 122/71)  BP(mean): 89 (06 Mar 2024 06:02) (80 - 90)  RR: 18 (06 Mar 2024 06:02) (16 - 18)  SpO2: 95% (06 Mar 2024 06:02) (95% - 98%)    Parameters below as of 06 Mar 2024 06:02  Patient On (Oxygen Delivery Method): room air        PHYSICAL EXAM:  General: in no acute distress, non toxic appearing, speaking in full sentences  Eyes: EOMI intact bilaterally. Anicteric sclerae, moist conjunctivae  HENT: Moist mucous membranes  Neck: Trachea midline, supple  Lungs: CTA B/L. No wheezes, rales, or rhonchi  Cardiovascular: RRR. No murmurs, rubs, or gallops  Abdomen: +BS Soft, non-tender non-distended; No rebound or guarding  Vasc: Rad and DP intact and equal b/l   Extremities: WWP, No clubbing, cyanosis or edema  Neurological: Alert and oriented  Skin: Warm and dry. No obvious rash     LABS:                        9.2    11.58 )-----------( 470      ( 05 Mar 2024 05:30 )             31.1     03-05    135  |  96  |  12  ----------------------------<  211<H>  4.1   |  26  |  0.73    Ca    9.4      05 Mar 2024 05:30  Phos  4.2     03-05  Mg     2.0     03-05        Urinalysis Basic - ( 05 Mar 2024 05:30 )    Color: x / Appearance: x / SG: x / pH: x  Gluc: 211 mg/dL / Ketone: x  / Bili: x / Urobili: x   Blood: x / Protein: x / Nitrite: x   Leuk Esterase: x / RBC: x / WBC x   Sq Epi: x / Non Sq Epi: x / Bacteria: x        MICROBIOLOGY:    RADIOLOGY & ADDITIONAL STUDIES: INTERVAL HPI/OVERNIGHT EVENTS:    SUBJECTIVE: Pt seen and examined at bedside. NATALIE.   Denies f/c/n/v/d, abd pain, SOB, CP,  sxs,     ANTIBIOTICS/RELEVANT:    MEDICATIONS  (STANDING):  bictegravir 50 mG/emtricitabine 200 mG/tenofovir alafenamide 25 mG (BIKTARVY) 1 Tablet(s) Oral every 24 hours  enoxaparin Injectable 40 milliGRAM(s) SubCutaneous every 24 hours  iron sucrose IVPB 300 milliGRAM(s) IV Intermittent every 24 hours  melatonin 3 milliGRAM(s) Oral at bedtime  morphine ER Tablet 15 milliGRAM(s) Oral every 12 hours  piperacillin/tazobactam IVPB.. 4.5 Gram(s) IV Intermittent every 8 hours  polyethylene glycol 3350 17 Gram(s) Oral every 24 hours  thiamine 100 milliGRAM(s) Oral every 24 hours    MEDICATIONS  (PRN):  acetaminophen     Tablet .. 650 milliGRAM(s) Oral every 6 hours PRN Temp greater or equal to 38C (100.4F), Mild Pain (1 - 3)  ondansetron Injectable 4 milliGRAM(s) IV Push every 8 hours PRN Nausea and/or Vomiting  oxyCODONE    IR 2.5 milliGRAM(s) Oral every 6 hours PRN Moderate Pain (4 - 6)  oxyCODONE    IR 5 milliGRAM(s) Oral every 6 hours PRN Severe Pain (7 - 10)      Vital Signs Last 24 Hrs  T(C): 36.9 (06 Mar 2024 06:02), Max: 37.7 (05 Mar 2024 23:50)  T(F): 98.4 (06 Mar 2024 06:02), Max: 99.8 (05 Mar 2024 23:50)  HR: 125 (06 Mar 2024 06:02) (110 - 127)  BP: 116/75 (06 Mar 2024 06:02) (107/74 - 122/71)  BP(mean): 89 (06 Mar 2024 06:02) (80 - 90)  RR: 18 (06 Mar 2024 06:02) (16 - 18)  SpO2: 95% (06 Mar 2024 06:02) (95% - 98%)    Parameters below as of 06 Mar 2024 06:02  Patient On (Oxygen Delivery Method): room air        PHYSICAL EXAM:  General: Thin male, in no acute distress, non toxic appearing, speaking in full sentences  Eyes: EOMI intact bilaterally. Anicteric sclerae, moist conjunctivae  HENT: Moist mucous membranes  Neck: Trachea midline, supple  Lungs: CTA B/L. No wheezes, rales, or rhonchi  Cardiovascular: RRR. No murmurs, rubs, or gallops  Abdomen: +BS Soft, non-tender non-distended; No rebound or guarding  Vasc: Rad and DP intact and equal b/l   Extremities: WWP, No clubbing, cyanosis or edema  Neurological: Alert and oriented x3   Skin: Warm and dry. No obvious rash     LABS:                        9.2    11.58 )-----------( 470      ( 05 Mar 2024 05:30 )             31.1     03-05    135  |  96  |  12  ----------------------------<  211<H>  4.1   |  26  |  0.73    Ca    9.4      05 Mar 2024 05:30  Phos  4.2     03-05  Mg     2.0     03-05        Urinalysis Basic - ( 05 Mar 2024 05:30 )    Color: x / Appearance: x / SG: x / pH: x  Gluc: 211 mg/dL / Ketone: x  / Bili: x / Urobili: x   Blood: x / Protein: x / Nitrite: x   Leuk Esterase: x / RBC: x / WBC x   Sq Epi: x / Non Sq Epi: x / Bacteria: x        MICROBIOLOGY:    RADIOLOGY & ADDITIONAL STUDIES:

## 2024-03-06 NOTE — DISCHARGE NOTE PROVIDER - ATTENDING DISCHARGE PHYSICAL EXAMINATION:
43M with PMH Of metastatic rectal cancer and HIV, presenting with rectal bleeding, anemia and tachycardia, requiring stay on telemetry unit.  Now on RMF.  PE ruled out on CTA.  Further workup pending.     Plan  - started on IV iron  - follow up with ID and psychiatry  - palliative care following, appreciate input as well  - monitor CBC - Hb has been stable for the past few lab draws  - no fevers on review of flowsheet for past 24 hours.  - DC today    Physical exam  general: nad, in bed  heent: mmm  cards: rrr, normal s1s2  pulm: no labored breathing, no use of accessory muscles  ext: wwp  neuro: speech fluent, no acute deficits noted    Patient admitted with rectal bleeding due to rectal cancer.

## 2024-03-06 NOTE — PROGRESS NOTE ADULT - PROVIDER SPECIALTY LIST ADULT
Infectious Disease
Internal Medicine
Internal Medicine
Heme/Onc
Internal Medicine
Internal Medicine
Colorectal Surgery
Colorectal Surgery
Infectious Disease
Internal Medicine
Internal Medicine

## 2024-03-06 NOTE — DIETITIAN INITIAL EVALUATION ADULT - PROBLEM SELECTOR PLAN 7
pt exhibiting labile mood and is teary, feels depressed and requesting to speak w psychiatric services to begin tx for his depression and anxiety relating to his cancer  - AM psych consult

## 2024-03-06 NOTE — DIETITIAN INITIAL EVALUATION ADULT - PROBLEM SELECTOR PLAN 4
Pt claims he has a hx of tachycardia and that his resting HR hovers around 130bpm, however prior admissions demonstrates HR to be 100-110. Given the patients hx of prior heavy drug use cannot rule out possibility of a cardiomyopathy. It is also possible this is in response to blood loss or underlying infection   EKG normal sinus rhythm without ischemic changes  - patient asymptomatic at this time   - obtain TTE

## 2024-03-06 NOTE — DISCHARGE NOTE PROVIDER - NSDCFUADDAPPT_GEN_ALL_CORE_FT
(1) Please follow up with Dr. Julian on 3/13/24    (2) Please follow up with Dr. Taylor on 3/13/24  (1) Please follow up with Dr. Julian on 3/13/24    (2) Please follow up with Dr. Taylor (Palliative care) on 3/13/24     (3) If you are unable to follow up with the scheduled appointments due to insurance, please follow up at [facility] with hemotology/oncolgy. Their phone number is [phone].      If you have any worsening pain at any time please go to [facility].   -----------------------------------------------------  [facility]  Address: [address] [place] [address]  Phone: [phone]

## 2024-03-06 NOTE — PROGRESS NOTE ADULT - PROBLEM SELECTOR PLAN 1
Likely 2/2 malignancy given cyclical nature vs. infection of unknown origin, less likely non-COVID19 coronavirus infection given asx for resp. sx | Pt meeting 2/4 SIRS criteria on admission w Tachycardia and WBC of 13.07 w neutrophil predominance. Also concern for superimposed infx on rectal mass vs uti as patient has had episodes of penile shaft pain/dysuria.        - Blood cx 03/03: NGTD       - Last fever: 03/03, TMax 102.9 on 03/02    PLAN:  - Switch to cefdinir 300mg BID and flagyl 500mg po BID [id number] to complete 14day course of Abx.

## 2024-03-06 NOTE — DISCHARGE NOTE NURSING/CASE MANAGEMENT/SOCIAL WORK - NSDCPEEMAIL_GEN_ALL_CORE
[facility] email [email or web address]

## 2024-03-06 NOTE — DISCHARGE NOTE PROVIDER - DETAILS OF MALNUTRITION DIAGNOSIS/DIAGNOSES
This patient has been assessed with a concern for Malnutrition and was treated during this hospitalization for the following Nutrition diagnosis/diagnoses:     -  03/06/2024: Severe protein-calorie malnutrition   -  03/06/2024: Underweight (BMI < 19)

## 2024-03-06 NOTE — PROGRESS NOTE ADULT - REASON FOR ADMISSION
rectal mass/bleeding

## 2024-03-06 NOTE — PROGRESS NOTE ADULT - SUBJECTIVE AND OBJECTIVE BOX
INTERVAL HPI/OVERNIGHT EVENTS:    Patient was seen and examined at bedside.  Afebrile     CONSTITUTIONAL:  Negative fever or chills, feels well, good appetite  EYES:  Negative  blurry vision or double vision  CARDIOVASCULAR:  Negative for chest pain or palpitations  RESPIRATORY:  Negative for cough, wheezing, or SOB   GASTROINTESTINAL:  Negative for nausea, vomiting, diarrhea, constipation, or abdominal pain  GENITOURINARY:  Negative frequency, urgency or dysuria  NEUROLOGIC:  No headache, confusion, dizziness, lightheadedness      ANTIBIOTICS/RELEVANT:    MEDICATIONS  (STANDING):  bictegravir 50 mG/emtricitabine 200 mG/tenofovir alafenamide 25 mG (BIKTARVY) 1 Tablet(s) Oral every 24 hours  enoxaparin Injectable 40 milliGRAM(s) SubCutaneous every 24 hours  iron sucrose IVPB 300 milliGRAM(s) IV Intermittent every 24 hours  melatonin 3 milliGRAM(s) Oral at bedtime  morphine ER Tablet 15 milliGRAM(s) Oral every 12 hours  piperacillin/tazobactam IVPB.. 4.5 Gram(s) IV Intermittent every 8 hours  polyethylene glycol 3350 17 Gram(s) Oral every 24 hours  thiamine 100 milliGRAM(s) Oral every 24 hours    MEDICATIONS  (PRN):  acetaminophen     Tablet .. 650 milliGRAM(s) Oral every 6 hours PRN Temp greater or equal to 38C (100.4F), Mild Pain (1 - 3)  ondansetron Injectable 4 milliGRAM(s) IV Push every 8 hours PRN Nausea and/or Vomiting  oxyCODONE    IR 5 milliGRAM(s) Oral every 6 hours PRN Severe Pain (7 - 10)  oxyCODONE    IR 2.5 milliGRAM(s) Oral every 6 hours PRN Moderate Pain (4 - 6)        Vital Signs Last 24 Hrs  T(C): 36.9 (06 Mar 2024 06:02), Max: 37.7 (05 Mar 2024 23:50)  T(F): 98.4 (06 Mar 2024 06:02), Max: 99.8 (05 Mar 2024 23:50)  HR: 125 (06 Mar 2024 06:02) (110 - 127)  BP: 116/75 (06 Mar 2024 06:02) (112/64 - 122/71)  BP(mean): 89 (06 Mar 2024 06:02) (81 - 90)  RR: 18 (06 Mar 2024 06:02) (16 - 18)  SpO2: 95% (06 Mar 2024 06:02) (95% - 98%)    Parameters below as of 06 Mar 2024 06:02  Patient On (Oxygen Delivery Method): room air        PHYSICAL EXAM:  Constitutional: non-toxic, no distress  Eyes:ISHAAN, EOMI  Ear/Nose/Throat: no oral lesion, no sinus tenderness on percussion	  Neck:  supple  Respiratory: CTA tonia  Cardiovascular: S1S2 RRR, no murmurs  Gastrointestinal:soft, (+) BS, no HSM  Extremities:no e/e/c  Vascular: DP Pulse:	right normal; left normal      LABS:                        9.3    12.61 )-----------( 465      ( 06 Mar 2024 05:30 )             31.1     03-06    131<L>  |  96  |  12  ----------------------------<  95  4.1   |  24  |  0.78    Ca    9.4      06 Mar 2024 05:30  Phos  4.4     03-06  Mg     2.1     03-06        Urinalysis Basic - ( 06 Mar 2024 05:30 )    Color: x / Appearance: x / SG: x / pH: x  Gluc: 95 mg/dL / Ketone: x  / Bili: x / Urobili: x   Blood: x / Protein: x / Nitrite: x   Leuk Esterase: x / RBC: x / WBC x   Sq Epi: x / Non Sq Epi: x / Bacteria: x        MICROBIOLOGY:    Culture - Blood (03.03.24 @ 11:40)    Specimen Source: .Blood Blood   Culture Results:   No growth at 2 days.    Culture - Blood (03.01.24 @ 09:37)    Specimen Source: .Blood Blood-Peripheral   Culture Results:   No growth at 4 days        RADIOLOGY & ADDITIONAL STUDIES:

## 2024-03-08 LAB
CULTURE RESULTS: SIGNIFICANT CHANGE UP
SPECIMEN SOURCE: SIGNIFICANT CHANGE UP

## 2024-03-12 DIAGNOSIS — F32.A DEPRESSION, UNSPECIFIED: ICD-10-CM

## 2024-03-12 DIAGNOSIS — B34.2 CORONAVIRUS INFECTION, UNSPECIFIED: ICD-10-CM

## 2024-03-12 DIAGNOSIS — F41.9 ANXIETY DISORDER, UNSPECIFIED: ICD-10-CM

## 2024-03-12 DIAGNOSIS — I42.7 CARDIOMYOPATHY DUE TO DRUG AND EXTERNAL AGENT: ICD-10-CM

## 2024-03-12 DIAGNOSIS — C20 MALIGNANT NEOPLASM OF RECTUM: ICD-10-CM

## 2024-03-12 DIAGNOSIS — F15.10 OTHER STIMULANT ABUSE, UNCOMPLICATED: ICD-10-CM

## 2024-03-12 DIAGNOSIS — D63.0 ANEMIA IN NEOPLASTIC DISEASE: ICD-10-CM

## 2024-03-12 DIAGNOSIS — Z59.7 INSUFFICIENT SOCIAL INSURANCE AND WELFARE SUPPORT: ICD-10-CM

## 2024-03-12 DIAGNOSIS — Z91.198 PATIENT'S NONCOMPLIANCE WITH OTHER MEDICAL TREATMENT AND REGIMEN FOR OTHER REASON: ICD-10-CM

## 2024-03-12 DIAGNOSIS — F12.10 CANNABIS ABUSE, UNCOMPLICATED: ICD-10-CM

## 2024-03-12 DIAGNOSIS — Z41.8 ENCOUNTER FOR OTHER PROCEDURES FOR PURPOSES OTHER THAN REMEDYING HEALTH STATE: ICD-10-CM

## 2024-03-12 DIAGNOSIS — C79.51 SECONDARY MALIGNANT NEOPLASM OF BONE: ICD-10-CM

## 2024-03-12 DIAGNOSIS — R33.9 RETENTION OF URINE, UNSPECIFIED: ICD-10-CM

## 2024-03-12 DIAGNOSIS — Z79.899 OTHER LONG TERM (CURRENT) DRUG THERAPY: ICD-10-CM

## 2024-03-12 DIAGNOSIS — E43 UNSPECIFIED SEVERE PROTEIN-CALORIE MALNUTRITION: ICD-10-CM

## 2024-03-12 DIAGNOSIS — R65.10 SYSTEMIC INFLAMMATORY RESPONSE SYNDROME (SIRS) OF NON-INFECTIOUS ORIGIN WITHOUT ACUTE ORGAN DYSFUNCTION: ICD-10-CM

## 2024-03-12 DIAGNOSIS — D62 ACUTE POSTHEMORRHAGIC ANEMIA: ICD-10-CM

## 2024-03-12 DIAGNOSIS — B20 HUMAN IMMUNODEFICIENCY VIRUS [HIV] DISEASE: ICD-10-CM

## 2024-03-12 DIAGNOSIS — F17.210 NICOTINE DEPENDENCE, CIGARETTES, UNCOMPLICATED: ICD-10-CM

## 2024-03-12 DIAGNOSIS — R64 CACHEXIA: ICD-10-CM

## 2024-03-12 DIAGNOSIS — G89.3 NEOPLASM RELATED PAIN (ACUTE) (CHRONIC): ICD-10-CM

## 2024-03-12 DIAGNOSIS — K62.5 HEMORRHAGE OF ANUS AND RECTUM: ICD-10-CM

## 2024-03-12 SDOH — ECONOMIC STABILITY - INCOME SECURITY: INSUFFICIENT SOCIAL INSURANCE AND WELFARE SUPPORT: Z59.7

## 2024-03-13 ENCOUNTER — APPOINTMENT (OUTPATIENT)
Dept: PALLIATIVE MEDICINE | Facility: CLINIC | Age: 44
End: 2024-03-13
Payer: MEDICAID

## 2024-03-13 ENCOUNTER — NON-APPOINTMENT (OUTPATIENT)
Age: 44
End: 2024-03-13

## 2024-03-13 ENCOUNTER — APPOINTMENT (OUTPATIENT)
Dept: HEMATOLOGY ONCOLOGY | Facility: CLINIC | Age: 44
End: 2024-03-13
Payer: MEDICAID

## 2024-03-13 VITALS
TEMPERATURE: 96.6 F | DIASTOLIC BLOOD PRESSURE: 74 MMHG | BODY MASS INDEX: 18.95 KG/M2 | HEART RATE: 143 BPM | SYSTOLIC BLOOD PRESSURE: 128 MMHG | WEIGHT: 143 LBS | HEIGHT: 73 IN | OXYGEN SATURATION: 97 % | RESPIRATION RATE: 18 BRPM

## 2024-03-13 VITALS
SYSTOLIC BLOOD PRESSURE: 128 MMHG | TEMPERATURE: 96.6 F | HEIGHT: 73 IN | HEART RATE: 136 BPM | BODY MASS INDEX: 18.95 KG/M2 | OXYGEN SATURATION: 97 % | DIASTOLIC BLOOD PRESSURE: 74 MMHG | RESPIRATION RATE: 18 BRPM | WEIGHT: 143 LBS

## 2024-03-13 DIAGNOSIS — K59.00 CONSTIPATION, UNSPECIFIED: ICD-10-CM

## 2024-03-13 PROCEDURE — G2211 COMPLEX E/M VISIT ADD ON: CPT

## 2024-03-13 PROCEDURE — 99214 OFFICE O/P EST MOD 30 MIN: CPT

## 2024-03-13 PROCEDURE — 99205 OFFICE O/P NEW HI 60 MIN: CPT

## 2024-03-13 PROCEDURE — G2211 COMPLEX E/M VISIT ADD ON: CPT | Mod: NC,1L

## 2024-03-13 RX ORDER — POLYETHYLENE GLYCOL 3350 17 G/17G
17 POWDER, FOR SOLUTION ORAL DAILY
Qty: 1 | Refills: 5 | Status: ACTIVE | COMMUNITY
Start: 2024-03-13 | End: 1900-01-01

## 2024-03-13 RX ORDER — NALOXONE HYDROCHLORIDE 4 MG/.1ML
4 SPRAY NASAL
Qty: 1 | Refills: 0 | Status: ACTIVE | COMMUNITY
Start: 2024-03-13 | End: 1900-01-01

## 2024-03-13 RX ORDER — SENNOSIDES 8.6 MG TABLETS 8.6 MG/1
8.6 TABLET ORAL
Qty: 60 | Refills: 5 | Status: ACTIVE | COMMUNITY
Start: 2024-03-13 | End: 1900-01-01

## 2024-03-13 NOTE — PHYSICAL EXAM
[General Appearance - Alert] : alert [Sclera] : the sclera and conjunctiva were normal [PERRL With Normal Accommodation] : pupils were equal in size, round, and reactive to light [No Oral Pallor] : no oral pallor [Extraocular Movements] : extraocular movements were intact [Outer Ear] : the ears and nose were normal in appearance [Hearing Threshold Finger Rub Not Carbon] : hearing was normal [Neck Appearance] : the appearance of the neck was normal [Neck Cervical Mass (___cm)] : no neck mass was observed [Respiration, Rhythm And Depth] : normal respiratory rhythm and effort [Auscultation Breath Sounds / Voice Sounds] : lungs were clear to auscultation bilaterally [] : no respiratory distress [Heart Sounds] : normal S1 and S2 [Veins - Varicosity Changes] : there were no varicosital changes [FreeTextEntry1] : Tachycardic, regular rhythm [Abdomen Soft] : soft [Abdomen Mass (___ Cm)] : no abdominal mass palpated [Abdomen Tenderness] : non-tender [Musculoskeletal - Swelling] : no joint swelling seen [Involuntary Movements] : no involuntary movements were seen [Oriented To Time, Place, And Person] : oriented to person, place, and time [Impaired Insight] : insight and judgment were intact [Affect] : the affect was normal

## 2024-03-13 NOTE — ASSESSMENT
[FreeTextEntry1] : - 43 M with HIV (on biktarvy), invasive anorectal squamous cell carcinoma, polysubstance abuse, syphilis, anemia and abdominal MRSA 2021 here for palliative care support.  #Cancer-related pain - Methadone 5mg BID - Oxycodone 5-10mg PO q4h PRN - Controlled Medication Use agreement signed today. Patient agrees to stop all illicit drug use and only use opioids as prescribed. - Narcan intranasal spray sent to pharmacy. Pt and family counseled on proper use. - Senna 2 tabs nightly and miralax 17g in 8oz water daily - Radiation sim scheduled for 3/18, likely plan for 30 fractions  F/u in 1 week

## 2024-03-13 NOTE — HISTORY OF PRESENT ILLNESS
[FreeTextEntry1] : Accompanied by friend.   43 M with HIV (on biktarvy), invasive anorectal squamous cell carcinoma, polysubstance abuse, syphilis, anemia and abdominal MRSA 2021 here for palliative care support.  Onc Hx: - anal mass biopsy 12/13/23 - invasive squamous cell carcinoma, moderately differentiated - CT Chest 1/9/24 - mild paraseptal emphysema. no evidence of metastatic disease - MRI pelvis 1/3/24 - limited exam with only 3 sequences obtained. Showing invasion into sacrum, right hemipelvis, and invasion posteriorly into gluteal musculature - CTA 2/2/24 - without signs of local regional metastases, large lobulated rectal tumor dissecting into adjacent soft tissues and bones overall similar in appearance to the previo - colonoscopy/anoscopy may be difficult due to bulk of disease - CEA elevated at 192.0 prior to treatment - PET/CT (2/9/24): Intense uptake is seen in the patient's large pelvic mass known to be an invasive squamous cell carcinoma of the anus. There is extension into the left gluteal region, but no evidence of FDG avid local or distant metastatic disease. 3/1-3/7/24 - Admitted to [facility] with pain.  Providers: Oncologist - Nabila Julian Rad Onc - Itz Owens  Admitted to [facility] 3/1/24-3/7/24. UTox positive for amphetamines and GHB on admission (negative for opioids and methadone). Pain controlled with MSER 15 TID and oxycodone 5 during admission but never picked up any prescriptions on discharge. Endorses that he was using crystal meth and GHB prior to last hospitalization but has not used any since then and pain has been very poorly managed.  SYMPTOMS: #Pain Was using crystal meth and GHB to help with the pain because tylenol was not helping enough. Pain started last year but worsened after 12/13 biopsy and has continued to worsen since. Location - Buttocks b/l Quality - Sharp Radiation - Down R leg Timing - Constant Aggravating factors - Nothing Minimal acceptable level (0-10 scale) - 3/10 Severity in last 24h (0-10 scale) - 10/10 Current score (0-10 scale) - 9/10 ISTOP Ref #: [id number]   #Poor appetite Eating very little, potentially due to pain.  #N/V Constant nausea but very little vomiting. Does not take anything for it.   ROS: If [ ] blank, symptom not present Fatigue [ ] Nausea [X] Loss of appetite [X] Unintentional weight loss [ ] Constipation [ ] Diarrhea [ ] Anxiety [ ] Low mood [ ] Other symptoms: [x ] All other review of symptoms negative   SH: Friend lives with him to help him.

## 2024-03-13 NOTE — END OF VISIT
[] : Fellow [FreeTextEntry3] :  I evaluated the patient with the Oncology fellow, Dr Holland.  The patient finally presents to establish outpatient heme/onc care, after missing the three previously scheduled appointments.  He initially presented to [facility] and was diagnosed with anal cancer in 12/2023.  He has had extensive imaging - CT scans, pelvic MRI, Pet-CT.  He has a bulky, locally invasive anal cancer that has invaded several local structures including the prostate, gluteal musculature, and coccyx/sacrum.  No distant metastatic disease has been detected.  From a psychosocial standpoint, the patient has additional complicating factors including housing insecurity, poor support/no caregiver, substance abuse, and mental illness.    At this visit, the patient is attentive and engaged in discussion, taking notes on the topics discussed.  He is asking pertinent questions regarding the care plan.  He acknowledges that he has been noncompliant with follow up but emphatically states that he intends to pursue care now.  Plan: 1.  bulky, locally advanced anal cancer  --case has been discussed in multidisciplinary format - both in conference and separately w/ the involved physicians colorectal surgeon Dr Cortez and radiation oncologist Dr Owens --potential role for diverting ostomy has been discussed, not recommended at this time by CRS --favor upfront treatment with concurrent chemoradiation which probably offers the patient the best opportunity for local control and palliation of symptoms.  f/u with Rad Onc for simultation.  Would utilize mitomycin-C + capecitabine.  Avoid central line given risk of myelosuppression, risk of systemic infection from contaminated malignant wound.  Would need CBC + diff every 1-2 weeks to monitor for significant myelosuppression.  Discussed risks, side effects of chemo at length during this visit.   --RTC once CT sim completed for final chemo planning  2.  HIV/AIDS --100% adherence to ART advised.  Educated pt about the importance of ART in attaining the best oncologic outcomes. --? who is managing this?  he says he has refills for Biktarvy.  Ensure continuity of care for HIV/AIDS, refer to [facility] team if uncertain when he follows up.  3.  pain --involve palliative care physician for pain mgmt, emotional support, care planning --pt does not have distant metastatic disease, but he has a very locally advanced cancer, likelihood of cure is probably very slim.  4.  psychosocial support --consult SW --consult oncology navigators --pt may have problem w/ insurance, request assistance w/ navigation team w/ sorting this out.  Emphasized ot the patient that he needs treatment ASA, if it cannot be offered through [facility] due to insurance then he should establish with another health system or [facility] ASAP. --may need to involve psycho-oncologist as well, if pt proceeds w/ treatment through [facility].

## 2024-03-18 NOTE — PHYSICAL EXAM
[Restricted in physically strenuous activity but ambulatory and able to carry out work of a light or sedentary nature] : Status 1- Restricted in physically strenuous activity but ambulatory and able to carry out work of a light or sedentary nature, e.g., light house work, office work [Thin] : thin [Normal] : affect appropriate [de-identified] : CTAB [de-identified] : sponatenously moving all extremities [de-identified] : uncomfortable appearing due to pain  [de-identified] : tachycardic, regular

## 2024-03-18 NOTE — ASSESSMENT
[FreeTextEntry1] : Delonte Lua is a 44 year old man with AIDS and a bulky, locally advanced anal cancer.   He initially presented to [facility] and was diagnosed with anal cancer in 12/2023. He has had extensive imaging - CT scans, pelvic MRI, Pet-CT. He has a bulky, locally invasive anal cancer that has invaded several local structures including the prostate, gluteal musculature, and coccyx/sacrum. No distant metastatic disease has been detected. From a psychosocial standpoint, the patient has additional complicating factors including housing insecurity, poor support/no caregiver, substance abuse, and mental illness.  He missed several appointments after the initial hospitalization.  Plan: 1. bulky, locally advanced anal cancer --case has been discussed in multidisciplinary format - both in conference and separately w/ the involved physicians colorectal surgeon Dr Cortez and radiation oncologist Dr Owens --potential role for diverting ostomy has been discussed, not recommended at this time by CRS --favor upfront treatment with concurrent chemoradiation which probably offers the patient the best opportunity for local control and palliation of symptoms. f/u with Rad Onc for simultation.  --Would utilize mitomycin-C + capecitabine. Avoid central line given risk of myelosuppression, risk of systemic infection from contaminated malignant wound.  --Risks and side effects of mitomycin-C and capecitabine discussed.  Informed consent for treatment obtained. --his dose of capecitabine would be 825 mg/m2 = 1500 mg po qAM and 1000 mg po qPM on radiation days.  Will wait to send Rx until treatment confirmed. --he is immunosuppressed and may have a large radiation field - both risk factors for infectious complications and myelosuppression.  Will need careful monitoring while on treatment, including CBC + diff every 1-2 weeks to monitor for significant myelosuppression.  --will also need Rx for zofran, loperamide when capecitabine is sent. --reviewed labs from recent hospital stay, adequate for treatment.   2. HIV/AIDS --100% adherence to ART advised. Educated pt about the importance of ART in attaining the best oncologic outcomes. --He says he follows with Dr Mikayla Lang for HIV care.  he says he has refills for Biktarvy.  maintain f/u with Dr Lang.  3. pain --involve palliative care physician for pain mgmt, emotional support, care planning --pt does not have distant metastatic disease, but he has a very locally advanced cancer, likelihood of cure is probably very slim.  4. psychosocial support --consulted SW --consulted oncology navigators --may need to involve psycho-oncologist as well, if pt proceeds w/ treatment through [facility].  5.  photosensitivity --should get head CT with IV contrast   RTC as embarks on treatment CBC, CMP  ADDENDUM: team unable to schedule head CT due to insurance problem.  advised pt that if symptoms worsen he needs to go to ER for urgent CT.  otherwise will schedule once insurance active in 2 weeks.

## 2024-03-18 NOTE — HISTORY OF PRESENT ILLNESS
[de-identified] : 44 M, MSM with PMH HIV (on biktarvy), invasive anorectal squamous cell carcinoma, polysubstance abuse, syphilis, anemia and abdominal MRSA 2021, who presented to [facility] ED with rectal bleeding early February. Here for follow up.   Oncologic History: 1.  anal cancer - anal mass biopsy 12/13/23 - invasive squamous cell carcinoma, moderately differentiated - CT Chest 1/9/24 - mild paraseptal emphysema. no evidence of metastatic disease   - MRI pelvis 1/3/24 - limited exam with only 3 sequences obtained. Showing invasion into sacrum, right hemipelvis, and invasion posteriorly into gluteal musculature  - CTA 2/2/24 - without signs of local regional metastases, large lobulated rectal tumor dissecting into adjacent soft tissues and bones overall similar in appearance to the previo - colonoscopy/anoscopy may be difficult due to bulk of disease - CEA elevated at 192.0 prior to treatment - PET/CT (2/9/24): Intense uptake is seen in the patient's large pelvic mass known to be an invasive squamous cell carcinoma of the anus. There is extension into the left gluteal region, but no evidence of FDG avid local or distant metastatic disease. -  CT C/A/P on 3/1/24 showed no PE and grossly stable known rectal mass with grossly stable osseous involvement.  HIV: CD4 count 328 in 2/2024 HIV VL low positive 12/2023  [de-identified] : Here for follow up with  partner Acosta.  He was recently admitted to [facility] for BRBPR and penile pain 3/1/24-3/6/24, he was febrile, tachycardic and anemic.  COVID positive. TTE was unremarkable, received 1unit PRBC.  He was discharged with antibiotic.  No fever since discharged. He has been having persistent anal pain and no does not have pain medication.  He will be seeing Dr. Taylor after this appt.  Compliant with Biktvary.  He was complaining of photosensitivity and light was turned off when we went into room.  Denies dizziness, headache, SOB, or chest pain. .

## 2024-03-18 NOTE — REVIEW OF SYSTEMS
[Fever] : no fever [Fatigue] : fatigue [Chills] : no chills [Negative] : Endocrine [FreeTextEntry2] : occassional headache [FreeTextEntry8] : intermittent difficulty urinating [FreeTextEntry7] : blood clots with stool, blood on toliet paper [FreeTextEntry9] : low back/sacral pain [de-identified] : large anal mass

## 2024-03-20 ENCOUNTER — APPOINTMENT (OUTPATIENT)
Dept: CT IMAGING | Facility: HOSPITAL | Age: 44
End: 2024-03-20

## 2024-03-21 ENCOUNTER — EMERGENCY (EMERGENCY)
Facility: HOSPITAL | Age: 44
LOS: 1 days | Discharge: HOME | End: 2024-03-21
Attending: EMERGENCY MEDICINE | Admitting: EMERGENCY MEDICINE
Payer: MEDICAID

## 2024-03-21 VITALS
WEIGHT: 145.06 LBS | TEMPERATURE: 98 F | HEART RATE: 140 BPM | HEIGHT: 73 IN | SYSTOLIC BLOOD PRESSURE: 109 MMHG | DIASTOLIC BLOOD PRESSURE: 76 MMHG | RESPIRATION RATE: 18 BRPM | OXYGEN SATURATION: 99 %

## 2024-03-21 VITALS
HEART RATE: 99 BPM | SYSTOLIC BLOOD PRESSURE: 111 MMHG | RESPIRATION RATE: 16 BRPM | OXYGEN SATURATION: 98 % | DIASTOLIC BLOOD PRESSURE: 70 MMHG | TEMPERATURE: 98 F

## 2024-03-21 DIAGNOSIS — Z98.890 OTHER SPECIFIED POSTPROCEDURAL STATES: Chronic | ICD-10-CM

## 2024-03-21 LAB
ALBUMIN SERPL ELPH-MCNC: 2.7 G/DL — LOW (ref 3.4–5)
ALP SERPL-CCNC: 83 U/L — SIGNIFICANT CHANGE UP (ref 40–120)
ALT FLD-CCNC: 11 U/L — LOW (ref 12–42)
ANION GAP SERPL CALC-SCNC: 11 MMOL/L — SIGNIFICANT CHANGE UP (ref 9–16)
APPEARANCE UR: CLEAR — SIGNIFICANT CHANGE UP
AST SERPL-CCNC: 19 U/L — SIGNIFICANT CHANGE UP (ref 15–37)
BASOPHILS # BLD AUTO: 0.03 K/UL — SIGNIFICANT CHANGE UP (ref 0–0.2)
BASOPHILS NFR BLD AUTO: 0.2 % — SIGNIFICANT CHANGE UP (ref 0–2)
BILIRUB SERPL-MCNC: 0.3 MG/DL — SIGNIFICANT CHANGE UP (ref 0.2–1.2)
BILIRUB UR-MCNC: NEGATIVE — SIGNIFICANT CHANGE UP
BUN SERPL-MCNC: 7 MG/DL — SIGNIFICANT CHANGE UP (ref 7–23)
CALCIUM SERPL-MCNC: 9.7 MG/DL — SIGNIFICANT CHANGE UP (ref 8.5–10.5)
CHLORIDE SERPL-SCNC: 96 MMOL/L — SIGNIFICANT CHANGE UP (ref 96–108)
CO2 SERPL-SCNC: 27 MMOL/L — SIGNIFICANT CHANGE UP (ref 22–31)
COLOR SPEC: YELLOW — SIGNIFICANT CHANGE UP
CREAT SERPL-MCNC: 0.86 MG/DL — SIGNIFICANT CHANGE UP (ref 0.5–1.3)
D DIMER BLD IA.RAPID-MCNC: 610 NG/ML DDU — HIGH
DIFF PNL FLD: NEGATIVE — SIGNIFICANT CHANGE UP
EGFR: 111 ML/MIN/1.73M2 — SIGNIFICANT CHANGE UP
EOSINOPHIL # BLD AUTO: 0.06 K/UL — SIGNIFICANT CHANGE UP (ref 0–0.5)
EOSINOPHIL NFR BLD AUTO: 0.4 % — SIGNIFICANT CHANGE UP (ref 0–6)
GLUCOSE SERPL-MCNC: 101 MG/DL — HIGH (ref 70–99)
GLUCOSE UR QL: NEGATIVE MG/DL — SIGNIFICANT CHANGE UP
HCT VFR BLD CALC: 32.4 % — LOW (ref 39–50)
HGB BLD-MCNC: 9.7 G/DL — LOW (ref 13–17)
IMM GRANULOCYTES NFR BLD AUTO: 0.4 % — SIGNIFICANT CHANGE UP (ref 0–0.9)
KETONES UR-MCNC: NEGATIVE MG/DL — SIGNIFICANT CHANGE UP
LEUKOCYTE ESTERASE UR-ACNC: NEGATIVE — SIGNIFICANT CHANGE UP
LIDOCAIN IGE QN: 14 U/L — LOW (ref 16–77)
LYMPHOCYTES # BLD AUTO: 1 K/UL — SIGNIFICANT CHANGE UP (ref 1–3.3)
LYMPHOCYTES # BLD AUTO: 7.4 % — LOW (ref 13–44)
MCHC RBC-ENTMCNC: 23.9 PG — LOW (ref 27–34)
MCHC RBC-ENTMCNC: 29.9 GM/DL — LOW (ref 32–36)
MCV RBC AUTO: 79.8 FL — LOW (ref 80–100)
MONOCYTES # BLD AUTO: 0.82 K/UL — SIGNIFICANT CHANGE UP (ref 0–0.9)
MONOCYTES NFR BLD AUTO: 6.1 % — SIGNIFICANT CHANGE UP (ref 2–14)
NEUTROPHILS # BLD AUTO: 11.54 K/UL — HIGH (ref 1.8–7.4)
NEUTROPHILS NFR BLD AUTO: 85.5 % — HIGH (ref 43–77)
NITRITE UR-MCNC: NEGATIVE — SIGNIFICANT CHANGE UP
NRBC # BLD: 0 /100 WBCS — SIGNIFICANT CHANGE UP (ref 0–0)
PH UR: 8 — SIGNIFICANT CHANGE UP (ref 5–8)
PLATELET # BLD AUTO: 590 K/UL — HIGH (ref 150–400)
POTASSIUM SERPL-MCNC: 3.9 MMOL/L — SIGNIFICANT CHANGE UP (ref 3.5–5.3)
POTASSIUM SERPL-SCNC: 3.9 MMOL/L — SIGNIFICANT CHANGE UP (ref 3.5–5.3)
PROT SERPL-MCNC: 8.6 G/DL — HIGH (ref 6.4–8.2)
PROT UR-MCNC: NEGATIVE MG/DL — SIGNIFICANT CHANGE UP
RBC # BLD: 4.06 M/UL — LOW (ref 4.2–5.8)
RBC # FLD: 17.6 % — HIGH (ref 10.3–14.5)
SODIUM SERPL-SCNC: 134 MMOL/L — SIGNIFICANT CHANGE UP (ref 132–145)
SP GR SPEC: 1.01 — SIGNIFICANT CHANGE UP (ref 1–1.03)
TROPONIN I, HIGH SENSITIVITY RESULT: <4 NG/L — SIGNIFICANT CHANGE UP
TSH SERPL-MCNC: 0.64 UIU/ML — SIGNIFICANT CHANGE UP (ref 0.36–3.74)
UROBILINOGEN FLD QL: 1 MG/DL — SIGNIFICANT CHANGE UP (ref 0.2–1)
WBC # BLD: 13.5 K/UL — HIGH (ref 3.8–10.5)
WBC # FLD AUTO: 13.5 K/UL — HIGH (ref 3.8–10.5)

## 2024-03-21 PROCEDURE — 99285 EMERGENCY DEPT VISIT HI MDM: CPT

## 2024-03-21 PROCEDURE — 71275 CT ANGIOGRAPHY CHEST: CPT | Mod: 26,MC

## 2024-03-21 PROCEDURE — 74177 CT ABD & PELVIS W/CONTRAST: CPT | Mod: 26,MC

## 2024-03-21 RX ORDER — HYDROMORPHONE HYDROCHLORIDE 2 MG/ML
0.5 INJECTION INTRAMUSCULAR; INTRAVENOUS; SUBCUTANEOUS ONCE
Refills: 0 | Status: DISCONTINUED | OUTPATIENT
Start: 2024-03-21 | End: 2024-03-21

## 2024-03-21 RX ORDER — MORPHINE SULFATE 50 MG/1
6 CAPSULE, EXTENDED RELEASE ORAL ONCE
Refills: 0 | Status: DISCONTINUED | OUTPATIENT
Start: 2024-03-21 | End: 2024-03-21

## 2024-03-21 RX ORDER — HYDROMORPHONE HYDROCHLORIDE 2 MG/ML
1 INJECTION INTRAMUSCULAR; INTRAVENOUS; SUBCUTANEOUS ONCE
Refills: 0 | Status: DISCONTINUED | OUTPATIENT
Start: 2024-03-21 | End: 2024-03-21

## 2024-03-21 RX ORDER — OXYCODONE AND ACETAMINOPHEN 5; 325 MG/1; MG/1
1 TABLET ORAL
Qty: 16 | Refills: 0
Start: 2024-03-21 | End: 2024-03-24

## 2024-03-21 RX ORDER — SODIUM CHLORIDE 9 MG/ML
1000 INJECTION INTRAMUSCULAR; INTRAVENOUS; SUBCUTANEOUS ONCE
Refills: 0 | Status: COMPLETED | OUTPATIENT
Start: 2024-03-21 | End: 2024-03-21

## 2024-03-21 RX ADMIN — HYDROMORPHONE HYDROCHLORIDE 0.5 MILLIGRAM(S): 2 INJECTION INTRAMUSCULAR; INTRAVENOUS; SUBCUTANEOUS at 17:59

## 2024-03-21 RX ADMIN — SODIUM CHLORIDE 1000 MILLILITER(S): 9 INJECTION INTRAMUSCULAR; INTRAVENOUS; SUBCUTANEOUS at 13:36

## 2024-03-21 RX ADMIN — MORPHINE SULFATE 6 MILLIGRAM(S): 50 CAPSULE, EXTENDED RELEASE ORAL at 14:00

## 2024-03-21 RX ADMIN — HYDROMORPHONE HYDROCHLORIDE 0.5 MILLIGRAM(S): 2 INJECTION INTRAMUSCULAR; INTRAVENOUS; SUBCUTANEOUS at 17:23

## 2024-03-21 RX ADMIN — MORPHINE SULFATE 6 MILLIGRAM(S): 50 CAPSULE, EXTENDED RELEASE ORAL at 13:46

## 2024-03-21 RX ADMIN — HYDROMORPHONE HYDROCHLORIDE 1 MILLIGRAM(S): 2 INJECTION INTRAMUSCULAR; INTRAVENOUS; SUBCUTANEOUS at 20:43

## 2024-03-21 NOTE — ED ADULT TRIAGE NOTE - CHIEF COMPLAINT QUOTE
pt c/o abdominal pain that began yesterday. pt denies vomiting and diarrhea. pt tachycardic to the 140's, as per pt, baseline HR is 130's.

## 2024-03-21 NOTE — ED PROVIDER NOTE - NSFOLLOWUPINSTRUCTIONS_ED_ALL_ED_FT
Cancer Pain    WHAT YOU NEED TO KNOW:    What do I need to know about cancer pain? Pain may be caused by cancer, treatment for cancer, or a procedure to diagnose cancer. A tumor or another condition caused by cancer may also cause pain. Your pain may get better after you finish treatment or if your cancer is in remission. Proper control of pain that continues can help improve your quality of life.    How is the cause of cancer pain diagnosed? Your healthcare provider will examine you and ask where your pain is, what it feels like, and when it started. Tell your provider if your pain comes and goes or is constant. Tell your provider if anything helps your pain or makes it worse. You may need any of the following:    Pain scales use numbers or faces to help you describe how bad your pain is. Your provider may ask you to rate your pain on a scale from 0 to 10.  Pain Scale       An x-ray, CT, or MRI may show the cause of your pain. You may be given contrast liquid to help the area show up better in the pictures. Tell the healthcare provider if you have ever had an allergic reaction to contrast liquid. Do not enter the MRI room with anything metal. Metal can cause serious injury. Tell the healthcare provider if you have any metal in or on your body.    Stimulation tests may help to find which nerves or muscles are affected by pain. These tests may include nerve conduction or muscle function studies.  How is cancer pain treated? Treatment depends on the cause, type, and severity of your pain. Talk to your healthcare provider about pain control before, during, and after certain tests, procedures, or treatments. You may need any of the following:    Medicines  Acetaminophen or NSAIDs, such as ibuprofen, may help decrease pain and inflammation. Talk to your provider before you take these medicines if you are getting chemotherapy (chemo). Acetaminophen increases the risk for liver damage and NSAIDs increase the risk for kidney damage. Chemo can increase the risk for these problems even more. Your provider may recommend you wait to use these medicines until after you complete chemo.    Prescription pain medicine may be given. Ask your healthcare provider how to take this medicine safely. Some prescription pain medicines contain acetaminophen. Do not take other medicines that contain acetaminophen without talking to your healthcare provider. Too much acetaminophen may cause liver damage. Prescription pain medicine may cause constipation. Ask your healthcare provider how to prevent or treat constipation.    Local anesthetics can be rubbed on your skin or injected into a nerve or muscle to numb an area.    Other medicines may be given to decrease pain, inflammation, or muscle spasms.    Procedures may be done to block nerve or pain signals.    Surgery may be needed if other treatments do not work.  How else may cancer pain be treated?    Cognitive behavior therapy (CBT) teaches you ways to cope with your pain.    Acupuncture uses very thin needles to balance energy channels in the body. This is thought to help reduce pain and other symptoms.    Massage therapy or acupressure may help relax tight muscles and decrease pain.    A transcutaneous electrical nerve stimulation (TENS) unit uses mild, safe electrical signals to help control pain. The device is placed over the area of pain and attaches to your skin.    Radiation therapy may be used to decrease bone pain.  What do I need to know about prescription pain medicine safety?    Take your medicine as directed. Take only the amount prescribed or recommended by your provider. Do not take more medicine if you have breakthrough pain. Too much medicine may cause breathing problems or other health issues. Tell your provider if your medicine does not control your pain.    Do not mix prescription pain medicines. This can cause an overdose of medicine, which can become life-threatening. Read the medicine labels. Make sure you know the ingredients in all your medicines.    Store prescription pain medicine in a safe place at home. Keep your medicine away from children and other people. Never share your medicine with anyone.  What can I do to help manage my pain?    Apply heat or ice to the area. Heat helps decrease muscle spasms. Ice helps decrease inflammation. Your healthcare provider may recommend only heat or ice, or you may be told to alternate. For heat, use a heat pack, heating pad, or a warm washcloth. Apply heat for 20 to 30 minutes every 2 hours for as many days as directed. For ice, use an ice pack, or put crushed ice in a plastic bag. Cover the ice pack with a towel before you place it on your skin. Apply ice for 15 to 20 minutes every hour or as directed.    Be physically active, as directed. Physical activity such as exercise can help decrease pain and improve your body function. Ask your healthcare provider about the best exercise plan for you.    Practice mindfulness or relaxation techniques. Yoga, meditation, or deep breathing can help you relax and distract you from thinking about your pain.    Join a support group. It may be helpful for you to talk with others who have similar conditions. Tell your healthcare provider, family, or friends about your feelings. Your provider can give you resources to help you cope.  Call your local emergency number (911 in the [place]) or have someone call if:    You are breathing slower than usual, or you have trouble breathing.    You have trouble staying awake, or you lose consciousness.    You take too much pain medicine by mistake.    You have a seizure.  When should I seek immediate care?    You have a fever.    Your pain gets worse, even after you take medicine.    You have new symptoms, such as numbness or tingling.  When should I call my doctor?    You have side effects from your pain medicine, such as itching, nausea, or vomiting.    Your pain causes trouble sleeping.    You do not think your medicine is working.    You have questions or concerns about your condition or care.  CARE AGREEMENT:    You have the right to help plan your care. Learn about your health condition and how it may be treated. Discuss treatment options with your healthcare providers to decide what care you want to receive. You always have the right to refuse treatment.

## 2024-03-21 NOTE — ED PROVIDER NOTE - PHYSICAL EXAMINATION
Constitutional: awake and alert, in no acute distress, chronically ill appearing  HEENT: head normocephalic and atraumatic. moist mucous membranes  Eyes: extraocular movements intact, normal conjunctiva  Neck: supple, normal ROM  Cardiovascular: tachycardic  Pulmonary: no respiratory distress  Gastrointestinal: abdomen flat and nondistended, diffuse abd TTP  Skin: warm, dry, normal for ethnicity  Musculoskeletal: no edema, no deformity  Neurological: oriented x4, no focal neurologic deficit.   Psychiatric: calm and cooperative

## 2024-03-21 NOTE — ED PROVIDER NOTE - OBJECTIVE STATEMENT
44-year-old male with history of invasive anorectal squamous cell carcinoma, HIV (on biktarvy), chronic tachycardia, p/w exacerbation of his chronic diffuse abd pain x1 day.  States he ran out of oxycodone at home yesterday.  No fever, chills, nausea, vomiting, or diarrhea.  No abd distension.

## 2024-03-21 NOTE — ED PROVIDER NOTE - PATIENT PORTAL LINK FT
You can access the FollowMyHealth Patient Portal offered by [facility] by registering at the following website: [email or web address]. By joining [facility]’s FollowMyHealth portal, you will also be able to view your health information using other applications (apps) compatible with our system.

## 2024-03-21 NOTE — ED ADULT NURSE NOTE - NSFALLUNIVINTERV_ED_ALL_ED
Bed/Stretcher in lowest position, wheels locked, appropriate side rails in place/Call bell, personal items and telephone in reach/Instruct patient to call for assistance before getting out of bed/chair/stretcher/Non-slip footwear applied when patient is off stretcher/[place] to call system/Physically safe environment - no spills, clutter or unnecessary equipment/Purposeful proactive rounding/Room/bathroom lighting operational, light cord in reach

## 2024-03-21 NOTE — ED PROVIDER NOTE - CLINICAL SUMMARY MEDICAL DECISION MAKING FREE TEXT BOX
Patient with invasive anal squamous cell carcinoma, HIV, presents with exacerbation of chronic diffuse abdominal pain in setting of running out of of oxycodone at home.  On exam, patient is chronically ill-appearing, tachycardic, consistent with baseline.  Abdomen is soft, nondistended, diffusely tender to palpation.  Suspect exacerbation of chronic pain due to insufficient analgesia at home.  Differential includes spread of known malignancy, SBO, PE, other.  Plan for labs, analgesia, IV hydration, reassess.    D-dimer elevated.  CTA chest negative for PE.  CT abdomen/pelvis negative for acute obstruction or infection.  WBC minimally elevated from prior.  UA negative for UTI.  Patient feels better after several rounds of analgesia.  Offered admission for further analgesia.  Patient states he has to go to court and prefers to go home.  Will DC with p.o. Percocet and plan for outpatient follow-up as scheduled.

## 2024-03-21 NOTE — ED ADULT NURSE NOTE - OBJECTIVE STATEMENT
Patient is a 43 y/o M c/o abdominal pain. patient reports lower abdominal pain for the past 2 days. Patient reports last bowel movement was 2 days ago. patient reports hx of rectal cancer in which he takes oxycodone for pain. patient reports recent rx of Miralax by provider to help with constipation. patient tachycardic to 120s upon assessment. patient reports his baseline is 130s. patient placed on continuous cardiac monitor and pulse ox.

## 2024-03-22 ENCOUNTER — NON-APPOINTMENT (OUTPATIENT)
Age: 44
End: 2024-03-22

## 2024-03-22 LAB
CULTURE RESULTS: SIGNIFICANT CHANGE UP
SPECIMEN SOURCE: SIGNIFICANT CHANGE UP

## 2024-03-25 DIAGNOSIS — R00.0 TACHYCARDIA, UNSPECIFIED: ICD-10-CM

## 2024-03-25 DIAGNOSIS — G89.29 OTHER CHRONIC PAIN: ICD-10-CM

## 2024-03-25 DIAGNOSIS — C21.0 MALIGNANT NEOPLASM OF ANUS, UNSPECIFIED: ICD-10-CM

## 2024-03-25 DIAGNOSIS — Z21 ASYMPTOMATIC HUMAN IMMUNODEFICIENCY VIRUS [HIV] INFECTION STATUS: ICD-10-CM

## 2024-03-25 DIAGNOSIS — R10.84 GENERALIZED ABDOMINAL PAIN: ICD-10-CM

## 2024-03-25 DIAGNOSIS — R10.9 UNSPECIFIED ABDOMINAL PAIN: ICD-10-CM

## 2024-04-01 ENCOUNTER — NON-APPOINTMENT (OUTPATIENT)
Age: 44
End: 2024-04-01

## 2024-04-03 ENCOUNTER — OUTPATIENT (OUTPATIENT)
Dept: OUTPATIENT SERVICES | Facility: HOSPITAL | Age: 44
LOS: 1 days | End: 2024-04-03

## 2024-04-03 ENCOUNTER — NON-APPOINTMENT (OUTPATIENT)
Age: 44
End: 2024-04-03

## 2024-04-03 VITALS
DIASTOLIC BLOOD PRESSURE: 95 MMHG | WEIGHT: 143.1 LBS | HEART RATE: 149 BPM | BODY MASS INDEX: 18.88 KG/M2 | SYSTOLIC BLOOD PRESSURE: 140 MMHG | OXYGEN SATURATION: 100 % | TEMPERATURE: 98.6 F

## 2024-04-03 DIAGNOSIS — C21.0 MALIGNANT NEOPLASM OF ANUS, UNSPECIFIED: ICD-10-CM

## 2024-04-03 DIAGNOSIS — Z98.890 OTHER SPECIFIED POSTPROCEDURAL STATES: Chronic | ICD-10-CM

## 2024-04-03 RX ORDER — PALONOSETRON HYDROCHLORIDE 0.25 MG/5ML
0.25 INJECTION, SOLUTION INTRAVENOUS ONCE
Refills: 0 | Status: DISCONTINUED | OUTPATIENT
Start: 2024-04-03 | End: 2024-04-03

## 2024-04-03 RX ORDER — MITOMYCIN 5 MG/10ML
20 INJECTION, POWDER, LYOPHILIZED, FOR SOLUTION INTRAVENOUS ONCE
Refills: 0 | Status: DISCONTINUED | OUTPATIENT
Start: 2024-04-03 | End: 2024-04-03

## 2024-04-03 RX ORDER — DEXAMETHASONE 0.5 MG/5ML
10 ELIXIR ORAL ONCE
Refills: 0 | Status: DISCONTINUED | OUTPATIENT
Start: 2024-04-03 | End: 2024-04-03

## 2024-04-05 ENCOUNTER — APPOINTMENT (OUTPATIENT)
Dept: INFUSION THERAPY | Facility: CLINIC | Age: 44
End: 2024-04-05

## 2024-04-05 ENCOUNTER — OUTPATIENT (OUTPATIENT)
Dept: OUTPATIENT SERVICES | Facility: HOSPITAL | Age: 44
LOS: 1 days | End: 2024-04-05
Payer: COMMERCIAL

## 2024-04-05 DIAGNOSIS — C21.0 MALIGNANT NEOPLASM OF ANUS, UNSPECIFIED: ICD-10-CM

## 2024-04-05 DIAGNOSIS — Z98.890 OTHER SPECIFIED POSTPROCEDURAL STATES: Chronic | ICD-10-CM

## 2024-04-05 LAB
ALBUMIN SERPL ELPH-MCNC: 3.5 G/DL — SIGNIFICANT CHANGE UP (ref 3.3–5)
ALP SERPL-CCNC: 108 U/L — SIGNIFICANT CHANGE UP (ref 40–120)
ALT FLD-CCNC: 12 U/L — SIGNIFICANT CHANGE UP (ref 10–45)
ANION GAP SERPL CALC-SCNC: 14 MMOL/L — SIGNIFICANT CHANGE UP (ref 5–17)
AST SERPL-CCNC: 24 U/L — SIGNIFICANT CHANGE UP (ref 10–40)
BILIRUB SERPL-MCNC: 0.3 MG/DL — SIGNIFICANT CHANGE UP (ref 0.2–1.2)
BUN SERPL-MCNC: 10 MG/DL — SIGNIFICANT CHANGE UP (ref 7–23)
CALCIUM SERPL-MCNC: 10.2 MG/DL — SIGNIFICANT CHANGE UP (ref 8.4–10.5)
CHLORIDE SERPL-SCNC: 94 MMOL/L — LOW (ref 96–108)
CO2 SERPL-SCNC: 23 MMOL/L — SIGNIFICANT CHANGE UP (ref 22–31)
CREAT SERPL-MCNC: 0.78 MG/DL — SIGNIFICANT CHANGE UP (ref 0.5–1.3)
EGFR: 114 ML/MIN/1.73M2 — SIGNIFICANT CHANGE UP
GLUCOSE SERPL-MCNC: 102 MG/DL — HIGH (ref 70–99)
HCT VFR BLD CALC: 29.9 % — LOW (ref 39–50)
HGB BLD-MCNC: 9 G/DL — LOW (ref 13–17)
LYMPHOCYTES # BLD AUTO: 1 K/UL — SIGNIFICANT CHANGE UP (ref 1–3.3)
LYMPHOCYTES # BLD AUTO: 6.4 % — LOW (ref 13–44)
MCHC RBC-ENTMCNC: 24.1 PG — LOW (ref 27–34)
MCHC RBC-ENTMCNC: 30.1 GM/DL — LOW (ref 32–36)
MCV RBC AUTO: 80.2 FL — SIGNIFICANT CHANGE UP (ref 80–100)
NEUTROPHILS # BLD AUTO: 13.2 K/UL — HIGH (ref 1.8–7.4)
NEUTROPHILS NFR BLD AUTO: 88.1 % — HIGH (ref 43–77)
PLATELET # BLD AUTO: 704 K/UL — HIGH (ref 150–400)
POTASSIUM SERPL-MCNC: 4.3 MMOL/L — SIGNIFICANT CHANGE UP (ref 3.5–5.3)
POTASSIUM SERPL-SCNC: 4.3 MMOL/L — SIGNIFICANT CHANGE UP (ref 3.5–5.3)
PROT SERPL-MCNC: 8 G/DL — SIGNIFICANT CHANGE UP (ref 6–8.3)
RBC # BLD: 3.73 M/UL — LOW (ref 4.2–5.8)
RBC # FLD: 18.1 % — HIGH (ref 10.3–14.5)
SODIUM SERPL-SCNC: 131 MMOL/L — LOW (ref 135–145)
WBC # BLD: 15 K/UL — HIGH (ref 3.8–10.5)
WBC # FLD AUTO: 15 K/UL — HIGH (ref 3.8–10.5)

## 2024-04-05 PROCEDURE — 96375 TX/PRO/DX INJ NEW DRUG ADDON: CPT

## 2024-04-05 PROCEDURE — 96409 CHEMO IV PUSH SNGL DRUG: CPT

## 2024-04-05 PROCEDURE — 80053 COMPREHEN METABOLIC PANEL: CPT

## 2024-04-05 PROCEDURE — 85025 COMPLETE CBC W/AUTO DIFF WBC: CPT

## 2024-04-05 PROCEDURE — 36415 COLL VENOUS BLD VENIPUNCTURE: CPT

## 2024-04-05 RX ORDER — PALONOSETRON HYDROCHLORIDE 0.25 MG/5ML
0.25 INJECTION, SOLUTION INTRAVENOUS ONCE
Refills: 0 | Status: COMPLETED | OUTPATIENT
Start: 2024-04-05 | End: 2024-04-05

## 2024-04-05 RX ORDER — DEXAMETHASONE 0.5 MG/5ML
10 ELIXIR ORAL ONCE
Refills: 0 | Status: COMPLETED | OUTPATIENT
Start: 2024-04-05 | End: 2024-04-05

## 2024-04-05 RX ORDER — MITOMYCIN 5 MG/10ML
20 INJECTION, POWDER, LYOPHILIZED, FOR SOLUTION INTRAVENOUS ONCE
Refills: 0 | Status: COMPLETED | OUTPATIENT
Start: 2024-04-05 | End: 2024-04-05

## 2024-04-05 RX ADMIN — Medication 10 MILLIGRAM(S): at 14:50

## 2024-04-05 RX ADMIN — PALONOSETRON HYDROCHLORIDE 0.25 MILLIGRAM(S): 0.25 INJECTION, SOLUTION INTRAVENOUS at 14:55

## 2024-04-05 RX ADMIN — Medication 204 MILLIGRAM(S): at 14:00

## 2024-04-05 RX ADMIN — MITOMYCIN 800 MILLIGRAM(S): 5 INJECTION, POWDER, LYOPHILIZED, FOR SOLUTION INTRAVENOUS at 15:56

## 2024-04-06 NOTE — ED ADULT NURSE NOTE - PRIMARY CARE PROVIDER
The Delivery OB Provider certifies that vaginal examination and/or abdominal examination after the delivery was done and no foreign body was found. PCP

## 2024-04-10 ENCOUNTER — NON-APPOINTMENT (OUTPATIENT)
Age: 44
End: 2024-04-10

## 2024-04-10 VITALS
DIASTOLIC BLOOD PRESSURE: 88 MMHG | BODY MASS INDEX: 17.97 KG/M2 | TEMPERATURE: 98.1 F | OXYGEN SATURATION: 100 % | RESPIRATION RATE: 18 BRPM | HEIGHT: 73 IN | SYSTOLIC BLOOD PRESSURE: 137 MMHG | HEART RATE: 127 BPM | WEIGHT: 135.56 LBS

## 2024-04-10 NOTE — HISTORY OF PRESENT ILLNESS
[FreeTextEntry1] : Delonte Rojo is a 43y year old M with PMH HIV (on biktarvy), polysubstance abuse, now with at least cT4 invasive anorectal squamous cell  carcinoma. RT consulted while inpatient to discuss treatment options.   4/3/24: OTV  180cGy/5400cGy, 1/30fx to anus/pelvis. Mr. Rojo reports feeling well overall. He denies fatigue. Appetite is good. No abdominal pain, N/V/C/D, or rectal bleeding. Some sensitivity when sitting. Plan to continue radiation.   History of Present Illness  _______________________________________  Patient had recent admission 12/9 - 12/12 for spontaneous drainage and mild pain from bilateral gluteal abscesses. Notably he also had progressing R leg  numbness. He had previously had perianal I and D in [facility] and [facility]. Rectoanal mass noted on imaging, and underwent biopsy and EUA 12/13 which confirmed diagnosis of squamous cell carcinoma, moderately differentiated. Patient was recommended for staging imaging at that time, but left AMA. He did not follow up with med onc as scheduled and was unable to be reached. Patient reports this was due to his phone service being shut off.     He presented to the ED after large volume blood per rectum.     1/3/24: MRI Pelvis - limited exam with only 3 sequences obtained. No evidence for a large infiltrative mass centered in the lower rectum with invasion into  the sacrum and musculature     1/9/24: CT Chest - mild paraseptal emphysema. no evidence of metastatic disease     2/2/24: CTA - without signs of local regional metastases     He lives in [facility], current smoker  (trying to cut down). Denies alcohol other/drug use. Endorses significant pain in rectal area     On exam:  NAD, A&Ox3, asking appropriate questions  Anal lesion extending from canal along R and L perianal skin ~4cm. Contour of gluteal skin with ulcers 2/2 prior abscess vs underlying tumor. Oozing, no  delmy bright red blood.

## 2024-04-10 NOTE — REVIEW OF SYSTEMS
[Rectal Pain: Grade 1 - Mild pain] : Rectal Pain: Grade 1 - Mild pain [Constipation: Grade 2 - Persistent symptoms with regular use of laxatives or enemas; limiting instrumental ADL] : Constipation: Grade 2 - Persistent symptoms with regular use of laxatives or enemas; limiting instrumental ADL [Diarrhea: Grade 2 - Increase of 4 - 6 stools per day over baseline; moderate increase in ostomy output compared to baseline] : Diarrhea: Grade 2 - Increase of 4 - 6 stools per day over baseline; moderate increase in ostomy output compared to baseline

## 2024-04-10 NOTE — VITALS
[OTC] : OTC [Maximal Pain Intensity: 3/10] : 3/10 [90: Able to carry normal activity; minor signs or symptoms of disease.] : 90: Able to carry normal activity; minor signs or symptoms of disease.  [ECOG Performance Status: 1 - Restricted in physically strenuous activity but ambulatory and able to carry out work of a light or sedentary nature] : Performance Status: 1 - Restricted in physically strenuous activity but ambulatory and able to carry out work of a light or sedentary nature, e.g., light house work, office work

## 2024-04-10 NOTE — DISEASE MANAGEMENT
[Clinical] : TNM Stage: c [TTNM] : 4 [NTNM] : X [MTNM] : X [IV] : IV [de-identified] : 180G [de-identified] : 5400cGy [de-identified] : anus/pelvis

## 2024-04-12 ENCOUNTER — NON-APPOINTMENT (OUTPATIENT)
Age: 44
End: 2024-04-12

## 2024-04-15 ENCOUNTER — NON-APPOINTMENT (OUTPATIENT)
Age: 44
End: 2024-04-15

## 2024-04-17 ENCOUNTER — APPOINTMENT (OUTPATIENT)
Dept: HEMATOLOGY ONCOLOGY | Facility: CLINIC | Age: 44
End: 2024-04-17
Payer: MEDICAID

## 2024-04-17 ENCOUNTER — LABORATORY RESULT (OUTPATIENT)
Age: 44
End: 2024-04-17

## 2024-04-17 ENCOUNTER — NON-APPOINTMENT (OUTPATIENT)
Age: 44
End: 2024-04-17

## 2024-04-17 VITALS
BODY MASS INDEX: 17.89 KG/M2 | DIASTOLIC BLOOD PRESSURE: 81 MMHG | SYSTOLIC BLOOD PRESSURE: 110 MMHG | OXYGEN SATURATION: 100 % | TEMPERATURE: 98.2 F | HEART RATE: 142 BPM | WEIGHT: 135.6 LBS

## 2024-04-17 VITALS
TEMPERATURE: 97.8 F | HEIGHT: 73 IN | RESPIRATION RATE: 18 BRPM | BODY MASS INDEX: 17.63 KG/M2 | SYSTOLIC BLOOD PRESSURE: 98 MMHG | HEART RATE: 72 BPM | OXYGEN SATURATION: 99 % | WEIGHT: 133 LBS | DIASTOLIC BLOOD PRESSURE: 65 MMHG

## 2024-04-17 DIAGNOSIS — D64.9 ANEMIA, UNSPECIFIED: ICD-10-CM

## 2024-04-17 DIAGNOSIS — L56.8 OTHER SPECIFIED ACUTE SKIN CHANGES DUE TO ULTRAVIOLET RADIATION: ICD-10-CM

## 2024-04-17 PROCEDURE — 99214 OFFICE O/P EST MOD 30 MIN: CPT | Mod: 25

## 2024-04-17 NOTE — REVIEW OF SYSTEMS
[Diarrhea: Grade 2 - Increase of 4 - 6 stools per day over baseline; moderate increase in ostomy output compared to baseline] : Diarrhea: Grade 2 - Increase of 4 - 6 stools per day over baseline; moderate increase in ostomy output compared to baseline [Rectal Pain: Grade 2 - Moderate pain; limiting instrumental ADL] : Rectal Pain: Grade 2 - Moderate pain; limiting instrumental ADL [Fatigue: Grade 1 - Fatigue relieved by rest] : Fatigue: Grade 1 - Fatigue relieved by rest [Skin Hyperpigmentation: Grade 0] : Skin Hyperpigmentation: Grade 0 [Dermatitis Radiation: Grade 0] : Dermatitis Radiation: Grade 0

## 2024-04-18 ENCOUNTER — NON-APPOINTMENT (OUTPATIENT)
Age: 44
End: 2024-04-18

## 2024-04-18 ENCOUNTER — RX RENEWAL (OUTPATIENT)
Age: 44
End: 2024-04-18

## 2024-04-18 LAB
ALBUMIN SERPL ELPH-MCNC: 3 G/DL
ALP BLD-CCNC: 111 U/L
ALT SERPL-CCNC: 27 U/L
ANION GAP SERPL CALC-SCNC: 10 MMOL/L
AST SERPL-CCNC: 66 U/L
BILIRUB SERPL-MCNC: 0.7 MG/DL
BUN SERPL-MCNC: 10 MG/DL
CALCIUM SERPL-MCNC: 9.7 MG/DL
CHLORIDE SERPL-SCNC: 96 MMOL/L
CO2 SERPL-SCNC: 28 MMOL/L
CREAT SERPL-MCNC: 0.7 MG/DL
EGFR: 118 ML/MIN/1.73M2
GLUCOSE SERPL-MCNC: 149 MG/DL
POTASSIUM SERPL-SCNC: 4.4 MMOL/L
PROT SERPL-MCNC: 8.5 G/DL
SODIUM SERPL-SCNC: 134 MMOL/L

## 2024-04-21 PROBLEM — L56.8 PHOTOSENSITIVITY: Status: ACTIVE | Noted: 2024-03-13

## 2024-04-21 PROBLEM — D64.9 ANEMIA OF UNKNOWN ETIOLOGY: Noted: 2021-11-16

## 2024-04-21 NOTE — HISTORY OF PRESENT ILLNESS
[de-identified] : 44 M, MSM with PMH HIV (on biktarvy), invasive anorectal squamous cell carcinoma, polysubstance abuse, syphilis, anemia and abdominal MRSA 2021, who presented to [facility] ED with rectal bleeding early February. Here for follow up.   Oncologic History: 1.  anal cancer - anal mass biopsy 12/13/23 - invasive squamous cell carcinoma, moderately differentiated - CT Chest 1/9/24 - mild paraseptal emphysema. no evidence of metastatic disease   - MRI pelvis 1/3/24 - limited exam with only 3 sequences obtained. Showing invasion into sacrum, right hemipelvis, and invasion posteriorly into gluteal musculature  - CTA 2/2/24 - without signs of local regional metastases, large lobulated rectal tumor dissecting into adjacent soft tissues and bones overall similar in appearance to the previo - colonoscopy/anoscopy may be difficult due to bulk of disease - CEA elevated at 192.0 prior to treatment - PET/CT (2/9/24): Intense uptake is seen in the patient's large pelvic mass known to be an invasive squamous cell carcinoma of the anus. There is extension into the left gluteal region, but no evidence of FDG avid local or distant metastatic disease. - CT C/A/P on 3/1/24 showed no PE and grossly stable known rectal mass with grossly stable osseous involvement. - started RT to anus/pelvis and capecitabine(1500mg/1000mg) on 4/3/24, received mitomycin on 4/5/24  HIV: CD4 count 328 in 2/2024 HIV VL low positive 12/2023  [de-identified] : Here for follow up with  partner Acosta. He started RT to anus/pelvis and capecitabine(1500mg/1000mg) on 4/3/24,  he reports increased loose stools 3-4x/d, did not take loperamide, persistent anal pain, ran out of oxycodone 2 days ago, used to take 10mg every 4 hours with pain relief, has been taking Methadone and Tylenol.  No blood in stool but has some discharge  PO intake slightly decreased due to altered taste, weight stable. + mild/moderate fatigue, manageable  compliant with Biktvary.   continues to have photosensitivity,  denies dizziness, headache, SOB, fever, cough, or chest pain. No hand foot syndrome  otherwise he feels ok

## 2024-04-21 NOTE — ASSESSMENT
[FreeTextEntry1] : Delonte Lua is a 44 year old man with AIDS and a bulky, locally advanced anal cancer.   He initially presented to [facility] and was diagnosed with anal cancer in 12/2023. He has had extensive imaging - CT scans, pelvic MRI, Pet-CT. He has a bulky, locally invasive anal cancer that has invaded several local structures including the prostate, gluteal musculature, and coccyx/sacrum. No distant metastatic disease has been detected. From a psychosocial standpoint, the patient has additional complicating factors including housing insecurity, poor support/no caregiver, substance abuse, and mental illness.  He missed several appointments after the initial hospitalization.  Started RT to anus/pelvis and capecitabine(1500mg/1000mg) on 4/3/24, received mitomycin on 4/5/24  Plan: 1. bulky, locally advanced anal cancer --case has been discussed in multidisciplinary format - both in conference and separately w/ the involved physicians colorectal surgeon Dr. Cortez and radiation oncologist Dr Owens --potential role for diverting ostomy has been discussed, not recommended at this time by CRS --favor upfront treatment with concurrent chemoradiation which probably offers the patient the best opportunity for local control and palliation of symptoms.  --would utilize mitomycin-C + capecitabine. His dose of capecitabine would be 825 mg/m2 = 1500 mg po qAM and 1000 mg po qPM on radiation days. --started RT to anus/pelvis and capecitabine(1500mg/1000mg) on 4/3/24, received mitomycin on 4/5/24, tolerating treatment with some side effects, manageable  Labs reviewed, continue RT with capecitabine.  --he is immunosuppressed and may have a large radiation field - both risk factors for infectious complications and myelosuppression.  Will need careful monitoring while on treatment, including CBC + diff every 1-2 weeks to monitor for significant myelosuppression.   2. HIV/AIDS --100% adherence to ART advised. Educated pt about the importance of ART in attaining the best oncologic outcomes. --follow with Dr Mikayla Lang for HIV care.   3. anal pain --pt does not have distant metastatic disease, but he has a very locally advanced cancer, likelihood of cure is probably very slim. --on oxycodone 10mg every 4 hours, Methadone with pain relief, continue to follow up with Dr. Taylor.   4. psychosocial support --SW on board --consulted oncology navigators --may need to involve psycho-oncologist as well, if pt proceeds w/ treatment through [facility]. Will address next visit.   5. photosensitivity --should get head CT with IV contrast, delayed due to insurance, scheduled for 4/24  6.increased bowel movement  --encourage taking loperamide as instructed  --maintain good PO hydration   7. decreased appetite  --encourage Ensure 1-2 per day --eat frequent small meal with protein rich diet.   8. anemia 2/2 chemo and chronic blood loss --Hgb 8.3, monitor for now.  --will repeat ferritin, iron panel next time  plan discussed with Dr. Julian RTC 2 weeks or sooner if any worsening symptoms  labs-CBC, CMP, ferritin, iron TIBC

## 2024-04-21 NOTE — PHYSICAL EXAM
[Restricted in physically strenuous activity but ambulatory and able to carry out work of a light or sedentary nature] : Status 1- Restricted in physically strenuous activity but ambulatory and able to carry out work of a light or sedentary nature, e.g., light house work, office work [Thin] : thin [Normal] : affect appropriate [de-identified] : uncomfortable appearing due to pain  [de-identified] : tachycardic, regular  [de-identified] : no LE edema

## 2024-04-21 NOTE — REVIEW OF SYSTEMS
[FreeTextEntry7] : blood clots with stool, blood on toliet paper [FreeTextEntry9] : low back/sacral pain [de-identified] : large anal mass

## 2024-04-22 ENCOUNTER — EMERGENCY (EMERGENCY)
Facility: HOSPITAL | Age: 44
LOS: 1 days | Discharge: HOME | End: 2024-04-22
Attending: EMERGENCY MEDICINE | Admitting: EMERGENCY MEDICINE
Payer: MEDICAID

## 2024-04-22 ENCOUNTER — NON-APPOINTMENT (OUTPATIENT)
Age: 44
End: 2024-04-22

## 2024-04-22 VITALS
OXYGEN SATURATION: 96 % | WEIGHT: 136.03 LBS | DIASTOLIC BLOOD PRESSURE: 136 MMHG | TEMPERATURE: 100 F | HEIGHT: 73 IN | SYSTOLIC BLOOD PRESSURE: 161 MMHG | RESPIRATION RATE: 16 BRPM | HEART RATE: 150 BPM

## 2024-04-22 VITALS — OXYGEN SATURATION: 98 % | RESPIRATION RATE: 18 BRPM | TEMPERATURE: 98 F

## 2024-04-22 DIAGNOSIS — Z98.890 OTHER SPECIFIED POSTPROCEDURAL STATES: Chronic | ICD-10-CM

## 2024-04-22 LAB
ALBUMIN SERPL ELPH-MCNC: 3.4 G/DL — SIGNIFICANT CHANGE UP (ref 3.3–5)
ALP SERPL-CCNC: 122 U/L — HIGH (ref 40–120)
ALT FLD-CCNC: 22 U/L — SIGNIFICANT CHANGE UP (ref 10–45)
ANION GAP SERPL CALC-SCNC: 13 MMOL/L — SIGNIFICANT CHANGE UP (ref 5–17)
ANISOCYTOSIS BLD QL: SLIGHT — SIGNIFICANT CHANGE UP
AST SERPL-CCNC: 33 U/L — SIGNIFICANT CHANGE UP (ref 10–40)
BASOPHILS # BLD AUTO: 0.09 K/UL — SIGNIFICANT CHANGE UP (ref 0–0.2)
BASOPHILS NFR BLD AUTO: 0.9 % — SIGNIFICANT CHANGE UP (ref 0–2)
BILIRUB SERPL-MCNC: 0.3 MG/DL — SIGNIFICANT CHANGE UP (ref 0.2–1.2)
BUN SERPL-MCNC: 11 MG/DL — SIGNIFICANT CHANGE UP (ref 7–23)
BURR CELLS BLD QL SMEAR: PRESENT — SIGNIFICANT CHANGE UP
CALCIUM SERPL-MCNC: 9.3 MG/DL — SIGNIFICANT CHANGE UP (ref 8.4–10.5)
CHLORIDE SERPL-SCNC: 94 MMOL/L — LOW (ref 96–108)
CO2 SERPL-SCNC: 25 MMOL/L — SIGNIFICANT CHANGE UP (ref 22–31)
CREAT SERPL-MCNC: 0.67 MG/DL — SIGNIFICANT CHANGE UP (ref 0.5–1.3)
DACRYOCYTES BLD QL SMEAR: SLIGHT — SIGNIFICANT CHANGE UP
EGFR: 120 ML/MIN/1.73M2 — SIGNIFICANT CHANGE UP
EOSINOPHIL # BLD AUTO: 0 K/UL — SIGNIFICANT CHANGE UP (ref 0–0.5)
EOSINOPHIL NFR BLD AUTO: 0 % — SIGNIFICANT CHANGE UP (ref 0–6)
GLUCOSE SERPL-MCNC: 132 MG/DL — HIGH (ref 70–99)
HCT VFR BLD CALC: 26.4 % — LOW (ref 39–50)
HGB BLD-MCNC: 7.9 G/DL — LOW (ref 13–17)
HYPOCHROMIA BLD QL: SLIGHT — SIGNIFICANT CHANGE UP
LYMPHOCYTES # BLD AUTO: 0.09 K/UL — LOW (ref 1–3.3)
LYMPHOCYTES # BLD AUTO: 0.9 % — LOW (ref 13–44)
MACROCYTES BLD QL: SLIGHT — SIGNIFICANT CHANGE UP
MANUAL SMEAR VERIFICATION: SIGNIFICANT CHANGE UP
MCHC RBC-ENTMCNC: 25.3 PG — LOW (ref 27–34)
MCHC RBC-ENTMCNC: 29.9 GM/DL — LOW (ref 32–36)
MCV RBC AUTO: 84.6 FL — SIGNIFICANT CHANGE UP (ref 80–100)
MICROCYTES BLD QL: SLIGHT — SIGNIFICANT CHANGE UP
MONOCYTES # BLD AUTO: 0 K/UL — SIGNIFICANT CHANGE UP (ref 0–0.9)
MONOCYTES NFR BLD AUTO: 0 % — LOW (ref 2–14)
NEUTROPHILS # BLD AUTO: 9.67 K/UL — HIGH (ref 1.8–7.4)
NEUTROPHILS NFR BLD AUTO: 98.2 % — HIGH (ref 43–77)
OVALOCYTES BLD QL SMEAR: SLIGHT — SIGNIFICANT CHANGE UP
PLAT MORPH BLD: NORMAL — SIGNIFICANT CHANGE UP
PLATELET # BLD AUTO: 181 K/UL — SIGNIFICANT CHANGE UP (ref 150–400)
POIKILOCYTOSIS BLD QL AUTO: SLIGHT — SIGNIFICANT CHANGE UP
POLYCHROMASIA BLD QL SMEAR: SLIGHT — SIGNIFICANT CHANGE UP
POTASSIUM SERPL-MCNC: 4.7 MMOL/L — SIGNIFICANT CHANGE UP (ref 3.5–5.3)
POTASSIUM SERPL-SCNC: 4.7 MMOL/L — SIGNIFICANT CHANGE UP (ref 3.5–5.3)
PROT SERPL-MCNC: 8.1 G/DL — SIGNIFICANT CHANGE UP (ref 6–8.3)
RBC # BLD: 3.12 M/UL — LOW (ref 4.2–5.8)
RBC # FLD: 19.9 % — HIGH (ref 10.3–14.5)
RBC BLD AUTO: ABNORMAL
SODIUM SERPL-SCNC: 132 MMOL/L — LOW (ref 135–145)
SPHEROCYTES BLD QL SMEAR: SLIGHT — SIGNIFICANT CHANGE UP
WBC # BLD: 9.85 K/UL — SIGNIFICANT CHANGE UP (ref 3.8–10.5)
WBC # FLD AUTO: 9.85 K/UL — SIGNIFICANT CHANGE UP (ref 3.8–10.5)

## 2024-04-22 PROCEDURE — 85025 COMPLETE CBC W/AUTO DIFF WBC: CPT

## 2024-04-22 PROCEDURE — 99285 EMERGENCY DEPT VISIT HI MDM: CPT

## 2024-04-22 PROCEDURE — 96376 TX/PRO/DX INJ SAME DRUG ADON: CPT

## 2024-04-22 PROCEDURE — 36415 COLL VENOUS BLD VENIPUNCTURE: CPT

## 2024-04-22 PROCEDURE — 93010 ELECTROCARDIOGRAM REPORT: CPT

## 2024-04-22 PROCEDURE — 80053 COMPREHEN METABOLIC PANEL: CPT

## 2024-04-22 PROCEDURE — 93005 ELECTROCARDIOGRAM TRACING: CPT

## 2024-04-22 PROCEDURE — 96375 TX/PRO/DX INJ NEW DRUG ADDON: CPT

## 2024-04-22 PROCEDURE — 99284 EMERGENCY DEPT VISIT MOD MDM: CPT | Mod: 25

## 2024-04-22 PROCEDURE — 96374 THER/PROPH/DIAG INJ IV PUSH: CPT

## 2024-04-22 RX ORDER — SODIUM CHLORIDE 9 MG/ML
1000 INJECTION INTRAMUSCULAR; INTRAVENOUS; SUBCUTANEOUS ONCE
Refills: 0 | Status: COMPLETED | OUTPATIENT
Start: 2024-04-22 | End: 2024-04-22

## 2024-04-22 RX ORDER — KETOROLAC TROMETHAMINE 30 MG/ML
15 SYRINGE (ML) INJECTION ONCE
Refills: 0 | Status: DISCONTINUED | OUTPATIENT
Start: 2024-04-22 | End: 2024-04-22

## 2024-04-22 RX ORDER — HYDROMORPHONE HYDROCHLORIDE 2 MG/ML
1 INJECTION INTRAMUSCULAR; INTRAVENOUS; SUBCUTANEOUS ONCE
Refills: 0 | Status: DISCONTINUED | OUTPATIENT
Start: 2024-04-22 | End: 2024-04-22

## 2024-04-22 RX ADMIN — HYDROMORPHONE HYDROCHLORIDE 1 MILLIGRAM(S): 2 INJECTION INTRAMUSCULAR; INTRAVENOUS; SUBCUTANEOUS at 16:28

## 2024-04-22 RX ADMIN — SODIUM CHLORIDE 1000 MILLILITER(S): 9 INJECTION INTRAMUSCULAR; INTRAVENOUS; SUBCUTANEOUS at 16:27

## 2024-04-22 RX ADMIN — HYDROMORPHONE HYDROCHLORIDE 1 MILLIGRAM(S): 2 INJECTION INTRAMUSCULAR; INTRAVENOUS; SUBCUTANEOUS at 17:28

## 2024-04-22 RX ADMIN — Medication 15 MILLIGRAM(S): at 16:28

## 2024-04-22 RX ADMIN — SODIUM CHLORIDE 1000 MILLILITER(S): 9 INJECTION INTRAMUSCULAR; INTRAVENOUS; SUBCUTANEOUS at 16:28

## 2024-04-22 NOTE — ED PROVIDER NOTE - NSFOLLOWUPINSTRUCTIONS_ED_ALL_ED_FT
please follow-up with your oncologist in 1 to 2 days.   your methadone and take it as prescribed.  Return to the ED if you develop any concerning symptoms.    Chronic Pain, Adult  Chronic pain is a type of pain that lasts or keeps coming back for at least 3–6 months. You may have headaches, pain in the abdomen, or pain in other areas of the body. Chronic pain may be related to an illness, injury, or a health condition. Sometimes, the cause of chronic pain is not known.    Chronic pain can make it hard for you to do daily activities. If it is not treated, chronic pain can lead to anxiety and depression. Treatment depends on the cause of your pain and how severe it is. You may need to work with a pain specialist to come up with a treatment plan. Many people benefit from two or more types of treatment to control their pain.    Follow these instructions at home:  Treatment plan    A person doing a yoga stretch.  Follow your treatment plan as told by your health care provider. This may include:  Gentle, regular exercise.  Eating a healthy diet that includes foods such as vegetables, fruits, fish, and lean meats.  Mental health therapy (cognitive or behavioral therapy) that changes the way you think or act in response to the pain. This may help improve how you feel.  Doing physical therapy exercises to improve movement and strength.  Meditation, yoga, acupuncture, or massage therapy.  Using the oils from plants in your environment or on your skin (aromatherapy).  Other treatments may include:  Over-the-counter or prescription medicines.  Color, light, or sound therapy.  Local electrical stimulation. The electrical pulses help to relieve pain by temporarily stopping the nerve impulses that cause you to feel pain.  Injections. These deliver numbing or pain-relieving medicines into the spine or the area of pain.  Medicines    Take over-the-counter and prescription medicines only as told by your health care provider.  Ask your health care provider if the medicine prescribed to you:  Requires you to avoid driving or using machinery.  Can cause constipation. You may need to take these actions to prevent or treat constipation:  Drink enough fluid to keep your urine pale yellow.  Take over-the-counter or prescription medicines.  Eat foods that are high in fiber, such as beans, whole grains, and fresh fruits and vegetables.  Limit foods that are high in fat and processed sugars, such as fried or sweet foods.  Lifestyle    A person using a pen to write in a notebook.  Ask your health care provider whether you should keep a pain diary. Your health care provider will tell you what information to write in the diary. This may include:  When you have pain.  What the pain feels like.  How medicines and other behaviors or treatments help to reduce the pain.  Consider talking with a mental health care provider about how to help manage chronic pain.  Consider joining a chronic pain support group.  Try to control or lower your stress levels. Talk with your health care provider about ways to do this.  General instructions    Learn as much as you can about how to manage your chronic pain. Ask your health care provider if an intensive pain rehabilitation program or a chronic pain specialist would be helpful.  Check your pain level as told by your health care provider. Ask your health care provider if you should use a pain scale.  Contact a health care provider if:  Your pain is not controlled with treatment.  You have new pain.  You have side effects from pain medicine.  You feel weak or you have trouble doing your normal activities.  You have trouble sleeping or you develop confusion.  You lose feeling or have numbness in your body.  You lose control of your bowels or bladder.  Get help right away if:  Your pain suddenly gets much worse.  You develop chest pain.  You have trouble breathing or shortness of breath.  You faint, or another person sees you faint.  These symptoms may be an emergency. Get help right away. Call 911.  Do not wait to see if the symptoms will go away.  Do not drive yourself to the hospital.  Also, get help right away if:  You have thoughts about hurting yourself or others.  Take one of these steps if you feel like you may hurt yourself or others, or have thoughts about taking your own life:  Go to your nearest emergency room.  Call 911.  Call the National Suicide Prevention Lifeline at 1-117.664.1423 or 975. This is open 24 hours a day.  Text the Crisis Text Line at 141993.  This information is not intended to replace advice given to you by your health care provider. Make sure you discuss any questions you have with your health care provider.    Document Revised: 08/09/2023 Document Reviewed: 07/12/2023  Elsevier Patient Education © 2024 [employer]

## 2024-04-22 NOTE — ED ADULT NURSE NOTE - NSFALLHARMRISKINTERV_ED_ALL_ED

## 2024-04-22 NOTE — ED PROVIDER NOTE - PATIENT PORTAL LINK FT
You can access the FollowMyHealth Patient Portal offered by [facility] by registering at the following website: [email or web address]. By joining [facility]’s FollowMyHealth portal, you will also be able to view your health information using other applications (apps) compatible with our system.

## 2024-04-22 NOTE — ED ADULT TRIAGE NOTE - OTHER COMPLAINTS
c.o body pain more severe c.o L buttock pain and pain to R leg. pt currently being treated for GI cancer. ekg in progress.

## 2024-04-22 NOTE — ED PROVIDER NOTE - CLINICAL SUMMARY MEDICAL DECISION MAKING FREE TEXT BOX
44-year-old male with history of invasive anorectal squamous cell carcinoma, well-controlled HIV (on Biktarvy, last viral load undetectable a couple of months ago), chronic tachycardia, p/w severe L buttock pain radiating down left leg that is c/w his chronic pain from his anorectal cancer. States that he is oxycodone and Methadone for his chronic pain, but ran out his Methadone an did not take any today. Took 2-3 doses of Oxycodone today with minimal relief. States that he has a Methadone Rx and is supposed to pick it up today at his pharmacy. No fevers, chills, nausea, vomiting, abd pain, CP, SOB or diarrhea. Patient with chronic loose stools and incontinence with no change over the past few days.    ED course: Patient afebrile. Hypertensive with BP 160s/130s. Tachycardic with heart rate in the 150s.  Rest of vital signs are within normal. ECG with sinus tachycardia rate 120s. Pt given IV Dilaudid with improvement in pain. Labs noted and unremarkable. VS improved after IV Dilaudid X 2 with repeat  and normal BP reads. Patient remained afebrile. Patient reports feeling much better post pain meds. Will  Methadone today at his pharmacy. Non-toxic appearing and stable for discharge. To follow up outpatient. Strict return precautions given. 44-year-old male with history of invasive anorectal squamous cell carcinoma, well-controlled HIV (on Biktarvy, last viral load undetectable a couple of months ago), chronic tachycardia, p/w severe L buttock pain radiating down left leg that is c/w his chronic pain from his anorectal cancer. States that he is oxycodone and Methadone for his chronic pain, but ran out his Methadone an did not take any today. Took 2-3 doses of Oxycodone today with minimal relief. States that he has a Methadone Rx and is supposed to pick it up today at his pharmacy. No fevers, chills, nausea, vomiting, abd pain, CP, SOB or diarrhea. Patient with chronic loose stools and incontinence with no change over the past few days.    ED course: Patient afebrile. Hypertensive with BP 160s/130s. Tachycardic with heart rate in the 150s.  Rest of vital signs are within normal. ECG with sinus tachycardia, rate 120s. Pt given IVF, Toradol and IV Dilaudid with improvement in pain. Labs noted and unremarkable. VS improved after IV Dilaudid X 2 with repeat  and normal BP reads. Patient remained afebrile. Patient reports feeling much better post pain meds. Will  Methadone today at his pharmacy. Non-toxic appearing and stable for discharge. To follow up outpatient. Strict return precautions given.

## 2024-04-22 NOTE — ED PROVIDER NOTE - OBJECTIVE STATEMENT
44-year-old male with history of invasive anorectal squamous cell carcinoma, well-controlled HIV (on Biktarvy, last viral load undetectable a couple of months ago), chronic tachycardia, p/w severe L buttock pain radiating down left leg that is c/w his chronic pain from his anorectal cancer. States that he is oxycodone and Methadone for his chronic pain, but ran out his Methadone an did not take any today. Took 2-3 doses of Oxycodone today with minimal relief. States that he has a Methadone Rx and is supposed to pick it up today at his pharmacy. No fevers, chills, nausea, vomiting, abd pain, CP, SOB or diarrhea. Patient with chronic loose stools and incontinence with no change over the past few days.

## 2024-04-22 NOTE — ED ADULT NURSE NOTE - OBJECTIVE STATEMENT
Pt is a 43 yo M c/o pain to L buttock x 2 days. Pmhx GI cancer and ?abscess to L buttock, states "it finally burst and is draining stuff everywhere". Has been taking percocet and methadone at home, last dose this morning. Denies any f/c or any other acute sx at this time.  Pt upgraded to MD Berrios. ECG completed, placed on continuous cardiac monitoring. PIV placed.

## 2024-04-23 ENCOUNTER — EMERGENCY (EMERGENCY)
Facility: HOSPITAL | Age: 44
LOS: 1 days | Discharge: HOME | End: 2024-04-23
Attending: STUDENT IN AN ORGANIZED HEALTH CARE EDUCATION/TRAINING PROGRAM | Admitting: STUDENT IN AN ORGANIZED HEALTH CARE EDUCATION/TRAINING PROGRAM
Payer: MEDICAID

## 2024-04-23 VITALS
DIASTOLIC BLOOD PRESSURE: 63 MMHG | SYSTOLIC BLOOD PRESSURE: 101 MMHG | HEART RATE: 96 BPM | RESPIRATION RATE: 18 BRPM | OXYGEN SATURATION: 96 %

## 2024-04-23 VITALS
SYSTOLIC BLOOD PRESSURE: 103 MMHG | TEMPERATURE: 99 F | DIASTOLIC BLOOD PRESSURE: 70 MMHG | HEIGHT: 73 IN | OXYGEN SATURATION: 99 % | WEIGHT: 136.91 LBS | HEART RATE: 137 BPM | RESPIRATION RATE: 20 BRPM

## 2024-04-23 DIAGNOSIS — Z98.890 OTHER SPECIFIED POSTPROCEDURAL STATES: Chronic | ICD-10-CM

## 2024-04-23 LAB
ANION GAP SERPL CALC-SCNC: 11 MMOL/L — SIGNIFICANT CHANGE UP (ref 5–17)
ANISOCYTOSIS BLD QL: SIGNIFICANT CHANGE UP
BASOPHILS # BLD AUTO: 0 K/UL — SIGNIFICANT CHANGE UP (ref 0–0.2)
BASOPHILS NFR BLD AUTO: 0 % — SIGNIFICANT CHANGE UP (ref 0–2)
BUN SERPL-MCNC: 8 MG/DL — SIGNIFICANT CHANGE UP (ref 7–23)
CALCIUM SERPL-MCNC: 9.4 MG/DL — SIGNIFICANT CHANGE UP (ref 8.4–10.5)
CHLORIDE SERPL-SCNC: 97 MMOL/L — SIGNIFICANT CHANGE UP (ref 96–108)
CO2 SERPL-SCNC: 25 MMOL/L — SIGNIFICANT CHANGE UP (ref 22–31)
CREAT SERPL-MCNC: 0.56 MG/DL — SIGNIFICANT CHANGE UP (ref 0.5–1.3)
EGFR: 126 ML/MIN/1.73M2 — SIGNIFICANT CHANGE UP
EOSINOPHIL # BLD AUTO: 0.09 K/UL — SIGNIFICANT CHANGE UP (ref 0–0.5)
EOSINOPHIL NFR BLD AUTO: 0.9 % — SIGNIFICANT CHANGE UP (ref 0–6)
GLUCOSE SERPL-MCNC: 124 MG/DL — HIGH (ref 70–99)
HCT VFR BLD CALC: 24.6 % — LOW (ref 39–50)
HGB BLD-MCNC: 7.2 G/DL — LOW (ref 13–17)
HYPOCHROMIA BLD QL: SIGNIFICANT CHANGE UP
LYMPHOCYTES # BLD AUTO: 0.16 K/UL — LOW (ref 1–3.3)
LYMPHOCYTES # BLD AUTO: 1.7 % — LOW (ref 13–44)
MACROCYTES BLD QL: SLIGHT — SIGNIFICANT CHANGE UP
MANUAL SMEAR VERIFICATION: SIGNIFICANT CHANGE UP
MCHC RBC-ENTMCNC: 24.6 PG — LOW (ref 27–34)
MCHC RBC-ENTMCNC: 29.3 GM/DL — LOW (ref 32–36)
MCV RBC AUTO: 84 FL — SIGNIFICANT CHANGE UP (ref 80–100)
METAMYELOCYTES # FLD: 0.9 % — HIGH (ref 0–0)
MICROCYTES BLD QL: SLIGHT — SIGNIFICANT CHANGE UP
MONOCYTES # BLD AUTO: 0.33 K/UL — SIGNIFICANT CHANGE UP (ref 0–0.9)
MONOCYTES NFR BLD AUTO: 3.5 % — SIGNIFICANT CHANGE UP (ref 2–14)
NEUTROPHILS # BLD AUTO: 8.9 K/UL — HIGH (ref 1.8–7.4)
NEUTROPHILS NFR BLD AUTO: 93 % — HIGH (ref 43–77)
OVALOCYTES BLD QL SMEAR: SLIGHT — SIGNIFICANT CHANGE UP
PLAT MORPH BLD: ABNORMAL
PLATELET # BLD AUTO: 234 K/UL — SIGNIFICANT CHANGE UP (ref 150–400)
POIKILOCYTOSIS BLD QL AUTO: SLIGHT — SIGNIFICANT CHANGE UP
POLYCHROMASIA BLD QL SMEAR: SIGNIFICANT CHANGE UP
POTASSIUM SERPL-MCNC: 4.6 MMOL/L — SIGNIFICANT CHANGE UP (ref 3.5–5.3)
POTASSIUM SERPL-SCNC: 4.6 MMOL/L — SIGNIFICANT CHANGE UP (ref 3.5–5.3)
RBC # BLD: 2.93 M/UL — LOW (ref 4.2–5.8)
RBC # FLD: 20.4 % — HIGH (ref 10.3–14.5)
RBC BLD AUTO: ABNORMAL
SCHISTOCYTES BLD QL AUTO: SLIGHT — SIGNIFICANT CHANGE UP
SODIUM SERPL-SCNC: 133 MMOL/L — LOW (ref 135–145)
SPHEROCYTES BLD QL SMEAR: SLIGHT — SIGNIFICANT CHANGE UP
WBC # BLD: 9.57 K/UL — SIGNIFICANT CHANGE UP (ref 3.8–10.5)
WBC # FLD AUTO: 9.57 K/UL — SIGNIFICANT CHANGE UP (ref 3.8–10.5)

## 2024-04-23 PROCEDURE — 96374 THER/PROPH/DIAG INJ IV PUSH: CPT

## 2024-04-23 PROCEDURE — 99284 EMERGENCY DEPT VISIT MOD MDM: CPT | Mod: 25

## 2024-04-23 PROCEDURE — 99285 EMERGENCY DEPT VISIT HI MDM: CPT

## 2024-04-23 PROCEDURE — 36415 COLL VENOUS BLD VENIPUNCTURE: CPT

## 2024-04-23 PROCEDURE — 72158 MRI LUMBAR SPINE W/O & W/DYE: CPT | Mod: 26,MC

## 2024-04-23 PROCEDURE — 80048 BASIC METABOLIC PNL TOTAL CA: CPT

## 2024-04-23 PROCEDURE — 96375 TX/PRO/DX INJ NEW DRUG ADDON: CPT

## 2024-04-23 PROCEDURE — 85025 COMPLETE CBC W/AUTO DIFF WBC: CPT

## 2024-04-23 PROCEDURE — 72158 MRI LUMBAR SPINE W/O & W/DYE: CPT | Mod: MC

## 2024-04-23 PROCEDURE — A9585: CPT

## 2024-04-23 RX ORDER — SODIUM CHLORIDE 9 MG/ML
1000 INJECTION INTRAMUSCULAR; INTRAVENOUS; SUBCUTANEOUS ONCE
Refills: 0 | Status: COMPLETED | OUTPATIENT
Start: 2024-04-23 | End: 2024-04-23

## 2024-04-23 RX ORDER — MORPHINE SULFATE 50 MG/1
4 CAPSULE, EXTENDED RELEASE ORAL ONCE
Refills: 0 | Status: DISCONTINUED | OUTPATIENT
Start: 2024-04-23 | End: 2024-04-23

## 2024-04-23 RX ADMIN — SODIUM CHLORIDE 1000 MILLILITER(S): 9 INJECTION INTRAMUSCULAR; INTRAVENOUS; SUBCUTANEOUS at 16:00

## 2024-04-23 RX ADMIN — MORPHINE SULFATE 4 MILLIGRAM(S): 50 CAPSULE, EXTENDED RELEASE ORAL at 15:42

## 2024-04-23 RX ADMIN — Medication 1 MILLIGRAM(S): at 17:39

## 2024-04-23 NOTE — ED PROVIDER NOTE - PHYSICAL EXAMINATION
general: Well appearing, in no acute distress  HEENT: Normocephalic, atraumatic, extraocular movements intact  CV: tachycardic  Pulm: No respiratory distress, no tachypnea  Abd: Flat, no gross distension  Ext: warm and well perfused  Skin: No gross rashes or lesions  Neuro: Alert and oriented, moving all extremities, CN II-XII intact, sensation intact bilaterally though endorsing subjective tingling of RLE, motor intact, + SLR

## 2024-04-23 NOTE — ED PROVIDER NOTE - NS ED ROS FT
Constitutional: No fever or chills  Eyes: No discharge or drainage  Ears, Nose, Mouth, Throat: No nasal discharge, no sore throat  Cardiovascular: No chest pain, no palpitations  Respiratory: No shortness of breath, no cough  Gastrointestinal: No nausea or vomiting, no abdominal pain, no diarrhea or constipation  Musculoskeletal: No joint pain, no swelling, + back pain  Skin: No rashes or lesions  Neurological: No numbness, weakness, tingling, no headache

## 2024-04-23 NOTE — ED PROVIDER NOTE - NSFOLLOWUPINSTRUCTIONS_ED_ALL_ED_FT
Please take tylenol or motrin as needed. Please fill your medications prescribed to you yesterday. Return for worsening symptoms, worsening pain, numbness, weakness, tingling. Otherwise, please follow up closely with your primary physician and oncologist.    I hope you feel better soon!    Sincerely,  Lion Dorantes MD

## 2024-04-23 NOTE — ED PROVIDER NOTE - OBJECTIVE STATEMENT
44-year-old male with history of invasive anorectal squamous cell carcinoma, well-controlled HIV (on Biktarvy, last viral load undetectable a couple of months ago), chronic tachycardia, p/w severe L buttock pain radiating down left leg. Pt states this has been ongoing for months but worst over last 5 days. Has some associated tingling of L left leg. Also states that he has some difficulty urinating over last several weeks. Was here yesterday, was given prescription for methadone which patient has not filled yet. No new weakness. No trauma. No f/c. ROS as above.

## 2024-04-23 NOTE — ED ADULT NURSE NOTE - NSFALLUNIVINTERV_ED_ALL_ED
Bed/Stretcher in lowest position, wheels locked, appropriate side rails in place/Call bell, personal items and telephone in reach/Instruct patient to call for assistance before getting out of bed/chair/stretcher/Non-slip footwear applied when patient is off stretcher/[place] to call system/Physically safe environment - no spills, clutter or unnecessary equipment/Purposeful proactive rounding/Room/bathroom lighting operational, light cord in reach

## 2024-04-23 NOTE — ED ADULT NURSE NOTE - OBJECTIVE STATEMENT
44 male c/o L gluteal pain radiating to L leg. Hx anal cancer, on chemotherapy and radiation. Reports pain has gradually increased over the past few months. Pt reports he is tachycardic at baseline; HR 90s-130s. Denies CP, SOB, dizziness, or any other complaints at this time. AAOx4.

## 2024-04-23 NOTE — ED ADULT NURSE REASSESSMENT NOTE - ED CARDIAC RATE
Quality 226: Preventive Care And Screening: Tobacco Use: Screening And Cessation Intervention: Patient screened for tobacco use and is an ex/non-smoker Detail Level: Detailed Quality 130: Documentation Of Current Medications In The Medical Record: Current Medications Documented Quality 431: Preventive Care And Screening: Unhealthy Alcohol Use - Screening: Patient screened for unhealthy alcohol use using a single question and scores less than 2 times per year normal

## 2024-04-23 NOTE — ED ADULT NURSE NOTE - NSFALLRISKASMTTYPE_ED_ALL_ED
I will START or STAY ON the medications listed below when I get home from the hospital:    acetaminophen 325 mg oral tablet  -- 2 tab(s) by mouth every 6 hours, As needed, Temp greater or equal to 38C (100.4F), Moderate Pain (4 - 6)  -- Indication: For Fever    metFORMIN 500 mg oral tablet  -- 1 tab(s) by mouth 2 times a day   -- Check with your doctor before becoming pregnant.  Do not drink alcoholic beverages when taking this medication.  It is very important that you take or use this exactly as directed.  Do not skip doses or discontinue unless directed by your doctor.  Obtain medical advice before taking any non-prescription drugs as some may affect the action of this medication.  Take with food or milk.    -- Indication: For Diabetes    oseltamivir 75 mg oral capsule  -- 1 cap(s) by mouth 2 times a day  -- Indication: For Flu    acyclovir 5% topical ointment  -- 1 application on skin 5 times a day  -- Indication: For Herpes Cold Sore    guaiFENesin 100 mg/5 mL oral liquid  -- 5 milliliter(s) by mouth every 6 hours, As needed, Cough  -- Indication: For Cough    benzocaine-menthol 15 mg-3.6 mg mucous membrane lozenge  -- 1 lozenge by mouth 3 times a day  -- Indication: For Cough Initial (On Arrival)

## 2024-04-23 NOTE — ED PROVIDER NOTE - PATIENT PORTAL LINK FT
You can access the FollowMyHealth Patient Portal offered by [facility] by registering at the following website: [email or web address]. By joining [employer]’s FollowMyHealth portal, you will also be able to view your health information using other applications (apps) compatible with our system.

## 2024-04-23 NOTE — ED PROVIDER NOTE - CLINICAL SUMMARY MEDICAL DECISION MAKING FREE TEXT BOX
43 yo M with back pain, known rectal cancer, also subjective tingling of RLE with difficulty urinating, post void of 250. will obtain mri l spine for ro mets/cord compression, labs, analgesia. tachycardic but similar to baseline

## 2024-04-24 ENCOUNTER — NON-APPOINTMENT (OUTPATIENT)
Age: 44
End: 2024-04-24

## 2024-04-24 ENCOUNTER — APPOINTMENT (OUTPATIENT)
Dept: CT IMAGING | Facility: CLINIC | Age: 44
End: 2024-04-24

## 2024-04-24 ENCOUNTER — APPOINTMENT (OUTPATIENT)
Dept: PALLIATIVE MEDICINE | Facility: CLINIC | Age: 44
End: 2024-04-24
Payer: MEDICAID

## 2024-04-24 VITALS
BODY MASS INDEX: 17.76 KG/M2 | TEMPERATURE: 97.9 F | OXYGEN SATURATION: 97 % | RESPIRATION RATE: 18 BRPM | DIASTOLIC BLOOD PRESSURE: 68 MMHG | HEIGHT: 73 IN | WEIGHT: 134 LBS | HEART RATE: 157 BPM | SYSTOLIC BLOOD PRESSURE: 109 MMHG

## 2024-04-24 DIAGNOSIS — M54.50 LOW BACK PAIN, UNSPECIFIED: ICD-10-CM

## 2024-04-24 DIAGNOSIS — Z85.048 PERSONAL HISTORY OF OTHER MALIGNANT NEOPLASM OF RECTUM, RECTOSIGMOID JUNCTION, AND ANUS: ICD-10-CM

## 2024-04-24 DIAGNOSIS — R00.0 TACHYCARDIA, UNSPECIFIED: ICD-10-CM

## 2024-04-24 DIAGNOSIS — F11.93 OPIOID USE, UNSPECIFIED WITH WITHDRAWAL: ICD-10-CM

## 2024-04-24 DIAGNOSIS — Z21 ASYMPTOMATIC HUMAN IMMUNODEFICIENCY VIRUS [HIV] INFECTION STATUS: ICD-10-CM

## 2024-04-24 PROCEDURE — G2211 COMPLEX E/M VISIT ADD ON: CPT | Mod: NC,1L

## 2024-04-24 PROCEDURE — 99215 OFFICE O/P EST HI 40 MIN: CPT

## 2024-04-24 NOTE — VITALS
[OTC] : OTC [Maximal Pain Intensity: 2/10] : 2/10 [Least Pain Intensity: 1/10] : 1/10 [80: Normal activity with effort; some signs or symptoms of disease.] : 80: Normal activity with effort; some signs or symptoms of disease.  [ECOG Performance Status: 2 - Ambulatory and capable of all self care but unable to carry out any work activities] : Performance Status: 2 - Ambulatory and capable of all self care but unable to carry out any work activities. Up and about more than 50% of waking hours

## 2024-04-24 NOTE — HISTORY OF PRESENT ILLNESS
[FreeTextEntry1] : Delonte Rojo is a 43y year old M with PMH HIV (on biktarvy), polysubstance abuse, now with at least cT4 invasive anorectal squamous cell carcinoma. RT consulted while inpatient to discuss treatment options.   4/17/24: OTV 1980cGy/5400cGy/11/30fx to anus/pelvis. Mr. Ge Lua reports mild fatigue and fair appetite. States he is having rectal pain but has run out of his oxycodone, email sent to Dr. Taylor on patient's behalf. No nausea or abdominal pain. Bowel movements are still loose but are slowing down. Script sent for Imodium. On exam, fistulas with some serous drainage, no skin changes or skin breakdown.  Plan to continue radiation.   4/10/24: OTV 1260cGy/5400cGy, 7/30fx to anus/pelvis. Mr. Ge Lua reports fatigue and loss of taste. Appetite is fair. He has been started on capecitabine. He denies pain. He is experiencing constipation alternating with diarrhea and he is medicating for both. He continues to have some difficulty having a bowel movement but states that overall he is feeling better. He denies rectal bleeding. Plan to continue radiation.   4/3/24: OTV  180cGy/5400cGy, 1/30fx to anus/pelvis. Mr. Rojo reports feeling well overall. He denies fatigue. Appetite is good. No abdominal pain, N/V/C/D, or rectal bleeding. Some sensitivity when sitting. Plan to continue radiation.   History of Present Illness  _______________________________________  Patient had recent admission 12/9 - 12/12 for spontaneous drainage and mild pain from bilateral gluteal abscesses. Notably he also had progressing R leg  numbness. He had previously had perianal I and D in [facility] and [facility]. Rectoanal mass noted on imaging, and underwent biopsy and EUA 12/13 which confirmed diagnosis of squamous cell carcinoma, moderately differentiated. Patient was recommended for staging imaging at that time, but left AMA. He did not follow up with med onc as scheduled and was unable to be reached. Patient reports this was due to his phone service being shut off.     He presented to the ED after large volume blood per rectum.     1/3/24: MRI Pelvis - limited exam with only 3 sequences obtained. No evidence for a large infiltrative mass centered in the lower rectum with invasion into  the sacrum and musculature     1/9/24: CT Chest - mild paraseptal emphysema. no evidence of metastatic disease     2/2/24: CTA - without signs of local regional metastases     He lives in [facility], current smoker  (trying to cut down). Denies alcohol other/drug use. Endorses significant pain in rectal area     On exam:  NAD, A&Ox3, asking appropriate questions  Anal lesion extending from canal along R and L perianal skin ~4cm. Contour of gluteal skin with ulcers 2/2 prior abscess vs underlying tumor. Oozing, no  delmy bright red blood.

## 2024-04-24 NOTE — ASSESSMENT
[FreeTextEntry1] : - 43 M with HIV (on biktarvy), invasive anorectal squamous cell carcinoma, polysubstance abuse, syphilis, anemia and abdominal MRSA 2021 here for palliative care support.  #Cancer-related pain - Methadone 10mg BID - Oxycodone 10mg PO q4h PRN - Counseled to note down when he takes each medication - Narcan intranasal spray sent to pharmacy. Pt and family counseled on proper use. - Senna 2 tabs nightly - Tylenol 1000mg q6h PRN. Counseled not to exceed 1000mg per dose  #Opioid Withdrawal Patient appears to have some minor withdrawal symptoms including tremors and diarrhea in setting of prescribed opioid overuse and then running out of prescribed medication - Restart opioids as above - Counseled on taking medications as prescribed only  #Anxiety - Referral to Dr. Ryan for psychotherapy.  F/u in 1 week

## 2024-04-24 NOTE — HISTORY OF PRESENT ILLNESS
[FreeTextEntry1] : Delonte Rojo is a 43y year old M with PMH HIV (on biktarvy), polysubstance abuse, now with at least cT4 invasive anorectal squamous cell carcinoma. RT consulted while inpatient to discuss treatment options.   4/10/24: OTV 1260cGy/5400cGy, 7/30fx to anus/pelvis. Mr. Rojo reports fatigue and loss of taste. Appetite is fair. He has been started on capecitabine. He denies pain. He is experiencing constipation alternating with diarrhea and he is medicating for both. He continues to have some difficulty having a bowel movement but states that overall he is feeling better. He denies rectal bleeding. Plan to continue radiation.   4/3/24: OTV  180cGy/5400cGy, 1/30fx to anus/pelvis. Mr. Rojo reports feeling well overall. He denies fatigue. Appetite is good. No abdominal pain, N/V/C/D, or rectal bleeding. Some sensitivity when sitting. Plan to continue radiation.   History of Present Illness  _______________________________________  Patient had recent admission 12/9 - 12/12 for spontaneous drainage and mild pain from bilateral gluteal abscesses. Notably he also had progressing R leg  numbness. He had previously had perianal I and D in [facility] and [facility]. Rectoanal mass noted on imaging, and underwent biopsy and EUA 12/13 which confirmed diagnosis of squamous cell carcinoma, moderately differentiated. Patient was recommended for staging imaging at that time, but left AMA. He did not follow up with med onc as scheduled and was unable to be reached. Patient reports this was due to his phone service being shut off.     He presented to the ED after large volume blood per rectum.     1/3/24: MRI Pelvis - limited exam with only 3 sequences obtained. No evidence for a large infiltrative mass centered in the lower rectum with invasion into  the sacrum and musculature     1/9/24: CT Chest - mild paraseptal emphysema. no evidence of metastatic disease     2/2/24: CTA - without signs of local regional metastases     He lives in [facility], current smoker  (trying to cut down). Denies alcohol other/drug use. Endorses significant pain in rectal area     On exam:  NAD, A&Ox3, asking appropriate questions  Anal lesion extending from canal along R and L perianal skin ~4cm. Contour of gluteal skin with ulcers 2/2 prior abscess vs underlying tumor. Oozing, no  delmy bright red blood.

## 2024-04-24 NOTE — DISEASE MANAGEMENT
[Clinical] : TNM Stage: c [TTNM] : 4 [NTNM] : 0 [MTNM] : 0 [IV] : IV [de-identified] : 1980G [de-identified] : 5400cGy [de-identified] : anus/pelvis

## 2024-04-24 NOTE — HISTORY OF PRESENT ILLNESS
[FreeTextEntry1] : Accompanied by friend.  43 M with HIV (on biktarvy), invasive anorectal squamous cell carcinoma, polysubstance abuse, syphilis, anemia and abdominal MRSA 2021 here for palliative care support.  Onc Hx: - anal mass biopsy 12/13/23 - invasive squamous cell carcinoma, moderately differentiated - CT Chest 1/9/24 - mild paraseptal emphysema. no evidence of metastatic disease - MRI pelvis 1/3/24 - limited exam with only 3 sequences obtained. Showing invasion into sacrum, right hemipelvis, and invasion posteriorly into gluteal musculature - CTA 2/2/24 - without signs of local regional metastases, large lobulated rectal tumor dissecting into adjacent soft tissues and bones overall similar in appearance to the previo - colonoscopy/anoscopy may be difficult due to bulk of disease - CEA elevated at 192.0 prior to treatment - PET/CT (2/9/24): Intense uptake is seen in the patient's large pelvic mass known to be an invasive squamous cell carcinoma of the anus. There is extension into the left gluteal region, but no evidence of FDG avid local or distant metastatic disease. 3/1-3/7/24 - Admitted to [facility] with pain. 3/1/24-3/7/24 - Admitted to [facility]. UTox positive for amphetamines and GHB on admission (negative for opioids and methadone). Pain controlled with MSER 15 TID and oxycodone 5 during admission but never picked up any prescriptions on discharge. Endorses that he was using crystal meth and GHB prior to last hospitalization but has not used any since then and pain has been very poorly managed.  Providers: Oncologist - Nabila Julian Rad Onc - Itz Owens  Interval Hx: Last seen in clinic 6 weeks ago due to loss of insurance initially and then not making f/u appointments. Endorses running out of methadone but per ISTOP, should have one week left (picked up 30 day supply on 4/1 - 60 pills for BID dosing). Also endorses running out of oxycodone 2 days ago despite speaking to him yesterday when he told me he had some. Has visited ER 3 times in past week due to pain. Developed loose stools, multiple times a day though cannot clearly identify when that began. Appears somewhat shaky today. Endorses finding one of his methadone pills this morning and took it which improved his paintoday. Currently undergoing RT, last session scheduled 5/14. Feels that the size of the mass has improved and he is having less trouble going to the bathroom.  SYMPTOMS: #Pain Taking oxycodone 10mg which brings down pain to a tolerable level almost down to a background level. Tends to take it before the 4 hours due to fear the pain will come back but cannot give me an estimate of how many times a day he takes it. Endorses running out of methadone one week ago but did not let me know he ran out. Took one pill of methadone this morning which he endorses helps the pain a little today (he found a missing pill with his roommate but has run out of the rest). Endorsed that he was taking PRN and not BID ATC as prescribed. Also taking tylenol 4 pills of tylenol at a time (325mg pills) twice daily with some relief. Previously was using crystal meth and GHB to help with the pain because tylenol was not helping enough. Denied further use of illicit drugs since starting opioids. Location - Buttocks b/l Quality - Sharp Radiation - Down L leg Timing - Constant Aggravating factors - Nothing Minimal acceptable level (0-10 scale) - 3/10 Severity in last 24h (0-10 scale) - 9/10 Current score (0-10 scale) - 4/10 ISTOP Ref #: [id number]  #Constipation/Diarrhea Was having constipation but recently starting having multiple BMs per day. Takes senna 3x weekly normally. Currently off opioids which may be cause of diarrhea.  #Anxiety Feeling anxious recently but has not spoken to a therapist or taken anything for the anxiety.  #Poor appetite Eating little due to poor appetite vs. pain. Taking cannabis gummy which is helping with pain and appetite.  #N/V Constant nausea but very little vomiting. Taking ondansetron and cannabis gummies which are both helpful.  ROS: If [ ] blank, symptom not present Fatigue [ ] Nausea [X] Loss of appetite [X] Unintentional weight loss [ ] Constipation [ ] Diarrhea [X] Anxiety [X] Low mood [ ] Other symptoms: [x ] All other review of symptoms negative  SH: Friend lives with him to help him.

## 2024-04-24 NOTE — PHYSICAL EXAM
[General Appearance - Alert] : alert [No Oral Pallor] : no oral pallor [Outer Ear] : the ears and nose were normal in appearance [Hearing Threshold Finger Rub Not [place]] : hearing was normal [Neck Appearance] : the appearance of the neck was normal [Neck Cervical Mass (___cm)] : no neck mass was observed [Respiration, Rhythm And Depth] : normal respiratory rhythm and effort [Auscultation Breath Sounds / Voice Sounds] : lungs were clear to auscultation bilaterally [Heart Sounds] : normal S1 and S2 [Veins - Varicosity Changes] : there were no varicosital changes [Abdomen Soft] : soft [Abdomen Tenderness] : non-tender [Abdomen Mass (___ Cm)] : no abdominal mass palpated [Skin Color & Pigmentation] : normal skin color and pigmentation [Skin Turgor] : normal skin turgor [] : no rash [Oriented To Time, Place, And Person] : oriented to person, place, and time [Impaired Insight] : insight and judgment were intact [Affect] : the affect was normal [FreeTextEntry1] : Hand tremors on extension, no tremors at rest

## 2024-04-24 NOTE — VITALS
[Maximal Pain Intensity: 4/10] : 4/10 [Opioid] : opioid [Least Pain Intensity: 0/10] : 0/10 [70: Cares for self; unalbe to carry on normal activity or do active work.] : 70: Cares for self; unable to carry on normal activity or do active work. [ECOG Performance Status: 2 - Ambulatory and capable of all self care but unable to carry out any work activities] : Performance Status: 2 - Ambulatory and capable of all self care but unable to carry out any work activities. Up and about more than 50% of waking hours

## 2024-04-24 NOTE — DISEASE MANAGEMENT
[Clinical] : TNM Stage: c [TTNM] : 4 [NTNM] : 0 [MTNM] : 0 [IV] : IV [de-identified] : 1260cG [de-identified] : 5400cGy [de-identified] : anus/pelvis

## 2024-04-25 DIAGNOSIS — R00.0 TACHYCARDIA, UNSPECIFIED: ICD-10-CM

## 2024-04-25 DIAGNOSIS — M54.50 LOW BACK PAIN, UNSPECIFIED: ICD-10-CM

## 2024-04-25 DIAGNOSIS — Z85.048 PERSONAL HISTORY OF OTHER MALIGNANT NEOPLASM OF RECTUM, RECTOSIGMOID JUNCTION, AND ANUS: ICD-10-CM

## 2024-04-25 DIAGNOSIS — Z21 ASYMPTOMATIC HUMAN IMMUNODEFICIENCY VIRUS [HIV] INFECTION STATUS: ICD-10-CM

## 2024-04-25 DIAGNOSIS — R20.2 PARESTHESIA OF SKIN: ICD-10-CM

## 2024-04-26 NOTE — VITALS
[Maximal Pain Intensity: 0/10] : 0/10 [80: Normal activity with effort; some signs or symptoms of disease.] : 80: Normal activity with effort; some signs or symptoms of disease.  [ECOG Performance Status: 2 - Ambulatory and capable of all self care but unable to carry out any work activities] : Performance Status: 2 - Ambulatory and capable of all self care but unable to carry out any work activities. Up and about more than 50% of waking hours

## 2024-04-26 NOTE — REVIEW OF SYSTEMS
[Rectal Pain: Grade 2 - Moderate pain; limiting instrumental ADL] : Rectal Pain: Grade 2 - Moderate pain; limiting instrumental ADL [Fatigue: Grade 1 - Fatigue relieved by rest] : Fatigue: Grade 1 - Fatigue relieved by rest [Skin Hyperpigmentation: Grade 0] : Skin Hyperpigmentation: Grade 0 [Dermatitis Radiation: Grade 0] : Dermatitis Radiation: Grade 0 [Diarrhea: Grade 2 - Increase of 4 - 6 stools per day over baseline; moderate increase in ostomy output compared to baseline] : Diarrhea: Grade 2 - Increase of 4 - 6 stools per day over baseline; moderate increase in ostomy output compared to baseline

## 2024-05-01 ENCOUNTER — RX RENEWAL (OUTPATIENT)
Age: 44
End: 2024-05-01

## 2024-05-01 ENCOUNTER — APPOINTMENT (OUTPATIENT)
Dept: PALLIATIVE MEDICINE | Facility: CLINIC | Age: 44
End: 2024-05-01
Payer: MEDICAID

## 2024-05-01 ENCOUNTER — APPOINTMENT (OUTPATIENT)
Dept: HEMATOLOGY ONCOLOGY | Facility: CLINIC | Age: 44
End: 2024-05-01
Payer: MEDICAID

## 2024-05-01 ENCOUNTER — NON-APPOINTMENT (OUTPATIENT)
Age: 44
End: 2024-05-01

## 2024-05-01 ENCOUNTER — LABORATORY RESULT (OUTPATIENT)
Age: 44
End: 2024-05-01

## 2024-05-01 ENCOUNTER — OUTPATIENT (OUTPATIENT)
Dept: OUTPATIENT SERVICES | Facility: HOSPITAL | Age: 44
LOS: 1 days | End: 2024-05-01

## 2024-05-01 VITALS
SYSTOLIC BLOOD PRESSURE: 99 MMHG | HEIGHT: 73 IN | BODY MASS INDEX: 17.23 KG/M2 | HEART RATE: 112 BPM | RESPIRATION RATE: 18 BRPM | OXYGEN SATURATION: 100 % | DIASTOLIC BLOOD PRESSURE: 65 MMHG | TEMPERATURE: 97.8 F | WEIGHT: 130 LBS

## 2024-05-01 VITALS
SYSTOLIC BLOOD PRESSURE: 112 MMHG | DIASTOLIC BLOOD PRESSURE: 74 MMHG | TEMPERATURE: 99.1 F | HEART RATE: 165 BPM | WEIGHT: 133 LBS | BODY MASS INDEX: 17.55 KG/M2

## 2024-05-01 VITALS
WEIGHT: 130 LBS | SYSTOLIC BLOOD PRESSURE: 76 MMHG | RESPIRATION RATE: 18 BRPM | TEMPERATURE: 97.8 F | HEIGHT: 73 IN | DIASTOLIC BLOOD PRESSURE: 41 MMHG | OXYGEN SATURATION: 98 % | BODY MASS INDEX: 17.23 KG/M2 | HEART RATE: 127 BPM

## 2024-05-01 VITALS
DIASTOLIC BLOOD PRESSURE: 65 MMHG | OXYGEN SATURATION: 100 % | RESPIRATION RATE: 18 BRPM | SYSTOLIC BLOOD PRESSURE: 99 MMHG | HEART RATE: 112 BPM

## 2024-05-01 DIAGNOSIS — Z98.890 OTHER SPECIFIED POSTPROCEDURAL STATES: Chronic | ICD-10-CM

## 2024-05-01 DIAGNOSIS — T45.1X5A TOXIC GASTROENTERITIS AND COLITIS: ICD-10-CM

## 2024-05-01 DIAGNOSIS — K52.1 TOXIC GASTROENTERITIS AND COLITIS: ICD-10-CM

## 2024-05-01 DIAGNOSIS — R19.7 DIARRHEA, UNSPECIFIED: ICD-10-CM

## 2024-05-01 DIAGNOSIS — C21.0 MALIGNANT NEOPLASM OF ANUS, UNSPECIFIED: ICD-10-CM

## 2024-05-01 LAB
ALBUMIN SERPL ELPH-MCNC: 2.4 G/DL
ALP BLD-CCNC: 96 U/L
ALT SERPL-CCNC: 24 U/L
ANION GAP SERPL CALC-SCNC: 7 MMOL/L
AST SERPL-CCNC: 23 U/L
BILIRUB SERPL-MCNC: 0.5 MG/DL
BUN SERPL-MCNC: 9 MG/DL
CALCIUM SERPL-MCNC: 8.9 MG/DL
CHLORIDE SERPL-SCNC: 101 MMOL/L
CO2 SERPL-SCNC: 29 MMOL/L
CREAT SERPL-MCNC: 0.5 MG/DL
EGFR: 131 ML/MIN/1.73M2
FERRITIN SERPL-MCNC: 596 NG/ML
GLUCOSE SERPL-MCNC: 128 MG/DL
IRON SATN MFR SERPL: 15 %
IRON SERPL-MCNC: 26 UG/DL
POTASSIUM SERPL-SCNC: 4.9 MMOL/L
PROT SERPL-MCNC: 6.9 G/DL
SODIUM SERPL-SCNC: 137 MMOL/L
TIBC SERPL-MCNC: 173 UG/DL
UIBC SERPL-MCNC: 147 UG/DL

## 2024-05-01 PROCEDURE — 86850 RBC ANTIBODY SCREEN: CPT

## 2024-05-01 PROCEDURE — 99214 OFFICE O/P EST MOD 30 MIN: CPT

## 2024-05-01 PROCEDURE — 99215 OFFICE O/P EST HI 40 MIN: CPT

## 2024-05-01 PROCEDURE — 86901 BLOOD TYPING SEROLOGIC RH(D): CPT

## 2024-05-01 PROCEDURE — G2211 COMPLEX E/M VISIT ADD ON: CPT | Mod: NC,1L

## 2024-05-01 PROCEDURE — 86900 BLOOD TYPING SEROLOGIC ABO: CPT

## 2024-05-01 NOTE — PHYSICAL EXAM
[General Appearance - Alert] : alert [General Appearance - In No Acute Distress] : in no acute distress [No Oral Pallor] : no oral pallor [Outer Ear] : the ears and nose were normal in appearance [Hearing Threshold Finger Rub Not [place]] : hearing was normal [Neck Appearance] : the appearance of the neck was normal [Neck Cervical Mass (___cm)] : no neck mass was observed [Respiration, Rhythm And Depth] : normal respiratory rhythm and effort [Auscultation Breath Sounds / Voice Sounds] : lungs were clear to auscultation bilaterally [Heart Sounds] : normal S1 and S2 [FreeTextEntry1] : Tachycardic, regular rhythm [Edema] : there was no peripheral edema [Veins - Varicosity Changes] : there were no varicosital changes [Abdomen Soft] : soft [Abdomen Tenderness] : non-tender [Abdomen Mass (___ Cm)] : no abdominal mass palpated [Skin Color & Pigmentation] : normal skin color and pigmentation [Skin Turgor] : normal skin turgor [] : no rash [Oriented To Time, Place, And Person] : oriented to person, place, and time [Impaired Insight] : insight and judgment were intact [Affect] : the affect was normal

## 2024-05-01 NOTE — ASSESSMENT
[FreeTextEntry1] : - 43 M with HIV (on biktarvy), invasive anorectal squamous cell carcinoma, polysubstance abuse, syphilis, anemia and abdominal MRSA 2021 here for palliative care support.  #Cancer-related pain - Methadone 10mg BID - Oxycodone 10mg PO q4h PRN - Counseled to note down when he takes each medication - Narcan intranasal spray sent to pharmacy. Pt and family counseled on proper use. - Senna 2 tabs nightly if constipation develops (has long h/o diarrhea) - Tylenol 1000mg q6h PRN. Counseled not to exceed 1000mg per dose  #Diarrhea - Loperamide PRN  #Poor appetite - Medical cannabis certification completed today. Counseled patient on [facility] Medical Cannabis program. - Start cannabis at 1:1 THC:CBD at 2mg THC per dose, 2-3 times daily.  #Anxiety - Referral to Dr. Ryan for psychotherapy.  F/u in 2 weeks

## 2024-05-01 NOTE — HISTORY OF PRESENT ILLNESS
[FreeTextEntry1] : Accompanied by friend Acosta  43 M with HIV (on biktarvy), invasive anorectal squamous cell carcinoma, polysubstance abuse, syphilis, anemia and abdominal MRSA 2021 here for palliative care support.  Onc Hx: - anal mass biopsy 12/13/23 - invasive squamous cell carcinoma, moderately differentiated - CT Chest 1/9/24 - mild paraseptal emphysema. no evidence of metastatic disease - MRI pelvis 1/3/24 - limited exam with only 3 sequences obtained. Showing invasion into sacrum, right hemipelvis, and invasion posteriorly into gluteal musculature - CTA 2/2/24 - without signs of local regional metastases, large lobulated rectal tumor dissecting into adjacent soft tissues and bones overall similar in appearance to the previo - colonoscopy/anoscopy may be difficult due to bulk of disease - CEA elevated at 192.0 prior to treatment - PET/CT (2/9/24): Intense uptake is seen in the patient's large pelvic mass known to be an invasive squamous cell carcinoma of the anus. There is extension into the left gluteal region, but no evidence of FDG avid local or distant metastatic disease. 3/1-3/7/24 - Admitted to [facility] with pain. 3/1/24-3/7/24 - Admitted to [facility]. UTox positive for amphetamines and GHB on admission (negative for opioids and methadone). Pain controlled with MSER 15 TID and oxycodone 5 during admission but never picked up any prescriptions on discharge. Endorses that he was using crystal meth and GHB prior to last hospitalization but has not used any since then and pain has been very poorly managed.  Providers: Oncologist - Nabila Julian Rad Onc - Itz Owens  Interval Hx: Continues to undergo RT, last session scheduled 5/14. Feels that the size of the mass has improved. Pain is better now that he is taking pain regimen as prescribed.  SYMPTOMS: #Pain Taking methadone 10mg BID and oxycodone 10mg PRN, on average 5 times per day. Pain much better this past week. Location - Buttocks b/l Quality - Sharp Radiation - Down L leg Timing - Constant Aggravating factors - Nothing Minimal acceptable level (0-10 scale) - 3/10 Severity in last 24h (0-10 scale) - 7/10 Current score (0-10 scale) - 5/10 ISTOP Ref #: [id number]  #Constipation/Diarrhea Was having constipation but recently starting having multiple BMs per day (4 times per day, loose stool). Not on any stool softeners. Was taking loperamide which was helping but stopped it recently.  #Anxiety Feeling anxious recently but has not spoken to a therapist or taken anything for the anxiety. Referred to Dr. Ryan on previous visit.  #Poor appetite Was eating little due to poor appetite. Was taking cannabis gummies which were helping appetite but received new batch from his friend and these have not been helping as much anymore. Acosta feels patient is eating less than half his normal.  ROS: If [ ] blank, symptom not present Fatigue [ ] Nausea [X] Loss of appetite [X] Unintentional weight loss [ ] Constipation [ ] Diarrhea [X] Anxiety [X] Low mood [ ] Other symptoms: [x ] All other review of symptoms negative  SH: Friend lives with him to help him. Crystal meth and GHB use in the past. Denies current illicit drug use.

## 2024-05-02 ENCOUNTER — OUTPATIENT (OUTPATIENT)
Dept: OUTPATIENT SERVICES | Facility: HOSPITAL | Age: 44
LOS: 1 days | End: 2024-05-02
Payer: MEDICAID

## 2024-05-02 ENCOUNTER — NON-APPOINTMENT (OUTPATIENT)
Age: 44
End: 2024-05-02

## 2024-05-02 ENCOUNTER — APPOINTMENT (OUTPATIENT)
Dept: INFUSION THERAPY | Facility: CLINIC | Age: 44
End: 2024-05-02

## 2024-05-02 VITALS
HEART RATE: 92 BPM | TEMPERATURE: 98 F | DIASTOLIC BLOOD PRESSURE: 57 MMHG | OXYGEN SATURATION: 99 % | SYSTOLIC BLOOD PRESSURE: 104 MMHG | RESPIRATION RATE: 16 BRPM

## 2024-05-02 VITALS
OXYGEN SATURATION: 99 % | DIASTOLIC BLOOD PRESSURE: 60 MMHG | HEART RATE: 94 BPM | SYSTOLIC BLOOD PRESSURE: 99 MMHG | TEMPERATURE: 98 F | RESPIRATION RATE: 18 BRPM | WEIGHT: 136.03 LBS | HEIGHT: 72 IN

## 2024-05-02 DIAGNOSIS — D64.9 ANEMIA, UNSPECIFIED: ICD-10-CM

## 2024-05-02 PROCEDURE — 86923 COMPATIBILITY TEST ELECTRIC: CPT

## 2024-05-02 PROCEDURE — P9040: CPT

## 2024-05-02 PROCEDURE — 36430 TRANSFUSION BLD/BLD COMPNT: CPT

## 2024-05-03 ENCOUNTER — NON-APPOINTMENT (OUTPATIENT)
Age: 44
End: 2024-05-03

## 2024-05-05 PROBLEM — K52.1 CHEMOTHERAPY-INDUCED DIARRHEA: Status: ACTIVE | Noted: 2024-04-21

## 2024-05-05 NOTE — ASSESSMENT
[FreeTextEntry1] : Delonte Lua is a 44 year old man with AIDS and a bulky, locally advanced anal cancer.   He initially presented to [facility] and was diagnosed with anal cancer in 12/2023. He has had extensive imaging - CT scans, pelvic MRI, Pet-CT. He has a bulky, locally invasive anal cancer that has invaded several local structures including the prostate, gluteal musculature, and coccyx/sacrum. No distant metastatic disease has been detected. From a psychosocial standpoint, the patient has additional complicating factors including housing insecurity, poor support/no caregiver, substance abuse, and mental illness.  He missed several appointments after the initial hospitalization.  Started RT to anus/pelvis and capecitabine(1500mg/1000mg) on 4/3/24, received mitomycin on 4/5/24, undergoing RT  Plan: 1. bulky, locally advanced anal cancer --case has been discussed in multidisciplinary format - both in conference and separately w/ the involved physicians colorectal surgeon Dr. Cortez and radiation oncologist Dr Owens --potential role for diverting ostomy has been discussed, not recommended at this time by CRS --favor upfront treatment with concurrent chemoradiation which probably offers the patient the best opportunity for local control and palliation of symptoms.  --would utilize mitomycin-C + capecitabine. His dose of capecitabine would be 825 mg/m2 = 1500 mg po qAM and 1000 mg po qPM on radiation days. --started RT to anus/pelvis and capecitabine(1500mg/1000mg) on 4/3/24, received mitomycin on 4/5/24, tolerating treatment with manageable toxicities. Labs reviewed, notable for anemia, plan as below. Continue RT with capecitabine.  Plan to complete RT on 5/15.  --he is immunosuppressed and may have a large radiation field - both risk factors for infectious complications and myelosuppression.  Will need careful monitoring while on treatment, including CBC + diff every 1-2 weeks to monitor for significant myelosuppression.   2. HIV/AIDS --100% adherence to ART advised. Educated pt about the importance of ART in attaining the best oncologic outcomes. --follow with Dr Mikayla Lang for HIV care.   3. anal pain --pt does not have distant metastatic disease, but he has a very locally advanced cancer, likelihood of cure is probably very slim. --on oxycodone 10mg every 4 hours, Methadone with pain relief, continue to follow up with Dr. Taylor.   4. psychosocial support --SW on board --consulted oncology navigators --may need to involve psycho-oncologist as well, if pt proceeds w/ treatment through [facility]. Will address next visit.   5. photosensitivity --missed head CT with IV contrast last week, rescheduled for 5/7/24  6.increased bowel movement/diarrhea  --loperamide as prn, continue capecitabine with RT --maintain good PO hydration   7. decreased appetite/hypoalbuminemia  --albumin 2.4 --encourage Ensure 1-2 per day --eat frequent small meal with protein rich diet.   8. anemia 2/2 chemo and chronic blood loss  --dropped to 7.0. he is symptomatic.   --ferritin 596 iron serum 26, transferrin sat 15%, No need IV iron at this time.  --will give 1 unit PRBC tomorrow.   --return precautions reviewed  plan discussed with Dr. Julian RTC 2 weeks or sooner if any worsening symptoms  labs-CBC, CMP

## 2024-05-05 NOTE — PHYSICAL EXAM
[Restricted in physically strenuous activity but ambulatory and able to carry out work of a light or sedentary nature] : Status 1- Restricted in physically strenuous activity but ambulatory and able to carry out work of a light or sedentary nature, e.g., light house work, office work [Thin] : thin [Normal] : affect appropriate [de-identified] : uncomfortable appearing due to pain  [de-identified] : tachycardic, regular  [de-identified] : no LE edema

## 2024-05-07 ENCOUNTER — APPOINTMENT (OUTPATIENT)
Dept: CT IMAGING | Facility: HOSPITAL | Age: 44
End: 2024-05-07

## 2024-05-08 ENCOUNTER — NON-APPOINTMENT (OUTPATIENT)
Age: 44
End: 2024-05-08

## 2024-05-08 VITALS
OXYGEN SATURATION: 100 % | TEMPERATURE: 98.7 F | WEIGHT: 131 LBS | HEART RATE: 101 BPM | DIASTOLIC BLOOD PRESSURE: 65 MMHG | BODY MASS INDEX: 17.28 KG/M2 | RESPIRATION RATE: 22 BRPM | SYSTOLIC BLOOD PRESSURE: 102 MMHG

## 2024-05-08 NOTE — HISTORY OF PRESENT ILLNESS
[FreeTextEntry1] : Delonte Rojo is a 43y year old M with PMH HIV (on biktarvy), polysubstance abuse, now with at least cT4 invasive anorectal squamous cell carcinoma. RT consulted while inpatient to discuss treatment options.   5/1/24: OTV 3600cGy/5400cGy, 20/30fx to anus/pelvis. Mr. Ge Lua reports fatigue. He states his appetite is good. He denies N/V/C/D, or blood in his stools. He states his stools are more formed and easier to pass. he has rectal pain which is relieved with oxycodone. Plan to continue radiation.   4/25/24: OTV 2880cGy/5400cGy, 16/30fx to anus/pelvis. Mr. Ge Lua reports improved pain relief since medications addressed by DR. Taylor. Appetite remains fair and he continues to have mild fatigue. He denies nausea. Bowel movements are slightly easier to pass. Plan to continue radiation.   4/17/24: OTV 1980cGy/5400cGy/11/30fx to anus/pelvis. Mr. Ge Lua reports mild fatigue and fair appetite. States he is having rectal pain but has run out of his oxycodone, email sent to Dr. Taylor on patient's behalf. No nausea or abdominal pain. Bowel movements are still loose but are slowing down. Script sent for Imodium. Plan to continue radiation.   4/10/24: OTV 1260cGy/5400cGy, 7/30fx to anus/pelvis. Mr. Ge Lua reports fatigue and loss of taste. Appetite is fair. He has been started on capecitabine. He denies pain. He is experiencing constipation alternating with diarrhea and he is medicating for both. He continues to have some difficulty having a bowel movement but states that overall he is feeling better. He denies rectal bleeding. Plan to continue radiation.   4/3/24: OTV  180cGy/5400cGy, 1/30fx to anus/pelvis. Mr. Rojo reports feeling well overall. He denies fatigue. Appetite is good. No abdominal pain, N/V/C/D, or rectal bleeding. Some sensitivity when sitting. Plan to continue radiation.   History of Present Illness  _______________________________________  Patient had recent admission 12/9 - 12/12 for spontaneous drainage and mild pain from bilateral gluteal abscesses. Notably he also had progressing R leg  numbness. He had previously had perianal I and D in [facility] and [facility]. Rectoanal mass noted on imaging, and underwent biopsy and EUA 12/13 which confirmed diagnosis of squamous cell carcinoma, moderately differentiated. Patient was recommended for staging imaging at that time, but left AMA. He did not follow up with med onc as scheduled and was unable to be reached. Patient reports this was due to his phone service being shut off.     He presented to the ED after large volume blood per rectum.     1/3/24: MRI Pelvis - limited exam with only 3 sequences obtained. No evidence for a large infiltrative mass centered in the lower rectum with invasion into  the sacrum and musculature     1/9/24: CT Chest - mild paraseptal emphysema. no evidence of metastatic disease     2/2/24: CTA - without signs of local regional metastases     He lives in [facility], current smoker  (trying to cut down). Denies alcohol other/drug use. Endorses significant pain in rectal area     On exam:  NAD, A&Ox3, asking appropriate questions  Anal lesion extending from canal along R and L perianal skin ~4cm. Contour of gluteal skin with ulcers 2/2 prior abscess vs underlying tumor. Oozing, no  delmy bright red blood.

## 2024-05-08 NOTE — DISEASE MANAGEMENT
[Clinical] : TNM Stage: c [TTNM] : 4 [NTNM] : 0 [MTNM] : 0 [IV] : IV [de-identified] : 4244sG [de-identified] : 5400cGy [de-identified] : anus/pelvis

## 2024-05-08 NOTE — HISTORY OF PRESENT ILLNESS
[FreeTextEntry1] : Delonte Rojo is a 43y year old M with PMH HIV (on biktarvy), polysubstance abuse, now with at least cT4 invasive anorectal squamous cell carcinoma. RT consulted while inpatient to discuss treatment options.   4/25/24: OTV 2880cGy/5400cGy, 16/30fx to anus/pelvis. Mr. Ge Lua reports improved pain relief since medications addressed by DR. Taylor. Appetite remains fair and he continues to have mild fatigue. He denies nausea. Bowel movements are slightly easier to pass. Plan to continue radiation.   4/17/24: OTV 1980cGy/5400cGy/11/30fx to anus/pelvis. Mr. Ge Lua reports mild fatigue and fair appetite. States he is having rectal pain but has run out of his oxycodone, email sent to Dr. Taylor on patient's behalf. No nausea or abdominal pain. Bowel movements are still loose but are slowing down. Script sent for Imodium. Plan to continue radiation.   4/10/24: OTV 1260cGy/5400cGy, 7/30fx to anus/pelvis. Mr. Ge Lua reports fatigue and loss of taste. Appetite is fair. He has been started on capecitabine. He denies pain. He is experiencing constipation alternating with diarrhea and he is medicating for both. He continues to have some difficulty having a bowel movement but states that overall he is feeling better. He denies rectal bleeding. Plan to continue radiation.   4/3/24: OTV  180cGy/5400cGy, 1/30fx to anus/pelvis. Mr. Rojo reports feeling well overall. He denies fatigue. Appetite is good. No abdominal pain, N/V/C/D, or rectal bleeding. Some sensitivity when sitting. Plan to continue radiation.   History of Present Illness  _______________________________________  Patient had recent admission 12/9 - 12/12 for spontaneous drainage and mild pain from bilateral gluteal abscesses. Notably he also had progressing R leg  numbness. He had previously had perianal I and D in [facility] and [facility]. Rectoanal mass noted on imaging, and underwent biopsy and EUA 12/13 which confirmed diagnosis of squamous cell carcinoma, moderately differentiated. Patient was recommended for staging imaging at that time, but left AMA. He did not follow up with med onc as scheduled and was unable to be reached. Patient reports this was due to his phone service being shut off.     He presented to the ED after large volume blood per rectum.     1/3/24: MRI Pelvis - limited exam with only 3 sequences obtained. No evidence for a large infiltrative mass centered in the lower rectum with invasion into  the sacrum and musculature     1/9/24: CT Chest - mild paraseptal emphysema. no evidence of metastatic disease     2/2/24: CTA - without signs of local regional metastases     He lives in [facility], current smoker  (trying to cut down). Denies alcohol other/drug use. Endorses significant pain in rectal area     On exam:  NAD, A&Ox3, asking appropriate questions  Anal lesion extending from canal along R and L perianal skin ~4cm. Contour of gluteal skin with ulcers 2/2 prior abscess vs underlying tumor. Oozing, no  delmy bright red blood.

## 2024-05-08 NOTE — DISEASE MANAGEMENT
[Clinical] : TNM Stage: c [TTNM] : 4 [NTNM] : 0 [MTNM] : 0 [IV] : IV [de-identified] : 5730cG [de-identified] : 5400cGy [de-identified] : anus/pelvis

## 2024-05-08 NOTE — VITALS
[Maximal Pain Intensity: 6/10] : 6/10 [Opioid] : opioid [80: Normal activity with effort; some signs or symptoms of disease.] : 80: Normal activity with effort; some signs or symptoms of disease.  [ECOG Performance Status: 2 - Ambulatory and capable of all self care but unable to carry out any work activities] : Performance Status: 2 - Ambulatory and capable of all self care but unable to carry out any work activities. Up and about more than 50% of waking hours

## 2024-05-12 ENCOUNTER — EMERGENCY (EMERGENCY)
Facility: HOSPITAL | Age: 44
LOS: 1 days | Discharge: HOME | End: 2024-05-12
Admitting: EMERGENCY MEDICINE
Payer: MEDICAID

## 2024-05-12 VITALS
HEIGHT: 72 IN | SYSTOLIC BLOOD PRESSURE: 114 MMHG | TEMPERATURE: 98 F | HEART RATE: 124 BPM | RESPIRATION RATE: 18 BRPM | WEIGHT: 130.07 LBS | DIASTOLIC BLOOD PRESSURE: 67 MMHG | OXYGEN SATURATION: 97 %

## 2024-05-12 VITALS
RESPIRATION RATE: 18 BRPM | OXYGEN SATURATION: 98 % | TEMPERATURE: 98 F | HEART RATE: 92 BPM | SYSTOLIC BLOOD PRESSURE: 108 MMHG | DIASTOLIC BLOOD PRESSURE: 68 MMHG

## 2024-05-12 DIAGNOSIS — Z98.890 OTHER SPECIFIED POSTPROCEDURAL STATES: Chronic | ICD-10-CM

## 2024-05-12 LAB
ALBUMIN SERPL ELPH-MCNC: 2.1 G/DL — LOW (ref 3.4–5)
ALP SERPL-CCNC: 139 U/L — HIGH (ref 40–120)
ALT FLD-CCNC: 25 U/L — SIGNIFICANT CHANGE UP (ref 12–42)
ANION GAP SERPL CALC-SCNC: 8 MMOL/L — LOW (ref 9–16)
ANISOCYTOSIS BLD QL: SIGNIFICANT CHANGE UP
AST SERPL-CCNC: 25 U/L — SIGNIFICANT CHANGE UP (ref 15–37)
BASOPHILS # BLD AUTO: 0.01 K/UL — SIGNIFICANT CHANGE UP (ref 0–0.2)
BASOPHILS NFR BLD AUTO: 0.1 % — SIGNIFICANT CHANGE UP (ref 0–2)
BILIRUB SERPL-MCNC: 0.2 MG/DL — SIGNIFICANT CHANGE UP (ref 0.2–1.2)
BUN SERPL-MCNC: 12 MG/DL — SIGNIFICANT CHANGE UP (ref 7–23)
CALCIUM SERPL-MCNC: 8.7 MG/DL — SIGNIFICANT CHANGE UP (ref 8.5–10.5)
CHLORIDE SERPL-SCNC: 103 MMOL/L — SIGNIFICANT CHANGE UP (ref 96–108)
CO2 SERPL-SCNC: 27 MMOL/L — SIGNIFICANT CHANGE UP (ref 22–31)
CREAT SERPL-MCNC: 0.88 MG/DL — SIGNIFICANT CHANGE UP (ref 0.5–1.3)
EGFR: 110 ML/MIN/1.73M2 — SIGNIFICANT CHANGE UP
EOSINOPHIL # BLD AUTO: 0.12 K/UL — SIGNIFICANT CHANGE UP (ref 0–0.5)
EOSINOPHIL NFR BLD AUTO: 1.3 % — SIGNIFICANT CHANGE UP (ref 0–6)
GLUCOSE SERPL-MCNC: 114 MG/DL — HIGH (ref 70–99)
HCT VFR BLD CALC: 27 % — LOW (ref 39–50)
HGB BLD-MCNC: 8.2 G/DL — LOW (ref 13–17)
HYPOCHROMIA BLD QL: SIGNIFICANT CHANGE UP
IMM GRANULOCYTES NFR BLD AUTO: 0.5 % — SIGNIFICANT CHANGE UP (ref 0–0.9)
LACTATE BLDV-MCNC: 1.2 MMOL/L — SIGNIFICANT CHANGE UP (ref 0.5–2)
LYMPHOCYTES # BLD AUTO: 0.17 K/UL — LOW (ref 1–3.3)
LYMPHOCYTES # BLD AUTO: 1.8 % — LOW (ref 13–44)
MACROCYTES BLD QL: SLIGHT — SIGNIFICANT CHANGE UP
MAGNESIUM SERPL-MCNC: 1.9 MG/DL — SIGNIFICANT CHANGE UP (ref 1.6–2.6)
MANUAL SMEAR VERIFICATION: SIGNIFICANT CHANGE UP
MCHC RBC-ENTMCNC: 26.8 PG — LOW (ref 27–34)
MCHC RBC-ENTMCNC: 30.4 GM/DL — LOW (ref 32–36)
MCV RBC AUTO: 88.2 FL — SIGNIFICANT CHANGE UP (ref 80–100)
MONOCYTES # BLD AUTO: 0.48 K/UL — SIGNIFICANT CHANGE UP (ref 0–0.9)
MONOCYTES NFR BLD AUTO: 5.1 % — SIGNIFICANT CHANGE UP (ref 2–14)
NEUTROPHILS # BLD AUTO: 8.51 K/UL — HIGH (ref 1.8–7.4)
NEUTROPHILS NFR BLD AUTO: 91.2 % — HIGH (ref 43–77)
NRBC # BLD: 0 /100 WBCS — SIGNIFICANT CHANGE UP (ref 0–0)
PLAT MORPH BLD: NORMAL — SIGNIFICANT CHANGE UP
PLATELET # BLD AUTO: 176 K/UL — SIGNIFICANT CHANGE UP (ref 150–400)
POLYCHROMASIA BLD QL SMEAR: SLIGHT — SIGNIFICANT CHANGE UP
POTASSIUM SERPL-MCNC: 3.9 MMOL/L — SIGNIFICANT CHANGE UP (ref 3.5–5.3)
POTASSIUM SERPL-SCNC: 3.9 MMOL/L — SIGNIFICANT CHANGE UP (ref 3.5–5.3)
PROT SERPL-MCNC: 7.3 G/DL — SIGNIFICANT CHANGE UP (ref 6.4–8.2)
RBC # BLD: 3.06 M/UL — LOW (ref 4.2–5.8)
RBC # FLD: 21.2 % — HIGH (ref 10.3–14.5)
RBC BLD AUTO: ABNORMAL
SCHISTOCYTES BLD QL AUTO: SLIGHT — SIGNIFICANT CHANGE UP
SODIUM SERPL-SCNC: 138 MMOL/L — SIGNIFICANT CHANGE UP (ref 132–145)
TROPONIN I, HIGH SENSITIVITY RESULT: 4.3 NG/L — SIGNIFICANT CHANGE UP
WBC # BLD: 9.34 K/UL — SIGNIFICANT CHANGE UP (ref 3.8–10.5)
WBC # FLD AUTO: 9.34 K/UL — SIGNIFICANT CHANGE UP (ref 3.8–10.5)

## 2024-05-12 PROCEDURE — 99285 EMERGENCY DEPT VISIT HI MDM: CPT | Mod: 25

## 2024-05-12 RX ORDER — MORPHINE SULFATE 50 MG/1
4 CAPSULE, EXTENDED RELEASE ORAL ONCE
Refills: 0 | Status: DISCONTINUED | OUTPATIENT
Start: 2024-05-12 | End: 2024-05-12

## 2024-05-12 RX ORDER — SODIUM CHLORIDE 9 MG/ML
1000 INJECTION INTRAMUSCULAR; INTRAVENOUS; SUBCUTANEOUS ONCE
Refills: 0 | Status: COMPLETED | OUTPATIENT
Start: 2024-05-12 | End: 2024-05-12

## 2024-05-12 RX ADMIN — MORPHINE SULFATE 4 MILLIGRAM(S): 50 CAPSULE, EXTENDED RELEASE ORAL at 07:15

## 2024-05-12 RX ADMIN — SODIUM CHLORIDE 2000 MILLILITER(S): 9 INJECTION INTRAMUSCULAR; INTRAVENOUS; SUBCUTANEOUS at 07:15

## 2024-05-12 NOTE — ED ADULT NURSE NOTE - NSFALLRISKINTERV_ED_ALL_ED

## 2024-05-12 NOTE — ED PROVIDER NOTE - NSFOLLOWUPINSTRUCTIONS_ED_ALL_ED_FT
Hemoglobin level today was 8.2.  You are seen in the emergency room for rectal bleeding.  The rest of your workup was less than 90 minutes.  Return to the emergency room immediately if weakness, dizziness, bloody bowel movements, LOC, chest pain, shortness of breath, palpitations.

## 2024-05-12 NOTE — ED ADULT NURSE NOTE - CHPI ED NUR SYMPTOMS NEG
BMP
no chills/no decreased eating/drinking/no dizziness/no fever/no nausea/no tingling/no vomiting/no weakness

## 2024-05-12 NOTE — ED PROVIDER NOTE - OBJECTIVE STATEMENT
With a history of HIV on Biktarvy with undetectable viral loads former IVDA use invasive anorectal squamous cell carcinoma with mets to sacrum, anemia, chronic tachycardia presents with episode of bloody bowel movements this morning.  Admits to rectal pain which she chronically has.  Denies any chest pain dizziness diaphoresis LOC.  Last had chemo 2 weeks ago and last radiation treatment was 2 days ago.  Last transfused on May 2, 2024.    States that he has been having loose stools as a complication with radiation treatments.  States this stools have been becoming firmer over the last 4 days and has been having painful bowel movements.  Denies black tarry stools.  Rectal pain at this time is 7 out of 10.    Patient takes oxycodone 10 mg every 4 hours as needed for pain, methadone 10 mg every 12 hours.

## 2024-05-12 NOTE — ED ADULT NURSE NOTE - OBJECTIVE STATEMENT
pt reports rectal pain and bleeding; hx anal cancer (with chemo & radiation), HIV, blood transfusions; denies chest pain, SOB, dizziness, abdominal pain; noted elevated HR which pt states could be baseline for him

## 2024-05-12 NOTE — ED PROVIDER NOTE - CLINICAL SUMMARY MEDICAL DECISION MAKING FREE TEXT BOX
Patient with a history of anorectal cancer with mets to the sacrum, HIV on Biktarvy well-controlled, chronic tachycardia, anemia last transfusion 10 days ago last chemo was 2 weeks ago last radiation treatment was 2 days ago presents with bloody bowel movement that was bright red with 2 large clots that were palm-sized.  Denies any dizziness chest pain palpitations shortness of breath.  Will check labs orthostatics EKG give IV hydration analgesics and continue to monitor.

## 2024-05-12 NOTE — ED ADULT TRIAGE NOTE - CHIEF COMPLAINT QUOTE
Pt brought in by EMS with complaint of bright red blood per rectum along with rectal pain. Pt reports history of anal cancer on radiation and chemotherapy. Last radiation treatment was on Friday and last chemo two weeks ago.

## 2024-05-12 NOTE — ED PROVIDER NOTE - PATIENT PORTAL LINK FT
You can access the FollowMyHealth Patient Portal offered by [facility] by registering at the following website: [email or web address]. By joining [email or web address] FollowMyHealth portal, you will also be able to view your health information using other applications (apps) compatible with our system.

## 2024-05-13 ENCOUNTER — EMERGENCY (EMERGENCY)
Facility: HOSPITAL | Age: 44
LOS: 1 days | Discharge: HOME | End: 2024-05-13
Attending: EMERGENCY MEDICINE | Admitting: EMERGENCY MEDICINE
Payer: MEDICAID

## 2024-05-13 ENCOUNTER — NON-APPOINTMENT (OUTPATIENT)
Age: 44
End: 2024-05-13

## 2024-05-13 VITALS
TEMPERATURE: 98 F | DIASTOLIC BLOOD PRESSURE: 42 MMHG | WEIGHT: 134.92 LBS | HEART RATE: 130 BPM | RESPIRATION RATE: 17 BRPM | SYSTOLIC BLOOD PRESSURE: 100 MMHG | OXYGEN SATURATION: 100 % | HEIGHT: 72 IN

## 2024-05-13 VITALS
SYSTOLIC BLOOD PRESSURE: 104 MMHG | OXYGEN SATURATION: 98 % | DIASTOLIC BLOOD PRESSURE: 68 MMHG | RESPIRATION RATE: 16 BRPM | TEMPERATURE: 98 F | HEART RATE: 92 BPM

## 2024-05-13 DIAGNOSIS — Z98.890 OTHER SPECIFIED POSTPROCEDURAL STATES: Chronic | ICD-10-CM

## 2024-05-13 LAB
ALBUMIN SERPL ELPH-MCNC: 3.2 G/DL — LOW (ref 3.3–5)
ALP SERPL-CCNC: 144 U/L — HIGH (ref 40–120)
ALT FLD-CCNC: 19 U/L — SIGNIFICANT CHANGE UP (ref 10–45)
ANION GAP SERPL CALC-SCNC: 13 MMOL/L — SIGNIFICANT CHANGE UP (ref 5–17)
APTT BLD: 28.7 SEC — SIGNIFICANT CHANGE UP (ref 24.5–35.6)
AST SERPL-CCNC: 20 U/L — SIGNIFICANT CHANGE UP (ref 10–40)
BASOPHILS # BLD AUTO: 0.02 K/UL — SIGNIFICANT CHANGE UP (ref 0–0.2)
BASOPHILS NFR BLD AUTO: 0.2 % — SIGNIFICANT CHANGE UP (ref 0–2)
BILIRUB SERPL-MCNC: 0.3 MG/DL — SIGNIFICANT CHANGE UP (ref 0.2–1.2)
BLD GP AB SCN SERPL QL: NEGATIVE — SIGNIFICANT CHANGE UP
BUN SERPL-MCNC: 13 MG/DL — SIGNIFICANT CHANGE UP (ref 7–23)
CALCIUM SERPL-MCNC: 9.1 MG/DL — SIGNIFICANT CHANGE UP (ref 8.4–10.5)
CHLORIDE SERPL-SCNC: 101 MMOL/L — SIGNIFICANT CHANGE UP (ref 96–108)
CO2 SERPL-SCNC: 23 MMOL/L — SIGNIFICANT CHANGE UP (ref 22–31)
CREAT SERPL-MCNC: 0.61 MG/DL — SIGNIFICANT CHANGE UP (ref 0.5–1.3)
EGFR: 123 ML/MIN/1.73M2 — SIGNIFICANT CHANGE UP
EOSINOPHIL # BLD AUTO: 0.08 K/UL — SIGNIFICANT CHANGE UP (ref 0–0.5)
EOSINOPHIL NFR BLD AUTO: 0.9 % — SIGNIFICANT CHANGE UP (ref 0–6)
GLUCOSE SERPL-MCNC: 129 MG/DL — HIGH (ref 70–99)
HCT VFR BLD CALC: 29.7 % — LOW (ref 39–50)
HGB BLD-MCNC: 8.8 G/DL — LOW (ref 13–17)
IMM GRANULOCYTES NFR BLD AUTO: 0.9 % — SIGNIFICANT CHANGE UP (ref 0–0.9)
INR BLD: 1.29 — HIGH (ref 0.85–1.18)
LACTATE SERPL-SCNC: 2 MMOL/L — SIGNIFICANT CHANGE UP (ref 0.5–2)
LYMPHOCYTES # BLD AUTO: 0.29 K/UL — LOW (ref 1–3.3)
LYMPHOCYTES # BLD AUTO: 3.2 % — LOW (ref 13–44)
MCHC RBC-ENTMCNC: 26.7 PG — LOW (ref 27–34)
MCHC RBC-ENTMCNC: 29.6 GM/DL — LOW (ref 32–36)
MCV RBC AUTO: 90.3 FL — SIGNIFICANT CHANGE UP (ref 80–100)
MONOCYTES # BLD AUTO: 0.2 K/UL — SIGNIFICANT CHANGE UP (ref 0–0.9)
MONOCYTES NFR BLD AUTO: 2.2 % — SIGNIFICANT CHANGE UP (ref 2–14)
NEUTROPHILS # BLD AUTO: 8.27 K/UL — HIGH (ref 1.8–7.4)
NEUTROPHILS NFR BLD AUTO: 92.6 % — HIGH (ref 43–77)
NRBC # BLD: 0 /100 WBCS — SIGNIFICANT CHANGE UP (ref 0–0)
PLATELET # BLD AUTO: 211 K/UL — SIGNIFICANT CHANGE UP (ref 150–400)
POTASSIUM SERPL-MCNC: 4.1 MMOL/L — SIGNIFICANT CHANGE UP (ref 3.5–5.3)
POTASSIUM SERPL-SCNC: 4.1 MMOL/L — SIGNIFICANT CHANGE UP (ref 3.5–5.3)
PROT SERPL-MCNC: 7.4 G/DL — SIGNIFICANT CHANGE UP (ref 6–8.3)
PROTHROM AB SERPL-ACNC: 14.6 SEC — HIGH (ref 9.5–13)
RBC # BLD: 3.29 M/UL — LOW (ref 4.2–5.8)
RBC # FLD: 22.2 % — HIGH (ref 10.3–14.5)
RH IG SCN BLD-IMP: POSITIVE — SIGNIFICANT CHANGE UP
SODIUM SERPL-SCNC: 137 MMOL/L — SIGNIFICANT CHANGE UP (ref 135–145)
WBC # BLD: 8.94 K/UL — SIGNIFICANT CHANGE UP (ref 3.8–10.5)
WBC # FLD AUTO: 8.94 K/UL — SIGNIFICANT CHANGE UP (ref 3.8–10.5)

## 2024-05-13 PROCEDURE — 99285 EMERGENCY DEPT VISIT HI MDM: CPT

## 2024-05-13 PROCEDURE — 86900 BLOOD TYPING SEROLOGIC ABO: CPT

## 2024-05-13 PROCEDURE — 85730 THROMBOPLASTIN TIME PARTIAL: CPT

## 2024-05-13 PROCEDURE — 71045 X-RAY EXAM CHEST 1 VIEW: CPT | Mod: 26

## 2024-05-13 PROCEDURE — 86901 BLOOD TYPING SEROLOGIC RH(D): CPT

## 2024-05-13 PROCEDURE — 93010 ELECTROCARDIOGRAM REPORT: CPT

## 2024-05-13 PROCEDURE — 96375 TX/PRO/DX INJ NEW DRUG ADDON: CPT

## 2024-05-13 PROCEDURE — 93005 ELECTROCARDIOGRAM TRACING: CPT

## 2024-05-13 PROCEDURE — 85025 COMPLETE CBC W/AUTO DIFF WBC: CPT

## 2024-05-13 PROCEDURE — 71045 X-RAY EXAM CHEST 1 VIEW: CPT

## 2024-05-13 PROCEDURE — 96361 HYDRATE IV INFUSION ADD-ON: CPT

## 2024-05-13 PROCEDURE — 83605 ASSAY OF LACTIC ACID: CPT

## 2024-05-13 PROCEDURE — 96374 THER/PROPH/DIAG INJ IV PUSH: CPT

## 2024-05-13 PROCEDURE — 99285 EMERGENCY DEPT VISIT HI MDM: CPT | Mod: 25

## 2024-05-13 PROCEDURE — 36415 COLL VENOUS BLD VENIPUNCTURE: CPT

## 2024-05-13 PROCEDURE — 87040 BLOOD CULTURE FOR BACTERIA: CPT

## 2024-05-13 PROCEDURE — 86850 RBC ANTIBODY SCREEN: CPT

## 2024-05-13 PROCEDURE — 80053 COMPREHEN METABOLIC PANEL: CPT

## 2024-05-13 PROCEDURE — 85610 PROTHROMBIN TIME: CPT

## 2024-05-13 RX ORDER — SODIUM CHLORIDE 9 MG/ML
1000 INJECTION INTRAMUSCULAR; INTRAVENOUS; SUBCUTANEOUS ONCE
Refills: 0 | Status: COMPLETED | OUTPATIENT
Start: 2024-05-13 | End: 2024-05-13

## 2024-05-13 RX ORDER — MORPHINE SULFATE 50 MG/1
2 CAPSULE, EXTENDED RELEASE ORAL ONCE
Refills: 0 | Status: DISCONTINUED | OUTPATIENT
Start: 2024-05-13 | End: 2024-05-13

## 2024-05-13 RX ORDER — PANTOPRAZOLE SODIUM 20 MG/1
80 TABLET, DELAYED RELEASE ORAL ONCE
Refills: 0 | Status: COMPLETED | OUTPATIENT
Start: 2024-05-13 | End: 2024-05-13

## 2024-05-13 RX ADMIN — MORPHINE SULFATE 2 MILLIGRAM(S): 50 CAPSULE, EXTENDED RELEASE ORAL at 18:15

## 2024-05-13 RX ADMIN — SODIUM CHLORIDE 1000 MILLILITER(S): 9 INJECTION INTRAMUSCULAR; INTRAVENOUS; SUBCUTANEOUS at 18:16

## 2024-05-13 RX ADMIN — SODIUM CHLORIDE 1000 MILLILITER(S): 9 INJECTION INTRAMUSCULAR; INTRAVENOUS; SUBCUTANEOUS at 16:29

## 2024-05-13 RX ADMIN — PANTOPRAZOLE SODIUM 80 MILLIGRAM(S): 20 TABLET, DELAYED RELEASE ORAL at 16:29

## 2024-05-13 RX ADMIN — MORPHINE SULFATE 2 MILLIGRAM(S): 50 CAPSULE, EXTENDED RELEASE ORAL at 17:30

## 2024-05-13 NOTE — ED PROVIDER NOTE - NSFOLLOWUPINSTRUCTIONS_ED_ALL_ED_FT
1. You were seen for bleeding. Your hgb is 8.8. You do not need a blood transfusion at this time. Please take protonix 40 mg daily at home.  A copy of any of your resulted labs, imaging, and findings have been provided to you. Make sure to view any test results that may not have yet resulted at the time of your discharge by creating a FollowMyHealth account at: [email or web address] to sign up for FollowMyHealth.   2. Continue to take your home medications as prescribed.   3. Please follow up with your primary care physician. You may call our referrals coordinator at [phone] Monday to Friday 11am-7pm for assistance with making an appointment. Or you can call [phone] to make an appointment.  4. Return to the emergency department for new, persistent, or worsening symptoms or signs, or for any concerning symptoms.   5. For your for health, you should make healthy food choices and be physically active. Also, you should not smoke or use drugs, and you should not drink alcohol in excess. Please visit [email or web address] for resources and more information.    Rectal Bleeding  Body outline showing the digestive tract, including the anus.  Rectal bleeding is when blood comes out of the opening of the butt (anus). You may see bright red blood in your underwear or in the toilet after you poop (have a bowel movement). You may also have blood mixed with your poop (stool), or dark red or black poop.    Rectal bleeding is often a sign that something is wrong. It can be caused by many things. It needs to be checked by a doctor.    Follow these instructions at home:  Medicines    Take over-the-counter and prescription medicines only as told by your doctor.  Ask your doctor about changing or stopping your normal medicines. These include blood thinners.  Managing constipation    Pear, berries, artichoke, and dried beans.  Your condition may cause trouble pooping (constipation). To prevent or treat this, or to help make your poop soft, you may need to:  Drink enough fluid to keep your pee (urine) pale yellow.  Take over-the-counter or prescription medicines.  Eat foods that are high in fiber. These include beans, whole grains, and fresh fruits and vegetables.  Limit foods that are high in fat and sugar. These include fried or sweet foods.  General instructions    Try not to strain when you poop.  Take a warm bath. This may help with pain.  Watch for changes in your symptoms.  Contact a doctor if:  You have pain or swelling in your belly (abdomen).  You have a fever.  You feel weak or like you may vomit.  You cannot poop.  You have new or more bleeding.  You have black or dark red poop.  You vomit blood or something that looks like coffee grounds.  Get help right away if:  You faint.  You have very bad pain in your butt.  These symptoms may be an emergency. Get help right away. Call 911.  Do not wait to see if the symptoms will go away.  Do not drive yourself to the hospital.  This information is not intended to replace advice given to you by your health care provider. Make sure you discuss any questions you have with your health care provider.

## 2024-05-13 NOTE — ED PROVIDER NOTE - NSDCPRINTRESULTS_ED_ALL_ED
Please abstract the following data from this visit with this patient into the appropriate field in Epic:    Tests that can be patient reported without a hard copy:    Eye exam with ophthalmology on this date: 1/2/2020 - [facility]     Other Tests found in the patient's chart through Chart Review/Care Everywhere:    Dorothy Cheung MA on 6/3/2020 at 1:01 PM     
Patient requests all Lab, Cardiology, and Radiology Results on their Discharge Instructions

## 2024-05-13 NOTE — ED ADULT NURSE NOTE - NSFALLUNIVINTERV_ED_ALL_ED
Bed/Stretcher in lowest position, wheels locked, appropriate side rails in place/Call bell, personal items and telephone in reach/Instruct patient to call for assistance before getting out of bed/chair/stretcher/Non-slip footwear applied when patient is off stretcher/[place] to call system/Physically safe environment - no spills, clutter or unnecessary equipment/Purposeful proactive rounding/Room/bathroom lighting operational, light cord in reach

## 2024-05-13 NOTE — ED ADULT NURSE NOTE - CHIEF COMPLAINT QUOTE
Hx HIV, Anal ca, and anemia, comes for vomiting since yesterday with small amt of BRB in it, and then has been proceeding to dry heave since last night. Last bloody BM BRB was 2 days ago, used to have these often d/t cancer, but this one was first time in a while. also has R buttocks abscess actively draining pus. +lightheadedness, + paleness, mild ARZATE, generalized weakness. last blood transfusion was 1 week ago. Denies cp, HR 130s, states that is his baseline. ekg done.  UG to MD Goldberg

## 2024-05-13 NOTE — ED ADULT NURSE REASSESSMENT NOTE - NS ED NURSE REASSESS COMMENT FT1
Patient aoX 3, no nausea/vomitting since arrival to ED, states rectal pain improved /sp Morphine 2mg IVP.  Vital signs stable. Fluids PO tolerated well.  Discharged to home in stable condition.  Transport pending.

## 2024-05-13 NOTE — ED PROVIDER NOTE - PATIENT PORTAL LINK FT
You can access the FollowMyHealth Patient Portal offered by [facility] by registering at the following website: [email or web address]. By joining [employer]’s FollowMyHealth portal, you will also be able to view your health information using other applications (apps) compatible with our system.

## 2024-05-13 NOTE — ASSESSMENT
[FreeTextEntry1] : Delonte Lua is a 44 year old man with AIDS and a bulky, locally advanced anal cancer.   He initially presented to [facility] and was diagnosed with anal cancer in 12/2023. He has had extensive imaging - CT scans, pelvic MRI, Pet-CT. He has a bulky, locally invasive anal cancer that has invaded several local structures including the prostate, gluteal musculature, and coccyx/sacrum. No distant metastatic disease has been detected. From a psychosocial standpoint, the patient has additional complicating factors including housing insecurity, poor support/no caregiver, substance abuse, and mental illness.  He missed several appointments after the initial hospitalization.  Started RT to anus/pelvis and capecitabine(1500mg/1000mg) on 4/3/24, received mitomycin on 4/5/24, undergoing RT  Plan: 1. bulky, locally advanced anal cancer --case has been discussed in multidisciplinary format - both in conference and separately w/ the involved physicians colorectal surgeon Dr. Cortez and radiation oncologist Dr Owens --potential role for diverting ostomy has been discussed, not recommended at this time by CRS --favor upfront treatment with concurrent chemoradiation which probably offers the patient the best opportunity for local control and palliation of symptoms.  --would utilize mitomycin-C + capecitabine. His dose of capecitabine would be 825 mg/m2 = 1500 mg po qAM and 1000 mg po qPM on radiation days. --started RT to anus/pelvis and capecitabine(1500mg/1000mg) on 4/3/24, received mitomycin on 4/5/24, tolerating treatment with manageable toxicities. Labs reviewed, notable for anemia, plan as below. Continue RT with capecitabine.  Plan to complete RT on 5/15.  --he is immunosuppressed and may have a large radiation field - both risk factors for infectious complications and myelosuppression.  Will need careful monitoring while on treatment, including CBC + diff every 1-2 weeks to monitor for significant myelosuppression.   2. HIV/AIDS --100% adherence to ART advised. Educated pt about the importance of ART in attaining the best oncologic outcomes. --follow with Dr Mikayla Lang for HIV care.   3. anal pain --pt does not have distant metastatic disease, but he has a very locally advanced cancer, likelihood of cure is probably very slim. --on oxycodone 10mg every 4 hours, Methadone with pain relief, continue to follow up with Dr. Taylor.   4. psychosocial support --SW on board --consulted oncology navigators --may need to involve psycho-oncologist as well, if pt proceeds w/ treatment through [facility]. Will address next visit.   5. photosensitivity --missed head CT with IV contrast last week, rescheduled for 5/7/24  6.increased bowel movement/diarrhea  --loperamide as prn, continue capecitabine with RT --maintain good PO hydration   7. decreased appetite/hypoalbuminemia  --albumin 2.4 --encourage Ensure 1-2 per day --eat frequent small meal with protein rich diet.   8. anemia 2/2 chemo and chronic blood loss  --dropped to 7.0. he is symptomatic.   --ferritin 596 iron serum 26, transferrin sat 15%, No need IV iron at this time.  --will give 1 unit PRBC tomorrow.   --return precautions reviewed  plan discussed with Dr. Julian RTC 2 weeks or sooner if any worsening symptoms  labs-CBC, CMP

## 2024-05-13 NOTE — HISTORY OF PRESENT ILLNESS
[de-identified] : 44 M, MSM with PMH HIV (on biktarvy), invasive anorectal squamous cell carcinoma, polysubstance abuse, syphilis, anemia and abdominal MRSA 2021, who presented to [facility] ED with rectal bleeding early February. Here for follow up.   Oncologic History: 1.  anal cancer - anal mass biopsy 12/13/23 - invasive squamous cell carcinoma, moderately differentiated - CT Chest 1/9/24 - mild paraseptal emphysema. no evidence of metastatic disease   - MRI pelvis 1/3/24 - limited exam with only 3 sequences obtained. Showing invasion into sacrum, right hemipelvis, and invasion posteriorly into gluteal musculature  - CTA 2/2/24 - without signs of local regional metastases, large lobulated rectal tumor dissecting into adjacent soft tissues and bones overall similar in appearance to the previo - colonoscopy/anoscopy may be difficult due to bulk of disease - CEA elevated at 192.0 prior to treatment - PET/CT (2/9/24): Intense uptake is seen in the patient's large pelvic mass known to be an invasive squamous cell carcinoma of the anus. There is extension into the left gluteal region, but no evidence of FDG avid local or distant metastatic disease. - CT C/A/P on 3/1/24 showed no PE and grossly stable known rectal mass with grossly stable osseous involvement. - started RT to anus/pelvis and capecitabine(1500mg/1000mg) on 4/3/24, received mitomycin on 4/5/24  HIV: CD4 count 328 in 2/2024 HIV VL low positive 12/2023 [de-identified] : Here for follow up with  partner Acosta. He started RT to anus/pelvis and capecitabine(1500mg/1000mg) on 4/3/24,  had loose BM last week and took loperamide with help but stopped taking. Now started having loose BM with discharge/occasional tarry stool.  Pain has been controlled with Oxycodone 10mg every 4 hours and Methadone.  + decreased PO intake due to altered taste, has lost some weight, trying to eat and drink more.  No hand foot syndrome + photosensitivity, missed CT head last week. No headache BP 76/41, , no chest pain, SOB, fever, or palpitations. reports mild dizziness when walking and feeling weak and tired. Repeat BP 99/65,

## 2024-05-13 NOTE — ED ADULT NURSE REASSESSMENT NOTE - NS ED NURSE REASSESS COMMENT FT1
Patient aoX 3, c/o of episodes of nausea and vomititng, none since arrival to ED.  Sinus tacchycardia on EKG, other vitals stable.  C/O of rectal pain, no rectal bleed.  Left AC PIV #18 in place, US guided IV access, all labs, blood cultures sent to lab.  NSS 1 L bolus, Protonix 80mg IVP, Morphine 2mg IVP adminsitered.  Reverse isolation observed.  Hygeine care for BM done.  Results and disposition pending.

## 2024-05-13 NOTE — ED ADULT NURSE NOTE - OBJECTIVE STATEMENT
Patient c/o of mild abdominal discomfort, w/ nausea and vomitting X 2, blood streaked in the past 24 hours.  Also c/o of rectal pain, no diarrhea, no rectal bleed, no urinary symptoms.  Draining abscess noted on right buttocks.  Patient seen in ED yesterday for rectal bleed and discharged to home.  Previous visits to ED.  PMHx anemia, HIV on meds, rectal CA, IV drug use.  Elevated , no chest pain, SOB or dizziness/lightheadedness, states elevated HR baseline.  IMmediate upgrade to Dr. Goldberg.  Reverse isolation observed.

## 2024-05-13 NOTE — ED PROVIDER NOTE - PHYSICAL EXAMINATION
GEN: Thin, chronically ill-appearing, well developed, awake, alert, oriented to person, place, time/situation and in no apparent distress. NTAF  ENT: Airway patent, Nasal mucosa clear. Mouth with normal mucosa.  EYES: Clear bilaterally. PERRL, EOMI  RESPIRATORY: Breathing comfortably with normal RR. No W/C/R, no hypoxia or resp distress.  CARDIAC: Regular rate and rhythm, no M/R/G  ABDOMEN: Soft, nontender, +bowel sounds, no rebound, rigidity, or guarding.  rectal: brown soft stool, no bleeding from rectum.   MSK: Range of motion is not limited, no deformities noted.  NEURO: Alert and oriented, no focal deficits.  SKIN: Skin normal color for race, warm, dry, open abscess to right buttock  PSYCH: Alert and oriented to person, place, time/situation. normal mood and affect. no apparent risk to self or others.

## 2024-05-13 NOTE — ED PROVIDER NOTE - CLINICAL SUMMARY MEDICAL DECISION MAKING FREE TEXT BOX
44M with PMHx of invasive anorectal squamous cell carcinoma (last chemo 2 weeks ago, get radiation daily during the week, last today), well-controlled HIV (on Biktarvy, last viral load undetectable a couple of months ago), chronic tachycardia (states his baseline HR is 130s), chronic draining right buttock abscess who p/w nausea and vomiting since last night, associated with dry heaving. States the vomit was yellow colored and then he saw a tiny tinge of blood in it. He also report that he had a formed BM for the first time in 2 days and lead his to have some rectal bleeding with clots. Pt states he often has bloody BMs d/t known cancer, but when he told his radiation oncologist today he was told to go to the ER to get his blood work checked to make sure he didn't need a blood transfusions. Pt denies f/c, dizziness or syncope, or other complaints at this time.  VS notable for tachycardia, sinus tach on EKG, no stemi, pt states this is his baseline.  No rectal bleeding or vomiting on exam. Plan for labs, IVFs, PPI, pain control.   Labs show hgb 8.8. Pt feels better. No indication for PRBCs.   Pt was advised to take PPi and follow up with his doctors.  ED evaluation and management discussed with the patient in detail.  Close PMD/oncology follow up encouraged.  Strict ED return instructions discussed in detail and patient given the opportunity to ask any questions about their discharge diagnosis and instructions. Patient verbalized understanding.

## 2024-05-13 NOTE — ED ADULT TRIAGE NOTE - CHIEF COMPLAINT QUOTE
Hx HIV, Anal ca, and anemia, comes for vomiting since yesterday with small amt of BRB in it, and then has been proceeding to dry heave since last night. Last bloody BM BRB was 2 days ago, used to have these often d/t cancer, but this one was first time in a while. +lightheadedness, + paleness, mild ARZATE, generalized weakness. last blood transfusion was 1 week ago. Denies cp, HR 130s, states that is his baseline. ekg done. Hx HIV, Anal ca, and anemia, comes for vomiting since yesterday with small amt of BRB in it, and then has been proceeding to dry heave since last night. Last bloody BM BRB was 2 days ago, used to have these often d/t cancer, but this one was first time in a while. also has R buttocks abscess actively draining pus. +lightheadedness, + paleness, mild ARZATE, generalized weakness. last blood transfusion was 1 week ago. Denies cp, HR 130s, states that is his baseline. ekg done. Hx HIV, Anal ca, and anemia, comes for vomiting since yesterday with small amt of BRB in it, and then has been proceeding to dry heave since last night. Last bloody BM BRB was 2 days ago, used to have these often d/t cancer, but this one was first time in a while. also has R buttocks abscess actively draining pus. +lightheadedness, + paleness, mild ARZATE, generalized weakness. last blood transfusion was 1 week ago. Denies cp, HR 130s, states that is his baseline. ekg done.  UG to MD Goldberg

## 2024-05-13 NOTE — PHYSICAL EXAM
[Restricted in physically strenuous activity but ambulatory and able to carry out work of a light or sedentary nature] : Status 1- Restricted in physically strenuous activity but ambulatory and able to carry out work of a light or sedentary nature, e.g., light house work, office work [Thin] : thin [Normal] : affect appropriate [de-identified] : uncomfortable appearing due to pain  [de-identified] : tachycardic, regular  [de-identified] : no LE edema

## 2024-05-13 NOTE — ED PROVIDER NOTE - OBJECTIVE STATEMENT
44M with PMHx of invasive anorectal squamous cell carcinoma, well-controlled HIV (on Biktarvy, last viral load undetectable a couple of months ago), chronic tachycardia, who p/w bleeding 44M with PMHx of invasive anorectal squamous cell carcinoma (last chemo 2 weeks ago, get radiation daily during the week, last today), well-controlled HIV (on Biktarvy, last viral load undetectable a couple of months ago), chronic tachycardia (states his baseline HR is 130s), chronic draining right buttock abscess who p/w nausea and vomiting since last night, associated with dry heaving. States the vomit was yellow colored and then he saw a tiny tinge of blood in it. He also report that he had a formed BM for the first time in 2 days and lead his to have some rectal bleeding with clots. Pt states he often has bloody BMs d/t known cancer, but when he told his radiation oncologist today he was told to go to the ER to get his blood work checked to make sure he didn't need a blood transfusions. Pt denies f/c, dizziness or syncope, or other complaints at this time.

## 2024-05-13 NOTE — REASON FOR VISIT
I have reviewed discharge instructions with the patient. The patient verbalized understanding. Discharge medications reviewed with patient and appropriate educational materials and side effects teaching were provided. [Follow-Up Visit] : a follow-up [Other: _____] : [unfilled]

## 2024-05-14 ENCOUNTER — NON-APPOINTMENT (OUTPATIENT)
Age: 44
End: 2024-05-14

## 2024-05-14 RX ORDER — ONDANSETRON 4 MG/1
4 TABLET ORAL EVERY 6 HOURS
Qty: 28 | Refills: 6 | Status: ACTIVE | COMMUNITY
Start: 2024-03-13 | End: 1900-01-01

## 2024-05-15 ENCOUNTER — NON-APPOINTMENT (OUTPATIENT)
Age: 44
End: 2024-05-15

## 2024-05-15 ENCOUNTER — APPOINTMENT (OUTPATIENT)
Dept: PALLIATIVE MEDICINE | Facility: CLINIC | Age: 44
End: 2024-05-15
Payer: MEDICAID

## 2024-05-15 ENCOUNTER — APPOINTMENT (OUTPATIENT)
Dept: HEMATOLOGY ONCOLOGY | Facility: CLINIC | Age: 44
End: 2024-05-15
Payer: MEDICAID

## 2024-05-15 VITALS
DIASTOLIC BLOOD PRESSURE: 67 MMHG | SYSTOLIC BLOOD PRESSURE: 100 MMHG | TEMPERATURE: 98.7 F | WEIGHT: 131 LBS | HEIGHT: 73 IN | HEART RATE: 90 BPM | BODY MASS INDEX: 17.36 KG/M2 | OXYGEN SATURATION: 100 % | RESPIRATION RATE: 20 BRPM

## 2024-05-15 VITALS
HEART RATE: 126 BPM | WEIGHT: 131 LBS | BODY MASS INDEX: 17.36 KG/M2 | TEMPERATURE: 98.6 F | HEIGHT: 73 IN | SYSTOLIC BLOOD PRESSURE: 135 MMHG | OXYGEN SATURATION: 100 % | DIASTOLIC BLOOD PRESSURE: 71 MMHG | RESPIRATION RATE: 18 BRPM

## 2024-05-15 VITALS
RESPIRATION RATE: 18 BRPM | HEIGHT: 73 IN | SYSTOLIC BLOOD PRESSURE: 135 MMHG | BODY MASS INDEX: 17.36 KG/M2 | HEART RATE: 126 BPM | TEMPERATURE: 98.6 F | DIASTOLIC BLOOD PRESSURE: 71 MMHG | OXYGEN SATURATION: 100 % | WEIGHT: 131 LBS

## 2024-05-15 DIAGNOSIS — Z21 ASYMPTOMATIC HUMAN IMMUNODEFICIENCY VIRUS [HIV] INFECTION STATUS: ICD-10-CM

## 2024-05-15 DIAGNOSIS — Z85.048 PERSONAL HISTORY OF OTHER MALIGNANT NEOPLASM OF RECTUM, RECTOSIGMOID JUNCTION, AND ANUS: ICD-10-CM

## 2024-05-15 DIAGNOSIS — R63.0 ANOREXIA: ICD-10-CM

## 2024-05-15 DIAGNOSIS — R00.0 TACHYCARDIA, UNSPECIFIED: ICD-10-CM

## 2024-05-15 DIAGNOSIS — K62.5 HEMORRHAGE OF ANUS AND RECTUM: ICD-10-CM

## 2024-05-15 DIAGNOSIS — F41.9 ANXIETY DISORDER, UNSPECIFIED: ICD-10-CM

## 2024-05-15 DIAGNOSIS — R11.0 NAUSEA: ICD-10-CM

## 2024-05-15 DIAGNOSIS — Z51.5 ENCOUNTER FOR PALLIATIVE CARE: ICD-10-CM

## 2024-05-15 PROCEDURE — 99213 OFFICE O/P EST LOW 20 MIN: CPT

## 2024-05-15 PROCEDURE — G2211 COMPLEX E/M VISIT ADD ON: CPT | Mod: NC,1L

## 2024-05-15 PROCEDURE — 99215 OFFICE O/P EST HI 40 MIN: CPT

## 2024-05-15 RX ORDER — ESCITALOPRAM OXALATE 10 MG/1
10 TABLET ORAL DAILY
Qty: 30 | Refills: 3 | Status: ACTIVE | COMMUNITY
Start: 2024-05-15 | End: 1900-01-01

## 2024-05-15 NOTE — PHYSICAL EXAM
[General Appearance - Alert] : alert [General Appearance - In No Acute Distress] : in no acute distress [Sclera] : the sclera and conjunctiva were normal [Extraocular Movements] : extraocular movements were intact [No Oral Pallor] : no oral pallor [Outer Ear] : the ears and nose were normal in appearance [Hearing Threshold Finger Rub Not [place]] : hearing was normal [Neck Appearance] : the appearance of the neck was normal [Neck Cervical Mass (___cm)] : no neck mass was observed [Respiration, Rhythm And Depth] : normal respiratory rhythm and effort [Auscultation Breath Sounds / Voice Sounds] : lungs were clear to auscultation bilaterally [Heart Sounds] : normal S1 and S2 [FreeTextEntry1] : Tachycardic, regular rhythm [Edema] : there was no peripheral edema [Veins - Varicosity Changes] : there were no varicosital changes [Abdomen Soft] : soft [Abdomen Tenderness] : non-tender [Abdomen Mass (___ Cm)] : no abdominal mass palpated [Skin Color & Pigmentation] : normal skin color and pigmentation [Skin Turgor] : normal skin turgor [] : no rash [Oriented To Time, Place, And Person] : oriented to person, place, and time [Impaired Insight] : insight and judgment were intact [Affect] : the affect was normal

## 2024-05-15 NOTE — HISTORY OF PRESENT ILLNESS
[FreeTextEntry1] : 43 M with HIV (on biktarvy), invasive anorectal squamous cell carcinoma, polysubstance abuse, syphilis, anemia and abdominal MRSA 2021 here for palliative care support.  Onc Hx: - anal mass biopsy 12/13/23 - invasive squamous cell carcinoma, moderately differentiated - CT Chest 1/9/24 - mild paraseptal emphysema. no evidence of metastatic disease - MRI pelvis 1/3/24 - limited exam with only 3 sequences obtained. Showing invasion into sacrum, right hemipelvis, and invasion posteriorly into gluteal musculature - CTA 2/2/24 - without signs of local regional metastases, large lobulated rectal tumor dissecting into adjacent soft tissues and bones overall similar in appearance to the previo - colonoscopy/anoscopy may be difficult due to bulk of disease - CEA elevated at 192.0 prior to treatment - PET/CT (2/9/24): Intense uptake is seen in the patient's large pelvic mass known to be an invasive squamous cell carcinoma of the anus. There is extension into the left gluteal region, but no evidence of FDG avid local or distant metastatic disease. 3/1-3/7/24 - Admitted to [facility] with pain. 3/1/24-3/7/24 - Admitted to [facility]. UTox positive for amphetamines and GHB on admission (negative for opioids and methadone). Pain controlled with MSER 15 TID and oxycodone 5 during admission but never picked up any prescriptions on discharge. Endorses that he was using crystal meth and GHB prior to last hospitalization but has not used any since then and pain has been very poorly managed.  Providers: Oncologist - Nabila Julian Rad Onc - Itz Owens  Interval Hx: Continues to undergo RT, last session scheduled for tomorrow. Pain is due to lesions/fistulas around mass (less from mass) and feels that those are shrinking and pain has been improving. Mood has also improved as mobility has also improved.  SYMPTOMS: #Pain Taking methadone 10mg BID and oxycodone 10mg PRN, on average 4-5 PRNs per day. Pain much better this past week, was able to spread out the oxycodone a little more this week and is more mobile. Location - Buttocks b/l Quality - Sharp Radiation - Down L leg Timing - Constant Aggravating factors - Nothing Minimal acceptable level (0-10 scale) - 3/10 Severity in last 24h (0-10 scale) - 9/10 Current score (0-10 scale) - 3/10 ISTOP Ref #: [id number]  #Constipation/Diarrhea Diarrhea resolved and had a few days of normal BMs. Going daily. Off loperamide now but not taking bowel regimen.  #Anxiety Feeling anxious recently but has improved so much since mobility, pain and appetite have improved. Ran out of escitalopram last month and did notice that when he stopped it, he felt anxiety worsen somewhat.  #Poor appetite Was eating little due to poor appetite but with the more formed BMs, more mobility, appetite has returned to normal. No longer taking gummies for appetite.  ROS: If [ ] blank, symptom not present Fatigue [X] Nausea [X] Loss of appetite [X] Unintentional weight loss [ ] Constipation [ ] Diarrhea [ ] Anxiety [X] Low mood [ ] Other symptoms: [x ] All other review of symptoms negative  SH: Crystal meth and GHB use in the past. Denies current illicit drug use. Has a partner, Acosta, dating for over 1 year. Worked as a  for [employer] but hasn't been able to work for past 2 years.

## 2024-05-15 NOTE — ASSESSMENT
[FreeTextEntry1] : - 43 M with HIV (on biktarvy), invasive anorectal squamous cell carcinoma, polysubstance abuse, syphilis, anemia and abdominal MRSA 2021 here for palliative care support.  #Cancer-related pain - Methadone 10mg BID - Oxycodone 10mg PO q4h PRN. Discussed only taking it if having moderate-severe pain and spreading out doses. Patient agreeable. - Narcan intranasal spray sent to pharmacy. Pt and family counseled on proper use. - Senna 2 tabs nightly if constipation develops (if not going daily) - Tylenol 1000mg q6h PRN. Counseled not to exceed 1000mg per dose  #Anxiety - Escitalopram 10mg PO daily  F/u in 2 weeks

## 2024-05-16 DIAGNOSIS — R00.0 TACHYCARDIA, UNSPECIFIED: ICD-10-CM

## 2024-05-16 DIAGNOSIS — Z21 ASYMPTOMATIC HUMAN IMMUNODEFICIENCY VIRUS [HIV] INFECTION STATUS: ICD-10-CM

## 2024-05-16 DIAGNOSIS — K62.5 HEMORRHAGE OF ANUS AND RECTUM: ICD-10-CM

## 2024-05-16 DIAGNOSIS — R11.2 NAUSEA WITH VOMITING, UNSPECIFIED: ICD-10-CM

## 2024-05-16 DIAGNOSIS — C44.520 SQUAMOUS CELL CARCINOMA OF ANAL SKIN: ICD-10-CM

## 2024-05-16 NOTE — PHYSICAL EXAM
[Normal] : oriented to person, place and time, the affect was normal, the mood was normal and not anxious [FreeTextEntry1] : fistula with leaking soft stool

## 2024-05-16 NOTE — REVIEW OF SYSTEMS
[Anal Pain: Grade 2 - Moderate pain; limiting instrumental ADL] : Anal Pain: Grade 2 - Moderate pain; limiting instrumental ADL [Constipation: Grade 0] : Constipation: Grade 0 [Diarrhea: Grade 2 - Increase of 4 - 6 stools per day over baseline; moderate increase in ostomy output compared to baseline] : Diarrhea: Grade 2 - Increase of 4 - 6 stools per day over baseline; moderate increase in ostomy output compared to baseline [Nausea: Grade 0] : Nausea: Grade 0 [Rectal Pain: Grade 2 - Moderate pain; limiting instrumental ADL] : Rectal Pain: Grade 2 - Moderate pain; limiting instrumental ADL [Vomiting: Grade 0] : Vomiting: Grade 0 [Fatigue: Grade 1 - Fatigue relieved by rest] : Fatigue: Grade 1 - Fatigue relieved by rest [Hematuria: Grade 0] : Hematuria: Grade 0 [Urinary Incontinence: Grade 0] : Urinary Incontinence: Grade 0  [Cognitive Disturbance: Grade 0] : Cognitive Disturbance: Grade 0 [Dizziness: Grade 0] : Dizziness: Grade 0  [Headache: Grade 0] : Headache: Grade 0 [Anxiety: Grade 0] : Anxiety: Grade 0  [Depression: Grade 0] : Depression: Grade 0 [Cough: Grade 0] : Cough: Grade 0 [Dyspnea: Grade 0] : Dyspnea: Grade 0 [Dermatitis Radiation: Grade 1 - Faint erythema or dry desquamation] : Dermatitis Radiation: Grade 1 - Faint erythema or dry desquamation [FreeTextEntry3] : fisula, leakning stool

## 2024-05-16 NOTE — VITALS
[Maximal Pain Intensity: 8/10] : 8/10 [Least Pain Intensity: 2/10] : 2/10 [Opioid] : opioid [80: Normal activity with effort; some signs or symptoms of disease.] : 80: Normal activity with effort; some signs or symptoms of disease.  [ECOG Performance Status: 2 - Ambulatory and capable of all self care but unable to carry out any work activities] : Performance Status: 2 - Ambulatory and capable of all self care but unable to carry out any work activities. Up and about more than 50% of waking hours [70: Cares for self; unalbe to carry on normal activity or do active work.] : 70: Cares for self; unable to carry on normal activity or do active work.

## 2024-05-16 NOTE — HISTORY OF PRESENT ILLNESS
[FreeTextEntry1] : Delonte Lua is a 44 year old M with PMH HIV (on biktarvy), polysubstance abuse, now with at least cT4 invasive anorectal squamous cell carcinoma. RT consulted while inpatient to discuss treatment options.   5/15/24: Final OTV 5220cGy/5400cGy, 29/30fx to anus/pelvis. periana  5/8/24:   OTV 4140cGy/5400cGy, 23/30fx to anus/pelvis.. Mr. Rojo continues to have episodes of fatigue and decrease appetite but will maintain and adequate PO intake. + flatus and noticed over the past 2 days he has formed stools with no blood. He continues to have rectal pain and take his pain medications and lay on side for comfort. He will continue current RT treatments as planned. He for  5/1/24: OTV 3600cGy/5400cGy, 20/30fx to anus/pelvis. Mr. Ge Lua reports fatigue. He states his appetite is good. He denies N/V/C/D, or blood in his stools. He states his stools are more formed and easier to pass. he has rectal pain which is relieved with oxycodone. Plan to continue radiation.   4/25/24: OTV 2880cGy/5400cGy, 16/30fx to anus/pelvis. Mr. Ge Lua reports improved pain relief since medications addressed by DR. Taylor. Appetite remains fair and he continues to have mild fatigue. He denies nausea. Bowel movements are slightly easier to pass. Plan to continue radiation.   4/17/24: OTV 1980cGy/5400cGy/11/30fx to anus/pelvis. Mr. Ge Lua reports mild fatigue and fair appetite. States he is having rectal pain but has run out of his oxycodone, email sent to Dr. Taylor on patient's behalf. No nausea or abdominal pain. Bowel movements are still loose but are slowing down. Script sent for Imodium. Plan to continue radiation.   4/10/24: OTV 1260cGy/5400cGy, 7/30fx to anus/pelvis. Mr. Ge Lua reports fatigue and loss of taste. Appetite is fair. He has been started on capecitabine. He denies pain. He is experiencing constipation alternating with diarrhea and he is medicating for both. He continues to have some difficulty having a bowel movement but states that overall he is feeling better. He denies rectal bleeding. Plan to continue radiation.   4/3/24: OTV  180cGy/5400cGy, 1/30fx to anus/pelvis. Mr. Rojo reports feeling well overall. He denies fatigue. Appetite is good. No abdominal pain, N/V/C/D, or rectal bleeding. Some sensitivity when sitting. Plan to continue radiation.   History of Present Illness  _______________________________________  Patient had recent admission 12/9 - 12/12 for spontaneous drainage and mild pain from bilateral gluteal abscesses. Notably he also had progressing R leg  numbness. He had previously had perianal I and D in [facility] and [facility]. Rectoanal mass noted on imaging, and underwent biopsy and EUA 12/13 which confirmed diagnosis of squamous cell carcinoma, moderately differentiated. Patient was recommended for staging imaging at that time, but left AMA. He did not follow up with med onc as scheduled and was unable to be reached. Patient reports this was due to his phone service being shut off.     He presented to the ED after large volume blood per rectum.     1/3/24: MRI Pelvis - limited exam with only 3 sequences obtained. No evidence for a large infiltrative mass centered in the lower rectum with invasion into  the sacrum and musculature     1/9/24: CT Chest - mild paraseptal emphysema. no evidence of metastatic disease     2/2/24: CTA - without signs of local regional metastases     He lives in [facility], current smoker  (trying to cut down). Denies alcohol other/drug use. Endorses significant pain in rectal area     On exam:  NAD, A&Ox3, asking appropriate questions  Anal lesion extending from canal along R and L perianal skin ~4cm. Contour of gluteal skin with ulcers 2/2 prior abscess vs underlying tumor. Oozing, no  delmy bright red blood.

## 2024-05-16 NOTE — HISTORY OF PRESENT ILLNESS
[FreeTextEntry1] : Delonte Lua is a 44 year old M with PMH HIV (on biktarvy), polysubstance abuse, now with at least cT4 invasive anorectal squamous cell carcinoma. RT consulted while inpatient to discuss treatment options.   5/15/24: Final OTV 5220cGy/5400cGy, 29/30fx to anus/pelvis. He denies N/V/C/D. He states his stools are more formed and easier to pass. His rectal pain is improved greatly and more manageable. He states his appetite is improving. He still has some fatigue but feels well overall. While in office a physical exam was performed, on undressing pt was incontinent of stool. His diaper was saturated in loose stool from a 3cm x 2cm right perianal fistula. Smaller fistulae also present in other perianal areas, skin otherwise in tact with diffuse grade 1 dermatitis. Patient was assisted in santo care. Plan to continue radiation, to be completed 5/16/24 5/8/24:   OTV 4140cGy/5400cGy, 23/30fx to anus/pelvis.. Mr. Rojo continues to have episodes of fatigue and decrease appetite but will maintain and adequate PO intake. + flatus and noticed over the past 2 days he has formed stools with no blood. He continues to have rectal pain and take his pain medications and lay on side for comfort. He will continue current RT treatments as planned. He for  5/1/24: OTV 3600cGy/5400cGy, 20/30fx to anus/pelvis. Mr. Ge Lua reports fatigue. He states his appetite is good. He denies N/V/C/D, or blood in his stools. He states his stools are more formed and easier to pass. he has rectal pain which is relieved with oxycodone. Plan to continue radiation.   4/25/24: OTV 2880cGy/5400cGy, 16/30fx to anus/pelvis. Mr. Ge Lua reports improved pain relief since medications addressed by DR. Taylor. Appetite remains fair and he continues to have mild fatigue. He denies nausea. Bowel movements are slightly easier to pass. Plan to continue radiation.   4/17/24: OTV 1980cGy/5400cGy/11/30fx to anus/pelvis. Mr. Ge Lua reports mild fatigue and fair appetite. States he is having rectal pain but has run out of his oxycodone, email sent to Dr. Taylor on patient's behalf. No nausea or abdominal pain. Bowel movements are still loose but are slowing down. Script sent for Imodium. Plan to continue radiation.   4/10/24: OTV 1260cGy/5400cGy, 7/30fx to anus/pelvis. Mr. Ge Lua reports fatigue and loss of taste. Appetite is fair. He has been started on capecitabine. He denies pain. He is experiencing constipation alternating with diarrhea and he is medicating for both. He continues to have some difficulty having a bowel movement but states that overall he is feeling better. He denies rectal bleeding. Plan to continue radiation.   4/3/24: OTV  180cGy/5400cGy, 1/30fx to anus/pelvis. Mr. Rojo reports feeling well overall. He denies fatigue. Appetite is good. No abdominal pain, N/V/C/D, or rectal bleeding. Some sensitivity when sitting. Plan to continue radiation.   History of Present Illness  _______________________________________  Patient had recent admission 12/9 - 12/12 for spontaneous drainage and mild pain from bilateral gluteal abscesses. Notably he also had progressing R leg  numbness. He had previously had perianal I and D in [facility] and [facility]. Rectoanal mass noted on imaging, and underwent biopsy and EUA 12/13 which confirmed diagnosis of squamous cell carcinoma, moderately differentiated. Patient was recommended for staging imaging at that time, but left AMA. He did not follow up with med onc as scheduled and was unable to be reached. Patient reports this was due to his phone service being shut off.     He presented to the ED after large volume blood per rectum.     1/3/24: MRI Pelvis - limited exam with only 3 sequences obtained. No evidence for a large infiltrative mass centered in the lower rectum with invasion into  the sacrum and musculature     1/9/24: CT Chest - mild paraseptal emphysema. no evidence of metastatic disease     2/2/24: CTA - without signs of local regional metastases     He lives in [facility], current smoker  (trying to cut down). Denies alcohol other/drug use. Endorses significant pain in rectal area     On exam:  NAD, A&Ox3, asking appropriate questions  Anal lesion extending from canal along R and L perianal skin ~4cm. Contour of gluteal skin with ulcers 2/2 prior abscess vs underlying tumor. Oozing, no  delmy bright red blood.

## 2024-05-16 NOTE — HISTORY OF PRESENT ILLNESS
[FreeTextEntry1] : Delonte Lua is a 44 year old M with PMH HIV (on biktarvy), polysubstance abuse, now with at least cT4 invasive anorectal squamous cell carcinoma. RT consulted while inpatient to discuss treatment options.   5/8/24:   OTV 4140cGy/5400cGy, 23/30fx to anus/pelvis.. Mr. Rojo continues to have episodes of fatigue and decrease appetite but will maintain and adequate PO intake. + flatus and noticed over the past 2 days he has formed stools with no blood. He continues to have rectal pain and take his pain medications and lay on side for comfort. On exam, incontinent of stool primarily via santo-anal fistulas, scant blood but no bleeding. Stool formed. Skin with grade 1 dermatitis in perianal region. He will continue current RT treatments as planned. He for  5/1/24: OTV 3600cGy/5400cGy, 20/30fx to anus/pelvis. Mr. Ge Lua reports fatigue. He states his appetite is good. He denies N/V/C/D, or blood in his stools. He states his stools are more formed and easier to pass. he has rectal pain which is relieved with oxycodone. Plan to continue radiation.   4/25/24: OTV 2880cGy/5400cGy, 16/30fx to anus/pelvis. Mr. Ge Lua reports improved pain relief since medications addressed by DR. Taylor. Appetite remains fair and he continues to have mild fatigue. He denies nausea. Bowel movements are slightly easier to pass. Plan to continue radiation.   4/17/24: OTV 1980cGy/5400cGy/11/30fx to anus/pelvis. Mr. Ge Lua reports mild fatigue and fair appetite. States he is having rectal pain but has run out of his oxycodone, email sent to Dr. Taylor on patient's behalf. No nausea or abdominal pain. Bowel movements are still loose but are slowing down. Script sent for Imodium. Plan to continue radiation.   4/10/24: OTV 1260cGy/5400cGy, 7/30fx to anus/pelvis. Mr. Ge Lua reports fatigue and loss of taste. Appetite is fair. He has been started on capecitabine. He denies pain. He is experiencing constipation alternating with diarrhea and he is medicating for both. He continues to have some difficulty having a bowel movement but states that overall he is feeling better. He denies rectal bleeding. Plan to continue radiation.   4/3/24: OTV  180cGy/5400cGy, 1/30fx to anus/pelvis. Mr. Rojo reports feeling well overall. He denies fatigue. Appetite is good. No abdominal pain, N/V/C/D, or rectal bleeding. Some sensitivity when sitting. Plan to continue radiation.   History of Present Illness  _______________________________________  Patient had recent admission 12/9 - 12/12 for spontaneous drainage and mild pain from bilateral gluteal abscesses. Notably he also had progressing R leg  numbness. He had previously had perianal I and D in [facility] and [facility]. Rectoanal mass noted on imaging, and underwent biopsy and EUA 12/13 which confirmed diagnosis of squamous cell carcinoma, moderately differentiated. Patient was recommended for staging imaging at that time, but left AMA. He did not follow up with med onc as scheduled and was unable to be reached. Patient reports this was due to his phone service being shut off.     He presented to the ED after large volume blood per rectum.     1/3/24: MRI Pelvis - limited exam with only 3 sequences obtained. No evidence for a large infiltrative mass centered in the lower rectum with invasion into  the sacrum and musculature     1/9/24: CT Chest - mild paraseptal emphysema. no evidence of metastatic disease     2/2/24: CTA - without signs of local regional metastases     He lives in [facility], current smoker  (trying to cut down). Denies alcohol other/drug use. Endorses significant pain in rectal area     On exam:  NAD, A&Ox3, asking appropriate questions  Anal lesion extending from canal along R and L perianal skin ~4cm. Contour of gluteal skin with ulcers 2/2 prior abscess vs underlying tumor. Oozing, no  delmy bright red blood.

## 2024-05-16 NOTE — VITALS
[Maximal Pain Intensity: 4/10] : 4/10 [Least Pain Intensity: 0/10] : 0/10 [70: Cares for self; unalbe to carry on normal activity or do active work.] : 70: Cares for self; unable to carry on normal activity or do active work. [ECOG Performance Status: 2 - Ambulatory and capable of all self care but unable to carry out any work activities] : Performance Status: 2 - Ambulatory and capable of all self care but unable to carry out any work activities. Up and about more than 50% of waking hours

## 2024-05-16 NOTE — DISEASE MANAGEMENT
[Clinical] : TNM Stage: c [TTNM] : 4 [NTNM] : 0 [MTNM] : 0 [IV] : IV [de-identified] : [id number] [de-identified] : 5400cGy [de-identified] : anus/pelvis

## 2024-05-16 NOTE — DISEASE MANAGEMENT
[Clinical] : TNM Stage: c [TTNM] : 4 [NTNM] : 0 [MTNM] : 0 [IV] : IV [de-identified] : 5220cG [de-identified] : 5400cGy [de-identified] : anus/pelvis

## 2024-05-16 NOTE — DISEASE MANAGEMENT
[Clinical] : TNM Stage: c [TTNM] : 4 [NTNM] : 0 [MTNM] : 0 [IV] : IV [de-identified] : 5220cG [de-identified] : 5400cGy [de-identified] : anus/pelvis

## 2024-05-19 PROBLEM — R11.0 NAUSEA: Status: ACTIVE | Noted: 2024-03-13

## 2024-05-29 ENCOUNTER — APPOINTMENT (OUTPATIENT)
Dept: PALLIATIVE MEDICINE | Facility: CLINIC | Age: 44
End: 2024-05-29

## 2024-06-12 ENCOUNTER — APPOINTMENT (OUTPATIENT)
Dept: PALLIATIVE MEDICINE | Facility: CLINIC | Age: 44
End: 2024-06-12

## 2024-06-12 ENCOUNTER — NON-APPOINTMENT (OUTPATIENT)
Age: 44
End: 2024-06-12

## 2024-06-12 ENCOUNTER — APPOINTMENT (OUTPATIENT)
Dept: HEMATOLOGY ONCOLOGY | Facility: CLINIC | Age: 44
End: 2024-06-12
Payer: MEDICAID

## 2024-06-12 VITALS
TEMPERATURE: 99 F | RESPIRATION RATE: 18 BRPM | HEART RATE: 125 BPM | OXYGEN SATURATION: 98 % | HEIGHT: 72 IN | WEIGHT: 130.07 LBS | DIASTOLIC BLOOD PRESSURE: 56 MMHG | SYSTOLIC BLOOD PRESSURE: 100 MMHG

## 2024-06-12 VITALS — SYSTOLIC BLOOD PRESSURE: 96 MMHG | TEMPERATURE: 99.1 F | HEART RATE: 136 BPM | DIASTOLIC BLOOD PRESSURE: 64 MMHG

## 2024-06-12 VITALS
DIASTOLIC BLOOD PRESSURE: 66 MMHG | HEART RATE: 162 BPM | BODY MASS INDEX: 16.96 KG/M2 | OXYGEN SATURATION: 96 % | SYSTOLIC BLOOD PRESSURE: 99 MMHG | HEIGHT: 73 IN | RESPIRATION RATE: 18 BRPM | WEIGHT: 128 LBS | TEMPERATURE: 99.9 F

## 2024-06-12 VITALS — DIASTOLIC BLOOD PRESSURE: 82 MMHG | SYSTOLIC BLOOD PRESSURE: 117 MMHG

## 2024-06-12 DIAGNOSIS — D72.829 ELEVATED WHITE BLOOD CELL COUNT, UNSPECIFIED: ICD-10-CM

## 2024-06-12 DIAGNOSIS — D64.9 ANEMIA, UNSPECIFIED: ICD-10-CM

## 2024-06-12 DIAGNOSIS — C21.0 MALIGNANT NEOPLASM OF ANUS, UNSPECIFIED: ICD-10-CM

## 2024-06-12 DIAGNOSIS — R00.0 TACHYCARDIA, UNSPECIFIED: ICD-10-CM

## 2024-06-12 DIAGNOSIS — G89.3 NEOPLASM RELATED PAIN (ACUTE) (CHRONIC): ICD-10-CM

## 2024-06-12 LAB
ALBUMIN SERPL ELPH-MCNC: 2.5 G/DL
ALBUMIN SERPL ELPH-MCNC: 3 G/DL — LOW (ref 3.3–5)
ALP BLD-CCNC: 252 U/L
ALP SERPL-CCNC: 295 U/L — HIGH (ref 40–120)
ALT FLD-CCNC: 29 U/L — SIGNIFICANT CHANGE UP (ref 10–45)
ALT SERPL-CCNC: 31 U/L
ANION GAP SERPL CALC-SCNC: 14 MMOL/L — SIGNIFICANT CHANGE UP (ref 5–17)
ANION GAP SERPL CALC-SCNC: 8 MMOL/L
ANISOCYTOSIS BLD QL: SLIGHT — SIGNIFICANT CHANGE UP
APPEARANCE UR: CLEAR — SIGNIFICANT CHANGE UP
APTT BLD: 30.3 SEC — SIGNIFICANT CHANGE UP (ref 24.5–35.6)
AST SERPL-CCNC: 21 U/L — SIGNIFICANT CHANGE UP (ref 10–40)
AST SERPL-CCNC: 27 U/L
BACTERIA # UR AUTO: ABNORMAL /HPF
BASE EXCESS BLDV CALC-SCNC: 2.6 MMOL/L — SIGNIFICANT CHANGE UP (ref -2–3)
BASOPHILS # BLD AUTO: 0 K/UL — SIGNIFICANT CHANGE UP (ref 0–0.2)
BASOPHILS NFR BLD AUTO: 0 % — SIGNIFICANT CHANGE UP (ref 0–2)
BILIRUB SERPL-MCNC: 0.4 MG/DL — SIGNIFICANT CHANGE UP (ref 0.2–1.2)
BILIRUB SERPL-MCNC: 0.6 MG/DL
BILIRUB UR-MCNC: NEGATIVE — SIGNIFICANT CHANGE UP
BUN SERPL-MCNC: 9 MG/DL
BUN SERPL-MCNC: 9 MG/DL — SIGNIFICANT CHANGE UP (ref 7–23)
CA-I SERPL-SCNC: 1.22 MMOL/L — SIGNIFICANT CHANGE UP (ref 1.15–1.33)
CALCIUM SERPL-MCNC: 9.1 MG/DL — SIGNIFICANT CHANGE UP (ref 8.4–10.5)
CALCIUM SERPL-MCNC: 9.2 MG/DL
CHLORIDE SERPL-SCNC: 93 MMOL/L — LOW (ref 96–108)
CHLORIDE SERPL-SCNC: 96 MMOL/L
CO2 BLDV-SCNC: 30.3 MMOL/L — HIGH (ref 22–26)
CO2 SERPL-SCNC: 24 MMOL/L — SIGNIFICANT CHANGE UP (ref 22–31)
CO2 SERPL-SCNC: 28 MMOL/L
COLOR SPEC: SIGNIFICANT CHANGE UP
CREAT SERPL-MCNC: 0.52 MG/DL — SIGNIFICANT CHANGE UP (ref 0.5–1.3)
CREAT SERPL-MCNC: 0.6 MG/DL
DACRYOCYTES BLD QL SMEAR: SLIGHT — SIGNIFICANT CHANGE UP
DIFF PNL FLD: NEGATIVE — SIGNIFICANT CHANGE UP
EGFR: 124 ML/MIN/1.73M2
EGFR: 129 ML/MIN/1.73M2 — SIGNIFICANT CHANGE UP
EOSINOPHIL # BLD AUTO: 0 K/UL — SIGNIFICANT CHANGE UP (ref 0–0.5)
EOSINOPHIL NFR BLD AUTO: 0 % — SIGNIFICANT CHANGE UP (ref 0–6)
FLUAV AG NPH QL: SIGNIFICANT CHANGE UP
FLUBV AG NPH QL: SIGNIFICANT CHANGE UP
GAS PNL BLDV: 131 MMOL/L — LOW (ref 136–145)
GAS PNL BLDV: SIGNIFICANT CHANGE UP
GAS PNL BLDV: SIGNIFICANT CHANGE UP
GIANT PLATELETS BLD QL SMEAR: PRESENT — SIGNIFICANT CHANGE UP
GLUCOSE SERPL-MCNC: 131 MG/DL
GLUCOSE SERPL-MCNC: 90 MG/DL — SIGNIFICANT CHANGE UP (ref 70–99)
GLUCOSE UR QL: NEGATIVE MG/DL — SIGNIFICANT CHANGE UP
HCO3 BLDV-SCNC: 29 MMOL/L — SIGNIFICANT CHANGE UP (ref 22–29)
HCT VFR BLD CALC: 26.3 % — LOW (ref 39–50)
HCT VFR BLD CALC: 26.6 %
HGB BLD-MCNC: 7.4 G/DL — LOW (ref 13–17)
HGB BLD-MCNC: 7.6 G/DL
HYALINE CASTS # UR AUTO: PRESENT
HYPOCHROMIA BLD QL: SIGNIFICANT CHANGE UP
INR BLD: 1.35 — HIGH (ref 0.85–1.18)
KETONES UR-MCNC: ABNORMAL MG/DL
LACTATE SERPL-SCNC: 1.9 MMOL/L — SIGNIFICANT CHANGE UP (ref 0.5–2)
LEUKOCYTE ESTERASE UR-ACNC: ABNORMAL
LYMPHOCYTES # BLD AUTO: 0.89 K/UL — LOW (ref 1–3.3)
LYMPHOCYTES # BLD AUTO: 1.1 K/UL
LYMPHOCYTES # BLD AUTO: 5.2 % — LOW (ref 13–44)
LYMPHOCYTES NFR BLD AUTO: 5.6 %
MACROCYTES BLD QL: SLIGHT — SIGNIFICANT CHANGE UP
MAN DIFF?: NO
MANUAL SMEAR VERIFICATION: SIGNIFICANT CHANGE UP
MCHC RBC-ENTMCNC: 24.9 PG
MCHC RBC-ENTMCNC: 25.2 PG — LOW (ref 27–34)
MCHC RBC-ENTMCNC: 28.1 GM/DL — LOW (ref 32–36)
MCHC RBC-ENTMCNC: 28.6 GM/DL
MCV RBC AUTO: 87.2 FL
MCV RBC AUTO: 89.5 FL — SIGNIFICANT CHANGE UP (ref 80–100)
MICROCYTES BLD QL: SLIGHT — SIGNIFICANT CHANGE UP
MONOCYTES # BLD AUTO: 1.05 K/UL — HIGH (ref 0–0.9)
MONOCYTES NFR BLD AUTO: 6.1 % — SIGNIFICANT CHANGE UP (ref 2–14)
MYELOCYTES NFR BLD: 0.9 % — HIGH (ref 0–0)
NEUTROPHILS # BLD AUTO: 15.08 K/UL — HIGH (ref 1.8–7.4)
NEUTROPHILS # BLD AUTO: 16.8 K/UL
NEUTROPHILS NFR BLD AUTO: 86.9 % — HIGH (ref 43–77)
NEUTROPHILS NFR BLD AUTO: 89.6 %
NEUTS BAND # BLD: 0.9 % — SIGNIFICANT CHANGE UP (ref 0–8)
NITRITE UR-MCNC: NEGATIVE — SIGNIFICANT CHANGE UP
OVALOCYTES BLD QL SMEAR: SLIGHT — SIGNIFICANT CHANGE UP
PCO2 BLDV: 52 MMHG — SIGNIFICANT CHANGE UP (ref 42–55)
PH BLDV: 7.35 — SIGNIFICANT CHANGE UP (ref 7.32–7.43)
PH UR: 6 — SIGNIFICANT CHANGE UP (ref 5–8)
PLAT MORPH BLD: ABNORMAL
PLATELET # BLD AUTO: 612 K/UL — HIGH (ref 150–400)
PLATELET # BLD AUTO: 752 K/UL
PO2 BLDV: 34 MMHG — SIGNIFICANT CHANGE UP (ref 25–45)
POIKILOCYTOSIS BLD QL AUTO: SLIGHT — SIGNIFICANT CHANGE UP
POLYCHROMASIA BLD QL SMEAR: SLIGHT — SIGNIFICANT CHANGE UP
POTASSIUM BLDV-SCNC: 4.3 MMOL/L — SIGNIFICANT CHANGE UP (ref 3.5–5.1)
POTASSIUM SERPL-MCNC: 4.1 MMOL/L — SIGNIFICANT CHANGE UP (ref 3.5–5.3)
POTASSIUM SERPL-SCNC: 4.1 MMOL/L — SIGNIFICANT CHANGE UP (ref 3.5–5.3)
POTASSIUM SERPL-SCNC: 4.6 MMOL/L
PROT SERPL-MCNC: 7.4 G/DL — SIGNIFICANT CHANGE UP (ref 6–8.3)
PROT SERPL-MCNC: 8.1 G/DL
PROT UR-MCNC: SIGNIFICANT CHANGE UP MG/DL
PROTHROM AB SERPL-ACNC: 15.3 SEC — HIGH (ref 9.5–13)
RBC # BLD: 2.94 M/UL — LOW (ref 4.2–5.8)
RBC # BLD: 3.05 M/UL
RBC # FLD: 19.7 %
RBC # FLD: 19.9 % — HIGH (ref 10.3–14.5)
RBC BLD AUTO: ABNORMAL
RBC CASTS # UR COMP ASSIST: 2 /HPF — SIGNIFICANT CHANGE UP (ref 0–4)
RSV RNA NPH QL NAA+NON-PROBE: SIGNIFICANT CHANGE UP
SAO2 % BLDV: 50.4 % — LOW (ref 67–88)
SARS-COV-2 RNA SPEC QL NAA+PROBE: SIGNIFICANT CHANGE UP
SODIUM SERPL-SCNC: 131 MMOL/L — LOW (ref 135–145)
SODIUM SERPL-SCNC: 132 MMOL/L
SP GR SPEC: 1.02 — SIGNIFICANT CHANGE UP (ref 1–1.03)
SQUAMOUS # UR AUTO: 0 /HPF — SIGNIFICANT CHANGE UP (ref 0–5)
UROBILINOGEN FLD QL: 2 MG/DL (ref 0.2–1)
WBC # BLD: 17.17 K/UL — HIGH (ref 3.8–10.5)
WBC # FLD AUTO: 17.17 K/UL — HIGH (ref 3.8–10.5)
WBC # FLD AUTO: 18.8 K/UL
WBC UR QL: 1 /HPF — SIGNIFICANT CHANGE UP (ref 0–5)

## 2024-06-12 PROCEDURE — 74177 CT ABD & PELVIS W/CONTRAST: CPT | Mod: 26,MC

## 2024-06-12 PROCEDURE — 99214 OFFICE O/P EST MOD 30 MIN: CPT

## 2024-06-12 PROCEDURE — G2211 COMPLEX E/M VISIT ADD ON: CPT | Mod: NC

## 2024-06-12 PROCEDURE — 99291 CRITICAL CARE FIRST HOUR: CPT

## 2024-06-12 PROCEDURE — 71045 X-RAY EXAM CHEST 1 VIEW: CPT | Mod: 26

## 2024-06-12 PROCEDURE — 93010 ELECTROCARDIOGRAM REPORT: CPT

## 2024-06-12 RX ORDER — SODIUM CHLORIDE 0.9 % (FLUSH) 0.9 %
1000 SYRINGE (ML) INJECTION ONCE
Refills: 0 | Status: COMPLETED | OUTPATIENT
Start: 2024-06-12 | End: 2024-06-12

## 2024-06-12 RX ORDER — IOHEXOL 350 MG/ML
30 INJECTION, SOLUTION INTRAVENOUS ONCE
Refills: 0 | Status: COMPLETED | OUTPATIENT
Start: 2024-06-12 | End: 2024-06-12

## 2024-06-12 RX ORDER — CAPECITABINE 500 MG/1
500 TABLET ORAL
Qty: 50 | Refills: 0 | Status: DISCONTINUED | COMMUNITY
Start: 2024-03-19 | End: 2024-06-12

## 2024-06-12 RX ORDER — KETOROLAC TROMETHAMINE 30 MG/ML
15 INJECTION, SOLUTION INTRAMUSCULAR ONCE
Refills: 0 | Status: DISCONTINUED | OUTPATIENT
Start: 2024-06-12 | End: 2024-06-12

## 2024-06-12 RX ORDER — SODIUM CHLORIDE 0.9 % (FLUSH) 0.9 %
1850 SYRINGE (ML) INJECTION ONCE
Refills: 0 | Status: COMPLETED | OUTPATIENT
Start: 2024-06-12 | End: 2024-06-12

## 2024-06-12 RX ORDER — ONDANSETRON HYDROCHLORIDE 2 MG/ML
4 INJECTION INTRAMUSCULAR; INTRAVENOUS ONCE
Refills: 0 | Status: COMPLETED | OUTPATIENT
Start: 2024-06-12 | End: 2024-06-12

## 2024-06-12 RX ORDER — VANCOMYCIN HYDROCHLORIDE 50 MG/ML
1000 KIT ORAL ONCE
Refills: 0 | Status: COMPLETED | OUTPATIENT
Start: 2024-06-12 | End: 2024-06-12

## 2024-06-12 RX ORDER — PIPERACILLIN SODIUM AND TAZOBACTAM SODIUM 3; .375 G/15ML; G/15ML
3.38 INJECTION, POWDER, LYOPHILIZED, FOR SOLUTION INTRAVENOUS ONCE
Refills: 0 | Status: COMPLETED | OUTPATIENT
Start: 2024-06-12 | End: 2024-06-12

## 2024-06-12 RX ORDER — HYDROMORPHONE HCL 0.2 MG/ML
1 INJECTION, SOLUTION INTRAVENOUS ONCE
Refills: 0 | Status: DISCONTINUED | OUTPATIENT
Start: 2024-06-12 | End: 2024-06-12

## 2024-06-12 RX ORDER — ACETAMINOPHEN 325 MG
650 TABLET ORAL ONCE
Refills: 0 | Status: COMPLETED | OUTPATIENT
Start: 2024-06-12 | End: 2024-06-12

## 2024-06-12 RX ADMIN — PIPERACILLIN SODIUM AND TAZOBACTAM SODIUM 200 GRAM(S): 3; .375 INJECTION, POWDER, LYOPHILIZED, FOR SOLUTION INTRAVENOUS at 17:30

## 2024-06-12 RX ADMIN — HYDROMORPHONE HCL 1 MILLIGRAM(S): 0.2 INJECTION, SOLUTION INTRAVENOUS at 22:57

## 2024-06-12 RX ADMIN — HYDROMORPHONE HCL 1 MILLIGRAM(S): 0.2 INJECTION, SOLUTION INTRAVENOUS at 21:47

## 2024-06-12 RX ADMIN — Medication 1850 MILLILITER(S): at 17:30

## 2024-06-12 RX ADMIN — ONDANSETRON HYDROCHLORIDE 4 MILLIGRAM(S): 2 INJECTION INTRAMUSCULAR; INTRAVENOUS at 17:29

## 2024-06-12 RX ADMIN — HYDROMORPHONE HCL 1 MILLIGRAM(S): 0.2 INJECTION, SOLUTION INTRAVENOUS at 17:30

## 2024-06-12 RX ADMIN — Medication 1000 MILLILITER(S): at 23:03

## 2024-06-12 RX ADMIN — Medication 650 MILLIGRAM(S): at 17:30

## 2024-06-12 RX ADMIN — VANCOMYCIN HYDROCHLORIDE 166.67 MILLIGRAM(S): KIT at 17:56

## 2024-06-12 RX ADMIN — KETOROLAC TROMETHAMINE 15 MILLIGRAM(S): 30 INJECTION, SOLUTION INTRAMUSCULAR at 23:20

## 2024-06-12 RX ADMIN — IOHEXOL 30 MILLILITER(S): 350 INJECTION, SOLUTION INTRAVENOUS at 17:29

## 2024-06-12 RX ADMIN — Medication 650 MILLIGRAM(S): at 22:56

## 2024-06-12 NOTE — ASSESSMENT
[FreeTextEntry1] : Delonte Lua is a 44 year old man with AIDS and a bulky, locally advanced anal cancer.   He initially presented to [facility] and was diagnosed with anal cancer in 12/2023. He has had extensive imaging - CT scans, pelvic MRI, Pet-CT. He has a bulky, locally invasive anal cancer that has invaded several local structures including the prostate, gluteal musculature, and coccyx/sacrum. No distant metastatic disease has been detected. From a psychosocial standpoint, the patient has additional complicating factors including housing insecurity, poor support/no caregiver, substance abuse, and mental illness.  He missed several appointments after the initial hospitalization.  Started RT to anus/pelvis and capecitabine(1500mg/1000mg) on 4/3/24, received mitomycin on 4/5/24, completed RT on 5/16/24  Plan: 1. bulky, locally advanced anal cancer --case has been discussed in multidisciplinary format - both in conference and separately w/ the involved physicians colorectal surgeon Dr. Cortez and radiation oncologist Dr Owens --potential role for diverting ostomy has been discussed, not recommended at this time by CRS --favor upfront treatment with concurrent chemoradiation which probably offers the patient the best opportunity for local control and palliation of symptoms.  --completed RT to anus/pelvis and capecitabine on 5/16/24 --he has not seen Dr. Cortez since last visit, scheduled for tomorrow. However, he presents to clinic with possible anal abscess/infection, tachycardia, borderline low BP and temperature, r/o sepsis, we are sending him to ED by EMS for further evaluation.    2. HIV/AIDS --100% adherence to ART advised. Educated pt about the importance of ART in attaining the best oncologic outcomes. --follow with Dr Mikayla Lang for HIV care.   3. r/o anal abscess & infection, r/o sepsis --BP 96/64, , temp 99.9, O2 sat 96%.  --W 18.8, Hgb 7.6, Alk Phos 252. today --sending him to ED by EMS, he needs to be admitted for infectious work up and surgical procedure if needed.    4. anal pain  --pt does not have distant metastatic disease, but he has a very locally advanced cancer, likelihood of cure is probably very slim. --on oxycodone 10mg every 4 -6 hours and Methadone bid, follow up with Dr. Taylor.   5. psychosocial support --SW on board --consulted oncology navigators --may need to involve psycho-oncologist in the future if he wants, follow up with Dr. Taylor.   6. anemia 2/2 chemo and chronic blood loss  --s/p 1 unit PRBC on 5/2/24 --ferritin 596 iron serum 26, transferrin sat 15% on 5/1/24. No need IV iron at this time.  --Hgb 7.6 today. Monitor closely.   7. decreased appetite and PO intake --encourage Ensure 2-3 xd, PO hydration, protein rich diet.  --nutrition referral as needed   I saw and evaluated the patient with Dr. Julian RTMEREDITH after ED visit  labs-CBC, CMP

## 2024-06-12 NOTE — PHYSICAL EXAM
[Restricted in physically strenuous activity but ambulatory and able to carry out work of a light or sedentary nature] : Status 1- Restricted in physically strenuous activity but ambulatory and able to carry out work of a light or sedentary nature, e.g., light house work, office work [Ambulatory and capable of all self care but unable to carry out any work activities] : Status 2- Ambulatory and capable of all self care but unable to carry out any work activities. Up and about more than 50% of waking hours [Thin] : thin [Normal] : affect appropriate [de-identified] : uncomfortable appearing due to pain [de-identified] : tachycardic, regular, no murmurs  [de-identified] : no LE edema

## 2024-06-12 NOTE — PHYSICAL EXAM
[Restricted in physically strenuous activity but ambulatory and able to carry out work of a light or sedentary nature] : Status 1- Restricted in physically strenuous activity but ambulatory and able to carry out work of a light or sedentary nature, e.g., light house work, office work [Ambulatory and capable of all self care but unable to carry out any work activities] : Status 2- Ambulatory and capable of all self care but unable to carry out any work activities. Up and about more than 50% of waking hours [Thin] : thin [Normal] : affect appropriate [de-identified] : uncomfortable appearing due to pain [de-identified] : tachycardic, regular, no murmurs  [de-identified] : no LE edema

## 2024-06-12 NOTE — HISTORY OF PRESENT ILLNESS
[de-identified] : 44 M, MSM with PMH HIV (on biktarvy), invasive anorectal squamous cell carcinoma, polysubstance abuse, syphilis, anemia and abdominal MRSA 2021, who presented to [facility] ED with rectal bleeding early February. Here for follow up.   Oncologic History: 1.  anal cancer - anal mass biopsy 12/13/23 - invasive squamous cell carcinoma, moderately differentiated - CT Chest 1/9/24 - mild paraseptal emphysema. no evidence of metastatic disease   - MRI pelvis 1/3/24 - limited exam with only 3 sequences obtained. Showing invasion into sacrum, right hemipelvis, and invasion posteriorly into gluteal musculature  - CTA 2/2/24 - without signs of local regional metastases, large lobulated rectal tumor dissecting into adjacent soft tissues and bones overall similar in appearance to the previo - colonoscopy/anoscopy may be difficult due to bulk of disease - CEA elevated at 192.0 prior to treatment - PET/CT (2/9/24): Intense uptake is seen in the patient's large pelvic mass known to be an invasive squamous cell carcinoma of the anus. There is extension into the left gluteal region, but no evidence of FDG avid local or distant metastatic disease. - CT C/A/P on 3/1/24 showed no PE and grossly stable known rectal mass with grossly stable osseous involvement. - started RT to anus/pelvis and capecitabine(1500mg/1000mg) on 4/3/24, received mitomycin on 4/5/24, completed RT on 5/16/24  HIV: CD4 count 328 in 2/2024 HIV VL low positive 12/2023 [de-identified] : Here for follow up. He completed RT to anus/pelvis and capecitabine on 5/16/24  he has been having persistent brownish discharge and pain from left buttock and anus, that interfering with mobility, sweating EOD x 1-2 weeks. He never checked temperature, denies chills or shivering.  His appetite and PO intake have been decreased, drinking is fine. he has lost a few pounds.  taking Oxycodone 10mg every 4-6 hours and Methadone BID with pain control. Normal BM w/o blood.  BP 96/64, tachycardic 165, repeat one 135, low grade temperature 99.9-99.1  denies chest pain, SOB, palpitations, dizziness, or headache.

## 2024-06-12 NOTE — ASSESSMENT
[FreeTextEntry1] : Delonte Lua is a 44 year old man with AIDS and a bulky, locally advanced anal cancer.   He initially presented to [facility] and was diagnosed with anal cancer in 12/2023. He has had extensive imaging - CT scans, pelvic MRI, Pet-CT. He has a bulky, locally invasive anal cancer that has invaded several local structures including the prostate, gluteal musculature, and coccyx/sacrum. No distant metastatic disease has been detected. From a psychosocial standpoint, the patient has additional complicating factors including housing insecurity, poor support/no caregiver, substance abuse, and mental illness.  He missed several appointments after the initial hospitalization.  Started RT to anus/pelvis and capecitabine(1500mg/1000mg) on 4/3/24, received mitomycin on 4/5/24, completed RT on 5/16/24  Plan: 1. bulky, locally advanced anal cancer --case has been discussed in multidisciplinary format - both in conference and separately w/ the involved physicians colorectal surgeon Dr. Cortez and radiation oncologist Dr Owens --potential role for diverting ostomy has been discussed, not recommended at this time by CRS --favor upfront treatment with concurrent chemoradiation which probably offers the patient the best opportunity for local control and palliation of symptoms.  --completed RT to anus/pelvis and capecitabine on 5/16/24 --he has not seen Dr. Cortez since last visit, scheduled for tomorrow. However, he presents to clinic with possible anal abscess/infection, tachycardia, borderline low BP and temperature, r/o sepsis, we are sending him to ED by EMS for further evaluation.    2. HIV/AIDS --100% adherence to ART advised. Educated pt about the importance of ART in attaining the best oncologic outcomes. --follow with Dr Mikayla Lang for HIV care.   3. r/o anal abscess & infection, r/o sepsis --BP 96/64, , temp 99.9, O2 sat 96%.  --W 18.8, Hgb 7.6, Alk Phos 252. today --sending him to ED by EMS, he needs to be admitted for infectious work up and surgical procedure if needed.    4. anal pain  --pt does not have distant metastatic disease, but he has a very locally advanced cancer, likelihood of cure is probably very slim. --on oxycodone 10mg every 4 -6 hours and Methadone bid, follow up with Dr. Taylor.   5. psychosocial support --SW on board --consulted oncology navigators --may need to involve psycho-oncologist in the future if he wants, follow up with Dr. Taylor.   6. anemia 2/2 chemo and chronic blood loss  --s/p 1 unit PRBC on 5/2/24 --ferritin 596 iron serum 26, transferrin sat 15% on 5/1/24. No need IV iron at this time.  --Hgb 7.6 today. Monitor closely.   7. decreased appetite and PO intake --encourage Ensure 2-3 xd, PO hydration, protein rich diet.  --nutrition referral as needed   I saw and evaluated the patient with Dr. Julian RTMEREDITH after ED visit  labs-CBC, CMP

## 2024-06-12 NOTE — HISTORY OF PRESENT ILLNESS
[FreeTextEntry1] : - 43 M with HIV (on biktarvy), invasive anorectal squamous cell carcinoma, polysubstance abuse, syphilis, anemia and abdominal MRSA 2021 here for palliative care support.  Onc Hx: - anal mass biopsy 12/13/23 - invasive squamous cell carcinoma, moderately differentiated - CT Chest 1/9/24 - mild paraseptal emphysema. no evidence of metastatic disease - MRI pelvis 1/3/24 - limited exam with only 3 sequences obtained. Showing invasion into sacrum, right hemipelvis, and invasion posteriorly into gluteal musculature - CTA 2/2/24 - without signs of local regional metastases, large lobulated rectal tumor dissecting into adjacent soft tissues and bones overall similar in appearance to the previo - colonoscopy/anoscopy may be difficult due to bulk of disease - CEA elevated at 192.0 prior to treatment - PET/CT (2/9/24): Intense uptake is seen in the patient's large pelvic mass known to be an invasive squamous cell carcinoma of the anus. There is extension into the left gluteal region, but no evidence of FDG avid local or distant metastatic disease. 3/1-3/7/24 - Admitted to [facility] with pain. 3/1/24-3/7/24 - Admitted to [facility]. UTox positive for amphetamines and GHB on admission (negative for opioids and methadone). Pain controlled with MSER 15 TID and oxycodone 5 during admission but never picked up any prescriptions on discharge. Endorses that he was using crystal meth and GHB prior to last hospitalization but has not used any since then and pain has been very poorly managed.  Providers: Oncologist - Nabila Julian Rad Onc - Itz Owens  Interval Hx: Completed RT. Pain is due to lesions/fistulas around mass (less from mass) and feels that those are shrinking and pain has been improving. Mood has also improved as mobility has also improved.  SYMPTOMS: #Pain Taking methadone 10mg BID and oxycodone 10mg PRN, on average 4-5 PRNs per day. Pain much better this past week, was able to spread out the oxycodone a little more this week and is more mobile. Location - Buttocks b/l Quality - Sharp Radiation - Down L leg Timing - Constant Aggravating factors - Nothing Minimal acceptable level (0-10 scale) - 3/10 Severity in last 24h (0-10 scale) - 9/10 Current score (0-10 scale) - 3/10 ISTOP Ref #: [id number]  #Constipation/Diarrhea Diarrhea resolved and had a few days of normal BMs. Going daily. Off loperamide now but not taking bowel regimen.  #Anxiety Feeling anxious recently but has improved so much since mobility, pain and appetite have improved. Ran out of escitalopram last month and did notice that when he stopped it, he felt anxiety worsen somewhat.  #Poor appetite Was eating little due to poor appetite but with the more formed BMs, more mobility, appetite has returned to normal. No longer taking gummies for appetite.  ROS: If [ ] blank, symptom not present Fatigue [X] Nausea [X] Loss of appetite [X] Unintentional weight loss [ ] Constipation [ ] Diarrhea [ ] Anxiety [X] Low mood [ ] Other symptoms: [x ] All other review of symptoms negative  SH: Crystal meth and GHB use in the past. Denies current illicit drug use. Has a partner, Acosta, dating for over 1 year. Worked as a  for [employer] but hasn't been able to work for past 2 years.

## 2024-06-12 NOTE — HISTORY OF PRESENT ILLNESS
[de-identified] : 44 M, MSM with PMH HIV (on biktarvy), invasive anorectal squamous cell carcinoma, polysubstance abuse, syphilis, anemia and abdominal MRSA 2021, who presented to [facility] ED with rectal bleeding early February. Here for follow up.   Oncologic History: 1.  anal cancer - anal mass biopsy 12/13/23 - invasive squamous cell carcinoma, moderately differentiated - CT Chest 1/9/24 - mild paraseptal emphysema. no evidence of metastatic disease   - MRI pelvis 1/3/24 - limited exam with only 3 sequences obtained. Showing invasion into sacrum, right hemipelvis, and invasion posteriorly into gluteal musculature  - CTA 2/2/24 - without signs of local regional metastases, large lobulated rectal tumor dissecting into adjacent soft tissues and bones overall similar in appearance to the previo - colonoscopy/anoscopy may be difficult due to bulk of disease - CEA elevated at 192.0 prior to treatment - PET/CT (2/9/24): Intense uptake is seen in the patient's large pelvic mass known to be an invasive squamous cell carcinoma of the anus. There is extension into the left gluteal region, but no evidence of FDG avid local or distant metastatic disease. - CT C/A/P on 3/1/24 showed no PE and grossly stable known rectal mass with grossly stable osseous involvement. - started RT to anus/pelvis and capecitabine(1500mg/1000mg) on 4/3/24, received mitomycin on 4/5/24, completed RT on 5/16/24  HIV: CD4 count 328 in 2/2024 HIV VL low positive 12/2023 [de-identified] : Here for follow up. He completed RT to anus/pelvis and capecitabine on 5/16/24  he has been having persistent brownish discharge and pain from left buttock and anus, that interfering with mobility, sweating EOD x 1-2 weeks. He never checked temperature, denies chills or shivering.  His appetite and PO intake have been decreased, drinking is fine. he has lost a few pounds.  taking Oxycodone 10mg every 4-6 hours and Methadone BID with pain control. Normal BM w/o blood.  BP 96/64, tachycardic 165, repeat one 135, low grade temperature 99.9-99.1  denies chest pain, SOB, palpitations, dizziness, or headache.

## 2024-06-13 ENCOUNTER — INPATIENT (INPATIENT)
Facility: HOSPITAL | Age: 44
LOS: 5 days | Discharge: HOME HEALTH | DRG: 969 | End: 2024-06-19
Attending: STUDENT IN AN ORGANIZED HEALTH CARE EDUCATION/TRAINING PROGRAM | Admitting: STUDENT IN AN ORGANIZED HEALTH CARE EDUCATION/TRAINING PROGRAM
Payer: MEDICAID

## 2024-06-13 ENCOUNTER — APPOINTMENT (OUTPATIENT)
Dept: COLORECTAL SURGERY | Facility: CLINIC | Age: 44
End: 2024-06-13

## 2024-06-13 ENCOUNTER — RESULT REVIEW (OUTPATIENT)
Age: 44
End: 2024-06-13

## 2024-06-13 DIAGNOSIS — Z71.89 OTHER SPECIFIED COUNSELING: ICD-10-CM

## 2024-06-13 DIAGNOSIS — C20 MALIGNANT NEOPLASM OF RECTUM: ICD-10-CM

## 2024-06-13 DIAGNOSIS — L02.91 CUTANEOUS ABSCESS, UNSPECIFIED: ICD-10-CM

## 2024-06-13 DIAGNOSIS — Z98.890 OTHER SPECIFIED POSTPROCEDURAL STATES: Chronic | ICD-10-CM

## 2024-06-13 DIAGNOSIS — Z51.5 ENCOUNTER FOR PALLIATIVE CARE: ICD-10-CM

## 2024-06-13 DIAGNOSIS — G89.3 NEOPLASM RELATED PAIN (ACUTE) (CHRONIC): ICD-10-CM

## 2024-06-13 DIAGNOSIS — R53.81 OTHER MALAISE: ICD-10-CM

## 2024-06-13 LAB
ANION GAP SERPL CALC-SCNC: 8 MMOL/L — SIGNIFICANT CHANGE UP (ref 5–17)
BLD GP AB SCN SERPL QL: NEGATIVE — SIGNIFICANT CHANGE UP
BUN SERPL-MCNC: 9 MG/DL — SIGNIFICANT CHANGE UP (ref 7–23)
CALCIUM SERPL-MCNC: 8.6 MG/DL — SIGNIFICANT CHANGE UP (ref 8.4–10.5)
CHLORIDE SERPL-SCNC: 101 MMOL/L — SIGNIFICANT CHANGE UP (ref 96–108)
CO2 SERPL-SCNC: 25 MMOL/L — SIGNIFICANT CHANGE UP (ref 22–31)
CREAT SERPL-MCNC: 0.49 MG/DL — LOW (ref 0.5–1.3)
EGFR: 131 ML/MIN/1.73M2 — SIGNIFICANT CHANGE UP
FERRITIN SERPL-MCNC: 633 NG/ML — HIGH (ref 30–400)
GLUCOSE SERPL-MCNC: 105 MG/DL — HIGH (ref 70–99)
HCT VFR BLD CALC: 21.3 % — LOW (ref 39–50)
HGB BLD-MCNC: 6.1 G/DL — CRITICAL LOW (ref 13–17)
IRON SATN MFR SERPL: 22 % — SIGNIFICANT CHANGE UP (ref 16–55)
IRON SATN MFR SERPL: 25 UG/DL — LOW (ref 45–165)
MAGNESIUM SERPL-MCNC: 1.9 MG/DL — SIGNIFICANT CHANGE UP (ref 1.6–2.6)
MCHC RBC-ENTMCNC: 25.8 PG — LOW (ref 27–34)
MCHC RBC-ENTMCNC: 28.6 GM/DL — LOW (ref 32–36)
MCV RBC AUTO: 90.3 FL — SIGNIFICANT CHANGE UP (ref 80–100)
NRBC # BLD: 0 /100 WBCS — SIGNIFICANT CHANGE UP (ref 0–0)
PHOSPHATE SERPL-MCNC: 3.7 MG/DL — SIGNIFICANT CHANGE UP (ref 2.5–4.5)
PLATELET # BLD AUTO: 520 K/UL — HIGH (ref 150–400)
POTASSIUM SERPL-MCNC: 4 MMOL/L — SIGNIFICANT CHANGE UP (ref 3.5–5.3)
POTASSIUM SERPL-SCNC: 4 MMOL/L — SIGNIFICANT CHANGE UP (ref 3.5–5.3)
RBC # BLD: 2.36 M/UL — LOW (ref 4.2–5.8)
RBC # FLD: 19.8 % — HIGH (ref 10.3–14.5)
RH IG SCN BLD-IMP: POSITIVE — SIGNIFICANT CHANGE UP
SODIUM SERPL-SCNC: 134 MMOL/L — LOW (ref 135–145)
TIBC SERPL-MCNC: 116 UG/DL — LOW (ref 220–430)
UIBC SERPL-MCNC: 91 UG/DL — LOW (ref 110–370)
WBC # BLD: 12.49 K/UL — HIGH (ref 3.8–10.5)
WBC # FLD AUTO: 12.49 K/UL — HIGH (ref 3.8–10.5)

## 2024-06-13 PROCEDURE — 88305 TISSUE EXAM BY PATHOLOGIST: CPT | Mod: 26

## 2024-06-13 PROCEDURE — 99221 1ST HOSP IP/OBS SF/LOW 40: CPT

## 2024-06-13 PROCEDURE — 99223 1ST HOSP IP/OBS HIGH 75: CPT

## 2024-06-13 RX ORDER — ESCITALOPRAM OXALATE 20 MG/1
1 TABLET, FILM COATED ORAL
Refills: 0 | DISCHARGE

## 2024-06-13 RX ORDER — ESCITALOPRAM OXALATE 20 MG/1
10 TABLET, FILM COATED ORAL DAILY
Refills: 0 | Status: DISCONTINUED | OUTPATIENT
Start: 2024-06-13 | End: 2024-06-19

## 2024-06-13 RX ORDER — HYDROMORPHONE HCL 0.2 MG/ML
0.5 INJECTION, SOLUTION INTRAVENOUS EVERY 4 HOURS
Refills: 0 | Status: DISCONTINUED | OUTPATIENT
Start: 2024-06-13 | End: 2024-06-16

## 2024-06-13 RX ORDER — PIPERACILLIN SODIUM AND TAZOBACTAM SODIUM 3; .375 G/15ML; G/15ML
3.38 INJECTION, POWDER, LYOPHILIZED, FOR SOLUTION INTRAVENOUS EVERY 8 HOURS
Refills: 0 | Status: DISCONTINUED | OUTPATIENT
Start: 2024-06-13 | End: 2024-06-19

## 2024-06-13 RX ORDER — HYDROMORPHONE HCL 0.2 MG/ML
1 INJECTION, SOLUTION INTRAVENOUS EVERY 4 HOURS
Refills: 0 | Status: DISCONTINUED | OUTPATIENT
Start: 2024-06-13 | End: 2024-06-16

## 2024-06-13 RX ORDER — BICTEGRAVIR SODIUM, EMTRICITABINE, AND TENOFOVIR ALAFENAMIDE FUMARATE 30; 120; 15 MG/1; MG/1; MG/1
1 TABLET ORAL DAILY
Refills: 0 | Status: DISCONTINUED | OUTPATIENT
Start: 2024-06-13 | End: 2024-06-19

## 2024-06-13 RX ORDER — SODIUM CHLORIDE 0.9 % (FLUSH) 0.9 %
1000 SYRINGE (ML) INJECTION
Refills: 0 | Status: DISCONTINUED | OUTPATIENT
Start: 2024-06-13 | End: 2024-06-14

## 2024-06-13 RX ORDER — NALOXONE HYDROCHLORIDE 1 MG/ML
1 INJECTION PARENTERAL
Refills: 0 | DISCHARGE

## 2024-06-13 RX ORDER — HEPARIN SODIUM 50 [USP'U]/ML
5000 INJECTION, SOLUTION INTRAVENOUS EVERY 8 HOURS
Refills: 0 | Status: DISCONTINUED | OUTPATIENT
Start: 2024-06-13 | End: 2024-06-15

## 2024-06-13 RX ORDER — ONDANSETRON HYDROCHLORIDE 2 MG/ML
4 INJECTION INTRAMUSCULAR; INTRAVENOUS EVERY 6 HOURS
Refills: 0 | Status: DISCONTINUED | OUTPATIENT
Start: 2024-06-13 | End: 2024-06-19

## 2024-06-13 RX ADMIN — HYDROMORPHONE HCL 0.5 MILLIGRAM(S): 0.2 INJECTION, SOLUTION INTRAVENOUS at 14:20

## 2024-06-13 RX ADMIN — HYDROMORPHONE HCL 0.5 MILLIGRAM(S): 0.2 INJECTION, SOLUTION INTRAVENOUS at 07:00

## 2024-06-13 RX ADMIN — HYDROMORPHONE HCL 1 MILLIGRAM(S): 0.2 INJECTION, SOLUTION INTRAVENOUS at 23:35

## 2024-06-13 RX ADMIN — HEPARIN SODIUM 5000 UNIT(S): 50 INJECTION, SOLUTION INTRAVENOUS at 05:14

## 2024-06-13 RX ADMIN — PIPERACILLIN SODIUM AND TAZOBACTAM SODIUM 25 GRAM(S): 3; .375 INJECTION, POWDER, LYOPHILIZED, FOR SOLUTION INTRAVENOUS at 21:25

## 2024-06-13 RX ADMIN — Medication 10 MILLIGRAM(S): at 13:20

## 2024-06-13 RX ADMIN — HEPARIN SODIUM 5000 UNIT(S): 50 INJECTION, SOLUTION INTRAVENOUS at 21:25

## 2024-06-13 RX ADMIN — HYDROMORPHONE HCL 0.5 MILLIGRAM(S): 0.2 INJECTION, SOLUTION INTRAVENOUS at 21:40

## 2024-06-13 RX ADMIN — ESCITALOPRAM OXALATE 10 MILLIGRAM(S): 20 TABLET, FILM COATED ORAL at 13:20

## 2024-06-13 RX ADMIN — HYDROMORPHONE HCL 1 MILLIGRAM(S): 0.2 INJECTION, SOLUTION INTRAVENOUS at 23:40

## 2024-06-13 RX ADMIN — HYDROMORPHONE HCL 0.5 MILLIGRAM(S): 0.2 INJECTION, SOLUTION INTRAVENOUS at 06:39

## 2024-06-13 RX ADMIN — Medication 100 MILLILITER(S): at 03:46

## 2024-06-13 RX ADMIN — HYDROMORPHONE HCL 0.5 MILLIGRAM(S): 0.2 INJECTION, SOLUTION INTRAVENOUS at 21:25

## 2024-06-13 RX ADMIN — PIPERACILLIN SODIUM AND TAZOBACTAM SODIUM 25 GRAM(S): 3; .375 INJECTION, POWDER, LYOPHILIZED, FOR SOLUTION INTRAVENOUS at 13:20

## 2024-06-13 RX ADMIN — ONDANSETRON HYDROCHLORIDE 4 MILLIGRAM(S): 2 INJECTION INTRAMUSCULAR; INTRAVENOUS at 21:25

## 2024-06-13 RX ADMIN — HEPARIN SODIUM 5000 UNIT(S): 50 INJECTION, SOLUTION INTRAVENOUS at 13:20

## 2024-06-13 RX ADMIN — BICTEGRAVIR SODIUM, EMTRICITABINE, AND TENOFOVIR ALAFENAMIDE FUMARATE 1 TABLET(S): 30; 120; 15 TABLET ORAL at 13:21

## 2024-06-13 RX ADMIN — HYDROMORPHONE HCL 0.5 MILLIGRAM(S): 0.2 INJECTION, SOLUTION INTRAVENOUS at 14:05

## 2024-06-13 RX ADMIN — PIPERACILLIN SODIUM AND TAZOBACTAM SODIUM 25 GRAM(S): 3; .375 INJECTION, POWDER, LYOPHILIZED, FOR SOLUTION INTRAVENOUS at 05:14

## 2024-06-14 ENCOUNTER — TRANSCRIPTION ENCOUNTER (OUTPATIENT)
Age: 44
End: 2024-06-14

## 2024-06-14 LAB
ANION GAP SERPL CALC-SCNC: 9 MMOL/L — SIGNIFICANT CHANGE UP (ref 5–17)
BUN SERPL-MCNC: 6 MG/DL — LOW (ref 7–23)
CALCIUM SERPL-MCNC: 8.9 MG/DL — SIGNIFICANT CHANGE UP (ref 8.4–10.5)
CHLORIDE SERPL-SCNC: 99 MMOL/L — SIGNIFICANT CHANGE UP (ref 96–108)
CO2 SERPL-SCNC: 25 MMOL/L — SIGNIFICANT CHANGE UP (ref 22–31)
CREAT SERPL-MCNC: 0.48 MG/DL — LOW (ref 0.5–1.3)
EGFR: 132 ML/MIN/1.73M2 — SIGNIFICANT CHANGE UP
GLUCOSE SERPL-MCNC: 91 MG/DL — SIGNIFICANT CHANGE UP (ref 70–99)
HCT VFR BLD CALC: 25.8 % — LOW (ref 39–50)
HGB BLD-MCNC: 7.7 G/DL — LOW (ref 13–17)
MAGNESIUM SERPL-MCNC: 1.8 MG/DL — SIGNIFICANT CHANGE UP (ref 1.6–2.6)
MCHC RBC-ENTMCNC: 26.5 PG — LOW (ref 27–34)
MCHC RBC-ENTMCNC: 29.8 GM/DL — LOW (ref 32–36)
MCV RBC AUTO: 88.7 FL — SIGNIFICANT CHANGE UP (ref 80–100)
NRBC # BLD: 0 /100 WBCS — SIGNIFICANT CHANGE UP (ref 0–0)
PHOSPHATE SERPL-MCNC: 3.6 MG/DL — SIGNIFICANT CHANGE UP (ref 2.5–4.5)
PLATELET # BLD AUTO: 513 K/UL — HIGH (ref 150–400)
POTASSIUM SERPL-MCNC: 3.9 MMOL/L — SIGNIFICANT CHANGE UP (ref 3.5–5.3)
POTASSIUM SERPL-SCNC: 3.9 MMOL/L — SIGNIFICANT CHANGE UP (ref 3.5–5.3)
RBC # BLD: 2.91 M/UL — LOW (ref 4.2–5.8)
RBC # FLD: 18.9 % — HIGH (ref 10.3–14.5)
SODIUM SERPL-SCNC: 133 MMOL/L — LOW (ref 135–145)
WBC # BLD: 13.59 K/UL — HIGH (ref 3.8–10.5)
WBC # FLD AUTO: 13.59 K/UL — HIGH (ref 3.8–10.5)

## 2024-06-14 PROCEDURE — 99232 SBSQ HOSP IP/OBS MODERATE 35: CPT | Mod: 57

## 2024-06-14 RX ORDER — ACETAMINOPHEN 325 MG
1000 TABLET ORAL ONCE
Refills: 0 | Status: COMPLETED | OUTPATIENT
Start: 2024-06-14 | End: 2024-06-14

## 2024-06-14 RX ORDER — MAGNESIUM SULFATE 100 %
1 POWDER (GRAM) MISCELLANEOUS ONCE
Refills: 0 | Status: COMPLETED | OUTPATIENT
Start: 2024-06-14 | End: 2024-06-14

## 2024-06-14 RX ADMIN — HYDROMORPHONE HCL 0.5 MILLIGRAM(S): 0.2 INJECTION, SOLUTION INTRAVENOUS at 04:07

## 2024-06-14 RX ADMIN — HEPARIN SODIUM 5000 UNIT(S): 50 INJECTION, SOLUTION INTRAVENOUS at 21:45

## 2024-06-14 RX ADMIN — Medication 400 MILLIGRAM(S): at 22:11

## 2024-06-14 RX ADMIN — HYDROMORPHONE HCL 0.5 MILLIGRAM(S): 0.2 INJECTION, SOLUTION INTRAVENOUS at 09:15

## 2024-06-14 RX ADMIN — BICTEGRAVIR SODIUM, EMTRICITABINE, AND TENOFOVIR ALAFENAMIDE FUMARATE 1 TABLET(S): 30; 120; 15 TABLET ORAL at 13:02

## 2024-06-14 RX ADMIN — Medication 100 MILLILITER(S): at 01:39

## 2024-06-14 RX ADMIN — PIPERACILLIN SODIUM AND TAZOBACTAM SODIUM 25 GRAM(S): 3; .375 INJECTION, POWDER, LYOPHILIZED, FOR SOLUTION INTRAVENOUS at 13:02

## 2024-06-14 RX ADMIN — HEPARIN SODIUM 5000 UNIT(S): 50 INJECTION, SOLUTION INTRAVENOUS at 13:02

## 2024-06-14 RX ADMIN — Medication 10 MILLIGRAM(S): at 17:16

## 2024-06-14 RX ADMIN — HYDROMORPHONE HCL 1 MILLIGRAM(S): 0.2 INJECTION, SOLUTION INTRAVENOUS at 13:45

## 2024-06-14 RX ADMIN — HYDROMORPHONE HCL 0.5 MILLIGRAM(S): 0.2 INJECTION, SOLUTION INTRAVENOUS at 19:00

## 2024-06-14 RX ADMIN — ESCITALOPRAM OXALATE 10 MILLIGRAM(S): 20 TABLET, FILM COATED ORAL at 13:02

## 2024-06-14 RX ADMIN — HYDROMORPHONE HCL 0.5 MILLIGRAM(S): 0.2 INJECTION, SOLUTION INTRAVENOUS at 18:45

## 2024-06-14 RX ADMIN — Medication 10 MILLIGRAM(S): at 05:55

## 2024-06-14 RX ADMIN — Medication 1000 MILLIGRAM(S): at 22:26

## 2024-06-14 RX ADMIN — HYDROMORPHONE HCL 1 MILLIGRAM(S): 0.2 INJECTION, SOLUTION INTRAVENOUS at 13:29

## 2024-06-14 RX ADMIN — HYDROMORPHONE HCL 0.5 MILLIGRAM(S): 0.2 INJECTION, SOLUTION INTRAVENOUS at 04:25

## 2024-06-14 RX ADMIN — HEPARIN SODIUM 5000 UNIT(S): 50 INJECTION, SOLUTION INTRAVENOUS at 05:55

## 2024-06-14 RX ADMIN — Medication 100 GRAM(S): at 09:03

## 2024-06-14 RX ADMIN — PIPERACILLIN SODIUM AND TAZOBACTAM SODIUM 25 GRAM(S): 3; .375 INJECTION, POWDER, LYOPHILIZED, FOR SOLUTION INTRAVENOUS at 05:55

## 2024-06-14 RX ADMIN — HYDROMORPHONE HCL 0.5 MILLIGRAM(S): 0.2 INJECTION, SOLUTION INTRAVENOUS at 09:00

## 2024-06-14 RX ADMIN — PIPERACILLIN SODIUM AND TAZOBACTAM SODIUM 25 GRAM(S): 3; .375 INJECTION, POWDER, LYOPHILIZED, FOR SOLUTION INTRAVENOUS at 21:45

## 2024-06-15 ENCOUNTER — APPOINTMENT (OUTPATIENT)
Dept: COLORECTAL SURGERY | Facility: HOSPITAL | Age: 44
End: 2024-06-15

## 2024-06-15 PROCEDURE — 46060 I&D ISCHIORECTAL/NTRMRL ABSC: CPT | Mod: GC

## 2024-06-15 PROCEDURE — 44188 LAP COLOSTOMY: CPT | Mod: GC

## 2024-06-15 DEVICE — STAPLER COVIDIEN TRI-STAPLE 60MM PURPLE RELOAD: Type: IMPLANTABLE DEVICE | Status: FUNCTIONAL

## 2024-06-15 DEVICE — SURGICEL FIBRILLAR 4 X 4": Type: IMPLANTABLE DEVICE | Status: FUNCTIONAL

## 2024-06-15 RX ORDER — HYDROMORPHONE HCL 0.2 MG/ML
0.2 INJECTION, SOLUTION INTRAVENOUS ONCE
Refills: 0 | Status: DISCONTINUED | OUTPATIENT
Start: 2024-06-15 | End: 2024-06-15

## 2024-06-15 RX ORDER — SODIUM CHLORIDE 0.9 % (FLUSH) 0.9 %
1000 SYRINGE (ML) INJECTION
Refills: 0 | Status: DISCONTINUED | OUTPATIENT
Start: 2024-06-15 | End: 2024-06-15

## 2024-06-15 RX ORDER — HEPARIN SODIUM 50 [USP'U]/ML
5000 INJECTION, SOLUTION INTRAVENOUS EVERY 8 HOURS
Refills: 0 | Status: DISCONTINUED | OUTPATIENT
Start: 2024-06-15 | End: 2024-06-19

## 2024-06-15 RX ORDER — HYDROMORPHONE HCL 0.2 MG/ML
0.5 INJECTION, SOLUTION INTRAVENOUS
Refills: 0 | Status: ACTIVE | OUTPATIENT
Start: 2024-06-15 | End: 2024-06-18

## 2024-06-15 RX ORDER — DEXTROSE MONOHYDRATE AND SODIUM CHLORIDE 5; .3 G/100ML; G/100ML
1000 INJECTION, SOLUTION INTRAVENOUS
Refills: 0 | Status: DISCONTINUED | OUTPATIENT
Start: 2024-06-15 | End: 2024-06-15

## 2024-06-15 RX ADMIN — HYDROMORPHONE HCL 0.5 MILLIGRAM(S): 0.2 INJECTION, SOLUTION INTRAVENOUS at 14:46

## 2024-06-15 RX ADMIN — Medication 10 MILLIGRAM(S): at 18:29

## 2024-06-15 RX ADMIN — Medication 10 MILLIGRAM(S): at 06:06

## 2024-06-15 RX ADMIN — HYDROMORPHONE HCL 0.2 MILLIGRAM(S): 0.2 INJECTION, SOLUTION INTRAVENOUS at 01:50

## 2024-06-15 RX ADMIN — PIPERACILLIN SODIUM AND TAZOBACTAM SODIUM 25 GRAM(S): 3; .375 INJECTION, POWDER, LYOPHILIZED, FOR SOLUTION INTRAVENOUS at 14:35

## 2024-06-15 RX ADMIN — HYDROMORPHONE HCL 0.5 MILLIGRAM(S): 0.2 INJECTION, SOLUTION INTRAVENOUS at 15:01

## 2024-06-15 RX ADMIN — Medication 100 MILLILITER(S): at 13:54

## 2024-06-15 RX ADMIN — HYDROMORPHONE HCL 0.5 MILLIGRAM(S): 0.2 INJECTION, SOLUTION INTRAVENOUS at 19:39

## 2024-06-15 RX ADMIN — HYDROMORPHONE HCL 0.2 MILLIGRAM(S): 0.2 INJECTION, SOLUTION INTRAVENOUS at 01:35

## 2024-06-15 RX ADMIN — HEPARIN SODIUM 5000 UNIT(S): 50 INJECTION, SOLUTION INTRAVENOUS at 06:06

## 2024-06-15 RX ADMIN — Medication 100 MILLILITER(S): at 01:35

## 2024-06-15 RX ADMIN — BICTEGRAVIR SODIUM, EMTRICITABINE, AND TENOFOVIR ALAFENAMIDE FUMARATE 1 TABLET(S): 30; 120; 15 TABLET ORAL at 14:35

## 2024-06-15 RX ADMIN — ESCITALOPRAM OXALATE 10 MILLIGRAM(S): 20 TABLET, FILM COATED ORAL at 14:35

## 2024-06-15 RX ADMIN — PIPERACILLIN SODIUM AND TAZOBACTAM SODIUM 25 GRAM(S): 3; .375 INJECTION, POWDER, LYOPHILIZED, FOR SOLUTION INTRAVENOUS at 06:06

## 2024-06-16 LAB
ANION GAP SERPL CALC-SCNC: 9 MMOL/L — SIGNIFICANT CHANGE UP (ref 5–17)
BUN SERPL-MCNC: 8 MG/DL — SIGNIFICANT CHANGE UP (ref 7–23)
CALCIUM SERPL-MCNC: 8.9 MG/DL — SIGNIFICANT CHANGE UP (ref 8.4–10.5)
CHLORIDE SERPL-SCNC: 102 MMOL/L — SIGNIFICANT CHANGE UP (ref 96–108)
CO2 SERPL-SCNC: 26 MMOL/L — SIGNIFICANT CHANGE UP (ref 22–31)
CREAT SERPL-MCNC: 0.55 MG/DL — SIGNIFICANT CHANGE UP (ref 0.5–1.3)
EGFR: 127 ML/MIN/1.73M2 — SIGNIFICANT CHANGE UP
GLUCOSE SERPL-MCNC: 103 MG/DL — HIGH (ref 70–99)
HCT VFR BLD CALC: 27.6 % — LOW (ref 39–50)
HGB BLD-MCNC: 7.9 G/DL — LOW (ref 13–17)
MAGNESIUM SERPL-MCNC: 2.1 MG/DL — SIGNIFICANT CHANGE UP (ref 1.6–2.6)
MCHC RBC-ENTMCNC: 25.9 PG — LOW (ref 27–34)
MCHC RBC-ENTMCNC: 28.6 GM/DL — LOW (ref 32–36)
MCV RBC AUTO: 90.5 FL — SIGNIFICANT CHANGE UP (ref 80–100)
NRBC # BLD: 0 /100 WBCS — SIGNIFICANT CHANGE UP (ref 0–0)
PHOSPHATE SERPL-MCNC: 3.5 MG/DL — SIGNIFICANT CHANGE UP (ref 2.5–4.5)
PLATELET # BLD AUTO: 574 K/UL — HIGH (ref 150–400)
POTASSIUM SERPL-MCNC: 4 MMOL/L — SIGNIFICANT CHANGE UP (ref 3.5–5.3)
POTASSIUM SERPL-SCNC: 4 MMOL/L — SIGNIFICANT CHANGE UP (ref 3.5–5.3)
RBC # BLD: 3.05 M/UL — LOW (ref 4.2–5.8)
RBC # FLD: 18.7 % — HIGH (ref 10.3–14.5)
SODIUM SERPL-SCNC: 137 MMOL/L — SIGNIFICANT CHANGE UP (ref 135–145)
WBC # BLD: 12.15 K/UL — HIGH (ref 3.8–10.5)
WBC # FLD AUTO: 12.15 K/UL — HIGH (ref 3.8–10.5)

## 2024-06-16 RX ORDER — HYDROMORPHONE HCL 0.2 MG/ML
1 INJECTION, SOLUTION INTRAVENOUS ONCE
Refills: 0 | Status: DISCONTINUED | OUTPATIENT
Start: 2024-06-16 | End: 2024-06-16

## 2024-06-16 RX ORDER — OXYCODONE HYDROCHLORIDE 100 MG/5ML
10 SOLUTION ORAL EVERY 6 HOURS
Refills: 0 | Status: DISCONTINUED | OUTPATIENT
Start: 2024-06-16 | End: 2024-06-19

## 2024-06-16 RX ORDER — OXYCODONE HYDROCHLORIDE 100 MG/5ML
5 SOLUTION ORAL EVERY 6 HOURS
Refills: 0 | Status: DISCONTINUED | OUTPATIENT
Start: 2024-06-16 | End: 2024-06-19

## 2024-06-16 RX ADMIN — OXYCODONE HYDROCHLORIDE 10 MILLIGRAM(S): 100 SOLUTION ORAL at 20:23

## 2024-06-16 RX ADMIN — HYDROMORPHONE HCL 1 MILLIGRAM(S): 0.2 INJECTION, SOLUTION INTRAVENOUS at 09:27

## 2024-06-16 RX ADMIN — HEPARIN SODIUM 5000 UNIT(S): 50 INJECTION, SOLUTION INTRAVENOUS at 13:07

## 2024-06-16 RX ADMIN — OXYCODONE HYDROCHLORIDE 10 MILLIGRAM(S): 100 SOLUTION ORAL at 14:21

## 2024-06-16 RX ADMIN — Medication 10 MILLIGRAM(S): at 06:11

## 2024-06-16 RX ADMIN — HYDROMORPHONE HCL 1 MILLIGRAM(S): 0.2 INJECTION, SOLUTION INTRAVENOUS at 09:42

## 2024-06-16 RX ADMIN — HYDROMORPHONE HCL 1 MILLIGRAM(S): 0.2 INJECTION, SOLUTION INTRAVENOUS at 02:39

## 2024-06-16 RX ADMIN — PIPERACILLIN SODIUM AND TAZOBACTAM SODIUM 25 GRAM(S): 3; .375 INJECTION, POWDER, LYOPHILIZED, FOR SOLUTION INTRAVENOUS at 21:33

## 2024-06-16 RX ADMIN — PIPERACILLIN SODIUM AND TAZOBACTAM SODIUM 25 GRAM(S): 3; .375 INJECTION, POWDER, LYOPHILIZED, FOR SOLUTION INTRAVENOUS at 13:07

## 2024-06-16 RX ADMIN — OXYCODONE HYDROCHLORIDE 10 MILLIGRAM(S): 100 SOLUTION ORAL at 21:23

## 2024-06-16 RX ADMIN — BICTEGRAVIR SODIUM, EMTRICITABINE, AND TENOFOVIR ALAFENAMIDE FUMARATE 1 TABLET(S): 30; 120; 15 TABLET ORAL at 13:07

## 2024-06-16 RX ADMIN — HYDROMORPHONE HCL 0.5 MILLIGRAM(S): 0.2 INJECTION, SOLUTION INTRAVENOUS at 00:57

## 2024-06-16 RX ADMIN — Medication 10 MILLIGRAM(S): at 19:06

## 2024-06-16 RX ADMIN — ESCITALOPRAM OXALATE 10 MILLIGRAM(S): 20 TABLET, FILM COATED ORAL at 13:07

## 2024-06-16 RX ADMIN — HYDROMORPHONE HCL 1 MILLIGRAM(S): 0.2 INJECTION, SOLUTION INTRAVENOUS at 22:05

## 2024-06-16 RX ADMIN — HYDROMORPHONE HCL 0.5 MILLIGRAM(S): 0.2 INJECTION, SOLUTION INTRAVENOUS at 01:12

## 2024-06-16 RX ADMIN — PIPERACILLIN SODIUM AND TAZOBACTAM SODIUM 25 GRAM(S): 3; .375 INJECTION, POWDER, LYOPHILIZED, FOR SOLUTION INTRAVENOUS at 06:11

## 2024-06-16 RX ADMIN — OXYCODONE HYDROCHLORIDE 10 MILLIGRAM(S): 100 SOLUTION ORAL at 13:21

## 2024-06-16 RX ADMIN — HEPARIN SODIUM 5000 UNIT(S): 50 INJECTION, SOLUTION INTRAVENOUS at 04:02

## 2024-06-16 RX ADMIN — HEPARIN SODIUM 5000 UNIT(S): 50 INJECTION, SOLUTION INTRAVENOUS at 20:23

## 2024-06-16 RX ADMIN — HYDROMORPHONE HCL 1 MILLIGRAM(S): 0.2 INJECTION, SOLUTION INTRAVENOUS at 02:24

## 2024-06-17 ENCOUNTER — TRANSCRIPTION ENCOUNTER (OUTPATIENT)
Age: 44
End: 2024-06-17

## 2024-06-17 RX ORDER — KETOROLAC TROMETHAMINE 30 MG/ML
15 INJECTION, SOLUTION INTRAMUSCULAR EVERY 6 HOURS
Refills: 0 | Status: DISCONTINUED | OUTPATIENT
Start: 2024-06-17 | End: 2024-06-19

## 2024-06-17 RX ORDER — KETOROLAC TROMETHAMINE 30 MG/ML
15 INJECTION, SOLUTION INTRAMUSCULAR EVERY 6 HOURS
Refills: 0 | Status: DISCONTINUED | OUTPATIENT
Start: 2024-06-17 | End: 2024-06-17

## 2024-06-17 RX ORDER — HYDROMORPHONE HCL 0.2 MG/ML
0.5 INJECTION, SOLUTION INTRAVENOUS ONCE
Refills: 0 | Status: DISCONTINUED | OUTPATIENT
Start: 2024-06-17 | End: 2024-06-17

## 2024-06-17 RX ADMIN — OXYCODONE HYDROCHLORIDE 5 MILLIGRAM(S): 100 SOLUTION ORAL at 23:48

## 2024-06-17 RX ADMIN — KETOROLAC TROMETHAMINE 15 MILLIGRAM(S): 30 INJECTION, SOLUTION INTRAMUSCULAR at 15:08

## 2024-06-17 RX ADMIN — KETOROLAC TROMETHAMINE 15 MILLIGRAM(S): 30 INJECTION, SOLUTION INTRAMUSCULAR at 08:20

## 2024-06-17 RX ADMIN — KETOROLAC TROMETHAMINE 15 MILLIGRAM(S): 30 INJECTION, SOLUTION INTRAMUSCULAR at 15:23

## 2024-06-17 RX ADMIN — HYDROMORPHONE HCL 0.5 MILLIGRAM(S): 0.2 INJECTION, SOLUTION INTRAVENOUS at 03:27

## 2024-06-17 RX ADMIN — PIPERACILLIN SODIUM AND TAZOBACTAM SODIUM 25 GRAM(S): 3; .375 INJECTION, POWDER, LYOPHILIZED, FOR SOLUTION INTRAVENOUS at 05:21

## 2024-06-17 RX ADMIN — HEPARIN SODIUM 5000 UNIT(S): 50 INJECTION, SOLUTION INTRAVENOUS at 21:18

## 2024-06-17 RX ADMIN — HEPARIN SODIUM 5000 UNIT(S): 50 INJECTION, SOLUTION INTRAVENOUS at 12:12

## 2024-06-17 RX ADMIN — PIPERACILLIN SODIUM AND TAZOBACTAM SODIUM 25 GRAM(S): 3; .375 INJECTION, POWDER, LYOPHILIZED, FOR SOLUTION INTRAVENOUS at 21:18

## 2024-06-17 RX ADMIN — BICTEGRAVIR SODIUM, EMTRICITABINE, AND TENOFOVIR ALAFENAMIDE FUMARATE 1 TABLET(S): 30; 120; 15 TABLET ORAL at 12:12

## 2024-06-17 RX ADMIN — ESCITALOPRAM OXALATE 10 MILLIGRAM(S): 20 TABLET, FILM COATED ORAL at 12:12

## 2024-06-17 RX ADMIN — PIPERACILLIN SODIUM AND TAZOBACTAM SODIUM 25 GRAM(S): 3; .375 INJECTION, POWDER, LYOPHILIZED, FOR SOLUTION INTRAVENOUS at 13:10

## 2024-06-17 RX ADMIN — Medication 10 MILLIGRAM(S): at 17:00

## 2024-06-17 RX ADMIN — KETOROLAC TROMETHAMINE 15 MILLIGRAM(S): 30 INJECTION, SOLUTION INTRAMUSCULAR at 22:15

## 2024-06-17 RX ADMIN — OXYCODONE HYDROCHLORIDE 10 MILLIGRAM(S): 100 SOLUTION ORAL at 02:01

## 2024-06-17 RX ADMIN — KETOROLAC TROMETHAMINE 15 MILLIGRAM(S): 30 INJECTION, SOLUTION INTRAMUSCULAR at 22:00

## 2024-06-17 RX ADMIN — OXYCODONE HYDROCHLORIDE 10 MILLIGRAM(S): 100 SOLUTION ORAL at 20:04

## 2024-06-17 RX ADMIN — OXYCODONE HYDROCHLORIDE 10 MILLIGRAM(S): 100 SOLUTION ORAL at 19:04

## 2024-06-17 RX ADMIN — HEPARIN SODIUM 5000 UNIT(S): 50 INJECTION, SOLUTION INTRAVENOUS at 05:20

## 2024-06-17 RX ADMIN — OXYCODONE HYDROCHLORIDE 5 MILLIGRAM(S): 100 SOLUTION ORAL at 13:11

## 2024-06-17 RX ADMIN — OXYCODONE HYDROCHLORIDE 5 MILLIGRAM(S): 100 SOLUTION ORAL at 12:11

## 2024-06-17 RX ADMIN — OXYCODONE HYDROCHLORIDE 10 MILLIGRAM(S): 100 SOLUTION ORAL at 03:01

## 2024-06-17 RX ADMIN — Medication 10 MILLIGRAM(S): at 06:25

## 2024-06-17 RX ADMIN — KETOROLAC TROMETHAMINE 15 MILLIGRAM(S): 30 INJECTION, SOLUTION INTRAMUSCULAR at 08:05

## 2024-06-18 ENCOUNTER — TRANSCRIPTION ENCOUNTER (OUTPATIENT)
Age: 44
End: 2024-06-18

## 2024-06-18 LAB — SURGICAL PATHOLOGY STUDY: SIGNIFICANT CHANGE UP

## 2024-06-18 PROCEDURE — 99233 SBSQ HOSP IP/OBS HIGH 50: CPT

## 2024-06-18 RX ORDER — SENNOSIDES 8.6 MG
2 TABLET ORAL
Refills: 0 | DISCHARGE

## 2024-06-18 RX ORDER — OXYCODONE HYDROCHLORIDE 100 MG/5ML
1 SOLUTION ORAL
Refills: 0 | DISCHARGE

## 2024-06-18 RX ORDER — HYDROMORPHONE HCL 0.2 MG/ML
0.5 INJECTION, SOLUTION INTRAVENOUS ONCE
Refills: 0 | Status: DISCONTINUED | OUTPATIENT
Start: 2024-06-18 | End: 2024-06-18

## 2024-06-18 RX ORDER — ACETAMINOPHEN 325 MG
1000 TABLET ORAL ONCE
Refills: 0 | Status: DISCONTINUED | OUTPATIENT
Start: 2024-06-18 | End: 2024-06-19

## 2024-06-18 RX ORDER — OXYCODONE HYDROCHLORIDE 100 MG/5ML
1 SOLUTION ORAL
Qty: 60 | Refills: 0
Start: 2024-06-18 | End: 2024-06-27

## 2024-06-18 RX ORDER — LOPERAMIDE HCL 2 MG
1 CAPSULE ORAL
Refills: 0 | DISCHARGE

## 2024-06-18 RX ADMIN — HEPARIN SODIUM 5000 UNIT(S): 50 INJECTION, SOLUTION INTRAVENOUS at 21:55

## 2024-06-18 RX ADMIN — OXYCODONE HYDROCHLORIDE 10 MILLIGRAM(S): 100 SOLUTION ORAL at 20:24

## 2024-06-18 RX ADMIN — OXYCODONE HYDROCHLORIDE 10 MILLIGRAM(S): 100 SOLUTION ORAL at 21:24

## 2024-06-18 RX ADMIN — ESCITALOPRAM OXALATE 10 MILLIGRAM(S): 20 TABLET, FILM COATED ORAL at 12:07

## 2024-06-18 RX ADMIN — PIPERACILLIN SODIUM AND TAZOBACTAM SODIUM 25 GRAM(S): 3; .375 INJECTION, POWDER, LYOPHILIZED, FOR SOLUTION INTRAVENOUS at 22:22

## 2024-06-18 RX ADMIN — HEPARIN SODIUM 5000 UNIT(S): 50 INJECTION, SOLUTION INTRAVENOUS at 05:50

## 2024-06-18 RX ADMIN — OXYCODONE HYDROCHLORIDE 10 MILLIGRAM(S): 100 SOLUTION ORAL at 11:15

## 2024-06-18 RX ADMIN — OXYCODONE HYDROCHLORIDE 5 MILLIGRAM(S): 100 SOLUTION ORAL at 00:48

## 2024-06-18 RX ADMIN — OXYCODONE HYDROCHLORIDE 10 MILLIGRAM(S): 100 SOLUTION ORAL at 10:15

## 2024-06-18 RX ADMIN — KETOROLAC TROMETHAMINE 15 MILLIGRAM(S): 30 INJECTION, SOLUTION INTRAMUSCULAR at 14:18

## 2024-06-18 RX ADMIN — BICTEGRAVIR SODIUM, EMTRICITABINE, AND TENOFOVIR ALAFENAMIDE FUMARATE 1 TABLET(S): 30; 120; 15 TABLET ORAL at 12:07

## 2024-06-18 RX ADMIN — KETOROLAC TROMETHAMINE 15 MILLIGRAM(S): 30 INJECTION, SOLUTION INTRAMUSCULAR at 14:38

## 2024-06-18 RX ADMIN — KETOROLAC TROMETHAMINE 15 MILLIGRAM(S): 30 INJECTION, SOLUTION INTRAMUSCULAR at 06:22

## 2024-06-18 RX ADMIN — HYDROMORPHONE HCL 0.5 MILLIGRAM(S): 0.2 INJECTION, SOLUTION INTRAVENOUS at 03:27

## 2024-06-18 RX ADMIN — Medication 10 MILLIGRAM(S): at 05:50

## 2024-06-18 RX ADMIN — PIPERACILLIN SODIUM AND TAZOBACTAM SODIUM 25 GRAM(S): 3; .375 INJECTION, POWDER, LYOPHILIZED, FOR SOLUTION INTRAVENOUS at 05:50

## 2024-06-18 RX ADMIN — PIPERACILLIN SODIUM AND TAZOBACTAM SODIUM 25 GRAM(S): 3; .375 INJECTION, POWDER, LYOPHILIZED, FOR SOLUTION INTRAVENOUS at 14:18

## 2024-06-18 RX ADMIN — HEPARIN SODIUM 5000 UNIT(S): 50 INJECTION, SOLUTION INTRAVENOUS at 14:18

## 2024-06-18 RX ADMIN — Medication 10 MILLIGRAM(S): at 18:05

## 2024-06-18 RX ADMIN — KETOROLAC TROMETHAMINE 15 MILLIGRAM(S): 30 INJECTION, SOLUTION INTRAMUSCULAR at 06:37

## 2024-06-19 VITALS
DIASTOLIC BLOOD PRESSURE: 66 MMHG | TEMPERATURE: 98 F | HEART RATE: 100 BPM | SYSTOLIC BLOOD PRESSURE: 100 MMHG | RESPIRATION RATE: 18 BRPM | OXYGEN SATURATION: 96 %

## 2024-06-19 LAB
ANION GAP SERPL CALC-SCNC: 10 MMOL/L — SIGNIFICANT CHANGE UP (ref 5–17)
BUN SERPL-MCNC: 7 MG/DL — SIGNIFICANT CHANGE UP (ref 7–23)
CALCIUM SERPL-MCNC: 9.1 MG/DL — SIGNIFICANT CHANGE UP (ref 8.4–10.5)
CHLORIDE SERPL-SCNC: 103 MMOL/L — SIGNIFICANT CHANGE UP (ref 96–108)
CO2 SERPL-SCNC: 25 MMOL/L — SIGNIFICANT CHANGE UP (ref 22–31)
CREAT SERPL-MCNC: 0.54 MG/DL — SIGNIFICANT CHANGE UP (ref 0.5–1.3)
EGFR: 128 ML/MIN/1.73M2 — SIGNIFICANT CHANGE UP
GLUCOSE SERPL-MCNC: 101 MG/DL — HIGH (ref 70–99)
HCT VFR BLD CALC: 29.2 % — LOW (ref 39–50)
HGB BLD-MCNC: 8.4 G/DL — LOW (ref 13–17)
MAGNESIUM SERPL-MCNC: 2.1 MG/DL — SIGNIFICANT CHANGE UP (ref 1.6–2.6)
MCHC RBC-ENTMCNC: 26.1 PG — LOW (ref 27–34)
MCHC RBC-ENTMCNC: 28.8 GM/DL — LOW (ref 32–36)
MCV RBC AUTO: 90.7 FL — SIGNIFICANT CHANGE UP (ref 80–100)
NRBC # BLD: 0 /100 WBCS — SIGNIFICANT CHANGE UP (ref 0–0)
PHOSPHATE SERPL-MCNC: 3.9 MG/DL — SIGNIFICANT CHANGE UP (ref 2.5–4.5)
PLATELET # BLD AUTO: 576 K/UL — HIGH (ref 150–400)
POTASSIUM SERPL-MCNC: 3.8 MMOL/L — SIGNIFICANT CHANGE UP (ref 3.5–5.3)
POTASSIUM SERPL-SCNC: 3.8 MMOL/L — SIGNIFICANT CHANGE UP (ref 3.5–5.3)
RBC # BLD: 3.22 M/UL — LOW (ref 4.2–5.8)
RBC # FLD: 18.2 % — HIGH (ref 10.3–14.5)
SODIUM SERPL-SCNC: 138 MMOL/L — SIGNIFICANT CHANGE UP (ref 135–145)
WBC # BLD: 8.18 K/UL — SIGNIFICANT CHANGE UP (ref 3.8–10.5)
WBC # FLD AUTO: 8.18 K/UL — SIGNIFICANT CHANGE UP (ref 3.8–10.5)

## 2024-06-19 PROCEDURE — 87040 BLOOD CULTURE FOR BACTERIA: CPT

## 2024-06-19 PROCEDURE — 80053 COMPREHEN METABOLIC PANEL: CPT

## 2024-06-19 PROCEDURE — C1889: CPT

## 2024-06-19 PROCEDURE — 83735 ASSAY OF MAGNESIUM: CPT

## 2024-06-19 PROCEDURE — 82728 ASSAY OF FERRITIN: CPT

## 2024-06-19 PROCEDURE — 81001 URINALYSIS AUTO W/SCOPE: CPT

## 2024-06-19 PROCEDURE — 86901 BLOOD TYPING SEROLOGIC RH(D): CPT

## 2024-06-19 PROCEDURE — 85027 COMPLETE CBC AUTOMATED: CPT

## 2024-06-19 PROCEDURE — 36415 COLL VENOUS BLD VENIPUNCTURE: CPT

## 2024-06-19 PROCEDURE — 85730 THROMBOPLASTIN TIME PARTIAL: CPT

## 2024-06-19 PROCEDURE — 71045 X-RAY EXAM CHEST 1 VIEW: CPT

## 2024-06-19 PROCEDURE — 36430 TRANSFUSION BLD/BLD COMPNT: CPT

## 2024-06-19 PROCEDURE — 96374 THER/PROPH/DIAG INJ IV PUSH: CPT

## 2024-06-19 PROCEDURE — 80048 BASIC METABOLIC PNL TOTAL CA: CPT

## 2024-06-19 PROCEDURE — 83540 ASSAY OF IRON: CPT

## 2024-06-19 PROCEDURE — 86850 RBC ANTIBODY SCREEN: CPT

## 2024-06-19 PROCEDURE — 99285 EMERGENCY DEPT VISIT HI MDM: CPT | Mod: 25

## 2024-06-19 PROCEDURE — 88305 TISSUE EXAM BY PATHOLOGIST: CPT

## 2024-06-19 PROCEDURE — 93005 ELECTROCARDIOGRAM TRACING: CPT

## 2024-06-19 PROCEDURE — 83605 ASSAY OF LACTIC ACID: CPT

## 2024-06-19 PROCEDURE — 82803 BLOOD GASES ANY COMBINATION: CPT

## 2024-06-19 PROCEDURE — 83550 IRON BINDING TEST: CPT

## 2024-06-19 PROCEDURE — 86923 COMPATIBILITY TEST ELECTRIC: CPT

## 2024-06-19 PROCEDURE — 96375 TX/PRO/DX INJ NEW DRUG ADDON: CPT

## 2024-06-19 PROCEDURE — 86900 BLOOD TYPING SEROLOGIC ABO: CPT

## 2024-06-19 PROCEDURE — P9040: CPT

## 2024-06-19 PROCEDURE — 97161 PT EVAL LOW COMPLEX 20 MIN: CPT

## 2024-06-19 PROCEDURE — 74177 CT ABD & PELVIS W/CONTRAST: CPT | Mod: MC

## 2024-06-19 PROCEDURE — 85025 COMPLETE CBC W/AUTO DIFF WBC: CPT

## 2024-06-19 PROCEDURE — 82330 ASSAY OF CALCIUM: CPT

## 2024-06-19 PROCEDURE — 84132 ASSAY OF SERUM POTASSIUM: CPT

## 2024-06-19 PROCEDURE — 85610 PROTHROMBIN TIME: CPT

## 2024-06-19 PROCEDURE — 87637 SARSCOV2&INF A&B&RSV AMP PRB: CPT

## 2024-06-19 PROCEDURE — 84100 ASSAY OF PHOSPHORUS: CPT

## 2024-06-19 PROCEDURE — 84295 ASSAY OF SERUM SODIUM: CPT

## 2024-06-19 RX ORDER — OXYCODONE HYDROCHLORIDE 100 MG/5ML
1 SOLUTION ORAL
Qty: 0 | Refills: 0 | DISCHARGE
Start: 2024-06-19

## 2024-06-19 RX ORDER — POTASSIUM CHLORIDE 600 MG/1
20 TABLET, FILM COATED, EXTENDED RELEASE ORAL ONCE
Refills: 0 | Status: COMPLETED | OUTPATIENT
Start: 2024-06-19 | End: 2024-06-19

## 2024-06-19 RX ADMIN — OXYCODONE HYDROCHLORIDE 10 MILLIGRAM(S): 100 SOLUTION ORAL at 02:52

## 2024-06-19 RX ADMIN — KETOROLAC TROMETHAMINE 15 MILLIGRAM(S): 30 INJECTION, SOLUTION INTRAMUSCULAR at 00:12

## 2024-06-19 RX ADMIN — OXYCODONE HYDROCHLORIDE 10 MILLIGRAM(S): 100 SOLUTION ORAL at 03:30

## 2024-06-19 RX ADMIN — BICTEGRAVIR SODIUM, EMTRICITABINE, AND TENOFOVIR ALAFENAMIDE FUMARATE 1 TABLET(S): 30; 120; 15 TABLET ORAL at 11:10

## 2024-06-19 RX ADMIN — POTASSIUM CHLORIDE 20 MILLIEQUIVALENT(S): 600 TABLET, FILM COATED, EXTENDED RELEASE ORAL at 09:39

## 2024-06-19 RX ADMIN — KETOROLAC TROMETHAMINE 15 MILLIGRAM(S): 30 INJECTION, SOLUTION INTRAMUSCULAR at 07:21

## 2024-06-19 RX ADMIN — Medication 10 MILLIGRAM(S): at 05:51

## 2024-06-19 RX ADMIN — ESCITALOPRAM OXALATE 10 MILLIGRAM(S): 20 TABLET, FILM COATED ORAL at 11:10

## 2024-06-19 RX ADMIN — PIPERACILLIN SODIUM AND TAZOBACTAM SODIUM 25 GRAM(S): 3; .375 INJECTION, POWDER, LYOPHILIZED, FOR SOLUTION INTRAVENOUS at 05:51

## 2024-06-19 RX ADMIN — HEPARIN SODIUM 5000 UNIT(S): 50 INJECTION, SOLUTION INTRAVENOUS at 05:51

## 2024-06-19 RX ADMIN — KETOROLAC TROMETHAMINE 15 MILLIGRAM(S): 30 INJECTION, SOLUTION INTRAMUSCULAR at 06:51

## 2024-06-19 RX ADMIN — KETOROLAC TROMETHAMINE 15 MILLIGRAM(S): 30 INJECTION, SOLUTION INTRAMUSCULAR at 00:42

## 2024-06-26 DIAGNOSIS — R00.0 TACHYCARDIA, UNSPECIFIED: ICD-10-CM

## 2024-06-26 DIAGNOSIS — F17.200 NICOTINE DEPENDENCE, UNSPECIFIED, UNCOMPLICATED: ICD-10-CM

## 2024-06-26 DIAGNOSIS — Z11.52 ENCOUNTER FOR SCREENING FOR COVID-19: ICD-10-CM

## 2024-06-26 DIAGNOSIS — F32.A DEPRESSION, UNSPECIFIED: ICD-10-CM

## 2024-06-26 DIAGNOSIS — C79.89 SECONDARY MALIGNANT NEOPLASM OF OTHER SPECIFIED SITES: ICD-10-CM

## 2024-06-26 DIAGNOSIS — C79.82 SECONDARY MALIGNANT NEOPLASM OF GENITAL ORGANS: ICD-10-CM

## 2024-06-26 DIAGNOSIS — G89.3 NEOPLASM RELATED PAIN (ACUTE) (CHRONIC): ICD-10-CM

## 2024-06-26 DIAGNOSIS — E43 UNSPECIFIED SEVERE PROTEIN-CALORIE MALNUTRITION: ICD-10-CM

## 2024-06-26 DIAGNOSIS — Z92.21 PERSONAL HISTORY OF ANTINEOPLASTIC CHEMOTHERAPY: ICD-10-CM

## 2024-06-26 DIAGNOSIS — B20 HUMAN IMMUNODEFICIENCY VIRUS [HIV] DISEASE: ICD-10-CM

## 2024-06-26 DIAGNOSIS — Z92.3 PERSONAL HISTORY OF IRRADIATION: ICD-10-CM

## 2024-06-26 DIAGNOSIS — C21.8 MALIGNANT NEOPLASM OF OVERLAPPING SITES OF RECTUM, ANUS AND ANAL CANAL: ICD-10-CM

## 2024-06-26 DIAGNOSIS — D62 ACUTE POSTHEMORRHAGIC ANEMIA: ICD-10-CM

## 2024-06-26 DIAGNOSIS — K61.1 RECTAL ABSCESS: ICD-10-CM

## 2024-06-26 DIAGNOSIS — K61.39 OTHER ISCHIORECTAL ABSCESS: ICD-10-CM

## 2024-06-26 DIAGNOSIS — A41.9 SEPSIS, UNSPECIFIED ORGANISM: ICD-10-CM

## 2024-06-26 DIAGNOSIS — C79.51 SECONDARY MALIGNANT NEOPLASM OF BONE: ICD-10-CM

## 2024-06-26 DIAGNOSIS — Z59.819 HOUSING INSTABILITY, HOUSED UNSPECIFIED: ICD-10-CM

## 2024-06-26 SDOH — ECONOMIC STABILITY - HOUSING INSECURITY: HOUSING INSTABILITY UNSPECIFIED: Z59.819

## 2024-06-28 ENCOUNTER — NON-APPOINTMENT (OUTPATIENT)
Age: 44
End: 2024-06-28

## 2024-07-02 NOTE — ED ADULT NURSE NOTE - [facility] ED NUR QUALITY2
CC:  Shelbi Lange is here today for Eye Problem (Per patient bilateral eye infection and left side pain no trauma) and Office Visit      Denies known Latex allergy or symptoms of Latex sensitivity.    Patient would like communication of their results via:      Cell Phone:   Telephone Information:   Mobile [phone]     Okay to leave a message containing results? Yes    Medications reviewed and updated.  Health Maintenance Due   Topic Date Due    Pneumococcal Vaccine 0-64 (1 of 2 - PCV) Never done    Shingles Vaccine (1 of 2) Never done    COVID-19 Vaccine (5 - 2023-24 season) 09/01/2023       Patient is due for topics as listed above but is not proceeding with Immunization(s) COVID-19, Pneumococcal, and Shingles at this time. MD to discuss.    Recent PHQ 2/9 Score    PHQ 2:  PHQ 2 Score Adult PHQ 2 Score Adult PHQ 2 Interpretation Little interest or pleasure in activity?   7/2/2024  12:24 PM 0 No further screening needed 0       PHQ 9:     PHQ-2/9 Depression Screening  Little interest or pleasure in activity?: Not at all  Feeling down, depressed or hopeless?: Not at all  Initial depression screening score:: 0  PHQ2 Interpretation: No further screening needed     Medications: medications verified, no change  Added preferred pharmacy  Refills needed today?  NO      Tobacco Reviewed.     discharge

## 2024-07-05 ENCOUNTER — APPOINTMENT (OUTPATIENT)
Dept: RADIATION ONCOLOGY | Facility: CLINIC | Age: 44
End: 2024-07-05
Payer: MEDICAID

## 2024-07-05 ENCOUNTER — NON-APPOINTMENT (OUTPATIENT)
Age: 44
End: 2024-07-05

## 2024-07-05 VITALS
HEART RATE: 147 BPM | WEIGHT: 129.2 LBS | SYSTOLIC BLOOD PRESSURE: 107 MMHG | BODY MASS INDEX: 17.05 KG/M2 | TEMPERATURE: 98.1 F | DIASTOLIC BLOOD PRESSURE: 65 MMHG | OXYGEN SATURATION: 100 %

## 2024-07-05 PROCEDURE — 99024 POSTOP FOLLOW-UP VISIT: CPT

## 2024-07-12 ENCOUNTER — APPOINTMENT (OUTPATIENT)
Dept: PALLIATIVE MEDICINE | Facility: CLINIC | Age: 44
End: 2024-07-12
Payer: MEDICAID

## 2024-07-12 DIAGNOSIS — F41.9 ANXIETY DISORDER, UNSPECIFIED: ICD-10-CM

## 2024-07-12 DIAGNOSIS — Z51.5 ENCOUNTER FOR PALLIATIVE CARE: ICD-10-CM

## 2024-07-12 DIAGNOSIS — G89.3 NEOPLASM RELATED PAIN (ACUTE) (CHRONIC): ICD-10-CM

## 2024-07-12 DIAGNOSIS — F32.1 MAJOR DEPRESSIVE DISORDER, SINGLE EPISODE, MODERATE: ICD-10-CM

## 2024-07-12 PROCEDURE — G2211 COMPLEX E/M VISIT ADD ON: CPT | Mod: NC,95

## 2024-07-12 PROCEDURE — 99215 OFFICE O/P EST HI 40 MIN: CPT | Mod: 95

## 2024-07-18 ENCOUNTER — APPOINTMENT (OUTPATIENT)
Dept: COLORECTAL SURGERY | Facility: CLINIC | Age: 44
End: 2024-07-18
Payer: MEDICAID

## 2024-07-18 ENCOUNTER — NON-APPOINTMENT (OUTPATIENT)
Age: 44
End: 2024-07-18

## 2024-07-18 VITALS
HEIGHT: 73 IN | HEART RATE: 160 BPM | DIASTOLIC BLOOD PRESSURE: 61 MMHG | SYSTOLIC BLOOD PRESSURE: 103 MMHG | WEIGHT: 130 LBS | TEMPERATURE: 99.1 F | BODY MASS INDEX: 17.23 KG/M2

## 2024-07-18 DIAGNOSIS — C21.0 MALIGNANT NEOPLASM OF ANUS, UNSPECIFIED: ICD-10-CM

## 2024-07-18 PROCEDURE — 99024 POSTOP FOLLOW-UP VISIT: CPT

## 2024-07-22 ENCOUNTER — APPOINTMENT (OUTPATIENT)
Dept: PALLIATIVE MEDICINE | Facility: CLINIC | Age: 44
End: 2024-07-22

## 2024-07-25 VITALS
OXYGEN SATURATION: 93 % | SYSTOLIC BLOOD PRESSURE: 98 MMHG | HEIGHT: 72 IN | RESPIRATION RATE: 16 BRPM | WEIGHT: 128.97 LBS | DIASTOLIC BLOOD PRESSURE: 63 MMHG | HEART RATE: 128 BPM | TEMPERATURE: 98 F

## 2024-07-25 LAB
ALBUMIN SERPL ELPH-MCNC: 2 G/DL — LOW (ref 3.4–5)
ALP SERPL-CCNC: 302 U/L — HIGH (ref 40–120)
ALT FLD-CCNC: 28 U/L — SIGNIFICANT CHANGE UP (ref 12–42)
AMPHET UR-MCNC: POSITIVE
ANION GAP SERPL CALC-SCNC: 2 MMOL/L — LOW (ref 9–16)
APPEARANCE UR: CLEAR — SIGNIFICANT CHANGE UP
APTT BLD: 30.6 SEC — SIGNIFICANT CHANGE UP (ref 24.5–35.6)
AST SERPL-CCNC: 39 U/L — HIGH (ref 15–37)
BACTERIA # UR AUTO: ABNORMAL /HPF
BARBITURATES UR SCN-MCNC: NEGATIVE — SIGNIFICANT CHANGE UP
BENZODIAZ UR-MCNC: POSITIVE
BILIRUB SERPL-MCNC: 0.4 MG/DL — SIGNIFICANT CHANGE UP (ref 0.2–1.2)
BILIRUB UR-MCNC: ABNORMAL
BUN SERPL-MCNC: 11 MG/DL — SIGNIFICANT CHANGE UP (ref 7–23)
CALCIUM SERPL-MCNC: 9.2 MG/DL — SIGNIFICANT CHANGE UP (ref 8.5–10.5)
CHLORIDE SERPL-SCNC: 90 MMOL/L — LOW (ref 96–108)
CK SERPL-CCNC: 536 U/L — HIGH (ref 39–308)
CO2 SERPL-SCNC: 34 MMOL/L — HIGH (ref 22–31)
COCAINE METAB.OTHER UR-MCNC: NEGATIVE — SIGNIFICANT CHANGE UP
COD CRY URNS QL: SIGNIFICANT CHANGE UP
COLOR SPEC: SIGNIFICANT CHANGE UP
COMMENT - URINE: SIGNIFICANT CHANGE UP
CREAT SERPL-MCNC: 0.68 MG/DL — SIGNIFICANT CHANGE UP (ref 0.5–1.3)
DIFF PNL FLD: NEGATIVE — SIGNIFICANT CHANGE UP
EGFR: 119 ML/MIN/1.73M2 — SIGNIFICANT CHANGE UP
EPI CELLS # UR: SIGNIFICANT CHANGE UP
ETHANOL SERPL-MCNC: <3 MG/DL — SIGNIFICANT CHANGE UP
FENTANYL UR QL SCN: NEGATIVE — SIGNIFICANT CHANGE UP
FLUAV AG NPH QL: SIGNIFICANT CHANGE UP
FLUBV AG NPH QL: SIGNIFICANT CHANGE UP
GLUCOSE BLDC GLUCOMTR-MCNC: 136 MG/DL — HIGH (ref 70–99)
GLUCOSE SERPL-MCNC: 106 MG/DL — HIGH (ref 70–99)
GLUCOSE UR QL: NEGATIVE MG/DL — SIGNIFICANT CHANGE UP
GRAN CASTS # UR COMP ASSIST: PRESENT
HCT VFR BLD CALC: 27 % — LOW (ref 39–50)
HGB BLD-MCNC: 7.8 G/DL — LOW (ref 13–17)
HYALINE CASTS # UR AUTO: PRESENT
INR BLD: 1.47 — HIGH (ref 0.85–1.18)
KETONES UR-MCNC: ABNORMAL MG/DL
LACTATE BLDV-MCNC: 1.4 MMOL/L — SIGNIFICANT CHANGE UP (ref 0.5–2)
LEUKOCYTE ESTERASE UR-ACNC: ABNORMAL
LIDOCAIN IGE QN: 27 U/L — SIGNIFICANT CHANGE UP (ref 16–77)
MAGNESIUM SERPL-MCNC: 1.8 MG/DL — SIGNIFICANT CHANGE UP (ref 1.6–2.6)
MCHC RBC-ENTMCNC: 23.6 PG — LOW (ref 27–34)
MCHC RBC-ENTMCNC: 28.9 GM/DL — LOW (ref 32–36)
MCV RBC AUTO: 81.8 FL — SIGNIFICANT CHANGE UP (ref 80–100)
METHADONE UR-MCNC: POSITIVE
NITRITE UR-MCNC: NEGATIVE — SIGNIFICANT CHANGE UP
NRBC # BLD: 0 /100 WBCS — SIGNIFICANT CHANGE UP (ref 0–0)
OPIATES UR-MCNC: POSITIVE
PCP SPEC-MCNC: SIGNIFICANT CHANGE UP
PCP UR-MCNC: NEGATIVE — SIGNIFICANT CHANGE UP
PH UR: 5.5 — SIGNIFICANT CHANGE UP (ref 5–8)
PLATELET # BLD AUTO: 654 K/UL — HIGH (ref 150–400)
POTASSIUM SERPL-MCNC: 3.9 MMOL/L — SIGNIFICANT CHANGE UP (ref 3.5–5.3)
POTASSIUM SERPL-SCNC: 3.9 MMOL/L — SIGNIFICANT CHANGE UP (ref 3.5–5.3)
PROT SERPL-MCNC: 8.3 G/DL — HIGH (ref 6.4–8.2)
PROT UR-MCNC: 30 MG/DL
PROTHROM AB SERPL-ACNC: 16.4 SEC — HIGH (ref 9.5–13)
RBC # BLD: 3.3 M/UL — LOW (ref 4.2–5.8)
RBC # FLD: 18.4 % — HIGH (ref 10.3–14.5)
RBC CASTS # UR COMP ASSIST: 1 /HPF — SIGNIFICANT CHANGE UP (ref 0–4)
RSV RNA NPH QL NAA+NON-PROBE: SIGNIFICANT CHANGE UP
SARS-COV-2 RNA SPEC QL NAA+PROBE: SIGNIFICANT CHANGE UP
SODIUM SERPL-SCNC: 126 MMOL/L — LOW (ref 132–145)
SP GR SPEC: 1.02 — SIGNIFICANT CHANGE UP (ref 1–1.03)
THC UR QL: POSITIVE
TRI-PHOS CRY UR QL COMP ASSIST: SIGNIFICANT CHANGE UP
TROPONIN I, HIGH SENSITIVITY RESULT: 5.6 NG/L — SIGNIFICANT CHANGE UP
URATE CRY FLD QL MICRO: SIGNIFICANT CHANGE UP
UROBILINOGEN FLD QL: 4 MG/DL (ref 0.2–1)
WBC # BLD: 22.64 K/UL — HIGH (ref 3.8–10.5)
WBC # FLD AUTO: 22.64 K/UL — HIGH (ref 3.8–10.5)
WBC UR QL: 1 /HPF — SIGNIFICANT CHANGE UP (ref 0–5)

## 2024-07-25 PROCEDURE — 99285 EMERGENCY DEPT VISIT HI MDM: CPT

## 2024-07-25 PROCEDURE — 74177 CT ABD & PELVIS W/CONTRAST: CPT | Mod: 26,MC

## 2024-07-25 PROCEDURE — 71045 X-RAY EXAM CHEST 1 VIEW: CPT | Mod: 26

## 2024-07-25 RX ORDER — VANCOMYCIN HYDROCHLORIDE 5 G/100ML
1000 INJECTION, POWDER, LYOPHILIZED, FOR SOLUTION INTRAVENOUS ONCE
Refills: 0 | Status: COMPLETED | OUTPATIENT
Start: 2024-07-25 | End: 2024-07-25

## 2024-07-25 RX ORDER — BACTERIOSTATIC SODIUM CHLORIDE 0.9 %
1000 VIAL (ML) INJECTION ONCE
Refills: 0 | Status: COMPLETED | OUTPATIENT
Start: 2024-07-25 | End: 2024-07-25

## 2024-07-25 RX ORDER — PIPERACILLIN SODIUM, TAZOBACTAM SODIUM 3; .375 G/15ML; G/15ML
3.38 INJECTION, POWDER, LYOPHILIZED, FOR SOLUTION INTRAVENOUS ONCE
Refills: 0 | Status: COMPLETED | OUTPATIENT
Start: 2024-07-25 | End: 2024-07-25

## 2024-07-25 RX ORDER — HYDROMORPHONE HCL IN 0.9% NACL 0.2 MG/ML
2 PLASTIC BAG, INJECTION (ML) INTRAVENOUS ONCE
Refills: 0 | Status: DISCONTINUED | OUTPATIENT
Start: 2024-07-25 | End: 2024-07-25

## 2024-07-25 RX ORDER — HYDROMORPHONE HCL IN 0.9% NACL 0.2 MG/ML
1 PLASTIC BAG, INJECTION (ML) INTRAVENOUS ONCE
Refills: 0 | Status: DISCONTINUED | OUTPATIENT
Start: 2024-07-25 | End: 2024-07-25

## 2024-07-25 RX ADMIN — Medication 1000 MILLILITER(S): at 19:05

## 2024-07-25 RX ADMIN — Medication 1000 MILLILITER(S): at 18:25

## 2024-07-25 RX ADMIN — PIPERACILLIN SODIUM, TAZOBACTAM SODIUM 200 GRAM(S): 3; .375 INJECTION, POWDER, LYOPHILIZED, FOR SOLUTION INTRAVENOUS at 19:04

## 2024-07-25 RX ADMIN — Medication 2 MILLIGRAM(S): at 22:13

## 2024-07-25 RX ADMIN — Medication 2 MILLIGRAM(S): at 18:27

## 2024-07-25 RX ADMIN — Medication 1 MILLIGRAM(S): at 21:09

## 2024-07-25 RX ADMIN — VANCOMYCIN HYDROCHLORIDE 250 MILLIGRAM(S): 5 INJECTION, POWDER, LYOPHILIZED, FOR SOLUTION INTRAVENOUS at 19:36

## 2024-07-25 NOTE — ED ADULT NURSE NOTE - NSFALLRISKFACTORS_ED_ALL_ED
Patient reports:  He is out of lexapro medication  Advised patient he is due for follow up with provider on anxiety and medication  Patient verbalized understanding and requested 5 tablets be sent to pharmacy today for Lexapro, Rx sent to [facility] pharmacy per patient request  Scheduled telephone visit with provider 7/9/18    Nayely CHRISTIANSON Rn    
No indicators present

## 2024-07-25 NOTE — ED ADULT TRIAGE NOTE - SPO2 (%)
Creatinine at discharge on 10/3 1.0, now 1.4. Denies decreased po intake of fluids   - will judiously give 250 cc bolus    93

## 2024-07-25 NOTE — ED ADULT NURSE NOTE - OBJECTIVE STATEMENT
Pt came in c/o generalized weakness and whole body pain x today after doing errands. PMH anal cancer with a colostomy to Q. PT reports finishing chromotherapy 1 month ago. A&Ox4 speaking in full clear sentences. Respirations even and unlabored. On continuous o2/hr monitoring.

## 2024-07-25 NOTE — ED ADULT TRIAGE NOTE - CHIEF COMPLAINT QUOTE
Pt came in c/o generalized weakness after doing errands today. PMH anal cancer and finished chemotherapy 1 month ago. Pt has a colostomy bag. BEFAST negative.

## 2024-07-25 NOTE — ED ADULT NURSE NOTE - NSFALLRISKINTERV_ED_ALL_ED

## 2024-07-25 NOTE — ED PROVIDER NOTE - PROGRESS NOTE DETAILS
The patient's labs show an elevated white blood cell count.  His CT once again shows necrotic rectal mass with possible collection.  He was covered with Zosyn and vancomycin.  I spoke with Dr. Bang of surgery.  He is very well-known to their service.  He reports that there is no additional surgical intervention at this point unfortunately and recommends that he be admitted to medicine with a surgical consult as well as the reinvolvement of palliative care.   Patient was accepted for admission to medicine by Dr. Martinez.

## 2024-07-25 NOTE — ED PROVIDER NOTE - OBJECTIVE STATEMENT
Patient is a 44-year-old man complaining of generalized weakness.  Patient with history of anal cancer status postradiation therapy.  Prior records reveal that patient has history of significant anemia in the past.  Patient does not complain of any abdominal pain at this time, he has a colostomy.  Patient states that he feels dehydrated.  Patient denies cough, fever, chills, chest pain. Patient is a 44-year-old man complaining of generalized weakness.  too weak to stand or move.  Pain in back/sacral area.  Patient with history of anal cancer status postradiation therapy.  Prior records reveal that patient has history of significant anemia in the past.  Patient does not complain of any abdominal pain at this time, he has a colostomy.  Patient states that he feels dehydrated.  Patient denies cough, fever, chills, chest pain.

## 2024-07-25 NOTE — ED PROVIDER NOTE - CLINICAL SUMMARY MEDICAL DECISION MAKING FREE TEXT BOX
Patient is a 44-year-old man complaining of generalized weakness.  Patient with history of anal cancer status postradiation therapy.  Prior records reveal that patient has history of significant anemia in the past.  Patient does not complain of any abdominal pain at this time, he has a colostomy.  Patient states that he feels dehydrated.  Patient denies cough, fever, chills, chest pain. Patient is a 44-year-old man complaining of generalized weakness.  Patient with history of anal cancer status postradiation therapy.  Prior records reveal that patient has history of significant anemia in the past.  Patient does not complain of any abdominal pain at this time, he has a colostomy.  Patient states that he feels dehydrated.  Patient denies cough, fever, chills, chest pain.    WBC noted to be > 20k.  A year ago patient's white blood cell count was approximately 17,000 and patient was noted to have underlying infection at that time.  Prior records have been reviewed.  Prior CT scan images and report have been reviewed.  Chest x-ray and CT scan of abdomen and pelvis with IV contrast been ordered.

## 2024-07-26 ENCOUNTER — INPATIENT (INPATIENT)
Facility: HOSPITAL | Age: 44
LOS: 10 days | Discharge: HOME | End: 2024-08-06
Attending: STUDENT IN AN ORGANIZED HEALTH CARE EDUCATION/TRAINING PROGRAM | Admitting: STUDENT IN AN ORGANIZED HEALTH CARE EDUCATION/TRAINING PROGRAM
Payer: MEDICAID

## 2024-07-26 DIAGNOSIS — R53.1 WEAKNESS: ICD-10-CM

## 2024-07-26 DIAGNOSIS — G89.3 NEOPLASM RELATED PAIN (ACUTE) (CHRONIC): ICD-10-CM

## 2024-07-26 DIAGNOSIS — A41.9 SEPSIS, UNSPECIFIED ORGANISM: ICD-10-CM

## 2024-07-26 DIAGNOSIS — R09.89 OTHER SPECIFIED SYMPTOMS AND SIGNS INVOLVING THE CIRCULATORY AND RESPIRATORY SYSTEMS: ICD-10-CM

## 2024-07-26 DIAGNOSIS — C20 MALIGNANT NEOPLASM OF RECTUM: ICD-10-CM

## 2024-07-26 DIAGNOSIS — B20 HUMAN IMMUNODEFICIENCY VIRUS [HIV] DISEASE: ICD-10-CM

## 2024-07-26 DIAGNOSIS — Z29.9 ENCOUNTER FOR PROPHYLACTIC MEASURES, UNSPECIFIED: ICD-10-CM

## 2024-07-26 DIAGNOSIS — Z98.890 OTHER SPECIFIED POSTPROCEDURAL STATES: Chronic | ICD-10-CM

## 2024-07-26 DIAGNOSIS — R53.81 OTHER MALAISE: ICD-10-CM

## 2024-07-26 DIAGNOSIS — Z51.5 ENCOUNTER FOR PALLIATIVE CARE: ICD-10-CM

## 2024-07-26 DIAGNOSIS — R64 CACHEXIA: ICD-10-CM

## 2024-07-26 DIAGNOSIS — F19.10 OTHER PSYCHOACTIVE SUBSTANCE ABUSE, UNCOMPLICATED: ICD-10-CM

## 2024-07-26 LAB
ALBUMIN SERPL ELPH-MCNC: 1.6 G/DL — LOW (ref 3.4–5)
ALBUMIN SERPL ELPH-MCNC: 2.6 G/DL — LOW (ref 3.3–5)
ALP SERPL-CCNC: 253 U/L — HIGH (ref 40–120)
ALP SERPL-CCNC: 254 U/L — HIGH (ref 40–120)
ALT FLD-CCNC: 17 U/L — SIGNIFICANT CHANGE UP (ref 10–45)
ALT FLD-CCNC: 24 U/L — SIGNIFICANT CHANGE UP (ref 12–42)
ANION GAP SERPL CALC-SCNC: 6 MMOL/L — LOW (ref 9–16)
ANION GAP SERPL CALC-SCNC: 9 MMOL/L — SIGNIFICANT CHANGE UP (ref 5–17)
ANISOCYTOSIS BLD QL: SLIGHT — SIGNIFICANT CHANGE UP
AST SERPL-CCNC: 21 U/L — SIGNIFICANT CHANGE UP (ref 10–40)
AST SERPL-CCNC: 31 U/L — SIGNIFICANT CHANGE UP (ref 15–37)
BASOPHILS # BLD AUTO: 0.12 K/UL — SIGNIFICANT CHANGE UP (ref 0–0.2)
BASOPHILS NFR BLD AUTO: 0.9 % — SIGNIFICANT CHANGE UP (ref 0–2)
BILIRUB SERPL-MCNC: 0.2 MG/DL — SIGNIFICANT CHANGE UP (ref 0.2–1.2)
BILIRUB SERPL-MCNC: 0.3 MG/DL — SIGNIFICANT CHANGE UP (ref 0.2–1.2)
BLD GP AB SCN SERPL QL: NEGATIVE — SIGNIFICANT CHANGE UP
BUN SERPL-MCNC: 5 MG/DL — LOW (ref 7–23)
BUN SERPL-MCNC: 6 MG/DL — LOW (ref 7–23)
CALCIUM SERPL-MCNC: 8.4 MG/DL — LOW (ref 8.5–10.5)
CALCIUM SERPL-MCNC: 8.4 MG/DL — SIGNIFICANT CHANGE UP (ref 8.4–10.5)
CHLORIDE SERPL-SCNC: 95 MMOL/L — LOW (ref 96–108)
CHLORIDE SERPL-SCNC: 95 MMOL/L — LOW (ref 96–108)
CO2 SERPL-SCNC: 29 MMOL/L — SIGNIFICANT CHANGE UP (ref 22–31)
CO2 SERPL-SCNC: 31 MMOL/L — SIGNIFICANT CHANGE UP (ref 22–31)
CREAT SERPL-MCNC: 0.39 MG/DL — LOW (ref 0.5–1.3)
CREAT SERPL-MCNC: 0.54 MG/DL — SIGNIFICANT CHANGE UP (ref 0.5–1.3)
DACRYOCYTES BLD QL SMEAR: SLIGHT — SIGNIFICANT CHANGE UP
EGFR: 128 ML/MIN/1.73M2 — SIGNIFICANT CHANGE UP
EGFR: 141 ML/MIN/1.73M2 — SIGNIFICANT CHANGE UP
EOSINOPHIL # BLD AUTO: 0 K/UL — SIGNIFICANT CHANGE UP (ref 0–0.5)
EOSINOPHIL NFR BLD AUTO: 0 % — SIGNIFICANT CHANGE UP (ref 0–6)
GLUCOSE SERPL-MCNC: 117 MG/DL — HIGH (ref 70–99)
GLUCOSE SERPL-MCNC: 120 MG/DL — HIGH (ref 70–99)
HCT VFR BLD CALC: 21.6 % — LOW (ref 39–50)
HGB BLD-MCNC: 6.2 G/DL — CRITICAL LOW (ref 13–17)
HYPOCHROMIA BLD QL: SIGNIFICANT CHANGE UP
LYMPHOCYTES # BLD AUTO: 0.45 K/UL — LOW (ref 1–3.3)
LYMPHOCYTES # BLD AUTO: 3.5 % — LOW (ref 13–44)
MAGNESIUM SERPL-MCNC: 1.9 MG/DL — SIGNIFICANT CHANGE UP (ref 1.6–2.6)
MANUAL SMEAR VERIFICATION: SIGNIFICANT CHANGE UP
MCHC RBC-ENTMCNC: 23.7 PG — LOW (ref 27–34)
MCHC RBC-ENTMCNC: 28.7 GM/DL — LOW (ref 32–36)
MCV RBC AUTO: 82.4 FL — SIGNIFICANT CHANGE UP (ref 80–100)
MICROCYTES BLD QL: SLIGHT — SIGNIFICANT CHANGE UP
MONOCYTES # BLD AUTO: 0.22 K/UL — SIGNIFICANT CHANGE UP (ref 0–0.9)
MONOCYTES NFR BLD AUTO: 1.7 % — LOW (ref 2–14)
MYELOCYTES NFR BLD: 0.9 % — HIGH (ref 0–0)
NEUTROPHILS # BLD AUTO: 12 K/UL — HIGH (ref 1.8–7.4)
NEUTROPHILS NFR BLD AUTO: 93 % — HIGH (ref 43–77)
OVALOCYTES BLD QL SMEAR: SLIGHT — SIGNIFICANT CHANGE UP
PHOSPHATE SERPL-MCNC: 2.4 MG/DL — LOW (ref 2.5–4.5)
PLAT MORPH BLD: ABNORMAL
PLATELET # BLD AUTO: 533 K/UL — HIGH (ref 150–400)
POIKILOCYTOSIS BLD QL AUTO: SLIGHT — SIGNIFICANT CHANGE UP
POLYCHROMASIA BLD QL SMEAR: SLIGHT — SIGNIFICANT CHANGE UP
POTASSIUM SERPL-MCNC: 3.4 MMOL/L — LOW (ref 3.5–5.3)
POTASSIUM SERPL-MCNC: 4.1 MMOL/L — SIGNIFICANT CHANGE UP (ref 3.5–5.3)
POTASSIUM SERPL-SCNC: 3.4 MMOL/L — LOW (ref 3.5–5.3)
POTASSIUM SERPL-SCNC: 4.1 MMOL/L — SIGNIFICANT CHANGE UP (ref 3.5–5.3)
PROT SERPL-MCNC: 6.7 G/DL — SIGNIFICANT CHANGE UP (ref 6–8.3)
PROT SERPL-MCNC: 7 G/DL — SIGNIFICANT CHANGE UP (ref 6.4–8.2)
RBC # BLD: 2.62 M/UL — LOW (ref 4.2–5.8)
RBC # FLD: 18.4 % — HIGH (ref 10.3–14.5)
RBC BLD AUTO: ABNORMAL
RH IG SCN BLD-IMP: POSITIVE — SIGNIFICANT CHANGE UP
SCHISTOCYTES BLD QL AUTO: SLIGHT — SIGNIFICANT CHANGE UP
SODIUM SERPL-SCNC: 132 MMOL/L — SIGNIFICANT CHANGE UP (ref 132–145)
SODIUM SERPL-SCNC: 133 MMOL/L — LOW (ref 135–145)
WBC # BLD: 12.9 K/UL — HIGH (ref 3.8–10.5)
WBC # FLD AUTO: 12.9 K/UL — HIGH (ref 3.8–10.5)

## 2024-07-26 PROCEDURE — 99223 1ST HOSP IP/OBS HIGH 75: CPT

## 2024-07-26 PROCEDURE — 93010 ELECTROCARDIOGRAM REPORT: CPT

## 2024-07-26 PROCEDURE — 36000 PLACE NEEDLE IN VEIN: CPT

## 2024-07-26 PROCEDURE — 76937 US GUIDE VASCULAR ACCESS: CPT | Mod: 26

## 2024-07-26 RX ORDER — OXYCODONE HYDROCHLORIDE 30 MG/1
15 TABLET ORAL EVERY 4 HOURS
Refills: 0 | Status: DISCONTINUED | OUTPATIENT
Start: 2024-07-26 | End: 2024-08-02

## 2024-07-26 RX ORDER — HYDROMORPHONE HCL IN 0.9% NACL 0.2 MG/ML
1 PLASTIC BAG, INJECTION (ML) INTRAVENOUS ONCE
Refills: 0 | Status: DISCONTINUED | OUTPATIENT
Start: 2024-07-26 | End: 2024-07-26

## 2024-07-26 RX ORDER — HYDROMORPHONE HCL IN 0.9% NACL 0.2 MG/ML
0.5 PLASTIC BAG, INJECTION (ML) INTRAVENOUS EVERY 4 HOURS
Refills: 0 | Status: DISCONTINUED | OUTPATIENT
Start: 2024-07-26 | End: 2024-07-26

## 2024-07-26 RX ORDER — PIPERACILLIN SODIUM, TAZOBACTAM SODIUM 3; .375 G/15ML; G/15ML
3.38 INJECTION, POWDER, LYOPHILIZED, FOR SOLUTION INTRAVENOUS EVERY 8 HOURS
Refills: 0 | Status: DISCONTINUED | OUTPATIENT
Start: 2024-07-26 | End: 2024-07-31

## 2024-07-26 RX ORDER — LIDOCAINE 5% 5 G/100G
1 CREAM TOPICAL EVERY 4 HOURS
Refills: 0 | Status: DISCONTINUED | OUTPATIENT
Start: 2024-07-26 | End: 2024-07-28

## 2024-07-26 RX ORDER — ENOXAPARIN SODIUM 120 MG/.8ML
40 INJECTION SUBCUTANEOUS EVERY 24 HOURS
Refills: 0 | Status: DISCONTINUED | OUTPATIENT
Start: 2024-07-26 | End: 2024-07-27

## 2024-07-26 RX ORDER — HYDROMORPHONE HCL IN 0.9% NACL 0.2 MG/ML
0.75 PLASTIC BAG, INJECTION (ML) INTRAVENOUS EVERY 4 HOURS
Refills: 0 | Status: DISCONTINUED | OUTPATIENT
Start: 2024-07-26 | End: 2024-07-26

## 2024-07-26 RX ORDER — SODIUM PHOSPHATE, MONOBASIC, MONOHYDRATE 276; 142 MG/ML; MG/ML
30 INJECTION, SOLUTION INTRAVENOUS ONCE
Refills: 0 | Status: COMPLETED | OUTPATIENT
Start: 2024-07-26 | End: 2024-07-27

## 2024-07-26 RX ORDER — METHADONE HCL 10 MG
10 TABLET ORAL EVERY 8 HOURS
Refills: 0 | Status: DISCONTINUED | OUTPATIENT
Start: 2024-07-26 | End: 2024-07-29

## 2024-07-26 RX ORDER — DEXTROSE MONOHYDRATE, SODIUM CHLORIDE, SODIUM LACTATE, CALCIUM CHLORIDE, MAGNESIUM CHLORIDE 1.5; 538; 448; 18.4; 5.08 G/100ML; MG/100ML; MG/100ML; MG/100ML; MG/100ML
1000 SOLUTION INTRAPERITONEAL ONCE
Refills: 0 | Status: COMPLETED | OUTPATIENT
Start: 2024-07-26 | End: 2024-07-26

## 2024-07-26 RX ORDER — VANCOMYCIN HYDROCHLORIDE 5 G/100ML
1000 INJECTION, POWDER, LYOPHILIZED, FOR SOLUTION INTRAVENOUS EVERY 12 HOURS
Refills: 0 | Status: DISCONTINUED | OUTPATIENT
Start: 2024-07-26 | End: 2024-07-29

## 2024-07-26 RX ORDER — BICTEGRAVIR SODIUM, EMTRICITABINE, AND TENOFOVIR ALAFENAMIDE FUMARATE 50; 200; 25 MG/1; MG/1; MG/1
1 TABLET ORAL DAILY
Refills: 0 | Status: DISCONTINUED | OUTPATIENT
Start: 2024-07-26 | End: 2024-08-06

## 2024-07-26 RX ORDER — KETOROLAC TROMETHAMINE 10 MG
15 TABLET ORAL EVERY 8 HOURS
Refills: 0 | Status: DISCONTINUED | OUTPATIENT
Start: 2024-07-26 | End: 2024-07-28

## 2024-07-26 RX ORDER — FERROUS SULFATE 325(65) MG
325 TABLET ORAL DAILY
Refills: 0 | Status: DISCONTINUED | OUTPATIENT
Start: 2024-07-26 | End: 2024-07-26

## 2024-07-26 RX ORDER — HYDROMORPHONE HCL IN 0.9% NACL 0.2 MG/ML
1 PLASTIC BAG, INJECTION (ML) INTRAVENOUS
Refills: 0 | Status: DISCONTINUED | OUTPATIENT
Start: 2024-07-26 | End: 2024-07-28

## 2024-07-26 RX ORDER — LIDOCAINE 5% 5 G/100G
1 CREAM TOPICAL EVERY 24 HOURS
Refills: 0 | Status: DISCONTINUED | OUTPATIENT
Start: 2024-07-26 | End: 2024-08-06

## 2024-07-26 RX ORDER — PIPERACILLIN SODIUM, TAZOBACTAM SODIUM 3; .375 G/15ML; G/15ML
3.38 INJECTION, POWDER, LYOPHILIZED, FOR SOLUTION INTRAVENOUS EVERY 8 HOURS
Refills: 0 | Status: DISCONTINUED | OUTPATIENT
Start: 2024-07-26 | End: 2024-07-26

## 2024-07-26 RX ORDER — ALPRAZOLAM 0.5 MG
0.5 TABLET ORAL ONCE
Refills: 0 | Status: DISCONTINUED | OUTPATIENT
Start: 2024-07-26 | End: 2024-07-26

## 2024-07-26 RX ORDER — VANCOMYCIN HYDROCHLORIDE 5 G/100ML
1000 INJECTION, POWDER, LYOPHILIZED, FOR SOLUTION INTRAVENOUS EVERY 12 HOURS
Refills: 0 | Status: DISCONTINUED | OUTPATIENT
Start: 2024-07-26 | End: 2024-07-26

## 2024-07-26 RX ORDER — OXYCODONE HYDROCHLORIDE 30 MG/1
10 TABLET ORAL EVERY 4 HOURS
Refills: 0 | Status: DISCONTINUED | OUTPATIENT
Start: 2024-07-26 | End: 2024-07-26

## 2024-07-26 RX ORDER — KETOROLAC TROMETHAMINE 10 MG
15 TABLET ORAL ONCE
Refills: 0 | Status: DISCONTINUED | OUTPATIENT
Start: 2024-07-26 | End: 2024-07-26

## 2024-07-26 RX ORDER — POTASSIUM CHLORIDE 1500 MG/1
40 TABLET, EXTENDED RELEASE ORAL ONCE
Refills: 0 | Status: COMPLETED | OUTPATIENT
Start: 2024-07-26 | End: 2024-07-26

## 2024-07-26 RX ORDER — ESCITALOPRAM OXALATE 20 MG
10 TABLET ORAL DAILY
Refills: 0 | Status: DISCONTINUED | OUTPATIENT
Start: 2024-07-26 | End: 2024-08-01

## 2024-07-26 RX ORDER — DEXTROSE MONOHYDRATE, SODIUM CHLORIDE, SODIUM LACTATE, CALCIUM CHLORIDE, MAGNESIUM CHLORIDE 1.5; 538; 448; 18.4; 5.08 G/100ML; MG/100ML; MG/100ML; MG/100ML; MG/100ML
1000 SOLUTION INTRAPERITONEAL
Refills: 0 | Status: DISCONTINUED | OUTPATIENT
Start: 2024-07-26 | End: 2024-08-06

## 2024-07-26 RX ORDER — METHADONE HCL 10 MG
10 TABLET ORAL EVERY 12 HOURS
Refills: 0 | Status: DISCONTINUED | OUTPATIENT
Start: 2024-07-26 | End: 2024-07-26

## 2024-07-26 RX ORDER — ACETAMINOPHEN 500 MG
1000 TABLET ORAL EVERY 8 HOURS
Refills: 0 | Status: DISCONTINUED | OUTPATIENT
Start: 2024-07-26 | End: 2024-08-06

## 2024-07-26 RX ADMIN — DEXTROSE MONOHYDRATE, SODIUM CHLORIDE, SODIUM LACTATE, CALCIUM CHLORIDE, MAGNESIUM CHLORIDE 1000 MILLILITER(S): 1.5; 538; 448; 18.4; 5.08 SOLUTION INTRAPERITONEAL at 08:35

## 2024-07-26 RX ADMIN — Medication 1 MILLIGRAM(S): at 18:28

## 2024-07-26 RX ADMIN — LIDOCAINE 5% 1 APPLICATION(S): 5 CREAM TOPICAL at 18:16

## 2024-07-26 RX ADMIN — PIPERACILLIN SODIUM, TAZOBACTAM SODIUM 25 GRAM(S): 3; .375 INJECTION, POWDER, LYOPHILIZED, FOR SOLUTION INTRAVENOUS at 13:11

## 2024-07-26 RX ADMIN — DEXTROSE MONOHYDRATE, SODIUM CHLORIDE, SODIUM LACTATE, CALCIUM CHLORIDE, MAGNESIUM CHLORIDE 100 MILLILITER(S): 1.5; 538; 448; 18.4; 5.08 SOLUTION INTRAPERITONEAL at 14:23

## 2024-07-26 RX ADMIN — PIPERACILLIN SODIUM, TAZOBACTAM SODIUM 25 GRAM(S): 3; .375 INJECTION, POWDER, LYOPHILIZED, FOR SOLUTION INTRAVENOUS at 23:34

## 2024-07-26 RX ADMIN — PIPERACILLIN SODIUM, TAZOBACTAM SODIUM 25 GRAM(S): 3; .375 INJECTION, POWDER, LYOPHILIZED, FOR SOLUTION INTRAVENOUS at 05:32

## 2024-07-26 RX ADMIN — LIDOCAINE 5% 1 APPLICATION(S): 5 CREAM TOPICAL at 23:47

## 2024-07-26 RX ADMIN — Medication 0.5 MILLIGRAM(S): at 05:33

## 2024-07-26 RX ADMIN — Medication 10 MILLIGRAM(S): at 23:34

## 2024-07-26 RX ADMIN — Medication 10 MILLIGRAM(S): at 14:23

## 2024-07-26 RX ADMIN — Medication 1000 MILLIGRAM(S): at 23:34

## 2024-07-26 RX ADMIN — Medication 15 MILLIGRAM(S): at 23:34

## 2024-07-26 RX ADMIN — Medication 0.5 MILLIGRAM(S): at 11:03

## 2024-07-26 RX ADMIN — Medication 1 MILLIGRAM(S): at 20:45

## 2024-07-26 RX ADMIN — Medication 100 GRAM(S): at 14:23

## 2024-07-26 RX ADMIN — LIDOCAINE 5% 1 APPLICATION(S): 5 CREAM TOPICAL at 14:23

## 2024-07-26 RX ADMIN — Medication 325 MILLIGRAM(S): at 14:24

## 2024-07-26 RX ADMIN — VANCOMYCIN HYDROCHLORIDE 250 MILLIGRAM(S): 5 INJECTION, POWDER, LYOPHILIZED, FOR SOLUTION INTRAVENOUS at 19:01

## 2024-07-26 RX ADMIN — Medication 0.5 MILLIGRAM(S): at 14:48

## 2024-07-26 RX ADMIN — Medication 1 MILLIGRAM(S): at 01:24

## 2024-07-26 RX ADMIN — LIDOCAINE 5% 1 PATCH: 5 CREAM TOPICAL at 13:11

## 2024-07-26 RX ADMIN — Medication 1 MILLIGRAM(S): at 21:45

## 2024-07-26 RX ADMIN — Medication 0.5 MILLIGRAM(S): at 15:03

## 2024-07-26 RX ADMIN — ENOXAPARIN SODIUM 40 MILLIGRAM(S): 120 INJECTION SUBCUTANEOUS at 14:23

## 2024-07-26 RX ADMIN — Medication 1 MILLIGRAM(S): at 18:13

## 2024-07-26 RX ADMIN — POTASSIUM CHLORIDE 40 MILLIEQUIVALENT(S): 1500 TABLET, EXTENDED RELEASE ORAL at 13:11

## 2024-07-26 RX ADMIN — OXYCODONE HYDROCHLORIDE 15 MILLIGRAM(S): 30 TABLET ORAL at 23:34

## 2024-07-26 RX ADMIN — Medication 15 MILLIGRAM(S): at 01:24

## 2024-07-26 RX ADMIN — LIDOCAINE 5% 1 PATCH: 5 CREAM TOPICAL at 18:05

## 2024-07-26 RX ADMIN — Medication 0.5 MILLIGRAM(S): at 10:47

## 2024-07-26 RX ADMIN — BICTEGRAVIR SODIUM, EMTRICITABINE, AND TENOFOVIR ALAFENAMIDE FUMARATE 1 TABLET(S): 50; 200; 25 TABLET ORAL at 14:24

## 2024-07-26 RX ADMIN — Medication 1 MILLIGRAM(S): at 07:10

## 2024-07-26 RX ADMIN — Medication 1 MILLIGRAM(S): at 08:34

## 2024-07-26 NOTE — CONSULT NOTE ADULT - PROBLEM SELECTOR RECOMMENDATION 2
Invasive anorectal squamous cell carcinoma, follows w/ Dr. Julian. Colorectal surgeon Dr. Cortez and radiation oncologist Dr Owens.    - Underwent RT to anus/pelvis & capecitabine (completed on 5/16/24)  - CTAP 7/25 with overall progression of disease w/ greater extension of mass laterally though R pelvic muscles. Destruction of coccyx, sacrum, R ischium, posterior portion R acetabulum  - Oncology consulted

## 2024-07-26 NOTE — H&P ADULT - HISTORY OF PRESENT ILLNESS
Mr. Sanchez is a 44 year old male who came to [facility] ED with complaint of generalized weakness. He has PMHx of anal cancer (s/p radiation therapy), HIV, and significant anemia.       PPatient does not complain of any abdominal pain at this time, he has a colostomy.  Patient states that he feels dehydrated.  Patient denies cough, fever, chills, chest pain. Patient is a 44-year-old man complaining of generalized weakness.  too weak to stand or move.  Pain in back/sacral area.  Patient with history of anal cancer status postradiation therapy.  Prior records reveal that patient has history of significant anemia in the past.  Patient does not complain of any abdominal pain at this time, he has a colostomy.  Patient states that he feels dehydrated.  Patient denies cough, fever, chills, chest pain.   Mr. Sanchez is a 44 year old male who came to [facility] ED with complaint of generalized weakness. He has PMHx of  invasive anorectal squamous cell carcinoma (last received chemo and XRT a month ago, dx'd 12/23, pending a surgical intervention) with bony involvement  to sacrum/acetabulum (followed by Eliel/Graciela), HIV (biktarvy), anemia, baseline tachycardia attributed to prior heavy drug use (crystal meth, ecstasy), nicotine dependence (a pack a week) and depression, presenting for generalized weakness, pain in the hips and shoulders BL, and pain at the sites of incisions on anus. Too weak to stand or move. Pain and weakness started 3-4 days ago, gradually, persistently, and worsening, now bedbound. Pain most profound where significant muscle loss has occurred - BL hips, shoulders, buttocks/sacral area. Decreased appetite/decreased PO intake for last year, associated weight loss and muscle loss. Walks at baseline with walker, now worsening mostly bedbound. Denies fatigue, dizziness, or lightheadedness numbness, tingling, pain or discomfort, SOB, weight loss or gain, or sleep patterns. Nothing seems to make it better or worse. No recent changes in medications or substance use. Has not experienced similar weakness in the past.  Patient denies emesis, shortness of breath or chest pain. Patient endorsed longstanding history of perianal abscesses but worsening pain and drainage, with no fevers. Denies nausea/vomiting. Recently seen in June 12, 2024 for perianal abscesses and fistulas at [facility].     PMHx: Anorectal cancer, HIV, nicotine dependence, depression, opiod use disorder   PSHx: Anorectal biopsy   Colonoscopy/EGD: Denied  Allergies: NKDA  Social Hx: Smoking a pack of cigarettes a week. +Marijuana daily Denies EtOH (2 years sober) or recreational drug use   Meds: Biktarvy, Escitalopram 10 mg q24 hours,  Methadone 10 u5ilppd, Oxycodone 10 mg Q4,     In the ED   Vitals: T 98.1, , /73, RR 16, SpO2 97% on room air   Labs: WBC 22.64, , Hgb 7.8 (8.8 a month ago), Plt 654, Na 126, ,  La pending  Interventions: Zosyn, Vancomycin, surgical consult - no additional surgical intervention at this point,   UtOX +   Imaging:          Mr. Sanchez is a 44 year old male who came to [facility] ED with complaint of generalized weakness. He has PMHx of  invasive anorectal squamous cell carcinoma (last received chemo and XRT a month ago, dx'd 12/23, pending a surgical intervention) with bony involvement  to sacrum/acetabulum (followed by Eliel/Graciela), HIV (biktarvy), anemia, baseline tachycardia attributed to prior heavy drug use (crystal meth, ecstasy), nicotine dependence (a pack a week) and depression, presenting for generalized weakness, pain in the hips and shoulders BL, and pain at the sites of incisions on anus. Too weak to stand or move. Pain and weakness started 3-4 days ago, gradually, persistently, and worsening, now bedbound. Pain most profound where significant muscle loss has occurred - BL hips, shoulders, buttocks/sacral area. Decreased appetite/decreased PO intake for last year, associated weight loss and muscle loss. Walks at baseline with walker, now worsening mostly bedbound. Denies fatigue, dizziness, or lightheadedness numbness, tingling, pain or discomfort, SOB, weight loss or gain, or sleep patterns. Nothing seems to make it better or worse. No recent changes in medications or substance use. Has not experienced similar weakness in the past.  Patient denies emesis, shortness of breath or chest pain. Patient endorsed longstanding history of perianal abscesses but worsening pain and drainage, with no fevers. Denies nausea/vomiting. Recently seen in June 12, 2024 for perianal abscesses and fistulas at [facility]. On 6/15 he was s/p colostomy creation and perineal washout     PMHx: Anorectal cancer, HIV, nicotine dependence, depression, opioid use disorder (on methadone)  PSHx: Anorectal biopsy   Colonoscopy/EGD: Denied  Allergies: NKDA  Social Hx: Smoking a pack of cigarettes a week. +Marijuana daily Denies EtOH (2 years sober) or recreational drug use   Meds: Biktarvy, Escitalopram 10 mg q24 hours,  Methadone 10 t5ercep, Oxycodone 10 mg Q4,     In the ED   Vitals: T 98.1, , /73, RR 16, SpO2 97% on room air   Labs: WBC 22.64, , Hgb 7.8 (8.8 a month ago), Plt 654, Na 126, ,  La pending  Interventions: Zosyn, Vancomycin, surgical consult - no additional surgical intervention at this point,   UTOX + : Benzos, THC, amphetamine, opiates, methadone  Imaging:   (1) CT  - probable superimposed infection of the mass. The possibility of osteomyelitis cannot be excluded   - Probable DVT of L LE veins. Appears improved. Recc further eval with ultrasound     (2) CXR   Hyperinflation, but unremarkable.       Mr. Sanchez is a 44 year old male who came to [facility] ED with complaint of generalized weakness. He has PMHx of  invasive anorectal squamous cell carcinoma (last received chemo and XRT a month ago, dx'd 12/23, pending a surgical intervention) with bony involvement  to sacrum/acetabulum (followed by Eliel/Graciela), HIV (biktarvy), anemia, baseline tachycardia attributed to prior heavy drug use (crystal meth, ecstasy), nicotine dependence (a pack a week) and depression, presenting for generalized weakness, pain in the hips and shoulders BL, and pain at the sites of incisions on anus. Too weak to stand or move. Pain and weakness started 3-4 days ago, gradually, persistently, and worsening, now bedbound. Pain most profound where significant muscle loss has occurred - BL hips, shoulders, buttocks/sacral area. Decreased appetite/decreased PO intake for last year, associated weight loss and muscle loss. Walks at baseline with walker, now worsening mostly bedbound. Denies fatigue, dizziness, or lightheadedness numbness, tingling, pain or discomfort, SOB, weight loss or gain, or sleep patterns. Nothing seems to make it better or worse. No recent changes in medications or substance use. Has not experienced similar weakness in the past.  Patient denies emesis, shortness of breath or chest pain. Patient endorsed longstanding history of perianal abscesses but worsening pain and drainage, with no fevers. Denies nausea/vomiting. Recently seen in June 12, 2024 for perianal abscesses and fistulas at [facility]. On 6/15 he was s/p colostomy creation and perineal washout     PMHx: Anorectal cancer, HIV, nicotine dependence, depression, opioid use disorder (on methadone)  PSHx: Anorectal biopsy   Colonoscopy/EGD: Denied  Allergies: NKDA  Social Hx: Smoking a pack of cigarettes a week. +Marijuana daily Denies EtOH (2 years sober) or recreational drug use   Meds: Biktarvy, Escitalopram 10 mg q24 hours,  Methadone 10 l3efrmx, Oxycodone 10 mg Q4,     In the ED   Vitals: T 98.1, , /73, RR 16, SpO2 97% on room air   Labs: WBC 22.64, , Hgb 7.8 (8.8 a month ago), Plt 654, Na 126, ,  UTOX + : Benzos, THC, amphetamine, opiates, methadone   CTAP -  larger gas-filled cavity in the more inferior portion of the mass, with a larger fistula to the perineum probable superimposed infection of the mass, greater extension of mass laterally though R pelvic muscles. Destruction of coccyx, sacrum, R ischium, posterior portion R acetabulum, ?possibility of OM superimposted upon lytic destruction from tumor. Also proable DVT of LLE veins.   Interventions: Zosyn, Vancomycin, surgical consult - no additional surgical intervention at this point,

## 2024-07-26 NOTE — H&P ADULT - PROBLEM SELECTOR PLAN 4
Hx anorectal SCC, follows w/ Dr. Julian. Colorectal surgeon Dr. Cortez and radiation oncologist Dr Owens.  Follows w/ Palliative for pain mgmt.    Finished RT to anus/pelvis & capecitabine on 5/16/24.   CTAP on admission showed progression of disease w/ greater extension of mass laterally though R pelvic muscles. Destruction of coccyx, sacrum, R ischium, posterior portion R acetabulum,     Plan:   - Heme consult, Dr. Julian aware of admission  - Pain mgmt w/ Methadone 10mg BID, Oxycodone 10 PO q4h PRN for mod pain, Dilaudid 0.5mg I V q4h PRN for breakthru pain

## 2024-07-26 NOTE — H&P ADULT - PROBLEM SELECTOR PLAN 3
CT w/ probably DVT of LLE  - f/u w/ duplex US of bilat LLEs CT w/ probably DVT of LLE. On exam, no LE edema, no calf tenderness. However high thrombotic risk given hx malignancy.     - f/u w/ duplex US of bilat LLEs

## 2024-07-26 NOTE — CONSULT NOTE ADULT - ASSESSMENT
42 y/o M with invasive anorectal squamous cell carcinoma (dx'd 12/2023) with bony involvement to sacrum/acetabulum (followed by Eliel/Graciela), HIV (on biktarvy), anemia, baseline tachycardia attributed to prior heavy drug use (crystal meth, ecstasy), and depression, presenting with generalized weakness and persistent perianal pain admitted for uncontrolled pain and concern for sepsis (elevated WBC and tachycardia) possibly 2/2 osteomyelitis. Palliative following for symptom management.   42 y/o M with invasive anorectal squamous cell carcinoma (dx'd 12/2023) with bony involvement to sacrum/acetabulum (followed by Eliel/Graciela), HIV (on biktarvy), anemia, baseline tachycardia attributed to prior heavy drug use (crystal meth, ecstasy), and depression, presenting with generalized weakness and persistent perianal pain admitted for uncontrolled pain and concern for sepsis (elevated WBC and tachycardia) possibly 2/2 osteomyelitis. Palliative following for symptom management.    ·	adjusted symptom regimen as noted below  ·	will address GOC pending symptom control, will recommend symptom-directed care +/- home hospice depending interventions being offered/accepted 43 y/o M with invasive anorectal squamous cell carcinoma (dx'd 12/2023) with bony involvement to sacrum/acetabulum (followed by Eliel/Graciela), HIV (on biktarvy), anemia, baseline tachycardia attributed to prior heavy drug use (crystal meth, ecstasy), and depression, presenting with generalized weakness and persistent perianal pain admitted for uncontrolled pain and concern for sepsis (elevated WBC and tachycardia) possibly 2/2 osteomyelitis. Palliative following for symptom management.    ·	adjusted symptom regimen as noted below  ·	will address GOC pending symptom control, will recommend symptom-directed care +/- home hospice depending interventions being offered/accepted 45 y/o M with invasive anorectal squamous cell carcinoma (dx'd 12/2023) with bony involvement to sacrum/acetabulum (followed by Eliel/Graciela), HIV (on biktarvy), anemia, baseline tachycardia attributed to prior heavy drug use (crystal meth, ecstasy), and depression, presenting with generalized weakness and persistent perianal pain admitted for uncontrolled pain and concern for sepsis (elevated WBC and tachycardia) possibly 2/2 osteomyelitis. Palliative following for symptom management.    ·	adjusted symptom regimen as noted below  ·	will address GOC pending symptom control, will recommend symptom-directed care +/- home hospice depending on interventions being offered/accepted

## 2024-07-26 NOTE — CONSULT NOTE ADULT - SUBJECTIVE AND OBJECTIVE BOX
Orthopaedic Surgery Consult Note  CC: Patient is a 44y old  Male who presents with a chief complaint of Weakness (26 Jul 2024 10:45)    HPI:44yoM with pmhx anorectal SCC with bony involvement to sacrum/acetabulum(followed by [facility]) presenting with worsening weakness x 1 week. Patient reports pain into bilateral lower extremities right worse than left with associated constant numbness of lateral aspect of right lower extremity. Denies N/T of LLE. States he has progressively had more difficulty with walking. He has been taking metahdone 10mg po 3x a day and oxycodone 10mg po q4 hrs without relief of pain. Denies saddle anesthesia. Reports feeling feverish and chills.       From Medicine H&P:  "HPI:  Mr. Sanchez is a 44 year old male who came to [facility] ED with complaint of generalized weakness. He has PMHx of  invasive anorectal squamous cell carcinoma (last received chemo and XRT a month ago, dx'd 12/23, pending a surgical intervention) with bony involvement  to sacrum/acetabulum (followed by [facility]), HIV (biktarvy), anemia, baseline tachycardia attributed to prior heavy drug use (crystal meth, ecstasy), nicotine dependence (a pack a week) and depression, presenting for generalized weakness, pain in the hips and shoulders BL, and pain at the sites of incisions on anus. Too weak to stand or move. Pain and weakness started 3-4 days ago, gradually, persistently, and worsening, now bedbound. Pain most profound where significant muscle loss has occurred - BL hips, shoulders, buttocks/sacral area. Decreased appetite/decreased PO intake for last year, associated weight loss and muscle loss. Walks at baseline with walker, now worsening mostly bedbound. Denies fatigue, dizziness, or lightheadedness numbness, tingling, pain or discomfort, SOB, weight loss or gain, or sleep patterns. Nothing seems to make it better or worse. No recent changes in medications or substance use. Has not experienced similar weakness in the past.  Patient denies emesis, shortness of breath or chest pain. Patient endorsed longstanding history of perianal abscesses but worsening pain and drainage, with no fevers. Denies nausea/vomiting. Recently seen in June 12, 2024 for perianal abscesses and fistulas at [facility]. On 6/15 he was s/p colostomy creation and perineal washout     PMHx: Anorectal cancer, HIV, nicotine dependence, depression, opioid use disorder (on methadone)  PSHx: Anorectal biopsy   Colonoscopy/EGD: Denied  Allergies: NKDA  Social Hx: Smoking a pack of cigarettes a week. +Marijuana daily Denies EtOH (2 years sober) or recreational drug use   Meds: Biktarvy, Escitalopram 10 mg q24 hours,  Methadone 10 c4amiga, Oxycodone 10 mg Q4,     In the ED   Vitals: T 98.1, , /73, RR 16, SpO2 97% on room air   Labs: WBC 22.64, , Hgb 7.8 (8.8 a month ago), Plt 654, Na 126, ,  UTOX + : Benzos, THC, amphetamine, opiates, methadone   CTAP -  larger gas-filled cavity in the more inferior portion of the mass, with a larger fistula to the perineum probable superimposed infection of the mass, greater extension of mass laterally though R pelvic muscles. Destruction of coccyx, sacrum, R ischium, posterior portion R acetabulum, ?possibility of OM superimposted upon lytic destruction from tumor. Also proable DVT of LLE veins.   Interventions: Zosyn, Vancomycin, surgical consult - no additional surgical intervention at this point,      (26 Jul 2024 10:45)"      ROS: Positive for above  Denies chest pain  All other systems negative, except for above.     Allergies  No Known Allergies      PAST MEDICAL & SURGICAL HISTORY:  HIV (human immunodeficiency virus infection)  Abscess  IVDU (intravenous drug user)  Anemia  Rectal cancer  H/O rectal polypectomy    Social History:  Lives with Partner Acosta Mclean [phone] in 3rd floor walk up in the [facility] (26 Jul 2024 10:45)    FAMILY HISTORY:  FH: CAD (coronary artery disease) (Mother)      Medications: see med rec    Vital Signs Last 24 Hrs  T(C): 36.7 (26 Jul 2024 13:38), Max: 37.6 (26 Jul 2024 09:57)  T(F): 98.1 (26 Jul 2024 13:38), Max: 99.6 (26 Jul 2024 09:57)  HR: 101 (26 Jul 2024 13:38) (101 - 128)  BP: 136/87 (26 Jul 2024 13:38) (98/63 - 136/87)  BP(mean): --  RR: 16 (26 Jul 2024 13:38) (16 - 16)  SpO2: 94% (26 Jul 2024 13:38) (93% - 100%)    Parameters below as of 26 Jul 2024 13:38  Patient On (Oxygen Delivery Method): room air        PE:  General: laying on left side with legs up  Psych: Teary-eyed  Skin: Warm, dry, extremities well perfused, no obvious rash  MSK: patient unable to lay on back for exam, exam performed while laying on left side    -Sensation: decreased sensation in L5 distribution of RLE compared to left, otherwise SILT bilateral lower extremities    -Motor: EHL/FHL/TA/GS: 5/5 bilaterally, Hip flexion: 5/5 bilaterally, hip extension weak RLE, LLE: 5/5    -Pulses: DP 2+, PT 2+ bilaterally                            7.8    22.64 )-----------( 654      ( 25 Jul 2024 17:49 )             27.0     07-26    132  |  95<L>  |  6<L>  ----------------------------<  117<H>  3.4<L>   |  31  |  0.54    Ca    8.4<L>      26 Jul 2024 02:38  Mg     1.8     07-25    TPro  7.0  /  Alb  1.6<L>  /  TBili  0.3  /  DBili  x   /  AST  31  /  ALT  24  /  AlkPhos  254<H>  07-26    PT/INR - ( 25 Jul 2024 17:49 )   PT: 16.4 sec;   INR: 1.47          PTT - ( 25 Jul 2024 17:49 )  PTT:30.6 sec    Imaging:   ACC: [id number] EXAM:  CT ABDOMEN AND PELVIS IC   ORDERED BY: APRIL RIDLEY     PROCEDURE DATE:  07/25/2024          INTERPRETATION:  CLINICAL INFORMATION: History of anal cancer, status   post radiation. Colostomy. Weak. White blood cell count of 22,000.    COMPARISON: Most recent CT scan of abdomen and pelvis from 6/12/2024 and   additional prior imaging studies dating back to 11/1/2021.    CONTRAST/COMPLICATIONS:  IV Contrast: Omnipaque 350  96 cc administered   4 cc discarded  Oral Contrast: NONE  Complications: None reported at time of study completion    PROCEDURE:  CT of the Abdomen and Pelvis was performed.  Sagittal and coronal reformats were performed.    FINDINGS:  LOWER CHEST: Within normal limits.    LIVER: No change in a fewsubcentimeter hypodense lesions, again too   small to characterize.  BILE DUCTS: Normal caliber.  GALLBLADDER: Within normal limits.  SPLEEN: Within normal limits.  PANCREAS: Within normal limits.  ADRENALS: Within normal limits.  KIDNEYS/URETERS: Small cyst right kidney. Left kidney within normal   limits.    BLADDER: There is again asymmetric thickening of the right lateral wall.  REPRODUCTIVE ORGANS: Prostate within normal limits.    BOWEL: Interval descending colostomy. No bowel obstruction. There has   been a change in appearance of the locally advanced anal mass. The more   superior, solid portion of the mass has decreased in size from 9.1 x 6.7   cm to 8.4 x 4.9 cm (2/94). However, there is a larger gas-filled cavity   in the more inferior portion of the mass, with a larger fistula to the   perineum (2/115). The largest portion of the gas-filled cavity is on the   right side of the pelvis, but extends across the midline to the left   pelvis. There is also greater extension of the cavitary portion of the   mass laterally to the right through the right pelvic musculature, now   lying posterior to the right femoral head. Small amount of fluid in both   the right lateral and left lateral portions of the cavity (2/104). This   raises the suspicion of superimposed infection of the cavitary tumor.   There is again destruction of the coccyx, sacrum, and right ischium,   including the posterior portion of the right acetabulum. The possibility   of osteomyelitis superimposed upon lytic destruction from tumor cannot be   excluded.  PERITONEUM/RETROPERITONEUM: Within normal limits.  VESSELS: Asymmetric filling defects left common femoral and femoral   veins, suspicious for deep venous thrombosis. This appears improved.  LYMPH NODES: No lymphadenopathy.  ABDOMINAL WALL: Within normal limits.  BONES: Scoliosis.    IMPRESSION:  1. Since 6/12/2024, there has been a change in the appearance of the   locally advanced anal mass. The solid portion of the mass is decreased in   size, but the gas filled cavitary component of the mass has increased in   size and now extends further laterally through the musculature of the   right posterior pelvis. There is probable superimposed infection of the   mass given the presence of a small amount of fluid in the cavitary   component. The possibility of osteomyelitis cannot be excluded.    2. Probable deep venous thrombosis of left lower extremity veins. This   appears improved. Recommend further evaluation with ultrasound.    3. Interval descending colostomy.    4. Asymmetric thickening of the right lateral bladder wall. This may be   secondary to the adjacent inflammatory process, prior radiation therapy,   or cystitis.    --- End of Report ---            DAVID BROWN MD; Attending Radiologist  This document has been electronically signed. Jul 26 2024  8:37AM      A/P: 44yMale with perianal squamous cell carcinoma with extension of mass laterally through R pelvic muscles. Destruction of coccyx, sacrum, right ischium, posterior portion right acetabulum, concern for OM superimposed upon lytic destruction    -   -   - Recommend PT eval  - Consider spine consult  - Risks and benefits were discussed for   - Weight bearing status- WBAT with assistive device  Conservative treatment rest, ice, elevation, NSAIDs  Patient history, exam, imaging and plan discussed with   who is in agreement    Ortho Pager [phone]   Orthopaedic Surgery Consult Note  CC: Patient is a 44y old  Male who presents with a chief complaint of Weakness (26 Jul 2024 10:45)    HPI:44yoM with pmhx anorectal SCC with bony involvement to sacrum/acetabulum(followed by [facility]) presenting with worsening weakness x 1 week. Patient reports pain into bilateral lower extremities right worse than left with associated constant numbness of lateral aspect of right lower extremity. Denies N/T of LLE. States he has progressively had more difficulty with walking. He has been taking methadone 10mg po 3x a day and oxycodone 10mg po q4 hrs without relief of pain. Denies saddle anesthesia. Reports feeling feverish and chills.       From Medicine H&P:  "HPI:  Mr. Sanchez is a 44 year old [facility]. He has PMHx of  invasive anorectal squamous cell carcinoma (last received chemo and XRT a month ago, dx'd 12/23, pending a surgical intervention) with bony involvement  to sacrum/acetabulum (followed by [facility]), HIV (biktarvy), anemia, baseline tachycardia attributed to prior heavy drug use (crystal meth, ecstasy), nicotine dependence (a pack a week) and depression, presenting for generalized weakness, pain in the hips and shoulders BL, and pain at the sites of incisions on anus. Too weak to stand or move. Pain and weakness started 3-4 days ago, gradually, persistently, and worsening, now bedbound. Pain most profound where significant muscle loss has occurred - BL hips, shoulders, buttocks/sacral area. Decreased appetite/decreased PO intake for last year, associated weight loss and muscle loss. Walks at baseline with walker, now worsening mostly bedbound. Denies fatigue, dizziness, or lightheadedness numbness, tingling, pain or discomfort, SOB, weight loss or gain, or sleep patterns. Nothing seems to make it better or worse. No recent changes in medications or substance use. Has not experienced similar weakness in the past.  Patient denies emesis, shortness of breath or chest pain. Patient endorsed longstanding history of perianal abscesses but worsening pain and drainage, with no fevers. Denies nausea/vomiting. Recently seen in June 12, 2024 for perianal abscesses and fistulas at [facility]. On 6/15 he was s/p colostomy creation and perineal washout     PMHx: Anorectal cancer, HIV, nicotine dependence, depression, opioid use disorder (on methadone)  PSHx: Anorectal biopsy   Colonoscopy/EGD: Denied  Allergies: NKDA  Social Hx: Smoking a pack of cigarettes a week. +Marijuana daily Denies EtOH (2 years sober) or recreational drug use   Meds: Biktarvy, Escitalopram 10 mg q24 hours,  Methadone 10 u4tfuor, Oxycodone 10 mg Q4,     In the ED   Vitals: T 98.1, , /73, RR 16, SpO2 97% on room air   Labs: WBC 22.64, , Hgb 7.8 (8.8 a month ago), Plt 654, Na 126, ,  UTOX + : Benzos, THC, amphetamine, opiates, methadone   CTAP -  larger gas-filled cavity in the more inferior portion of the mass, with a larger fistula to the perineum probable superimposed infection of the mass, greater extension of mass laterally though R pelvic muscles. Destruction of coccyx, sacrum, R ischium, posterior portion R acetabulum, ?possibility of OM superimposted upon lytic destruction from tumor. Also proable DVT of LLE veins.   Interventions: Zosyn, Vancomycin, surgical consult - no additional surgical intervention at this point,      (26 Jul 2024 10:45)"      ROS: Positive for above  Denies chest pain  All other systems negative, except for above.     Allergies  No Known Allergies      PAST MEDICAL & SURGICAL HISTORY:  HIV (human immunodeficiency virus infection)  Abscess  IVDU (intravenous drug user)  Anemia  Rectal cancer  H/O rectal polypectomy    Social History:  Lives with Partner Acosta Mclean [phone] in 3rd floor walk up in the [facility] (26 Jul 2024 10:45)    FAMILY HISTORY:  FH: CAD (coronary artery disease) (Mother)      Medications: see med rec    Vital Signs Last 24 Hrs  T(C): 36.7 (26 Jul 2024 13:38), Max: 37.6 (26 Jul 2024 09:57)  T(F): 98.1 (26 Jul 2024 13:38), Max: 99.6 (26 Jul 2024 09:57)  HR: 101 (26 Jul 2024 13:38) (101 - 128)  BP: 136/87 (26 Jul 2024 13:38) (98/63 - 136/87)  BP(mean): --  RR: 16 (26 Jul 2024 13:38) (16 - 16)  SpO2: 94% (26 Jul 2024 13:38) (93% - 100%)    Parameters below as of 26 Jul 2024 13:38  Patient On (Oxygen Delivery Method): room air        PE:  General: laying on left side with legs up  Psych: Teary-eyed  Skin: Warm, dry, extremities well perfused, no obvious rash  MSK: patient unable to lay on back for exam, exam performed while laying on left side    -Sensation: decreased sensation in L5 distribution of RLE compared to left, otherwise SILT bilateral lower extremities    -Motor: EHL/FHL/TA/GS: 5/5 bilaterally, Hip flexion: 5/5 bilaterally, unable to extend right hip, LLE: 5/5    -Pulses: DP 2+, PT 2+ bilaterally                            7.8    22.64 )-----------( 654      ( 25 Jul 2024 17:49 )             27.0     07-26    132  |  95<L>  |  6<L>  ----------------------------<  117<H>  3.4<L>   |  31  |  0.54    Ca    8.4<L>      26 Jul 2024 02:38  Mg     1.8     07-25    TPro  7.0  /  Alb  1.6<L>  /  TBili  0.3  /  DBili  x   /  AST  31  /  ALT  24  /  AlkPhos  254<H>  07-26    PT/INR - ( 25 Jul 2024 17:49 )   PT: 16.4 sec;   INR: 1.47          PTT - ( 25 Jul 2024 17:49 )  PTT:30.6 sec    Imaging:   ACC: [id number] EXAM:  CT ABDOMEN AND PELVIS IC   ORDERED BY: APRIL RIDLEY     PROCEDURE DATE:  07/25/2024          INTERPRETATION:  CLINICAL INFORMATION: History of anal cancer, status   post radiation. Colostomy. Weak. White blood cell count of 22,000.    COMPARISON: Most recent CT scan of abdomen and pelvis from 6/12/2024 and   additional prior imaging studies dating back to 11/1/2021.    CONTRAST/COMPLICATIONS:  IV Contrast: Omnipaque 350  96 cc administered   4 cc discarded  Oral Contrast: NONE  Complications: None reported at time of study completion    PROCEDURE:  CT of the Abdomen and Pelvis was performed.  Sagittal and coronal reformats were performed.    FINDINGS:  LOWER CHEST: Within normal limits.    LIVER: No change in a fewsubcentimeter hypodense lesions, again too   small to characterize.  BILE DUCTS: Normal caliber.  GALLBLADDER: Within normal limits.  SPLEEN: Within normal limits.  PANCREAS: Within normal limits.  ADRENALS: Within normal limits.  KIDNEYS/URETERS: Small cyst right kidney. Left kidney within normal   limits.    BLADDER: There is again asymmetric thickening of the right lateral wall.  REPRODUCTIVE ORGANS: Prostate within normal limits.    BOWEL: Interval descending colostomy. No bowel obstruction. There has   been a change in appearance of the locally advanced anal mass. The more   superior, solid portion of the mass has decreased in size from 9.1 x 6.7   cm to 8.4 x 4.9 cm (2/94). However, there is a larger gas-filled cavity   in the more inferior portion of the mass, with a larger fistula to the   perineum (2/115). The largest portion of the gas-filled cavity is on the   right side of the pelvis, but extends across the midline to the left   pelvis. There is also greater extension of the cavitary portion of the   mass laterally to the right through the right pelvic musculature, now   lying posterior to the right femoral head. Small amount of fluid in both   the right lateral and left lateral portions of the cavity (2/104). This   raises the suspicion of superimposed infection of the cavitary tumor.   There is again destruction of the coccyx, sacrum, and right ischium,   including the posterior portion of the right acetabulum. The possibility   of osteomyelitis superimposed upon lytic destruction from tumor cannot be   excluded.  PERITONEUM/RETROPERITONEUM: Within normal limits.  VESSELS: Asymmetric filling defects left common femoral and femoral   veins, suspicious for deep venous thrombosis. This appears improved.  LYMPH NODES: No lymphadenopathy.  ABDOMINAL WALL: Within normal limits.  BONES: Scoliosis.    IMPRESSION:  1. Since 6/12/2024, there has been a change in the appearance of the   locally advanced anal mass. The solid portion of the mass is decreased in   size, but the gas filled cavitary component of the mass has increased in   size and now extends further laterally through the musculature of the   right posterior pelvis. There is probable superimposed infection of the   mass given the presence of a small amount of fluid in the cavitary   component. The possibility of osteomyelitis cannot be excluded.    2. Probable deep venous thrombosis of left lower extremity veins. This   appears improved. Recommend further evaluation with ultrasound.    3. Interval descending colostomy.    4. Asymmetric thickening of the right lateral bladder wall. This may be   secondary to the adjacent inflammatory process, prior radiation therapy,   or cystitis.    --- End of Report ---            DAVID BROWN MD; Attending Radiologist  This document has been electronically signed. Jul 26 2024  8:37AM      A/P: 44yMale with perianal squamous cell carcinoma with extension of mass laterally through R pelvic muscles. Destruction of coccyx, sacrum, right ischium, posterior portion right acetabulum, concern for OM superimposed upon lytic destruction    -   - Recommend IR bone biopsy to confirm osteomyelitis  - Recommend PT eval  - Consider spine consult  - Weight bearing status- WBAT with assistive device    Patient history, exam, imaging and plan discussed with   who is in agreement    Ortho Pager [phone]   Orthopaedic Surgery Consult Note  CC: Patient is a 44y old  Male who presents with a chief complaint of Weakness (26 Jul 2024 10:45)    HPI:44yoM with pmhx anorectal SCC with bony involvement to sacrum/acetabulum(followed by [facility]) presenting with worsening weakness x 1 week. Patient reports pain into bilateral lower extremities right worse than left with associated constant numbness of lateral aspect of right lower extremity. Denies N/T of LLE. States he has progressively had more difficulty with walking. He has been taking methadone 10mg po 3x a day and oxycodone 10mg po q4 hrs without relief of pain. Denies saddle anesthesia. Reports feeling feverish and chills.       From Medicine H&P:  "HPI:  Mr. Sanchez is a 44 year old male who came to [facility] ED with complaint of generalized weakness. He has PMHx of  invasive anorectal squamous cell carcinoma (last received chemo and XRT a month ago, dx'd 12/23, pending a surgical intervention) with bony involvement  to sacrum/acetabulum (followed by [facility]), HIV (biktarvy), anemia, baseline tachycardia attributed to prior heavy drug use (crystal meth, ecstasy), nicotine dependence (a pack a week) and depression, presenting for generalized weakness, pain in the hips and shoulders BL, and pain at the sites of incisions on anus. Too weak to stand or move. Pain and weakness started 3-4 days ago, gradually, persistently, and worsening, now bedbound. Pain most profound where significant muscle loss has occurred - BL hips, shoulders, buttocks/sacral area. Decreased appetite/decreased PO intake for last year, associated weight loss and muscle loss. Walks at baseline with walker, now worsening mostly bedbound. Denies fatigue, dizziness, or lightheadedness numbness, tingling, pain or discomfort, SOB, weight loss or gain, or sleep patterns. Nothing seems to make it better or worse. No recent changes in medications or substance use. Has not experienced similar weakness in the past.  Patient denies emesis, shortness of breath or chest pain. Patient endorsed longstanding history of perianal abscesses but worsening pain and drainage, with no fevers. Denies nausea/vomiting. Recently seen in June 12, 2024 for perianal abscesses and fistulas at [facility]. On 6/15 he was s/p colostomy creation and perineal washout     PMHx: Anorectal cancer, HIV, nicotine dependence, depression, opioid use disorder (on methadone)  PSHx: Anorectal biopsy   Colonoscopy/EGD: Denied  Allergies: NKDA  Social Hx: Smoking a pack of cigarettes a week. +Marijuana daily Denies EtOH (2 years sober) or recreational drug use   Meds: Biktarvy, Escitalopram 10 mg q24 hours,  Methadone 10 l0sfbst, Oxycodone 10 mg Q4,     In the ED   Vitals: T 98.1, , /73, RR 16, SpO2 97% on room air   Labs: WBC 22.64, , Hgb 7.8 (8.8 a month ago), Plt 654, Na 126, ,  UTOX + : Benzos, THC, amphetamine, opiates, methadone   CTAP -  larger gas-filled cavity in the more inferior portion of the mass, with a larger fistula to the perineum probable superimposed infection of the mass, greater extension of mass laterally though R pelvic muscles. Destruction of coccyx, sacrum, R ischium, posterior portion R acetabulum, ?possibility of OM superimposted upon lytic destruction from tumor. Also proable DVT of LLE veins.   Interventions: Zosyn, Vancomycin, surgical consult - no additional surgical intervention at this point,      (26 Jul 2024 10:45)"      ROS: Positive for above  Denies chest pain  All other systems negative, except for above.     Allergies  No Known Allergies      PAST MEDICAL & SURGICAL HISTORY:  HIV (human immunodeficiency virus infection)  Abscess  IVDU (intravenous drug user)  Anemia  Rectal cancer  H/O rectal polypectomy    Social History:  Lives with Partner Acosta Mclean [phone] in 3rd floor walk up in the [facility] (26 Jul 2024 10:45)    FAMILY HISTORY:  FH: CAD (coronary artery disease) (Mother)      Medications: see med rec    Vital Signs Last 24 Hrs  T(C): 36.7 (26 Jul 2024 13:38), Max: 37.6 (26 Jul 2024 09:57)  T(F): 98.1 (26 Jul 2024 13:38), Max: 99.6 (26 Jul 2024 09:57)  HR: 101 (26 Jul 2024 13:38) (101 - 128)  BP: 136/87 (26 Jul 2024 13:38) (98/63 - 136/87)  BP(mean): --  RR: 16 (26 Jul 2024 13:38) (16 - 16)  SpO2: 94% (26 Jul 2024 13:38) (93% - 100%)    Parameters below as of 26 Jul 2024 13:38  Patient On (Oxygen Delivery Method): room air        PE:  General: laying on left side with legs up  Psych: Teary-eyed  Skin: Warm, dry, extremities well perfused, no obvious rash  MSK: patient unable to lay on back for exam, exam performed while laying on left side    -Sensation: decreased sensation in L5 distribution of RLE compared to left, otherwise SILT bilateral lower extremities    -Motor: EHL/FHL/TA/GS: 5/5 bilaterally, Hip flexion: 5/5 bilaterally, unable to extend right hip, LLE: 5/5    -Pulses: DP 2+, PT 2+ bilaterally                            7.8    22.64 )-----------( 654      ( 25 Jul 2024 17:49 )             27.0     07-26    132  |  95<L>  |  6<L>  ----------------------------<  117<H>  3.4<L>   |  31  |  0.54    Ca    8.4<L>      26 Jul 2024 02:38  Mg     1.8     07-25    TPro  7.0  /  Alb  1.6<L>  /  TBili  0.3  /  DBili  x   /  AST  31  /  ALT  24  /  AlkPhos  254<H>  07-26    PT/INR - ( 25 Jul 2024 17:49 )   PT: 16.4 sec;   INR: 1.47          PTT - ( 25 Jul 2024 17:49 )  PTT:30.6 sec    Imaging:   ACC: [id number] EXAM:  CT ABDOMEN AND PELVIS IC   ORDERED BY: APRIL RILDEY     PROCEDURE DATE:  07/25/2024          INTERPRETATION:  CLINICAL INFORMATION: History of anal cancer, status   post radiation. Colostomy. Weak. White blood cell count of 22,000.    COMPARISON: Most recent CT scan of abdomen and pelvis from 6/12/2024 and   additional prior imaging studies dating back to 11/1/2021.    CONTRAST/COMPLICATIONS:  IV Contrast: Omnipaque 350  96 cc administered   4 cc discarded  Oral Contrast: NONE  Complications: None reported at time of study completion    PROCEDURE:  CT of the Abdomen and Pelvis was performed.  Sagittal and coronal reformats were performed.    FINDINGS:  LOWER CHEST: Within normal limits.    LIVER: No change in a fewsubcentimeter hypodense lesions, again too   small to characterize.  BILE DUCTS: Normal caliber.  GALLBLADDER: Within normal limits.  SPLEEN: Within normal limits.  PANCREAS: Within normal limits.  ADRENALS: Within normal limits.  KIDNEYS/URETERS: Small cyst right kidney. Left kidney within normal   limits.    BLADDER: There is again asymmetric thickening of the right lateral wall.  REPRODUCTIVE ORGANS: Prostate within normal limits.    BOWEL: Interval descending colostomy. No bowel obstruction. There has   been a change in appearance of the locally advanced anal mass. The more   superior, solid portion of the mass has decreased in size from 9.1 x 6.7   cm to 8.4 x 4.9 cm (2/94). However, there is a larger gas-filled cavity   in the more inferior portion of the mass, with a larger fistula to the   perineum (2/115). The largest portion of the gas-filled cavity is on the   right side of the pelvis, but extends across the midline to the left   pelvis. There is also greater extension of the cavitary portion of the   mass laterally to the right through the right pelvic musculature, now   lying posterior to the right femoral head. Small amount of fluid in both   the right lateral and left lateral portions of the cavity (2/104). This   raises the suspicion of superimposed infection of the cavitary tumor.   There is again destruction of the coccyx, sacrum, and right ischium,   including the posterior portion of the right acetabulum. The possibility   of osteomyelitis superimposed upon lytic destruction from tumor cannot be   excluded.  PERITONEUM/RETROPERITONEUM: Within normal limits.  VESSELS: Asymmetric filling defects left common femoral and femoral   veins, suspicious for deep venous thrombosis. This appears improved.  LYMPH NODES: No lymphadenopathy.  ABDOMINAL WALL: Within normal limits.  BONES: Scoliosis.    IMPRESSION:  1. Since 6/12/2024, there has been a change in the appearance of the   locally advanced anal mass. The solid portion of the mass is decreased in   size, but the gas filled cavitary component of the mass has increased in   size and now extends further laterally through the musculature of the   right posterior pelvis. There is probable superimposed infection of the   mass given the presence of a small amount of fluid in the cavitary   component. The possibility of osteomyelitis cannot be excluded.    2. Probable deep venous thrombosis of left lower extremity veins. This   appears improved. Recommend further evaluation with ultrasound.    3. Interval descending colostomy.    4. Asymmetric thickening of the right lateral bladder wall. This may be   secondary to the adjacent inflammatory process, prior radiation therapy,   or cystitis.    --- End of Report ---    DAVID BROWN MD; Attending Radiologist  This document has been electronically signed. Jul 26 2024  8:37AM      A/P: 44yMale with perianal squamous cell carcinoma with extension of mass laterally through right pelvic muscles. Destruction of coccyx, sacrum, right ischium, posterior portion right acetabulum, concern for OM superimposed upon lytic destruction    - Recommend IR bone biopsy to confirm osteomyelitis  - Weight bearing status- WBAT with assistive device    Patient history, exam, imaging and plan discussed with Dr. Sprague, who will review imaging and     Ortho Pager [phone]   Orthopaedic Surgery Consult Note  CC: Patient is a 44y old  Male who presents with a chief complaint of Weakness (26 Jul 2024 10:45)    HPI:44yoM with pmhx anorectal SCC with bony involvement to sacrum/acetabulum(followed by Eliel/Graciela) presenting with worsening weakness x 1 week. Patient reports pain into bilateral lower extremities right worse than left with associated constant numbness of lateral aspect of right lower extremity. Denies N/T of LLE. States he has progressively had more difficulty with walking. He has been taking methadone 10mg po 3x a day and oxycodone 10mg po q4 hrs without relief of pain. Denies saddle anesthesia. Reports feeling feverish and chills.       From Medicine H&P:  "HPI:  Mr. Sanchez is a 44 year old male who came to [facility] ED with complaint of generalized weakness. He has PMHx of  invasive anorectal squamous cell carcinoma (last received chemo and XRT a month ago, dx'd 12/23, pending a surgical intervention) with bony involvement  to sacrum/acetabulum (followed by [facility]), HIV (biktarvy), anemia, baseline tachycardia attributed to prior heavy drug use (crystal meth, ecstasy), nicotine dependence (a pack a week) and depression, presenting for generalized weakness, pain in the hips and shoulders BL, and pain at the sites of incisions on anus. Too weak to stand or move. Pain and weakness started 3-4 days ago, gradually, persistently, and worsening, now bedbound. Pain most profound where significant muscle loss has occurred - BL hips, shoulders, buttocks/sacral area. Decreased appetite/decreased PO intake for last year, associated weight loss and muscle loss. Walks at baseline with walker, now worsening mostly bedbound. Denies fatigue, dizziness, or lightheadedness numbness, tingling, pain or discomfort, SOB, weight loss or gain, or sleep patterns. Nothing seems to make it better or worse. No recent changes in medications or substance use. Has not experienced similar weakness in the past.  Patient denies emesis, shortness of breath or chest pain. Patient endorsed longstanding history of perianal abscesses but worsening pain and drainage, with no fevers. Denies nausea/vomiting. Recently seen in June 12, 2024 for perianal abscesses and fistulas at [facility]. On 6/15 he was s/p colostomy creation and perineal washout     PMHx: Anorectal cancer, HIV, nicotine dependence, depression, opioid use disorder (on methadone)  PSHx: Anorectal biopsy   Colonoscopy/EGD: Denied  Allergies: NKDA  Social Hx: Smoking a pack of cigarettes a week. +Marijuana daily Denies EtOH (2 years sober) or recreational drug use   Meds: Biktarvy, Escitalopram 10 mg q24 hours,  Methadone 10 l9rdbog, Oxycodone 10 mg Q4,     In the ED   Vitals: T 98.1, , /73, RR 16, SpO2 97% on room air   Labs: WBC 22.64, , Hgb 7.8 (8.8 a month ago), Plt 654, Na 126, ,  UTOX + : Benzos, THC, amphetamine, opiates, methadone   CTAP -  larger gas-filled cavity in the more inferior portion of the mass, with a larger fistula to the perineum probable superimposed infection of the mass, greater extension of mass laterally though R pelvic muscles. Destruction of coccyx, sacrum, R ischium, posterior portion R acetabulum, ?possibility of OM superimposted upon lytic destruction from tumor. Also proable DVT of LLE veins.   Interventions: Zosyn, Vancomycin, surgical consult - no additional surgical intervention at this point,      (26 Jul 2024 10:45)"      ROS: Positive for above  Denies chest pain  All other systems negative, except for above.     Allergies  No Known Allergies      PAST MEDICAL & SURGICAL HISTORY:  HIV (human immunodeficiency virus infection)  Abscess  IVDU (intravenous drug user)  Anemia  Rectal cancer  H/O rectal polypectomy    Social History:  Lives with Partner Acosta Mclean [phone] in 3rd floor walk up in the [facility] (26 Jul 2024 10:45)    FAMILY HISTORY:  FH: CAD (coronary artery disease) (Mother)      Medications: see med rec    Vital Signs Last 24 Hrs  T(C): 36.7 (26 Jul 2024 13:38), Max: 37.6 (26 Jul 2024 09:57)  T(F): 98.1 (26 Jul 2024 13:38), Max: 99.6 (26 Jul 2024 09:57)  HR: 101 (26 Jul 2024 13:38) (101 - 128)  BP: 136/87 (26 Jul 2024 13:38) (98/63 - 136/87)  BP(mean): --  RR: 16 (26 Jul 2024 13:38) (16 - 16)  SpO2: 94% (26 Jul 2024 13:38) (93% - 100%)    Parameters below as of 26 Jul 2024 13:38  Patient On (Oxygen Delivery Method): room air        PE:  General: laying on left side with legs up  Psych: Teary-eyed  Skin: Warm, dry, extremities well perfused, no obvious rash  MSK: patient unable to lay on back for exam, exam performed while laying on left side    -Sensation: decreased sensation in L5 distribution of RLE compared to left, otherwise SILT bilateral lower extremities    -Motor: EHL/FHL/TA/GS: 5/5 bilaterally, Hip flexion: 5/5 bilaterally, unable to extend right hip, LLE: 5/5    -Pulses: DP 2+, PT 2+ bilaterally                            7.8    22.64 )-----------( 654      ( 25 Jul 2024 17:49 )             27.0     07-26    132  |  95<L>  |  6<L>  ----------------------------<  117<H>  3.4<L>   |  31  |  0.54    Ca    8.4<L>      26 Jul 2024 02:38  Mg     1.8     07-25    TPro  7.0  /  Alb  1.6<L>  /  TBili  0.3  /  DBili  x   /  AST  31  /  ALT  24  /  AlkPhos  254<H>  07-26    PT/INR - ( 25 Jul 2024 17:49 )   PT: 16.4 sec;   INR: 1.47          PTT - ( 25 Jul 2024 17:49 )  PTT:30.6 sec    Imaging:   ACC: [id number] EXAM:  CT ABDOMEN AND PELVIS IC   ORDERED BY: APRIL RIDLEY     PROCEDURE DATE:  07/25/2024          INTERPRETATION:  CLINICAL INFORMATION: History of anal cancer, status   post radiation. Colostomy. Weak. White blood cell count of 22,000.    COMPARISON: Most recent CT scan of abdomen and pelvis from 6/12/2024 and   additional prior imaging studies dating back to 11/1/2021.    CONTRAST/COMPLICATIONS:  IV Contrast: Omnipaque 350  96 cc administered   4 cc discarded  Oral Contrast: NONE  Complications: None reported at time of study completion    PROCEDURE:  CT of the Abdomen and Pelvis was performed.  Sagittal and coronal reformats were performed.    FINDINGS:  LOWER CHEST: Within normal limits.    LIVER: No change in a fewsubcentimeter hypodense lesions, again too   small to characterize.  BILE DUCTS: Normal caliber.  GALLBLADDER: Within normal limits.  SPLEEN: Within normal limits.  PANCREAS: Within normal limits.  ADRENALS: Within normal limits.  KIDNEYS/URETERS: Small cyst right kidney. Left kidney within normal   limits.    BLADDER: There is again asymmetric thickening of the right lateral wall.  REPRODUCTIVE ORGANS: Prostate within normal limits.    BOWEL: Interval descending colostomy. No bowel obstruction. There has   been a change in appearance of the locally advanced anal mass. The more   superior, solid portion of the mass has decreased in size from 9.1 x 6.7   cm to 8.4 x 4.9 cm (2/94). However, there is a larger gas-filled cavity   in the more inferior portion of the mass, with a larger fistula to the   perineum (2/115). The largest portion of the gas-filled cavity is on the   right side of the pelvis, but extends across the midline to the left   pelvis. There is also greater extension of the cavitary portion of the   mass laterally to the right through the right pelvic musculature, now   lying posterior to the right femoral head. Small amount of fluid in both   the right lateral and left lateral portions of the cavity (2/104). This   raises the suspicion of superimposed infection of the cavitary tumor.   There is again destruction of the coccyx, sacrum, and right ischium,   including the posterior portion of the right acetabulum. The possibility   of osteomyelitis superimposed upon lytic destruction from tumor cannot be   excluded.  PERITONEUM/RETROPERITONEUM: Within normal limits.  VESSELS: Asymmetric filling defects left common femoral and femoral   veins, suspicious for deep venous thrombosis. This appears improved.  LYMPH NODES: No lymphadenopathy.  ABDOMINAL WALL: Within normal limits.  BONES: Scoliosis.    IMPRESSION:  1. Since 6/12/2024, there has been a change in the appearance of the   locally advanced anal mass. The solid portion of the mass is decreased in   size, but the gas filled cavitary component of the mass has increased in   size and now extends further laterally through the musculature of the   right posterior pelvis. There is probable superimposed infection of the   mass given the presence of a small amount of fluid in the cavitary   component. The possibility of osteomyelitis cannot be excluded.    2. Probable deep venous thrombosis of left lower extremity veins. This   appears improved. Recommend further evaluation with ultrasound.    3. Interval descending colostomy.    4. Asymmetric thickening of the right lateral bladder wall. This may be   secondary to the adjacent inflammatory process, prior radiation therapy,   or cystitis.    --- End of Report ---    DAVID BROWN MD; Attending Radiologist  This document has been electronically signed. Jul 26 2024  8:37AM      A/P: 44yMale with perianal squamous cell carcinoma with extension of mass laterally through right pelvic muscles. Destruction of coccyx, sacrum, right ischium, posterior portion right acetabulum, concern for OM superimposed upon lytic destruction  - Patient afebrile  - Recommend IR bone biopsy to confirm osteomyelitis  - IV abx  - Weight bearing status- WBAT   - Pain control  - Physical therapy    Patient history, and exam discussed with Dr. Sprague, further plan pending review of imaging    Ortho Pager [phone]   Orthopaedic Surgery Consult Note  CC: Patient is a 44y old  Male who presents with a chief complaint of Weakness (26 Jul 2024 10:45)    HPI:44yoM with pmhx anorectal SCC with bony involvement to sacrum/acetabulum(followed by Eliel/Graciela) presenting with worsening weakness x 1 week. Patient reports pain into bilateral lower extremities right worse than left with associated constant numbness of lateral aspect of right lower extremity. Denies N/T of LLE. States he has progressively had more difficulty with walking. He has been taking methadone 10mg po 3x a day and oxycodone 10mg po q4 hrs without relief of pain. Denies saddle anesthesia. Reports feeling feverish and chills.       From Medicine H&P:  "HPI:  Mr. Sanchez is a 44 year old [facility]. He has PMHx of  invasive anorectal squamous cell carcinoma (last received chemo and XRT a month ago, dx'd 12/23, pending a surgical intervention) with bony involvement  to sacrum/acetabulum (followed by [facility]), HIV (biktarvy), anemia, baseline tachycardia attributed to prior heavy drug use (crystal meth, ecstasy), nicotine dependence (a pack a week) and depression, presenting for generalized weakness, pain in the hips and shoulders BL, and pain at the sites of incisions on anus. Too weak to stand or move. Pain and weakness started 3-4 days ago, gradually, persistently, and worsening, now bedbound. Pain most profound where significant muscle loss has occurred - BL hips, shoulders, buttocks/sacral area. Decreased appetite/decreased PO intake for last year, associated weight loss and muscle loss. Walks at baseline with walker, now worsening mostly bedbound. Denies fatigue, dizziness, or lightheadedness numbness, tingling, pain or discomfort, SOB, weight loss or gain, or sleep patterns. Nothing seems to make it better or worse. No recent changes in medications or substance use. Has not experienced similar weakness in the past.  Patient denies emesis, shortness of breath or chest pain. Patient endorsed longstanding history of perianal abscesses but worsening pain and drainage, with no fevers. Denies nausea/vomiting. Recently seen in June 12, 2024 for perianal abscesses and fistulas at [facility]. On 6/15 he was s/p colostomy creation and perineal washout     PMHx: Anorectal cancer, HIV, nicotine dependence, depression, opioid use disorder (on methadone)  PSHx: Anorectal biopsy   Colonoscopy/EGD: Denied  Allergies: NKDA  Social Hx: Smoking a pack of cigarettes a week. +Marijuana daily Denies EtOH (2 years sober) or recreational drug use   Meds: Biktarvy, Escitalopram 10 mg q24 hours,  Methadone 10 c3jpdxb, Oxycodone 10 mg Q4,     In the ED   Vitals: T 98.1, , /73, RR 16, SpO2 97% on room air   Labs: WBC 22.64, , Hgb 7.8 (8.8 a month ago), Plt 654, Na 126, ,  UTOX + : Benzos, THC, amphetamine, opiates, methadone   CTAP -  larger gas-filled cavity in the more inferior portion of the mass, with a larger fistula to the perineum probable superimposed infection of the mass, greater extension of mass laterally though R pelvic muscles. Destruction of coccyx, sacrum, R ischium, posterior portion R acetabulum, ?possibility of OM superimposted upon lytic destruction from tumor. Also proable DVT of LLE veins.   Interventions: Zosyn, Vancomycin, surgical consult - no additional surgical intervention at this point,      (26 Jul 2024 10:45)"      ROS: Positive for above  Denies chest pain  All other systems negative, except for above.     Allergies  No Known Allergies      PAST MEDICAL & SURGICAL HISTORY:  HIV (human immunodeficiency virus infection)  Abscess  IVDU (intravenous drug user)  Anemia  Rectal cancer  H/O rectal polypectomy    Social History:  Lives with Partner Acosta Mclean [phone] in 3rd floor walk up in the [facility] (26 Jul 2024 10:45)    FAMILY HISTORY:  FH: CAD (coronary artery disease) (Mother)      Medications: see med rec    Vital Signs Last 24 Hrs  T(C): 36.7 (26 Jul 2024 13:38), Max: 37.6 (26 Jul 2024 09:57)  T(F): 98.1 (26 Jul 2024 13:38), Max: 99.6 (26 Jul 2024 09:57)  HR: 101 (26 Jul 2024 13:38) (101 - 128)  BP: 136/87 (26 Jul 2024 13:38) (98/63 - 136/87)  BP(mean): --  RR: 16 (26 Jul 2024 13:38) (16 - 16)  SpO2: 94% (26 Jul 2024 13:38) (93% - 100%)    Parameters below as of 26 Jul 2024 13:38  Patient On (Oxygen Delivery Method): room air        PE:  General: laying on left side with legs up  Psych: Teary-eyed  Skin: Warm, dry, extremities well perfused, no obvious rash  MSK: patient unable to lay on back for exam, exam performed while laying on left side    -Sensation: decreased sensation in L5 distribution of RLE compared to left, otherwise SILT bilateral lower extremities    -Motor: EHL/FHL/TA/GS: 5/5 bilaterally, Hip flexion: 5/5 bilaterally, unable to extend right hip, LLE: 5/5    -Pulses: DP 2+, PT 2+ bilaterally                            7.8    22.64 )-----------( 654      ( 25 Jul 2024 17:49 )             27.0     07-26    132  |  95<L>  |  6<L>  ----------------------------<  117<H>  3.4<L>   |  31  |  0.54    Ca    8.4<L>      26 Jul 2024 02:38  Mg     1.8     07-25    TPro  7.0  /  Alb  1.6<L>  /  TBili  0.3  /  DBili  x   /  AST  31  /  ALT  24  /  AlkPhos  254<H>  07-26    PT/INR - ( 25 Jul 2024 17:49 )   PT: 16.4 sec;   INR: 1.47          PTT - ( 25 Jul 2024 17:49 )  PTT:30.6 sec    Imaging:   ACC: [id number] EXAM:  CT ABDOMEN AND PELVIS IC   ORDERED BY: APRIL RIDLEY     PROCEDURE DATE:  07/25/2024          INTERPRETATION:  CLINICAL INFORMATION: History of anal cancer, status   post radiation. Colostomy. Weak. White blood cell count of 22,000.    COMPARISON: Most recent CT scan of abdomen and pelvis from 6/12/2024 and   additional prior imaging studies dating back to 11/1/2021.    CONTRAST/COMPLICATIONS:  IV Contrast: Omnipaque 350  96 cc administered   4 cc discarded  Oral Contrast: NONE  Complications: None reported at time of study completion    PROCEDURE:  CT of the Abdomen and Pelvis was performed.  Sagittal and coronal reformats were performed.    FINDINGS:  LOWER CHEST: Within normal limits.    LIVER: No change in a fewsubcentimeter hypodense lesions, again too   small to characterize.  BILE DUCTS: Normal caliber.  GALLBLADDER: Within normal limits.  SPLEEN: Within normal limits.  PANCREAS: Within normal limits.  ADRENALS: Within normal limits.  KIDNEYS/URETERS: Small cyst right kidney. Left kidney within normal   limits.    BLADDER: There is again asymmetric thickening of the right lateral wall.  REPRODUCTIVE ORGANS: Prostate within normal limits.    BOWEL: Interval descending colostomy. No bowel obstruction. There has   been a change in appearance of the locally advanced anal mass. The more   superior, solid portion of the mass has decreased in size from 9.1 x 6.7   cm to 8.4 x 4.9 cm (2/94). However, there is a larger gas-filled cavity   in the more inferior portion of the mass, with a larger fistula to the   perineum (2/115). The largest portion of the gas-filled cavity is on the   right side of the pelvis, but extends across the midline to the left   pelvis. There is also greater extension of the cavitary portion of the   mass laterally to the right through the right pelvic musculature, now   lying posterior to the right femoral head. Small amount of fluid in both   the right lateral and left lateral portions of the cavity (2/104). This   raises the suspicion of superimposed infection of the cavitary tumor.   There is again destruction of the coccyx, sacrum, and right ischium,   including the posterior portion of the right acetabulum. The possibility   of osteomyelitis superimposed upon lytic destruction from tumor cannot be   excluded.  PERITONEUM/RETROPERITONEUM: Within normal limits.  VESSELS: Asymmetric filling defects left common femoral and femoral   veins, suspicious for deep venous thrombosis. This appears improved.  LYMPH NODES: No lymphadenopathy.  ABDOMINAL WALL: Within normal limits.  BONES: Scoliosis.    IMPRESSION:  1. Since 6/12/2024, there has been a change in the appearance of the   locally advanced anal mass. The solid portion of the mass is decreased in   size, but the gas filled cavitary component of the mass has increased in   size and now extends further laterally through the musculature of the   right posterior pelvis. There is probable superimposed infection of the   mass given the presence of a small amount of fluid in the cavitary   component. The possibility of osteomyelitis cannot be excluded.    2. Probable deep venous thrombosis of left lower extremity veins. This   appears improved. Recommend further evaluation with ultrasound.    3. Interval descending colostomy.    4. Asymmetric thickening of the right lateral bladder wall. This may be   secondary to the adjacent inflammatory process, prior radiation therapy,   or cystitis.    --- End of Report ---    DAVID BROWN MD; Attending Radiologist  This document has been electronically signed. Jul 26 2024  8:37AM      A/P: 44yMale with perianal squamous cell carcinoma with extension of mass laterally through right pelvic muscles. Destruction of coccyx, sacrum, right ischium, posterior portion right acetabulum, concern for OM superimposed upon lytic destruction  - Patient afebrile  - Recommend IR bone biopsy to confirm osteomyelitis  - IV abx  - Given mass proximity to posterior femoral head, recommend formal right hip and pelvis Xrays  - Weight bearing status- WBAT   - Pain control  - Physical therapy    Patient history, and exam discussed with Dr. Sprague, further plan pending review of imaging    Ortho Pager [phone]   Orthopaedic Surgery Consult Note  CC: Patient is a 44y old  Male who presents with a chief complaint of Weakness (26 Jul 2024 10:45)    HPI:44yoM with pmhx anorectal SCC with bony involvement to sacrum/acetabulum(followed by Eliel/Graciela) presenting with worsening weakness x 1 week. Patient reports pain into bilateral lower extremities right worse than left with associated constant numbness of lateral aspect of right lower extremity. Denies N/T of LLE. States he has progressively had more difficulty with walking. He has been taking methadone 10mg po 3x a day and oxycodone 10mg po q4 hrs without relief of pain. Denies saddle anesthesia. Reports feeling feverish and chills.       From Medicine H&P:  "HPI:  Mr. Sanchez is a 44 year old male who came to [facility] ED with complaint of generalized weakness. He has PMHx of  invasive anorectal squamous cell carcinoma (last received chemo and XRT a month ago, dx'd 12/23, pending a surgical intervention) with bony involvement  to sacrum/acetabulum (followed by [facility]), HIV (biktarvy), anemia, baseline tachycardia attributed to prior heavy drug use (crystal meth, ecstasy), nicotine dependence (a pack a week) and depression, presenting for generalized weakness, pain in the hips and shoulders BL, and pain at the sites of incisions on anus. Too weak to stand or move. Pain and weakness started 3-4 days ago, gradually, persistently, and worsening, now bedbound. Pain most profound where significant muscle loss has occurred - BL hips, shoulders, buttocks/sacral area. Decreased appetite/decreased PO intake for last year, associated weight loss and muscle loss. Walks at baseline with walker, now worsening mostly bedbound. Denies fatigue, dizziness, or lightheadedness numbness, tingling, pain or discomfort, SOB, weight loss or gain, or sleep patterns. Nothing seems to make it better or worse. No recent changes in medications or substance use. Has not experienced similar weakness in the past.  Patient denies emesis, shortness of breath or chest pain. Patient endorsed longstanding history of perianal abscesses but worsening pain and drainage, with no fevers. Denies nausea/vomiting. Recently seen in June 12, 2024 for perianal abscesses and fistulas at [facility]. On 6/15 he was s/p colostomy creation and perineal washout     PMHx: Anorectal cancer, HIV, nicotine dependence, depression, opioid use disorder (on methadone)  PSHx: Anorectal biopsy   Colonoscopy/EGD: Denied  Allergies: NKDA  Social Hx: Smoking a pack of cigarettes a week. +Marijuana daily Denies EtOH (2 years sober) or recreational drug use   Meds: Biktarvy, Escitalopram 10 mg q24 hours,  Methadone 10 [id number], Oxycodone 10 mg Q4,     In the ED   Vitals: T 98.1, , /73, RR 16, SpO2 97% on room air   Labs: WBC 22.64, , Hgb 7.8 (8.8 a month ago), Plt 654, Na 126, ,  UTOX + : Benzos, THC, amphetamine, opiates, methadone   CTAP -  larger gas-filled cavity in the more inferior portion of the mass, with a larger fistula to the perineum probable superimposed infection of the mass, greater extension of mass laterally though R pelvic muscles. Destruction of coccyx, sacrum, R ischium, posterior portion R acetabulum, ?possibility of OM superimposted upon lytic destruction from tumor. Also proable DVT of LLE veins.   Interventions: Zosyn, Vancomycin, surgical consult - no additional surgical intervention at this point,      (26 Jul 2024 10:45)"      ROS: Positive for above  Denies chest pain  All other systems negative, except for above.     Allergies  No Known Allergies      PAST MEDICAL & SURGICAL HISTORY:  HIV (human immunodeficiency virus infection)  Abscess  IVDU (intravenous drug user)  Anemia  Rectal cancer  H/O rectal polypectomy    Social History:  Lives with Partner Acosta Mclean [phone] in 3rd floor walk up in the [facility] (26 Jul 2024 10:45)    FAMILY HISTORY:  FH: CAD (coronary artery disease) (Mother)      Medications: see med rec    Vital Signs Last 24 Hrs  T(C): 36.7 (26 Jul 2024 13:38), Max: 37.6 (26 Jul 2024 09:57)  T(F): 98.1 (26 Jul 2024 13:38), Max: 99.6 (26 Jul 2024 09:57)  HR: 101 (26 Jul 2024 13:38) (101 - 128)  BP: 136/87 (26 Jul 2024 13:38) (98/63 - 136/87)  BP(mean): --  RR: 16 (26 Jul 2024 13:38) (16 - 16)  SpO2: 94% (26 Jul 2024 13:38) (93% - 100%)    Parameters below as of 26 Jul 2024 13:38  Patient On (Oxygen Delivery Method): room air        PE:  General: laying on left side with knees flexed to 60 degrees  Psych: Teary-eyed  Skin: Warm, dry, extremities well perfused, no obvious rash  MSK: patient unable to lay on back for exam, exam performed while laying on left side    -Sensation: decreased sensation in L5 distribution of RLE compared to left, otherwise SILT bilateral lower extremities    -Motor: EHL/FHL/TA/GS: 5/5 bilaterally, Hip flexion: 5/5 bilaterally, unable to extend right hip, LLE: 5/5    -Pulses: DP 2+, PT 2+ bilaterally                            7.8    22.64 )-----------( 654      ( 25 Jul 2024 17:49 )             27.0     07-26    132  |  95<L>  |  6<L>  ----------------------------<  117<H>  3.4<L>   |  31  |  0.54    Ca    8.4<L>      26 Jul 2024 02:38  Mg     1.8     07-25    TPro  7.0  /  Alb  1.6<L>  /  TBili  0.3  /  DBili  x   /  AST  31  /  ALT  24  /  AlkPhos  254<H>  07-26    PT/INR - ( 25 Jul 2024 17:49 )   PT: 16.4 sec;   INR: 1.47          PTT - ( 25 Jul 2024 17:49 )  PTT:30.6 sec    Imaging:   ACC: [id number] EXAM:  CT ABDOMEN AND PELVIS IC   ORDERED BY: APRIL RIDLEY     PROCEDURE DATE:  07/25/2024          INTERPRETATION:  CLINICAL INFORMATION: History of anal cancer, status   post radiation. Colostomy. Weak. White blood cell count of 22,000.    COMPARISON: Most recent CT scan of abdomen and pelvis from 6/12/2024 and   additional prior imaging studies dating back to 11/1/2021.    CONTRAST/COMPLICATIONS:  IV Contrast: Omnipaque 350  96 cc administered   4 cc discarded  Oral Contrast: NONE  Complications: None reported at time of study completion    PROCEDURE:  CT of the Abdomen and Pelvis was performed.  Sagittal and coronal reformats were performed.    FINDINGS:  LOWER CHEST: Within normal limits.    LIVER: No change in a fewsubcentimeter hypodense lesions, again too   small to characterize.  BILE DUCTS: Normal caliber.  GALLBLADDER: Within normal limits.  SPLEEN: Within normal limits.  PANCREAS: Within normal limits.  ADRENALS: Within normal limits.  KIDNEYS/URETERS: Small cyst right kidney. Left kidney within normal   limits.    BLADDER: There is again asymmetric thickening of the right lateral wall.  REPRODUCTIVE ORGANS: Prostate within normal limits.    BOWEL: Interval descending colostomy. No bowel obstruction. There has   been a change in appearance of the locally advanced anal mass. The more   superior, solid portion of the mass has decreased in size from 9.1 x 6.7   cm to 8.4 x 4.9 cm (2/94). However, there is a larger gas-filled cavity   in the more inferior portion of the mass, with a larger fistula to the   perineum (2/115). The largest portion of the gas-filled cavity is on the   right side of the pelvis, but extends across the midline to the left   pelvis. There is also greater extension of the cavitary portion of the   mass laterally to the right through the right pelvic musculature, now   lying posterior to the right femoral head. Small amount of fluid in both   the right lateral and left lateral portions of the cavity (2/104). This   raises the suspicion of superimposed infection of the cavitary tumor.   There is again destruction of the coccyx, sacrum, and right ischium,   including the posterior portion of the right acetabulum. The possibility   of osteomyelitis superimposed upon lytic destruction from tumor cannot be   excluded.  PERITONEUM/RETROPERITONEUM: Within normal limits.  VESSELS: Asymmetric filling defects left common femoral and femoral   veins, suspicious for deep venous thrombosis. This appears improved.  LYMPH NODES: No lymphadenopathy.  ABDOMINAL WALL: Within normal limits.  BONES: Scoliosis.    IMPRESSION:  1. Since 6/12/2024, there has been a change in the appearance of the   locally advanced anal mass. The solid portion of the mass is decreased in   size, but the gas filled cavitary component of the mass has increased in   size and now extends further laterally through the musculature of the   right posterior pelvis. There is probable superimposed infection of the   mass given the presence of a small amount of fluid in the cavitary   component. The possibility of osteomyelitis cannot be excluded.    2. Probable deep venous thrombosis of left lower extremity veins. This   appears improved. Recommend further evaluation with ultrasound.    3. Interval descending colostomy.    4. Asymmetric thickening of the right lateral bladder wall. This may be   secondary to the adjacent inflammatory process, prior radiation therapy,   or cystitis.    --- End of Report ---    DAVID BROWN MD; Attending Radiologist  This document has been electronically signed. Jul 26 2024  8:37AM      A/P: 44yMale with perianal squamous cell carcinoma with extension of mass laterally through right pelvic muscles. Destruction of coccyx, sacrum, right ischium, posterior portion right acetabulum, concern for OM superimposed upon lytic destruction  - Patient afebrile  - Recommend IR bone biopsy to confirm osteomyelitis  - IV abx  - Given mass proximity to posterior femoral head, recommend formal right hip and pelvis Xrays  - Weight bearing status- WBAT   - Pain control  - Physical therapy    Patient history, and exam discussed with Dr. Sprague, further plan pending review of imaging    Ortho Pager [phone]

## 2024-07-26 NOTE — CONSULT NOTE ADULT - PROBLEM SELECTOR RECOMMENDATION 9
Progressively worsening with increasing medication requirements as outpatient. Reports pain is better controlled with Dilaudid 0.5 mg IVP but only by about 50%.     - Recommendations for pain: Dilaudid 1 mg IVP q3 PRN Severe Pain, Oxycodone 15 mg PO q4 PRN Moderate Pain and Methadone 10 mg q8 (outpatient records reviewed, Oxycodone and Methadone were recently increased)  - Bowel regimen while on opioids: senna 2 tabs daily, miralax 17g daily. Both can be made BID if the patient develops opioid-induced constipation.  - Will continue to follow with Dr. Allison Taylor.

## 2024-07-26 NOTE — H&P ADULT - PROBLEM SELECTOR PLAN 2
P/w generalized weakness, pain in his hips and shoulders, unable to walk. His weakness progressively worsened until he felt "basically bedbound"   Likely iso infection as above, poor PO intake, and progression of disease. Also c/f bony pelvic destruction iso expansion of SCC    - c/w /hr for hydration, encourage PO  - see sepsis mgmt above, & mgmt for suspected OM  - Palliative consult  - hold off on PT consult until ?OM of pelvis further characterized

## 2024-07-26 NOTE — H&P ADULT - PROBLEM SELECTOR PLAN 5
- c/w [place] Biktarvy Home med: [place],  reports he's compliant.   Last CD4 251, VLUD in Mar '24  No need to re-check at this time, will be affected iso acute illness     - c/w home Biktarvy

## 2024-07-26 NOTE — H&P ADULT - NSHPADDITIONALINFOADULT_GEN_ALL_CORE
< from: CT Abdomen and Pelvis w/ IV Cont (07.25.24 @ 21:55) >    IMPRESSION:  1. Since 6/12/2024, there has been a change in the appearance of the   locally advanced anal mass. The solid portion of the mass is decreased in   size, but the gas filled cavitary component of the mass has increased in   size and now extends further laterally through the musculature of the   right posterior pelvis. There is probable superimposed infection of the   mass given the presence of a small amount of fluid in the cavitary   component. The possibility of osteomyelitis cannot be excluded.    2. Probable deep venous thrombosis of left lower extremity veins. This   appears improved. Recommend further evaluation with ultrasound.    3. Interval descending colostomy.    4. Asymmetric thickening of the right lateral bladder wall. This may be   secondary to the adjacent inflammatory process, prior radiation therapy,   or cystitis.    < end of copied text >

## 2024-07-26 NOTE — H&P ADULT - NSHPREVIEWOFSYSTEMS_GEN_ALL_CORE
Review of Systems  General: no fever, chills  Eyes: no vision changes  ENT: no hearing changes, nasal congestion, or sore throat  CV: no chest pain or palpitations  Pulm: no SOB, wheezing, cough, or hemoptysis  Abd/GI: pain around colostomy site, no nausea, vomiting, diarrhea, constipation, abd pain  : no dysuria, hematuria, urinary frequency  MSK: pain in BL hips/shoulders   Neuro: no syncope, dizziness, focal weakness  Psych: Anxious about pain  Endo: no heat or cold intolerance

## 2024-07-26 NOTE — CONSULT NOTE ADULT - SUBJECTIVE AND OBJECTIVE BOX
HPI:  Mr. Sanchez is a 44 year old male who came to [facility] ED with complaint of generalized weakness. He has PMHx of  invasive anorectal squamous cell carcinoma (last received chemo and XRT a month ago, dx'd 12/23, pending a surgical intervention) with bony involvement  to sacrum/acetabulum (followed by Eliel/Graciela), HIV (biktarvy), anemia, baseline tachycardia attributed to prior heavy drug use (crystal meth, ecstasy), nicotine dependence (a pack a week) and depression, presenting for generalized weakness, pain in the hips and shoulders BL, and pain at the sites of incisions on anus. Too weak to stand or move. Pain and weakness started 3-4 days ago, gradually, persistently, and worsening, now bedbound. Pain most profound where significant muscle loss has occurred - BL hips, shoulders, buttocks/sacral area. Decreased appetite/decreased PO intake for last year, associated weight loss and muscle loss. Walks at baseline with walker, now worsening mostly bedbound. Denies fatigue, dizziness, or lightheadedness numbness, tingling, pain or discomfort, SOB, weight loss or gain, or sleep patterns. Nothing seems to make it better or worse. No recent changes in medications or substance use. Has not experienced similar weakness in the past.  Patient denies emesis, shortness of breath or chest pain. Patient endorsed longstanding history of perianal abscesses but worsening pain and drainage, with no fevers. Denies nausea/vomiting. Recently seen in June 12, 2024 for perianal abscesses and fistulas at [facility]. On 6/15 he was s/p colostomy creation and perineal washout     PMHx: Anorectal cancer, HIV, nicotine dependence, depression, opioid use disorder (on methadone)  PSHx: Anorectal biopsy   Colonoscopy/EGD: Denied  Allergies: NKDA  Social Hx: Smoking a pack of cigarettes a week. 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 Destruction of coccyx, sacrum, R ischium, posterior portion R acetabulum, ?possibility of OM superimposted upon lytic destruction from tumor. Also proable DVT of LLE veins.   Interventions: Zosyn, Vancomycin, surgical consult - no additional surgical intervention at this point,   (26 Jul 2024 10:45)    Palliative care consulted for assistance in symptom management. Patient is well known to our service and follows with Dr. Taylor as an outpatient. He states that the last few weeks to months his condition has been deteriorating. He has been in pain and weak to the point of nearly being bedbound. He states that despite all of his progress and attempts to optimize himself for a potential upcoming operative intervention, he has been instead getting worse. His appetite is decreased and he has lost a significant amount of weight. He reports severe constant pain in the pelvis and rectum. He is now unable to lift his right leg without physically picking it up with his arms due to pain and weakness. He denies nausea, vomiting, fevers, chills. He has been taking his medications as prescribed but missed a dose of methadone last night because he was in the hospital.     PERTINENT PM/SXH:   HIV (human immunodeficiency virus infection)    Abscess    IVDU (intravenous drug user)    Anemia    HIV (human immunodeficiency virus infection)    Rectal cancer      No significant past surgical history    No significant past surgical history    H/O rectal polypectomy      FAMILY HISTORY:  FH: CAD (coronary artery disease) (Mother)    No history of  in first degree relatives according to chart  Unable to obtain due to patient's encephalopathy    ITEMS NOT CHECKED ARE NOT PRESENT  SOCIAL HISTORY:   Significant other/partner:  []  Children:  []   Substance hx:  []   Tobacco hx:  []   Alcohol hx: []   Home Opioid hx:  [x] I-Stop Reference No: [id number]  PDI	Current Rx	Drug Type	Rx Written	Rx Dispensed	Drug	Quantity	Days Supply	Prescriber Name	Prescriber DOROTEO #	Payment Method	Dispenser  A	Y		07/02/2024	07/03/2024	testosterone 1.62% gel pump	75gm	30	Rickey Mikayla Graham	[id number]	Insurance	[id number]  A	N		06/03/2024	06/05/2024	testosterone 1.62% gel pump	75gm	30	Rickey, Mikayla Graham	[id number]	Insurance	[id number]  A	N		05/03/2024	05/08/2024	testosterone 1.62% gel pump	75gm	30	Rickey, De Beque Graham	[id number]	Insurance	[id number]  A	N		04/08/2024	04/09/2024	testosterone 1.62% gel pump	75gm	30	Rickey, De Beque Graham	[id number]	Insurance	[id number]  A	N		03/05/2024	03/12/2024	testosterone 1.62% gel pump	75gm	30	Rickey, Mikayla Graham	[id number]	Insurance	[id number]  A	N		02/06/2024	02/07/2024	testosterone 1.62% gel pump	75gm	30	[facility]	[id number]	Insurance	[id number]  A	N		01/02/2024	01/10/2024	testosterone 1.62% gel pump	75gm	30	Rickey, Mikayla Graham	[id number]	Insurance	[id number]  A	N		11/30/2023	12/12/2023	testosterone 1.62% gel pump	75gm	30	Rickey, Mikayla Graham	[id number]	Insurance	[id number]  A	N		11/06/2023	11/07/2023	testosterone 1.62% gel pump	75gm	30	Rickey, Mikayla Graham	[id number]	Insurance	[id number]  A	N		09/28/2023	10/04/2023	testosterone 1.62% gel pump	75gm	30	Rickey, De Beque Graham	[id number]	Insurance	[id number]  A	N		09/05/2023	09/07/2023	testosterone 1.62% gel pump	75gm	30	Rickey, Mikayla Graham	[id number]	Insurance	[id number]  B	Y	O	07/12/2024	07/15/2024	oxycodone hcl (ir) 15 mg tab	84	14	Allison Taylor	[id number]	Medicaid	[facility]  B	Y	O	07/12/2024	07/12/2024	methadone hcl 10 mg tablet	42	14	Allison Taylor	[id number]	Medicaid	[facility]	N	O	06/28/2024	07/02/2024	methadone hcl 10 mg tablet	28	14	Allison Taylor	[id number]	Medicaid	[facility]	N	O	06/28/2024	06/28/2024	oxycodone hcl (ir) 10 mg tab	84	14	Allison Taylor	[id number]	Medicaid	[facility]	O	06/18/2024	06/19/2024	oxycodone hcl (ir) 10 mg tab	60	10	Allison Taylor	[id number]	Medicaid	[facility]	O	06/18/2024	06/19/2024	methadone hcl 10 mg tablet	28	14	Allison Taylor	[id number]	Medicaid	[facility]	N	O	05/31/2024	06/03/2024	methadone hcl 10 mg tablet	28	14	Allison aTylor	[id number]	Medicaid	[facility]	O	05/31/2024	05/31/2024	oxycodone hcl (ir) 10 mg tab	84	14	Allison Taylor	[id number]	Medicaid	[facility]	O	05/15/2024	05/20/2024	oxycodone hcl (ir) 10 mg tab	60	10	Allison Taylor	[id number]	Medicaid	[facility]	N	O	05/15/2024	05/20/2024	methadone hcl 10 mg tablet	28	14	Allison Taylor	[id number]	Medicaid	[facility]	N	O	05/14/2024	05/14/2024	oxycodone hcl (ir) 10 mg tab	42	7	Allison Taylor	[id number]	Medicaid	[facility]	O	05/14/2024	05/14/2024	methadone hcl 10 mg tablet	14	7	Allison Taylor	[id number]	Medicaid	[facility]	N	O	05/01/2024	05/01/2024	methadone hcl 10 mg tablet	28	14	Allison Taylor	[id number]	Medicaid	[facility]	N	O	05/01/2024	05/01/2024	oxycodone hcl (ir) 10 mg tab	84	14	PampanAllison jones	[id number]	Medicaid	[facility]	N	O	04/24/2024	04/24/2024	oxycodone hcl (ir) 10 mg tab	42	7	PamAllison diego	[id number]	Medicaid	[facility]	N	O	04/24/2024	04/24/2024	methadone hcl 10 mg tablet	14	7	PampaniniAllison	[id number]	Medicaid	[facility]	N	O	04/17/2024	04/17/2024	oxycodone hcl (ir) 5 mg tablet	84	7	PampaniniAllison	[id number]	Medicaid	[facility]	N	O	03/26/2024	04/01/2024	methadone hcl 5 mg tablet	60	30	PamAllison diego	[id number]	Medicaid	[facility]	N	O	03/26/2024	04/01/2024	oxycodone hcl (ir) 5 mg tablet	160	14	PamAllison diego	[id number]	Medicaid	[facility]	N	O	03/21/2024	03/21/2024	oxycodone-acetaminophen 5-325 mg tab	16	4	Nusrat Alicia MD	[id number]	Medicaid	[facility] [address] [place] [address]	N	O	03/13/2024	03/13/2024	methadone hcl 5 mg tablet	28	14	PamAllison diego	[id number]	Medicaid	[facility]	N	O	03/13/2024	03/13/2024	oxycodone hcl (ir) 5 mg tablet	84	7	PamAllison diego	[id number]	Medicaid	[facility]	B	01/05/2024	01/06/2024	alprazolam 0.5 mg tablet	5	5	Juan Antonio Cortez	[id number]	[facility] [id number]  Living Situation: [x]Home  []Long term care  []Rehab []Other  Adventism/Spiritual practice: ; Role of organized Lutheran [ ] important [ ] some [ ] unable to assess  Coping: [] well [x] with difficulty [] poor coping [] unable to assess  Support system: [] strong [x] adequate [] inadequate    ADVANCE DIRECTIVES:    []MOLST  []Living Will  DECISION MAKER(s):  [x] Health Care Proxy(s)  [] Surrogate(s)  [] Guardian           Name(s)/Phone Number(s):     BASELINE (I)ADLs (prior to admission):  [place]: []Total  [x] Moderate []Dependent    ALLERGIES:  No Known Allergies    MEDICATIONS  (STANDING):  bictegravir 50 mG/emtricitabine 200 mG/tenofovir alafenamide 25 mG (BIKTARVY) 1 Tablet(s) Oral daily  enoxaparin Injectable 40 milliGRAM(s) SubCutaneous every 24 hours  escitalopram 10 milliGRAM(s) Oral daily  lactated ringers. 1000 milliLiter(s) (100 mL/Hr) IV Continuous <Continuous>  lidocaine   4% Patch 1 Patch Transdermal every 24 hours  lidocaine 2% Gel 1 Application(s) Topical every 4 hours  methadone    Tablet 10 milliGRAM(s) Oral every 8 hours  piperacillin/tazobactam IVPB.. 3.375 Gram(s) IV Intermittent every 8 hours  vancomycin  IVPB 1000 milliGRAM(s) IV Intermittent every 12 hours    MEDICATIONS  (PRN):  HYDROmorphone  Injectable 0.75 milliGRAM(s) IV Push every 4 hours PRN Severe Pain (7 - 10)  oxyCODONE    IR 15 milliGRAM(s) Oral every 4 hours PRN Moderate Pain (4 - 6)    Analgesic Use (Scheduled and PRNs) for past 24 hours:  ALPRAZolam   0.5 milliGRAM(s) Oral (07-26-24 @ 05:33)    escitalopram   10 milliGRAM(s) Oral (07-26-24 @ 14:23)    HYDROmorphone  Injectable   2 milliGRAM(s) IV Push (07-25-24 @ 22:13)    HYDROmorphone  Injectable   1 milliGRAM(s) IV Push (07-26-24 @ 01:24)    HYDROmorphone  Injectable   1 milliGRAM(s) IV Push (07-25-24 @ 21:09)    HYDROmorphone  Injectable   1 milliGRAM(s) IV Push (07-26-24 @ 08:34)    HYDROmorphone  Injectable   0.5 milliGRAM(s) IV Push (07-26-24 @ 14:48)   0.5 milliGRAM(s) IV Push (07-26-24 @ 10:47)    HYDROmorphone  Injectable   1 milliGRAM(s) IV Push (07-26-24 @ 07:10)    ketorolac   Injectable   15 milliGRAM(s) IV Push (07-26-24 @ 01:24)    morphine  - Injectable   2 milliGRAM(s) IV Push (07-25-24 @ 18:27)      PRESENT SYMPTOMS/REVIEW OF SYSTEMS: []Unable to obtain due to poor mentation/encephalopathy  Source if other than patient:  []Family   []Team     Pain: [x] yes [] no  QOL Impact -   Location -   rectum                 Aggravating Factors -  Quality - sharp, ache  Radiation -  Timing -  Severity (0-10 scale) - 10/10  Minimal Acceptable Level (0-10 scale) -    CPOT Score:  (Pain Assessment Scale for Critically Ill)    PAIN AD Score:  (Nonverbal Pain Assessment Scale)    Dyspnea:                           []Mild  []Moderate []Severe  Anxiety:                             []Mild []Moderate []Severe  Fatigue:                             []Mild []Moderate []Severe  Nausea:                             []Mild []Moderate []Severe  Loss of Appetite:              []Mild []Moderate []Severe  Constipation:                    []Mild []Moderate []Severe    Other Symptoms:  [x]All Other Review Of Systems Negative     Palliative Performance Status Version 2:  %  (Functional Assessment Tool)    PHYSICAL EXAM:  GENERAL:  [x]Alert, tearful  [x]Oriented x   []Lethargic  []Cachexia  []Unarousable  []Verbal  []Non-Verbal  Behavioral:   []Anxiety  []Delirium []Agitation []Cooperative  HEENT:  [x]Normal   []Dry mouth   []ET Tube/Trach  []Oral lesions  PULMONARY:   [x]Clear []Tachypnea  []Audible excessive secretions  []Normal Work of Breathing []Labored Breathing  []Rhonchi []Crackles []Wheezing  CARDIOVASCULAR:    [x]Regular Rate & Rhythm []Irregular []Tachy  []Ethan  GASTROINTESTINAL:  [x]Soft  []Distended   [x]+BS  [x]Non tender []Tender  []PEG []OGT/ NGT  Last BM: Ostomy with stool  GENITOURINARY:  [x]Normal [] Incontinent   []Oliguria/Anuria   []Calderon  MUSCULOSKELETAL:   []Normal   [x]Weakness  []Bed/Wheelchair bound []Edema  NEUROLOGIC:   []No focal deficits  []Cognitive impairment  []Dysphagia []Dysarthria []Paresis []Encephalopathic  SKIN:   []Normal   []Pressure ulcer(s)  []Rash    CRITICAL CARE:  []Shock Present  []Septic []Cardiogenic []Neurologic []Hypovolemic  []Vasopressors []Inotropes   []Respiratory failure present []Mechanical Ventilation []Non-invasive ventilatory support []High-Flow  []Acute  []Chronic []Hypoxic  []Hypercarbic  []Other organ failure    Vital Signs Last 24 Hrs  T(C): 36.7 (26 Jul 2024 13:38), Max: 37.6 (26 Jul 2024 09:57)  T(F): 98.1 (26 Jul 2024 13:38), Max: 99.6 (26 Jul 2024 09:57)  HR: 101 (26 Jul 2024 13:38) (101 - 128)  BP: 136/87 (26 Jul 2024 13:38) (98/63 - 136/87)  BP(mean): --  RR: 16 (26 Jul 2024 13:38) (16 - 16)  SpO2: 94% (26 Jul 2024 13:38) (93% - 100%)    Parameters below as of 26 Jul 2024 13:38  Patient On (Oxygen Delivery Method): room air        LABS:                        6.2    12.90 )-----------( 533      ( 26 Jul 2024 14:00 )             21.6   07-26    133<L>  |  95<L>  |  5<L>  ----------------------------<  120<H>  4.1   |  29  |  0.39<L>    Ca    8.4      26 Jul 2024 14:00  Phos  2.4     07-26  Mg     1.9     07-26    TPro  6.7  /  Alb  2.6<L>  /  TBili  0.2  /  DBili  x   /  AST  21  /  ALT  17  /  AlkPhos  253<H>  07-26    RADIOLOGY & ADDITIONAL STUDIES:      REFERRALS:  [x]Social Work  []Case management []PT/OT []Chaplaincy  []Hospice  []Patient/Family Support []Massage Therapy []Music Therapy    DISCUSSION OF CASE: Family - obtained additional history and to provide emotional support;  Primary team - discussed plan of care HPI:  Mr. Sanchez is a 44 year old male who came to [facility] ED with complaint of generalized weakness. He has PMHx of  invasive anorectal squamous cell carcinoma (last received chemo and XRT a month ago, dx'd 12/23, pending a surgical intervention) with bony involvement  to sacrum/acetabulum (followed by Eliel/Graciela), HIV (biktarvy), anemia, baseline tachycardia attributed to prior heavy drug use (crystal meth, ecstasy), nicotine dependence (a pack a week) and depression, presenting for generalized weakness, pain in the hips and shoulders BL, and pain at the sites of incisions on anus. Too weak to stand or move. Pain and weakness started 3-4 days ago, gradually, persistently, and worsening, now bedbound. Pain most profound where significant muscle loss has occurred - BL hips, shoulders, buttocks/sacral area. Decreased appetite/decreased PO intake for last year, associated weight loss and muscle loss. Walks at baseline with walker, now worsening mostly bedbound. Denies fatigue, dizziness, or lightheadedness numbness, tingling, pain or discomfort, SOB, weight loss or gain, or sleep patterns. Nothing seems to make it better or worse. No recent changes in medications or substance use. Has not experienced similar weakness in the past.  Patient denies emesis, shortness of breath or chest pain. Patient endorsed longstanding history of perianal abscesses but worsening pain and drainage, with no fevers. Denies nausea/vomiting. Recently seen in June 12, 2024 for perianal abscesses and fistulas at [facility]. On 6/15 he was s/p colostomy creation and perineal washout     Palliative care consulted for assistance in symptom management. Patient is well known to our service and follows with Dr. Taylor as an outpatient. He states that the last few weeks to months his condition has been deteriorating. He has been in pain and weak to the point of nearly being bedbound. He states that despite all of his progress and attempts to optimize himself for a potential upcoming operative intervention, he has been instead getting worse. His appetite is decreased and he has lost a significant amount of weight. He reports severe constant pain in the pelvis and rectum. He is now unable to lift his right leg without physically picking it up with his arms due to pain and weakness. He denies nausea, vomiting, fevers, chills. He has been taking his medications as prescribed but missed a dose of methadone last night because he was in the hospital.     PERTINENT PM/SXH:   HIV (human immunodeficiency virus infection)  Abscess  IVDU (intravenous drug user)  Anemia  HIV (human immunodeficiency virus infection)  Rectal cancer  H/O rectal polypectomy    FAMILY HISTORY:  FH: CAD (coronary artery disease) (Mother)    ITEMS NOT CHECKED ARE NOT PRESENT  SOCIAL HISTORY:   Significant other/partner:  []  Children:  []   Substance hx:  []   Tobacco hx:  []   Alcohol hx: []   Home Opioid hx:  [x] I-Stop Reference No: [id number]  PDI	Current Rx	Drug Type	Rx Written	Rx Dispensed	Drug	Quantity	Days Supply	Prescriber Name	Prescriber DOROTEO #	Payment Method	Dispenser  A	Y		07/02/2024	07/03/2024	testosterone 1.62% gel pump	75gm	30	Rickey, Mikayla Stevenson	[id number]	Insurance	[id number]  A	N		06/03/2024	06/05/2024	testosterone 1.62% gel pump	75gm	30	Rickey, Mikayla Stevenson	[id number]	Insurance	[id number]  A	N		05/03/2024	05/08/2024	testosterone 1.62% gel pump	75gm	30	Rickey, Shamrockjanet Stevenson	[id number]	Insurance	[id number]  A	N		04/08/2024	04/09/2024	testosterone 1.62% gel pump	75gm	30	Rickey, Mikayla Stevenson	[id number]	Insurance	[id number]  A	N		03/05/2024	03/12/2024	testosterone 1.62% gel pump	75gm	30	Rickey, Shamrockjanet Stevenson	[id number]	Insurance	[id number]  A	N		02/06/2024	02/07/2024	testosterone 1.62% gel pump	75gm	30	Rickey, Mikaylajanet Stevenson	[id number]	Insurance	[id number]  A	N		01/02/2024	01/10/2024	testosterone 1.62% gel pump	75gm	30	Rickey, Mikaylajanet Stevenson	[id number]	Insurance	[id number]  A	N		11/30/2023	12/12/2023	testosterone 1.62% gel pump	75gm	30	Rickey, Mikaylajanet Stevenson	[id number]	Insurance	[id number]  A	N		11/06/2023	11/07/2023	testosterone 1.62% gel pump	75gm	30	Rickey, Mikayla Graham	[id number]	Insurance	[id number]  A	N		09/28/2023	10/04/2023	testosterone 1.62% gel pump	75gm	30	Rickey, Mikayla Graham	[id number]	Insurance	[id number]  A	N		09/05/2023	09/07/2023	testosterone 1.62% gel pump	75gm	30	Rickey, Mikayla Graham	[id number]	Insurance	[id number]  B	Y	O	07/12/2024	07/15/2024	oxycodone hcl (ir) 15 mg tab	84	14	Allison Taylor	[id number]	Medicaid	[facility]	Y	O	07/12/2024	07/12/2024	methadone hcl 10 mg tablet	42	14	Allison Taylor	[id number]	Medicaid	[facility]	N	O	06/28/2024	07/02/2024	methadone hcl 10 mg tablet	28	14	Allison Taylor	[id number]	Medicaid	[facility]	N	O	06/28/2024	06/28/2024	oxycodone hcl (ir) 10 mg tab	84	14	Allison Taylor	[id number]	Medicaid	[facility]	N	O	06/18/2024	06/19/2024	oxycodone hcl (ir) 10 mg tab	60	10	Allison Taylor	[id number]	Medicaid	[facility]	N	O	06/18/2024	06/19/2024	methadone hcl 10 mg tablet	28	14	Allison Taylor	[id number]	Medicaid	[facility]	N	O	05/31/2024	06/03/2024	methadone hcl 10 mg tablet	28	14	Allison Taylor	[id number]	Medicaid	[facility]	N	O	05/31/2024	05/31/2024	oxycodone hcl (ir) 10 mg tab	84	14	Allison Taylor	[id number]	Medicaid	[facility]	N	O	05/15/2024	05/20/2024	oxycodone hcl (ir) 10 mg tab	60	10	Allison Taylor	[id number]	Medicaid	[facility]	N	O	05/15/2024	05/20/2024	methadone hcl 10 mg tablet	28	14	Allison Taylor	[id number]	Medicaid	[facility]	N	O	05/14/2024	05/14/2024	oxycodone hcl (ir) 10 mg tab	42	7	Allison Taylor	[id number]	Medicaid	[facility]	N	O	05/14/2024	05/14/2024	methadone hcl 10 mg tablet	14	7	Allison Taylor	[id number]	Medicaid	[facility]	N	O	05/01/2024	05/01/2024	methadone hcl 10 mg tablet	28	14	Allison Taylor	[id number]	Medicaid	[facility]	N	O	05/01/2024	05/01/2024	oxycodone hcl (ir) 10 mg tab	84	14	Allison Taylor	[id number]	Medicaid	[facility]	N	O	04/24/2024	04/24/2024	oxycodone hcl (ir) 10 mg tab	42	7	Allison Taylor	[id number]	Medicaid	[facility]	N	O	04/24/2024	04/24/2024	methadone hcl 10 mg tablet	14	7	Allison Taylor	[id number]	Medicaid	[facility]	N	O	04/17/2024	04/17/2024	oxycodone hcl (ir) 5 mg tablet	84	7	Allison Taylor	[id number]	Medicaid	[facility]	N	O	03/26/2024	04/01/2024	methadone hcl 5 mg tablet	60	30	Allison Taylor	[id number]	Medicaid	[facility]	N	O	03/26/2024	04/01/2024	oxycodone hcl (ir) 5 mg tablet	160	14	Allison Taylor	[id number]	Medicaid	[facility]	N	O	03/21/2024	03/21/2024	oxycodone-acetaminophen 5-325 mg tab	16	4	Nusrat Alicia MD	[id number]	Medicaid	[facility] [id number]	N	O	03/13/2024	03/13/2024	methadone hcl 5 mg tablet	28	14	Allison Taylor	[id number]	Medicaid	[facility]	N	O	03/13/2024	03/13/2024	oxycodone hcl (ir) 5 mg tablet	84	7	Allison Taylor	[id number]	Medicaid	[facility]	N	B	01/05/2024	01/06/2024	alprazolam 0.5 mg tablet	5	5	Juan Antonio Cortez	[id number]	Insurance	[facility] [id number]  Living Situation: [x]Home  []Long term care  []Rehab []Other  Voodoo/Spiritual practice: ; Role of organized Shinto [ ] important [ ] some [ ] unable to assess  Coping: [] well [x] with difficulty [] poor coping [] unable to assess  Support system: [] strong [x] adequate [] inadequate    ADVANCE DIRECTIVES:    []MOLST  []Living Will  DECISION MAKER(s):  [x] Health Care Proxy(s)  [] Surrogate(s)  [] Guardian           Name(s)/Phone Number(s): Alexy Velasquez, [phone]    BASELINE (I)ADLs (prior to admission):  [place]: []Total  [x] Moderate []Dependent    ALLERGIES:  No Known Allergies    MEDICATIONS  (STANDING):  bictegravir 50 mG/emtricitabine 200 mG/tenofovir alafenamide 25 mG (BIKTARVY) 1 Tablet(s) Oral daily  enoxaparin Injectable 40 milliGRAM(s) SubCutaneous every 24 hours  escitalopram 10 milliGRAM(s) Oral daily  lactated ringers. 1000 milliLiter(s) (100 mL/Hr) IV Continuous <Continuous>  lidocaine   4% Patch 1 Patch Transdermal every 24 hours  lidocaine 2% Gel 1 Application(s) Topical every 4 hours  methadone    Tablet 10 milliGRAM(s) Oral every 8 hours  piperacillin/tazobactam IVPB.. 3.375 Gram(s) IV Intermittent every 8 hours  vancomycin  IVPB 1000 milliGRAM(s) IV Intermittent every 12 hours    MEDICATIONS  (PRN):  HYDROmorphone  Injectable 0.75 milliGRAM(s) IV Push every 4 hours PRN Severe Pain (7 - 10)  oxyCODONE    IR 15 milliGRAM(s) Oral every 4 hours PRN Moderate Pain (4 - 6)    Analgesic Use (Scheduled and PRNs) for past 24 hours:  ALPRAZolam   0.5 milliGRAM(s) Oral (07-26-24 @ 05:33)  escitalopram   10 milliGRAM(s) Oral (07-26-24 @ 14:23)  HYDROmorphone  Injectable   2 milliGRAM(s) IV Push (07-25-24 @ 22:13)  HYDROmorphone  Injectable   1 milliGRAM(s) IV Push (07-26-24 @ 01:24)  HYDROmorphone  Injectable   1 milliGRAM(s) IV Push (07-25-24 @ 21:09)  HYDROmorphone  Injectable   1 milliGRAM(s) IV Push (07-26-24 @ 08:34)  HYDROmorphone  Injectable   0.5 milliGRAM(s) IV Push (07-26-24 @ 14:48)   0.5 milliGRAM(s) IV Push (07-26-24 @ 10:47)  HYDROmorphone  Injectable   1 milliGRAM(s) IV Push (07-26-24 @ 07:10)  ketorolac   Injectable   15 milliGRAM(s) IV Push (07-26-24 @ 01:24)  morphine  - Injectable   2 milliGRAM(s) IV Push (07-25-24 @ 18:27)    PRESENT SYMPTOMS/REVIEW OF SYSTEMS: []Unable to obtain due to poor mentation/encephalopathy  Source if other than patient:  []Family   []Team     Pain: [x] yes [] no  QOL Impact - debilitating  Location -   rectum                 Aggravating Factors -  Quality - sharp, ache  Radiation -  Timing - constant  Severity (0-10 scale) - 10/10  Minimal Acceptable Level (0-10 scale) -    Dyspnea:                           []Mild  []Moderate []Severe  Anxiety:                             []Mild []Moderate []Severe  Fatigue:                             []Mild []Moderate []Severe  Nausea:                             []Mild []Moderate []Severe  Loss of Appetite:              []Mild []Moderate []Severe  Constipation:                    []Mild []Moderate []Severe    Other Symptoms:  [x]All Other Review Of Systems Negative     Palliative Performance Status Version 2:  50%  (Functional Assessment Tool)    PHYSICAL EXAM:  GENERAL:  [x]Alert, tearful  [x]Oriented x   []Lethargic  []Cachexia  []Unarousable  [x]Verbal  []Non-Verbal  Behavioral:   []Anxiety  []Delirium []Agitation [x]Cooperative  HEENT:  [x]Normal   []Dry mouth   []ET Tube/Trach  []Oral lesions  PULMONARY:   [x]Clear []Tachypnea  []Audible excessive secretions  [x]Normal Work of Breathing []Labored Breathing  []Rhonchi []Crackles []Wheezing  CARDIOVASCULAR:    [x]Regular Rate & Rhythm []Irregular []Tachy  []Ethan  GASTROINTESTINAL:  [x]Soft  []Distended   [x]+BS  [x]Non tender []Tender  []PEG []OGT/ NGT  Last BM: Ostomy with stool  GENITOURINARY:  [x]Normal [] Incontinent   []Oliguria/Anuria   []Calderon  MUSCULOSKELETAL:   []Normal   [x]Weakness  []Bed/Wheelchair bound []Edema  NEUROLOGIC:   [x]No focal deficits  []Cognitive impairment  []Dysphagia []Dysarthria []Paresis []Encephalopathic  SKIN:   []Normal   [x]Pressure ulcer(s)  []Rash    Vital Signs Last 24 Hrs  T(C): 36.7 (26 Jul 2024 13:38), Max: 37.6 (26 Jul 2024 09:57)  T(F): 98.1 (26 Jul 2024 13:38), Max: 99.6 (26 Jul 2024 09:57)  HR: 101 (26 Jul 2024 13:38) (101 - 128)  BP: 136/87 (26 Jul 2024 13:38) (98/63 - 136/87)  BP(mean): --  RR: 16 (26 Jul 2024 13:38) (16 - 16)  SpO2: 94% (26 Jul 2024 13:38) (93% - 100%)    Parameters below as of 26 Jul 2024 13:38  Patient On (Oxygen Delivery Method): room air    LABS:                     6.2    12.90 )-----------( 533      ( 26 Jul 2024 14:00 )             21.6   07-26    133<L>  |  95<L>  |  5<L>  ----------------------------<  120<H>  4.1   |  29  |  0.39<L>    Ca    8.4      26 Jul 2024 14:00  Phos  2.4     07-26  Mg     1.9     07-26  TPro  6.7  /  Alb  2.6<L>  /  TBili  0.2  /  DBili  x   /  AST  21  /  ALT  17  /  AlkPhos  253<H>  07-26    RADIOLOGY & ADDITIONAL STUDIES:  < from: CT Abdomen and Pelvis w/ IV Cont (07.25.24 @ 21:55) >  1. Since 6/12/2024, there has been a change in the appearance of the   locally advanced anal mass. The solid portion of the mass is decreased in   size, but the gas filled cavitary component of the mass has increased in   size and now extends further laterally through the musculature of the   right posterior pelvis. There is probable superimposed infection of the   mass given the presence of a small amount of fluid in the cavitary   component. The possibility of osteomyelitis cannot be excluded.  2. Probable deep venous thrombosis of left lower extremity veins. This   appears improved. Recommend further evaluation with ultrasound.  3. Interval descending colostomy.  4. Asymmetric thickening of the right lateral bladder wall. This may be   secondary to the adjacent inflammatory process, prior radiation therapy,   or cystitis.    REFERRALS:  [x]Social Work  []Case management []PT/OT []Chaplaincy  []Hospice  [x]Patient/Family Support []Massage Therapy []Music Therapy    DISCUSSION OF CASE: Family - obtained additional history and to provide emotional support;  Primary team - discussed plan of care

## 2024-07-26 NOTE — CONSULT NOTE ADULT - PROBLEM SELECTOR RECOMMENDATION 5
Has been essentially bed bound for the last few weeks.     - Pain control and ruling out acute bony process for now.   - Discussed briefly that this also could be related to his overall disease course. Will need further goals of care discussions once pain is better controlled.

## 2024-07-26 NOTE — H&P ADULT - NSHPSOCIALHISTORY_GEN_ALL_CORE
no
Lives with Partner Shane Vinay [phone] in 3rd floor walk up in the [facility]

## 2024-07-26 NOTE — H&P ADULT - PROBLEM SELECTOR PLAN 6
hx of drug abuse with meth, IV drug use, cocaine, ecstasy and marijuana; UTOX positive for meth, THC, opiates, methadone  - SBIRT   - monitor for s/sx withdrawal, currently no concern for withdrawal   - Narcan kit on discharge

## 2024-07-26 NOTE — CONSULT NOTE ADULT - ATTENDING COMMENTS
Complex medical decision making / symptom management in the setting of metastatic malignancy.    45yo M with PMH of Metastatic Anorectal CA, HIV, and Depression p/w weakness and found to have wound/disease progression. Palliative consulted for complex symptom management and medical decision making in the setting of life-limiting illness. Symptom management recommendations as noted below. Follows with outpatient Supportive Oncology. Will address GOC and advance directives pending symptom control given significant symptom burden and disease progression. If no further DMT were offered/pursued, then patient would be eligible for home hospice. Patient would need insurance auth for [facility] so that would not be definite discharge option. Tolerating parenteral controlled substances without unexpected adverse effects/toxicities. Will require continued monitoring and titration of parenteral opiate regimen for adequate symptom management.    -Methadone 10mg PO q8h ATC  -Dilaudid 1mg IV q3h PRN for Severe Pain  -Tylenol 1000mg PO q8h ATC  -Toradol 15mg IV q8h ATC x48hrs    Will continue to follow for ongoing GOC discussion / titration of palliative regimen. Emotional support provided, questions answered.  Active Psychosocial Referrals:  [x]Social Work/Case management []PT/OT []Chaplaincy []Hospice  [x]Patient/Family Support []Holistic RN []Massage Therapy []Music Therapy []Ethics  Coping: [] well [x] with difficulty [] poor coping [] unable to assess  Support system: [] strong [x] adequate [] inadequate    For new or uncontrolled symptoms, please call Palliative Care at [phone]). The service is available 24/7 (including nights & weekends) to provide symptom management recommendations over the phone as appropriate

## 2024-07-26 NOTE — PATIENT PROFILE ADULT - TOBACCO USE
[facility]  Same-Day Surgery   Adult Discharge Orders & Instructions     For 24 hours after surgery    1. Get plenty of rest.  A responsible adult must stay with you for at least 24 hours after you leave the hospital.   2. Do not drive or use heavy equipment.  If you have weakness or tingling, don't drive or use heavy equipment until this feeling goes away.  3. Do not drink alcohol.  4. Avoid strenuous or risky activities.  Ask for help when climbing stairs.   5. You may feel lightheaded.  IF so, sit for a few minutes before standing.  Have someone help you get up.   6. If you have nausea (feel sick to your stomach): Drink only clear liquids such as apple juice, ginger ale, broth or 7-Up.  Rest may also help.  Be sure to drink enough fluids.  Move to a regular diet as you feel able.  7. You may have a slight fever. Call the doctor if your fever is over 100 F (37.7 C) (taken under the tongue) or lasts longer than 24 hours.  8. You may have a dry mouth, a sore throat, muscle aches or trouble sleeping.  These should go away after 24 hours.  9. Do not make important or legal decisions.   Call your doctor for any of the followin.  Signs of infection (fever, growing tenderness at the surgery site, a large amount of drainage or bleeding, severe pain, foul-smelling drainage, redness, swelling).    2. It has been over 8 to 10 hours since surgery and you are still not able to urinate (pass water).    3.  Headache for over 24 hours.    4.  Numbness, tingling or weakness the day after surgery (if you had spinal anesthesia).  To contact a doctor, call Dr Garza at [phone]  during clinic hours (9am-5pm)    or:        [phone] and ask for the resident on call for gastroenterology (answered 24 hours a day)      Emergency Department:    [facility]: [phone]       (TTY for hearing impaired: [phone])            
Current some day smoker

## 2024-07-26 NOTE — H&P ADULT - NSHPPHYSICALEXAM_GEN_ALL_CORE
.  VITAL SIGNS:  T(C): 37.6 (07-26-24 @ 09:57), Max: 37.6 (07-26-24 @ 09:57)  T(F): 99.6 (07-26-24 @ 09:57), Max: 99.6 (07-26-24 @ 09:57)  HR: 114 (07-26-24 @ 11:24) (107 - 128)  BP: 101/60 (07-26-24 @ 11:24) (98/63 - 121/77)  BP(mean): --  RR: 16 (07-26-24 @ 09:57) (16 - 16)  SpO2: 98% (07-26-24 @ 09:57) (93% - 100%)  Wt(kg): --    PHYSICAL EXAM:    Constitutional: Frail, emaciated, low muscle mass, resting in bed, weak.  HEENT: NC/AT, PERRL/EOMI, anicteric sclera, No JVD or thyromegaly, MMM  Respiratory: CTA B/L; no audible wheezing/ronchi/rales  Cardiac: +S1/S2; RRR; no M/R/G; PMI non-displaced  Gastrointestinal: TTP at site of ostomy bag, soft, NT/ND; no rebound or guarding; +BS  Genitourinary: with ostomy bag   Back: spine midline, no bony tenderness or step-offs; no CVAT B/L  Extremities: WWP, no clubbing or cyanosis; no peripheral edema  Vascular: 2+ radial & DP pulses  Dermatologic: Wounds on anus area with purulent yellow drainage.  Neurologic: AAOx3; CNII-XII grossly intact; no focal deficits  Psychiatric: anxious .  VITAL SIGNS:  T(C): 37.6 (07-26-24 @ 09:57), Max: 37.6 (07-26-24 @ 09:57)  T(F): 99.6 (07-26-24 @ 09:57), Max: 99.6 (07-26-24 @ 09:57)  HR: 114 (07-26-24 @ 11:24) (107 - 128)  BP: 101/60 (07-26-24 @ 11:24) (98/63 - 121/77)  BP(mean): --  RR: 16 (07-26-24 @ 09:57) (16 - 16)  SpO2: 98% (07-26-24 @ 09:57) (93% - 100%)  Wt(kg): --    PHYSICAL EXAM:    Constitutional: Frail, emaciated, low muscle mass, resting in bed, weak.  HEENT: NC/AT, PERRL/EOMI, anicteric sclera, No JVD or thyromegaly, MMM  Respiratory: CTA B/L; no audible wheezing/ronchi/rales  Cardiac: +S1/S2; RRR; no M/R/G; PMI non-displaced  Gastrointestinal: TTP at site of ostomy bag, soft, NT/ND; no rebound or guarding; +BS. ++Drainage above rectal opening, no obvious mass or swelling. Drainage appears thick/white pus-like.   Genitourinary: with ostomy bag, c/di  Back: spine midline, no bony tenderness or step-offs; no CVAT B/L  Extremities: WWP, no clubbing or cyanosis; no peripheral edema  Vascular: 2+ radial & DP pulses  Dermatologic: Wounds on anus area with purulent yellow drainage.  Neurologic: AAOx3; CNII-XII grossly intact; no focal deficits  Psychiatric: anxious

## 2024-07-26 NOTE — H&P ADULT - ATTENDING COMMENTS
sepsis 2/2 pelvic abscess r/o OM   cw bs abx w vanc and zosyn - can escalate  CTAP showed: Destruction of coccyx, sacrum, R ischium, posterior portion R acetabulum, ?possibility of OM superimposed upon lytic destruction from tumor. Likely superimposed infection.   will get an MRI and fu ortho recs   thrombocytosis  reactive : plt 654- fu repeat   anemia: fu anemia panel : suspect multifactorial in the setting of aCD and BM suppression   cw methadone 10 bid, oxy 10 q4h palliative fu for pain and further advance care planning   hypokalemia: k 3.4 - s/p repletion   utox positive for amphetamine, benzo, thc , methadone   deep venous thrombosis of left lower extremity veins: fu duplex US Bl LE  dvt ppx lovenox for now - advance to FD lovenox - hold off on eliquis if there is plan for bone bx /culture w ortho sepsis 2/2 anal mass w superimposed bacterial infection   r/o OM   Squamous cell carcinoma of rectum.   cw bs abx w vanc and zosyn - can escalate  CTAP showed: Destruction of coccyx, sacrum, R ischium, posterior portion R acetabulum, ?possibility of OM superimposed upon lytic destruction from tumor. Likely superimposed infection.   will get an MRI and fu ortho recs   thrombocytosis  reactive : plt 654- fu repeat   anemia: fu anemia panel : suspect multifactorial in the setting of aCD and BM suppression   cw methadone 10 bid, oxy 10 q4h palliative fu for pain and further advance care planning   hypokalemia: k 3.4 - s/p repletion   utox positive for amphetamine, benzo, thc , methadone   deep venous thrombosis of left lower extremity veins: fu duplex US Bl LE  dvt ppx lovenox for now - advance to FD lovenox - hold off on eliquis if there is plan for bone bx /culture w ortho

## 2024-07-26 NOTE — CONSULT NOTE ADULT - PROBLEM SELECTOR RECOMMENDATION 6
Patient aware of his continued decline in multiple aspects of his health including cachexia, worsening pain and now significant debility are poor indicators of his overall prognosis. He is starting to question whether continuing to accept treatment is worthwhile at this point given his quality of life. He is open to further discussions once his pain is better controlled. Patient aware that his continued decline in multiple aspects of his health including cachexia, worsening pain and now significant debility are poor indicators of his overall prognosis. He is starting to question whether continuing to accept treatment is worthwhile at this point given his poor quality of life. He is open to further discussions once his pain is better controlled.

## 2024-07-26 NOTE — H&P ADULT - PROBLEM SELECTOR PLAN 1
On admission, met 2/4 SIRS w/ HR 120s, WBC >22. Given immunosuppresion, c/f infectious etiology.   UA neg, CXR clear, RVP & Covid neg.   CTAP showed: Destruction of coccyx, sacrum, R ischium, posterior portion R acetabulum, ?possibility of OM superimposted upon lytic destruction from tumor. Likely superimposed infection.     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  s/p Vanc & Zosyn in ED  - Given immunocompromised status, c/w Vanc 1g q12h & Cefepime 2g q12h  - F/u BCx  - F/u MR Pelvic bony to assess further for OM  - Consulted ortho, if OM confirmed will need bone biopsy/cultures to guide mgmt. Will likely need ID consult once culture data obtained. On admission, met 2/4 SIRS w/ HR 120s, WBC >22. Given immunosuppression, c/f infectious etiology.   UA neg, CXR clear, RVP & Covid neg.   CTAP showed: Destruction of coccyx, sacrum, R ischium, posterior portion R acetabulum, ?possibility of OM superimposed upon lytic destruction from tumor. Likely superimposed infection.     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  s/p Vanc & Zosyn in ED  - Given immunocompromised status, c/w Vanc 1g q12h & Cefepime 2g q12h  - F/u BCx  - F/u MR Pelvic bony to assess further for OM  - Consulted ortho, if OM confirmed will need bone biopsy/cultures to guide mgmt. Will likely need ID consult once culture data obtained. On admission, met 2/4 SIRS w/ HR 120s, WBC >22. Given immunosuppression, c/f infectious etiology.   UA neg, CXR clear, RVP & Covid neg.   CTAP showed: Destruction of coccyx, sacrum, R ischium, posterior portion R acetabulum, ?possibility of OM superimposed upon lytic destruction from tumor. Likely superimposed infection.     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  s/p Vanc & Zosyn in ED  - Given immunocompromised status, c/w Vanc 1g q12h & Zosyn 3.375 q8h (not PsA dosing given location)   - F/u BCx  - F/u MR Pelvic bony to assess further for OM  - Consulted ortho, if OM confirmed will need bone biopsy/cultures to guide mgmt. Will likely need ID consult once culture data obtained.

## 2024-07-26 NOTE — PATIENT PROFILE ADULT - FALL HARM RISK - HARM RISK INTERVENTIONS

## 2024-07-26 NOTE — ED ADULT NURSE REASSESSMENT NOTE - NS ED NURSE REASSESS COMMENT FT1
patient resting comfortably in stretcher, no signs of distress noted. awaitng transfer to [facility]

## 2024-07-26 NOTE — PROCEDURE NOTE - NSICDXPROCEDURE_GEN_ALL_CORE_FT
PROCEDURES:  Insertion of intravenous catheter with ultrasound guidance 26-Jul-2024 19:03:13  Shital Troy

## 2024-07-26 NOTE — H&P ADULT - NSHPLABSRESULTS_GEN_ALL_CORE
7.8    22.64 )-----------( 654      ( 25 Jul 2024 17:49 )             27.0       07-26    132  |  95<L>  |  6<L>  ----------------------------<  117<H>  3.4<L>   |  31  |  0.54    Ca    8.4<L>      26 Jul 2024 02:38  Mg     1.8     07-25    TPro  7.0  /  Alb  1.6<L>  /  TBili  0.3  /  DBili  x   /  AST  31  /  ALT  24  /  AlkPhos  254<H>  07-26         CAPILLARY BLOOD GLUCOSE      POCT Blood Glucose.: 136 mg/dL (25 Jul 2024 17:55)      PT/INR - ( 25 Jul 2024 17:49 )   PT: 16.4 sec;   INR: 1.47          PTT - ( 25 Jul 2024 17:49 )  PTT:30.6 sec     CARDIAC MARKERS ( 25 Jul 2024 17:49 )  x     / x     / 536 U/L / x     / x                Lactate Trend      Urinalysis Basic - ( 26 Jul 2024 02:38 )    Color: x / Appearance: x / SG: x / pH: x  Gluc: 117 mg/dL / Ketone: x  / Bili: x / Urobili: x   Blood: x / Protein: x / Nitrite: x   Leuk Esterase: x / RBC: x / WBC x   Sq Epi: x / Non Sq Epi: x / Bacteria: x              RADIOLOGY, EKG & ADDITIONAL TESTS: Reviewed.

## 2024-07-26 NOTE — CONSULT NOTE ADULT - PROBLEM SELECTOR RECOMMENDATION 4
Patient with significant weight loss despite attempts at increasing oral intake at home. Associated hypoalbuminemia 1.6 --> 2.6.     - Patient well aware of recommended approach of scheduling food regardless of appetite, eating small amounts consistently throughout the day to maximize daily total intake, prioritizing protein supplements, and avoiding any limitations in diet.  - Will be a component in the discussion of goals of care if amenable and pain is better controlled.

## 2024-07-26 NOTE — PATIENT PROFILE ADULT - SBIRT REFERRAL
Pharmacy informed to disregard the hydroxyzine. Pharmacist informed that the combo pill was back in the stock and that pt picked up      SBIRT referral

## 2024-07-26 NOTE — H&P ADULT - ASSESSMENT
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 Presenting again w/ worsening weakness and generalized pain, pow/ CT scan showing worsening pelvic bony destruction, w/ SIRS 2/4 likey 2/2 OM vs other infectious etiology. Admitted for further mgmt.

## 2024-07-26 NOTE — H&P ADULT - PROBLEM SELECTOR PLAN 7
F: 1L LR  E: Replete PRN  N: Regular diet  P: Lovenox 40 F: Started LR 100cc/hr x24h, light hydration  E: Replete PRN  N: Regular diet  P: Lovenox 40

## 2024-07-27 DIAGNOSIS — I82.409 ACUTE EMBOLISM AND THROMBOSIS OF UNSPECIFIED DEEP VEINS OF UNSPECIFIED LOWER EXTREMITY: ICD-10-CM

## 2024-07-27 DIAGNOSIS — Z29.9 ENCOUNTER FOR PROPHYLACTIC MEASURES, UNSPECIFIED: ICD-10-CM

## 2024-07-27 DIAGNOSIS — E43 UNSPECIFIED SEVERE PROTEIN-CALORIE MALNUTRITION: ICD-10-CM

## 2024-07-27 LAB
ALBUMIN SERPL ELPH-MCNC: 2.7 G/DL — LOW (ref 3.3–5)
ALP SERPL-CCNC: 229 U/L — HIGH (ref 40–120)
ALT FLD-CCNC: 16 U/L — SIGNIFICANT CHANGE UP (ref 10–45)
ANION GAP SERPL CALC-SCNC: 9 MMOL/L — SIGNIFICANT CHANGE UP (ref 5–17)
AST SERPL-CCNC: 15 U/L — SIGNIFICANT CHANGE UP (ref 10–40)
BILIRUB SERPL-MCNC: 0.4 MG/DL — SIGNIFICANT CHANGE UP (ref 0.2–1.2)
BUN SERPL-MCNC: 6 MG/DL — LOW (ref 7–23)
CALCIUM SERPL-MCNC: 8.7 MG/DL — SIGNIFICANT CHANGE UP (ref 8.4–10.5)
CHLORIDE SERPL-SCNC: 97 MMOL/L — SIGNIFICANT CHANGE UP (ref 96–108)
CO2 SERPL-SCNC: 28 MMOL/L — SIGNIFICANT CHANGE UP (ref 22–31)
CREAT SERPL-MCNC: 0.46 MG/DL — LOW (ref 0.5–1.3)
EGFR: 134 ML/MIN/1.73M2 — SIGNIFICANT CHANGE UP
GLUCOSE SERPL-MCNC: 109 MG/DL — HIGH (ref 70–99)
HCT VFR BLD CALC: 24 % — LOW (ref 39–50)
HCT VFR BLD CALC: 25 % — LOW (ref 39–50)
HGB BLD-MCNC: 7.1 G/DL — LOW (ref 13–17)
HGB BLD-MCNC: 7.3 G/DL — LOW (ref 13–17)
MAGNESIUM SERPL-MCNC: 2.2 MG/DL — SIGNIFICANT CHANGE UP (ref 1.6–2.6)
MCHC RBC-ENTMCNC: 24.1 PG — LOW (ref 27–34)
MCHC RBC-ENTMCNC: 24.1 PG — LOW (ref 27–34)
MCHC RBC-ENTMCNC: 29.2 GM/DL — LOW (ref 32–36)
MCHC RBC-ENTMCNC: 29.6 GM/DL — LOW (ref 32–36)
MCV RBC AUTO: 81.6 FL — SIGNIFICANT CHANGE UP (ref 80–100)
MCV RBC AUTO: 82.5 FL — SIGNIFICANT CHANGE UP (ref 80–100)
NRBC # BLD: 0 /100 WBCS — SIGNIFICANT CHANGE UP (ref 0–0)
NRBC # BLD: 0 /100 WBCS — SIGNIFICANT CHANGE UP (ref 0–0)
PHOSPHATE SERPL-MCNC: 2.5 MG/DL — SIGNIFICANT CHANGE UP (ref 2.5–4.5)
PLATELET # BLD AUTO: 526 K/UL — HIGH (ref 150–400)
PLATELET # BLD AUTO: 532 K/UL — HIGH (ref 150–400)
POTASSIUM SERPL-MCNC: 3.8 MMOL/L — SIGNIFICANT CHANGE UP (ref 3.5–5.3)
POTASSIUM SERPL-SCNC: 3.8 MMOL/L — SIGNIFICANT CHANGE UP (ref 3.5–5.3)
PROT SERPL-MCNC: 6.5 G/DL — SIGNIFICANT CHANGE UP (ref 6–8.3)
RBC # BLD: 2.94 M/UL — LOW (ref 4.2–5.8)
RBC # BLD: 3.03 M/UL — LOW (ref 4.2–5.8)
RBC # FLD: 17.7 % — HIGH (ref 10.3–14.5)
RBC # FLD: 17.8 % — HIGH (ref 10.3–14.5)
SODIUM SERPL-SCNC: 134 MMOL/L — LOW (ref 135–145)
WBC # BLD: 11.83 K/UL — HIGH (ref 3.8–10.5)
WBC # BLD: 14.76 K/UL — HIGH (ref 3.8–10.5)
WBC # FLD AUTO: 11.83 K/UL — HIGH (ref 3.8–10.5)
WBC # FLD AUTO: 14.76 K/UL — HIGH (ref 3.8–10.5)

## 2024-07-27 PROCEDURE — 73502 X-RAY EXAM HIP UNI 2-3 VIEWS: CPT | Mod: 26,RT

## 2024-07-27 PROCEDURE — 73552 X-RAY EXAM OF FEMUR 2/>: CPT | Mod: 26,RT

## 2024-07-27 PROCEDURE — 99233 SBSQ HOSP IP/OBS HIGH 50: CPT

## 2024-07-27 PROCEDURE — 93970 EXTREMITY STUDY: CPT | Mod: 26

## 2024-07-27 RX ORDER — LORAZEPAM 1 MG/1
1 TABLET ORAL ONCE
Refills: 0 | Status: DISCONTINUED | OUTPATIENT
Start: 2024-07-27 | End: 2024-07-28

## 2024-07-27 RX ORDER — SODIUM PHOSPHATE, MONOBASIC, MONOHYDRATE 276; 142 MG/ML; MG/ML
15 INJECTION, SOLUTION INTRAVENOUS ONCE
Refills: 0 | Status: COMPLETED | OUTPATIENT
Start: 2024-07-27 | End: 2024-07-27

## 2024-07-27 RX ORDER — ENOXAPARIN SODIUM 120 MG/.8ML
60 INJECTION SUBCUTANEOUS EVERY 12 HOURS
Refills: 0 | Status: DISCONTINUED | OUTPATIENT
Start: 2024-07-27 | End: 2024-08-06

## 2024-07-27 RX ADMIN — LIDOCAINE 5% 1 APPLICATION(S): 5 CREAM TOPICAL at 03:55

## 2024-07-27 RX ADMIN — Medication 1000 MILLIGRAM(S): at 00:49

## 2024-07-27 RX ADMIN — Medication 1 MILLIGRAM(S): at 19:57

## 2024-07-27 RX ADMIN — Medication 1 MILLIGRAM(S): at 01:59

## 2024-07-27 RX ADMIN — OXYCODONE HYDROCHLORIDE 15 MILLIGRAM(S): 30 TABLET ORAL at 00:34

## 2024-07-27 RX ADMIN — Medication 1 MILLIGRAM(S): at 07:09

## 2024-07-27 RX ADMIN — SODIUM PHOSPHATE, MONOBASIC, MONOHYDRATE 85 MILLIMOLE(S): 276; 142 INJECTION, SOLUTION INTRAVENOUS at 06:31

## 2024-07-27 RX ADMIN — Medication 1 MILLIGRAM(S): at 16:57

## 2024-07-27 RX ADMIN — Medication 1000 MILLIGRAM(S): at 22:21

## 2024-07-27 RX ADMIN — LIDOCAINE 5% 1 APPLICATION(S): 5 CREAM TOPICAL at 06:48

## 2024-07-27 RX ADMIN — LIDOCAINE 5% 1 PATCH: 5 CREAM TOPICAL at 13:40

## 2024-07-27 RX ADMIN — LIDOCAINE 5% 1 APPLICATION(S): 5 CREAM TOPICAL at 10:19

## 2024-07-27 RX ADMIN — Medication 10 MILLIGRAM(S): at 13:39

## 2024-07-27 RX ADMIN — Medication 1 MILLIGRAM(S): at 16:42

## 2024-07-27 RX ADMIN — LIDOCAINE 5% 1 APPLICATION(S): 5 CREAM TOPICAL at 14:18

## 2024-07-27 RX ADMIN — BICTEGRAVIR SODIUM, EMTRICITABINE, AND TENOFOVIR ALAFENAMIDE FUMARATE 1 TABLET(S): 50; 200; 25 TABLET ORAL at 13:40

## 2024-07-27 RX ADMIN — PIPERACILLIN SODIUM, TAZOBACTAM SODIUM 25 GRAM(S): 3; .375 INJECTION, POWDER, LYOPHILIZED, FOR SOLUTION INTRAVENOUS at 22:21

## 2024-07-27 RX ADMIN — PIPERACILLIN SODIUM, TAZOBACTAM SODIUM 25 GRAM(S): 3; .375 INJECTION, POWDER, LYOPHILIZED, FOR SOLUTION INTRAVENOUS at 06:31

## 2024-07-27 RX ADMIN — LIDOCAINE 5% 1 PATCH: 5 CREAM TOPICAL at 18:08

## 2024-07-27 RX ADMIN — PIPERACILLIN SODIUM, TAZOBACTAM SODIUM 25 GRAM(S): 3; .375 INJECTION, POWDER, LYOPHILIZED, FOR SOLUTION INTRAVENOUS at 14:18

## 2024-07-27 RX ADMIN — Medication 15 MILLIGRAM(S): at 22:21

## 2024-07-27 RX ADMIN — Medication 1 MILLIGRAM(S): at 13:40

## 2024-07-27 RX ADMIN — Medication 1 MILLIGRAM(S): at 09:56

## 2024-07-27 RX ADMIN — Medication 15 MILLIGRAM(S): at 06:33

## 2024-07-27 RX ADMIN — Medication 1 MILLIGRAM(S): at 06:32

## 2024-07-27 RX ADMIN — Medication 10 MILLIGRAM(S): at 22:21

## 2024-07-27 RX ADMIN — ENOXAPARIN SODIUM 60 MILLIGRAM(S): 120 INJECTION SUBCUTANEOUS at 19:02

## 2024-07-27 RX ADMIN — Medication 1 MILLIGRAM(S): at 19:42

## 2024-07-27 RX ADMIN — Medication 10 MILLIGRAM(S): at 13:40

## 2024-07-27 RX ADMIN — Medication 1 MILLIGRAM(S): at 09:41

## 2024-07-27 RX ADMIN — Medication 1 MILLIGRAM(S): at 13:55

## 2024-07-27 RX ADMIN — LIDOCAINE 5% 1 PATCH: 5 CREAM TOPICAL at 06:47

## 2024-07-27 RX ADMIN — Medication 15 MILLIGRAM(S): at 00:49

## 2024-07-27 RX ADMIN — Medication 1 MILLIGRAM(S): at 00:59

## 2024-07-27 RX ADMIN — Medication 1000 MILLIGRAM(S): at 06:33

## 2024-07-27 RX ADMIN — VANCOMYCIN HYDROCHLORIDE 250 MILLIGRAM(S): 5 INJECTION, POWDER, LYOPHILIZED, FOR SOLUTION INTRAVENOUS at 08:11

## 2024-07-27 RX ADMIN — VANCOMYCIN HYDROCHLORIDE 250 MILLIGRAM(S): 5 INJECTION, POWDER, LYOPHILIZED, FOR SOLUTION INTRAVENOUS at 19:03

## 2024-07-27 RX ADMIN — Medication 1000 MILLIGRAM(S): at 07:08

## 2024-07-27 RX ADMIN — Medication 15 MILLIGRAM(S): at 07:09

## 2024-07-27 RX ADMIN — LIDOCAINE 5% 1 APPLICATION(S): 5 CREAM TOPICAL at 18:08

## 2024-07-27 RX ADMIN — Medication 10 MILLIGRAM(S): at 06:33

## 2024-07-27 RX ADMIN — SODIUM PHOSPHATE, MONOBASIC, MONOHYDRATE 62.5 MILLIMOLE(S): 276; 142 INJECTION, SOLUTION INTRAVENOUS at 09:22

## 2024-07-27 NOTE — PROGRESS NOTE ADULT - PROBLEM SELECTOR PLAN 1
CTAP -  larger gas-filled cavity in the more inferior portion of the mass, with a larger fistula to the perineum probable superimposed infection of the mass, greater extension of mass laterally though R pelvic muscles. Destruction of coccyx, sacrum, R ischium, posterior portion R acetabulum, ?possibility of OM superimposted upon lytic destruction from tumor.     - Appreciate heme recs  - Pain mgmt w/ Methadone 10mg BID, Oxycodone 10 PO q4h PRN for mod pain, Dilaudid 0.5mg I V q4h PRN for breakthru pain.  - Followed by Dr. Julian and Graciela outpt  - 5/16/2024 completed radiation therapy to anus/pelvis and capecitabine CTAP -  larger gas-filled cavity in the more inferior portion of the mass, with a larger fistula to the perineum probable superimposed infection of the mass, greater extension of mass laterally though R pelvic muscles. Destruction of coccyx, sacrum, R ischium, posterior portion R acetabulum, ?possibility of OM superimposted upon lytic destruction from tumor.     - pending MRI  - Appreciate heme recs  - Pain mgmt w/ Methadone 10mg BID, Oxycodone 10 PO q4h PRN for mod pain, Dilaudid 0.5mg I V q4h PRN for breakthru pain.  - Followed by Dr. Julian and Graciela outpt  - 5/16/2024 completed radiation therapy to anus/pelvis and capecitabine CTAP -  larger gas-filled cavity in the more inferior portion of the mass, with a larger fistula to the perineum probable superimposed infection of the mass, greater extension of mass laterally though R pelvic muscles. Destruction of coccyx, sacrum, R ischium, posterior portion R acetabulum, ?possibility of OM superimposted upon lytic destruction from tumor.     - pending MRI  - Appreciate heme recs  - Pain mgmt w/ Methadone 10mg BID(?), Oxycodone 10 PO q4h PRN for mod pain, Dilaudid 0.5mg I V q4h PRN for breakthru pain.  - Followed by Dr. Julian and Graciela outpt  - 5/16/2024 completed radiation therapy to anus/pelvis and capecitabine

## 2024-07-27 NOTE — CONSULT NOTE ADULT - NS ATTEST RISK PROBLEM GEN_ALL_CORE FT
Osteomyelitis   Anal cancer  failure to thrive
Chronic Illness With Severe Exacerbation/Progression  Prescription Of Parenteral Controlled Substances

## 2024-07-27 NOTE — PROGRESS NOTE ADULT - PROBLEM SELECTOR PLAN 8
- Nutrition consult Patient has had decreased PO intake and decreased appetite. Has had increasing generalized weakness which has made him bedbound.    - Nutrition consult

## 2024-07-27 NOTE — PROGRESS NOTE ADULT - SUBJECTIVE AND OBJECTIVE BOX
O/N Events:  Subjective/ROS: Denies HA, CP, SOB, n/v, changes in bowel/urinary habits.  12pt ROS otherwise negative.    VITALS  Vital Signs Last 24 Hrs  T(C): 36.8 (27 Jul 2024 05:50), Max: 37.6 (26 Jul 2024 09:57)  T(F): 98.3 (27 Jul 2024 05:50), Max: 99.6 (26 Jul 2024 09:57)  HR: 112 (27 Jul 2024 05:50) (101 - 115)  BP: 113/69 (27 Jul 2024 05:50) (101/60 - 136/87)  BP(mean): --  RR: 16 (27 Jul 2024 05:50) (16 - 16)  SpO2: 97% (27 Jul 2024 05:50) (94% - 99%)    Parameters below as of 26 Jul 2024 19:00  Patient On (Oxygen Delivery Method): room air        I&O's Summary    26 Jul 2024 07:01  -  27 Jul 2024 07:00  --------------------------------------------------------  IN: 300 mL / OUT: 0 mL / NET: 300 mL        CAPILLARY BLOOD GLUCOSE          PHYSICAL EXAM  Constitutional: Frail, emaciated, low muscle mass, resting in bed, weak.  HEENT: NC/AT, PERRL/EOMI, anicteric sclera, No JVD or thyromegaly, MMM  Respiratory: CTA B/L; no audible wheezing/ronchi/rales  Cardiac: +S1/S2; RRR; no M/R/G; PMI non-displaced  Gastrointestinal: TTP at site of ostomy bag, soft, NT/ND; no rebound or guarding; +BS. ++Drainage above rectal opening, no obvious mass or swelling. Drainage appears thick/white pus-like.   Genitourinary: with ostomy bag, c/di  Back: spine midline, no bony tenderness or step-offs; no CVAT B/L  Extremities: WWP, no clubbing or cyanosis; no peripheral edema  Vascular: 2+ radial & DP pulses  Dermatologic: Wounds on anus area with purulent yellow drainage.  Neurologic: AAOx3; CNII-XII grossly intact; no focal deficits  Psychiatric: anxious    MEDICATIONS  (STANDING):  acetaminophen     Tablet .. 1000 milliGRAM(s) Oral every 8 hours  bictegravir 50 mG/emtricitabine 200 mG/tenofovir alafenamide 25 mG (BIKTARVY) 1 Tablet(s) Oral daily  enoxaparin Injectable 40 milliGRAM(s) SubCutaneous every 24 hours  escitalopram 10 milliGRAM(s) Oral daily  ketorolac   Injectable 15 milliGRAM(s) IV Push every 8 hours  lactated ringers. 1000 milliLiter(s) (100 mL/Hr) IV Continuous <Continuous>  lidocaine   4% Patch 1 Patch Transdermal every 24 hours  lidocaine 2% Gel 1 Application(s) Topical every 4 hours  LORazepam   Injectable 1 milliGRAM(s) IV Push once  methadone    Tablet 10 milliGRAM(s) Oral every 8 hours  piperacillin/tazobactam IVPB.. 3.375 Gram(s) IV Intermittent every 8 hours  vancomycin  IVPB 1000 milliGRAM(s) IV Intermittent every 12 hours    MEDICATIONS  (PRN):  HYDROmorphone  Injectable 1 milliGRAM(s) IV Push every 3 hours PRN Severe Pain (7 - 10)  oxyCODONE    IR 15 milliGRAM(s) Oral every 4 hours PRN Moderate Pain (4 - 6)      No Known Allergies      LABS                        7.1    11.83 )-----------( 532      ( 27 Jul 2024 05:30 )             24.0     07-27    134<L>  |  97  |  6<L>  ----------------------------<  109<H>  3.8   |  28  |  0.46<L>    Ca    8.7      27 Jul 2024 05:30  Phos  2.5     07-27  Mg     2.2     07-27    TPro  6.5  /  Alb  2.7<L>  /  TBili  0.4  /  DBili  x   /  AST  15  /  ALT  16  /  AlkPhos  229<H>  07-27    PT/INR - ( 25 Jul 2024 17:49 )   PT: 16.4 sec;   INR: 1.47          PTT - ( 25 Jul 2024 17:49 )  PTT:30.6 sec  Urinalysis Basic - ( 27 Jul 2024 05:30 )    Color: x / Appearance: x / SG: x / pH: x  Gluc: 109 mg/dL / Ketone: x  / Bili: x / Urobili: x   Blood: x / Protein: x / Nitrite: x   Leuk Esterase: x / RBC: x / WBC x   Sq Epi: x / Non Sq Epi: x / Bacteria: x      CARDIAC MARKERS ( 25 Jul 2024 17:49 )  x     / x     / 536 U/L / x     / x            IMAGING/EKG/ETC: reviewed

## 2024-07-27 NOTE — CONSULT NOTE ADULT - SUBJECTIVE AND OBJECTIVE BOX
Hematology Oncology Consult Note     RAMBO ALBERTS is a 44 year old male with PMH HIV, invasive anorectal squamous cell carcinoma s/p chemoradiation (finished 5/16/24, s/p ostomy 6/15/24, polysubstance abuse, syphilis, anemia and abdominal MRSA 2021 who presenting for generalized weakness, hip pain and shoulders bilaterally, and pain at the sites of incisions on the anus, found to have sepsis 2/2 to possible pelvic osteomyelitis and possible DVT on CT scan. Oncology is consulted for hx of anal cancer.     Of note, patient with recent admission 6/13 - 6/19/24 for perianal pain and drainage, and fevers after being sent into ED from oncologist office  for tacchcyardia to 160's. He was given antibitoics and colostomy creation and perineal washout 6/15/24.    Patient seen at bedside. He reports it is now difficult to ambulate 2/2 to pain. He reports that he has been in pain/weak to the point of nearly being bedbound, and his appetite is decreased as well. He reports he is unable to  his leg without using both of his hands to do so. He denies nausea/vomiting/fever/chills.    Patient labs on admission notable for Hgb 7.8 --> 6.2  WBC 22 --. 12.90  Platelet 654 --> 533    CXR 7/25/24  Hyperinflation. Heart, lungs, mediastinum and thorax are   unremarkable    CT A/P 7/25/2024  Since 6/12/2024, there has been a change in the appearance of the   locally advanced anal mass. The solid portion of the mass is decreased in   size, but the gas filled cavitary component of the mass has increased in   size and now extends further laterally through the musculature of the   right posterior pelvis. There is probable superimposed infection of the   mass given the presence of a small amount of fluid in the cavitary   component. The possibility of osteomyelitis cannot be excluded.    2. Probable deep venous thrombosis of left lower extremity veins. This   appears improved. Recommend further evaluation with ultrasound.    3. Interval descending colostomy.    4. Asymmetric thickening of the right lateral bladder wall. This may be   secondary to the adjacent inflammatory process, prior radiation therapy,   or cystitis.    Oncologic History:  1. Bulky anal cancer  - anal mass biopsy 12/13/23 - invasive squamous cell carcinoma, moderately differentiated  - CT Chest 1/9/24 - mild paraseptal emphysema. no evidence of metastatic disease  - MRI pelvis 1/3/24 - limited exam with only 3 sequences obtained. Showing invasion into sacrum, right hemipelvis, and invasion posteriorly into gluteal musculature  - CTA 2/2/24 - without signs of local regional metastases, large lobulated rectal tumor dissecting into adjacent soft tissues and bones overall similar in appearance to the previo  - colonoscopy/anoscopy may be difficult due to bulk of disease  - CEA elevated at 192.0 prior to treatment  - PET/CT (2/9/24): Intense uptake is seen in the patient's large pelvic mass known to be an invasive squamous cell carcinoma of the anus. There is extension into the left gluteal region, but no evidence of FDG avid local or distant metastatic disease.  - CT C/A/P on 3/1/24 showed no PE and grossly stable known rectal mass with grossly stable osseous involvement.  - started RT to anus/pelvis and capecitabine(1500mg/1000mg) on 4/3/24, received mitomycin on 4/5/24, completed RT on 5/16/24  - CT A/P 6/12/24 showed 2  abscesses in the posterior medial upper left thigh, the largest   measures 6.0 cm. Extensive perianal skin thickening and fat stranding which may    PAST MEDICAL & SURGICAL HISTORY:  HIV (human immunodeficiency virus infection)      Abscess      IVDU (intravenous drug user)      Anemia      Rectal cancer      H/O rectal polypectomy          Allergies:  No Known Allergies      Medications:  enoxaparin Injectable 40 milliGRAM(s) SubCutaneous every 24 hours        Social History:    FAMILY HISTORY:  FH: CAD (coronary artery disease) (Mother)        PHYSICAL EXAM:    T(F): 98.3 (07-27-24 @ 05:50), Max: 98.5 (07-26-24 @ 13:20)  HR: 112 (07-27-24 @ 05:50) (101 - 115)  BP: 113/69 (07-27-24 @ 05:50) (101/60 - 136/87)  RR: 16 (07-27-24 @ 05:50) (16 - 16)  SpO2: 97% (07-27-24 @ 05:50) (94% - 99%)  Wt(kg): --    Daily     Daily     Gen: Frial, cachectic resting in bed  HEENT: NC/AT, PERRL/EOMI, anicteric sclera  Neck: supple, no masses, no JVD  Cardiovascular: RR, nl S1S2, no murmurs/rubs/gallops  Respiratory: clear air entry b/l  Gastrointestinal: ostomy in place; no stool currently  Extremities: no clubbing/cyanosis, no edema, no calf tenderness  Neurological: no focal deficits  Skin: no rash on visible skin  Musculoskeletal:  full ROM  Psychiatric:  mood stable            Labs:                          7.1    11.83 )-----------( 532      ( 27 Jul 2024 05:30 )             24.0     CBC Full  -  ( 27 Jul 2024 05:30 )  WBC Count : 11.83 K/uL  RBC Count : 2.94 M/uL  Hemoglobin : 7.1 g/dL  Hematocrit : 24.0 %  Platelet Count - Automated : 532 K/uL  Mean Cell Volume : 81.6 fl  Mean Cell Hemoglobin : 24.1 pg  Mean Cell Hemoglobin Concentration : 29.6 gm/dL  Auto Neutrophil # : x  Auto Lymphocyte # : x  Auto Monocyte # : x  Auto Eosinophil # : x  Auto Basophil # : x  Auto Neutrophil % : x  Auto Lymphocyte % : x  Auto Monocyte % : x  Auto Eosinophil % : x  Auto Basophil % : x    PT/INR - ( 25 Jul 2024 17:49 )   PT: 16.4 sec;   INR: 1.47          PTT - ( 25 Jul 2024 17:49 )  PTT:30.6 sec    07-27    134<L>  |  97  |  6<L>  ----------------------------<  109<H>  3.8   |  28  |  0.46<L>    Ca    8.7      27 Jul 2024 05:30  Phos  2.5     07-27  Mg     2.2     07-27    TPro  6.5  /  Alb  2.7<L>  /  TBili  0.4  /  DBili  x   /  AST  15  /  ALT  16  /  AlkPhos  229<H>  07-27      Urinalysis Basic - ( 27 Jul 2024 05:30 )    Color: x / Appearance: x / SG: x / pH: x  Gluc: 109 mg/dL / Ketone: x  / Bili: x / Urobili: x   Blood: x / Protein: x / Nitrite: x   Leuk Esterase: x / RBC: x / WBC x   Sq Epi: x / Non Sq Epi: x / Bacteria: x        Other Labs:    Cultures:    Pathology:    Imaging Studies:

## 2024-07-27 NOTE — PROGRESS NOTE ADULT - PROBLEM SELECTOR PLAN 2
Patient has had decreased PO intake and decreased appetite. Has had increasing generalized weakness which has made him bedbound.    - Nutrition consult

## 2024-07-27 NOTE — CONSULT NOTE ADULT - ASSESSMENT
44 year old male with PMH HIV, invasive anorectal squamous cell carcinoma s/p chemoradiation (finished 5/16/24, s/p ostomy 6/15/24, polysubstance abuse, syphilis, anemia and abdominal MRSA 2021 who presenting for generalized weakness, hip pain and shoulders bilaterally, and pain at the sites of incisions on the anus, found to have sepsis 2/2 to possible pelvic osteomyelitis and possible DVT on CT scan. Oncology is consulted for hx of anal cancer.    #Bulky, locally advanced anal cancer  C/F osteomyelitis   - previously discussed in multidisciplinary format - chemoradiation upfront  - started RT to anus/pelvis and capecitabine(1500mg/1000mg) on 4/3/24, received mitomycin on 4/5/24, completed RT on 5/16/24  - s/p ostomy 6/15/24  - recent 7/25/24 CT A/P with invasion of mass into right posterior pelvis/superimposed infection  - follow up MRI pelvis - likely will need bone biopsy vs drainage to assess for osteomyelitis +/- ID consult if/when cultures result to establish length of treatment for possible osteomyelitis   - appreciate palliative care management of symptom control and GOC  - may consider restaging scan (last CT chest was prior to radiation therapy)  - currently given concern for active infection and poor performance status, no plans for chemotherapy at this time. Will discuss with outpatient providers to confirm overall treatment plan. Patient was planned for consideration for completion with Dr. Cortez. Would discuss any surgical plans with their team as well.     #C/f for DVT  - no apparent size asymmetry on exam today, though concern seen on CT A/P from 7/25  - follow up ultrasound    #Thrombocytosis   - likely reactive in the setting of tumor/infection  - iron studies not consistent with iron deficiency    Oncology will continue to follow. Recommendations are considered final after attending attestation 44 year old male with PMH HIV, invasive anorectal squamous cell carcinoma s/p chemoradiation (finished 5/16/24, s/p ostomy 6/15/24, polysubstance abuse, syphilis, anemia and abdominal MRSA 2021 who presenting for generalized weakness, hip pain and shoulders bilaterally, and pain at the sites of incisions on the anus, found to have sepsis 2/2 to possible pelvic osteomyelitis and possible DVT on CT scan. Oncology is consulted for hx of anal cancer.    #Bulky, locally advanced anal cancer  C/F osteomyelitis   - previously discussed in multidisciplinary format - chemoradiation upfront  - started RT to anus/pelvis and capecitabine(1500mg/1000mg) on 4/3/24, received mitomycin on 4/5/24, completed RT on 5/16/24  - s/p ostomy 6/15/24  - recent 7/25/24 CT A/P with invasion of mass into right posterior pelvis/superimposed infection  - follow up MRI pelvis - likely will need bone biopsy vs drainage to assess for osteomyelitis +/- ID consult if/when cultures result to establish length of treatment for possible osteomyelitis   - appreciate palliative care management of symptom control and GOC  - may consider restaging scan (last CT chest was prior to radiation therapy)  - currently given concern for active infection and poor performance status, no plans for chemotherapy at this time. Will discuss with outpatient providers to confirm overall treatment plan. Patient was planned for consideration for proctectomy completion with Dr. Cortez. Would discuss any surgical plans with their team as well.     #C/f for DVT  - no apparent size asymmetry on exam today, though concern seen on CT A/P from 7/25  - follow up ultrasound    #Thrombocytosis   - likely reactive in the setting of tumor/infection  - iron studies not consistent with iron deficiency    Oncology will continue to follow. Recommendations are considered final after attending attestation 44 year old male with PMH HIV, invasive anorectal squamous cell carcinoma s/p chemoradiation (finished 5/16/24, s/p ostomy 6/15/24, polysubstance abuse, syphilis, anemia and abdominal MRSA 2021 who presenting for generalized weakness, hip pain and shoulders bilaterally, and pain at the sites of incisions on the anus, found to have sepsis 2/2 to possible pelvic osteomyelitis and possible DVT on CT scan. Oncology is consulted for hx of anal cancer.    #Bulky, locally advanced anal cancer  C/F osteomyelitis   - previously discussed in multidisciplinary format - chemoradiation upfront  - started RT to anus/pelvis and capecitabine(1500mg/1000mg) on 4/3/24, received mitomycin on 4/5/24, completed RT on 5/16/24  - s/p ostomy 6/15/24  - recent 7/25/24 CT A/P with invasion of mass into right posterior pelvis/superimposed infection  - follow up MRI pelvis - likely will need bone biopsy vs drainage to assess for osteomyelitis +/- ID consult if/when cultures result to establish length of treatment for possible osteomyelitis   - appreciate palliative care management of symptom control and GOC  - may consider restaging scan (last CT chest was prior to radiation therapy)  - currently given concern for active infection and poor performance status, no plans for chemotherapy at this time. Will discuss with outpatient providers to confirm overall treatment plan. Patient was planned for consideration for proctectomy completion with Dr. Cortez. Would discuss any surgical plans with their team as well.     #C/f for DVT  - no apparent size asymmetry on exam today, though concern seen on CT A/P from 7/25 - reports improved from prior (?) though no prior clot noted on previous scan - will follow up with radiologist if this was present before  - follow up ultrasound    #Thrombocytosis   - likely reactive in the setting of tumor/infection  - iron studies not consistent with iron deficiency    Oncology will continue to follow. Recommendations are considered final after attending attestation

## 2024-07-27 NOTE — PROGRESS NOTE ADULT - PROBLEM SELECTOR PLAN 5
hx of drug abuse with meth, IV drug use, cocaine, ecstasy and marijuana; UTOX positive for meth, THC, opiates, methadone. Is on methadone 10mg x3 daily    - SBIRT  - monitor for symptom withdrawal  - Give narcan kit on dc

## 2024-07-27 NOTE — PROGRESS NOTE ADULT - PROBLEM SELECTOR PLAN 7
Patient has had decreased PO intake and decreased appetite. Has had increasing generalized weakness which has made him bedbound.

## 2024-07-27 NOTE — CONSULT NOTE ADULT - ATTENDING COMMENTS
Seen on 7/27.    44 year old male with PMH HIV, invasive anorectal squamous cell carcinoma s/p chemoradiation (finished 5/16/24, s/p ostomy 6/15/24), polysubstance abuse, syphilis, anemia and abdominal MRSA 2021 who presenting for generalized weakness, hip pain and shoulders bilaterally, and pain at the sites of incisions on the anus, found to have sepsis 2/2 to possible pelvic osteomyelitis and possible DVT on CT scan.     He appears frail on exam, he lost weight.    I reviewed imaging independently. While the mass appears necrotic, there appears to be a superimposed infection on top of necrosis.  It is unclear to me whether there is tumor extension or not, but more likely than not this is all infection.  Treatment for this complication could be surgical.  Recommend CRC consult to explore this option.  Locally advanced Anal cancer w/o avenues at cure can get palliative immunotherapy.  At this point the patient appears to have quite a poor performance status, and possibly consideration should be placed for supportive care alone as he does not appear to be able to get to appointments due to uncontrolled tumor related pain.  Recommend checking iron panel for anemia.  Appreciate palliative care involvement. Seen on 7/27.    44 year old male with PMH HIV, invasive anorectal squamous cell carcinoma s/p chemoradiation (finished 5/16/24, s/p ostomy 6/15/24), polysubstance abuse, syphilis, anemia and abdominal MRSA 2021 who presenting for generalized weakness, hip pain and shoulders bilaterally, and pain at the sites of incisions on the anus, found to have sepsis 2/2 to possible pelvic osteomyelitis and possible DVT on CT scan.     He appears frail on exam, he lost weight.    I reviewed imaging independently. While the mass appears necrotic, there appears to be a superimposed infection on top of necrosis.  It is unclear to me whether there is tumor extension or not, but more likely than not this is all infection.  Treatment for this complication could be surgical.  Recommend CRC consult to explore this option.  Locally advanced Anal cancer w/o avenues at cure can get palliative immunotherapy.  At this point the patient appears to have quite a poor performance status, and possibly consideration should be placed for supportive care alone as he does not appear to be able to get to appointments due to uncontrolled tumor related pain.  Recommend checking iron panel for anemia.  Appreciate palliative care involvement.  Recommend MRI pelvis.

## 2024-07-28 DIAGNOSIS — G47.9 SLEEP DISORDER, UNSPECIFIED: ICD-10-CM

## 2024-07-28 LAB
ALBUMIN SERPL ELPH-MCNC: 2.8 G/DL — LOW (ref 3.3–5)
ALP SERPL-CCNC: 223 U/L — HIGH (ref 40–120)
ALT FLD-CCNC: 15 U/L — SIGNIFICANT CHANGE UP (ref 10–45)
ANION GAP SERPL CALC-SCNC: 9 MMOL/L — SIGNIFICANT CHANGE UP (ref 5–17)
ANISOCYTOSIS BLD QL: SLIGHT — SIGNIFICANT CHANGE UP
AST SERPL-CCNC: 12 U/L — SIGNIFICANT CHANGE UP (ref 10–40)
BASOPHILS # BLD AUTO: 0 K/UL — SIGNIFICANT CHANGE UP (ref 0–0.2)
BASOPHILS NFR BLD AUTO: 0 % — SIGNIFICANT CHANGE UP (ref 0–2)
BILIRUB SERPL-MCNC: 0.2 MG/DL — SIGNIFICANT CHANGE UP (ref 0.2–1.2)
BUN SERPL-MCNC: 5 MG/DL — LOW (ref 7–23)
CALCIUM SERPL-MCNC: 8.6 MG/DL — SIGNIFICANT CHANGE UP (ref 8.4–10.5)
CHLORIDE SERPL-SCNC: 97 MMOL/L — SIGNIFICANT CHANGE UP (ref 96–108)
CO2 SERPL-SCNC: 31 MMOL/L — SIGNIFICANT CHANGE UP (ref 22–31)
CREAT SERPL-MCNC: 0.48 MG/DL — LOW (ref 0.5–1.3)
DACRYOCYTES BLD QL SMEAR: SLIGHT — SIGNIFICANT CHANGE UP
EGFR: 132 ML/MIN/1.73M2 — SIGNIFICANT CHANGE UP
EOSINOPHIL # BLD AUTO: 0.1 K/UL — SIGNIFICANT CHANGE UP (ref 0–0.5)
EOSINOPHIL NFR BLD AUTO: 0.9 % — SIGNIFICANT CHANGE UP (ref 0–6)
GLUCOSE SERPL-MCNC: 88 MG/DL — SIGNIFICANT CHANGE UP (ref 70–99)
HCT VFR BLD CALC: 24.9 % — LOW (ref 39–50)
HGB BLD-MCNC: 7.1 G/DL — LOW (ref 13–17)
HYPOCHROMIA BLD QL: SIGNIFICANT CHANGE UP
LYMPHOCYTES # BLD AUTO: 0.1 K/UL — LOW (ref 1–3.3)
LYMPHOCYTES # BLD AUTO: 0.9 % — LOW (ref 13–44)
MACROCYTES BLD QL: SLIGHT — SIGNIFICANT CHANGE UP
MAGNESIUM SERPL-MCNC: 2.1 MG/DL — SIGNIFICANT CHANGE UP (ref 1.6–2.6)
MANUAL SMEAR VERIFICATION: SIGNIFICANT CHANGE UP
MCHC RBC-ENTMCNC: 24.2 PG — LOW (ref 27–34)
MCHC RBC-ENTMCNC: 28.5 GM/DL — LOW (ref 32–36)
MCV RBC AUTO: 85 FL — SIGNIFICANT CHANGE UP (ref 80–100)
MICROCYTES BLD QL: SLIGHT — SIGNIFICANT CHANGE UP
MONOCYTES # BLD AUTO: 0.8 K/UL — SIGNIFICANT CHANGE UP (ref 0–0.9)
MONOCYTES NFR BLD AUTO: 6.9 % — SIGNIFICANT CHANGE UP (ref 2–14)
MYELOCYTES NFR BLD: 0.9 % — HIGH (ref 0–0)
NEUTROPHILS # BLD AUTO: 10.53 K/UL — HIGH (ref 1.8–7.4)
NEUTROPHILS NFR BLD AUTO: 90.4 % — HIGH (ref 43–77)
OVALOCYTES BLD QL SMEAR: SLIGHT — SIGNIFICANT CHANGE UP
PHOSPHATE SERPL-MCNC: 4.3 MG/DL — SIGNIFICANT CHANGE UP (ref 2.5–4.5)
PLAT MORPH BLD: NORMAL — SIGNIFICANT CHANGE UP
PLATELET # BLD AUTO: 628 K/UL — HIGH (ref 150–400)
POIKILOCYTOSIS BLD QL AUTO: SLIGHT — SIGNIFICANT CHANGE UP
POLYCHROMASIA BLD QL SMEAR: SLIGHT — SIGNIFICANT CHANGE UP
POTASSIUM SERPL-MCNC: 3.5 MMOL/L — SIGNIFICANT CHANGE UP (ref 3.5–5.3)
POTASSIUM SERPL-SCNC: 3.5 MMOL/L — SIGNIFICANT CHANGE UP (ref 3.5–5.3)
PROT SERPL-MCNC: 6.6 G/DL — SIGNIFICANT CHANGE UP (ref 6–8.3)
RBC # BLD: 2.93 M/UL — LOW (ref 4.2–5.8)
RBC # FLD: 18 % — HIGH (ref 10.3–14.5)
RBC BLD AUTO: ABNORMAL
SODIUM SERPL-SCNC: 137 MMOL/L — SIGNIFICANT CHANGE UP (ref 135–145)
SPHEROCYTES BLD QL SMEAR: SLIGHT — SIGNIFICANT CHANGE UP
WBC # BLD: 11.65 K/UL — HIGH (ref 3.8–10.5)
WBC # FLD AUTO: 11.65 K/UL — HIGH (ref 3.8–10.5)

## 2024-07-28 PROCEDURE — 99233 SBSQ HOSP IP/OBS HIGH 50: CPT

## 2024-07-28 RX ORDER — LORATADINE 10 MG
17 TABLET,DISINTEGRATING ORAL
Refills: 0 | Status: DISCONTINUED | OUTPATIENT
Start: 2024-07-28 | End: 2024-08-06

## 2024-07-28 RX ORDER — LORAZEPAM 1 MG/1
1 TABLET ORAL ONCE
Refills: 0 | Status: DISCONTINUED | OUTPATIENT
Start: 2024-07-28 | End: 2024-07-29

## 2024-07-28 RX ORDER — MIRTAZAPINE 15 MG
7.5 TABLET ORAL AT BEDTIME
Refills: 0 | Status: DISCONTINUED | OUTPATIENT
Start: 2024-07-28 | End: 2024-07-31

## 2024-07-28 RX ORDER — POTASSIUM CHLORIDE 1500 MG/1
20 TABLET, EXTENDED RELEASE ORAL ONCE
Refills: 0 | Status: COMPLETED | OUTPATIENT
Start: 2024-07-28 | End: 2024-07-28

## 2024-07-28 RX ORDER — SENNOSIDES 8.6 MG/1
1 TABLET ORAL ONCE
Refills: 0 | Status: DISCONTINUED | OUTPATIENT
Start: 2024-07-28 | End: 2024-07-28

## 2024-07-28 RX ORDER — KETOROLAC TROMETHAMINE 10 MG
15 TABLET ORAL EVERY 8 HOURS
Refills: 0 | Status: DISCONTINUED | OUTPATIENT
Start: 2024-07-28 | End: 2024-07-29

## 2024-07-28 RX ORDER — HYDROMORPHONE HCL IN 0.9% NACL 0.2 MG/ML
1 PLASTIC BAG, INJECTION (ML) INTRAVENOUS
Refills: 0 | Status: DISCONTINUED | OUTPATIENT
Start: 2024-07-28 | End: 2024-07-29

## 2024-07-28 RX ORDER — SENNOSIDES 8.6 MG/1
2 TABLET ORAL AT BEDTIME
Refills: 0 | Status: DISCONTINUED | OUTPATIENT
Start: 2024-07-28 | End: 2024-08-06

## 2024-07-28 RX ADMIN — Medication 15 MILLIGRAM(S): at 18:41

## 2024-07-28 RX ADMIN — OXYCODONE HYDROCHLORIDE 15 MILLIGRAM(S): 30 TABLET ORAL at 20:45

## 2024-07-28 RX ADMIN — LIDOCAINE 5% 1 APPLICATION(S): 5 CREAM TOPICAL at 06:10

## 2024-07-28 RX ADMIN — Medication 15 MILLIGRAM(S): at 22:45

## 2024-07-28 RX ADMIN — Medication 1 MILLIGRAM(S): at 11:17

## 2024-07-28 RX ADMIN — Medication 10 MILLIGRAM(S): at 13:10

## 2024-07-28 RX ADMIN — Medication 15 MILLIGRAM(S): at 22:21

## 2024-07-28 RX ADMIN — Medication 1 MILLIGRAM(S): at 19:08

## 2024-07-28 RX ADMIN — ENOXAPARIN SODIUM 60 MILLIGRAM(S): 120 INJECTION SUBCUTANEOUS at 18:32

## 2024-07-28 RX ADMIN — OXYCODONE HYDROCHLORIDE 15 MILLIGRAM(S): 30 TABLET ORAL at 10:26

## 2024-07-28 RX ADMIN — PIPERACILLIN SODIUM, TAZOBACTAM SODIUM 25 GRAM(S): 3; .375 INJECTION, POWDER, LYOPHILIZED, FOR SOLUTION INTRAVENOUS at 22:21

## 2024-07-28 RX ADMIN — LIDOCAINE 5% 1 PATCH: 5 CREAM TOPICAL at 18:41

## 2024-07-28 RX ADMIN — LIDOCAINE 5% 1 PATCH: 5 CREAM TOPICAL at 01:30

## 2024-07-28 RX ADMIN — Medication 1000 MILLIGRAM(S): at 13:10

## 2024-07-28 RX ADMIN — Medication 1 MILLIGRAM(S): at 16:44

## 2024-07-28 RX ADMIN — Medication 1 MILLIGRAM(S): at 01:39

## 2024-07-28 RX ADMIN — Medication 15 MILLIGRAM(S): at 13:09

## 2024-07-28 RX ADMIN — Medication 1 MILLIGRAM(S): at 18:33

## 2024-07-28 RX ADMIN — Medication 1 MILLIGRAM(S): at 05:59

## 2024-07-28 RX ADMIN — Medication 1000 MILLIGRAM(S): at 06:10

## 2024-07-28 RX ADMIN — Medication 7.5 MILLIGRAM(S): at 22:22

## 2024-07-28 RX ADMIN — Medication 1 MILLIGRAM(S): at 09:16

## 2024-07-28 RX ADMIN — POTASSIUM CHLORIDE 20 MILLIEQUIVALENT(S): 1500 TABLET, EXTENDED RELEASE ORAL at 09:16

## 2024-07-28 RX ADMIN — BICTEGRAVIR SODIUM, EMTRICITABINE, AND TENOFOVIR ALAFENAMIDE FUMARATE 1 TABLET(S): 50; 200; 25 TABLET ORAL at 13:09

## 2024-07-28 RX ADMIN — Medication 10 MILLIGRAM(S): at 22:21

## 2024-07-28 RX ADMIN — Medication 1000 MILLIGRAM(S): at 16:44

## 2024-07-28 RX ADMIN — Medication 1 MILLIGRAM(S): at 21:14

## 2024-07-28 RX ADMIN — PIPERACILLIN SODIUM, TAZOBACTAM SODIUM 25 GRAM(S): 3; .375 INJECTION, POWDER, LYOPHILIZED, FOR SOLUTION INTRAVENOUS at 06:09

## 2024-07-28 RX ADMIN — ENOXAPARIN SODIUM 60 MILLIGRAM(S): 120 INJECTION SUBCUTANEOUS at 06:09

## 2024-07-28 RX ADMIN — LIDOCAINE 5% 1 PATCH: 5 CREAM TOPICAL at 15:10

## 2024-07-28 RX ADMIN — Medication 1 MILLIGRAM(S): at 13:09

## 2024-07-28 RX ADMIN — VANCOMYCIN HYDROCHLORIDE 250 MILLIGRAM(S): 5 INJECTION, POWDER, LYOPHILIZED, FOR SOLUTION INTRAVENOUS at 19:03

## 2024-07-28 RX ADMIN — Medication 10 MILLIGRAM(S): at 06:10

## 2024-07-28 RX ADMIN — PIPERACILLIN SODIUM, TAZOBACTAM SODIUM 25 GRAM(S): 3; .375 INJECTION, POWDER, LYOPHILIZED, FOR SOLUTION INTRAVENOUS at 15:10

## 2024-07-28 RX ADMIN — Medication 1 MILLIGRAM(S): at 21:29

## 2024-07-28 RX ADMIN — Medication 15 MILLIGRAM(S): at 06:10

## 2024-07-28 RX ADMIN — OXYCODONE HYDROCHLORIDE 15 MILLIGRAM(S): 30 TABLET ORAL at 20:14

## 2024-07-28 RX ADMIN — Medication 1 MILLIGRAM(S): at 02:39

## 2024-07-28 RX ADMIN — OXYCODONE HYDROCHLORIDE 15 MILLIGRAM(S): 30 TABLET ORAL at 15:04

## 2024-07-28 RX ADMIN — Medication 1 MILLIGRAM(S): at 04:59

## 2024-07-28 NOTE — PROGRESS NOTE ADULT - PROBLEM SELECTOR PLAN 1
CTAP -  larger gas-filled cavity in the more inferior portion of the mass, with a larger fistula to the perineum probable superimposed infection of the mass, greater extension of mass laterally though R pelvic muscles. Destruction of coccyx, sacrum, R ischium, posterior portion R acetabulum, ?possibility of OM superimposted upon lytic destruction from tumor.     - pending MRI  - Appreciate heme recs  - Pain mgmt w/ Methadone 10mg BID(?), Oxycodone 10 PO q4h PRN for mod pain, Dilaudid 0.5mg I V q4h PRN for breakthru pain.  - Followed by Dr. Julian and Graciela outpt  - 5/16/2024 completed radiation therapy to anus/pelvis and capecitabine CTAP -  larger gas-filled cavity in the more inferior portion of the mass, with a larger fistula to the perineum probable superimposed infection of the mass, greater extension of mass laterally though R pelvic muscles. Destruction of coccyx, sacrum, R ischium, posterior portion R acetabulum, ?possibility of OM superimposted upon lytic destruction from tumor.     - pending MRI  - heme rec following: no chemo in patient, consider protectomy or surgical intervention after improvement in clinical status.   - Pain mgmt w/ Methadone 10mg BID(?), Oxycodone 10 PO q4h PRN for mod pain, Dilaudid 0.5mg I V q4h PRN for breakthru pain.  - Followed by Dr. Julian and Graciela outpt  - 5/16/2024 completed radiation therapy to anus/pelvis and capecitabine CTAP -  larger gas-filled cavity in the more inferior portion of the mass, with a larger fistula to the perineum probable superimposed infection of the mass, greater extension of mass laterally though R pelvic muscles. Destruction of coccyx, sacrum, R ischium, posterior portion R acetabulum, ?possibility of OM superimposted upon lytic destruction from tumor.     - pending MRI, will require premedication with Ativan  - heme rec following: no chemo in patient, consider protectomy or surgical intervention after improvement in clinical status.   - Pain mgmt w/ Methadone 10mg BID(?), Oxycodone 10 PO q4h PRN for mod pain, Dilaudid 0.5mg I V q4h PRN for breakthru pain.  - Followed by Dr. Julian and Graciela outpt  - 5/16/2024 completed radiation therapy to anus/pelvis and capecitabine

## 2024-07-29 DIAGNOSIS — D64.9 ANEMIA, UNSPECIFIED: ICD-10-CM

## 2024-07-29 LAB
4/8 RATIO: 0.18 RATIO — LOW (ref 0.9–3.6)
ABS CD8: 259 CELLS/UL — SIGNIFICANT CHANGE UP (ref 142–740)
ALBUMIN SERPL ELPH-MCNC: 2.8 G/DL — LOW (ref 3.3–5)
ALP SERPL-CCNC: 222 U/L — HIGH (ref 40–120)
ALT FLD-CCNC: 13 U/L — SIGNIFICANT CHANGE UP (ref 10–45)
ANION GAP SERPL CALC-SCNC: 9 MMOL/L — SIGNIFICANT CHANGE UP (ref 5–17)
ANISOCYTOSIS BLD QL: SLIGHT — SIGNIFICANT CHANGE UP
AST SERPL-CCNC: 9 U/L — LOW (ref 10–40)
BASOPHILS # BLD AUTO: 0 K/UL — SIGNIFICANT CHANGE UP (ref 0–0.2)
BASOPHILS NFR BLD AUTO: 0 % — SIGNIFICANT CHANGE UP (ref 0–2)
BILIRUB SERPL-MCNC: 0.2 MG/DL — SIGNIFICANT CHANGE UP (ref 0.2–1.2)
BUN SERPL-MCNC: 5 MG/DL — LOW (ref 7–23)
CALCIUM SERPL-MCNC: 8.8 MG/DL — SIGNIFICANT CHANGE UP (ref 8.4–10.5)
CD3 BLASTS SPEC-ACNC: 305 CELLS/UL — LOW (ref 672–1870)
CD3 BLASTS SPEC-ACNC: 76 % — SIGNIFICANT CHANGE UP (ref 59–83)
CD4 %: 12 % — LOW (ref 30–62)
CD8 %: 64 % — HIGH (ref 12–36)
CHLORIDE SERPL-SCNC: 99 MMOL/L — SIGNIFICANT CHANGE UP (ref 96–108)
CO2 SERPL-SCNC: 28 MMOL/L — SIGNIFICANT CHANGE UP (ref 22–31)
CREAT SERPL-MCNC: 0.48 MG/DL — LOW (ref 0.5–1.3)
DACRYOCYTES BLD QL SMEAR: SLIGHT — SIGNIFICANT CHANGE UP
EGFR: 132 ML/MIN/1.73M2 — SIGNIFICANT CHANGE UP
EOSINOPHIL # BLD AUTO: 0 K/UL — SIGNIFICANT CHANGE UP (ref 0–0.5)
EOSINOPHIL NFR BLD AUTO: 0 % — SIGNIFICANT CHANGE UP (ref 0–6)
FERRITIN SERPL-MCNC: 603 NG/ML — HIGH (ref 30–400)
GIANT PLATELETS BLD QL SMEAR: PRESENT — SIGNIFICANT CHANGE UP
GLUCOSE SERPL-MCNC: 103 MG/DL — HIGH (ref 70–99)
HCT VFR BLD CALC: 23.8 % — LOW (ref 39–50)
HCT VFR BLD CALC: 26.9 % — LOW (ref 39–50)
HGB BLD-MCNC: 7 G/DL — CRITICAL LOW (ref 13–17)
HGB BLD-MCNC: 8 G/DL — LOW (ref 13–17)
HIV-1 VIRAL LOAD RESULT: SIGNIFICANT CHANGE UP
HIV1 RNA # SERPL NAA+PROBE: SIGNIFICANT CHANGE UP COPIES/ML
HIV1 RNA SER-IMP: SIGNIFICANT CHANGE UP
HIV1 RNA SERPL NAA+PROBE-ACNC: SIGNIFICANT CHANGE UP
HIV1 RNA SERPL NAA+PROBE-LOG#: SIGNIFICANT CHANGE UP LG COP/ML
HYPOCHROMIA BLD QL: SIGNIFICANT CHANGE UP
IRON SATN MFR SERPL: 14 % — LOW (ref 16–55)
IRON SATN MFR SERPL: 17 UG/DL — LOW (ref 45–165)
LYMPHOCYTES # BLD AUTO: 0.21 K/UL — LOW (ref 1–3.3)
LYMPHOCYTES # BLD AUTO: 1.7 % — LOW (ref 13–44)
MAGNESIUM SERPL-MCNC: 2.2 MG/DL — SIGNIFICANT CHANGE UP (ref 1.6–2.6)
MANUAL SMEAR VERIFICATION: SIGNIFICANT CHANGE UP
MCHC RBC-ENTMCNC: 24.3 PG — LOW (ref 27–34)
MCHC RBC-ENTMCNC: 25.2 PG — LOW (ref 27–34)
MCHC RBC-ENTMCNC: 29.4 GM/DL — LOW (ref 32–36)
MCHC RBC-ENTMCNC: 29.7 GM/DL — LOW (ref 32–36)
MCV RBC AUTO: 82.6 FL — SIGNIFICANT CHANGE UP (ref 80–100)
MCV RBC AUTO: 84.6 FL — SIGNIFICANT CHANGE UP (ref 80–100)
MONOCYTES # BLD AUTO: 0.42 K/UL — SIGNIFICANT CHANGE UP (ref 0–0.9)
MONOCYTES NFR BLD AUTO: 3.5 % — SIGNIFICANT CHANGE UP (ref 2–14)
NEUTROPHILS # BLD AUTO: 11.46 K/UL — HIGH (ref 1.8–7.4)
NEUTROPHILS NFR BLD AUTO: 93.9 % — HIGH (ref 43–77)
NEUTS BAND # BLD: 0.9 % — SIGNIFICANT CHANGE UP (ref 0–8)
NRBC # BLD: 0 /100 WBCS — SIGNIFICANT CHANGE UP (ref 0–0)
OVALOCYTES BLD QL SMEAR: SLIGHT — SIGNIFICANT CHANGE UP
PHOSPHATE SERPL-MCNC: 3.8 MG/DL — SIGNIFICANT CHANGE UP (ref 2.5–4.5)
PLAT MORPH BLD: ABNORMAL
PLATELET # BLD AUTO: 606 K/UL — HIGH (ref 150–400)
PLATELET # BLD AUTO: 636 K/UL — HIGH (ref 150–400)
POIKILOCYTOSIS BLD QL AUTO: SLIGHT — SIGNIFICANT CHANGE UP
POLYCHROMASIA BLD QL SMEAR: SLIGHT — SIGNIFICANT CHANGE UP
POTASSIUM SERPL-MCNC: 3.6 MMOL/L — SIGNIFICANT CHANGE UP (ref 3.5–5.3)
POTASSIUM SERPL-SCNC: 3.6 MMOL/L — SIGNIFICANT CHANGE UP (ref 3.5–5.3)
PROT SERPL-MCNC: 6.8 G/DL — SIGNIFICANT CHANGE UP (ref 6–8.3)
RBC # BLD: 2.88 M/UL — LOW (ref 4.2–5.8)
RBC # BLD: 3.18 M/UL — LOW (ref 4.2–5.8)
RBC # FLD: 18.4 % — HIGH (ref 10.3–14.5)
RBC # FLD: 18.7 % — HIGH (ref 10.3–14.5)
RBC BLD AUTO: ABNORMAL
SODIUM SERPL-SCNC: 136 MMOL/L — SIGNIFICANT CHANGE UP (ref 135–145)
T-CELL CD4 SUBSET PNL BLD: 47 CELLS/UL — LOW (ref 489–1457)
TIBC SERPL-MCNC: 121 UG/DL — LOW (ref 220–430)
UIBC SERPL-MCNC: 104 UG/DL — LOW (ref 110–370)
VANCOMYCIN TROUGH SERPL-MCNC: <4 UG/ML — LOW (ref 10–20)
WBC # BLD: 12.09 K/UL — HIGH (ref 3.8–10.5)
WBC # BLD: 12.26 K/UL — HIGH (ref 3.8–10.5)
WBC # FLD AUTO: 12.09 K/UL — HIGH (ref 3.8–10.5)
WBC # FLD AUTO: 12.26 K/UL — HIGH (ref 3.8–10.5)

## 2024-07-29 PROCEDURE — 99222 1ST HOSP IP/OBS MODERATE 55: CPT

## 2024-07-29 PROCEDURE — 99233 SBSQ HOSP IP/OBS HIGH 50: CPT

## 2024-07-29 PROCEDURE — 72197 MRI PELVIS W/O & W/DYE: CPT | Mod: 26

## 2024-07-29 PROCEDURE — 99233 SBSQ HOSP IP/OBS HIGH 50: CPT | Mod: GC

## 2024-07-29 RX ORDER — VANCOMYCIN HYDROCHLORIDE 5 G/100ML
1000 INJECTION, POWDER, LYOPHILIZED, FOR SOLUTION INTRAVENOUS EVERY 8 HOURS
Refills: 0 | Status: DISCONTINUED | OUTPATIENT
Start: 2024-07-29 | End: 2024-07-30

## 2024-07-29 RX ORDER — HYDROMORPHONE HCL IN 0.9% NACL 0.2 MG/ML
1 PLASTIC BAG, INJECTION (ML) INTRAVENOUS
Refills: 0 | Status: DISCONTINUED | OUTPATIENT
Start: 2024-07-29 | End: 2024-08-01

## 2024-07-29 RX ORDER — KETOROLAC TROMETHAMINE 10 MG
15 TABLET ORAL EVERY 8 HOURS
Refills: 0 | Status: DISCONTINUED | OUTPATIENT
Start: 2024-07-29 | End: 2024-07-31

## 2024-07-29 RX ORDER — KETOROLAC TROMETHAMINE 10 MG
15 TABLET ORAL EVERY 8 HOURS
Refills: 0 | Status: DISCONTINUED | OUTPATIENT
Start: 2024-07-29 | End: 2024-07-29

## 2024-07-29 RX ORDER — SULFAMETHOXAZOLE AND TRIMETHOPRIM 400; 80 MG/1; MG/1
1 TABLET ORAL EVERY 24 HOURS
Refills: 0 | Status: DISCONTINUED | OUTPATIENT
Start: 2024-07-29 | End: 2024-08-06

## 2024-07-29 RX ORDER — METHADONE HCL 10 MG
15 TABLET ORAL EVERY 8 HOURS
Refills: 0 | Status: DISCONTINUED | OUTPATIENT
Start: 2024-07-29 | End: 2024-08-02

## 2024-07-29 RX ORDER — VANCOMYCIN HYDROCHLORIDE 5 G/100ML
1250 INJECTION, POWDER, LYOPHILIZED, FOR SOLUTION INTRAVENOUS EVERY 12 HOURS
Refills: 0 | Status: DISCONTINUED | OUTPATIENT
Start: 2024-07-29 | End: 2024-07-29

## 2024-07-29 RX ORDER — VANCOMYCIN HYDROCHLORIDE 5 G/100ML
1250 INJECTION, POWDER, LYOPHILIZED, FOR SOLUTION INTRAVENOUS ONCE
Refills: 0 | Status: DISCONTINUED | OUTPATIENT
Start: 2024-07-29 | End: 2024-07-29

## 2024-07-29 RX ADMIN — Medication 20 MILLILITER(S): at 18:27

## 2024-07-29 RX ADMIN — SULFAMETHOXAZOLE AND TRIMETHOPRIM 1 TABLET(S): 400; 80 TABLET ORAL at 07:59

## 2024-07-29 RX ADMIN — VANCOMYCIN HYDROCHLORIDE 166.67 MILLIGRAM(S): 5 INJECTION, POWDER, LYOPHILIZED, FOR SOLUTION INTRAVENOUS at 10:09

## 2024-07-29 RX ADMIN — Medication 1 MILLIGRAM(S): at 10:32

## 2024-07-29 RX ADMIN — Medication 10 MILLIGRAM(S): at 13:05

## 2024-07-29 RX ADMIN — SENNOSIDES 2 TABLET(S): 8.6 TABLET ORAL at 21:03

## 2024-07-29 RX ADMIN — Medication 20 MILLILITER(S): at 10:09

## 2024-07-29 RX ADMIN — BICTEGRAVIR SODIUM, EMTRICITABINE, AND TENOFOVIR ALAFENAMIDE FUMARATE 1 TABLET(S): 50; 200; 25 TABLET ORAL at 11:33

## 2024-07-29 RX ADMIN — Medication 1 MILLIGRAM(S): at 19:29

## 2024-07-29 RX ADMIN — LIDOCAINE 5% 1 PATCH: 5 CREAM TOPICAL at 13:05

## 2024-07-29 RX ADMIN — Medication 15 MILLIGRAM(S): at 14:32

## 2024-07-29 RX ADMIN — OXYCODONE HYDROCHLORIDE 15 MILLIGRAM(S): 30 TABLET ORAL at 20:44

## 2024-07-29 RX ADMIN — Medication 15 MILLIGRAM(S): at 21:02

## 2024-07-29 RX ADMIN — Medication 1 MILLIGRAM(S): at 03:01

## 2024-07-29 RX ADMIN — Medication 10 MILLIGRAM(S): at 06:23

## 2024-07-29 RX ADMIN — Medication 1 MILLIGRAM(S): at 06:50

## 2024-07-29 RX ADMIN — Medication 15 MILLIGRAM(S): at 13:59

## 2024-07-29 RX ADMIN — Medication 10 MILLIGRAM(S): at 11:33

## 2024-07-29 RX ADMIN — Medication 7.5 MILLIGRAM(S): at 21:02

## 2024-07-29 RX ADMIN — Medication 20 MILLILITER(S): at 02:29

## 2024-07-29 RX ADMIN — LIDOCAINE 5% 1 PATCH: 5 CREAM TOPICAL at 19:31

## 2024-07-29 RX ADMIN — VANCOMYCIN HYDROCHLORIDE 250 MILLIGRAM(S): 5 INJECTION, POWDER, LYOPHILIZED, FOR SOLUTION INTRAVENOUS at 23:19

## 2024-07-29 RX ADMIN — LORAZEPAM 1 MILLIGRAM(S): 1 TABLET ORAL at 21:41

## 2024-07-29 RX ADMIN — Medication 1 MILLIGRAM(S): at 03:31

## 2024-07-29 RX ADMIN — Medication 1 MILLIGRAM(S): at 19:31

## 2024-07-29 RX ADMIN — Medication 1 MILLIGRAM(S): at 14:36

## 2024-07-29 RX ADMIN — Medication 1 MILLIGRAM(S): at 00:25

## 2024-07-29 RX ADMIN — PIPERACILLIN SODIUM, TAZOBACTAM SODIUM 25 GRAM(S): 3; .375 INJECTION, POWDER, LYOPHILIZED, FOR SOLUTION INTRAVENOUS at 06:23

## 2024-07-29 RX ADMIN — Medication 15 MILLIGRAM(S): at 21:20

## 2024-07-29 RX ADMIN — Medication 1 MILLIGRAM(S): at 08:55

## 2024-07-29 RX ADMIN — ENOXAPARIN SODIUM 60 MILLIGRAM(S): 120 INJECTION SUBCUTANEOUS at 06:23

## 2024-07-29 RX ADMIN — Medication 1 MILLIGRAM(S): at 23:30

## 2024-07-29 RX ADMIN — Medication 1 MILLIGRAM(S): at 18:26

## 2024-07-29 RX ADMIN — Medication 15 MILLIGRAM(S): at 05:45

## 2024-07-29 RX ADMIN — Medication 1 MILLIGRAM(S): at 23:18

## 2024-07-29 RX ADMIN — Medication 20 MILLILITER(S): at 06:23

## 2024-07-29 RX ADMIN — PIPERACILLIN SODIUM, TAZOBACTAM SODIUM 25 GRAM(S): 3; .375 INJECTION, POWDER, LYOPHILIZED, FOR SOLUTION INTRAVENOUS at 18:27

## 2024-07-29 RX ADMIN — Medication 1 MILLIGRAM(S): at 11:33

## 2024-07-29 RX ADMIN — Medication 15 MILLIGRAM(S): at 21:03

## 2024-07-29 RX ADMIN — Medication 1 MILLIGRAM(S): at 15:52

## 2024-07-29 RX ADMIN — Medication 15 MILLIGRAM(S): at 05:14

## 2024-07-29 RX ADMIN — Medication 1 MILLIGRAM(S): at 06:22

## 2024-07-29 RX ADMIN — ENOXAPARIN SODIUM 60 MILLIGRAM(S): 120 INJECTION SUBCUTANEOUS at 18:28

## 2024-07-29 RX ADMIN — Medication 20 MILLILITER(S): at 21:03

## 2024-07-29 RX ADMIN — OXYCODONE HYDROCHLORIDE 15 MILLIGRAM(S): 30 TABLET ORAL at 04:49

## 2024-07-29 NOTE — PROGRESS NOTE ADULT - ASSESSMENT
44 year old male with PMH HIV, invasive anorectal squamous cell carcinoma s/p chemoradiation (finished 5/16/24, s/p ostomy 6/15/24, polysubstance abuse, syphilis, anemia and abdominal MRSA 2021 who presenting for generalized weakness, hip pain and shoulders bilaterally, and pain at the sites of incisions on the anus, found to have sepsis 2/2 to possible pelvic osteomyelitis and possible DVT on CT scan. Oncology is consulted for hx of anal cancer.    #Bulky, locally advanced anal cancer  C/F osteomyelitis   - previously discussed in multidisciplinary format - chemoradiation upfront  - started RT to anus/pelvis and capecitabine(1500mg/1000mg) on 4/3/24, received mitomycin on 4/5/24, completed RT on 5/16/24  - s/p ostomy 6/15/24  - recent 7/25/24 CT A/P with invasion of mass into right posterior pelvis/superimposed infection  - follow up MRI pelvis - likely will need bone biopsy vs drainage to assess for osteomyelitis +/- ID consult if/when cultures result to establish length of treatment for possible osteomyelitis   - appreciate palliative care management of symptom control and GOC  - may consider restaging scan (last CT chest was prior to radiation therapy)  - currently given concern for active infection and poor performance status, no plans for chemotherapy at this time  - recommend CRC consult (sees Dr. Cortez outpatient) to see if explore if there is a surgical treatment for source control   - immunotherapy likely will not be effective in patient's immunosuppressed status    #Deep Venous Thrombosis  - no apparent size asymmetry on exam today, though concern seen on CT A/P from 7/25 - reports improved from prior (?) though no prior clot noted on previous scan - will follow up with radiologist if this was present before  - ultrasound lower extremity 7/27/24 - deep venous thrombosis of the left lower extremity above and below the knee. No evidence of deep venous thrombosis in the right lower extremity    #Anemia  #Thrombocytosis   - likely reactive in the setting of tumor/infection  - iron studies not consistent with iron deficiency  - would consider other nutritional workup B12, folate, and also reticulocyte count    Oncology will continue to follow. Recommendations are considered final after attending attestation

## 2024-07-29 NOTE — PROGRESS NOTE ADULT - PROBLEM SELECTOR PLAN 4
3/2024 CD4 251    - Continue home biktarvy  - CD4 count 47   - Started prophylactic bactrim   - ID consulted, appreciate recs

## 2024-07-29 NOTE — CONSULT NOTE ADULT - ATTENDING COMMENTS
43 yo M with AIDS (diagnosed 2003, no OIs except zoster;  CD4 47, 12%; VL UD 7/28 on BIC/FTC/TAF), polysubstance use d/o/IVDU, invasive anorectal squamous cell CA (12/23;  neoadjuvant last CRT 1 month ago) with bony involvement of sacrum/acetabulum s/p colostomy creation (6/15/24), h/o tumor fever, h/o perianal fistula, h/o syphilis admitted 7/26 with weakness.  He had been having low grade fevers (Tm 99.9), rigors and sweats for 1 week, no change in RLQ pain. In the ED, he was afebrile, , BP 98/63.  Labs with WBC 22.6 (no diff), Na 126, Cl 90, CO2 34, T bili 2, alk phos 302, AST 39, ALT 28, tox screen positive for benzo, THC, amphetamine, opiate and methadone, FLUVID neg.  CT A/P showed more superior solid portion of mass has decreased in size from 9.1 X 6.7 to 8.3 X 4.9 cm.  Larger gas-filled cavity in the more inferior portion of the mass with a larger fistula to the perineum, greater extension of cavitary portion of mass laterally to the R through the R pelvic mm with small amt of fluid in both R lateral and L lateral portions of the cavity raising suspicion of superimposed infection.  Destruction of coccyx, sacrum and R ischium – can be OM superimposed upon lytic destruction.  Also seen were probable DVT of LLE veins and asymmetric thickening of R lateral bladder wall – secondary to adjacent inflammatory process, prior RT or cystitis. He was started on vanc and pip-tazo.   He has remained afebrile.  On exam, he is cachectic, chronically ill-appearing.  He has firm abd with tenderness to palpation max RUQ. WBC 12.1 with 94% PMNs, blood cultures 7/25 NGTD.  Imp:  Infected tumor w/ known bony involvement, may have OM.  However, will not be able to clear infection in this setting and AIDS with CD4 47 will not help.  Can f/u blood cultures from 7/24, obtain MRI and biopsy by IR if within GOC, continue vanc and pip-tazo as above but long course IV antibiotic therapy for cure of infection is futile unless he has surgery.  He likely would be considered a poor surgical candidate.  Would continue BIC/FTC/TAF, agree with TMP/SX for PJP prophylaxis.  Discussed with Dr. Monae.  Will follow with you, team 1.

## 2024-07-29 NOTE — PROGRESS NOTE ADULT - PROVIDER SPECIALTY LIST ADULT
Internal Medicine Rest today and tomorrow.  No heavy lifting over 15 pounds in the next 10 days.  Adjust work duties accordingly.  I recommend following up early this upcoming week with occupational health.    There is a chance that this gets worse before getting better, return to the ER sooner for increasing pain/weakness/numbness, change in bowel or bladder, or any turn for the worse.

## 2024-07-29 NOTE — PROGRESS NOTE ADULT - ASSESSMENT
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 Presenting again w/ worsening weakness and generalized pain, pow/ CT scan showing worsening pelvic bony destruction, w/ SIRS 2/4 likey 2/2 OM vs other infectious etiology. Admitted for further mgmt.

## 2024-07-29 NOTE — PROGRESS NOTE ADULT - PROBLEM SELECTOR PLAN 1
CTAP -  larger gas-filled cavity in the more inferior portion of the mass, with a larger fistula to the perineum probable superimposed infection of the mass, greater extension of mass laterally though R pelvic muscles. Destruction of coccyx, sacrum, R ischium, posterior portion R acetabulum, ?possibility of OM superimposed upon lytic destruction from tumor.     - pending MRI, will require premedication with Ativan  - Surgery consulted  - Spoke to Dr. Owens  - heme rec following: no chemo in patient, consider protectomy or surgical intervention after improvement in clinical status.   - Pain mgmt w/ Methadone 10mg BID(?), Oxycodone 10 PO q4h PRN for mod pain, Dilaudid 0.5mg I V q4h PRN for breakthru pain.  - Followed by Dr. Julian and Graciela outpt  - 5/16/2024 completed radiation therapy to anus/pelvis and capecitabine

## 2024-07-29 NOTE — SBIRT NOTE ADULT - NSSBIRTDRGNOACTINTDET_GEN_A_CORE
No intervention applied. Patient declined usage of drugs other than those required for medical reasons.

## 2024-07-29 NOTE — CONSULT NOTE ADULT - SUBJECTIVE AND OBJECTIVE BOX
Consultation Requested by:    Patient is a 44y old  Male who presents with a chief complaint of Weakness (29 Jul 2024 15:45)    HPI:  Mr. Sanchez is a 44 year old male who came to [facility] ED with complaint of generalized weakness. He has PMHx of  invasive anorectal squamous cell carcinoma (last received chemo and XRT a month ago, dx'd 12/23, pending a surgical intervention) with bony involvement  to sacrum/acetabulum (followed by Eliel/Graciela), HIV (biktarvy), anemia, baseline tachycardia attributed to prior heavy drug use (crystal meth, ecstasy), nicotine dependence (a pack a week) and depression, presenting for generalized weakness, pain in the hips and shoulders BL, and pain at the sites of incisions on anus. Too weak to stand or move. Pain and weakness started 3-4 days ago, gradually, persistently, and worsening, now bedbound. Pain most profound where significant muscle loss has occurred - BL hips, shoulders, buttocks/sacral area. Decreased appetite/decreased PO intake for last year, associated weight loss and muscle loss. Walks at baseline with walker, now worsening mostly bedbound. Denies fatigue, dizziness, or lightheadedness numbness, tingling, pain or discomfort, SOB, weight loss or gain, or sleep patterns. Nothing seems to make it better or worse. No recent changes in medications or substance use. Has not experienced similar weakness in the past.  Patient denies emesis, shortness of breath or chest pain. Patient endorsed longstanding history of perianal abscesses but worsening pain and drainage, with no fevers. Denies nausea/vomiting. Recently seen in June 12, 2024 for perianal abscesses and fistulas at [facility]. On 6/15 he was s/p colostomy creation and perineal washout     PMHx: Anorectal cancer, HIV, nicotine dependence, depression, opioid use disorder (on methadone)  PSHx: Anorectal biopsy   Colonoscopy/EGD: Denied  Allergies: NKDA  Social Hx: Smoking a pack of cigarettes a week. +Marijuana daily Denies EtOH (2 years sober) or recreational drug use   Meds: Biktarvy, Escitalopram 10 mg q24 hours,  Methadone 10 [id number], Oxycodone 10 mg Q4,     In the ED   Vitals: T 98.1, , /73, RR 16, SpO2 97% on room air   Labs: WBC 22.64, , Hgb 7.8 (8.8 a month ago), Plt 654, Na 126, ,  UTOX + : Benzos, THC, amphetamine, opiates, methadone   CTAP -  larger gas-filled cavity in the more inferior portion of the mass, with a larger fistula to the perineum probable superimposed infection of the mass, greater extension of mass laterally though R pelvic muscles. Destruction of coccyx, sacrum, R ischium, posterior portion R acetabulum, ?possibility of OM superimposted upon lytic destruction from tumor. Also proable DVT of LLE veins.   Interventions: Zosyn, Vancomycin, surgical consult - no additional surgical intervention at this point,   -----------------------  Subjective: Patient reports he was diagnosed w/ HIV 2003 poor compliance w/ biktarvy until he was diagnosed w/ cancer on 12/2023 after which he started taking his biktarvy everyday. Reports leg pain w/ walk and lumbar back pain. Smokes 3-5 cigarettes/day for ~10yrs, No alcohol use or IVUD since 2003, lives by self, no recent travel, last travled to [place] in 2018. NO pets, no children. Never had a anal pap smear.      (26 Jul 2024 10:45)    Allergies    No Known Allergies    Intolerances      Antimicrobials:  bictegravir 50 mG/emtricitabine 200 mG/tenofovir alafenamide 25 mG (BIKTARVY) 1 Tablet(s) Oral daily  piperacillin/tazobactam IVPB.. 3.375 Gram(s) IV Intermittent every 8 hours  trimethoprim  160 mG/sulfamethoxazole 800 mG 1 Tablet(s) Oral every 24 hours  vancomycin  IVPB 1250 milliGRAM(s) IV Intermittent every 12 hours      Other Medications:  acetaminophen     Tablet .. 1000 milliGRAM(s) Oral every 8 hours  enoxaparin Injectable 60 milliGRAM(s) SubCutaneous every 12 hours  escitalopram 10 milliGRAM(s) Oral daily  HYDROmorphone  Injectable 1 milliGRAM(s) IV Push every 3 hours PRN  ketorolac   Injectable 15 milliGRAM(s) IV Push every 8 hours  lactated ringers. 1000 milliLiter(s) IV Continuous <Continuous>  lidocaine   4% Patch 1 Patch Transdermal every 24 hours  lidocaine 2% Jelly 20 milliLiter(s) IntraUrethral every 4 hours  LORazepam   Injectable 1 milliGRAM(s) IV Push once PRN  methadone    Tablet 15 milliGRAM(s) Oral every 8 hours  mirtazapine 7.5 milliGRAM(s) Oral at bedtime  oxyCODONE    IR 15 milliGRAM(s) Oral every 4 hours PRN  polyethylene glycol 3350 17 Gram(s) Oral two times a day  senna 2 Tablet(s) Oral at bedtime      FAMILY HISTORY:  FH: CAD (coronary artery disease) (Mother)      PAST MEDICAL & SURGICAL HISTORY:  HIV (human immunodeficiency virus infection)      Abscess      IVDU (intravenous drug user)      Anemia      Rectal cancer      H/O rectal polypectomy        SOCIAL HISTORY:    IMMUNIZATIONS  [] Up to Date		[] Not Up to Date:  Recent Immunizations:	[] No	[] Yes:    Daily     Daily   Head Circumference:  Vital Signs Last 24 Hrs  T(C): 37 (29 Jul 2024 15:09), Max: 37 (29 Jul 2024 15:09)  T(F): 98.6 (29 Jul 2024 15:09), Max: 98.6 (29 Jul 2024 15:09)  HR: 99 (29 Jul 2024 15:09) (99 - 120)  BP: 120/78 (29 Jul 2024 15:09) (113/72 - 120/78)  BP(mean): --  RR: 18 (29 Jul 2024 15:09) (16 - 18)  SpO2: 100% (29 Jul 2024 15:09) (97% - 100%)    Parameters below as of 29 Jul 2024 15:09  Patient On (Oxygen Delivery Method): room air        PHYSICAL EXAM  T(C): 37 (07-29-24 @ 15:09), Max: 37 (07-29-24 @ 15:09)  HR: 99 (07-29-24 @ 15:09) (99 - 120)  BP: 120/78 (07-29-24 @ 15:09) (113/72 - 120/78)  RR: 18 (07-29-24 @ 15:09) (16 - 18)  SpO2: 100% (07-29-24 @ 15:09) (97% - 100%)    General: Thin cachetic male.   Cardio: RRR  Resp: lungs CTAB, no cough/wheezes/rales/rhonchi  Abdo: soft, NT,ND. Colostomy bag w/ brown stool.    Extremities: WWP  Vasc: 2+ radial and DP pulses b/l  Neuro: A&Ox3  Skin: dry, intact, no visible jaundice   MSK: Rt sided femur TTP. Lumbar spinal TTP.       Lab Results:                        8.0    12.26 )-----------( 606      ( 29 Jul 2024 16:33 )             26.9     07-29    136  |  99  |  5<L>  ----------------------------<  103<H>  3.6   |  28  |  0.48<L>    Ca    8.8      29 Jul 2024 07:01  Phos  3.8     07-29  Mg     2.2     07-29    TPro  6.8  /  Alb  2.8<L>  /  TBili  0.2  /  DBili  x   /  AST  9<L>  /  ALT  13  /  AlkPhos  222<H>  07-29    LIVER FUNCTIONS - ( 29 Jul 2024 07:01 )  Alb: 2.8 g/dL / Pro: 6.8 g/dL / ALK PHOS: 222 U/L / ALT: 13 U/L / AST: 9 U/L / GGT: x             Urinalysis Basic - ( 29 Jul 2024 07:01 )    Color: x / Appearance: x / SG: x / pH: x  Gluc: 103 mg/dL / Ketone: x  / Bili: x / Urobili: x   Blood: x / Protein: x / Nitrite: x   Leuk Esterase: x / RBC: x / WBC x   Sq Epi: x / Non Sq Epi: x / Bacteria: x        MICROBIOLOGY    [] Pathology slides reviewed and/or discussed with pathologist  [] Microbiology findings discussed with microbiologist or slides reviewed  [] Images erviewed with radiologist  [] Case discussed with an attending physician in addition to the patient's primary physician  [] Records, reports from outside [facility] reviewed    [] Patient requires continued monitoring for:  [] Total time spent by attending physician: __ minutes, excluding procedure time.

## 2024-07-29 NOTE — CONSULT NOTE ADULT - ASSESSMENT
45 y/o M w/ Hx of HIV previously poor compliance prior to being diagnosed w/ invasive anorectal SCC w/ involvement of sacrum/acetetabulum. 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 This admission had a CTAP showing locally advance anal mass, gas filled cavitary showing increase size which extends to musculature of Rt posteiror pelvis, with superimposed infected mass and also c/f OM. Ortho was consulted and no intervention from them and recommended IR Bx. Heme/Onc consulted and no therapy offered. Patient was continued on biktarvy, started on zosyn 3.375mg, bactrim, and vancomycin. ID was consulted for recommendations on superimposed infected mass and c/f OM.    #Superimposed infection of mass  #c/f OM  - x-ray of femur/hip wnl, CXR showing hyperinflation  #HIV   - Patient w/ previous CD4 >200 and now CD4 this admission 47. Unlikely d/t poor compliance as VLUD. Acute decrease in Cd4 could be d/t infection.     Cultures:  Bcx NGTD x2    Recommendations:  - c/w Vancomycin 1g q8  - c/w Zosyn 3.375mg  - c/w Biktarvy  - c/w Bactrim for PCP ppx  - Agree w/ IR Bx and MRI if its within patient's GOC.     ID team will continue to follow   Discussed w/ Dr. 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 This admission had a CTAP showing locally advance anal mass, gas filled cavitary showing increase size which extends to musculature of Rt posteiror pelvis, with superimposed infected mass and also c/f OM. Ortho was consulted and no intervention from them and recommended IR Bx. Heme/Onc consulted and no therapy offered. Patient was continued on biktarvy, started on zosyn 3.375mg, bactrim, and vancomycin. ID was consulted for recommendations on superimposed infected mass and c/f OM.    #Superimposed infection of mass  #c/f OM  - x-ray of femur/hip wnl, CXR showing hyperinflation  #HIV   - Patient w/ previous CD4 >200 and now CD4 this admission 47. Unlikely d/t poor compliance as VLUD. Acute decrease in Cd4 could be d/t infection.     Cultures:  Bcx NGTD x2    Recommendations:  - Change Vancomycin to 1g IV q8 - trough prior to 4th administration at that dose  - c/w Zosyn 3.375mg  - c/w Biktarvy  - c/w Bactrim for PCP ppx  - Agree w/ IR Bx and MRI if its within patient's GOC.     ID team will continue to follow   Discussed w/ Dr. Reyes

## 2024-07-29 NOTE — CONSULT NOTE ADULT - SUBJECTIVE AND OBJECTIVE BOX
44M, PMHx HIV, invasive anorectal SCC w/ bony metastasis to sacrum/acetabulum (s/p chemoradiation, capecitabine, mitomycin finished on 5/16/24, s/p diverting colostomy 6/15/24 w/ Dana, pending re-staging/further surgical intervention at a later time), HIV (biktarvy) admitted to the medicine service for progressive weakness, pain to B/L hips, shoulders, buttocks/sacral area, as well as decreased appetite/decreased PO intake over the past year w/ associated with loss (ambulates w/ walker at baseline) pt was tachycardic to 120s w/ WBC >22 at time of admission w/ CT scan demonstrating destruction of coccyx, sacrum, R ischium, posterior portion R acetabulum w/ concern for 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 Follows w/ Dr. Miller from heme-onc, rad onc Dr. Owens. Duplex on 7/27 demonstrated DVT of LT lower extremity above/below the knee, none on RT, being tx w/ Lovenox, blood cultures negative thus far, tachycardic during stay to 101-116, BL WNL, satting well on RA, WBC 12 (22 on admission), Hgb 7 (7.8 on admission), Cr 0.48 (0.68 on admission), CD4 count on 7/28, 47. Pt reports ostomy OP has been normal, no blood noted, continues to pass gas, both solid/liquid stool. General surgery consulted for timeline of surgical planning/intervention     PMHx: Anorectal cancer, HIV, nicotine dependence, depression, anemia,  PSHx: Anorectal biopsy, diverting colostomy (Dana)  Colonoscopy/EGD: Denied  Allergies: NKDA  Social Hx: Smoking a pack of cigarettes a week. Denies EtOH or recreational drug use   Meds: Biktarvy, Escitalopram 10 mg, Imodium 2 mg, Methadone 10 BID, Naloxone 4 mg PRN, Zofran 4 mg PO PRN, Oxycodone 10 mg Q4, Senna 2 tabs bedtime, Miralax       T(C): 36.9 (07-29-24 @ 10:38), Max: 36.9 (07-29-24 @ 05:52)  HR: 116 (07-29-24 @ 10:38) (89 - 120)  BP: 113/72 (07-29-24 @ 10:38) (108/67 - 119/73)  RR: 17 (07-29-24 @ 10:38) (16 - 18)  SpO2: 98% (07-29-24 @ 10:38) (97% - 100%)    Physical Exam  General: AAOx3, cachectic appearing  Cardio: S1,S2, No MRG  Pulm: Nonlabored breathing  Abdomen: Soft, NT, ND, colostomy in place  Rectal: Refused at this time, agreeable at later re-evaluation  Extremities: WWP, peripheral pulses appreciated, muscle wasting noted to B/L UE, BL LE, BL LE w/o swelling, tenderness      LABS:                        7.0    12.09 )-----------( 636      ( 29 Jul 2024 07:01 )             23.8     07-29    136  |  99  |  5<L>  ----------------------------<  103<H>  3.6   |  28  |  0.48<L>    Ca    8.8      29 Jul 2024 07:01  Phos  3.8     07-29  Mg     2.2     07-29    TPro  6.8  /  Alb  2.8<L>  /  TBili  0.2  /  DBili  x   /  AST  9<L>  /  ALT  13  /  AlkPhos  222<H>  07-29

## 2024-07-29 NOTE — PROGRESS NOTE ADULT - ASSESSMENT
43 y/o M with invasive anorectal squamous cell carcinoma (dx'd 12/2023) with bony involvement to sacrum/acetabulum (followed by Eliel/Graciela), HIV (on biktarvy), anemia, baseline tachycardia attributed to prior heavy drug use (crystal meth, ecstasy), and depression, presenting with generalized weakness and persistent perianal pain admitted for uncontrolled pain and concern for sepsis (elevated WBC and tachycardia) possibly 2/2 osteomyelitis. Palliative following for symptom management.

## 2024-07-29 NOTE — PROGRESS NOTE ADULT - SUBJECTIVE AND OBJECTIVE BOX
[facility] Geriatrics and Palliative Care  Aliya Francis DO  Palliative Care Fellow  Contact Info: Call [phone] (HEAL Line) or message on Microsoft Teams.    SUBJECTIVE AND OBJECTIVE: symptom management and goals of care    INTERVAL HPI/OVERNIGHT EVENTS: Patient with significant pain over the week. Required Oxycodone 15 mg x3 PRN for Moderate pain in addition to the Methadone 10 mg TID and Dilaudid 1 mg q3 ATC. Patient evaluated at bedside, reports pain is much better controlled.    ALLERGIES:  No Known Allergies    MEDICATIONS  (STANDING):  acetaminophen     Tablet .. 1000 milliGRAM(s) Oral every 8 hours  bictegravir 50 mG/emtricitabine 200 mG/tenofovir alafenamide 25 mG (BIKTARVY) 1 Tablet(s) Oral daily  enoxaparin Injectable 60 milliGRAM(s) SubCutaneous every 12 hours  escitalopram 10 milliGRAM(s) Oral daily  HYDROmorphone  Injectable 1 milliGRAM(s) IV Push every 3 hours  lactated ringers. 1000 milliLiter(s) (100 mL/Hr) IV Continuous <Continuous>  lidocaine   4% Patch 1 Patch Transdermal every 24 hours  lidocaine 2% Jelly 20 milliLiter(s) IntraUrethral every 4 hours  methadone    Tablet 10 milliGRAM(s) Oral every 8 hours  mirtazapine 7.5 milliGRAM(s) Oral at bedtime  piperacillin/tazobactam IVPB.. 3.375 Gram(s) IV Intermittent every 8 hours  polyethylene glycol 3350 17 Gram(s) Oral two times a day  senna 2 Tablet(s) Oral at bedtime  trimethoprim  160 mG/sulfamethoxazole 800 mG 1 Tablet(s) Oral every 24 hours  vancomycin  IVPB 1250 milliGRAM(s) IV Intermittent every 12 hours    MEDICATIONS  (PRN):  ketorolac   Injectable 15 milliGRAM(s) IV Push every 8 hours PRN Severe Pain (7 - 10)  LORazepam   Injectable 1 milliGRAM(s) IV Push once PRN pre-MRI anxiety  oxyCODONE    IR 15 milliGRAM(s) Oral every 4 hours PRN Moderate Pain (4 - 6)      Analgesic Use (Scheduled and PRNs) for past 24 hours:    escitalopram   10 milliGRAM(s) Oral (07-29-24 @ 11:33)    HYDROmorphone  Injectable   1 milliGRAM(s) IV Push (07-29-24 @ 11:33)   1 milliGRAM(s) IV Push (07-29-24 @ 08:55)   1 milliGRAM(s) IV Push (07-29-24 @ 06:22)   1 milliGRAM(s) IV Push (07-29-24 @ 03:01)   1 milliGRAM(s) IV Push (07-29-24 @ 00:25)    HYDROmorphone  Injectable   1 milliGRAM(s) IV Push (07-28-24 @ 21:14)   1 milliGRAM(s) IV Push (07-28-24 @ 18:33)    ketorolac   Injectable   15 milliGRAM(s) IV Push (07-29-24 @ 13:59)   15 milliGRAM(s) IV Push (07-29-24 @ 05:14)   15 milliGRAM(s) IV Push (07-28-24 @ 22:21)    lidocaine 2% Jelly   20 milliLiter(s) IntraUrethral (07-29-24 @ 10:09)   20 milliLiter(s) IntraUrethral (07-29-24 @ 06:23)   20 milliLiter(s) IntraUrethral (07-29-24 @ 02:29)    methadone    Tablet   10 milliGRAM(s) Oral (07-29-24 @ 13:05)   10 milliGRAM(s) Oral (07-29-24 @ 06:23)   10 milliGRAM(s) Oral (07-28-24 @ 22:21)    mirtazapine   7.5 milliGRAM(s) Oral (07-28-24 @ 22:22)    oxyCODONE    IR   15 milliGRAM(s) Oral (07-29-24 @ 04:49)   15 milliGRAM(s) Oral (07-28-24 @ 20:14)      ITEMS UNCHECKED ARE NOT PRESENT  PRESENT SYMPTOMS/REVIEW OF SYSTEMS: []Unable to obtain due to poor mentation   Source if other than patient:  []Family   []Team         Vital Signs Last 24 Hrs  T(C): 36.9 (29 Jul 2024 10:38), Max: 36.9 (29 Jul 2024 05:52)  T(F): 98.5 (29 Jul 2024 10:38), Max: 98.5 (29 Jul 2024 10:38)  HR: 116 (29 Jul 2024 10:38) (105 - 120)  BP: 113/72 (29 Jul 2024 10:38) (113/72 - 119/73)  BP(mean): --  RR: 17 (29 Jul 2024 10:38) (16 - 18)  SpO2: 98% (29 Jul 2024 10:38) (97% - 100%)    Parameters below as of 29 Jul 2024 10:38  Patient On (Oxygen Delivery Method): room air        LABS: Personally reviewed and interpreted                        7.0    12.09 )-----------( 636      ( 29 Jul 2024 07:01 )             23.8   07-29    136  |  99  |  5<L>  ----------------------------<  103<H>  3.6   |  28  |  0.48<L>    Ca    8.8      29 Jul 2024 07:01  Phos  3.8     07-29  Mg     2.2     07-29    TPro  6.8  /  Alb  2.8<L>  /  TBili  0.2  /  DBili  x   /  AST  9<L>  /  ALT  13  /  AlkPhos  222<H>  07-29      RADIOLOGY & ADDITIONAL STUDIES: Personally reviewed and interpreted  None new    DISCUSSION OF CASE: Family - to provide updates and emotional support; Primary Team/RN - to discuss plan of care [facility] Geriatrics and Palliative Care  Aliya Francis DO  Palliative Care Fellow  Contact Info: Call [phone] (HEAL Line) or message on Microsoft Teams.    SUBJECTIVE AND OBJECTIVE: symptom management and goals of care    INTERVAL HPI/OVERNIGHT EVENTS: Patient with significant pain over the week. Required Oxycodone 15 mg x3 PRN for Moderate pain in addition to the Methadone 10 mg TID and Dilaudid 1 mg q3 ATC. Patient evaluated at bedside, reports pain is much better controlled.    ALLERGIES:  No Known Allergies    MEDICATIONS  (STANDING):  acetaminophen     Tablet .. 1000 milliGRAM(s) Oral every 8 hours  bictegravir 50 mG/emtricitabine 200 mG/tenofovir alafenamide 25 mG (BIKTARVY) 1 Tablet(s) Oral daily  enoxaparin Injectable 60 milliGRAM(s) SubCutaneous every 12 hours  escitalopram 10 milliGRAM(s) Oral daily  HYDROmorphone  Injectable 1 milliGRAM(s) IV Push every 3 hours  lactated ringers. 1000 milliLiter(s) (100 mL/Hr) IV Continuous <Continuous>  lidocaine   4% Patch 1 Patch Transdermal every 24 hours  lidocaine 2% Jelly 20 milliLiter(s) IntraUrethral every 4 hours  methadone    Tablet 10 milliGRAM(s) Oral every 8 hours  mirtazapine 7.5 milliGRAM(s) Oral at bedtime  piperacillin/tazobactam IVPB.. 3.375 Gram(s) IV Intermittent every 8 hours  polyethylene glycol 3350 17 Gram(s) Oral two times a day  senna 2 Tablet(s) Oral at bedtime  trimethoprim  160 mG/sulfamethoxazole 800 mG 1 Tablet(s) Oral every 24 hours  vancomycin  IVPB 1250 milliGRAM(s) IV Intermittent every 12 hours    MEDICATIONS  (PRN):  ketorolac   Injectable 15 milliGRAM(s) IV Push every 8 hours PRN Severe Pain (7 - 10)  LORazepam   Injectable 1 milliGRAM(s) IV Push once PRN pre-MRI anxiety  oxyCODONE    IR 15 milliGRAM(s) Oral every 4 hours PRN Moderate Pain (4 - 6)      Analgesic Use (Scheduled and PRNs) for past 24 hours:    escitalopram   10 milliGRAM(s) Oral (07-29-24 @ 11:33)    HYDROmorphone  Injectable   1 milliGRAM(s) IV Push (07-29-24 @ 11:33)   1 milliGRAM(s) IV Push (07-29-24 @ 08:55)   1 milliGRAM(s) IV Push (07-29-24 @ 06:22)   1 milliGRAM(s) IV Push (07-29-24 @ 03:01)   1 milliGRAM(s) IV Push (07-29-24 @ 00:25)    HYDROmorphone  Injectable   1 milliGRAM(s) IV Push (07-28-24 @ 21:14)   1 milliGRAM(s) IV Push (07-28-24 @ 18:33)    ketorolac   Injectable   15 milliGRAM(s) IV Push (07-29-24 @ 13:59)   15 milliGRAM(s) IV Push (07-29-24 @ 05:14)   15 milliGRAM(s) IV Push (07-28-24 @ 22:21)    lidocaine 2% Jelly   20 milliLiter(s) IntraUrethral (07-29-24 @ 10:09)   20 milliLiter(s) IntraUrethral (07-29-24 @ 06:23)   20 milliLiter(s) IntraUrethral (07-29-24 @ 02:29)    methadone    Tablet   10 milliGRAM(s) Oral (07-29-24 @ 13:05)   10 milliGRAM(s) Oral (07-29-24 @ 06:23)   10 milliGRAM(s) Oral (07-28-24 @ 22:21)    mirtazapine   7.5 milliGRAM(s) Oral (07-28-24 @ 22:22)    oxyCODONE    IR   15 milliGRAM(s) Oral (07-29-24 @ 04:49)   15 milliGRAM(s) Oral (07-28-24 @ 20:14)      ITEMS UNCHECKED ARE NOT PRESENT  PRESENT SYMPTOMS/REVIEW OF SYSTEMS: []Unable to obtain due to poor mentation   Source if other than patient:  []Family   []Team     Pain: [x] yes [] no  QOL Impact - debilitating  Location -   rectum                 Aggravating Factors -  Quality - sharp, ache  Radiation -  Timing - constant  Severity (0-10 scale) - 10/10  Minimal Acceptable Level (0-10 scale) -    Dyspnea:                           []Mild  []Moderate []Severe  Anxiety:                             []Mild []Moderate []Severe  Fatigue:                             []Mild []Moderate []Severe  Nausea:                             []Mild []Moderate []Severe  Loss of Appetite:              []Mild []Moderate []Severe  Constipation:                    []Mild []Moderate []Severe    Other Symptoms:  [x]All Other Review Of Systems Negative     Palliative Performance Status Version 2:  50%  (Functional Assessment Tool)    PHYSICAL EXAM:  GENERAL:  [x]Alert, tearful  [x]Oriented x   []Lethargic  []Cachexia  []Unarousable  [x]Verbal  []Non-Verbal  Behavioral:   []Anxiety  []Delirium []Agitation [x]Cooperative  HEENT:  [x]Normal   []Dry mouth   []ET Tube/Trach  []Oral lesions  PULMONARY:   [x]Clear []Tachypnea  []Audible excessive secretions  [x]Normal Work of Breathing []Labored Breathing  []Rhonchi []Crackles []Wheezing  CARDIOVASCULAR:    [x]Regular Rate & Rhythm []Irregular []Tachy  []Ethan  GASTROINTESTINAL:  [x]Soft  []Distended   [x]+BS  [x]Non tender []Tender  []PEG []OGT/ NGT  Last BM: Ostomy with stool  GENITOURINARY:  [x]Normal [] Incontinent   []Oliguria/Anuria   []Calderon  MUSCULOSKELETAL:   []Normal   [x]Weakness  []Bed/Wheelchair bound []Edema  NEUROLOGIC:   [x]No focal deficits  []Cognitive impairment  []Dysphagia []Dysarthria []Paresis []Encephalopathic  SKIN:   []Normal   [x]Pressure ulcer(s)  []Rash    Vital Signs Last 24 Hrs  T(C): 36.9 (29 Jul 2024 10:38), Max: 36.9 (29 Jul 2024 05:52)  T(F): 98.5 (29 Jul 2024 10:38), Max: 98.5 (29 Jul 2024 10:38)  HR: 116 (29 Jul 2024 10:38) (105 - 120)  BP: 113/72 (29 Jul 2024 10:38) (113/72 - 119/73)  BP(mean): --  RR: 17 (29 Jul 2024 10:38) (16 - 18)  SpO2: 98% (29 Jul 2024 10:38) (97% - 100%)    Parameters below as of 29 Jul 2024 10:38  Patient On (Oxygen Delivery Method): room air        LABS: Personally reviewed and interpreted                        7.0    12.09 )-----------( 636      ( 29 Jul 2024 07:01 )             23.8   07-29    136  |  99  |  5<L>  ----------------------------<  103<H>  3.6   |  28  |  0.48<L>    Ca    8.8      29 Jul 2024 07:01  Phos  3.8     07-29  Mg     2.2     07-29    TPro  6.8  /  Alb  2.8<L>  /  TBili  0.2  /  DBili  x   /  AST  9<L>  /  ALT  13  /  AlkPhos  222<H>  07-29      RADIOLOGY & ADDITIONAL STUDIES: Personally reviewed and interpreted  None new    DISCUSSION OF CASE: Family - to provide updates and emotional support; Primary Team/RN - to discuss plan of care

## 2024-07-29 NOTE — PROGRESS NOTE ADULT - PROBLEM SELECTOR PLAN 2
Invasive anorectal squamous cell carcinoma, follows w/ Dr. Julian. Colorectal surgeon Dr. Cortez and radiation oncologist Dr Owens.    - Underwent RT to anus/pelvis & capecitabine (completed on 5/16/24)  - CTAP 7/25 with overall progression of disease w/ greater extension of mass laterally though R pelvic muscles. Destruction of coccyx, sacrum, R ischium, posterior portion R acetabulum  - Oncology following, no plans for chemotherapy given performance status or immunotherapy given active infection.  - Pending surgical evaluation for possible surgical options Invasive anorectal squamous cell carcinoma, follows w/ Dr. uJlian. Colorectal surgeon Dr. Cortez and radiation oncologist Dr Owens.    - Underwent RT to anus/pelvis & capecitabine (completed on 5/16/24)  - CTAP 7/25 with overall progression of disease w/ greater extension of mass laterally though R pelvic muscles. Destruction of coccyx, sacrum, R ischium, posterior portion R acetabulum  - Oncology following, no plans for chemotherapy given performance status or immunotherapy given active infection.  - Pending surgical evaluation for possible intervention.

## 2024-07-29 NOTE — PROGRESS NOTE ADULT - PROBLEM SELECTOR PLAN 4
Patient with significant weight loss despite attempts at increasing oral intake at home. Associated hypoalbuminemia 1.6 --> 2.6.     - Patient well aware of recommended approach of scheduling food regardless of appetite, eating small amounts consistently throughout the day to maximize daily total intake, prioritizing protein supplements, and avoiding any limitations in diet.  - Will be a component in the discussion of goals of care once full options are discussed. Patient with significant weight loss despite attempts at increasing oral intake at home. Associated hypoalbuminemia 2.6.     - Patient well aware of recommended approach of scheduling food regardless of appetite, eating small amounts consistently throughout the day to maximize daily total intake, prioritizing protein supplements, and avoiding any limitations in diet.  - Will be a component in the discussion of goals of care once full options are discussed.

## 2024-07-29 NOTE — CONSULT NOTE ADULT - ASSESSMENT
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 General surgery consulted for timeline of surgical planning/intervention. Given patients current nutritional status, concern for sepsis 2/2 pelvic OM, need for cancer re-staging, no acute surgical intervention is recommended at this time.     Recommendations:  -No acute surgical intervention at this time  -Plan to continue with re-staging anorectal SCC at a later date with possible surgical intervention pending re-staging   -Please reach out to Surgery Team 5c with any questions or concerns

## 2024-07-29 NOTE — PROGRESS NOTE ADULT - PROBLEM SELECTOR PLAN 5
Has been essentially bed bound for the last few weeks.     - Pain control and ruling out acute bony process for now.   - Patient remains aware that this could be all be related to his overall disease course. Will need further goals of care discussions once his surgical options are discussed. Has been essentially bed bound for the last few weeks.     - Pain control and ruling out acute bony process for now.   - Patient remains aware that this could be all be related to his overall disease course. Will continue further goals of care discussions once his surgical options are discussed.

## 2024-07-29 NOTE — PROGRESS NOTE ADULT - PROBLEM SELECTOR PLAN 1
Partially controlled on current regimen.     *** Partially controlled on current regimen.     - Increased Methadone to 15 mg TID  - Continue Oxycodone 15 mg q4 PRN Moderate Pain  - Continue Dilaudid 1 mg IVP q3 PRN (**not ATC**) Severe Pain  - Continue Toradol 15 mg IVP q8 for additional 48 hours (6 doses)  - Continue Tylenol 1 gm q8 ATC

## 2024-07-29 NOTE — PROGRESS NOTE ADULT - SUBJECTIVE AND OBJECTIVE BOX
Hematology Oncology Progress Note    Interval History:    SUBJECTIVE:   Patient seen and examined at bedside.  Hgb 7 this morning, awaiting transfusion.  Denies bloody bowel movements    OBJECTIVE:    VITAL SIGNS:  ICU Vital Signs Last 24 Hrs  T(C): 36.9 (29 Jul 2024 10:38), Max: 36.9 (29 Jul 2024 05:52)  T(F): 98.5 (29 Jul 2024 10:38), Max: 98.5 (29 Jul 2024 10:38)  HR: 116 (29 Jul 2024 10:38) (89 - 120)  BP: 113/72 (29 Jul 2024 10:38) (108/67 - 119/73)  BP(mean): --  ABP: --  ABP(mean): --  RR: 17 (29 Jul 2024 10:38) (16 - 18)  SpO2: 98% (29 Jul 2024 10:38) (97% - 100%)    O2 Parameters below as of 29 Jul 2024 10:38  Patient On (Oxygen Delivery Method): room air              07-28 @ 07:01  -  07-29 @ 07:00  --------------------------------------------------------  IN: 0 mL / OUT: 300 mL / NET: -300 mL      CAPILLARY BLOOD GLUCOSE          PHYSICAL EXAM:  Gen: Frial, cachectic resting in bed  HEENT: NC/AT, PERRL/EOMI, anicteric sclera  Neck: supple, no masses, no JVD  Cardiovascular: RR, nl S1S2, no murmurs/rubs/gallops  Respiratory: clear air entry b/l  Gastrointestinal: ostomy in place; no stool currently  Extremities: no clubbing/cyanosis, no edema, no calf tenderness  Neurological: no focal deficits  Skin: no rash on visible skin  Musculoskeletal:  full ROM  Psychiatric:  mood stable    MEDICATIONS:  MEDICATIONS  (STANDING):  acetaminophen     Tablet .. 1000 milliGRAM(s) Oral every 8 hours  bictegravir 50 mG/emtricitabine 200 mG/tenofovir alafenamide 25 mG (BIKTARVY) 1 Tablet(s) Oral daily  enoxaparin Injectable 60 milliGRAM(s) SubCutaneous every 12 hours  escitalopram 10 milliGRAM(s) Oral daily  HYDROmorphone  Injectable 1 milliGRAM(s) IV Push every 3 hours  lactated ringers. 1000 milliLiter(s) (100 mL/Hr) IV Continuous <Continuous>  lidocaine   4% Patch 1 Patch Transdermal every 24 hours  lidocaine 2% Jelly 20 milliLiter(s) IntraUrethral every 4 hours  methadone    Tablet 10 milliGRAM(s) Oral every 8 hours  mirtazapine 7.5 milliGRAM(s) Oral at bedtime  piperacillin/tazobactam IVPB.. 3.375 Gram(s) IV Intermittent every 8 hours  polyethylene glycol 3350 17 Gram(s) Oral two times a day  senna 2 Tablet(s) Oral at bedtime  trimethoprim  160 mG/sulfamethoxazole 800 mG 1 Tablet(s) Oral every 24 hours  vancomycin  IVPB 1250 milliGRAM(s) IV Intermittent every 12 hours    MEDICATIONS  (PRN):  ketorolac   Injectable 15 milliGRAM(s) IV Push every 8 hours PRN Severe Pain (7 - 10)  LORazepam   Injectable 1 milliGRAM(s) IV Push once PRN pre-MRI anxiety  oxyCODONE    IR 15 milliGRAM(s) Oral every 4 hours PRN Moderate Pain (4 - 6)      ALLERGIES:  Allergies    No Known Allergies    Intolerances        LABS:                        7.0    12.09 )-----------( 636      ( 29 Jul 2024 07:01 )             23.8     07-29    136  |  99  |  5<L>  ----------------------------<  103<H>  3.6   |  28  |  0.48<L>    Ca    8.8      29 Jul 2024 07:01  Phos  3.8     07-29  Mg     2.2     07-29    TPro  6.8  /  Alb  2.8<L>  /  TBili  0.2  /  DBili  x   /  AST  9<L>  /  ALT  13  /  AlkPhos  222<H>  07-29      Urinalysis Basic - ( 29 Jul 2024 07:01 )    Color: x / Appearance: x / SG: x / pH: x  Gluc: 103 mg/dL / Ketone: x  / Bili: x / Urobili: x   Blood: x / Protein: x / Nitrite: x   Leuk Esterase: x / RBC: x / WBC x   Sq Epi: x / Non Sq Epi: x / Bacteria: x                  RADIOLOGY & ADDITIONAL TESTS: Reviewed.

## 2024-07-29 NOTE — PROGRESS NOTE ADULT - SUBJECTIVE AND OBJECTIVE BOX
Internal Medicine Progress Note  Kelsie Concepcion, PGY-1      OVERNIGHT EVENTS/INTERVAL HPI:  Dilaudid changed to q3, toradol restarted.   Pt reports increased pain and believes dilaudid and toradol work when they are scheduled close to each other. Patient denying chest pain, SOB, palpitations, denies fever, chills, HA, Dizziness, N/V, abdominal pain, dysuria, hematuria, new onset weakness/numbness.   Per nursing, one of pt's IV had infiltrated.      OBJECTIVE:  Vital Signs Last 24 Hrs  T(C): 36.9 (29 Jul 2024 10:38), Max: 36.9 (29 Jul 2024 05:52)  T(F): 98.5 (29 Jul 2024 10:38), Max: 98.5 (29 Jul 2024 10:38)  HR: 116 (29 Jul 2024 10:38) (89 - 120)  BP: 113/72 (29 Jul 2024 10:38) (108/67 - 119/73)  BP(mean): --  RR: 17 (29 Jul 2024 10:38) (16 - 18)  SpO2: 98% (29 Jul 2024 10:38) (97% - 100%)    Parameters below as of 29 Jul 2024 10:38  Patient On (Oxygen Delivery Method): room air      I&O's Detail    28 Jul 2024 07:01  -  29 Jul 2024 07:00  --------------------------------------------------------  IN:  Total IN: 0 mL    OUT:    Voided (mL): 300 mL  Total OUT: 300 mL    Total NET: -300 mL        Physical Exam:  GENERAL: Awake, alert and interactive, no acute distress, appears comfortable  NEURO: A&Ox4, no focal deficits, 5/5 strength in all ext, reflexes 2+ throughout, CN 2-12 intact  HEENT: Normocephalic, atraumatic, no conjunctivitis or scleral icterus, oral mucosa moist, no oral lesions noted  NECK: Supple, no LAD, no JVD, thyroid not palpable  CARDIAC: Regular rate and rhythm, +S1/S2, no murmurs/rubs/gallops  PULM: Breathing comfortably on RA, clear to auscultation bilaterally, no wheezes/rales/rhonchi  ABDOMEN: Soft, nontender, nondistended, +bs, no hepatosplenomegaly, no rebound tenderness or fluid wave, no CVA tenderness  : Deferred  MSK: Range of motion grossly intact, no back tenderness  SKIN: Warm and dry, no rashes, lesions  VASC: Cap refil < 2 sec, 2+ peripheral pulses, no edema, no LE tenderness  Psych: Appropriate affect    Medications:  MEDICATIONS  (STANDING):  acetaminophen     Tablet .. 1000 milliGRAM(s) Oral every 8 hours  bictegravir 50 mG/emtricitabine 200 mG/tenofovir alafenamide 25 mG (BIKTARVY) 1 Tablet(s) Oral daily  enoxaparin Injectable 60 milliGRAM(s) SubCutaneous every 12 hours  escitalopram 10 milliGRAM(s) Oral daily  HYDROmorphone  Injectable 1 milliGRAM(s) IV Push every 3 hours  lactated ringers. 1000 milliLiter(s) (100 mL/Hr) IV Continuous <Continuous>  lidocaine   4% Patch 1 Patch Transdermal every 24 hours  lidocaine 2% Jelly 20 milliLiter(s) IntraUrethral every 4 hours  methadone    Tablet 10 milliGRAM(s) Oral every 8 hours  mirtazapine 7.5 milliGRAM(s) Oral at bedtime  piperacillin/tazobactam IVPB.. 3.375 Gram(s) IV Intermittent every 8 hours  polyethylene glycol 3350 17 Gram(s) Oral two times a day  senna 2 Tablet(s) Oral at bedtime  trimethoprim  160 mG/sulfamethoxazole 800 mG 1 Tablet(s) Oral every 24 hours  vancomycin  IVPB 1250 milliGRAM(s) IV Intermittent every 12 hours    MEDICATIONS  (PRN):  ketorolac   Injectable 15 milliGRAM(s) IV Push every 8 hours PRN Severe Pain (7 - 10)  LORazepam   Injectable 1 milliGRAM(s) IV Push once PRN pre-MRI anxiety  oxyCODONE    IR 15 milliGRAM(s) Oral every 4 hours PRN Moderate Pain (4 - 6)      Labs:                        7.0    12.09 )-----------( 636      ( 29 Jul 2024 07:01 )             23.8     07-29    136  |  99  |  5<L>  ----------------------------<  103<H>  3.6   |  28  |  0.48<L>    Ca    8.8      29 Jul 2024 07:01  Phos  3.8     07-29  Mg     2.2     07-29    TPro  6.8  /  Alb  2.8<L>  /  TBili  0.2  /  DBili  x   /  AST  9<L>  /  ALT  13  /  AlkPhos  222<H>  07-29        Urinalysis Basic - ( 29 Jul 2024 07:01 )    Color: x / Appearance: x / SG: x / pH: x  Gluc: 103 mg/dL / Ketone: x  / Bili: x / Urobili: x   Blood: x / Protein: x / Nitrite: x   Leuk Esterase: x / RBC: x / WBC x   Sq Epi: x / Non Sq Epi: x / Bacteria: x      SARS-CoV-2: NotDetec (02 Mar 2024 06:07)      Radiology: Reviewed

## 2024-07-30 DIAGNOSIS — Z71.89 OTHER SPECIFIED COUNSELING: ICD-10-CM

## 2024-07-30 LAB
ALBUMIN SERPL ELPH-MCNC: 2.6 G/DL — LOW (ref 3.3–5)
ALP SERPL-CCNC: 212 U/L — HIGH (ref 40–120)
ALT FLD-CCNC: 13 U/L — SIGNIFICANT CHANGE UP (ref 10–45)
ANION GAP SERPL CALC-SCNC: 8 MMOL/L — SIGNIFICANT CHANGE UP (ref 5–17)
ANISOCYTOSIS BLD QL: SLIGHT — SIGNIFICANT CHANGE UP
AST SERPL-CCNC: 10 U/L — SIGNIFICANT CHANGE UP (ref 10–40)
BASOPHILS # BLD AUTO: 0 K/UL — SIGNIFICANT CHANGE UP (ref 0–0.2)
BASOPHILS NFR BLD AUTO: 0 % — SIGNIFICANT CHANGE UP (ref 0–2)
BILIRUB SERPL-MCNC: 0.2 MG/DL — SIGNIFICANT CHANGE UP (ref 0.2–1.2)
BLD GP AB SCN SERPL QL: NEGATIVE — SIGNIFICANT CHANGE UP
BUN SERPL-MCNC: 6 MG/DL — LOW (ref 7–23)
CALCIUM SERPL-MCNC: 8.5 MG/DL — SIGNIFICANT CHANGE UP (ref 8.4–10.5)
CHLORIDE SERPL-SCNC: 102 MMOL/L — SIGNIFICANT CHANGE UP (ref 96–108)
CO2 SERPL-SCNC: 24 MMOL/L — SIGNIFICANT CHANGE UP (ref 22–31)
CREAT SERPL-MCNC: 0.64 MG/DL — SIGNIFICANT CHANGE UP (ref 0.5–1.3)
DACRYOCYTES BLD QL SMEAR: SLIGHT — SIGNIFICANT CHANGE UP
EGFR: 121 ML/MIN/1.73M2 — SIGNIFICANT CHANGE UP
EOSINOPHIL # BLD AUTO: 0.1 K/UL — SIGNIFICANT CHANGE UP (ref 0–0.5)
EOSINOPHIL NFR BLD AUTO: 0.9 % — SIGNIFICANT CHANGE UP (ref 0–6)
FOLATE SERPL-MCNC: 5.3 NG/ML — SIGNIFICANT CHANGE UP
GLUCOSE SERPL-MCNC: 107 MG/DL — HIGH (ref 70–99)
HCT VFR BLD CALC: 27.9 % — LOW (ref 39–50)
HGB BLD-MCNC: 8.2 G/DL — LOW (ref 13–17)
HYPOCHROMIA BLD QL: SIGNIFICANT CHANGE UP
LYMPHOCYTES # BLD AUTO: 0.4 K/UL — LOW (ref 1–3.3)
LYMPHOCYTES # BLD AUTO: 3.5 % — LOW (ref 13–44)
MACROCYTES BLD QL: SLIGHT — SIGNIFICANT CHANGE UP
MAGNESIUM SERPL-MCNC: 2 MG/DL — SIGNIFICANT CHANGE UP (ref 1.6–2.6)
MANUAL SMEAR VERIFICATION: SIGNIFICANT CHANGE UP
MCHC RBC-ENTMCNC: 25.5 PG — LOW (ref 27–34)
MCHC RBC-ENTMCNC: 29.4 GM/DL — LOW (ref 32–36)
MCV RBC AUTO: 86.9 FL — SIGNIFICANT CHANGE UP (ref 80–100)
MICROCYTES BLD QL: SLIGHT — SIGNIFICANT CHANGE UP
MONOCYTES # BLD AUTO: 0.4 K/UL — SIGNIFICANT CHANGE UP (ref 0–0.9)
MONOCYTES NFR BLD AUTO: 3.5 % — SIGNIFICANT CHANGE UP (ref 2–14)
NEUTROPHILS # BLD AUTO: 10.51 K/UL — HIGH (ref 1.8–7.4)
NEUTROPHILS NFR BLD AUTO: 92.1 % — HIGH (ref 43–77)
OVALOCYTES BLD QL SMEAR: SLIGHT — SIGNIFICANT CHANGE UP
PHOSPHATE SERPL-MCNC: 3.6 MG/DL — SIGNIFICANT CHANGE UP (ref 2.5–4.5)
PLAT MORPH BLD: ABNORMAL
PLATELET # BLD AUTO: 837 K/UL — HIGH (ref 150–400)
POIKILOCYTOSIS BLD QL AUTO: SLIGHT — SIGNIFICANT CHANGE UP
POLYCHROMASIA BLD QL SMEAR: SLIGHT — SIGNIFICANT CHANGE UP
POTASSIUM SERPL-MCNC: 4 MMOL/L — SIGNIFICANT CHANGE UP (ref 3.5–5.3)
POTASSIUM SERPL-SCNC: 4 MMOL/L — SIGNIFICANT CHANGE UP (ref 3.5–5.3)
PROT SERPL-MCNC: 6.8 G/DL — SIGNIFICANT CHANGE UP (ref 6–8.3)
RBC # BLD: 3.21 M/UL — LOW (ref 4.2–5.8)
RBC # BLD: 3.21 M/UL — LOW (ref 4.2–5.8)
RBC # FLD: 18.3 % — HIGH (ref 10.3–14.5)
RBC BLD AUTO: ABNORMAL
RETICS #: 115.2 K/UL — SIGNIFICANT CHANGE UP (ref 25–125)
RETICS/RBC NFR: 3.6 % — HIGH (ref 0.5–2.5)
RH IG SCN BLD-IMP: POSITIVE — SIGNIFICANT CHANGE UP
SCHISTOCYTES BLD QL AUTO: SLIGHT — SIGNIFICANT CHANGE UP
SODIUM SERPL-SCNC: 134 MMOL/L — LOW (ref 135–145)
SPHEROCYTES BLD QL SMEAR: SLIGHT — SIGNIFICANT CHANGE UP
VANCOMYCIN TROUGH SERPL-MCNC: 7.8 UG/ML — LOW (ref 10–20)
VANCOMYCIN TROUGH SERPL-MCNC: 8.2 UG/ML — LOW (ref 10–20)
VIT B12 SERPL-MCNC: 567 PG/ML — SIGNIFICANT CHANGE UP (ref 232–1245)
WBC # BLD: 11.41 K/UL — HIGH (ref 3.8–10.5)
WBC # FLD AUTO: 11.41 K/UL — HIGH (ref 3.8–10.5)

## 2024-07-30 PROCEDURE — 99232 SBSQ HOSP IP/OBS MODERATE 35: CPT | Mod: GC

## 2024-07-30 PROCEDURE — 99233 SBSQ HOSP IP/OBS HIGH 50: CPT | Mod: GC

## 2024-07-30 PROCEDURE — 99233 SBSQ HOSP IP/OBS HIGH 50: CPT | Mod: 25

## 2024-07-30 PROCEDURE — 93010 ELECTROCARDIOGRAM REPORT: CPT

## 2024-07-30 PROCEDURE — 99497 ADVNCD CARE PLAN 30 MIN: CPT | Mod: 25

## 2024-07-30 RX ORDER — VANCOMYCIN HYDROCHLORIDE 5 G/100ML
1000 INJECTION, POWDER, LYOPHILIZED, FOR SOLUTION INTRAVENOUS EVERY 8 HOURS
Refills: 0 | Status: DISCONTINUED | OUTPATIENT
Start: 2024-07-30 | End: 2024-07-31

## 2024-07-30 RX ORDER — VANCOMYCIN HYDROCHLORIDE 5 G/100ML
1250 INJECTION, POWDER, LYOPHILIZED, FOR SOLUTION INTRAVENOUS EVERY 8 HOURS
Refills: 0 | Status: DISCONTINUED | OUTPATIENT
Start: 2024-07-30 | End: 2024-07-30

## 2024-07-30 RX ADMIN — Medication 1 MILLIGRAM(S): at 18:00

## 2024-07-30 RX ADMIN — PIPERACILLIN SODIUM, TAZOBACTAM SODIUM 25 GRAM(S): 3; .375 INJECTION, POWDER, LYOPHILIZED, FOR SOLUTION INTRAVENOUS at 09:15

## 2024-07-30 RX ADMIN — Medication 1 MILLIGRAM(S): at 20:50

## 2024-07-30 RX ADMIN — Medication 1 MILLIGRAM(S): at 17:24

## 2024-07-30 RX ADMIN — Medication 1 MILLIGRAM(S): at 13:03

## 2024-07-30 RX ADMIN — Medication 1 MILLIGRAM(S): at 04:04

## 2024-07-30 RX ADMIN — Medication 10 MILLIGRAM(S): at 11:24

## 2024-07-30 RX ADMIN — Medication 15 MILLIGRAM(S): at 14:05

## 2024-07-30 RX ADMIN — VANCOMYCIN HYDROCHLORIDE 250 MILLIGRAM(S): 5 INJECTION, POWDER, LYOPHILIZED, FOR SOLUTION INTRAVENOUS at 17:24

## 2024-07-30 RX ADMIN — Medication 1 MILLIGRAM(S): at 20:34

## 2024-07-30 RX ADMIN — ENOXAPARIN SODIUM 60 MILLIGRAM(S): 120 INJECTION SUBCUTANEOUS at 05:46

## 2024-07-30 RX ADMIN — Medication 15 MILLIGRAM(S): at 05:45

## 2024-07-30 RX ADMIN — Medication 1 MILLIGRAM(S): at 13:30

## 2024-07-30 RX ADMIN — Medication 15 MILLIGRAM(S): at 21:31

## 2024-07-30 RX ADMIN — PIPERACILLIN SODIUM, TAZOBACTAM SODIUM 25 GRAM(S): 3; .375 INJECTION, POWDER, LYOPHILIZED, FOR SOLUTION INTRAVENOUS at 17:11

## 2024-07-30 RX ADMIN — Medication 1 MILLIGRAM(S): at 07:25

## 2024-07-30 RX ADMIN — Medication 15 MILLIGRAM(S): at 21:45

## 2024-07-30 RX ADMIN — Medication 15 MILLIGRAM(S): at 14:45

## 2024-07-30 RX ADMIN — ENOXAPARIN SODIUM 60 MILLIGRAM(S): 120 INJECTION SUBCUTANEOUS at 17:25

## 2024-07-30 RX ADMIN — LIDOCAINE 5% 1 PATCH: 5 CREAM TOPICAL at 11:23

## 2024-07-30 RX ADMIN — Medication 15 MILLIGRAM(S): at 06:00

## 2024-07-30 RX ADMIN — VANCOMYCIN HYDROCHLORIDE 250 MILLIGRAM(S): 5 INJECTION, POWDER, LYOPHILIZED, FOR SOLUTION INTRAVENOUS at 07:11

## 2024-07-30 RX ADMIN — LIDOCAINE 5% 1 PATCH: 5 CREAM TOPICAL at 18:20

## 2024-07-30 RX ADMIN — SULFAMETHOXAZOLE AND TRIMETHOPRIM 1 TABLET(S): 400; 80 TABLET ORAL at 05:54

## 2024-07-30 RX ADMIN — OXYCODONE HYDROCHLORIDE 15 MILLIGRAM(S): 30 TABLET ORAL at 19:47

## 2024-07-30 RX ADMIN — OXYCODONE HYDROCHLORIDE 15 MILLIGRAM(S): 30 TABLET ORAL at 20:15

## 2024-07-30 RX ADMIN — PIPERACILLIN SODIUM, TAZOBACTAM SODIUM 25 GRAM(S): 3; .375 INJECTION, POWDER, LYOPHILIZED, FOR SOLUTION INTRAVENOUS at 01:14

## 2024-07-30 RX ADMIN — Medication 1 MILLIGRAM(S): at 04:20

## 2024-07-30 RX ADMIN — BICTEGRAVIR SODIUM, EMTRICITABINE, AND TENOFOVIR ALAFENAMIDE FUMARATE 1 TABLET(S): 50; 200; 25 TABLET ORAL at 11:24

## 2024-07-30 RX ADMIN — Medication 7.5 MILLIGRAM(S): at 21:31

## 2024-07-30 RX ADMIN — Medication 1 MILLIGRAM(S): at 07:11

## 2024-07-30 NOTE — PROGRESS NOTE ADULT - PROBLEM SELECTOR PLAN 1
.  -Methadone 15mg PO q8h ATC  -Dilaudid 1mg IV q3h PRN for Severe Pain  -Tylenol 1000mg PO q8h ATC  -Toradol 15mg IV q8h ATC (for total of 5 days then would recommend off for a few days)    Tolerating parenteral opiates without overt adverse effects. New Methadone dose is not therapeutic yet

## 2024-07-30 NOTE — PROGRESS NOTE ADULT - CONVERSATION DETAILS
Reviewed patient's hospital course and interval developments. Discussed advanced directives including code status, HCP completion, and hospice eligibility. Explained that surgery could still be an option but he would need to overcome his current infection, have improved CD4 count, and optimized nutritional status. Counselled the patient on the concerns that his infection may not be curable and that persistent infection would be a barrier to surgery or further chemotherapy. Stated that without surgery or DMT, then overall life expectancy is limited to likely weeks-months. Patient was not surprised by this news and states he has been considering this possibility. He actually shared that hearing this out loud offered him some relief, knowing his prognosis is poor offers clarity on his expected disease trajectory.    Patient expressed concerns with the idea of long-term IV Abx but understands continued Abx leaves the door open for longer life expectancy. Discussed a few discharge options: continued IV Abx at [facility] with or without IV Abx, home hospice without IV Abx. He acknowledged that he would be hospice eligible and appropriate if he decides to foregoing aggressive/invasive interventions.    Patient conveyed his view of the "big picture:" he would like to solidify his housing situation in [place] so he can work on a significant project (did not relate what this was) but once he was done then he would move to [place] with his sister for the remainder of his life expectancy.    He also expressed concern about how his friends and family would accept the prognosis and his general acceptance that he is nearing end of life. Brought up advance directives and code status, patient agrees that he would want to allow a natural death when the time comes and he recognizes that CPR/Intubation at end of life will not offer him the quality of life he desires. Not ready to finalize MOLST at this time, would like to consider how he will broach this topic with his family. Reviewed patient's hospital course and interval developments. Discussed advanced directives including code status, HCP completion, and hospice eligibility. Explained that surgery could still be an option but he would need to overcome his current infection, have improved CD4 count, and optimized nutritional status. Cautioned that likelihood of checking these boxes seems low. Counselled the patient on the concerns that his infection may not be curable and that persistent infection would be a barrier to surgery or further chemotherapy. Stated that without surgery or DMT, then overall life expectancy is limited to likely weeks-months. Patient was not surprised by this news and states he has been considering this possibility. He actually shared that hearing this out loud offered him some relief, knowing his prognosis is poor offers clarity on his expected disease trajectory.    Patient expressed concerns with the idea of long-term IV Abx but understands continued Abx leaves the door open for longer life expectancy. Discussed a few discharge options: continued IV Abx at [facility] with or without IV Abx, home hospice without IV Abx. He acknowledged that he would be hospice eligible and appropriate if he decides to foregoing aggressive/invasive interventions.    Patient conveyed his view of the "big picture:" he would like to solidify his housing situation in [place] so he can work on a significant project (did not relate what this was) but once he was done then he would move to [place] with his sister for the remainder of his life expectancy.    He also expressed concern about how his friends and family would accept the prognosis and his general acceptance that he is nearing end of life. Brought up advance directives and code status, patient agrees that he would want to allow a natural death when the time comes and he recognizes that CPR/Intubation at end of life will not offer him the quality of life he desires. Not ready to finalize MOLST at this time, would like to consider how he will broach this topic with his family. Reviewed patient's hospital course and interval developments. Discussed advanced directives including code status, HCP completion, and hospice eligibility. Explained that surgery could still be an option but he would need to overcome his current infection, have improved CD4 count, and optimized nutritional status. Cautioned that likelihood of checking these boxes seems low. Counselled the patient on the concerns that his infection may not be curable and that persistent infection would be a barrier to surgery or further chemotherapy. Stated that without surgery or DMT, then overall life expectancy is limited to likely weeks-months. Patient was not surprised by this news and states he has been considering this possibility. He actually shared that hearing this out loud offered him some relief, knowing his prognosis is poor offers clarity on his expected disease trajectory. He expressed how he is tired and overall wants to ensure his symptoms are adequately controlled.    Patient expressed concerns with the idea of long-term IV Abx but understands continued Abx leaves the door open for longer life expectancy. Discussed a few discharge options: continued IV Abx at [facility] with or without IV Abx, home hospice without IV Abx. He acknowledged that he would be hospice eligible and appropriate if he decides to foregoing aggressive/invasive interventions. His sister apparently has enough resources to care for him -> "My sister's house would be my [place]."    Patient conveyed his view of the "big picture:" he would like to solidify his housing situation in [place] so he can work on a significant project (did not relate what this was) but once he was done then he would move to [place] with his sister for the remainder of his life expectancy.    He also expressed concern about how his friends and family would accept the prognosis and his general acceptance that he is nearing end of life. Brought up advance directives and code status, patient agrees that he would want to allow a natural death when the time comes and he recognizes that CPR/Intubation at end of life will not offer him the quality of life he desires. Not ready to finalize MOLST at this time, would like to consider how he will broach this topic with his family. Offered to assist with communicating with his family/friends, he will let us know how to proceed. Reviewed patient's hospital course and interval developments. Discussed advanced directives including code status, HCP completion, and hospice eligibility. Explained that surgery could still be an option but he would need to overcome his current infection, have improved CD4 count, and optimized nutritional status. Cautioned that likelihood of checking these boxes seems low. Counselled the patient on the concerns that his infection may not be curable and that persistent infection would be a barrier to surgery or further chemotherapy. Stated that without surgery or DMT, then overall life expectancy is limited to likely weeks-months. Patient was not surprised by this news and states he has been considering this possibility. He actually shared that hearing this out loud offered him some relief, knowing his prognosis is poor offers clarity on his expected disease trajectory. He expressed how he is tired and overall wants to ensure his symptoms are adequately controlled.    Patient expressed concerns with the idea of long-term IV Abx but understands continued Abx leaves the door open for longer life expectancy. Discussed a few discharge options: [facility] with or without IV Abx, home hospice without IV Abx. He acknowledged that he would be hospice eligible and appropriate if he decides to foregoing aggressive/invasive interventions. His sister apparently has enough resources to care for him -> "My sister's house would be my [place]."    Patient conveyed his view of the "big picture:" he would like to solidify his housing situation in [place] so he can work on a significant project (did not relate what this was) but once he was done then he would move to [place] with his sister for the remainder of his life expectancy.    He also expressed concern about how his friends and family would accept the prognosis and his general acceptance that he is nearing end of life. Brought up advance directives and code status, patient agrees that he would want to allow a natural death when the time comes and he recognizes that CPR/Intubation at end of life will not offer him the quality of life he desires. Not ready to finalize MOLST at this time, would like to consider how he will broach this topic with his family. Offered to assist with communicating with his family/friends, he will let us know how to proceed. Reviewed patient's hospital course and interval developments. Discussed advanced directives including code status, HCP completion, and hospice eligibility. Explained that surgery could still be an option but he would need to overcome his current infection, have improved CD4 count, and optimized nutritional status. Cautioned that likelihood of checking these boxes seems low. Counselled the patient on the concerns that his infection may not be curable and that persistent infection would be a barrier to surgery or further chemotherapy. Stated that without surgery or DMT, then overall life expectancy is limited to likely weeks-months. Patient was not surprised by this news and states he has been considering this possibility. He actually shared that hearing this out loud offered him some relief, knowing his prognosis is poor offers clarity on his expected disease trajectory. He expressed how he is tired and overall wants to ensure his symptoms are adequately controlled.    Patient expressed concerns with the idea of long-term IV Abx but understands continued Abx leaves the door open for longer life expectancy. Discussed a few discharge options: [facility] with or without IV Abx, home hospice without IV Abx. He acknowledged that he would be hospice eligible and appropriate if he decides to foregoing aggressive/invasive interventions.    Patient conveyed his view of the "big picture:" he would like to solidify his housing situation in [place] so he can work on a significant project (did not relate what this was) but once he was done then he would move to [place] with his sister for the remainder of his life expectancy.  His sister apparently has enough resources to care for him -> "My sister's house would be my [place]."    He also expressed concern about how his friends and family would accept the prognosis and his general acceptance that he is nearing end of life. Brought up advance directives and code status, patient agrees that he would want to allow a natural death when the time comes and he recognizes that CPR/Intubation at end of life will not offer him the quality of life he desires. Not ready to finalize MOLST at this time, would like to consider how he will broach this topic with his family. Offered to assist with communicating with his family/friends, he will let us know how to proceed.

## 2024-07-30 NOTE — PROGRESS NOTE ADULT - SUBJECTIVE AND OBJECTIVE BOX
[facility] Geriatrics and Palliative Care  Geo Rodriguez, Palliative Care Attending  Contact Info: Call [phone] (HEAL Line) or message on Microsoft Teams (Geo Rodriguez)    SUBJECTIVE AND OBJECTIVE:  INTERVAL HPI/OVERNIGHT EVENTS: Interval events noted. See patient's PRN use for the past 24hrs noted below. Comprehensive symptom assessment and GOC exploration as noted below. Extensive time spent discussing plan of care with patient/family. No unexpected adverse effects of opiates noted: no sedation/dizziness/nausea.    ALLERGIES:  No Known Allergies    MEDICATIONS  (STANDING):  acetaminophen     Tablet .. 1000 milliGRAM(s) Oral every 8 hours  bictegravir 50 mG/emtricitabine 200 mG/tenofovir alafenamide 25 mG (BIKTARVY) 1 Tablet(s) Oral daily  enoxaparin Injectable 60 milliGRAM(s) SubCutaneous every 12 hours  escitalopram 10 milliGRAM(s) Oral daily  ketorolac   Injectable 15 milliGRAM(s) IV Push every 8 hours  lactated ringers. 1000 milliLiter(s) (100 mL/Hr) IV Continuous <Continuous>  lidocaine   4% Patch 1 Patch Transdermal every 24 hours  lidocaine 2% Jelly 20 milliLiter(s) IntraUrethral every 4 hours  methadone    Tablet 15 milliGRAM(s) Oral every 8 hours  mirtazapine 7.5 milliGRAM(s) Oral at bedtime  piperacillin/tazobactam IVPB.. 3.375 Gram(s) IV Intermittent every 8 hours  polyethylene glycol 3350 17 Gram(s) Oral two times a day  senna 2 Tablet(s) Oral at bedtime  trimethoprim  160 mG/sulfamethoxazole 800 mG 1 Tablet(s) Oral every 24 hours  vancomycin  IVPB 1000 milliGRAM(s) IV Intermittent every 8 hours    MEDICATIONS  (PRN):  HYDROmorphone  Injectable 1 milliGRAM(s) IV Push every 3 hours PRN Severe Pain (7 - 10)  oxyCODONE    IR 15 milliGRAM(s) Oral every 4 hours PRN Moderate Pain (4 - 6)      Analgesic Use (Scheduled and PRNs) for past 24 hours:    escitalopram   10 milliGRAM(s) Oral (07-30-24 @ 11:24)    HYDROmorphone  Injectable   1 milliGRAM(s) IV Push (07-30-24 @ 20:34)   1 milliGRAM(s) IV Push (07-30-24 @ 17:24)   1 milliGRAM(s) IV Push (07-30-24 @ 13:03)   1 milliGRAM(s) IV Push (07-30-24 @ 07:11)   1 milliGRAM(s) IV Push (07-30-24 @ 04:04)   1 milliGRAM(s) IV Push (07-29-24 @ 23:18)    ketorolac   Injectable   15 milliGRAM(s) IV Push (07-30-24 @ 14:05)   15 milliGRAM(s) IV Push (07-30-24 @ 05:45)   15 milliGRAM(s) IV Push (07-29-24 @ 21:03)    lidocaine 2% Jelly   20 milliLiter(s) IntraUrethral (07-29-24 @ 21:03)    LORazepam   Injectable   1 milliGRAM(s) IV Push (07-29-24 @ 21:41)    methadone    Tablet   15 milliGRAM(s) Oral (07-30-24 @ 14:05)   15 milliGRAM(s) Oral (07-30-24 @ 05:45)   15 milliGRAM(s) Oral (07-29-24 @ 21:02)    mirtazapine   7.5 milliGRAM(s) Oral (07-29-24 @ 21:02)    oxyCODONE    IR   15 milliGRAM(s) Oral (07-30-24 @ 19:47)      ITEMS UNCHECKED ARE NOT PRESENT  PRESENT SYMPTOMS/REVIEW OF SYSTEMS: []Unable to obtain due to poor mentation   Source if other than patient:  []Family   []Team         Vital Signs Last 24 Hrs  T(C): 36.8 (30 Jul 2024 16:03), Max: 37.2 (30 Jul 2024 05:15)  T(F): 98.3 (30 Jul 2024 16:03), Max: 99 (30 Jul 2024 05:15)  HR: 101 (30 Jul 2024 16:03) (101 - 125)  BP: 109/68 (30 Jul 2024 16:03) (109/68 - 134/89)  BP(mean): --  RR: 18 (30 Jul 2024 16:03) (17 - 18)  SpO2: 97% (30 Jul 2024 16:03) (97% - 99%)    Parameters below as of 30 Jul 2024 16:03  Patient On (Oxygen Delivery Method): room air        LABS: Personally reviewed and interpreted                        8.2    11.41 )-----------( 837      ( 30 Jul 2024 05:30 )             27.9   07-30    134<L>  |  102  |  6<L>  ----------------------------<  107<H>  4.0   |  24  |  0.64    Ca    8.5      30 Jul 2024 05:30  Phos  3.6     07-30  Mg     2.0     07-30    TPro  6.8  /  Alb  2.6<L>  /  TBili  0.2  /  DBili  x   /  AST  10  /  ALT  13  /  AlkPhos  212<H>  07-30      RADIOLOGY & ADDITIONAL STUDIES: Personally reviewed and interpreted  None new    DISCUSSION OF CASE: Family - to provide updates and emotional support; Primary Team/RN - to discuss plan of care [facility] Geriatrics and Palliative Care  Geo Rodriguez, Palliative Care Attending  Contact Info: Call [phone] (HEAL Line) or message on Microsoft Teams (Geo Rodriguez)    SUBJECTIVE AND OBJECTIVE:  INTERVAL HPI/OVERNIGHT EVENTS: Interval events noted. States pain was initially uncontrolled last night but now better. Feels that Toradol offers significant relief. See patient's PRN use for the past 24hrs noted below. Comprehensive symptom assessment and GOC exploration as noted below. Extensive time spent discussing plan of care with patient. No unexpected adverse effects of opiates noted: no sedation/dizziness/nausea.    ALLERGIES:  No Known Allergies    MEDICATIONS  (STANDING):  acetaminophen     Tablet .. 1000 milliGRAM(s) Oral every 8 hours  bictegravir 50 mG/emtricitabine 200 mG/tenofovir alafenamide 25 mG (BIKTARVY) 1 Tablet(s) Oral daily  enoxaparin Injectable 60 milliGRAM(s) SubCutaneous every 12 hours  escitalopram 10 milliGRAM(s) Oral daily  ketorolac   Injectable 15 milliGRAM(s) IV Push every 8 hours  lactated ringers. 1000 milliLiter(s) (100 mL/Hr) IV Continuous <Continuous>  lidocaine   4% Patch 1 Patch Transdermal every 24 hours  lidocaine 2% Jelly 20 milliLiter(s) IntraUrethral every 4 hours  methadone    Tablet 15 milliGRAM(s) Oral every 8 hours  mirtazapine 7.5 milliGRAM(s) Oral at bedtime  piperacillin/tazobactam IVPB.. 3.375 Gram(s) IV Intermittent every 8 hours  polyethylene glycol 3350 17 Gram(s) Oral two times a day  senna 2 Tablet(s) Oral at bedtime  trimethoprim  160 mG/sulfamethoxazole 800 mG 1 Tablet(s) Oral every 24 hours  vancomycin  IVPB 1000 milliGRAM(s) IV Intermittent every 8 hours    MEDICATIONS  (PRN):  HYDROmorphone  Injectable 1 milliGRAM(s) IV Push every 3 hours PRN Severe Pain (7 - 10)  oxyCODONE    IR 15 milliGRAM(s) Oral every 4 hours PRN Moderate Pain (4 - 6)    Analgesic Use (Scheduled and PRNs) for past 24 hours:  escitalopram   10 milliGRAM(s) Oral (07-30-24 @ 11:24)  HYDROmorphone  Injectable   1 milliGRAM(s) IV Push (07-30-24 @ 20:34)   1 milliGRAM(s) IV Push (07-30-24 @ 17:24)   1 milliGRAM(s) IV Push (07-30-24 @ 13:03)   1 milliGRAM(s) IV Push (07-30-24 @ 07:11)   1 milliGRAM(s) IV Push (07-30-24 @ 04:04)   1 milliGRAM(s) IV Push (07-29-24 @ 23:18)  ketorolac   Injectable   15 milliGRAM(s) IV Push (07-30-24 @ 14:05)   15 milliGRAM(s) IV Push (07-30-24 @ 05:45)   15 milliGRAM(s) IV Push (07-29-24 @ 21:03)  lidocaine 2% Jelly   20 milliLiter(s) IntraUrethral (07-29-24 @ 21:03)  LORazepam   Injectable   1 milliGRAM(s) IV Push (07-29-24 @ 21:41)  methadone    Tablet   15 milliGRAM(s) Oral (07-30-24 @ 14:05)   15 milliGRAM(s) Oral (07-30-24 @ 05:45)   15 milliGRAM(s) Oral (07-29-24 @ 21:02)  mirtazapine   7.5 milliGRAM(s) Oral (07-29-24 @ 21:02)  oxyCODONE    IR   15 milliGRAM(s) Oral (07-30-24 @ 19:47)    ITEMS UNCHECKED ARE NOT PRESENT  PRESENT SYMPTOMS/REVIEW OF SYSTEMS: []Unable to obtain due to poor mentation   Source if other than patient:  []Family   []Team     Pain: [x] yes [] no  QOL Impact - bothersome  Location -   rectum                 Aggravating Factors -  Quality - sharp, ache  Radiation -  Timing - constant  Severity (0-10 scale) - 5/10  Minimal Acceptable Level (0-10 scale) -    Dyspnea:                           []Mild  []Moderate []Severe  Anxiety:                             []Mild []Moderate []Severe  Fatigue:                             []Mild []Moderate []Severe  Nausea:                             []Mild []Moderate []Severe  Loss of Appetite:              []Mild []Moderate []Severe  Constipation:                    []Mild []Moderate []Severe    Other Symptoms:  [x]All Other Review Of Systems Negative     PHYSICAL EXAM:  GENERAL:  [x]Alert, tearful  [x]Oriented x3   []Lethargic  []Cachexia  []Unarousable  [x]Verbal  []Non-Verbal  Behavioral:   []Anxiety  []Delirium []Agitation [x]Cooperative  HEENT:  [x]Normal   []Dry mouth   []ET Tube/Trach  []Oral lesions  PULMONARY:   [x]Clear []Tachypnea  []Audible excessive secretions  [x]Normal Work of Breathing []Labored Breathing  []Rhonchi []Crackles []Wheezing  CARDIOVASCULAR:    [x]Regular Rate & Rhythm []Irregular []Tachy  []Ethan  GASTROINTESTINAL:  [x]Soft  []Distended   [x]+BS  [x]Non tender []Tender  []PEG []OGT/ NGT  Last BM: Ostomy with stool  GENITOURINARY:  [x]Normal [] Incontinent   []Oliguria/Anuria   [][place]  MUSCULOSKELETAL:   []Normal   [x]Weakness  []Bed/Wheelchair bound []Edema  NEUROLOGIC:   [x]No focal deficits  []Cognitive impairment  []Dysphagia []Dysarthria []Paresis []Encephalopathic  SKIN:   []Normal   [x]Pressure ulcer(s)  []Rash    Vital Signs Last 24 Hrs  T(C): 36.8 (30 Jul 2024 16:03), Max: 37.2 (30 Jul 2024 05:15)  T(F): 98.3 (30 Jul 2024 16:03), Max: 99 (30 Jul 2024 05:15)  HR: 101 (30 Jul 2024 16:03) (101 - 125)  BP: 109/68 (30 Jul 2024 16:03) (109/68 - 134/89)  BP(mean): --  RR: 18 (30 Jul 2024 16:03) (17 - 18)  SpO2: 97% (30 Jul 2024 16:03) (97% - 99%)    Parameters below as of 30 Jul 2024 16:03  Patient On (Oxygen Delivery Method): room air    LABS: Personally reviewed and interpreted                     8.2    11.41 )-----------( 837      ( 30 Jul 2024 05:30 )             27.9   07-30    134<L>  |  102  |  6<L>  ----------------------------<  107<H>  4.0   |  24  |  0.64    Ca    8.5      30 Jul 2024 05:30  Phos  3.6     07-30  Mg     2.0     07-30  TPro  6.8  /  Alb  2.6<L>  /  TBili  0.2  /  DBili  x   /  AST  10  /  ALT  13  /  AlkPhos  212<H>  07-30    RADIOLOGY & ADDITIONAL STUDIES: Personally reviewed and interpreted  None new    DISCUSSION OF CASE: Primary Team/RN - to discuss plan of care

## 2024-07-30 NOTE — PROGRESS NOTE ADULT - NSPROGADDITIONALINFOA_GEN_ALL_CORE
In addition to the E/M visit, an advance care planning meeting was performed. Start time: 4:45PM; End time: 5:05PM; Total time: 20min. A face to face meeting to discuss advance care planning was held today regarding: RAMBO ALBERTS. Primary decision maker: Patient is able to participate in decision making; Alternate/surrogate: Alexy. Discussed advance directives including, but not limited to, healthcare proxy and code status. Decision regarding code status: FULL CODE but considering DNR/DNI; Documentation completed today: GOC note

## 2024-07-30 NOTE — PROGRESS NOTE ADULT - ASSESSMENT
·	c/w pain regimen as ordered, new Methadone dose will not be therapeutic for a few days; notified by team regarding uncontrolled pain overnight but patient states pain control is adequate at this time  ·	see GOC note: will continue to explore 43yo M with PMH of Metastatic Anorectal CA, HIV, and Depression p/w weakness and found to have wound/disease progression. Palliative consulted for complex symptom management and medical decision making in the setting of life-limiting illness.    ·	c/w pain regimen as ordered, new Methadone dose will not be therapeutic for a few days; notified by team regarding uncontrolled pain overnight but patient states pain control is adequate at this time  ·	see GOC note: will continue to explore

## 2024-07-30 NOTE — PROGRESS NOTE ADULT - PROBLEM SELECTOR PLAN 7
Complex medical decision making / symptom management in the setting of metastatic malignancy.    If no further DMT were offered/pursued, then patient would be eligible for home hospice, however patient with significant barriers to this being possible as he is pending eviction from his apartment. Social work following for assistance.  Will continue to follow for ongoing GOC discussion / titration of palliative regimen. Emotional support provided, questions answered.    Active Psychosocial Referrals:  [x]Social Work/Case management [x]PT/OT []Chaplaincy []Hospice  [x]Patient/Family Support []Holistic RN [x]Massage Therapy [x]Music Therapy []Ethics  Coping: [] well [x] with difficulty [] poor coping [] unable to assess  Support system: [] strong [] adequate [x] inadequate

## 2024-07-30 NOTE — PROGRESS NOTE ADULT - PROBLEM SELECTOR PLAN 5
.  Locally advanced, metastatic disease  -s/p chemo/RT as outpatient  -overall progression of disease noted on imaging  -no plan for DMT at this time given performance status and active infection  -while surgical intervention is possible would need significant optimization and general concern is this is unliekly to occur

## 2024-07-30 NOTE — PROGRESS NOTE ADULT - PROBLEM SELECTOR PLAN 7
Currently on 1mg dilaudid, IV 15mg ketorolac, lidocaine patch, oxycodone 15mg for moderate pain, methadone 15mg TID

## 2024-07-30 NOTE — PROGRESS NOTE ADULT - SUBJECTIVE AND OBJECTIVE BOX
INTERVAL HPI/OVERNIGHT EVENTS: summer    SUBJECTIVE: Pt seen and examined at bedside. Still c/o back pain.     ANTIBIOTICS/RELEVANT:    MEDICATIONS  (STANDING):  acetaminophen     Tablet .. 1000 milliGRAM(s) Oral every 8 hours  bictegravir 50 mG/emtricitabine 200 mG/tenofovir alafenamide 25 mG (BIKTARVY) 1 Tablet(s) Oral daily  enoxaparin Injectable 60 milliGRAM(s) SubCutaneous every 12 hours  escitalopram 10 milliGRAM(s) Oral daily  ketorolac   Injectable 15 milliGRAM(s) IV Push every 8 hours  lactated ringers. 1000 milliLiter(s) (100 mL/Hr) IV Continuous <Continuous>  lidocaine   4% Patch 1 Patch Transdermal every 24 hours  lidocaine 2% Jelly 20 milliLiter(s) IntraUrethral every 4 hours  methadone    Tablet 15 milliGRAM(s) Oral every 8 hours  mirtazapine 7.5 milliGRAM(s) Oral at bedtime  piperacillin/tazobactam IVPB.. 3.375 Gram(s) IV Intermittent every 8 hours  polyethylene glycol 3350 17 Gram(s) Oral two times a day  senna 2 Tablet(s) Oral at bedtime  trimethoprim  160 mG/sulfamethoxazole 800 mG 1 Tablet(s) Oral every 24 hours  vancomycin  IVPB 1000 milliGRAM(s) IV Intermittent every 8 hours    MEDICATIONS  (PRN):  HYDROmorphone  Injectable 1 milliGRAM(s) IV Push every 3 hours PRN Severe Pain (7 - 10)  oxyCODONE    IR 15 milliGRAM(s) Oral every 4 hours PRN Moderate Pain (4 - 6)      Vital Signs Last 24 Hrs  T(C): 36.8 (30 Jul 2024 16:03), Max: 37.2 (30 Jul 2024 05:15)  T(F): 98.3 (30 Jul 2024 16:03), Max: 99 (30 Jul 2024 05:15)  HR: 101 (30 Jul 2024 16:03) (101 - 125)  BP: 109/68 (30 Jul 2024 16:03) (109/68 - 134/89)  BP(mean): --  RR: 18 (30 Jul 2024 16:03) (17 - 18)  SpO2: 97% (30 Jul 2024 16:03) (97% - 99%)    Parameters below as of 30 Jul 2024 16:03  Patient On (Oxygen Delivery Method): room air        PHYSICAL EXAM:  General: in no acute distress, non toxic appearing, speaking in full sentence  Lungs: CTA B/L. No wheezes, rales, or rhonchi  Cardiovascular: RRR. No murmurs, rubs, or gallops  Abdomen: +BS Soft, non-tender non-distended  Extremities: WWP  Neurological: Alert and oriented  MSK: Rt femur TTP and lower back pain     LABS:                        8.2    11.41 )-----------( 837      ( 30 Jul 2024 05:30 )             27.9     07-30    134<L>  |  102  |  6<L>  ----------------------------<  107<H>  4.0   |  24  |  0.64    Ca    8.5      30 Jul 2024 05:30  Phos  3.6     07-30  Mg     2.0     07-30    TPro  6.8  /  Alb  2.6<L>  /  TBili  0.2  /  DBili  x   /  AST  10  /  ALT  13  /  AlkPhos  212<H>  07-30      Urinalysis Basic - ( 30 Jul 2024 05:30 )    Color: x / Appearance: x / SG: x / pH: x  Gluc: 107 mg/dL / Ketone: x  / Bili: x / Urobili: x   Blood: x / Protein: x / Nitrite: x   Leuk Esterase: x / RBC: x / WBC x   Sq Epi: x / Non Sq Epi: x / Bacteria: x        MICROBIOLOGY:    RADIOLOGY & ADDITIONAL STUDIES:

## 2024-07-30 NOTE — PROGRESS NOTE ADULT - ASSESSMENT
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 This admission had a CTAP showing locally advance anal mass, gas filled cavitary showing increase size which extends to musculature of Rt posteiror pelvis, with superimposed infected mass and also c/f OM. Ortho was consulted and no intervention from them and recommended IR Bx. Heme/Onc consulted and no therapy offered. Patient was continued on biktarvy, started on zosyn 3.375mg, bactrim, and vancomycin. ID was consulted for recommendations on superimposed infected mass and c/f OM.    #Superimposed infection of mass  #c/f OM  - x-ray of femur/hip wnl, CXR showing hyperinflation  #HIV   - Patient w/ previous CD4 >200 and now CD4 this admission 47. Unlikely d/t poor compliance as VLUD. Acute decrease in Cd4 could be d/t infection.     Cultures:  Bcx NGTD x2    Recommendations:  - Please continue with vancomycin 1g q8 x2 additional doses then f/u trough.  - c/w Zosyn 3.375mg  - c/w Biktarvy  - c/w Bactrim for PCP ppx  - Agree w/ IR Bx and MRI if its within patient's GOC.   - Please order Cystatin C     ID team will continue to follow   Discussed w/ Dr. Reyes  43 y/o M w/ Hx of HIV previously poor compliance prior to being diagnosed w/ invasive anorectal SCC w/ involvement of sacrum/acetetabulum. 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 This admission had a CTAP showing locally advance anal mass, gas filled cavitary showing increase size which extends to musculature of Rt posteiror pelvis, with superimposed infected mass and also c/f OM. Ortho was consulted and no intervention from them and recommended IR Bx. Heme/Onc consulted and no therapy offered. Patient was continued on biktarvy, started on zosyn 3.375mg, bactrim, and vancomycin. ID was consulted for recommendations on superimposed infected mass and c/f OM.    #Superimposed infection of mass  #c/f OM  - x-ray of femur/hip wnl, CXR showing hyperinflation  #HIV   - Patient w/ previous CD4 >200 and now CD4 this admission 47. Unlikely d/t poor compliance as VLUD. Acute decrease in Cd4 could be d/t infection.     Cultures:  Bcx NGTD x2    Recommendations:  - VT 8.2 - Please continue with vancomycin 1g q8 x2 additional doses then f/u trough.  - c/w Zosyn 3.375mg  - c/w Biktarvy  - c/w Bactrim for PCP ppx  - Agree w/ IR Bx and MRI if its within patient's GOC.   - Please order Cystatin C     ID team will continue to follow   Discussed w/ Dr. Reyes

## 2024-07-30 NOTE — PROGRESS NOTE ADULT - SUBJECTIVE AND OBJECTIVE BOX
Internal Medicine Progress Note  Kelsie Concepcion, PGY-1      OVERNIGHT EVENTS/INTERVAL HPI:  Pt had breakthrough pain on current pain regiment. Palliative will follow up. Patient denying chest pain, SOB, palpitations, denies fever, chills, HA, Dizziness, N/V, abdominal pain, hematochezia/melena, dysuria, hematuria, new onset weakness/numbness, LE pain and/or swelling.      OBJECTIVE:  Vital Signs Last 24 Hrs  T(C): 36.8 (30 Jul 2024 09:00), Max: 37.2 (30 Jul 2024 05:15)  T(F): 98.3 (30 Jul 2024 09:00), Max: 99 (30 Jul 2024 05:15)  HR: 111 (30 Jul 2024 09:00) (99 - 125)  BP: 112/79 (30 Jul 2024 09:00) (112/79 - 134/89)  BP(mean): --  RR: 18 (30 Jul 2024 09:00) (17 - 18)  SpO2: 99% (30 Jul 2024 09:00) (99% - 100%)    Parameters below as of 30 Jul 2024 09:00  Patient On (Oxygen Delivery Method): room air      I&O's Detail    Physical Exam:  GENERAL: Awake, alert and interactive, no acute distress, appears comfortable  NEURO: A&Ox4, no focal deficits, 5/5 strength in all ext, reflexes 2+ throughout, CN 2-12 intact  HEENT: Normocephalic, atraumatic, no conjunctivitis or scleral icterus, oral mucosa moist, no oral lesions noted  NECK: Supple, no LAD, no JVD, thyroid not palpable  CARDIAC: Regular rate and rhythm, +S1/S2, no murmurs/rubs/gallops  PULM: Breathing comfortably on RA, clear to auscultation bilaterally, no wheezes/rales/rhonchi  ABDOMEN: Soft, nontender, nondistended, +bs, no hepatosplenomegaly, no rebound tenderness or fluid wave, no CVA tenderness  : Deferred  MSK: Range of motion grossly intact, no back tenderness  SKIN: Warm and dry, no rashes, lesions  VASC: Cap refil < 2 sec, 2+ peripheral pulses, no edema, no LE tenderness  Psych: Appropriate affect    Medications:  MEDICATIONS  (STANDING):  acetaminophen     Tablet .. 1000 milliGRAM(s) Oral every 8 hours  bictegravir 50 mG/emtricitabine 200 mG/tenofovir alafenamide 25 mG (BIKTARVY) 1 Tablet(s) Oral daily  enoxaparin Injectable 60 milliGRAM(s) SubCutaneous every 12 hours  escitalopram 10 milliGRAM(s) Oral daily  ketorolac   Injectable 15 milliGRAM(s) IV Push every 8 hours  lactated ringers. 1000 milliLiter(s) (100 mL/Hr) IV Continuous <Continuous>  lidocaine   4% Patch 1 Patch Transdermal every 24 hours  lidocaine 2% Jelly 20 milliLiter(s) IntraUrethral every 4 hours  methadone    Tablet 15 milliGRAM(s) Oral every 8 hours  mirtazapine 7.5 milliGRAM(s) Oral at bedtime  piperacillin/tazobactam IVPB.. 3.375 Gram(s) IV Intermittent every 8 hours  polyethylene glycol 3350 17 Gram(s) Oral two times a day  senna 2 Tablet(s) Oral at bedtime  trimethoprim  160 mG/sulfamethoxazole 800 mG 1 Tablet(s) Oral every 24 hours    MEDICATIONS  (PRN):  HYDROmorphone  Injectable 1 milliGRAM(s) IV Push every 3 hours PRN Severe Pain (7 - 10)  oxyCODONE    IR 15 milliGRAM(s) Oral every 4 hours PRN Moderate Pain (4 - 6)      Labs:                        8.2    11.41 )-----------( 837      ( 30 Jul 2024 05:30 )             27.9     07-30    134<L>  |  102  |  6<L>  ----------------------------<  107<H>  4.0   |  24  |  0.64    Ca    8.5      30 Jul 2024 05:30  Phos  3.6     07-30  Mg     2.0     07-30    TPro  6.8  /  Alb  2.6<L>  /  TBili  0.2  /  DBili  x   /  AST  10  /  ALT  13  /  AlkPhos  212<H>  07-30        Urinalysis Basic - ( 30 Jul 2024 05:30 )    Color: x / Appearance: x / SG: x / pH: x  Gluc: 107 mg/dL / Ketone: x  / Bili: x / Urobili: x   Blood: x / Protein: x / Nitrite: x   Leuk Esterase: x / RBC: x / WBC x   Sq Epi: x / Non Sq Epi: x / Bacteria: x      SARS-CoV-2: NotDetec (02 Mar 2024 06:07)      Radiology: Reviewed

## 2024-07-30 NOTE — PROGRESS NOTE ADULT - PROBLEM SELECTOR PLAN 4
.  Poor nutritional intake and significant weight loss in the setting of advanced malignancy  -encourage PO intake and supplements

## 2024-07-30 NOTE — PROGRESS NOTE ADULT - PROBLEM SELECTOR PLAN 1
CTAP -  larger gas-filled cavity in the more inferior portion of the mass, with a larger fistula to the perineum probable superimposed infection of the mass, greater extension of mass laterally though R pelvic muscles. Destruction of coccyx, sacrum, R ischium, posterior portion R acetabulum, ?possibility of OM superimposed upon lytic destruction from tumor.     - MRI with osteomyelitis  - Will have discussion with surgery, rad onc, palliative, heme/onc and primary team about goals of care  - heme rec following: no chemo in patient, consider protectomy or surgical intervention after improvement in clinical status.   - Pain mgmt w/ Methadone 15mg TID, Oxycodone 10 PO q4h PRN for mod pain, Dilaudid 0.5mg I V q4h PRN for breakthru pain.  - Followed by Dr. Julian and [facility] outpt  - 5/16/2024 completed radiation therapy to anus/pelvis and capecitabine

## 2024-07-30 NOTE — PROGRESS NOTE ADULT - PROBLEM SELECTOR PLAN 4
3/2024 CD4 251    - Continue home biktarvy  - CD4 count 47   - Started prophylactic bactrim   - ID consulted, appreciate recs  - HIV viral load negative

## 2024-07-31 DIAGNOSIS — M86.9 OSTEOMYELITIS, UNSPECIFIED: ICD-10-CM

## 2024-07-31 LAB
ALBUMIN SERPL ELPH-MCNC: 2.8 G/DL — LOW (ref 3.3–5)
ALP SERPL-CCNC: 217 U/L — HIGH (ref 40–120)
ALT FLD-CCNC: 13 U/L — SIGNIFICANT CHANGE UP (ref 10–45)
ANION GAP SERPL CALC-SCNC: 7 MMOL/L — SIGNIFICANT CHANGE UP (ref 5–17)
ANISOCYTOSIS BLD QL: SLIGHT — SIGNIFICANT CHANGE UP
AST SERPL-CCNC: 9 U/L — LOW (ref 10–40)
BASOPHILS # BLD AUTO: 0.1 K/UL — SIGNIFICANT CHANGE UP (ref 0–0.2)
BASOPHILS NFR BLD AUTO: 0.9 % — SIGNIFICANT CHANGE UP (ref 0–2)
BILIRUB SERPL-MCNC: 0.2 MG/DL — SIGNIFICANT CHANGE UP (ref 0.2–1.2)
BUN SERPL-MCNC: 8 MG/DL — SIGNIFICANT CHANGE UP (ref 7–23)
CALCIUM SERPL-MCNC: 9.1 MG/DL — SIGNIFICANT CHANGE UP (ref 8.4–10.5)
CHLORIDE SERPL-SCNC: 100 MMOL/L — SIGNIFICANT CHANGE UP (ref 96–108)
CO2 SERPL-SCNC: 28 MMOL/L — SIGNIFICANT CHANGE UP (ref 22–31)
CREAT SERPL-MCNC: 0.5 MG/DL — SIGNIFICANT CHANGE UP (ref 0.5–1.3)
CULTURE RESULTS: SIGNIFICANT CHANGE UP
CULTURE RESULTS: SIGNIFICANT CHANGE UP
CYSTATIN C SERPL-MCNC: 0.77 MG/L — SIGNIFICANT CHANGE UP (ref 0.68–1.22)
DACRYOCYTES BLD QL SMEAR: SLIGHT — SIGNIFICANT CHANGE UP
EGFR: 131 ML/MIN/1.73M2 — SIGNIFICANT CHANGE UP
EOSINOPHIL # BLD AUTO: 0.29 K/UL — SIGNIFICANT CHANGE UP (ref 0–0.5)
EOSINOPHIL NFR BLD AUTO: 2.7 % — SIGNIFICANT CHANGE UP (ref 0–6)
GFR/BSA.PRED SERPLBLD CYS-BASED-ARV: 115 ML/MIN/1.73M2 — SIGNIFICANT CHANGE UP
GIANT PLATELETS BLD QL SMEAR: PRESENT — SIGNIFICANT CHANGE UP
GLUCOSE SERPL-MCNC: 95 MG/DL — SIGNIFICANT CHANGE UP (ref 70–99)
HCT VFR BLD CALC: 28.6 % — LOW (ref 39–50)
HGB BLD-MCNC: 8.5 G/DL — LOW (ref 13–17)
HYPOCHROMIA BLD QL: SLIGHT — SIGNIFICANT CHANGE UP
LYMPHOCYTES # BLD AUTO: 1.26 K/UL — SIGNIFICANT CHANGE UP (ref 1–3.3)
LYMPHOCYTES # BLD AUTO: 11.6 % — LOW (ref 13–44)
MACROCYTES BLD QL: SLIGHT — SIGNIFICANT CHANGE UP
MAGNESIUM SERPL-MCNC: 2 MG/DL — SIGNIFICANT CHANGE UP (ref 1.6–2.6)
MANUAL SMEAR VERIFICATION: SIGNIFICANT CHANGE UP
MCHC RBC-ENTMCNC: 25.1 PG — LOW (ref 27–34)
MCHC RBC-ENTMCNC: 29.7 GM/DL — LOW (ref 32–36)
MCV RBC AUTO: 84.4 FL — SIGNIFICANT CHANGE UP (ref 80–100)
METAMYELOCYTES # FLD: 0.9 % — HIGH (ref 0–0)
MICROCYTES BLD QL: SLIGHT — SIGNIFICANT CHANGE UP
MONOCYTES # BLD AUTO: 0.29 K/UL — SIGNIFICANT CHANGE UP (ref 0–0.9)
MONOCYTES NFR BLD AUTO: 2.7 % — SIGNIFICANT CHANGE UP (ref 2–14)
NEUTROPHILS # BLD AUTO: 8.82 K/UL — HIGH (ref 1.8–7.4)
NEUTROPHILS NFR BLD AUTO: 81.2 % — HIGH (ref 43–77)
OVALOCYTES BLD QL SMEAR: SLIGHT — SIGNIFICANT CHANGE UP
PHOSPHATE SERPL-MCNC: 4.2 MG/DL — SIGNIFICANT CHANGE UP (ref 2.5–4.5)
PLAT MORPH BLD: ABNORMAL
PLATELET # BLD AUTO: 670 K/UL — HIGH (ref 150–400)
POIKILOCYTOSIS BLD QL AUTO: SIGNIFICANT CHANGE UP
POLYCHROMASIA BLD QL SMEAR: SLIGHT — SIGNIFICANT CHANGE UP
POTASSIUM SERPL-MCNC: 4.4 MMOL/L — SIGNIFICANT CHANGE UP (ref 3.5–5.3)
POTASSIUM SERPL-SCNC: 4.4 MMOL/L — SIGNIFICANT CHANGE UP (ref 3.5–5.3)
PROT SERPL-MCNC: 7.4 G/DL — SIGNIFICANT CHANGE UP (ref 6–8.3)
RBC # BLD: 3.39 M/UL — LOW (ref 4.2–5.8)
RBC # FLD: 19 % — HIGH (ref 10.3–14.5)
RBC BLD AUTO: ABNORMAL
SCHISTOCYTES BLD QL AUTO: SLIGHT — SIGNIFICANT CHANGE UP
SODIUM SERPL-SCNC: 135 MMOL/L — SIGNIFICANT CHANGE UP (ref 135–145)
SPECIMEN SOURCE: SIGNIFICANT CHANGE UP
SPECIMEN SOURCE: SIGNIFICANT CHANGE UP
SPHEROCYTES BLD QL SMEAR: SIGNIFICANT CHANGE UP
VANCOMYCIN TROUGH SERPL-MCNC: 12.5 UG/ML — SIGNIFICANT CHANGE UP (ref 10–20)
WBC # BLD: 10.86 K/UL — HIGH (ref 3.8–10.5)
WBC # FLD AUTO: 10.86 K/UL — HIGH (ref 3.8–10.5)

## 2024-07-31 PROCEDURE — 99233 SBSQ HOSP IP/OBS HIGH 50: CPT

## 2024-07-31 PROCEDURE — 99232 SBSQ HOSP IP/OBS MODERATE 35: CPT | Mod: GC

## 2024-07-31 PROCEDURE — 99233 SBSQ HOSP IP/OBS HIGH 50: CPT | Mod: GC

## 2024-07-31 RX ORDER — MIRTAZAPINE 15 MG
15 TABLET ORAL AT BEDTIME
Refills: 0 | Status: DISCONTINUED | OUTPATIENT
Start: 2024-07-31 | End: 2024-08-06

## 2024-07-31 RX ORDER — CYANOCOBALAMIN/FOLIC AC/VIT B6 2-2.5-25MG
1 TABLET ORAL EVERY 24 HOURS
Refills: 0 | Status: DISCONTINUED | OUTPATIENT
Start: 2024-08-01 | End: 2024-08-06

## 2024-07-31 RX ORDER — GINGER ROOT/GINGER ROOT EXT 262.5 MG
100 CAPSULE ORAL EVERY 24 HOURS
Refills: 0 | Status: DISCONTINUED | OUTPATIENT
Start: 2024-08-01 | End: 2024-08-06

## 2024-07-31 RX ORDER — CYANOCOBALAMIN/FOLIC AC/VIT B6 2-2.5-25MG
1 TABLET ORAL ONCE
Refills: 0 | Status: COMPLETED | OUTPATIENT
Start: 2024-07-31 | End: 2024-07-31

## 2024-07-31 RX ORDER — GINGER ROOT/GINGER ROOT EXT 262.5 MG
100 CAPSULE ORAL ONCE
Refills: 0 | Status: COMPLETED | OUTPATIENT
Start: 2024-07-31 | End: 2024-07-31

## 2024-07-31 RX ORDER — DEXTROAMPHETAMINE SACCHARATE, AMPHETAMINE ASPARTATE MONOHYDRATE, DEXTROAMPHETAMINE SULFATE AND AMPHETAMINE SULFATE 7.5; 7.5; 7.5; 7.5 MG/1; MG/1; MG/1; MG/1
5 CAPSULE, EXTENDED RELEASE ORAL
Refills: 0 | Status: DISCONTINUED | OUTPATIENT
Start: 2024-08-01 | End: 2024-08-06

## 2024-07-31 RX ADMIN — VANCOMYCIN HYDROCHLORIDE 250 MILLIGRAM(S): 5 INJECTION, POWDER, LYOPHILIZED, FOR SOLUTION INTRAVENOUS at 01:13

## 2024-07-31 RX ADMIN — PIPERACILLIN SODIUM, TAZOBACTAM SODIUM 25 GRAM(S): 3; .375 INJECTION, POWDER, LYOPHILIZED, FOR SOLUTION INTRAVENOUS at 08:27

## 2024-07-31 RX ADMIN — OXYCODONE HYDROCHLORIDE 15 MILLIGRAM(S): 30 TABLET ORAL at 03:30

## 2024-07-31 RX ADMIN — OXYCODONE HYDROCHLORIDE 15 MILLIGRAM(S): 30 TABLET ORAL at 23:43

## 2024-07-31 RX ADMIN — Medication 1 MILLIGRAM(S): at 19:15

## 2024-07-31 RX ADMIN — Medication 15 MILLIGRAM(S): at 06:00

## 2024-07-31 RX ADMIN — Medication 20 MILLILITER(S): at 19:09

## 2024-07-31 RX ADMIN — Medication 1 MILLIGRAM(S): at 01:22

## 2024-07-31 RX ADMIN — Medication 1 MILLIGRAM(S): at 12:32

## 2024-07-31 RX ADMIN — VANCOMYCIN HYDROCHLORIDE 250 MILLIGRAM(S): 5 INJECTION, POWDER, LYOPHILIZED, FOR SOLUTION INTRAVENOUS at 16:15

## 2024-07-31 RX ADMIN — Medication 1 MILLIGRAM(S): at 19:00

## 2024-07-31 RX ADMIN — LIDOCAINE 5% 1 PATCH: 5 CREAM TOPICAL at 00:12

## 2024-07-31 RX ADMIN — BICTEGRAVIR SODIUM, EMTRICITABINE, AND TENOFOVIR ALAFENAMIDE FUMARATE 1 TABLET(S): 50; 200; 25 TABLET ORAL at 11:31

## 2024-07-31 RX ADMIN — Medication 1 TABLET(S): at 14:10

## 2024-07-31 RX ADMIN — OXYCODONE HYDROCHLORIDE 15 MILLIGRAM(S): 30 TABLET ORAL at 10:19

## 2024-07-31 RX ADMIN — ENOXAPARIN SODIUM 60 MILLIGRAM(S): 120 INJECTION SUBCUTANEOUS at 19:00

## 2024-07-31 RX ADMIN — OXYCODONE HYDROCHLORIDE 15 MILLIGRAM(S): 30 TABLET ORAL at 11:19

## 2024-07-31 RX ADMIN — PIPERACILLIN SODIUM, TAZOBACTAM SODIUM 25 GRAM(S): 3; .375 INJECTION, POWDER, LYOPHILIZED, FOR SOLUTION INTRAVENOUS at 16:15

## 2024-07-31 RX ADMIN — Medication 1 MILLIGRAM(S): at 04:36

## 2024-07-31 RX ADMIN — Medication 15 MILLIGRAM(S): at 14:10

## 2024-07-31 RX ADMIN — Medication 1 MILLIGRAM(S): at 16:11

## 2024-07-31 RX ADMIN — VANCOMYCIN HYDROCHLORIDE 250 MILLIGRAM(S): 5 INJECTION, POWDER, LYOPHILIZED, FOR SOLUTION INTRAVENOUS at 08:28

## 2024-07-31 RX ADMIN — Medication 15 MILLIGRAM(S): at 22:06

## 2024-07-31 RX ADMIN — Medication 15 MILLIGRAM(S): at 05:45

## 2024-07-31 RX ADMIN — PIPERACILLIN SODIUM, TAZOBACTAM SODIUM 25 GRAM(S): 3; .375 INJECTION, POWDER, LYOPHILIZED, FOR SOLUTION INTRAVENOUS at 03:03

## 2024-07-31 RX ADMIN — Medication 1 MILLIGRAM(S): at 08:27

## 2024-07-31 RX ADMIN — Medication 1 MILLIGRAM(S): at 01:37

## 2024-07-31 RX ADMIN — Medication 15 MILLIGRAM(S): at 14:25

## 2024-07-31 RX ADMIN — Medication 100 MILLIGRAM(S): at 14:10

## 2024-07-31 RX ADMIN — ENOXAPARIN SODIUM 60 MILLIGRAM(S): 120 INJECTION SUBCUTANEOUS at 05:46

## 2024-07-31 RX ADMIN — Medication 1 MILLIGRAM(S): at 22:20

## 2024-07-31 RX ADMIN — Medication 1 MILLIGRAM(S): at 12:47

## 2024-07-31 RX ADMIN — SULFAMETHOXAZOLE AND TRIMETHOPRIM 1 TABLET(S): 400; 80 TABLET ORAL at 05:46

## 2024-07-31 RX ADMIN — Medication 1 MILLIGRAM(S): at 05:38

## 2024-07-31 RX ADMIN — OXYCODONE HYDROCHLORIDE 15 MILLIGRAM(S): 30 TABLET ORAL at 03:15

## 2024-07-31 RX ADMIN — Medication 1 MILLIGRAM(S): at 15:56

## 2024-07-31 RX ADMIN — Medication 1 MILLIGRAM(S): at 22:06

## 2024-07-31 RX ADMIN — Medication 10 MILLIGRAM(S): at 11:28

## 2024-07-31 RX ADMIN — Medication 1 MILLIGRAM(S): at 08:42

## 2024-07-31 NOTE — DIETITIAN INITIAL EVALUATION ADULT - PROBLEM SELECTOR PLAN 5
Home med: [facility],  reports he's compliant.   Last CD4 251, VLUD in Mar '24  No need to re-check at this time, will be affected iso acute illness     - c/w home Biktarvy

## 2024-07-31 NOTE — DIETITIAN INITIAL EVALUATION ADULT - NUTRITON FOCUSED PHYSICAL EXAM
Patient left message on nurse line stating he would like a referral for a sleep study; stated he saw an ENT doctor at [facility] and this doctor recommends it    Dr. Finney please order or advise.   yes...

## 2024-07-31 NOTE — DIETITIAN INITIAL EVALUATION ADULT - ORAL INTAKE PTA/DIET HISTORY
No known food allergies nor food intolerances reported. Pt reported poor appetite and PO intake x months (1 year per H&P) due to stress/environmental reasons; denied following any specific diet PTA.

## 2024-07-31 NOTE — DIETITIAN INITIAL EVALUATION ADULT - NSFNSGIIOFT_GEN_A_CORE
Pt denied nausea/vomiting, mentioned occasional diarrhea/constipation (pt with colostomy bag), stated last BM 7/30.

## 2024-07-31 NOTE — PROGRESS NOTE ADULT - SUBJECTIVE AND OBJECTIVE BOX
Internal Medicine Progress Note  Kelsie Concepcion, PGY-1      OVERNIGHT EVENTS/INTERVAL HPI:  No acute overnight events. Patient denying chest pain, SOB, palpitations, denies fever, chills, HA, Dizziness, N/V, abdominal pain, diarrhea, constipation, hematochezia/melena, dysuria, hematuria, new onset weakness/numbness, LE pain and/or swelling.  Patient requesting note for housing court date since he has been hospitalized.    OBJECTIVE:  Vital Signs Last 24 Hrs  T(C): 37.1 (31 Jul 2024 05:32), Max: 37.1 (31 Jul 2024 05:32)  T(F): 98.7 (31 Jul 2024 05:32), Max: 98.7 (31 Jul 2024 05:32)  HR: 113 (31 Jul 2024 05:32) (101 - 113)  BP: 100/64 (31 Jul 2024 05:32) (100/64 - 109/69)  BP(mean): --  RR: 18 (31 Jul 2024 05:32) (18 - 18)  SpO2: 98% (31 Jul 2024 05:32) (97% - 98%)    Parameters below as of 31 Jul 2024 05:32  Patient On (Oxygen Delivery Method): room air      I&O's Detail    31 Jul 2024 07:01  -  31 Jul 2024 10:53  --------------------------------------------------------  IN:    IV PiggyBack: 275 mL  Total IN: 275 mL    OUT:  Total OUT: 0 mL    Total NET: 275 mL        PHYSICAL EXAM  Constitutional: Frail, emaciated, low muscle mass, resting in bed, weak.  HEENT: NC/AT, PERRL/EOMI, anicteric sclera, No JVD or thyromegaly, MMM  Respiratory: CTA B/L; no audible wheezing/ronchi/rales  Cardiac: +S1/S2; RRR; no M/R/G; PMI non-displaced  Gastrointestinal: TTP at site of ostomy bag, soft, NT/ND; no rebound or guarding; +BS. ++Drainage above rectal opening, no obvious mass or swelling. Drainage appears thick/white pus-like.   Genitourinary: with ostomy bag, c/di  Back: spine midline, no bony tenderness or step-offs; no CVAT B/L  Extremities: WWP, no clubbing or cyanosis; no peripheral edema  Vascular: 2+ radial & DP pulses  Neurologic: AAOx3; CNII-XII grossly intact; no focal deficits  Psychiatric: anxious    Medications:  MEDICATIONS  (STANDING):  acetaminophen     Tablet .. 1000 milliGRAM(s) Oral every 8 hours  bictegravir 50 mG/emtricitabine 200 mG/tenofovir alafenamide 25 mG (BIKTARVY) 1 Tablet(s) Oral daily  enoxaparin Injectable 60 milliGRAM(s) SubCutaneous every 12 hours  escitalopram 10 milliGRAM(s) Oral daily  ketorolac   Injectable 15 milliGRAM(s) IV Push every 8 hours  lactated ringers. 1000 milliLiter(s) (100 mL/Hr) IV Continuous <Continuous>  lidocaine   4% Patch 1 Patch Transdermal every 24 hours  lidocaine 2% Jelly 20 milliLiter(s) IntraUrethral every 4 hours  methadone    Tablet 15 milliGRAM(s) Oral every 8 hours  mirtazapine 7.5 milliGRAM(s) Oral at bedtime  piperacillin/tazobactam IVPB.. 3.375 Gram(s) IV Intermittent every 8 hours  polyethylene glycol 3350 17 Gram(s) Oral two times a day  senna 2 Tablet(s) Oral at bedtime  trimethoprim  160 mG/sulfamethoxazole 800 mG 1 Tablet(s) Oral every 24 hours  vancomycin  IVPB 1000 milliGRAM(s) IV Intermittent every 8 hours    MEDICATIONS  (PRN):  HYDROmorphone  Injectable 1 milliGRAM(s) IV Push every 3 hours PRN Severe Pain (7 - 10)  oxyCODONE    IR 15 milliGRAM(s) Oral every 4 hours PRN Moderate Pain (4 - 6)      Labs:                        8.5    10.86 )-----------( 670      ( 31 Jul 2024 06:23 )             28.6     07-31    135  |  100  |  8   ----------------------------<  95  4.4   |  28  |  0.50    Ca    9.1      31 Jul 2024 06:23  Phos  4.2     07-31  Mg     2.0     07-31    TPro  7.4  /  Alb  2.8<L>  /  TBili  0.2  /  DBili  x   /  AST  9<L>  /  ALT  13  /  AlkPhos  217<H>  07-31        Urinalysis Basic - ( 31 Jul 2024 06:23 )    Color: x / Appearance: x / SG: x / pH: x  Gluc: 95 mg/dL / Ketone: x  / Bili: x / Urobili: x   Blood: x / Protein: x / Nitrite: x   Leuk Esterase: x / RBC: x / WBC x   Sq Epi: x / Non Sq Epi: x / Bacteria: x      SARS-CoV-2: NotDetec (02 Mar 2024 06:07)      Radiology: Reviewed

## 2024-07-31 NOTE — DIETITIAN INITIAL EVALUATION ADULT - PERTINENT MEDS FT
MEDICATIONS  (STANDING):  acetaminophen     Tablet .. 1000 milliGRAM(s) Oral every 8 hours  bictegravir 50 mG/emtricitabine 200 mG/tenofovir alafenamide 25 mG (BIKTARVY) 1 Tablet(s) Oral daily  enoxaparin Injectable 60 milliGRAM(s) SubCutaneous every 12 hours  escitalopram 10 milliGRAM(s) Oral daily  ketorolac   Injectable 15 milliGRAM(s) IV Push every 8 hours  lactated ringers. 1000 milliLiter(s) (100 mL/Hr) IV Continuous <Continuous>  lidocaine   4% Patch 1 Patch Transdermal every 24 hours  lidocaine 2% Jelly 20 milliLiter(s) IntraUrethral every 4 hours  methadone    Tablet 15 milliGRAM(s) Oral every 8 hours  mirtazapine 7.5 milliGRAM(s) Oral at bedtime  piperacillin/tazobactam IVPB.. 3.375 Gram(s) IV Intermittent every 8 hours  polyethylene glycol 3350 17 Gram(s) Oral two times a day  senna 2 Tablet(s) Oral at bedtime  trimethoprim  160 mG/sulfamethoxazole 800 mG 1 Tablet(s) Oral every 24 hours  vancomycin  IVPB 1000 milliGRAM(s) IV Intermittent every 8 hours    MEDICATIONS  (PRN):  HYDROmorphone  Injectable 1 milliGRAM(s) IV Push every 3 hours PRN Severe Pain (7 - 10)  oxyCODONE    IR 15 milliGRAM(s) Oral every 4 hours PRN Moderate Pain (4 - 6)

## 2024-07-31 NOTE — DIETITIAN INITIAL EVALUATION ADULT - ADD RECOMMEND
1. Continue with Regular diet  - Encourage adequate PO and protein intake if within goals of care  - Honor food preferences, as medically able  2. Ensure plus HP TID (pt requesting 6x/day)  - Provides 350 kcal, 20 g pro per serving  3. Recommend MVI for general nutrient coverage  4. Recommend thiamin 100 mg/d in setting of malnutrition  5. Continue with appetite stimulant, prn  6. Continue with current bowel regimen, prn  7. Appreciate weekly weight trends  8. Continue to monitor lytes, specifically Mg and Phos for refeeding, replete prn

## 2024-07-31 NOTE — PROGRESS NOTE ADULT - ASSESSMENT
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 This admission had a CTAP showing locally advance anal mass, gas filled cavitary showing increase size which extends to musculature of Rt posteiror pelvis, with superimposed infected mass and also c/f OM. Ortho was consulted and no intervention from them and recommended IR Bx. Heme/Onc consulted and no therapy offered. Patient was continued on biktarvy, started on zosyn 3.375mg, bactrim, and vancomycin. ID was consulted for recommendations on superimposed infected mass and c/f OM.    #Superimposed infection of mass  #c/f OM  - x-ray of femur/hip wnl, CXR showing hyperinflation  #HIV   - Patient w/ previous CD4 >200 and now CD4 this admission 47. Unlikely d/t poor compliance as VLUD. Acute decrease in Cd4 could be d/t infection.     Cultures:  Bcx NGTD x2    Recommendations:  - VT 8.2 - Please continue with vancomycin 1g q8 x2 additional doses then f/u trough.  - c/w Zosyn 3.375mg  - c/w Biktarvy  - c/w Bactrim for PCP ppx  - Agree w/ IR Bx and MRI if its within patient's GOC.   - Please order Cystatin C     ID team will continue to follow   Discussed w/ Dr. 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 This admission had a CTAP showing locally advance anal mass, gas filled cavitary showing increase size which extends to musculature of Rt posteiror pelvis, with superimposed infected mass and also c/f OM. Ortho was consulted and no intervention from them and recommended IR Bx. Heme/Onc consulted and no therapy offered. Patient was continued on biktarvy, started on zosyn 3.375mg, bactrim, and vancomycin. ID was consulted for recommendations on superimposed infected mass and c/f OM.    #Superimposed infection of mass  #c/f OM  - x-ray of femur/hip wnl, CXR showing hyperinflation  #HIV   - Patient w/ previous CD4 >200 and now CD4 this admission 47. Unlikely d/t poor compliance as VLUD. Acute decrease in Cd4 could be d/t infection.     Cultures:  Bcx NGTD x2    Recommendations:  - Recommending stopping antibiotic at this time given patient's poor prognosis status.   - c/w Biktarvy  - c/w Bactrim for PCP ppx      ID team will sign off.   Discussed w/ Dr. Reyes

## 2024-07-31 NOTE — PROGRESS NOTE ADULT - PROBLEM SELECTOR PLAN 1
.  -Methadone 15mg PO q8h ATC  -Dilaudid 1mg IV q3h PRN for Severe Pain  -Tylenol 1000mg PO q8h ATC  -s/p 5 days of Toradol 15mg IV q8h ATC (can restart after a few days)    Tolerating parenteral opiates without overt adverse effects. Anticipated discharge pain regimen is Methadone 15mg PO q8h ATC + Oxy IR 15mg PO q4h PRN for Severe Pain

## 2024-07-31 NOTE — PROGRESS NOTE ADULT - SUBJECTIVE AND OBJECTIVE BOX
INCOMPLETE   INTERVAL HPI/OVERNIGHT EVENTS:NATALIE     SUBJECTIVE: Pt seen and examined at bedside. Pain well controlled.    ANTIBIOTICS/RELEVANT:    MEDICATIONS  (STANDING):  acetaminophen     Tablet .. 1000 milliGRAM(s) Oral every 8 hours  bictegravir 50 mG/emtricitabine 200 mG/tenofovir alafenamide 25 mG (BIKTARVY) 1 Tablet(s) Oral daily  enoxaparin Injectable 60 milliGRAM(s) SubCutaneous every 12 hours  escitalopram 10 milliGRAM(s) Oral daily  lactated ringers. 1000 milliLiter(s) (100 mL/Hr) IV Continuous <Continuous>  lidocaine   4% Patch 1 Patch Transdermal every 24 hours  lidocaine 2% Jelly 20 milliLiter(s) IntraUrethral every 4 hours  methadone    Tablet 15 milliGRAM(s) Oral every 8 hours  mirtazapine 15 milliGRAM(s) Oral at bedtime  piperacillin/tazobactam IVPB.. 3.375 Gram(s) IV Intermittent every 8 hours  polyethylene glycol 3350 17 Gram(s) Oral two times a day  senna 2 Tablet(s) Oral at bedtime  trimethoprim  160 mG/sulfamethoxazole 800 mG 1 Tablet(s) Oral every 24 hours  vancomycin  IVPB 1000 milliGRAM(s) IV Intermittent every 8 hours    MEDICATIONS  (PRN):  HYDROmorphone  Injectable 1 milliGRAM(s) IV Push every 3 hours PRN Severe Pain (7 - 10)  oxyCODONE    IR 15 milliGRAM(s) Oral every 4 hours PRN Moderate Pain (4 - 6)      Vital Signs Last 24 Hrs  T(C): 36.7 (31 Jul 2024 13:22), Max: 37.1 (31 Jul 2024 05:32)  T(F): 98 (31 Jul 2024 13:22), Max: 98.7 (31 Jul 2024 05:32)  HR: 109 (31 Jul 2024 13:22) (109 - 113)  BP: 103/65 (31 Jul 2024 13:22) (100/64 - 109/69)  BP(mean): --  RR: 16 (31 Jul 2024 13:22) (16 - 18)  SpO2: 98% (31 Jul 2024 13:22) (98% - 98%)    Parameters below as of 31 Jul 2024 13:22  Patient On (Oxygen Delivery Method): room air        PHYSICAL EXAM:  General: in no acute distress, non toxic appearing, speaking in full sentences  Lungs: CTA B/L. No wheezes, rales, or rhonchi  Cardiovascular: RRR. No murmurs, rubs, or gallops  Abdomen: +BS Soft, non-tender  Extremities: WWP  Neurological: Alert and oriented x3    LABS:                        8.5    10.86 )-----------( 670      ( 31 Jul 2024 06:23 )             28.6     07-31    135  |  100  |  8   ----------------------------<  95  4.4   |  28  |  0.50    Ca    9.1      31 Jul 2024 06:23  Phos  4.2     07-31  Mg     2.0     07-31    TPro  7.4  /  Alb  2.8<L>  /  TBili  0.2  /  DBili  x   /  AST  9<L>  /  ALT  13  /  AlkPhos  217<H>  07-31      Urinalysis Basic - ( 31 Jul 2024 06:23 )    Color: x / Appearance: x / SG: x / pH: x  Gluc: 95 mg/dL / Ketone: x  / Bili: x / Urobili: x   Blood: x / Protein: x / Nitrite: x   Leuk Esterase: x / RBC: x / WBC x   Sq Epi: x / Non Sq Epi: x / Bacteria: x        MICROBIOLOGY:    RADIOLOGY & ADDITIONAL STUDIES: INTERVAL HPI/OVERNIGHT EVENTS:NATALIE     SUBJECTIVE: Pt seen and examined at bedside. Pain well controlled.    ANTIBIOTICS/RELEVANT:    MEDICATIONS  (STANDING):  acetaminophen     Tablet .. 1000 milliGRAM(s) Oral every 8 hours  bictegravir 50 mG/emtricitabine 200 mG/tenofovir alafenamide 25 mG (BIKTARVY) 1 Tablet(s) Oral daily  enoxaparin Injectable 60 milliGRAM(s) SubCutaneous every 12 hours  escitalopram 10 milliGRAM(s) Oral daily  lactated ringers. 1000 milliLiter(s) (100 mL/Hr) IV Continuous <Continuous>  lidocaine   4% Patch 1 Patch Transdermal every 24 hours  lidocaine 2% Jelly 20 milliLiter(s) IntraUrethral every 4 hours  methadone    Tablet 15 milliGRAM(s) Oral every 8 hours  mirtazapine 15 milliGRAM(s) Oral at bedtime  piperacillin/tazobactam IVPB.. 3.375 Gram(s) IV Intermittent every 8 hours  polyethylene glycol 3350 17 Gram(s) Oral two times a day  senna 2 Tablet(s) Oral at bedtime  trimethoprim  160 mG/sulfamethoxazole 800 mG 1 Tablet(s) Oral every 24 hours  vancomycin  IVPB 1000 milliGRAM(s) IV Intermittent every 8 hours    MEDICATIONS  (PRN):  HYDROmorphone  Injectable 1 milliGRAM(s) IV Push every 3 hours PRN Severe Pain (7 - 10)  oxyCODONE    IR 15 milliGRAM(s) Oral every 4 hours PRN Moderate Pain (4 - 6)      Vital Signs Last 24 Hrs  T(C): 36.7 (31 Jul 2024 13:22), Max: 37.1 (31 Jul 2024 05:32)  T(F): 98 (31 Jul 2024 13:22), Max: 98.7 (31 Jul 2024 05:32)  HR: 109 (31 Jul 2024 13:22) (109 - 113)  BP: 103/65 (31 Jul 2024 13:22) (100/64 - 109/69)  BP(mean): --  RR: 16 (31 Jul 2024 13:22) (16 - 18)  SpO2: 98% (31 Jul 2024 13:22) (98% - 98%)    Parameters below as of 31 Jul 2024 13:22  Patient On (Oxygen Delivery Method): room air        PHYSICAL EXAM:  General: in no acute distress, non toxic appearing, speaking in full sentences  Lungs: CTA B/L. No wheezes, rales, or rhonchi  Cardiovascular: RRR. No murmurs, rubs, or gallops  Abdomen: +BS Soft, non-tender  Extremities: WWP  Neurological: Alert and oriented x3    LABS:                        8.5    10.86 )-----------( 670      ( 31 Jul 2024 06:23 )             28.6     07-31    135  |  100  |  8   ----------------------------<  95  4.4   |  28  |  0.50    Ca    9.1      31 Jul 2024 06:23  Phos  4.2     07-31  Mg     2.0     07-31    TPro  7.4  /  Alb  2.8<L>  /  TBili  0.2  /  DBili  x   /  AST  9<L>  /  ALT  13  /  AlkPhos  217<H>  07-31      Urinalysis Basic - ( 31 Jul 2024 06:23 )    Color: x / Appearance: x / SG: x / pH: x  Gluc: 95 mg/dL / Ketone: x  / Bili: x / Urobili: x   Blood: x / Protein: x / Nitrite: x   Leuk Esterase: x / RBC: x / WBC x   Sq Epi: x / Non Sq Epi: x / Bacteria: x        MICROBIOLOGY:    RADIOLOGY & ADDITIONAL STUDIES:

## 2024-07-31 NOTE — PROGRESS NOTE ADULT - PROBLEM SELECTOR PLAN 1
CTAP -  larger gas-filled cavity in the more inferior portion of the mass, with a larger fistula to the perineum probable superimposed infection of the mass, greater extension of mass laterally though R pelvic muscles. Destruction of coccyx, sacrum, R ischium, posterior portion R acetabulum, OM superimposed upon lytic destruction from tumor.     - MRI with osteomyelitis  - Will have discussion with surgery, rad onc, palliative, heme/onc and primary team about goals of care  - heme rec following: no chemo in patient, consider proctectomy or surgical intervention after improvement in clinical status.   - Pain mgmt w/ Methadone 15mg TID, Oxycodone 10 PO q4h PRN for mod pain, Dilaudid 0.5mg I V q4h PRN for breakthru pain.  - Followed by Dr. Julian and [facility] outpt  - 5/16/2024 completed radiation therapy to anus/pelvis and capecitabine

## 2024-07-31 NOTE — DIETITIAN INITIAL EVALUATION ADULT - SIGNS/SYMPTOMS
as evidenced by moderate muscle and fat wasting; <75% PO/energy intake x >/= 1 months as evidenced by muscle/fat wasting; <75% energy intake x >/= 1 month; >20% wt loss x 1 yr; BMI <19

## 2024-07-31 NOTE — DIETITIAN INITIAL EVALUATION ADULT - PROBLEM SELECTOR PLAN 1
On admission, met 2/4 SIRS w/ HR 120s, WBC >22. Given immunosuppression, c/f infectious etiology.   UA neg, CXR clear, RVP & Covid neg.   CTAP showed: Destruction of coccyx, sacrum, R ischium, posterior portion R acetabulum, ?possibility of OM superimposed upon lytic destruction from tumor. Likely superimposed infection.     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  s/p Vanc & Zosyn in ED  - Given immunocompromised status, c/w Vanc 1g q12h & Zosyn 3.375 q8h (not PsA dosing given location)   - F/u BCx  - F/u MR Pelvic bony to assess further for OM  - Consulted ortho, if OM confirmed will need bone biopsy/cultures to guide mgmt. Will likely need ID consult once culture data obtained.

## 2024-07-31 NOTE — PROGRESS NOTE ADULT - SUBJECTIVE AND OBJECTIVE BOX
[facility]  Geo Rodriguez, Palliative Care Attending  Contact Info: Call [phone] (HEAL Line) or message on Microsoft Teams (Geo Rodriguez)    SUBJECTIVE AND OBJECTIVE:  INTERVAL HPI/OVERNIGHT EVENTS: Interval events noted. Patient had further discussion with primary team regarding expected disease trajectory. Stated he was accepting of prognosis but overwhelmed by decision making. See patient's PRN use for the past 24hrs noted below. Comprehensive symptom assessment and GOC exploration as noted below. Extensive time spent discussing plan of care with patient. No unexpected adverse effects of opiates noted: no sedation/dizziness/nausea.    ALLERGIES:  No Known Allergies    MEDICATIONS  (STANDING):  acetaminophen     Tablet .. 1000 milliGRAM(s) Oral every 8 hours  bictegravir 50 mG/emtricitabine 200 mG/tenofovir alafenamide 25 mG (BIKTARVY) 1 Tablet(s) Oral daily  enoxaparin Injectable 60 milliGRAM(s) SubCutaneous every 12 hours  escitalopram 10 milliGRAM(s) Oral daily  lactated ringers. 1000 milliLiter(s) (100 mL/Hr) IV Continuous <Continuous>  lidocaine   4% Patch 1 Patch Transdermal every 24 hours  lidocaine 2% Jelly 20 milliLiter(s) IntraUrethral every 4 hours  methadone    Tablet 15 milliGRAM(s) Oral every 8 hours  mirtazapine 15 milliGRAM(s) Oral at bedtime  polyethylene glycol 3350 17 Gram(s) Oral two times a day  senna 2 Tablet(s) Oral at bedtime  trimethoprim  160 mG/sulfamethoxazole 800 mG 1 Tablet(s) Oral every 24 hours    MEDICATIONS  (PRN):  HYDROmorphone  Injectable 1 milliGRAM(s) IV Push every 3 hours PRN Severe Pain (7 - 10)  oxyCODONE    IR 15 milliGRAM(s) Oral every 4 hours PRN Moderate Pain (4 - 6)    Analgesic Use (Scheduled and PRNs) for past 24 hours:  escitalopram   10 milliGRAM(s) Oral (07-31-24 @ 11:28)  HYDROmorphone  Injectable   1 milliGRAM(s) IV Push (07-31-24 @ 19:00)   1 milliGRAM(s) IV Push (07-31-24 @ 15:56)   1 milliGRAM(s) IV Push (07-31-24 @ 12:32)   1 milliGRAM(s) IV Push (07-31-24 @ 08:27)   1 milliGRAM(s) IV Push (07-31-24 @ 04:36)   1 milliGRAM(s) IV Push (07-31-24 @ 01:22)  ketorolac   Injectable   15 milliGRAM(s) IV Push (07-31-24 @ 14:10)   15 milliGRAM(s) IV Push (07-31-24 @ 05:45)  lidocaine 2% Jelly   20 milliLiter(s) IntraUrethral (07-31-24 @ 19:09)  methadone    Tablet   15 milliGRAM(s) Oral (07-31-24 @ 14:10)   15 milliGRAM(s) Oral (07-31-24 @ 05:45)  oxyCODONE    IR   15 milliGRAM(s) Oral (07-31-24 @ 10:19)   15 milliGRAM(s) Oral (07-31-24 @ 03:15)    ITEMS UNCHECKED ARE NOT PRESENT  PRESENT SYMPTOMS/REVIEW OF SYSTEMS: []Unable to obtain due to poor mentation   Source if other than patient:  []Family   []Team     Pain: [x] yes [] no  QOL Impact - bothersome  Location -   rectum                 Aggravating Factors -  Quality - sharp, ache  Radiation -  Timing - constant  Severity (0-10 scale) - 5/10  Minimal Acceptable Level (0-10 scale) -    Dyspnea:                           []Mild  []Moderate []Severe  Anxiety:                             []Mild []Moderate []Severe  Fatigue:                             []Mild []Moderate []Severe  Nausea:                             []Mild []Moderate []Severe  Loss of Appetite:              []Mild []Moderate []Severe  Constipation:                    []Mild []Moderate []Severe    Other Symptoms:  [x]All Other Review Of Systems Negative     PHYSICAL EXAM:  GENERAL:  [x]Alert  [x]Oriented x3   []Lethargic  []Cachexia  []Unarousable  [x]Verbal  []Non-Verbal  Behavioral:   []Anxiety  []Delirium []Agitation [x]Cooperative  HEENT:  [x]Normal   []Dry mouth   []ET Tube/Trach  []Oral lesions  PULMONARY:   [x]Clear []Tachypnea  []Audible excessive secretions  [x]Normal Work of Breathing []Labored Breathing  []Rhonchi []Crackles []Wheezing  CARDIOVASCULAR:    [x]Regular Rate & Rhythm []Irregular []Tachy  []Ethan  GASTROINTESTINAL:  [x]Soft  []Distended   [x]+BS  [x]Non tender []Tender  []PEG []OGT/ NGT  Last BM: Ostomy with stool  GENITOURINARY:  [x]Normal [] Incontinent   []Oliguria/Anuria   []Calderon  MUSCULOSKELETAL:   []Normal   [x]Weakness  []Bed/Wheelchair bound []Edema  NEUROLOGIC:   [x]No focal deficits  []Cognitive impairment  []Dysphagia []Dysarthria []Paresis []Encephalopathic  SKIN:   []Normal   [x]Pressure ulcer(s)  []Rash    Vital Signs Last 24 Hrs  T(C): 36.4 (31 Jul 2024 21:01), Max: 37.1 (31 Jul 2024 05:32)  T(F): 97.5 (31 Jul 2024 21:01), Max: 98.7 (31 Jul 2024 05:32)  HR: 97 (31 Jul 2024 21:01) (97 - 113)  BP: 95/62 (31 Jul 2024 21:01) (95/62 - 103/65)  BP(mean): --  RR: 16 (31 Jul 2024 21:01) (16 - 18)  SpO2: 97% (31 Jul 2024 21:01) (97% - 98%)    Parameters below as of 31 Jul 2024 21:01  Patient On (Oxygen Delivery Method): room air    LABS: Personally reviewed and interpreted                    8.5    10.86 )-----------( 670      ( 31 Jul 2024 06:23 )             28.6   07-31    135  |  100  |  8   ----------------------------<  95  4.4   |  28  |  0.50    Ca    9.1      31 Jul 2024 06:23  Phos  4.2     07-31  Mg     2.0     07-31  TPro  7.4  /  Alb  2.8<L>  /  TBili  0.2  /  DBili  x   /  AST  9<L>  /  ALT  13  /  AlkPhos  217<H>  07-31    RADIOLOGY & ADDITIONAL STUDIES: Personally reviewed and interpreted  None new    DISCUSSION OF CASE: Primary Team/RN - to discuss plan of care

## 2024-07-31 NOTE — PROGRESS NOTE ADULT - PROBLEM SELECTOR PLAN 4
elevated WBC 22 at admission and tachycardia.     - MRI with osteomyelitis.  - Currently receiving vancomycin and Zosyn

## 2024-07-31 NOTE — DIETITIAN INITIAL EVALUATION ADULT - PROBLEM SELECTOR PLAN 3
CT w/ probably DVT of LLE. On exam, no LE edema, no calf tenderness. However high thrombotic risk given hx malignancy.     - f/u w/ duplex US of bilat LLEs

## 2024-07-31 NOTE — PROGRESS NOTE ADULT - PROBLEM SELECTOR PLAN 8
Currently on 1mg dilaudid, IV 15mg ketorolac, lidocaine patch, oxycodone 15mg for moderate pain, methadone 15mg TID    - Palliative following

## 2024-07-31 NOTE — PROGRESS NOTE ADULT - PROBLEM SELECTOR PLAN 2
MRI with osteomyelitis super imposed on recto SCC, on vancomycin 1g q8 hrs and Zosyn 3.375g q8hrs    - Continue IV vancomycin and Zosyn  - appreciate ID recs  - Follow with vanc troughs

## 2024-07-31 NOTE — PROGRESS NOTE ADULT - PROBLEM SELECTOR PLAN 5
.  Locally advanced, metastatic disease  -s/p chemo/RT as outpatient  -overall progression of disease noted on imaging  -no plan for DMT at this time given performance status and active infection  -beginning a transition to a symptom-directed plan of care a further invasive interventions are not being pursued  -hospice eligible and appropriate

## 2024-07-31 NOTE — PROGRESS NOTE ADULT - PROBLEM SELECTOR PLAN 7
Complex medical decision making / symptom management in the setting of metastatic malignancy.    Will continue to follow for ongoing GOC discussion / titration of palliative regimen. Emotional support provided, questions answered.    Active Psychosocial Referrals:  [x]Social Work/Case management [x]PT/OT []Chaplaincy []Hospice  [x]Patient/Family Support []Holistic RN [x]Massage Therapy [x]Music Therapy []Ethics  Coping: [] well [x] with difficulty [] poor coping [] unable to assess  Support system: [] strong [] adequate [x] inadequate

## 2024-07-31 NOTE — DIETITIAN INITIAL EVALUATION ADULT - OTHER INFO
Per H&P: Mr. Sanchez is a 44 year old male who came to [facility] ED with complaint of generalized weakness. He has PMHx of  invasive anorectal squamous cell carcinoma (last received chemo and XRT a month ago, dx'd 12/23, pending a surgical intervention) with bony involvement  to sacrum/acetabulum (followed by Eliel/Graciela), HIV (biktarvy), anemia, baseline tachycardia attributed to prior heavy drug use (crystal meth, ecstasy), nicotine dependence (a pack a week) and depression, presenting for generalized weakness, pain in the hips and shoulders BL, and pain at the sites of incisions on anus. Too weak to stand or move. Pain and weakness started 3-4 days ago, gradually, persistently, and worsening, now bedbound. Pain most profound where significant muscle loss has occurred - BL hips, shoulders, buttocks/sacral area. Decreased appetite/decreased PO intake for last year, associated weight loss and muscle loss. Walks at baseline with walker, now worsening mostly bedbound. Denies fatigue, dizziness, or lightheadedness numbness, tingling, pain or discomfort, SOB, weight loss or gain, or sleep patterns. Nothing seems to make it better or worse. No recent changes in medications or substance use. Has not experienced similar weakness in the past.  Patient denies emesis, shortness of breath or chest pain. Patient endorsed longstanding history of perianal abscesses but worsening pain and drainage, with no fevers. Denies nausea/vomiting. Recently seen in June 12, 2024 for perianal abscesses and fistulas at [facility]. On 6/15 he was s/p colostomy creation and perineal washout       Met with pt this AM at bedside. Pt was seated upright and able to articulate his nutrition hx well. No known food allergies nor food intolerances reported. Pt reported poor appetite and PO intake x months (1 year per H&P) due to stress/environmental reasons; denied following any specific diet PTA. Pt reported current good appetite - RD observed pt eating breakfast. Pt denied chewing/swallowing difficulties and mouth sores/dry mouth. Pt denied nausea/vomiting, mentioned occasional diarrhea/constipation (pt with colostomy bag), stated last BM 7/30. Pt reported usual body weight 160-165 pounds and endorsed recent weight loss since december 2023 (21.8% wt loss x 1 year - meets criteria for severe malnutrition). RD reviewed protein sources (chicken, fish, beans, nut, etc.) and encouraged adequate PO and protein consumption in order to regain strength, maintain muscle mass and reach adequate nutritional status. Pt was accepting to RD suggestion and asking appropriate questions.     *Previous RD note from prior admission (6/18/24):  - Weight: 130 pounds   - Nutrition dx: severe chronic malnutrition

## 2024-07-31 NOTE — PROGRESS NOTE ADULT - PROBLEM SELECTOR PLAN 2
.  PPSV = 40%, requires assistance for most ADLs  -PT Eval to assess functional status  -c/w supportive care, OOB, encourage movement

## 2024-07-31 NOTE — DIETITIAN INITIAL EVALUATION ADULT - PERTINENT LABORATORY DATA
07-31    135  |  100  |  8   ----------------------------<  95  4.4   |  28  |  0.50    Ca    9.1      31 Jul 2024 06:23  Phos  4.2     07-31  Mg     2.0     07-31    TPro  7.4  /  Alb  2.8<L>  /  TBili  0.2  /  DBili  x   /  AST  9<L>  /  ALT  13  /  AlkPhos  217<H>  07-31

## 2024-07-31 NOTE — PROGRESS NOTE ADULT - PROBLEM SELECTOR PLAN 5
3/2024 CD4 251    - Continue home biktarvy  - CD4 count 47   - On prophylactic bactrim   - ID consulted, appreciate recs  - HIV viral load negative

## 2024-07-31 NOTE — PROGRESS NOTE ADULT - ASSESSMENT
45yo M with PMH of Metastatic Anorectal CA, HIV, and Depression p/w weakness and found to have wound/disease progression. Palliative consulted for complex symptom management and medical decision making in the setting of life-limiting illness.    ·	c/w pain regimen as ordered while discharge plan is being determined; anticipated discharge pain regimen is Methadone 15mg PO q8h ATC + Oxy IR 15mg PO q4h PRN for Severe Pain  ·	patient is home hospice eligible but need to clarify the setting and support that will be available for him

## 2024-07-31 NOTE — DIETITIAN INITIAL EVALUATION ADULT - OTHER CALCULATIONS
*Using ideal body weight as pt is <80% ideal body weight. Needs adjusted for malnutrition, anorectal cancer, HIV, sepsis. ideal body weight: 178 pounds; % ideal body weight: 72%

## 2024-08-01 LAB
ALBUMIN SERPL ELPH-MCNC: 2.5 G/DL — LOW (ref 3.3–5)
ALP SERPL-CCNC: 210 U/L — HIGH (ref 40–120)
ALT FLD-CCNC: 14 U/L — SIGNIFICANT CHANGE UP (ref 10–45)
ANION GAP SERPL CALC-SCNC: 9 MMOL/L — SIGNIFICANT CHANGE UP (ref 5–17)
AST SERPL-CCNC: 12 U/L — SIGNIFICANT CHANGE UP (ref 10–40)
BASOPHILS # BLD AUTO: 0.05 K/UL — SIGNIFICANT CHANGE UP (ref 0–0.2)
BASOPHILS NFR BLD AUTO: 0.5 % — SIGNIFICANT CHANGE UP (ref 0–2)
BILIRUB SERPL-MCNC: 0.2 MG/DL — SIGNIFICANT CHANGE UP (ref 0.2–1.2)
BUN SERPL-MCNC: 9 MG/DL — SIGNIFICANT CHANGE UP (ref 7–23)
CALCIUM SERPL-MCNC: 9.2 MG/DL — SIGNIFICANT CHANGE UP (ref 8.4–10.5)
CHLORIDE SERPL-SCNC: 97 MMOL/L — SIGNIFICANT CHANGE UP (ref 96–108)
CO2 SERPL-SCNC: 28 MMOL/L — SIGNIFICANT CHANGE UP (ref 22–31)
CREAT SERPL-MCNC: 0.53 MG/DL — SIGNIFICANT CHANGE UP (ref 0.5–1.3)
EGFR: 128 ML/MIN/1.73M2 — SIGNIFICANT CHANGE UP
EOSINOPHIL # BLD AUTO: 0.32 K/UL — SIGNIFICANT CHANGE UP (ref 0–0.5)
EOSINOPHIL NFR BLD AUTO: 3 % — SIGNIFICANT CHANGE UP (ref 0–6)
GLUCOSE SERPL-MCNC: 85 MG/DL — SIGNIFICANT CHANGE UP (ref 70–99)
HCT VFR BLD CALC: 29.5 % — LOW (ref 39–50)
HGB BLD-MCNC: 8.6 G/DL — LOW (ref 13–17)
IMM GRANULOCYTES NFR BLD AUTO: 7.4 % — HIGH (ref 0–0.9)
LYMPHOCYTES # BLD AUTO: 0.86 K/UL — LOW (ref 1–3.3)
LYMPHOCYTES # BLD AUTO: 8.2 % — LOW (ref 13–44)
MAGNESIUM SERPL-MCNC: 2 MG/DL — SIGNIFICANT CHANGE UP (ref 1.6–2.6)
MCHC RBC-ENTMCNC: 25.6 PG — LOW (ref 27–34)
MCHC RBC-ENTMCNC: 29.2 GM/DL — LOW (ref 32–36)
MCV RBC AUTO: 87.8 FL — SIGNIFICANT CHANGE UP (ref 80–100)
MONOCYTES # BLD AUTO: 0.7 K/UL — SIGNIFICANT CHANGE UP (ref 0–0.9)
MONOCYTES NFR BLD AUTO: 6.7 % — SIGNIFICANT CHANGE UP (ref 2–14)
NEUTROPHILS # BLD AUTO: 7.81 K/UL — HIGH (ref 1.8–7.4)
NEUTROPHILS NFR BLD AUTO: 74.2 % — SIGNIFICANT CHANGE UP (ref 43–77)
NRBC # BLD: 0 /100 WBCS — SIGNIFICANT CHANGE UP (ref 0–0)
PHOSPHATE SERPL-MCNC: 4.7 MG/DL — HIGH (ref 2.5–4.5)
PLATELET # BLD AUTO: 650 K/UL — HIGH (ref 150–400)
POTASSIUM SERPL-MCNC: 4.2 MMOL/L — SIGNIFICANT CHANGE UP (ref 3.5–5.3)
POTASSIUM SERPL-SCNC: 4.2 MMOL/L — SIGNIFICANT CHANGE UP (ref 3.5–5.3)
PROT SERPL-MCNC: 7.1 G/DL — SIGNIFICANT CHANGE UP (ref 6–8.3)
RBC # BLD: 3.36 M/UL — LOW (ref 4.2–5.8)
RBC # FLD: 19.5 % — HIGH (ref 10.3–14.5)
SODIUM SERPL-SCNC: 134 MMOL/L — LOW (ref 135–145)
WBC # BLD: 10.52 K/UL — HIGH (ref 3.8–10.5)
WBC # FLD AUTO: 10.52 K/UL — HIGH (ref 3.8–10.5)

## 2024-08-01 PROCEDURE — 99233 SBSQ HOSP IP/OBS HIGH 50: CPT | Mod: 25

## 2024-08-01 PROCEDURE — 99497 ADVNCD CARE PLAN 30 MIN: CPT | Mod: 25

## 2024-08-01 PROCEDURE — 99232 SBSQ HOSP IP/OBS MODERATE 35: CPT | Mod: GC

## 2024-08-01 PROCEDURE — 99223 1ST HOSP IP/OBS HIGH 75: CPT

## 2024-08-01 RX ORDER — HYDROMORPHONE HCL IN 0.9% NACL 0.2 MG/ML
1.5 PLASTIC BAG, INJECTION (ML) INTRAVENOUS
Refills: 0 | Status: DISCONTINUED | OUTPATIENT
Start: 2024-08-01 | End: 2024-08-03

## 2024-08-01 RX ORDER — ESCITALOPRAM OXALATE 20 MG
20 TABLET ORAL EVERY 24 HOURS
Refills: 0 | Status: DISCONTINUED | OUTPATIENT
Start: 2024-08-02 | End: 2024-08-06

## 2024-08-01 RX ORDER — ACETAMINOPHEN 500 MG
1000 TABLET ORAL ONCE
Refills: 0 | Status: COMPLETED | OUTPATIENT
Start: 2024-08-01 | End: 2024-08-01

## 2024-08-01 RX ORDER — OXYCODONE HYDROCHLORIDE 30 MG/1
15 TABLET ORAL EVERY 4 HOURS
Refills: 0 | Status: DISCONTINUED | OUTPATIENT
Start: 2024-08-02 | End: 2024-08-02

## 2024-08-01 RX ADMIN — Medication 15 MILLIGRAM(S): at 22:16

## 2024-08-01 RX ADMIN — BICTEGRAVIR SODIUM, EMTRICITABINE, AND TENOFOVIR ALAFENAMIDE FUMARATE 1 TABLET(S): 50; 200; 25 TABLET ORAL at 11:05

## 2024-08-01 RX ADMIN — Medication 1 MILLIGRAM(S): at 15:28

## 2024-08-01 RX ADMIN — Medication 10 MILLIGRAM(S): at 11:05

## 2024-08-01 RX ADMIN — Medication 1 MILLIGRAM(S): at 04:23

## 2024-08-01 RX ADMIN — Medication 1 TABLET(S): at 06:15

## 2024-08-01 RX ADMIN — Medication 20 MILLILITER(S): at 22:17

## 2024-08-01 RX ADMIN — OXYCODONE HYDROCHLORIDE 15 MILLIGRAM(S): 30 TABLET ORAL at 00:23

## 2024-08-01 RX ADMIN — DEXTROAMPHETAMINE SACCHARATE, AMPHETAMINE ASPARTATE MONOHYDRATE, DEXTROAMPHETAMINE SULFATE AND AMPHETAMINE SULFATE 5 MILLIGRAM(S): 7.5; 7.5; 7.5; 7.5 CAPSULE, EXTENDED RELEASE ORAL at 09:37

## 2024-08-01 RX ADMIN — Medication 1 MILLIGRAM(S): at 04:50

## 2024-08-01 RX ADMIN — OXYCODONE HYDROCHLORIDE 15 MILLIGRAM(S): 30 TABLET ORAL at 09:37

## 2024-08-01 RX ADMIN — Medication 1.5 MILLIGRAM(S): at 22:06

## 2024-08-01 RX ADMIN — Medication 1.5 MILLIGRAM(S): at 18:29

## 2024-08-01 RX ADMIN — OXYCODONE HYDROCHLORIDE 15 MILLIGRAM(S): 30 TABLET ORAL at 18:24

## 2024-08-01 RX ADMIN — ENOXAPARIN SODIUM 60 MILLIGRAM(S): 120 INJECTION SUBCUTANEOUS at 06:16

## 2024-08-01 RX ADMIN — Medication 1 MILLIGRAM(S): at 15:43

## 2024-08-01 RX ADMIN — Medication 1 MILLIGRAM(S): at 01:25

## 2024-08-01 RX ADMIN — Medication 1 MILLIGRAM(S): at 07:34

## 2024-08-01 RX ADMIN — Medication 1 MILLIGRAM(S): at 07:50

## 2024-08-01 RX ADMIN — Medication 1.5 MILLIGRAM(S): at 18:44

## 2024-08-01 RX ADMIN — Medication 100 MILLIGRAM(S): at 06:15

## 2024-08-01 RX ADMIN — OXYCODONE HYDROCHLORIDE 15 MILLIGRAM(S): 30 TABLET ORAL at 10:21

## 2024-08-01 RX ADMIN — Medication 1 MILLIGRAM(S): at 11:05

## 2024-08-01 RX ADMIN — Medication 1.5 MILLIGRAM(S): at 21:36

## 2024-08-01 RX ADMIN — LIDOCAINE 5% 1 PATCH: 5 CREAM TOPICAL at 18:16

## 2024-08-01 RX ADMIN — Medication 1 MILLIGRAM(S): at 01:09

## 2024-08-01 RX ADMIN — Medication 1 MILLIGRAM(S): at 11:15

## 2024-08-01 RX ADMIN — Medication 15 MILLIGRAM(S): at 06:15

## 2024-08-01 RX ADMIN — Medication 20 MILLILITER(S): at 18:16

## 2024-08-01 RX ADMIN — SULFAMETHOXAZOLE AND TRIMETHOPRIM 1 TABLET(S): 400; 80 TABLET ORAL at 06:24

## 2024-08-01 RX ADMIN — Medication 15 MILLIGRAM(S): at 14:15

## 2024-08-01 RX ADMIN — OXYCODONE HYDROCHLORIDE 15 MILLIGRAM(S): 30 TABLET ORAL at 17:24

## 2024-08-01 RX ADMIN — ENOXAPARIN SODIUM 60 MILLIGRAM(S): 120 INJECTION SUBCUTANEOUS at 18:16

## 2024-08-01 RX ADMIN — LIDOCAINE 5% 1 PATCH: 5 CREAM TOPICAL at 14:15

## 2024-08-01 RX ADMIN — Medication 20 MILLILITER(S): at 09:37

## 2024-08-01 RX ADMIN — OXYCODONE HYDROCHLORIDE 15 MILLIGRAM(S): 30 TABLET ORAL at 05:18

## 2024-08-01 NOTE — PROGRESS NOTE ADULT - ASSESSMENT
43yo M with PMH of Metastatic Anorectal CA, HIV, and Depression p/w weakness and found to have wound/disease progression. Palliative consulted for complex symptom management and medical decision making in the setting of life-limiting illness. 45yo M with PMH of Metastatic Anorectal CA, HIV, and Depression p/w weakness and found to have wound/disease progression. Palliative consulted for complex symptom management and medical decision making in the setting of life-limiting illness.

## 2024-08-01 NOTE — PROGRESS NOTE ADULT - PROBLEM SELECTOR PLAN 1
**Incomplete** .  -Methadone 15mg PO q8h ATC  -Dilaudid 1.5mg IV q3h PRN for Severe Pain  -Tylenol 1000mg PO q8h ATC  -s/p 5 days of Toradol 15mg IV q8h ATC (can restart after a few days)    Tolerating parenteral opiates without overt adverse effects. Anticipated discharge pain regimen is Methadone 15mg PO q8h ATC + Oxy IR 20mg PO q4h PRN for Severe Pain

## 2024-08-01 NOTE — BH CONSULTATION LIAISON ASSESSMENT NOTE - CURRENT MEDICATION
MEDICATIONS  (STANDING):  acetaminophen     Tablet .. 1000 milliGRAM(s) Oral every 8 hours  amphetamine/dextroamphetamine XR 5 milliGRAM(s) Oral with breakfast  bictegravir 50 mG/emtricitabine 200 mG/tenofovir alafenamide 25 mG (BIKTARVY) 1 Tablet(s) Oral daily  enoxaparin Injectable 60 milliGRAM(s) SubCutaneous every 12 hours  escitalopram 10 milliGRAM(s) Oral daily  lactated ringers. 1000 milliLiter(s) (100 mL/Hr) IV Continuous <Continuous>  lidocaine   4% Patch 1 Patch Transdermal every 24 hours  lidocaine 2% Jelly 20 milliLiter(s) IntraUrethral every 4 hours  methadone    Tablet 15 milliGRAM(s) Oral every 8 hours  mirtazapine 15 milliGRAM(s) Oral at bedtime  multivitamin 1 Tablet(s) Oral every 24 hours  polyethylene glycol 3350 17 Gram(s) Oral two times a day  senna 2 Tablet(s) Oral at bedtime  thiamine 100 milliGRAM(s) Oral every 24 hours  trimethoprim  160 mG/sulfamethoxazole 800 mG 1 Tablet(s) Oral every 24 hours    MEDICATIONS  (PRN):  HYDROmorphone  Injectable 1.5 milliGRAM(s) IV Push every 3 hours PRN Severe Pain (7 - 10)  oxyCODONE    IR 15 milliGRAM(s) Oral every 4 hours PRN Moderate Pain (4 - 6)

## 2024-08-01 NOTE — PROGRESS NOTE ADULT - SUBJECTIVE AND OBJECTIVE BOX
Internal Medicine Progress Note  Kelsie Concepcion, PGY-1      OVERNIGHT EVENTS/INTERVAL HPI:  Overnight, pt asked why he was unable to receive toradol. Night team explained and patient expressed understanding. Pt interested in meeting with psych today to discuss his situation and how he is coping. Patient denying chest pain, SOB, palpitations, denies fever, chills, HA, Dizziness, N/V, abdominal pain, diarrhea, constipation, hematochezia/melena, dysuria, hematuria, new onset weakness/numbness, LE pain and/or swelling.      OBJECTIVE:  Vital Signs Last 24 Hrs  T(C): 37.2 (01 Aug 2024 13:05), Max: 37.2 (01 Aug 2024 13:05)  T(F): 98.9 (01 Aug 2024 13:05), Max: 98.9 (01 Aug 2024 13:05)  HR: 109 (01 Aug 2024 13:05) (97 - 109)  BP: 114/76 (01 Aug 2024 13:05) (95/62 - 117/69)  BP(mean): --  RR: 16 (01 Aug 2024 13:05) (16 - 17)  SpO2: 96% (01 Aug 2024 13:05) (96% - 97%)    Parameters below as of 01 Aug 2024 13:05  Patient On (Oxygen Delivery Method): room air      I&O's Detail    31 Jul 2024 07:01  -  01 Aug 2024 07:00  --------------------------------------------------------  IN:    IV PiggyBack: 625 mL  Total IN: 625 mL    OUT:  Total OUT: 0 mL    Total NET: 625 mL      PHYSICAL EXAM  Constitutional: Frail, emaciated, low muscle mass, resting in bed, weak.  HEENT: NC/AT, PERRL/EOMI, anicteric sclera, No JVD or thyromegaly, MMM  Respiratory: CTA B/L; no audible wheezing/ronchi/rales  Cardiac: +S1/S2; RRR; no M/R/G; PMI non-displaced  Gastrointestinal: TTP at site of ostomy bag, soft, NT/ND; no rebound or guarding; +BS.   Genitourinary: with ostomy bag, c/di  Back: spine midline, no bony tenderness or step-offs; no CVAT B/L  Extremities: WWP, no clubbing or cyanosis; no peripheral edema  Vascular: 2+ radial & DP pulses  Neurologic: AAOx3; CNII-XII grossly intact; no focal deficits  Psychiatric: anxious    Medications:  MEDICATIONS  (STANDING):  acetaminophen     Tablet .. 1000 milliGRAM(s) Oral every 8 hours  amphetamine/dextroamphetamine XR 5 milliGRAM(s) Oral with breakfast  bictegravir 50 mG/emtricitabine 200 mG/tenofovir alafenamide 25 mG (BIKTARVY) 1 Tablet(s) Oral daily  enoxaparin Injectable 60 milliGRAM(s) SubCutaneous every 12 hours  escitalopram 10 milliGRAM(s) Oral daily  lactated ringers. 1000 milliLiter(s) (100 mL/Hr) IV Continuous <Continuous>  lidocaine   4% Patch 1 Patch Transdermal every 24 hours  lidocaine 2% Jelly 20 milliLiter(s) IntraUrethral every 4 hours  methadone    Tablet 15 milliGRAM(s) Oral every 8 hours  mirtazapine 15 milliGRAM(s) Oral at bedtime  multivitamin 1 Tablet(s) Oral every 24 hours  polyethylene glycol 3350 17 Gram(s) Oral two times a day  senna 2 Tablet(s) Oral at bedtime  thiamine 100 milliGRAM(s) Oral every 24 hours  trimethoprim  160 mG/sulfamethoxazole 800 mG 1 Tablet(s) Oral every 24 hours    MEDICATIONS  (PRN):  HYDROmorphone  Injectable 1 milliGRAM(s) IV Push every 3 hours PRN Severe Pain (7 - 10)  oxyCODONE    IR 15 milliGRAM(s) Oral every 4 hours PRN Moderate Pain (4 - 6)      Labs:                        8.6    10.52 )-----------( 650      ( 01 Aug 2024 06:38 )             29.5     08-01    134<L>  |  97  |  9   ----------------------------<  85  4.2   |  28  |  0.53    Ca    9.2      01 Aug 2024 06:38  Phos  4.7     08-01  Mg     2.0     08-01    TPro  7.1  /  Alb  2.5<L>  /  TBili  0.2  /  DBili  x   /  AST  12  /  ALT  14  /  AlkPhos  210<H>  08-01        Urinalysis Basic - ( 01 Aug 2024 06:38 )    Color: x / Appearance: x / SG: x / pH: x  Gluc: 85 mg/dL / Ketone: x  / Bili: x / Urobili: x   Blood: x / Protein: x / Nitrite: x   Leuk Esterase: x / RBC: x / WBC x   Sq Epi: x / Non Sq Epi: x / Bacteria: x      SARS-CoV-2: NotDetec (02 Mar 2024 06:07)      Radiology: Reviewed Internal Medicine Progress Note  Kelsie Concepcion, PGY-1      OVERNIGHT EVENTS/INTERVAL HPI:  Overnight, pt asked why he was unable to receive toradol. Night team explained and patient expressed understanding. Pt interested in meeting with psych today to discuss his situation and how he is coping. Patient denying chest pain, SOB, palpitations, denies fever, chills, HA, Dizziness, N/V, abdominal pain, diarrhea, constipation, hematochezia/melena, dysuria, hematuria, new onset weakness/numbness, LE pain and/or swelling.   Pt is now DNR/DNI. Patient would like to go to [place] with his family; currently has housing issues and may be evicted.      OBJECTIVE:  Vital Signs Last 24 Hrs  T(C): 37.2 (01 Aug 2024 13:05), Max: 37.2 (01 Aug 2024 13:05)  T(F): 98.9 (01 Aug 2024 13:05), Max: 98.9 (01 Aug 2024 13:05)  HR: 109 (01 Aug 2024 13:05) (97 - 109)  BP: 114/76 (01 Aug 2024 13:05) (95/62 - 117/69)  BP(mean): --  RR: 16 (01 Aug 2024 13:05) (16 - 17)  SpO2: 96% (01 Aug 2024 13:05) (96% - 97%)    Parameters below as of 01 Aug 2024 13:05  Patient On (Oxygen Delivery Method): room air      I&O's Detail    31 Jul 2024 07:01  -  01 Aug 2024 07:00  --------------------------------------------------------  IN:    IV PiggyBack: 625 mL  Total IN: 625 mL    OUT:  Total OUT: 0 mL    Total NET: 625 mL      PHYSICAL EXAM  Constitutional: Frail, emaciated, low muscle mass, resting in bed, weak.  HEENT: NC/AT, PERRL/EOMI, anicteric sclera, No JVD or thyromegaly, MMM  Respiratory: CTA B/L; no audible wheezing/ronchi/rales  Cardiac: +S1/S2; RRR; no M/R/G; PMI non-displaced  Gastrointestinal: TTP at site of ostomy bag, soft, NT/ND; no rebound or guarding; +BS.   Genitourinary: with ostomy bag, c/di  Back: spine midline, no bony tenderness or step-offs; no CVAT B/L  Extremities: WWP, no clubbing or cyanosis; no peripheral edema  Vascular: 2+ radial & DP pulses  Neurologic: AAOx3; CNII-XII grossly intact; no focal deficits  Psychiatric: anxious    Medications:  MEDICATIONS  (STANDING):  acetaminophen     Tablet .. 1000 milliGRAM(s) Oral every 8 hours  amphetamine/dextroamphetamine XR 5 milliGRAM(s) Oral with breakfast  bictegravir 50 mG/emtricitabine 200 mG/tenofovir alafenamide 25 mG (BIKTARVY) 1 Tablet(s) Oral daily  enoxaparin Injectable 60 milliGRAM(s) SubCutaneous every 12 hours  escitalopram 10 milliGRAM(s) Oral daily  lactated ringers. 1000 milliLiter(s) (100 mL/Hr) IV Continuous <Continuous>  lidocaine   4% Patch 1 Patch Transdermal every 24 hours  lidocaine 2% Jelly 20 milliLiter(s) IntraUrethral every 4 hours  methadone    Tablet 15 milliGRAM(s) Oral every 8 hours  mirtazapine 15 milliGRAM(s) Oral at bedtime  multivitamin 1 Tablet(s) Oral every 24 hours  polyethylene glycol 3350 17 Gram(s) Oral two times a day  senna 2 Tablet(s) Oral at bedtime  thiamine 100 milliGRAM(s) Oral every 24 hours  trimethoprim  160 mG/sulfamethoxazole 800 mG 1 Tablet(s) Oral every 24 hours    MEDICATIONS  (PRN):  HYDROmorphone  Injectable 1 milliGRAM(s) IV Push every 3 hours PRN Severe Pain (7 - 10)  oxyCODONE    IR 15 milliGRAM(s) Oral every 4 hours PRN Moderate Pain (4 - 6)      Labs:                        8.6    10.52 )-----------( 650      ( 01 Aug 2024 06:38 )             29.5     08-01    134<L>  |  97  |  9   ----------------------------<  85  4.2   |  28  |  0.53    Ca    9.2      01 Aug 2024 06:38  Phos  4.7     08-01  Mg     2.0     08-01    TPro  7.1  /  Alb  2.5<L>  /  TBili  0.2  /  DBili  x   /  AST  12  /  ALT  14  /  AlkPhos  210<H>  08-01        Urinalysis Basic - ( 01 Aug 2024 06:38 )    Color: x / Appearance: x / SG: x / pH: x  Gluc: 85 mg/dL / Ketone: x  / Bili: x / Urobili: x   Blood: x / Protein: x / Nitrite: x   Leuk Esterase: x / RBC: x / WBC x   Sq Epi: x / Non Sq Epi: x / Bacteria: x      SARS-CoV-2: NotDetec (02 Mar 2024 06:07)      Radiology: Reviewed

## 2024-08-01 NOTE — PROGRESS NOTE ADULT - CONVERSATION DETAILS
Reviewed patient's hospital course and interval developments. Reviewed patient thoughts regarding advanced directives including code status and hospice eligibility. Patient reflected on the last few days, having had been given "many options" and now feeling like he is somewhat without further options. He states that this has surprisingly been a relief for him and that now he can focus on promoting his quality of life in the time he has remaining. Patient states that he had what felt like an "epiphany" overnight and that he feels motivated to coordinate what is needed to return home. He understands that his care will be predominantly on his close friends, which they have agreed to. He has made arrangements for his siblings to also come visit and hopes that one will agree to staying with him at the end of life. He understands that hospice services would be a helpful addition to his care and in promoting his goal of elevating his quality of life while optimizing symptom management and avoiding unnecessary future hospitalizations.     He also has had multiple discussions with family and friends regarding code status and is prepared now for a DNR/DNI status with no further antibiotics or any medications not aimed at supporting his comfort. He would like to continue his HIV medications. 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 He believes that he can complete the work in 2-3 months, but understands that his life expectancy may be limited to less than this. Reviewed patient's hospital course and interval developments. Reviewed patient thoughts regarding advanced directives including code status and hospice eligibility. Patient reflected on the last few days, having had been given "many options" and now feeling like he is somewhat without further options. He states that this has surprisingly been a relief for him and that now he can focus on promoting his quality of life in the time he has remaining. Patient states that he had what felt like an "epiphany" overnight and that he feels motivated to coordinate what is needed to return home. He understands that his care will be predominantly on his close friends, which they have agreed to. He has made arrangements for his siblings to also come visit and hopes that one will agree to staying with him at the end of life. He understands that hospice services would be a helpful addition to his care and in promoting his goal of elevating his quality of life while optimizing symptom management and avoiding unnecessary future hospitalizations.     He also has had multiple discussions with family and friends regarding code status and is prepared now for a DNR/DNI status with no further antibiotics or any medications not aimed at supporting his comfort. He would like to continue his HIV medications. He states that he has "fought enough" and would not want to be connected to any machines at the time of his passing. 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 He believes that he can complete the work in 2-3 months, but understands that his life expectancy may be limited to less than this.

## 2024-08-01 NOTE — PROGRESS NOTE ADULT - SUBJECTIVE AND OBJECTIVE BOX
Aliya Francis,   Fellow, Hospice and Palliative Care   Contact Info: Call [phone] (HEAL Line) or message on [employer] Teams.    SUBJECTIVE AND OBJECTIVE:  INTERVAL HPI/OVERNIGHT EVENTS: Interval events noted. Patient reports having an "epiphany" overnight regarding everything that he has heard and has had time to process today. He had discussion with SW today regarding home hospice services. He required Dilaudid 1 mg IVP x8 and Oxycodone 15 mg PO x3 in 24 hr period 8am - 8am. He reports pain is somewhat better controlled this morning, but still having breakthrough pain.    ALLERGIES:  No Known Allergies    MEDICATIONS  (STANDING):  acetaminophen     Tablet .. 1000 milliGRAM(s) Oral every 8 hours  amphetamine/dextroamphetamine XR 5 milliGRAM(s) Oral with breakfast  bictegravir 50 mG/emtricitabine 200 mG/tenofovir alafenamide 25 mG (BIKTARVY) 1 Tablet(s) Oral daily  enoxaparin Injectable 60 milliGRAM(s) SubCutaneous every 12 hours  escitalopram 10 milliGRAM(s) Oral daily  lactated ringers. 1000 milliLiter(s) (100 mL/Hr) IV Continuous <Continuous>  lidocaine   4% Patch 1 Patch Transdermal every 24 hours  lidocaine 2% Jelly 20 milliLiter(s) IntraUrethral every 4 hours  methadone    Tablet 15 milliGRAM(s) Oral every 8 hours  mirtazapine 15 milliGRAM(s) Oral at bedtime  multivitamin 1 Tablet(s) Oral every 24 hours  polyethylene glycol 3350 17 Gram(s) Oral two times a day  senna 2 Tablet(s) Oral at bedtime  thiamine 100 milliGRAM(s) Oral every 24 hours  trimethoprim  160 mG/sulfamethoxazole 800 mG 1 Tablet(s) Oral every 24 hours    MEDICATIONS  (PRN):  HYDROmorphone  Injectable 1 milliGRAM(s) IV Push every 3 hours PRN Severe Pain (7 - 10)  oxyCODONE    IR 15 milliGRAM(s) Oral every 4 hours PRN Moderate Pain (4 - 6)    Analgesic Use (Scheduled and PRNs) for past 24 hours:    amphetamine/dextroamphetamine XR   5 milliGRAM(s) Oral (08-01-24 @ 09:37)    escitalopram   10 milliGRAM(s) Oral (08-01-24 @ 11:05)    HYDROmorphone  Injectable   1 milliGRAM(s) IV Push (08-01-24 @ 15:28)   1 milliGRAM(s) IV Push (08-01-24 @ 11:05)   1 milliGRAM(s) IV Push (08-01-24 @ 07:34)   1 milliGRAM(s) IV Push (08-01-24 @ 04:23)   1 milliGRAM(s) IV Push (08-01-24 @ 01:09)   1 milliGRAM(s) IV Push (07-31-24 @ 22:06)   1 milliGRAM(s) IV Push (07-31-24 @ 19:00)   1 milliGRAM(s) IV Push (07-31-24 @ 15:56)    methadone    Tablet   15 milliGRAM(s) Oral (08-01-24 @ 14:15)   15 milliGRAM(s) Oral (08-01-24 @ 06:15)   15 milliGRAM(s) Oral (07-31-24 @ 22:06)    mirtazapine   15 milliGRAM(s) Oral (07-31-24 @ 22:06)    oxyCODONE    IR   15 milliGRAM(s) Oral (08-01-24 @ 09:37)   15 milliGRAM(s) Oral (08-01-24 @ 05:18)   15 milliGRAM(s) Oral (07-31-24 @ 23:43)    ITEMS UNCHECKED ARE NOT PRESENT  PRESENT SYMPTOMS/REVIEW OF SYSTEMS: []Unable to obtain due to poor mentation   Source if other than patient:  []Family   []Team     Pain: [x] yes [] no  QOL Impact - bothersome  Location -   rectum                 Aggravating Factors -  Quality - sharp, ache  Radiation -  Timing - constant  Severity (0-10 scale) - 8/10  Minimal Acceptable Level (0-10 scale) -    Dyspnea:                           []Mild  []Moderate []Severe  Anxiety:                             []Mild []Moderate []Severe  Fatigue:                             []Mild []Moderate []Severe  Nausea:                             []Mild []Moderate []Severe  Loss of Appetite:              []Mild []Moderate []Severe  Constipation:                    []Mild []Moderate []Severe    Other Symptoms:  [x]All Other Review Of Systems Negative     PHYSICAL EXAM:  GENERAL:  [x]Alert  [x]Oriented x3   []Lethargic  []Cachexia  []Unarousable  [x]Verbal  []Non-Verbal  Behavioral:   []Anxiety  []Delirium []Agitation [x]Cooperative  HEENT:  [x]Normal   []Dry mouth   []ET Tube/Trach  []Oral lesions  PULMONARY:   [x]Clear []Tachypnea  []Audible excessive secretions  [x]Normal Work of Breathing []Labored Breathing  []Rhonchi []Crackles []Wheezing  CARDIOVASCULAR:    [x]Regular Rate & Rhythm []Irregular []Tachy  []Ethan  GASTROINTESTINAL:  [x]Soft  []Distended   [x]+BS  [x]Non tender []Tender  []PEG []OGT/ NGT  Last BM: Ostomy with stool  GENITOURINARY:  [x]Normal [] Incontinent   []Oliguria/Anuria   []Calderon  MUSCULOSKELETAL:   []Normal   [x]Weakness  []Bed/Wheelchair bound []Edema  NEUROLOGIC:   [x]No focal deficits  []Cognitive impairment  []Dysphagia []Dysarthria []Paresis []Encephalopathic  SKIN:   []Normal   [x]Pressure ulcer(s)  []Rash    Vital Signs Last 24 Hrs  T(C): 37.2 (01 Aug 2024 13:05), Max: 37.2 (01 Aug 2024 13:05)  T(F): 98.9 (01 Aug 2024 13:05), Max: 98.9 (01 Aug 2024 13:05)  HR: 109 (01 Aug 2024 13:05) (97 - 109)  BP: 114/76 (01 Aug 2024 13:05) (95/62 - 117/69)  BP(mean): --  RR: 16 (01 Aug 2024 13:05) (16 - 17)  SpO2: 96% (01 Aug 2024 13:05) (96% - 97%)    Parameters below as of 01 Aug 2024 13:05  Patient On (Oxygen Delivery Method): room air      LABS:                         8.6    10.52 )-----------( 650      ( 01 Aug 2024 06:38 )             29.5       08-01    134<L>  |  97  |  9   ----------------------------<  85  4.2   |  28  |  0.53    Ca    9.2      01 Aug 2024 06:38  Phos  4.7     08-01  Mg     2.0     08-01    TPro  7.1  /  Alb  2.5<L>  /  TBili  0.2  /  DBili  x   /  AST  12  /  ALT  14  /  AlkPhos  210<H>  08-01       LIVER FUNCTIONS - ( 01 Aug 2024 06:38 )  Alb: 2.5 g/dL / Pro: 7.1 g/dL / ALK PHOS: 210 U/L / ALT: 14 U/L / AST: 12 U/L / GGT: x                Urinalysis Basic - ( 01 Aug 2024 06:38 )    Color: x / Appearance: x / SG: x / pH: x  Gluc: 85 mg/dL / Ketone: x  / Bili: x / Urobili: x   Blood: x / Protein: x / Nitrite: x   Leuk Esterase: x / RBC: x / WBC x   Sq Epi: x / Non Sq Epi: x / Bacteria: x      RADIOLOGY & ADDITIONAL STUDIES: Personally reviewed and interpreted  None new    DISCUSSION OF CASE: Primary Team/RN - to discuss plan of care

## 2024-08-01 NOTE — PROGRESS NOTE ADULT - PROBLEM SELECTOR PLAN 1
CTAP -  larger gas-filled cavity in the more inferior portion of the mass, with a larger fistula to the perineum probable superimposed infection of the mass, greater extension of mass laterally though R pelvic muscles. Destruction of coccyx, sacrum, R ischium, posterior portion R acetabulum, OM superimposed upon lytic destruction from tumor.     - Pt considering home hospice  - MRI with osteomyelitis  - Will have discussion with surgery, rad onc, palliative, heme/onc and primary team about goals of care  - heme rec following: no chemo in patient, consider proctectomy or surgical intervention after improvement in clinical status.   - Pain mgmt w/ Methadone 15mg TID, Oxycodone 10 PO q4h PRN for mod pain, Dilaudid 1mg I V q3h PRN for breakthru pain.  - Followed by Dr. Julian and [facility] outpt  - 5/16/2024 completed radiation therapy to anus/pelvis and capecitabine

## 2024-08-01 NOTE — PROGRESS NOTE ADULT - PROBLEM SELECTOR PLAN 5
.  Locally advanced, metastatic disease  -s/p chemo/RT as outpatient  -overall progression of disease noted on imaging  -no plan for DMT at this time given performance status and active infection  -beginning a transition to a symptom-directed plan of care a further invasive interventions are not being pursued  -hospice referral pending

## 2024-08-01 NOTE — PROGRESS NOTE ADULT - CONVERSATION/DISCUSSION
97
Diagnosis/Prognosis/MOLST Discussed/Treatment Options/Hospice Referral
60
Diagnosis/Prognosis/MOLST Discussed/Hospice Referral

## 2024-08-01 NOTE — BH CONSULTATION LIAISON ASSESSMENT NOTE - HPI (INCLUDE ILLNESS QUALITY, SEVERITY, DURATION, TIMING, CONTEXT, MODIFYING FACTORS, ASSOCIATED SIGNS AND SYMPTOMS)
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 No prior psychiatric hospitalizations, no prior suicide attempts, presenting again w/ worsening weakness and generalized pain, pow/ CT scan showing worsening pelvic bony destruction, w/ SIRS 2/4 likey 2/2 OM vs other infectious etiology. Psychiatry consulted for management of psychosocial factors. Pt was brought to the hospital to treat complaints of generalized weakness. He was diagnosed with anal cancer in December 2023 and has since sought several hospitalizations to treat episodes of weakness and pain. He was made aware of no further possible surgical interventions that would be of value and expressed openness to returning to previous discussions of palliative care. He had positive things to say about a current palliative care physician located at [facility] [address] [place] [address] named Dr. Martinez.    Pt was seen at bedside. He presented calm, pleasant and cooperative. Pt endorsed he’s been “dealing with depression” after his  passed away 9 years ago. He never sought psychiatric treatment and expressed coping with grief, depression and physical pain through the use of substances. He endorsed feeling much better today than he had in days past, primarily due to helpful management of his pain and reaching acceptance with his terminal diagnosis. Patient reports feeling supported by his siblings who he endorsed were on their way to visit from [place] and will be staying in the city for a few days. There was no evidence of SI/HI/AVH/PI.     Pt was seen by psychology intern Papa Singh for 45min to provide supportive psychotherapy which consisted of exploring his relationship with mortality, the deterioration of his physical condition due to illness, existing and prior support systems, his present legal and housing concerns, what he finds most meaningful in life and discussing how he’d like to make the most of his time left.  Pt is currently interested in possibly increasing his currently prescribed dose of Adderall and exploring referrals for ketamine assisted psychotherapy after reading research of its success with treating terminally ill patients.    Biological factors contributing to the pt’s presentation include HIV, osteomyelitis, anemia, malnutrition, possible substance use withdrawal and difficulties sleeping. A psychosocial factor contributing to the pt’s presentation includes facing possibly eviction from his current residence. Pt endorsed he has a stable appetite and adequate energy. Pt demonstrated adequate insight into his present medical condition, symptoms and reason for hospitalization. Pt reports using primarily crystal methamphetamine, GHB and cocaine as of late but also attests to, “being the kevin who never says no to what’s been offered” implying additional substance use.

## 2024-08-01 NOTE — PROGRESS NOTE ADULT - PROBLEM SELECTOR PLAN 2
MRI with osteomyelitis super imposed on recto SCC s/p vancomycin and zosyn    - 7/31 antibiotics discontinued due to pt's prognosis and wishes   - appreciate ID recs

## 2024-08-01 NOTE — BH CONSULTATION LIAISON ASSESSMENT NOTE - NSBHCHARTREVIEWVS_PSY_A_CORE FT
Vital Signs Last 24 Hrs  T(C): 37.2 (01 Aug 2024 13:05), Max: 37.2 (01 Aug 2024 13:05)  T(F): 98.9 (01 Aug 2024 13:05), Max: 98.9 (01 Aug 2024 13:05)  HR: 109 (01 Aug 2024 13:05) (97 - 109)  BP: 114/76 (01 Aug 2024 13:05) (95/62 - 117/69)  BP(mean): --  RR: 16 (01 Aug 2024 13:05) (16 - 17)  SpO2: 96% (01 Aug 2024 13:05) (96% - 97%)    Parameters below as of 01 Aug 2024 13:05  Patient On (Oxygen Delivery Method): room air

## 2024-08-01 NOTE — PROGRESS NOTE ADULT - PROBLEM SELECTOR PLAN 5
3/2024 CD4 251    - Continue home biktarvy  - CD4 count 47   - On prophylactic bactrim   - HIV viral load negative

## 2024-08-01 NOTE — BH CONSULTATION LIAISON ASSESSMENT NOTE - SUMMARY
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 Pt with hx/o polysubstance use including opiates, GHB, crystal meth, no prior psychiatric hospitalizations, no prior suicide attempts, self reported hx/o depression since death of his , taking Lexapro prior to admission, presenting again w/ worsening weakness and generalized pain, pow/ CT scan showing worsening pelvic bony destruction, w/ SIRS 2/4 likely 2/2 OM vs other infectious etiology. Psychiatry consult asked to provide supportive therapy.   On evaluation pt endorsed ongoing depressed mood, insomnia, poor appetite in context of terminal diagnosis and ongoing pain. He reported improved pain control since yesterday. There was no evidence of SI/HI/ABH/PI or delirium. Pt was able to engage in a meaningful therapeutic intervention. He would like to remain to his current psychotropic medication regiment which he plans to adjust as needed with outpatient providers.

## 2024-08-01 NOTE — PROGRESS NOTE ADULT - PROBLEM SELECTOR PLAN 7
Complex medical decision making / symptom management in the setting of metastatic malignancy.    Will continue to follow for ongoing GOC discussion / titration of palliative regimen. Emotional support provided, questions answered.    Active Psychosocial Referrals:  [x]Social Work/Case management [x]PT/OT []Chaplaincy []Hospice  [x]Patient/Family Support []Holistic RN [x]Massage Therapy [x]Music Therapy []Ethics  Coping: [] well [x] with difficulty [] poor coping [] unable to assess  Support system: [] strong [] adequate [x] inadequate Complex medical decision making / symptom management in the setting of metastatic malignancy.    Will continue to follow for ongoing GOC discussion / titration of palliative regimen. Emotional support provided, questions answered.    Active Psychosocial Referrals:  [x]Social Work/Case management [x]PT/OT [x]Chaplaincy []Hospice  [x]Patient/Family Support []Holistic RN [x]Massage Therapy [x]Music Therapy []Ethics  Coping: [] well [x] with difficulty [] poor coping [] unable to assess  Support system: [] strong [] adequate [x] inadequate

## 2024-08-01 NOTE — BH CONSULTATION LIAISON ASSESSMENT NOTE - RISK ASSESSMENT
Pt is at low acute risk for self harm as he denies past or present SI. 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 He is looking forward to his family visiting this week.   Pt is at elevated chronic risk for self harm given ongoing substance use, terminal diagnosis, lack of family support in [place]

## 2024-08-01 NOTE — PROGRESS NOTE ADULT - PROBLEM SELECTOR PLAN 8
Currently on 1mg dilaudid, IV 15mg ketorolac, lidocaine patch, oxycodone 15mg for moderate pain, methadone 15mg TID    - Palliative following  - Psych following, patient expressed interesting in meeting with psychiatry and psychotherapy

## 2024-08-02 ENCOUNTER — TRANSCRIPTION ENCOUNTER (OUTPATIENT)
Age: 44
End: 2024-08-02

## 2024-08-02 PROCEDURE — 99232 SBSQ HOSP IP/OBS MODERATE 35: CPT | Mod: GC

## 2024-08-02 PROCEDURE — 93010 ELECTROCARDIOGRAM REPORT: CPT

## 2024-08-02 PROCEDURE — 99233 SBSQ HOSP IP/OBS HIGH 50: CPT

## 2024-08-02 RX ORDER — METHADONE HCL 10 MG
20 TABLET ORAL EVERY 8 HOURS
Refills: 0 | Status: DISCONTINUED | OUTPATIENT
Start: 2024-08-02 | End: 2024-08-06

## 2024-08-02 RX ORDER — OXYCODONE HYDROCHLORIDE 30 MG/1
15 TABLET ORAL EVERY 4 HOURS
Refills: 0 | Status: DISCONTINUED | OUTPATIENT
Start: 2024-08-02 | End: 2024-08-05

## 2024-08-02 RX ORDER — METHADONE HCL 10 MG
20 TABLET ORAL EVERY 8 HOURS
Refills: 0 | Status: DISCONTINUED | OUTPATIENT
Start: 2024-08-02 | End: 2024-08-02

## 2024-08-02 RX ADMIN — Medication 1.5 MILLIGRAM(S): at 13:41

## 2024-08-02 RX ADMIN — DEXTROAMPHETAMINE SACCHARATE, AMPHETAMINE ASPARTATE MONOHYDRATE, DEXTROAMPHETAMINE SULFATE AND AMPHETAMINE SULFATE 5 MILLIGRAM(S): 7.5; 7.5; 7.5; 7.5 CAPSULE, EXTENDED RELEASE ORAL at 07:23

## 2024-08-02 RX ADMIN — Medication 1.5 MILLIGRAM(S): at 16:46

## 2024-08-02 RX ADMIN — OXYCODONE HYDROCHLORIDE 15 MILLIGRAM(S): 30 TABLET ORAL at 11:27

## 2024-08-02 RX ADMIN — Medication 1.5 MILLIGRAM(S): at 01:31

## 2024-08-02 RX ADMIN — ENOXAPARIN SODIUM 60 MILLIGRAM(S): 120 INJECTION SUBCUTANEOUS at 18:13

## 2024-08-02 RX ADMIN — OXYCODONE HYDROCHLORIDE 15 MILLIGRAM(S): 30 TABLET ORAL at 18:12

## 2024-08-02 RX ADMIN — Medication 20 MILLILITER(S): at 14:04

## 2024-08-02 RX ADMIN — Medication 1.5 MILLIGRAM(S): at 04:48

## 2024-08-02 RX ADMIN — Medication 1.5 MILLIGRAM(S): at 05:18

## 2024-08-02 RX ADMIN — Medication 1.5 MILLIGRAM(S): at 23:30

## 2024-08-02 RX ADMIN — Medication 20 MILLILITER(S): at 23:31

## 2024-08-02 RX ADMIN — LIDOCAINE 5% 1 PATCH: 5 CREAM TOPICAL at 18:19

## 2024-08-02 RX ADMIN — Medication 20 MILLIGRAM(S): at 07:23

## 2024-08-02 RX ADMIN — Medication 1.5 MILLIGRAM(S): at 08:42

## 2024-08-02 RX ADMIN — Medication 20 MILLILITER(S): at 18:13

## 2024-08-02 RX ADMIN — OXYCODONE HYDROCHLORIDE 15 MILLIGRAM(S): 30 TABLET ORAL at 22:25

## 2024-08-02 RX ADMIN — Medication 15 MILLIGRAM(S): at 22:01

## 2024-08-02 RX ADMIN — BICTEGRAVIR SODIUM, EMTRICITABINE, AND TENOFOVIR ALAFENAMIDE FUMARATE 1 TABLET(S): 50; 200; 25 TABLET ORAL at 12:29

## 2024-08-02 RX ADMIN — SULFAMETHOXAZOLE AND TRIMETHOPRIM 1 TABLET(S): 400; 80 TABLET ORAL at 07:23

## 2024-08-02 RX ADMIN — Medication 20 MILLIGRAM(S): at 14:05

## 2024-08-02 RX ADMIN — Medication 20 MILLIGRAM(S): at 22:01

## 2024-08-02 RX ADMIN — LIDOCAINE 5% 1 PATCH: 5 CREAM TOPICAL at 23:54

## 2024-08-02 RX ADMIN — Medication 1.5 MILLIGRAM(S): at 20:41

## 2024-08-02 RX ADMIN — Medication 15 MILLIGRAM(S): at 06:26

## 2024-08-02 RX ADMIN — Medication 1.5 MILLIGRAM(S): at 16:05

## 2024-08-02 RX ADMIN — Medication 20 MILLILITER(S): at 06:26

## 2024-08-02 RX ADMIN — ENOXAPARIN SODIUM 60 MILLIGRAM(S): 120 INJECTION SUBCUTANEOUS at 06:27

## 2024-08-02 RX ADMIN — Medication 100 MILLIGRAM(S): at 06:27

## 2024-08-02 RX ADMIN — OXYCODONE HYDROCHLORIDE 15 MILLIGRAM(S): 30 TABLET ORAL at 19:05

## 2024-08-02 RX ADMIN — Medication 1.5 MILLIGRAM(S): at 12:37

## 2024-08-02 RX ADMIN — Medication 1.5 MILLIGRAM(S): at 19:41

## 2024-08-02 RX ADMIN — OXYCODONE HYDROCHLORIDE 15 MILLIGRAM(S): 30 TABLET ORAL at 06:25

## 2024-08-02 RX ADMIN — Medication 1.5 MILLIGRAM(S): at 02:01

## 2024-08-02 RX ADMIN — OXYCODONE HYDROCHLORIDE 15 MILLIGRAM(S): 30 TABLET ORAL at 23:25

## 2024-08-02 RX ADMIN — Medication 20 MILLILITER(S): at 10:12

## 2024-08-02 RX ADMIN — OXYCODONE HYDROCHLORIDE 15 MILLIGRAM(S): 30 TABLET ORAL at 13:41

## 2024-08-02 RX ADMIN — LIDOCAINE 5% 1 PATCH: 5 CREAM TOPICAL at 12:29

## 2024-08-02 RX ADMIN — Medication 1 TABLET(S): at 06:27

## 2024-08-02 RX ADMIN — OXYCODONE HYDROCHLORIDE 15 MILLIGRAM(S): 30 TABLET ORAL at 07:25

## 2024-08-02 RX ADMIN — Medication 1.5 MILLIGRAM(S): at 09:30

## 2024-08-02 NOTE — PHYSICAL THERAPY INITIAL EVALUATION ADULT - GENERAL OBSERVATIONS, REHAB EVAL
PT IE completed. Patient received semi supine in bed +heplock IV, +ostomy, NAD, willing to work with PT.

## 2024-08-02 NOTE — PHYSICAL THERAPY INITIAL EVALUATION ADULT - GAIT DEVIATIONS NOTED, PT EVAL
decreased valentina/increased time in double stance/decreased velocity of limb motion/decreased step length/decreased stride length/decreased weight-shifting ability

## 2024-08-02 NOTE — DISCHARGE NOTE PROVIDER - NSDCCPCAREPLAN_GEN_ALL_CORE_FT
PRINCIPAL DISCHARGE DIAGNOSIS  Diagnosis: Anal cancer  Assessment and Plan of Treatment: Rectal cancer is a kind of cancer that starts as a growth of cells in the rectum. The rectum is the last several inches of the large intestine. It starts at the end of the final segment of the colon and ends when it reaches the short, narrow passage known as the anus.  Cancer inside the rectum and cancer inside the colon are often referred to together as colorectal cancer.  While rectal and colon cancers are similar in many ways, their treatments are quite different. This is mainly because the rectum is barely  from other organs and structures. It sits in a tight space that can make surgery to remove rectal cancer complex.  Rectal cancer treatment usually involves surgery to remove the cancer. Other treatments may include chemotherapy, radiation or a combination of the two. Targeted therapy and immunotherapy also may be used.        SECONDARY DISCHARGE DIAGNOSES  Diagnosis: HIV disease  Assessment and Plan of Treatment: Acquired immunodeficiency syndrome (AIDS), is an ongoing, also called chronic, condition. It's caused by the human immunodeficiency virus, also called HIV. HIV damages the immune system so that the body is less able to fight infection and disease. If HIV isn't treated, it can take years before it weakens the immune system enough to become AIDS. Thanks to treatment, most people in the [place] don't get AIDS.  HIV is spread through contact with genitals, such as during sex without a condom. This type of infection is called a sexually transmitted infection, also called an STI. HIV also is spread through contact with blood, such as when people share needles or syringes. It is also possible for a person with untreated HIV to spread the virus to a child during pregnancy, childbirth or breastfeeding.  There's no cure for HIV/AIDS. But medicines can control the infection and keep the disease from getting worse. Antiviral treatments for HIV have reduced AIDS deaths around the world. There's an ongoing effort to make ways to prevent and treat HIV/AIDS more available in resource-poor countries.  ---  Please continue to take biktarvy and prophylactic bactrim daily to prevent opportunistic infections.    Diagnosis: Osteomyelitis  Assessment and Plan of Treatment: Osteomyelitis is an infection in a bone. Infections can reach a bone by traveling through the bloodstream or spreading from nearby tissue. Infections can also begin in the bone itself if an injury exposes the bone to germs.  Smokers and people with chronic health conditions, such as diabetes or kidney failure, are more at risk of developing osteomyelitis. People who have diabetes may develop osteomyelitis in their feet if they have foot ulcers.  Although once considered incurable, osteomyelitis can now be successfully treated. Most people need surgery to remove areas of the bone that have . After surgery, strong intravenous antibiotics are typically needed.

## 2024-08-02 NOTE — PROGRESS NOTE ADULT - ASSESSMENT
43yo M with PMH of Metastatic Anorectal CA, HIV, and Depression p/w weakness and found to have wound/disease progression. Palliative consulted for complex symptom management and medical decision making in the setting of life-limiting illness.

## 2024-08-02 NOTE — PHYSICAL THERAPY INITIAL EVALUATION ADULT - ADDITIONAL COMMENTS
Limited community ambulator who lives alone in a 3rd floor walk up apartment (2 FOS to front door). Patient has close friend (Acosta) who stays with the patient during the day and night, about 6 days/week, per patient. Patient ambulates with rolling walker and assistance and has assist with all ADLs.

## 2024-08-02 NOTE — PROGRESS NOTE ADULT - PROBLEM SELECTOR PLAN 7
Complex medical decision making / symptom management in the setting of metastatic malignancy.    Will continue to follow for ongoing GOC discussion / titration of palliative regimen. Emotional support provided, questions answered. Disposition planning remains complicated by patient having a secure home for home hospice services. He is awaiting to hear back from a new apartment that he had applied for.     Active Psychosocial Referrals:  [x]Social Work/Case management [x]PT/OT [x]Chaplaincy []Hospice  [x]Patient/Family Support []Holistic RN [x]Massage Therapy [x]Music Therapy []Ethics  Coping: [] well [x] with difficulty [] poor coping [] unable to assess  Support system: [] strong [] adequate [x] inadequate

## 2024-08-02 NOTE — DISCHARGE NOTE PROVIDER - CARE PROVIDER_API CALL
Itz Owens.  Radiation Oncology  [address] [address]  Phone: [phone]  Fax: [phone]  Established Patient  Follow Up Time:

## 2024-08-02 NOTE — PROGRESS NOTE ADULT - PROBLEM SELECTOR PLAN 1
CTAP -  larger gas-filled cavity in the more inferior portion of the mass, with a larger fistula to the perineum probable superimposed infection of the mass, greater extension of mass laterally though R pelvic muscles. Destruction of coccyx, sacrum, R ischium, posterior portion R acetabulum, OM superimposed upon lytic destruction from tumor.     - Pt considering home hospice; would like to return to [place] with family  - MRI with osteomyelitis  - Will have discussion with surgery, rad onc, palliative, heme/onc and primary team about goals of care  - heme rec following: no chemo in patient, consider proctectomy or surgical intervention after improvement in clinical status.   - Pain mgmt w/ Methadone 15mg TID, Oxycodone 15 PO q4h PRN for mod pain, Dilaudid 1.5mg I V q3h PRN for breakthru pain.  - Followed by Dr. Julian and Graciela outpt  - 5/16/2024 completed radiation therapy to anus/pelvis and capecitabine

## 2024-08-02 NOTE — PROGRESS NOTE ADULT - SUBJECTIVE AND OBJECTIVE BOX
Internal Medicine Progress Note  Kelsie Concepcion, PGY-1      OVERNIGHT EVENTS/INTERVAL HPI:  No acute overnight events.  Per palliative, increased dilaudid to 1.5mg q3hr. Per psych, patient reported his home lexapro is 20mg; current prescription of 10mg lexapro transitioned to 20mg. Pt also interested in ketamine assisted psychotherapy.  Patient denying chest pain, SOB, palpitations, denies fever, chills, HA, Dizziness, N/V, abdominal pain, hematochezia/melena, dysuria, hematuria, new onset weakness/numbness.  Patient would like to return to [place] with family. Is currently writing a book.       OBJECTIVE:  Vital Signs Last 24 Hrs  T(C): 37.1 (01 Aug 2024 21:17), Max: 37.2 (01 Aug 2024 13:05)  T(F): 98.8 (01 Aug 2024 21:17), Max: 98.9 (01 Aug 2024 13:05)  HR: 113 (01 Aug 2024 21:17) (109 - 113)  BP: 109/61 (01 Aug 2024 21:17) (109/61 - 117/69)  BP(mean): 74 (01 Aug 2024 21:17) (74 - 74)  RR: 18 (01 Aug 2024 21:17) (16 - 18)  SpO2: 99% (01 Aug 2024 21:17) (96% - 99%)    Parameters below as of 01 Aug 2024 21:17  Patient On (Oxygen Delivery Method): room air      I&O's Detail    31 Jul 2024 07:01  -  01 Aug 2024 07:00  --------------------------------------------------------  IN:    IV PiggyBack: 625 mL  Total IN: 625 mL    OUT:  Total OUT: 0 mL    Total NET: 625 mL      01 Aug 2024 07:01  -  02 Aug 2024 05:00  --------------------------------------------------------  IN:    IV PiggyBack: 50 mL  Total IN: 50 mL    OUT:  Total OUT: 0 mL    Total NET: 50 mL      PHYSICAL EXAM  Constitutional: Frail, emaciated, low muscle mass, resting in bed, weak.  HEENT: NC/AT, PERRL/EOMI, anicteric sclera, No JVD or thyromegaly, MMM  Respiratory: CTA B/L; no audible wheezing/ronchi/rales  Cardiac: +S1/S2; RRR; no M/R/G; PMI non-displaced  Gastrointestinal: TTP at site of ostomy bag, soft, NT/ND; no rebound or guarding; +BS.   Genitourinary: with ostomy bag, c/di  Back: spine midline, no bony tenderness or step-offs; no CVAT B/L  Extremities: WWP, no clubbing or cyanosis; no peripheral edema  Vascular: 2+ radial & DP pulses  Neurologic: AAOx3; CNII-XII grossly intact; no focal deficits  Psychiatric: anxious    Medications:  MEDICATIONS  (STANDING):  acetaminophen     Tablet .. 1000 milliGRAM(s) Oral every 8 hours  amphetamine/dextroamphetamine XR 5 milliGRAM(s) Oral with breakfast  bictegravir 50 mG/emtricitabine 200 mG/tenofovir alafenamide 25 mG (BIKTARVY) 1 Tablet(s) Oral daily  enoxaparin Injectable 60 milliGRAM(s) SubCutaneous every 12 hours  escitalopram 20 milliGRAM(s) Oral every 24 hours  lactated ringers. 1000 milliLiter(s) (100 mL/Hr) IV Continuous <Continuous>  lidocaine   4% Patch 1 Patch Transdermal every 24 hours  lidocaine 2% Jelly 20 milliLiter(s) IntraUrethral every 4 hours  methadone    Tablet 15 milliGRAM(s) Oral every 8 hours  mirtazapine 15 milliGRAM(s) Oral at bedtime  multivitamin 1 Tablet(s) Oral every 24 hours  oxyCODONE    IR 15 milliGRAM(s) Oral every 4 hours  polyethylene glycol 3350 17 Gram(s) Oral two times a day  senna 2 Tablet(s) Oral at bedtime  thiamine 100 milliGRAM(s) Oral every 24 hours  trimethoprim  160 mG/sulfamethoxazole 800 mG 1 Tablet(s) Oral every 24 hours    MEDICATIONS  (PRN):  HYDROmorphone  Injectable 1.5 milliGRAM(s) IV Push every 3 hours PRN Severe Pain (7 - 10)  oxyCODONE    IR 15 milliGRAM(s) Oral every 4 hours PRN Moderate Pain (4 - 6)      Labs:                        8.6    10.52 )-----------( 650      ( 01 Aug 2024 06:38 )             29.5     08-01    134<L>  |  97  |  9   ----------------------------<  85  4.2   |  28  |  0.53    Ca    9.2      01 Aug 2024 06:38  Phos  4.7     08-01  Mg     2.0     08-01    TPro  7.1  /  Alb  2.5<L>  /  TBili  0.2  /  DBili  x   /  AST  12  /  ALT  14  /  AlkPhos  210<H>  08-01        Urinalysis Basic - ( 01 Aug 2024 06:38 )    Color: x / Appearance: x / SG: x / pH: x  Gluc: 85 mg/dL / Ketone: x  / Bili: x / Urobili: x   Blood: x / Protein: x / Nitrite: x   Leuk Esterase: x / RBC: x / WBC x   Sq Epi: x / Non Sq Epi: x / Bacteria: x      SARS-CoV-2: NotDetec (02 Mar 2024 06:07)      Radiology: Reviewed

## 2024-08-02 NOTE — DISCHARGE NOTE PROVIDER - NSDCFUSCHEDAPPT_GEN_ALL_CORE_FT
Itz Owens  [facility]  [facility] [address] [facility] [address]  Scheduled Appointment: 09/17/2024

## 2024-08-02 NOTE — DISCHARGE NOTE PROVIDER - HOSPITAL COURSE
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 Presenting again w/ worsening weakness and generalized pain, pow/ CT scan showing worsening pelvic bony destruction, w/ SIRS 2/4 likey 2/2 OM vs other infectious etiology. Admitted for further mgmt. Due to MRI finding of osteomyelitis, patient has received vancomycin and zosyn but it was discontinued due to patient's wishes and poor prognosis. Patient also received Bactrim due to CD4 count 47. HIV viral load negative.  Pain mgmt w/ Methadone 15mg TID, Oxycodone 15 PO q4h PRN for mod pain, Dilaudid 1.5mg I V q3h PRN for breakthru pain. Heme/onc did not recommend chemotherapy at this time.    Sepsis 2/2 Osteomyelitis superimposed on rectal squamous cell carcinoma  CTAP -  larger gas-filled cavity in the more inferior portion of the mass, with a larger fistula to the perineum probable superimposed infection of the mass, greater extension of mass laterally though R pelvic muscles. Destruction of coccyx, sacrum, R ischium, posterior portion R acetabulum, OM superimposed upon lytic destruction from tumor.  MRI with osteomyelitis.    Plan:   - Patient home hospice and interested in returning back to [place].  - Patient will follow with palliative and psych    Squamous cell carcinoma of rectum.   Completed radiation therapy to anus/pelvis and capecitabine. Met with outpatient surgeon a few weeks ago where proctectomy might have been an option; however, based on poor prognosis and extensive disease, surgery not indicated at this time. 5/16/2024 completed radiation therapy to anus/pelvis and capecitabine.       Plan:  - Home hospice    Debility 2/2 severe protein-calorie malnutrition, cancer cachexia and neoplasm related pain  Decreased PO intake and decreased appetite. Increasing generalized weakness over the past 3 months which eventually made him bedbound. Was on 1.5mg dilaudid, lidocaine patch, oxycodone 15mg for moderate pain, methadone 15mg TID    Plan:  - Encourage PO intake  - Was provided additional protein/calorie rich shakes    HIV disease.   3/2024 CD4 251 and this hospitalization, CD4 47. HIV viral load negative.    Plan:  - Continue home biktarvy  - Continue prophylactic bactrim.     Anemia.   Hgb ~8.     Hx of Substance abuse.   hx of drug abuse with meth, IV drug use, cocaine, ecstasy and marijuana; UTOX positive for meth, THC, opiates, methadone. Prescribed methadone by pain management.    Plan:   - Follow up with primary provider  - Hospice    New medications/therapies:   New lines/hardware:  Labs to be followed outpatient:   Exam to be followed outpatient:     Discharge plan: discharge to home hospice    Physical Exam Upon Discharge:  Constitutional: Frail, emaciated, low muscle mass, resting in bed, weak.  HEENT: NC/AT, PERRL/EOMI, anicteric sclera, No JVD or thyromegaly, MMM  Respiratory: CTA B/L; no audible wheezing/ronchi/rales  Cardiac: +S1/S2; RRR; no M/R/G; PMI non-displaced  Gastrointestinal: TTP at site of ostomy bag, soft, NT/ND; no rebound or guarding; +BS.   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 Presenting again w/ worsening weakness and generalized pain, pow/ CT scan showing worsening pelvic bony destruction, w/ SIRS 2/4 likey 2/2 OM vs other infectious etiology. Admitted for further mgmt. Due to MRI finding of osteomyelitis, patient has received vancomycin and zosyn but it was discontinued due to patient's wishes and poor prognosis. Patient also received Bactrim due to CD4 count 47. HIV viral load negative.  Pain mgmt w/ Methadone 20mg TID, Oxycodone 15 PO q4h PRN for mod pain, Dilaudid 1.5mg I V q3h PRN for breakthru pain. Heme/onc did not recommend chemotherapy at this time.    Sepsis 2/2 Osteomyelitis superimposed on rectal squamous cell carcinoma  CTAP -  larger gas-filled cavity in the more inferior portion of the mass, with a larger fistula to the perineum probable superimposed infection of the mass, greater extension of mass laterally though R pelvic muscles. Destruction of coccyx, sacrum, R ischium, posterior portion R acetabulum, OM superimposed upon lytic destruction from tumor.  MRI with osteomyelitis.    Plan:   - Patient home hospice and interested in returning back to [place].  - Patient will follow with palliative and psych    Squamous cell carcinoma of rectum.   Completed radiation therapy to anus/pelvis and capecitabine. Met with outpatient surgeon a few weeks ago where proctectomy might have been an option; however, based on poor prognosis and extensive disease, surgery not indicated at this time. 5/16/2024 completed radiation therapy to anus/pelvis and capecitabine.       Plan:  - Home hospice    Debility 2/2 severe protein-calorie malnutrition, cancer cachexia and neoplasm related pain  Decreased PO intake and decreased appetite. Increasing generalized weakness over the past 3 months which eventually made him bedbound. Was on 1.5mg dilaudid, lidocaine patch, oxycodone 15mg for moderate pain, methadone 20mg TID    Plan:  - Encourage PO intake  - Was provided additional protein/calorie rich shakes    HIV disease.   3/2024 CD4 251 and this hospitalization, CD4 47. HIV viral load negative.    Plan:  - Continue home biktarvy  - Continue prophylactic bactrim.     Anemia.   Hgb ~8.     Hx of Substance abuse.   hx of drug abuse with meth, IV drug use, cocaine, ecstasy and marijuana; UTOX positive for meth, THC, opiates, methadone. Prescribed methadone by pain management.    Plan:   - Follow up with primary provider  - Hospice    New medications/therapies:   New lines/hardware:  Labs to be followed outpatient:   Exam to be followed outpatient:     Discharge plan: discharge to home hospice    Physical Exam Upon Discharge:  Constitutional: Frail, emaciated, low muscle mass, resting in bed, weak.  HEENT: NC/AT, PERRL/EOMI, anicteric sclera, No JVD or thyromegaly, MMM  Respiratory: CTA B/L; no audible wheezing/ronchi/rales  Cardiac: +S1/S2; RRR; no M/R/G; PMI non-displaced  Gastrointestinal: TTP at site of ostomy bag, soft, NT/ND; no rebound or guarding; +BS.   Genitourinary: with ostomy bag, c/di  Back: spine midline, no bony tenderness or step-offs; no CVAT B/L  Extremities: WWP, no clubbing or cyanosis; no peripheral edema  Vascular: 2+ radial & DP pulses  Neurologic: AAOx3; CNII-XII grossly intact; no focal deficits  Psychiatric: anxious

## 2024-08-02 NOTE — PROGRESS NOTE ADULT - PROBLEM SELECTOR PLAN 1
.  -Methadone 20mg PO q8h ATC (increased today)  -Dilaudid 1.5mg IV q3h PRN for Severe Pain  -Oxycodone 15mg PO q4 PRN for Moderate Pain  -Tylenol 1000mg PO q8h ATC  -s/p 5 days of Toradol 15mg IV q8h ATC (can restart after a few days)    Tolerating parenteral opiates without overt adverse effects. Anticipated discharge pain regimen is Methadone 15mg PO q8h ATC + Oxy IR 20mg PO q4h PRN for Severe Pain .  -Methadone 20mg PO q8h ATC (increased today)  -Dilaudid 1.5mg IV q3h PRN for Severe Pain  -Oxycodone 20mg PO q4 PRN for Moderate Pain  -Tylenol 1000mg PO q8h ATC  -s/p 5 days of Toradol 15mg IV q8h ATC (can restart)    Tolerating parenteral opiates without overt adverse effects. Anticipated discharge pain regimen is Methadone 15mg PO q8h ATC + Oxy IR 20mg PO q4h PRN for Severe Pain

## 2024-08-02 NOTE — DISCHARGE NOTE PROVIDER - ATTENDING DISCHARGE PHYSICAL EXAMINATION:
Constitutional: Frail, emaciated, low muscle mass, resting in bed, weak.  HEENT: NC/AT, PERRL/EOMI, anicteric sclera, No JVD or thyromegaly, MMM  Respiratory: CTA B/L; no audible wheezing/ronchi/rales  Cardiac: +S1/S2; RRR; no M/R/G; PMI non-displaced  Gastrointestinal: TTP at site of ostomy bag, soft, NT/ND; no rebound or guarding; +BS.   Genitourinary: with ostomy bag, c/di  Back: spine midline, no bony tenderness or step-offs; no CVAT B/L  Extremities: WWP, no clubbing or cyanosis; no peripheral edema  Vascular: 2+ radial & DP pulses  Neurologic: AAOx3; CNII-XII grossly intact; no focal deficits  Psychiatric: anxious

## 2024-08-02 NOTE — PROGRESS NOTE ADULT - PROBLEM SELECTOR PLAN 2
MRI with osteomyelitis super imposed on recto SCC s/p vancomycin and zosyn    - 7/31 antibiotics discontinued due to pt's prognosis and wishes

## 2024-08-02 NOTE — PROGRESS NOTE ADULT - SUBJECTIVE AND OBJECTIVE BOX
Aliya Francis,   Fellow, Hospice and Palliative Care   Contact Info: Call [phone] (HEAL Line) or message on [employer] Teams.    SUBJECTIVE AND OBJECTIVE:  INTERVAL HPI/OVERNIGHT EVENTS: Interval events noted. He required Dilaudid 1 mg IVP x5 in addition to ATC Methadone and Oxycodone in 24 hr period 8am - 8am. He reports pain remains better controlled but he would want the Oxycodone to be changed back to as needed.     ALLERGIES:  No Known Allergies    MEDICATIONS  (STANDING):  acetaminophen     Tablet .. 1000 milliGRAM(s) Oral every 8 hours  amphetamine/dextroamphetamine XR 5 milliGRAM(s) Oral with breakfast  bictegravir 50 mG/emtricitabine 200 mG/tenofovir alafenamide 25 mG (BIKTARVY) 1 Tablet(s) Oral daily  enoxaparin Injectable 60 milliGRAM(s) SubCutaneous every 12 hours  escitalopram 20 milliGRAM(s) Oral every 24 hours  lactated ringers. 1000 milliLiter(s) (100 mL/Hr) IV Continuous <Continuous>  lidocaine   4% Patch 1 Patch Transdermal every 24 hours  lidocaine 2% Jelly 20 milliLiter(s) IntraUrethral every 4 hours  methadone    Tablet 20 milliGRAM(s) Oral every 8 hours  mirtazapine 15 milliGRAM(s) Oral at bedtime  multivitamin 1 Tablet(s) Oral every 24 hours  polyethylene glycol 3350 17 Gram(s) Oral two times a day  senna 2 Tablet(s) Oral at bedtime  thiamine 100 milliGRAM(s) Oral every 24 hours  trimethoprim  160 mG/sulfamethoxazole 800 mG 1 Tablet(s) Oral every 24 hours    MEDICATIONS  (PRN):  HYDROmorphone  Injectable 1.5 milliGRAM(s) IV Push every 3 hours PRN Severe Pain (7 - 10)  oxyCODONE    IR 15 milliGRAM(s) Oral every 4 hours PRN Moderate Pain (4 - 6)      Analgesic Use (Scheduled and PRNs) for past 24 hours:    amphetamine/dextroamphetamine XR   5 milliGRAM(s) Oral (08-02-24 @ 07:23)    escitalopram   20 milliGRAM(s) Oral (08-02-24 @ 07:23)    HYDROmorphone  Injectable   1.5 milliGRAM(s) IV Push (08-02-24 @ 08:42)   1.5 milliGRAM(s) IV Push (08-02-24 @ 04:48)   1.5 milliGRAM(s) IV Push (08-02-24 @ 01:31)   1.5 milliGRAM(s) IV Push (08-01-24 @ 21:36)   1.5 milliGRAM(s) IV Push (08-01-24 @ 18:29)    HYDROmorphone  Injectable   1 milliGRAM(s) IV Push (08-01-24 @ 15:28)    methadone    Tablet   15 milliGRAM(s) Oral (08-02-24 @ 06:26)   15 milliGRAM(s) Oral (08-01-24 @ 22:16)   15 milliGRAM(s) Oral (08-01-24 @ 14:15)    mirtazapine   15 milliGRAM(s) Oral (08-01-24 @ 22:16)    oxyCODONE    IR   15 milliGRAM(s) Oral (08-02-24 @ 06:25)    oxyCODONE    IR   15 milliGRAM(s) Oral (08-02-24 @ 11:27)    oxyCODONE    IR   15 milliGRAM(s) Oral (08-01-24 @ 17:24)        ITEMS UNCHECKED ARE NOT PRESENT  PRESENT SYMPTOMS/REVIEW OF SYSTEMS: []Unable to obtain due to poor mentation   Source if other than patient:  []Family   []Team     Pain: [x] yes [] no  QOL Impact - bothersome  Location -   rectum                 Aggravating Factors -  Quality - sharp, ache  Radiation -  Timing - constant  Severity (0-10 scale) - 8/10  Minimal Acceptable Level (0-10 scale) -    Dyspnea:                           []Mild  []Moderate []Severe  Anxiety:                             []Mild []Moderate []Severe  Fatigue:                             []Mild []Moderate []Severe  Nausea:                             []Mild []Moderate []Severe  Loss of Appetite:              []Mild []Moderate []Severe  Constipation:                    []Mild []Moderate []Severe    Other Symptoms:  [x]All Other Review Of Systems Negative     PHYSICAL EXAM:  GENERAL:  [x]Alert  [x]Oriented x3   []Lethargic  []Cachexia  []Unarousable  [x]Verbal  []Non-Verbal  Behavioral:   []Anxiety  []Delirium []Agitation [x]Cooperative  HEENT:  [x]Normal   []Dry mouth   []ET Tube/Trach  []Oral lesions  PULMONARY:   [x]Clear []Tachypnea  []Audible excessive secretions  [x]Normal Work of Breathing []Labored Breathing  []Rhonchi []Crackles []Wheezing  CARDIOVASCULAR:    [x]Regular Rate & Rhythm []Irregular []Tachy  []Ethan  GASTROINTESTINAL:  [x]Soft  []Distended   [x]+BS  [x]Non tender []Tender  []PEG []OGT/ NGT  Last BM: Ostomy with stool  GENITOURINARY:  [x]Normal [] Incontinent   []Oliguria/Anuria   []Calderon  MUSCULOSKELETAL:   []Normal   [x]Weakness  []Bed/Wheelchair bound []Edema  NEUROLOGIC:   [x]No focal deficits  []Cognitive impairment  []Dysphagia []Dysarthria []Paresis []Encephalopathic  SKIN:   []Normal   [x]Pressure ulcer(s)  []Rash    Vital Signs Last 24 Hrs  T(C): 36.9 (02 Aug 2024 10:37), Max: 37.2 (01 Aug 2024 13:05)  T(F): 98.5 (02 Aug 2024 10:37), Max: 98.9 (01 Aug 2024 13:05)  HR: 101 (02 Aug 2024 10:37) (101 - 125)  BP: 101/67 (02 Aug 2024 10:37) (100/65 - 114/76)  BP(mean): 74 (01 Aug 2024 21:17) (74 - 74)  RR: 18 (02 Aug 2024 10:37) (16 - 18)  SpO2: 97% (02 Aug 2024 10:37) (96% - 99%)    Parameters below as of 02 Aug 2024 10:37  Patient On (Oxygen Delivery Method): room air    LABS:                         8.6    10.52 )-----------( 650      ( 01 Aug 2024 06:38 )             29.5     08-01    134<L>  |  97  |  9   ----------------------------<  85  4.2   |  28  |  0.53    Ca    9.2      01 Aug 2024 06:38  Phos  4.7     08-01  Mg     2.0     08-01    TPro  7.1  /  Alb  2.5<L>  /  TBili  0.2  /  DBili  x   /  AST  12  /  ALT  14  /  AlkPhos  210<H>  08-01      Urinalysis Basic - ( 01 Aug 2024 06:38 )    Color: x / Appearance: x / SG: x / pH: x  Gluc: 85 mg/dL / Ketone: x  / Bili: x / Urobili: x   Blood: x / Protein: x / Nitrite: x   Leuk Esterase: x / RBC: x / WBC x   Sq Epi: x / Non Sq Epi: x / Bacteria: x            RADIOLOGY, EKG & ADDITIONAL TESTS: Reviewed.     Color: x / Appearance: x / SG: x / pH: x  Gluc: 85 mg/dL / Ketone: x  / Bili: x / Urobili: x   Blood: x / Protein: x / Nitrite: x   Leuk Esterase: x / RBC: x / WBC x   Sq Epi: x / Non Sq Epi: x / Bacteria: x      RADIOLOGY & ADDITIONAL STUDIES: Personally reviewed and interpreted  None new    DISCUSSION OF CASE: Primary Team/RN - to discuss plan of care

## 2024-08-02 NOTE — DISCHARGE NOTE PROVIDER - NSDCMRMEDTOKEN_GEN_ALL_CORE_FT
bictegravir/emtricitabine/tenofovir 50 mg-200 mg-25 mg oral tablet: 1 tab(s) orally once a day  escitalopram 10 mg oral tablet: 1 tab(s) orally once a day  ferrous sulfate 200 mg (65 mg elemental iron) oral tablet: 1 tab(s) orally once a day  methadone 10 mg oral tablet: 1 tab(s) orally every 12 hours MDD: 20mg   bictegravir/emtricitabine/tenofovir 50 mg-200 mg-25 mg oral tablet: 1 tab(s) orally once a day  calamine topical lotion: 1 Apply topically to affected area 2 times a day  dextroamphetamine-amphetamine 5 mg oral capsule, extended release: 1 cap(s) orally once a day (before a meal)  ferrous sulfate 200 mg (65 mg elemental iron) oral tablet: 1 tab(s) orally once a day  Lexapro 20 mg oral tablet: 1 tab(s) orally every 24 hours  methadone 10 mg oral tablet: 2 tab(s) orally every 8 hours  mirtazapine 15 mg oral tablet: 1 tab(s) orally once a day (at bedtime)  Multiple Vitamins oral tablet: 1 tab(s) orally every 24 hours  oxyCODONE 15 mg oral tablet: 1 tab(s) orally every 4 hours As needed Moderate Pain (4 - 6)  sulfamethoxazole-trimethoprim 800 mg-160 mg oral tablet: 1 tab(s) orally every 24 hours  thiamine 100 mg oral tablet: 1 tab(s) orally every 24 hours   apixaban 5 mg oral tablet: 1 tab(s) orally 5mg BID after finishing 10mg BID  bictegravir/emtricitabine/tenofovir 50 mg-200 mg-25 mg oral tablet: 1 tab(s) orally once a day  calamine topical lotion: 1 Apply topically to affected area 2 times a day  Eliquis Starter Pack for Treatment of DVT and PE 5 mg oral tablet: 2 tab(s) orally 2 times a day 7 days  Lexapro 20 mg oral tablet: 1 tab(s) orally every 24 hours  methadone 10 mg oral tablet: 2 tab(s) orally every 8 hours MDD: 60mg  methylphenidate 10 mg oral tablet: 0.5 tab(s) orally 2 times a day MDD: 10mg  mirtazapine 15 mg oral tablet: 1 tab(s) orally once a day (at bedtime)  oxyCODONE 20 mg oral tablet: 1 tab(s) orally every 4 hours as needed for  severe pain MDD: 120mg  polyethylene glycol 3350 oral powder for reconstitution: 17 gram(s) orally 2 times a day  senna leaf extract oral tablet: 2 tab(s) orally once a day (at bedtime)  sulfamethoxazole-trimethoprim 800 mg-160 mg oral tablet: 1 tab(s) orally every 24 hours   apixaban 5 mg oral tablet: 1 tab(s) orally 5mg BID after finishing 10mg BID  bictegravir/emtricitabine/tenofovir 50 mg-200 mg-25 mg oral tablet: 1 tab(s) orally once a day  calamine topical lotion: 1 Apply topically to affected area 2 times a day  Eliquis Starter Pack for Treatment of DVT and PE 5 mg oral tablet: 2 tab(s) orally 2 times a day 7 days  Lexapro 20 mg oral tablet: 1 tab(s) orally every 24 hours  methadone 10 mg oral tablet: 2 tab(s) orally every 8 hours MDD: 60mg  methylphenidate 10 mg oral tablet: 0.5 tab(s) orally 2 times a day MDD: 10mg  mirtazapine 15 mg oral tablet: 1 tab(s) orally once a day (at bedtime)  mupirocin 2% topical ointment: Apply topically to affected area 3 times a day  oxyCODONE 20 mg oral tablet: 1 tab(s) orally every 4 hours as needed for  severe pain MDD: 120mg  polyethylene glycol 3350 oral powder for reconstitution: 17 gram(s) orally 2 times a day  senna leaf extract oral tablet: 2 tab(s) orally once a day (at bedtime)  sulfamethoxazole-trimethoprim 800 mg-160 mg oral tablet: 1 tab(s) orally every 24 hours

## 2024-08-02 NOTE — DISCHARGE NOTE PROVIDER - DETAILS OF MALNUTRITION DIAGNOSIS/DIAGNOSES
This patient has been assessed with a concern for Malnutrition and was treated during this hospitalization for the following Nutrition diagnosis/diagnoses:     -  07/31/2024: Severe protein-calorie malnutrition   -  07/31/2024: Underweight (BMI < 19)

## 2024-08-02 NOTE — PROGRESS NOTE ADULT - PROBLEM SELECTOR PLAN 8
Currently on 1.5mg dilaudid, lidocaine patch, oxycodone 15mg for moderate pain, methadone 15mg TID    - Palliative following  - Psych following, patient expressed interesting in meeting with psychiatry and psychotherapy

## 2024-08-03 PROCEDURE — 99232 SBSQ HOSP IP/OBS MODERATE 35: CPT

## 2024-08-03 RX ORDER — HYDROMORPHONE HCL IN 0.9% NACL 0.2 MG/ML
2 PLASTIC BAG, INJECTION (ML) INTRAVENOUS
Refills: 0 | Status: DISCONTINUED | OUTPATIENT
Start: 2024-08-03 | End: 2024-08-06

## 2024-08-03 RX ADMIN — Medication 20 MILLILITER(S): at 06:40

## 2024-08-03 RX ADMIN — Medication 2 MILLIGRAM(S): at 18:35

## 2024-08-03 RX ADMIN — Medication 1.5 MILLIGRAM(S): at 00:00

## 2024-08-03 RX ADMIN — Medication 20 MILLILITER(S): at 18:07

## 2024-08-03 RX ADMIN — LIDOCAINE 5% 1 PATCH: 5 CREAM TOPICAL at 14:19

## 2024-08-03 RX ADMIN — Medication 15 MILLIGRAM(S): at 22:57

## 2024-08-03 RX ADMIN — OXYCODONE HYDROCHLORIDE 15 MILLIGRAM(S): 30 TABLET ORAL at 15:52

## 2024-08-03 RX ADMIN — Medication 1 TABLET(S): at 06:39

## 2024-08-03 RX ADMIN — DEXTROAMPHETAMINE SACCHARATE, AMPHETAMINE ASPARTATE MONOHYDRATE, DEXTROAMPHETAMINE SULFATE AND AMPHETAMINE SULFATE 5 MILLIGRAM(S): 7.5; 7.5; 7.5; 7.5 CAPSULE, EXTENDED RELEASE ORAL at 10:29

## 2024-08-03 RX ADMIN — Medication 1.5 MILLIGRAM(S): at 09:53

## 2024-08-03 RX ADMIN — Medication 20 MILLIGRAM(S): at 14:19

## 2024-08-03 RX ADMIN — OXYCODONE HYDROCHLORIDE 15 MILLIGRAM(S): 30 TABLET ORAL at 17:16

## 2024-08-03 RX ADMIN — Medication 2 MILLIGRAM(S): at 21:19

## 2024-08-03 RX ADMIN — ENOXAPARIN SODIUM 60 MILLIGRAM(S): 120 INJECTION SUBCUTANEOUS at 18:07

## 2024-08-03 RX ADMIN — Medication 20 MILLIGRAM(S): at 22:57

## 2024-08-03 RX ADMIN — Medication 100 MILLIGRAM(S): at 06:39

## 2024-08-03 RX ADMIN — Medication 1.5 MILLIGRAM(S): at 09:27

## 2024-08-03 RX ADMIN — BICTEGRAVIR SODIUM, EMTRICITABINE, AND TENOFOVIR ALAFENAMIDE FUMARATE 1 TABLET(S): 50; 200; 25 TABLET ORAL at 11:02

## 2024-08-03 RX ADMIN — Medication 2 MILLIGRAM(S): at 18:07

## 2024-08-03 RX ADMIN — Medication 20 MILLIGRAM(S): at 06:40

## 2024-08-03 RX ADMIN — OXYCODONE HYDROCHLORIDE 15 MILLIGRAM(S): 30 TABLET ORAL at 11:59

## 2024-08-03 RX ADMIN — SULFAMETHOXAZOLE AND TRIMETHOPRIM 1 TABLET(S): 400; 80 TABLET ORAL at 06:40

## 2024-08-03 RX ADMIN — OXYCODONE HYDROCHLORIDE 15 MILLIGRAM(S): 30 TABLET ORAL at 05:30

## 2024-08-03 RX ADMIN — OXYCODONE HYDROCHLORIDE 15 MILLIGRAM(S): 30 TABLET ORAL at 04:35

## 2024-08-03 RX ADMIN — Medication 20 MILLILITER(S): at 22:59

## 2024-08-03 RX ADMIN — Medication 1.5 MILLIGRAM(S): at 14:19

## 2024-08-03 RX ADMIN — Medication 20 MILLILITER(S): at 14:19

## 2024-08-03 RX ADMIN — Medication 1.5 MILLIGRAM(S): at 02:35

## 2024-08-03 RX ADMIN — ENOXAPARIN SODIUM 60 MILLIGRAM(S): 120 INJECTION SUBCUTANEOUS at 06:39

## 2024-08-03 RX ADMIN — Medication 1.5 MILLIGRAM(S): at 03:00

## 2024-08-03 RX ADMIN — Medication 20 MILLIGRAM(S): at 06:39

## 2024-08-03 RX ADMIN — Medication 1.5 MILLIGRAM(S): at 06:00

## 2024-08-03 RX ADMIN — Medication 1.5 MILLIGRAM(S): at 05:39

## 2024-08-03 RX ADMIN — Medication 2 MILLIGRAM(S): at 21:50

## 2024-08-03 RX ADMIN — Medication 1.5 MILLIGRAM(S): at 14:45

## 2024-08-03 RX ADMIN — OXYCODONE HYDROCHLORIDE 15 MILLIGRAM(S): 30 TABLET ORAL at 11:03

## 2024-08-03 RX ADMIN — Medication 20 MILLILITER(S): at 11:02

## 2024-08-03 NOTE — PROGRESS NOTE ADULT - SUBJECTIVE AND OBJECTIVE BOX
Patient is a 44y old  Male who presents with a chief complaint of Weakness (02 Aug 2024 12:23)    INTERVAL EVENTS:      SUBJECTIVE:      MEDICATIONS:  MEDICATIONS  (STANDING):  acetaminophen     Tablet .. 1000 milliGRAM(s) Oral every 8 hours  amphetamine/dextroamphetamine XR 5 milliGRAM(s) Oral with breakfast  bictegravir 50 mG/emtricitabine 200 mG/tenofovir alafenamide 25 mG (BIKTARVY) 1 Tablet(s) Oral daily  enoxaparin Injectable 60 milliGRAM(s) SubCutaneous every 12 hours  escitalopram 20 milliGRAM(s) Oral every 24 hours  lactated ringers. 1000 milliLiter(s) (100 mL/Hr) IV Continuous <Continuous>  lidocaine   4% Patch 1 Patch Transdermal every 24 hours  lidocaine 2% Jelly 20 milliLiter(s) IntraUrethral every 4 hours  methadone    Tablet 20 milliGRAM(s) Oral every 8 hours  mirtazapine 15 milliGRAM(s) Oral at bedtime  multivitamin 1 Tablet(s) Oral every 24 hours  polyethylene glycol 3350 17 Gram(s) Oral two times a day  senna 2 Tablet(s) Oral at bedtime  thiamine 100 milliGRAM(s) Oral every 24 hours  trimethoprim  160 mG/sulfamethoxazole 800 mG 1 Tablet(s) Oral every 24 hours    MEDICATIONS  (PRN):  HYDROmorphone  Injectable 1.5 milliGRAM(s) IV Push every 3 hours PRN Severe Pain (7 - 10)  oxyCODONE    IR 15 milliGRAM(s) Oral every 4 hours PRN Moderate Pain (4 - 6)      Allergies    No Known Allergies    Intolerances        OBJECTIVE:  Vital Signs Last 24 Hrs  T(C): 36.7 (03 Aug 2024 09:01), Max: 36.9 (02 Aug 2024 10:37)  T(F): 98 (03 Aug 2024 09:01), Max: 98.5 (02 Aug 2024 10:37)  HR: 142 (03 Aug 2024 09:01) (101 - 142)  BP: 109/60 (03 Aug 2024 09:01) (101/67 - 109/60)  BP(mean): --  RR: 19 (03 Aug 2024 09:01) (17 - 19)  SpO2: 100% (03 Aug 2024 09:01) (96% - 100%)    Parameters below as of 03 Aug 2024 09:01  Patient On (Oxygen Delivery Method): room air      I&O's Summary      PHYSICAL EXAM:      LABS:              CAPILLARY BLOOD GLUCOSE            MICRODATA:      RADIOLOGY/OTHER STUDIES:   Patient is a 44y old  Male who presents with a chief complaint of Weakness (03 Aug 2024 10:20)    OVERNIGHT EVENTS: NAEON    SUBJECTIVE: Patient was examined at the bedside this morning. He knows that his pain is still relatively uncontrolled. He says that he was receiving Toradol along with his curb regimen. It was very beneficial. He knows that he’s waiting for the methadone dose too in order to help his pain management otherwise no other notable symptoms this morning no fever no chills no chest pain no changes in his ostomy new leg swelling overall tolerating regimen.     ROS: otherwise negative      T(C): 36.7 (08-03-24 @ 09:01), Max: 36.9 (08-02-24 @ 21:39)  HR: 119 (08-03-24 @ 10:31) (119 - 142)  BP: 109/60 (08-03-24 @ 09:01) (102/67 - 109/60)  RR: 19 (08-03-24 @ 09:01) (17 - 19)  SpO2: 100% (08-03-24 @ 09:01) (96% - 100%)  Wt(kg): --Vital Signs Last 24 Hrs  T(C): 36.7 (03 Aug 2024 09:01), Max: 36.9 (02 Aug 2024 21:39)  T(F): 98 (03 Aug 2024 09:01), Max: 98.4 (02 Aug 2024 21:39)  HR: 119 (03 Aug 2024 10:31) (119 - 142)  BP: 109/60 (03 Aug 2024 09:01) (102/67 - 109/60)  BP(mean): --  RR: 19 (03 Aug 2024 09:01) (17 - 19)  SpO2: 100% (03 Aug 2024 09:01) (96% - 100%)    Parameters below as of 03 Aug 2024 09:01  Patient On (Oxygen Delivery Method): room air        PHYSICAL EXAM  Constitutional: Frail, emaciated, low muscle mass, resting in bed, weak.  HEENT: NC/AT, PERRL/EOMI, anicteric sclera, No JVD or thyromegaly, MMM  Respiratory: CTA B/L; no audible wheezing/ronchi/rales  Cardiac: +S1/S2; RRR; no M/R/G; PMI non-displaced  Gastrointestinal: TTP at site of ostomy bag, soft, NT/ND; no rebound or guarding; +BS.   Genitourinary: with ostomy bag, c/di  Back: spine midline, no bony tenderness or step-offs; no CVAT B/L  Extremities: WWP, no clubbing or cyanosis; no peripheral edema  Vascular: 2+ radial & DP pulses  Neurologic: AAOx3; CNII-XII grossly intact; no focal deficits  Psychiatric: anxious      LABS:                CAPILLARY BLOOD GLUCOSE                MEDICATIONS  (STANDING):  acetaminophen     Tablet .. 1000 milliGRAM(s) Oral every 8 hours  amphetamine/dextroamphetamine XR 5 milliGRAM(s) Oral with breakfast  bictegravir 50 mG/emtricitabine 200 mG/tenofovir alafenamide 25 mG (BIKTARVY) 1 Tablet(s) Oral daily  enoxaparin Injectable 60 milliGRAM(s) SubCutaneous every 12 hours  escitalopram 20 milliGRAM(s) Oral every 24 hours  HYDROmorphone  Injectable 2 milliGRAM(s) IV Push every 3 hours  lactated ringers. 1000 milliLiter(s) (100 mL/Hr) IV Continuous <Continuous>  lidocaine   4% Patch 1 Patch Transdermal every 24 hours  lidocaine 2% Jelly 20 milliLiter(s) IntraUrethral every 4 hours  methadone    Tablet 20 milliGRAM(s) Oral every 8 hours  mirtazapine 15 milliGRAM(s) Oral at bedtime  multivitamin 1 Tablet(s) Oral every 24 hours  polyethylene glycol 3350 17 Gram(s) Oral two times a day  senna 2 Tablet(s) Oral at bedtime  thiamine 100 milliGRAM(s) Oral every 24 hours  trimethoprim  160 mG/sulfamethoxazole 800 mG 1 Tablet(s) Oral every 24 hours    MEDICATIONS  (PRN):  oxyCODONE    IR 15 milliGRAM(s) Oral every 4 hours PRN Moderate Pain (4 - 6)      RADIOLOGY & ADDITIONAL TESTS: Reviewed

## 2024-08-03 NOTE — PROGRESS NOTE ADULT - PROBLEM SELECTOR PLAN 1
CTAP -  larger gas-filled cavity in the more inferior portion of the mass, with a larger fistula to the perineum probable superimposed infection of the mass, greater extension of mass laterally though R pelvic muscles. Destruction of coccyx, sacrum, R ischium, posterior portion R acetabulum, OM superimposed upon lytic destruction from tumor.     - Pt considering home hospice; would like to return to [place] with family  - MRI with osteomyelitis  - Will have discussion with surgery, rad onc, palliative, heme/onc and primary team about goals of care  - heme rec following: no chemo in patient, consider proctectomy or surgical intervention after improvement in clinical status.   - Pain mgmt w/ Methadone 15mg TID, Oxycodone 15 PO q4h PRN for mod pain,   - Dilaudid 1.5mg I V q3h PRN for breakthru pain, on 8/3 changed to 2mg q3h  - Followed by Dr. Julian and Graciela outpt  - 5/16/2024 completed radiation therapy to anus/pelvis and capecitabine

## 2024-08-04 PROCEDURE — 99232 SBSQ HOSP IP/OBS MODERATE 35: CPT

## 2024-08-04 RX ORDER — CALAMINE 8% AND ZINC OXIDE 8% 160 MG/ML
1 LOTION TOPICAL
Refills: 0 | Status: DISCONTINUED | OUTPATIENT
Start: 2024-08-04 | End: 2024-08-06

## 2024-08-04 RX ADMIN — Medication 2 MILLIGRAM(S): at 08:26

## 2024-08-04 RX ADMIN — SULFAMETHOXAZOLE AND TRIMETHOPRIM 1 TABLET(S): 400; 80 TABLET ORAL at 06:59

## 2024-08-04 RX ADMIN — Medication 20 MILLIGRAM(S): at 21:25

## 2024-08-04 RX ADMIN — OXYCODONE HYDROCHLORIDE 15 MILLIGRAM(S): 30 TABLET ORAL at 02:28

## 2024-08-04 RX ADMIN — Medication 2 MILLIGRAM(S): at 00:30

## 2024-08-04 RX ADMIN — OXYCODONE HYDROCHLORIDE 15 MILLIGRAM(S): 30 TABLET ORAL at 11:35

## 2024-08-04 RX ADMIN — Medication 2 MILLIGRAM(S): at 13:30

## 2024-08-04 RX ADMIN — Medication 20 MILLIGRAM(S): at 07:00

## 2024-08-04 RX ADMIN — Medication 2 MILLIGRAM(S): at 13:06

## 2024-08-04 RX ADMIN — ENOXAPARIN SODIUM 60 MILLIGRAM(S): 120 INJECTION SUBCUTANEOUS at 07:00

## 2024-08-04 RX ADMIN — OXYCODONE HYDROCHLORIDE 15 MILLIGRAM(S): 30 TABLET ORAL at 11:59

## 2024-08-04 RX ADMIN — Medication 15 MILLIGRAM(S): at 21:25

## 2024-08-04 RX ADMIN — Medication 1000 MILLIGRAM(S): at 21:24

## 2024-08-04 RX ADMIN — Medication 20 MILLILITER(S): at 22:22

## 2024-08-04 RX ADMIN — DEXTROAMPHETAMINE SACCHARATE, AMPHETAMINE ASPARTATE MONOHYDRATE, DEXTROAMPHETAMINE SULFATE AND AMPHETAMINE SULFATE 5 MILLIGRAM(S): 7.5; 7.5; 7.5; 7.5 CAPSULE, EXTENDED RELEASE ORAL at 10:09

## 2024-08-04 RX ADMIN — Medication 2 MILLIGRAM(S): at 05:30

## 2024-08-04 RX ADMIN — Medication 2 MILLIGRAM(S): at 16:59

## 2024-08-04 RX ADMIN — Medication 20 MILLIGRAM(S): at 13:06

## 2024-08-04 RX ADMIN — Medication 2 MILLIGRAM(S): at 21:24

## 2024-08-04 RX ADMIN — Medication 20 MILLILITER(S): at 07:00

## 2024-08-04 RX ADMIN — Medication 100 MILLIGRAM(S): at 07:00

## 2024-08-04 RX ADMIN — OXYCODONE HYDROCHLORIDE 15 MILLIGRAM(S): 30 TABLET ORAL at 23:47

## 2024-08-04 RX ADMIN — Medication 2 MILLIGRAM(S): at 05:15

## 2024-08-04 RX ADMIN — Medication 1 TABLET(S): at 06:59

## 2024-08-04 RX ADMIN — LIDOCAINE 5% 1 PATCH: 5 CREAM TOPICAL at 11:35

## 2024-08-04 RX ADMIN — LIDOCAINE 5% 1 PATCH: 5 CREAM TOPICAL at 16:16

## 2024-08-04 RX ADMIN — Medication 2 MILLIGRAM(S): at 21:50

## 2024-08-04 RX ADMIN — SENNOSIDES 2 TABLET(S): 8.6 TABLET ORAL at 21:24

## 2024-08-04 RX ADMIN — BICTEGRAVIR SODIUM, EMTRICITABINE, AND TENOFOVIR ALAFENAMIDE FUMARATE 1 TABLET(S): 50; 200; 25 TABLET ORAL at 11:35

## 2024-08-04 RX ADMIN — Medication 2 MILLIGRAM(S): at 01:00

## 2024-08-04 RX ADMIN — Medication 2 MILLIGRAM(S): at 16:29

## 2024-08-04 RX ADMIN — Medication 2 MILLIGRAM(S): at 08:50

## 2024-08-04 RX ADMIN — OXYCODONE HYDROCHLORIDE 15 MILLIGRAM(S): 30 TABLET ORAL at 03:20

## 2024-08-04 RX ADMIN — ENOXAPARIN SODIUM 60 MILLIGRAM(S): 120 INJECTION SUBCUTANEOUS at 16:30

## 2024-08-04 NOTE — PROGRESS NOTE ADULT - ASSESSMENT
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 Presenting again w/ worsening weakness and generalized pain, pow/ CT scan showing worsening pelvic bony destruction, w/ SIRS 2/4 likey 2/2 OM vs other infectious etiology. Found with progressive disease with poor prognosis and now pending home hospice.

## 2024-08-04 NOTE — PROGRESS NOTE ADULT - PROBLEM SELECTOR PLAN 2
MRI with osteomyelitis super imposed on recto SCC s/p vancomycin and zosyn    - 7/31 antibiotics discontinued due to pt's prognosis and wishes  - pain control as above

## 2024-08-04 NOTE — PROGRESS NOTE ADULT - PROBLEM SELECTOR PLAN 4
elevated WBC 22 at admission and tachycardia.     - MRI with osteomyelitis.  - now treating primarily for pain and comfort given goals of care

## 2024-08-04 NOTE — PROGRESS NOTE ADULT - PROBLEM SELECTOR PLAN 1
CTAP -  larger gas-filled cavity in the more inferior portion of the mass, with a larger fistula to the perineum probable superimposed infection of the mass, greater extension of mass laterally though R pelvic muscles. Destruction of coccyx, sacrum, R ischium, posterior portion R acetabulum, OM superimposed upon lytic destruction from tumor.   7/25/24 CT A/P with invasion of mass into right posterior pelvis/superimposed infection    - MRI with osteomyelitis  - heme rec following: no chemo in patient, consider proctectomy or surgical intervention after improvement in clinical status if within goals of care, however likely this is not within patient's goals of care.   - Followed by Dr. Julian and Graciela outpt  - 5/16/2024 completed radiation therapy to anus/pelvis and capecitabine  - planning for home hospice discharge likely on Tuesday   - appreciate palliative input for pain management recommendations. currently on Dilaudid 2 q3h for severe pain, methadone 20 q8h standing, Oxycodone 15 q4h for moderate pain, tylenol standing 1gq8h, and lidocaine patch.

## 2024-08-04 NOTE — PROGRESS NOTE ADULT - PROBLEM SELECTOR PLAN 8
see above for pain regimen    - Palliative following  - Psych following, patient expressed interesting in meeting with psychiatry and psychotherapy

## 2024-08-04 NOTE — PROGRESS NOTE ADULT - SUBJECTIVE AND OBJECTIVE BOX
Patient is a 44y old  Male who presents with a chief complaint of Weakness (03 Aug 2024 10:20)    INTERVAL EVENTS:      SUBJECTIVE:      MEDICATIONS:  MEDICATIONS  (STANDING):  acetaminophen     Tablet .. 1000 milliGRAM(s) Oral every 8 hours  amphetamine/dextroamphetamine XR 5 milliGRAM(s) Oral with breakfast  bictegravir 50 mG/emtricitabine 200 mG/tenofovir alafenamide 25 mG (BIKTARVY) 1 Tablet(s) Oral daily  calamine/zinc oxide Lotion 1 Application(s) Topical two times a day  enoxaparin Injectable 60 milliGRAM(s) SubCutaneous every 12 hours  escitalopram 20 milliGRAM(s) Oral every 24 hours  lactated ringers. 1000 milliLiter(s) (100 mL/Hr) IV Continuous <Continuous>  lidocaine   4% Patch 1 Patch Transdermal every 24 hours  lidocaine 2% Jelly 20 milliLiter(s) IntraUrethral every 4 hours  methadone    Tablet 20 milliGRAM(s) Oral every 8 hours  mirtazapine 15 milliGRAM(s) Oral at bedtime  multivitamin 1 Tablet(s) Oral every 24 hours  polyethylene glycol 3350 17 Gram(s) Oral two times a day  senna 2 Tablet(s) Oral at bedtime  thiamine 100 milliGRAM(s) Oral every 24 hours  trimethoprim  160 mG/sulfamethoxazole 800 mG 1 Tablet(s) Oral every 24 hours    MEDICATIONS  (PRN):  HYDROmorphone  Injectable 2 milliGRAM(s) IV Push every 3 hours PRN Severe Pain (7 - 10)  oxyCODONE    IR 15 milliGRAM(s) Oral every 4 hours PRN Moderate Pain (4 - 6)      Allergies    No Known Allergies    Intolerances        OBJECTIVE:  Vital Signs Last 24 Hrs  T(C): 36.7 (04 Aug 2024 09:10), Max: 36.9 (04 Aug 2024 05:55)  T(F): 98 (04 Aug 2024 09:10), Max: 98.5 (04 Aug 2024 05:55)  HR: 111 (04 Aug 2024 09:10) (111 - 126)  BP: 114/74 (04 Aug 2024 09:10) (104/66 - 118/78)  BP(mean): --  RR: 18 (04 Aug 2024 09:10) (18 - 18)  SpO2: 93% (04 Aug 2024 09:10) (93% - 99%)    Parameters below as of 04 Aug 2024 09:10  Patient On (Oxygen Delivery Method): room air      I&O's Summary      PHYSICAL EXAM:      LABS:              CAPILLARY BLOOD GLUCOSE            MICRODATA:      RADIOLOGY/OTHER STUDIES:   Patient is a 44y old  Male who presents with a chief complaint of Weakness (03 Aug 2024 10:20)    INTERVAL EVENTS:  started topical treatment for rash on legs    SUBJECTIVE:  no new complaints. pain adequately controlled. new rash.     MEDICATIONS:  MEDICATIONS  (STANDING):  acetaminophen     Tablet .. 1000 milliGRAM(s) Oral every 8 hours  amphetamine/dextroamphetamine XR 5 milliGRAM(s) Oral with breakfast  bictegravir 50 mG/emtricitabine 200 mG/tenofovir alafenamide 25 mG (BIKTARVY) 1 Tablet(s) Oral daily  calamine/zinc oxide Lotion 1 Application(s) Topical two times a day  enoxaparin Injectable 60 milliGRAM(s) SubCutaneous every 12 hours  escitalopram 20 milliGRAM(s) Oral every 24 hours  lactated ringers. 1000 milliLiter(s) (100 mL/Hr) IV Continuous <Continuous>  lidocaine   4% Patch 1 Patch Transdermal every 24 hours  lidocaine 2% Jelly 20 milliLiter(s) IntraUrethral every 4 hours  methadone    Tablet 20 milliGRAM(s) Oral every 8 hours  mirtazapine 15 milliGRAM(s) Oral at bedtime  multivitamin 1 Tablet(s) Oral every 24 hours  polyethylene glycol 3350 17 Gram(s) Oral two times a day  senna 2 Tablet(s) Oral at bedtime  thiamine 100 milliGRAM(s) Oral every 24 hours  trimethoprim  160 mG/sulfamethoxazole 800 mG 1 Tablet(s) Oral every 24 hours    MEDICATIONS  (PRN):  HYDROmorphone  Injectable 2 milliGRAM(s) IV Push every 3 hours PRN Severe Pain (7 - 10)  oxyCODONE    IR 15 milliGRAM(s) Oral every 4 hours PRN Moderate Pain (4 - 6)      Allergies    No Known Allergies    Intolerances        OBJECTIVE:  Vital Signs Last 24 Hrs  T(C): 36.7 (04 Aug 2024 09:10), Max: 36.9 (04 Aug 2024 05:55)  T(F): 98 (04 Aug 2024 09:10), Max: 98.5 (04 Aug 2024 05:55)  HR: 111 (04 Aug 2024 09:10) (111 - 126)  BP: 114/74 (04 Aug 2024 09:10) (104/66 - 118/78)  BP(mean): --  RR: 18 (04 Aug 2024 09:10) (18 - 18)  SpO2: 93% (04 Aug 2024 09:10) (93% - 99%)    Parameters below as of 04 Aug 2024 09:10  Patient On (Oxygen Delivery Method): room air      I&O's Summary      PHYSICAL EXAM:  Constitutional: Frail, emaciated, low muscle mass, resting in bed, weak.  HEENT: NC/AT, PERRL/EOMI, anicteric sclera, No JVD or thyromegaly, MMM  Respiratory: CTA B/L; no audible wheezing/ronchi/rales  Cardiac: +S1/S2; RRR; no M/R/G; PMI non-displaced  Gastrointestinal: TTP at site of ostomy bag, soft, NT/ND; no rebound or guarding; +BS.   Back: spine midline, no bony tenderness or step-offs; no CVAT B/L  Extremities: WWP, no clubbing or cyanosis; no peripheral edema  Vascular: 2+ radial & DP pulses  Neurologic: AAOx3; CNII-XII grossly intact;

## 2024-08-04 NOTE — PROGRESS NOTE ADULT - PROBLEM SELECTOR PLAN 7
hx of drug abuse with meth, IV drug use, cocaine, ecstasy and marijuana; UTOX positive for meth, THC, opiates, methadone. Is on methadone 10mg x3 daily    - SBIRT  - monitor for symptom withdrawal  - Give narcan kit on dc  - methadone 20 mg TID (for both w/d and pain management as above)

## 2024-08-05 ENCOUNTER — TRANSCRIPTION ENCOUNTER (OUTPATIENT)
Age: 44
End: 2024-08-05

## 2024-08-05 PROCEDURE — 99233 SBSQ HOSP IP/OBS HIGH 50: CPT

## 2024-08-05 PROCEDURE — 99233 SBSQ HOSP IP/OBS HIGH 50: CPT | Mod: GC

## 2024-08-05 RX ORDER — MUPIROCIN CALCIUM 20 MG/G
1 CREAM TOPICAL THREE TIMES A DAY
Refills: 0 | Status: DISCONTINUED | OUTPATIENT
Start: 2024-08-05 | End: 2024-08-06

## 2024-08-05 RX ORDER — APIXABAN 5 MG/1
2 TABLET, FILM COATED ORAL
Qty: 0 | Refills: 0 | DISCHARGE

## 2024-08-05 RX ORDER — MIRTAZAPINE 15 MG
1 TABLET ORAL
Qty: 0 | Refills: 0 | DISCHARGE
Start: 2024-08-05

## 2024-08-05 RX ORDER — METHADONE HCL 10 MG
2 TABLET ORAL
Qty: 42 | Refills: 0
Start: 2024-08-05 | End: 2024-08-11

## 2024-08-05 RX ORDER — ESCITALOPRAM OXALATE 20 MG
1 TABLET ORAL
Qty: 30 | Refills: 0
Start: 2024-08-05 | End: 2024-09-03

## 2024-08-05 RX ORDER — MIRTAZAPINE 15 MG
1 TABLET ORAL
Qty: 30 | Refills: 0
Start: 2024-08-05 | End: 2024-09-03

## 2024-08-05 RX ORDER — LORATADINE 10 MG
17 TABLET,DISINTEGRATING ORAL
Qty: 170 | Refills: 0
Start: 2024-08-05 | End: 2024-08-09

## 2024-08-05 RX ORDER — OXYCODONE HYDROCHLORIDE 30 MG/1
1 TABLET ORAL
Qty: 30 | Refills: 0
Start: 2024-08-05 | End: 2024-08-09

## 2024-08-05 RX ORDER — OXYCODONE HYDROCHLORIDE 30 MG/1
1 TABLET ORAL
Qty: 0 | Refills: 0 | DISCHARGE
Start: 2024-08-05

## 2024-08-05 RX ORDER — ESCITALOPRAM OXALATE 20 MG
1 TABLET ORAL
Qty: 0 | Refills: 0 | DISCHARGE
Start: 2024-08-05

## 2024-08-05 RX ORDER — DEXTROAMPHETAMINE SACCHARATE, AMPHETAMINE ASPARTATE MONOHYDRATE, DEXTROAMPHETAMINE SULFATE AND AMPHETAMINE SULFATE 7.5; 7.5; 7.5; 7.5 MG/1; MG/1; MG/1; MG/1
1 CAPSULE, EXTENDED RELEASE ORAL
Qty: 0 | Refills: 0 | DISCHARGE
Start: 2024-08-05

## 2024-08-05 RX ORDER — GINGER ROOT/GINGER ROOT EXT 262.5 MG
1 CAPSULE ORAL
Qty: 0 | Refills: 0 | DISCHARGE
Start: 2024-08-05

## 2024-08-05 RX ORDER — METHADONE HCL 10 MG
2 TABLET ORAL
Qty: 0 | Refills: 0 | DISCHARGE

## 2024-08-05 RX ORDER — APIXABAN 5 MG/1
1 TABLET, FILM COATED ORAL
Qty: 14 | Refills: 0
Start: 2024-08-05 | End: 2024-08-11

## 2024-08-05 RX ORDER — METHYLPHENIDATE HYDROCHLORIDE 18 MG/1
0.5 TABLET, EXTENDED RELEASE ORAL
Qty: 5 | Refills: 0
Start: 2024-08-05 | End: 2024-08-09

## 2024-08-05 RX ORDER — KETOROLAC TROMETHAMINE 10 MG
15 TABLET ORAL EVERY 8 HOURS
Refills: 0 | Status: DISCONTINUED | OUTPATIENT
Start: 2024-08-05 | End: 2024-08-06

## 2024-08-05 RX ORDER — SULFAMETHOXAZOLE AND TRIMETHOPRIM 400; 80 MG/1; MG/1
1 TABLET ORAL
Qty: 0 | Refills: 0 | DISCHARGE
Start: 2024-08-05

## 2024-08-05 RX ORDER — SULFAMETHOXAZOLE AND TRIMETHOPRIM 400; 80 MG/1; MG/1
1 TABLET ORAL
Qty: 30 | Refills: 0
Start: 2024-08-05 | End: 2024-09-03

## 2024-08-05 RX ORDER — OXYCODONE HYDROCHLORIDE 30 MG/1
20 TABLET ORAL EVERY 4 HOURS
Refills: 0 | Status: DISCONTINUED | OUTPATIENT
Start: 2024-08-05 | End: 2024-08-06

## 2024-08-05 RX ORDER — SENNOSIDES 8.6 MG/1
2 TABLET ORAL
Qty: 14 | Refills: 0
Start: 2024-08-05 | End: 2024-08-11

## 2024-08-05 RX ORDER — CYANOCOBALAMIN/FOLIC AC/VIT B6 2-2.5-25MG
1 TABLET ORAL
Qty: 0 | Refills: 0 | DISCHARGE
Start: 2024-08-05

## 2024-08-05 RX ORDER — DEXTROAMPHETAMINE SACCHARATE, AMPHETAMINE ASPARTATE MONOHYDRATE, DEXTROAMPHETAMINE SULFATE AND AMPHETAMINE SULFATE 7.5; 7.5; 7.5; 7.5 MG/1; MG/1; MG/1; MG/1
1 CAPSULE, EXTENDED RELEASE ORAL
Qty: 10 | Refills: 0
Start: 2024-08-05 | End: 2024-08-14

## 2024-08-05 RX ORDER — BICTEGRAVIR SODIUM, EMTRICITABINE, AND TENOFOVIR ALAFENAMIDE FUMARATE 50; 200; 25 MG/1; MG/1; MG/1
1 TABLET ORAL
Qty: 30 | Refills: 0
Start: 2024-08-05 | End: 2024-09-03

## 2024-08-05 RX ORDER — APIXABAN 5 MG/1
1 TABLET, FILM COATED ORAL
Qty: 180 | Refills: 0
Start: 2024-08-05 | End: 2024-11-02

## 2024-08-05 RX ORDER — GINGER ROOT/GINGER ROOT EXT 262.5 MG
1 CAPSULE ORAL
Qty: 30 | Refills: 0
Start: 2024-08-05 | End: 2024-09-03

## 2024-08-05 RX ORDER — CALAMINE 8% AND ZINC OXIDE 8% 160 MG/ML
1 LOTION TOPICAL
Qty: 0 | Refills: 0 | DISCHARGE
Start: 2024-08-05

## 2024-08-05 RX ORDER — APIXABAN 5 MG/1
1 TABLET, FILM COATED ORAL
Qty: 0 | Refills: 0 | DISCHARGE

## 2024-08-05 RX ORDER — MUPIROCIN CALCIUM 20 MG/G
1 CREAM TOPICAL
Qty: 1 | Refills: 0
Start: 2024-08-05 | End: 2024-08-11

## 2024-08-05 RX ADMIN — MUPIROCIN CALCIUM 1 APPLICATION(S): 20 CREAM TOPICAL at 18:04

## 2024-08-05 RX ADMIN — Medication 100 MILLIGRAM(S): at 06:00

## 2024-08-05 RX ADMIN — Medication 1 TABLET(S): at 06:00

## 2024-08-05 RX ADMIN — Medication 15 MILLIGRAM(S): at 14:23

## 2024-08-05 RX ADMIN — CALAMINE 8% AND ZINC OXIDE 8% 1 APPLICATION(S): 160 LOTION TOPICAL at 17:48

## 2024-08-05 RX ADMIN — Medication 15 MILLIGRAM(S): at 23:17

## 2024-08-05 RX ADMIN — BICTEGRAVIR SODIUM, EMTRICITABINE, AND TENOFOVIR ALAFENAMIDE FUMARATE 1 TABLET(S): 50; 200; 25 TABLET ORAL at 11:32

## 2024-08-05 RX ADMIN — Medication 17 GRAM(S): at 17:45

## 2024-08-05 RX ADMIN — Medication 2 MILLIGRAM(S): at 02:58

## 2024-08-05 RX ADMIN — ENOXAPARIN SODIUM 60 MILLIGRAM(S): 120 INJECTION SUBCUTANEOUS at 06:01

## 2024-08-05 RX ADMIN — Medication 2 MILLIGRAM(S): at 11:46

## 2024-08-05 RX ADMIN — LIDOCAINE 5% 1 PATCH: 5 CREAM TOPICAL at 14:23

## 2024-08-05 RX ADMIN — Medication 2 MILLIGRAM(S): at 03:13

## 2024-08-05 RX ADMIN — Medication 20 MILLIGRAM(S): at 23:17

## 2024-08-05 RX ADMIN — Medication 20 MILLILITER(S): at 23:32

## 2024-08-05 RX ADMIN — Medication 2 MILLIGRAM(S): at 06:03

## 2024-08-05 RX ADMIN — Medication 20 MILLILITER(S): at 10:22

## 2024-08-05 RX ADMIN — Medication 2 MILLIGRAM(S): at 21:31

## 2024-08-05 RX ADMIN — Medication 20 MILLILITER(S): at 14:23

## 2024-08-05 RX ADMIN — Medication 2 MILLIGRAM(S): at 21:16

## 2024-08-05 RX ADMIN — ENOXAPARIN SODIUM 60 MILLIGRAM(S): 120 INJECTION SUBCUTANEOUS at 17:44

## 2024-08-05 RX ADMIN — DEXTROAMPHETAMINE SACCHARATE, AMPHETAMINE ASPARTATE MONOHYDRATE, DEXTROAMPHETAMINE SULFATE AND AMPHETAMINE SULFATE 5 MILLIGRAM(S): 7.5; 7.5; 7.5; 7.5 CAPSULE, EXTENDED RELEASE ORAL at 07:19

## 2024-08-05 RX ADMIN — Medication 15 MILLIGRAM(S): at 14:53

## 2024-08-05 RX ADMIN — MUPIROCIN CALCIUM 1 APPLICATION(S): 20 CREAM TOPICAL at 23:32

## 2024-08-05 RX ADMIN — LIDOCAINE 5% 1 PATCH: 5 CREAM TOPICAL at 18:58

## 2024-08-05 RX ADMIN — Medication 2 MILLIGRAM(S): at 06:23

## 2024-08-05 RX ADMIN — MUPIROCIN CALCIUM 1 APPLICATION(S): 20 CREAM TOPICAL at 10:42

## 2024-08-05 RX ADMIN — Medication 20 MILLILITER(S): at 17:42

## 2024-08-05 RX ADMIN — OXYCODONE HYDROCHLORIDE 20 MILLIGRAM(S): 30 TABLET ORAL at 17:55

## 2024-08-05 RX ADMIN — CALAMINE 8% AND ZINC OXIDE 8% 1 APPLICATION(S): 160 LOTION TOPICAL at 07:20

## 2024-08-05 RX ADMIN — Medication 20 MILLIGRAM(S): at 06:00

## 2024-08-05 RX ADMIN — Medication 2 MILLIGRAM(S): at 12:16

## 2024-08-05 RX ADMIN — Medication 20 MILLIGRAM(S): at 07:19

## 2024-08-05 RX ADMIN — Medication 20 MILLIGRAM(S): at 14:23

## 2024-08-05 RX ADMIN — SULFAMETHOXAZOLE AND TRIMETHOPRIM 1 TABLET(S): 400; 80 TABLET ORAL at 07:19

## 2024-08-05 RX ADMIN — OXYCODONE HYDROCHLORIDE 15 MILLIGRAM(S): 30 TABLET ORAL at 01:10

## 2024-08-05 RX ADMIN — Medication 15 MILLIGRAM(S): at 23:32

## 2024-08-05 NOTE — PROGRESS NOTE ADULT - PROBLEM SELECTOR PROBLEM 5
Squamous cell carcinoma of rectum
Debility
HIV disease
Anemia
Squamous cell carcinoma of rectum
Substance abuse
Squamous cell carcinoma of rectum
Substance abuse
HIV disease
Squamous cell carcinoma of rectum
HIV disease
Squamous cell carcinoma of rectum
HIV disease
Anemia

## 2024-08-05 NOTE — PROGRESS NOTE ADULT - PROBLEM SELECTOR PROBLEM 11
DVT, lower extremity
Body mass index (BMI) of 19 or less in adult
DVT, lower extremity
Body mass index (BMI) of 19 or less in adult
Difficulty sleeping
Body mass index (BMI) of 19 or less in adult

## 2024-08-05 NOTE — DISCHARGE NOTE NURSING/CASE MANAGEMENT/SOCIAL WORK - PATIENT PORTAL LINK FT
You can access the FollowMyHealth Patient Portal offered by [facility] by registering at the following website: [email or web address]. By joining [employer]’s FollowMyHealth portal, you will also be able to view your health information using other applications (apps) compatible with our system.

## 2024-08-05 NOTE — PROGRESS NOTE ADULT - PROBLEM SELECTOR PLAN 10
- Nutrition consult
Patient has had decreased PO intake and decreased appetite. Has had increasing generalized weakness which has made him bedbound.    - Nutrition as above
7/27 Doppler showed DVT above and below left knee  - Started Lovenox BID
Patient has had decreased PO intake and decreased appetite. Has had increasing generalized weakness which has made him bedbound.    - Nutrition consult
Patient has had decreased PO intake and decreased appetite. Has had increasing generalized weakness which has made him bedbound.    - Nutrition consult
7/27 Doppler showed DVT above and below left knee  - Started Lovenox BID
Patient has had decreased PO intake and decreased appetite. Has had increasing generalized weakness which has made him bedbound.    - Nutrition consult
Patient has had decreased PO intake and decreased appetite. Has had increasing generalized weakness which has made him bedbound.    - Nutrition consult
Patient has had decreased PO intake and decreased appetite. Has had increasing generalized weakness which has made him bedbound.    - Nutrition as above
- Nutrition consult

## 2024-08-05 NOTE — PROGRESS NOTE ADULT - PROBLEM SELECTOR PROBLEM 12
Difficulty sleeping
Difficulty sleeping
DVT, lower extremity
Prophylactic measure

## 2024-08-05 NOTE — PROGRESS NOTE ADULT - PROBLEM SELECTOR PROBLEM 10
Cancer cachexia
Body mass index (BMI) of 19 or less in adult
Cancer cachexia
Cancer cachexia
DVT, lower extremity
Cancer cachexia
Cancer cachexia
DVT, lower extremity
Body mass index (BMI) of 19 or less in adult
Cancer cachexia

## 2024-08-05 NOTE — PROGRESS NOTE ADULT - SUBJECTIVE AND OBJECTIVE BOX
Internal Medicine Progress Note  Kelsie Concepcion, PGY-4 [phone]        OVERNIGHT EVENTS/INTERVAL HPI:  No acute overnight events. Patient denying chest pain, SOB, palpitations, denies fever, chills, HA, Dizziness, N/V, abdominal pain, diarrhea, constipation, hematochezia/melena, dysuria, hematuria, new onset weakness/numbness, LE pain and/or swelling.  Palliative following with discharge medication recommendations.      OBJECTIVE:  Vital Signs Last 24 Hrs  T(C): 36.6 (05 Aug 2024 12:30), Max: 36.9 (05 Aug 2024 06:19)  T(F): 97.9 (05 Aug 2024 12:30), Max: 98.5 (05 Aug 2024 06:19)  HR: 117 (05 Aug 2024 12:30) (113 - 120)  BP: 116/66 (05 Aug 2024 12:30) (103/68 - 116/66)  BP(mean): --  RR: 17 (05 Aug 2024 12:30) (17 - 18)  SpO2: 96% (05 Aug 2024 12:30) (94% - 97%)    Parameters below as of 05 Aug 2024 06:19  Patient On (Oxygen Delivery Method): room air      I&O's Detail    PHYSICAL EXAM  Constitutional: Frail, emaciated, low muscle mass, resting in bed, weak.  HEENT: NC/AT, PERRL/EOMI, anicteric sclera, No JVD or thyromegaly, MMM  Respiratory: CTA B/L; no audible wheezing/ronchi/rales  Cardiac: +S1/S2; RRR; no M/R/G; PMI non-displaced  Gastrointestinal: TTP at site of ostomy bag, soft, NT/ND; no rebound or guarding; +BS.   Genitourinary: with ostomy bag, c/di  Back: spine midline, no bony tenderness or step-offs; no CVAT B/L  Extremities: WWP, no clubbing or cyanosis; no peripheral edema  Vascular: 2+ radial & DP pulses  Neurologic: AAOx3; CNII-XII grossly intact; no focal deficits  Psychiatric: anxious    Medications:  MEDICATIONS  (STANDING):  acetaminophen     Tablet .. 1000 milliGRAM(s) Oral every 8 hours  amphetamine/dextroamphetamine XR 5 milliGRAM(s) Oral with breakfast  bictegravir 50 mG/emtricitabine 200 mG/tenofovir alafenamide 25 mG (BIKTARVY) 1 Tablet(s) Oral daily  calamine/zinc oxide Lotion 1 Application(s) Topical two times a day  enoxaparin Injectable 60 milliGRAM(s) SubCutaneous every 12 hours  escitalopram 20 milliGRAM(s) Oral every 24 hours  ketorolac   Injectable 15 milliGRAM(s) IV Push every 8 hours  lactated ringers. 1000 milliLiter(s) (100 mL/Hr) IV Continuous <Continuous>  lidocaine   4% Patch 1 Patch Transdermal every 24 hours  lidocaine 2% Jelly 20 milliLiter(s) IntraUrethral every 4 hours  methadone    Tablet 20 milliGRAM(s) Oral every 8 hours  mirtazapine 15 milliGRAM(s) Oral at bedtime  multivitamin 1 Tablet(s) Oral every 24 hours  mupirocin 2% Ointment 1 Application(s) Topical three times a day  polyethylene glycol 3350 17 Gram(s) Oral two times a day  senna 2 Tablet(s) Oral at bedtime  thiamine 100 milliGRAM(s) Oral every 24 hours  trimethoprim  160 mG/sulfamethoxazole 800 mG 1 Tablet(s) Oral every 24 hours    MEDICATIONS  (PRN):  HYDROmorphone  Injectable 2 milliGRAM(s) IV Push every 3 hours PRN Severe Pain (7 - 10)  oxyCODONE    IR 20 milliGRAM(s) Oral every 4 hours PRN Moderate Pain (4 - 6)      Labs:                SARS-CoV-2: Alicia (02 Mar 2024 06:07)      Radiology: Reviewed

## 2024-08-05 NOTE — PROGRESS NOTE ADULT - NS ATTEST RISK PROBLEM GEN_ALL_CORE FT
Chronic Illness With Severe Exacerbation/Progression  Prescription Of Parenteral Controlled Substances
Chronic Illness With Severe Exacerbation/Progression  Prescription Of Parenteral Controlled Substances
Chronic Illness With Severe Exacerbation/Progression  Drug Therapy Requiring Intensive Monitoring for Toxicity  Prescription Of Parenteral Controlled Substances
Chronic Illness With Severe Exacerbation/Progression  Prescription Of Parenteral Controlled Substances
Chronic Illness With Severe Exacerbation/Progression  Decision Made To Not Resuscitate (DNR)  Decision Made To De-escalate Care Due To Poor Prognosis  Drug Therapy Requiring Intensive Monitoring for Toxicity  Prescription Of Parenteral Controlled Substances
Chronic Illness With Severe Exacerbation/Progression  Decision Made To Not Resuscitate (DNR)  Decision Made To De-escalate Care Due To Poor Prognosis  Drug Therapy Requiring Intensive Monitoring for Toxicity  Prescription Of Parenteral Controlled Substances

## 2024-08-05 NOTE — PROGRESS NOTE ADULT - PROBLEM SELECTOR PLAN 9
Patient has had decreased PO intake and decreased appetite. Has had increasing generalized weakness which has made him bedbound.    - Nutrition consult
Patient has had decreased PO intake and decreased appetite. Has had increasing generalized weakness which has made him bedbound.  - encourage nutrition as above
Patient has had decreased PO intake and decreased appetite. Has had increasing generalized weakness which has made him bedbound.
Patient has had decreased PO intake and decreased appetite. Has had increasing generalized weakness which has made him bedbound.  - encourage nutrition as above
Patient has had decreased PO intake and decreased appetite. Has had increasing generalized weakness which has made him bedbound.    - Nutrition consult

## 2024-08-05 NOTE — PROGRESS NOTE ADULT - PROBLEM SELECTOR PROBLEM 7
Encounter for palliative care
Substance abuse
Encounter for palliative care
Encounter for palliative care
Neoplasm related pain
Debility
Substance abuse
Encounter for palliative care
Substance abuse
Substance abuse
Debility
Neoplasm related pain
Substance abuse
Encounter for palliative care
Substance abuse

## 2024-08-05 NOTE — PROGRESS NOTE ADULT - PROBLEM SELECTOR PROBLEM 1
Neoplasm related pain
Neoplasm related pain
Squamous cell carcinoma of rectum
Squamous cell carcinoma of rectum
Neoplasm related pain
Squamous cell carcinoma of rectum
Neoplasm related pain
Squamous cell carcinoma of rectum
Neoplasm related pain
Neoplasm related pain
Squamous cell carcinoma of rectum
Squamous cell carcinoma of rectum

## 2024-08-05 NOTE — DISCHARGE NOTE NURSING/CASE MANAGEMENT/SOCIAL WORK - NSDCPEWEB_GEN_ALL_CORE
[facility] for Tobacco Control website --- [email or web address] website --- [email or web address]

## 2024-08-05 NOTE — PROGRESS NOTE ADULT - PROBLEM SELECTOR PROBLEM 4
Cancer cachexia
Sepsis
Cancer cachexia
HIV disease
Cancer cachexia
Cancer cachexia
HIV disease
Sepsis
HIV disease
Cancer cachexia
Sepsis
Sepsis
HIV disease
Cancer cachexia

## 2024-08-05 NOTE — PROGRESS NOTE ADULT - PROBLEM SELECTOR PLAN 5
.  Locally advanced, metastatic disease  -s/p chemo/RT as outpatient  -overall progression of disease noted on imaging  -no plan for DMT at this time given performance status and active infection  -beginning a transition to a symptom-directed plan of care a further invasive interventions are not being pursued  -hospice referral ongoing, planned for discharge tomorrow

## 2024-08-05 NOTE — PROGRESS NOTE ADULT - PROBLEM SELECTOR PLAN 12
7/27 Doppler showed DVT above and below left knee  - On Lovenox BID
7/27 Doppler showed DVT above and below left knee  - On Lovenox BID
Patient noted to have difficult sleeping    Plan:   reach out to pharmacy regarding starting Mirtazapine
7/27 Doppler showed DVT above and below left knee  - On Lovenox 60 BID
7/27 Doppler showed DVT above and below left knee  - On Lovenox 60 BID
7/27 Doppler showed DVT above and below left knee  - On Lovenox BID
Patient noted to have difficult sleeping    - On mirtazapine
7/27 Doppler showed DVT above and below left knee  - On Lovenox BID

## 2024-08-05 NOTE — CHART NOTE - NSCHARTNOTEFT_GEN_A_CORE
Pt prescribed methadone 10mg 3x a day by Dr. Taylor. Last  7/12 at [facility] pharmacy. Pt also prescribed oxy 15mg u0pmkie for pain. Last  7/15
ADMITTING DIAGNOSIS:   Patient is a 44y old  Male who presents with a chief complaint of Weakness (05 Aug 2024 05:18)    PAST MEDICAL & SURGICAL HISTORY:  HIV (human immunodeficiency virus infection)  Abscess  IVDU (intravenous drug user)  Anemia  Rectal cancer  H/O rectal polypectomy    CURRENT DIET ORDER: Diet, Regular:   Supplement Feeding Modality:  Oral  Ensure Plus High Protein Cans or Servings Per Day:  1       Frequency:  Three Times a day (07-31-24 @ 14:00) [Active]    PO INTAKE: Good (%) [   ]  Fair (50-75%) [x] Poor (<25%) [   ]    GI ISSUES: none reported at this time    PAIN: none reported at this time    SKIN: no pressure injury per RN flowsheets    EDEMA: no edema per RN flowsheets    MEDICATIONS:  MEDICATIONS  (STANDING):  acetaminophen     Tablet .. 1000 milliGRAM(s) Oral every 8 hours  amphetamine/dextroamphetamine XR 5 milliGRAM(s) Oral with breakfast  bictegravir 50 mG/emtricitabine 200 mG/tenofovir alafenamide 25 mG (BIKTARVY) 1 Tablet(s) Oral daily  calamine/zinc oxide Lotion 1 Application(s) Topical two times a day  enoxaparin Injectable 60 milliGRAM(s) SubCutaneous every 12 hours  escitalopram 20 milliGRAM(s) Oral every 24 hours  ketorolac   Injectable 15 milliGRAM(s) IV Push every 8 hours  lactated ringers. 1000 milliLiter(s) (100 mL/Hr) IV Continuous <Continuous>  lidocaine   4% Patch 1 Patch Transdermal every 24 hours  lidocaine 2% Jelly 20 milliLiter(s) IntraUrethral every 4 hours  methadone    Tablet 20 milliGRAM(s) Oral every 8 hours  mirtazapine 15 milliGRAM(s) Oral at bedtime  multivitamin 1 Tablet(s) Oral every 24 hours  mupirocin 2% Ointment 1 Application(s) Topical three times a day  polyethylene glycol 3350 17 Gram(s) Oral two times a day  senna 2 Tablet(s) Oral at bedtime  thiamine 100 milliGRAM(s) Oral every 24 hours  trimethoprim  160 mG/sulfamethoxazole 800 mG 1 Tablet(s) Oral every 24 hours    MEDICATIONS  (PRN):  HYDROmorphone  Injectable 2 milliGRAM(s) IV Push every 3 hours PRN Severe Pain (7 - 10)  oxyCODONE    IR 20 milliGRAM(s) Oral every 4 hours PRN Moderate Pain (4 - 6)    WEIGHT: (7/25)  Height for BMI (FEET)	6 Feet  Height for BMI (INCHES)	0 Inch(s)  Height for BMI (CENTIMETERS)	182.88 Centimeter(s)  Weight for BMI (lbs)	128.9 lb  Weight for BMI (kg)	58.5 kg  Body Mass Index	17.4    WEIGHT CHANGE: no updated weight since admission    NUTRITION FOCUSED PHYSICAL EXAM: Completed [7/31]; Not Pertinent at this time  Nutriton Focused Physical Exam	yes...  Muscle Mass (Loss of Muscle)	Temples...  Clavicles...  Shoulders...  Temples Depletion is	moderate  Clavicles Depletion is	moderate  Shoulders Depletion is	moderate  Body Fat (loss of subcutaneous fat)	Orbital...  Orbital Depletion is	moderate    ESTIMATED ENERGY NEEDS:   Weight used for calculations	IBW  Estimated Energy Needs Weight (lbs)	178 lb  Estimated Energy Needs Weight (kg)	80.7 kg  Estimated Energy Needs From (mohit/kg)	30  Estimated Energy Needs To (mohit/kg)	35  Estimated Energy Needs Calculated From (mohit/kg)	2421  Estimated Energy Needs Calculated To (mohit/kg)	2824    Weight used for calculations	IBW  Estimated Protein Needs Weight (lbs)	178 lb  Estimated Protein Needs Weight (kg)	80.7 kg  Estimated Protein Needs From (g/kg)	1.25  Estimated Protein Needs To (g/kg)	1.5  Estimated Protein Needs Calculated From (g/kg)	100.87  Estimated Protein Needs Calculated To (g/kg)	121.05    Estimated Fluid Needs Weight (lbs)	178 lb  Estimated Fluid Needs Weight (kg)	80.7 kg  Estimated Fluid Needs From (ml/kg)	25  Estimated Fluid Needs To (ml/kg)	30  Estimated Fluid Needs Calculated From (ml/kg)	2017  Estimated Fluid Needs Calculated To (ml/kg)	2421  *Using ideal body weight as pt is <80% ideal body weight. Needs adjusted for malnutrition, anorectal cancer, HIV, sepsis. ideal body weight: 178 pounds; % ideal body weight: 72%    SUBJECTIVE:   Per H&P: Mr. Sanchez is a 44 year old male who came to [facility] ED with complaint of generalized weakness. He has PMHx of  invasive anorectal squamous cell carcinoma (last received chemo and XRT a month ago, dx'd 12/23, pending a surgical intervention) with bony involvement  to sacrum/acetabulum (followed by Eliel/Graciela), HIV (biktarvy), anemia, baseline tachycardia attributed to prior heavy drug use (crystal meth, ecstasy), nicotine dependence (a pack a week) and depression, presenting for generalized weakness, pain in the hips and shoulders BL, and pain at the sites of incisions on anus. Too weak to stand or move. Pain and weakness started 3-4 days ago, gradually, persistently, and worsening, now bedbound. Pain most profound where significant muscle loss has occurred - BL hips, shoulders, buttocks/sacral area. Decreased appetite/decreased PO intake for last year, associated weight loss and muscle loss. Walks at baseline with walker, now worsening mostly bedbound. Denies fatigue, dizziness, or lightheadedness numbness, tingling, pain or discomfort, SOB, weight loss or gain, or sleep patterns. Nothing seems to make it better or worse. No recent changes in medications or substance use. Has not experienced similar weakness in the past.  Patient denies emesis, shortness of breath or chest pain. Patient endorsed longstanding history of perianal abscesses but worsening pain and drainage, with no fevers. Denies nausea/vomiting. Recently seen in June 12, 2024 for perianal abscesses and fistulas at [facility]. On 6/15 he was s/p colostomy creation and perineal washout     (7/31) Met with pt this AM at bedside. Pt was seated upright and able to articulate his nutrition hx well. No known food allergies nor food intolerances reported. Pt reported poor appetite and PO intake x months (1 year per H&P) due to stress/environmental reasons; denied following any specific diet PTA. Pt reported current good appetite - RD observed pt eating breakfast. Pt denied chewing/swallowing difficulties and mouth sores/dry mouth. Pt denied nausea/vomiting, mentioned occasional diarrhea/constipation (pt with colostomy bag), stated last BM 7/30. Pt reported usual body weight 160-165 pounds and endorsed recent weight loss since december 2023 (21.8% wt loss x 1 year - meets criteria for severe malnutrition). RD reviewed protein sources (chicken, fish, beans, nut, etc.) and encouraged adequate PO and protein consumption in order to regain strength, maintain muscle mass and reach adequate nutritional status. Pt was accepting to RD suggestion and asking appropriate questions.     (8/5) Met with pt this AM at bedside. Pt was seated upright and able to articulate his nutrition hx well. Pt reported fluctuating appetite with continued good acceptance to ensure plus HP TID. Pt denied nausea/vomiting/diarrhea/constipation, stated last BM 8/4 and consistent daily BMs. Pt requesting additional diet education, specifically relating to colostomy bag. RD able to provided nutrition education with written materials (see below). Pt accepting to RD education and asking appropriate questions.     *Nutrition Written Educational Materials, per pt request (source):  - Colostomy Nutrition Therapy (Nutrition Care Manual)    PREVIOUS NUTRITION DIAGNOSIS: Malnutrition (severe protein kcal malnutrition) related to inadequate energy intake as evidenced by muscle/fat wasting; <75% energy intake x >/= 1 month; >20% wt loss x 1 yr; BMI <19    Active [X]  Resolved [   ]    IF RESOLVED, NEW PES: N/A    GOAL: Pt to meet >75% estimated energy and protein needs throughout hospitalization, if within GOC    MONITORING AND EVALUATION:   Current diet order is appropriate and is well tolerated, will continue to monitor:  - Food and nutrient intake/POs  - Nutrition related lab value, specifically lytes  - Weight/weight trends  - GI functions  - Supplement acceptance    NUTRITION RECOMMENDATIONS:   1. Continue with Regular diet  - Maintain nutrition within goals of care at all times.   - Do not force feed.   - Honor all food preferences as able.  2. Continue with Ensure plus HP TID, per pt request  - Provides 350 kcal, 20 g pro per serving   3. RD will remain available, as needed    RISK LEVEL: High [X] Moderate [   ] Low [   ]    Maureen Metz RDN also available via TEAMS
Attempted to see pt. Pt declined to be interviewed today. Discussed with primary team. Will re-attempt tomorrow.

## 2024-08-05 NOTE — PROGRESS NOTE ADULT - TIME BILLING
Bedside exam and interview   Reviewed vitals, labs   Discussed patient's plan of care with housestaff   Documentation of encounter  Time excludes teaching and review of housestaff documentation
Bedside exam and interview   Reviewed vitals, labs   Discussed patient's plan of care with housestaff   Documentation of encounter
Bedside exam and interview   Reviewed vitals, labs   Discussed patient's plan of care with housestaff   Documentation of encounter  Time excludes teaching and review of housestaff documentation
Bedside exam and interview   Reviewed vitals, labs   Discussed patient's plan of care with housestaff   Documentation of encounter  Time excludes teaching and review of housestaff documentation  GOC discussion

## 2024-08-05 NOTE — PROGRESS NOTE ADULT - PROBLEM SELECTOR PLAN 11
- Nutrition consult
- Nutrition as above
7/27 Doppler showed DVT above and below left knee  - On Lovenox BID
- Nutrition consult
- Nutrition consult
7/27 Doppler showed DVT above and below left knee  - Started Lovenox BID
- Nutrition consult
Patient noted to have difficult sleeping    Plan:   reach out to pharmacy regarding starting Mirtazapine
- Nutrition as above

## 2024-08-05 NOTE — PROGRESS NOTE ADULT - PROBLEM SELECTOR PLAN 7
Complex medical decision making / symptom management in the setting of metastatic malignancy.    Will continue to follow for ongoing GOC discussion / titration of palliative regimen. Emotional support provided, questions answered.    Active Psychosocial Referrals:  [x]Social Work/Case management [x]PT/OT [x]Chaplaincy []Hospice  [x]Patient/Family Support []Holistic RN [x]Massage Therapy [x]Music Therapy []Ethics  Coping: [] well [x] with difficulty [] poor coping [] unable to assess  Support system: [] strong [] adequate [x] inadequate

## 2024-08-05 NOTE — PROGRESS NOTE ADULT - SUBJECTIVE AND OBJECTIVE BOX
[facility] Pall[facility]  Geo Rodriguez, Palliative Care Attending  Contact Info: Call [phone] (HEAL Line) or message on Microsoft Teams (Geo Rodriguez)    SUBJECTIVE AND OBJECTIVE:  INTERVAL HPI/OVERNIGHT EVENTS: Interval events noted. See patient's PRN use for the past 24hrs noted below. Comprehensive symptom assessment and GOC exploration as noted below. Extensive time spent discussing plan of care with patient/family. No unexpected adverse effects of opiates noted: no sedation/dizziness/nausea.    ALLERGIES:  No Known Allergies    MEDICATIONS  (STANDING):  acetaminophen     Tablet .. 1000 milliGRAM(s) Oral every 8 hours  amphetamine/dextroamphetamine XR 5 milliGRAM(s) Oral with breakfast  bictegravir 50 mG/emtricitabine 200 mG/tenofovir alafenamide 25 mG (BIKTARVY) 1 Tablet(s) Oral daily  calamine/zinc oxide Lotion 1 Application(s) Topical two times a day  enoxaparin Injectable 60 milliGRAM(s) SubCutaneous every 12 hours  escitalopram 20 milliGRAM(s) Oral every 24 hours  ketorolac   Injectable 15 milliGRAM(s) IV Push every 8 hours  lactated ringers. 1000 milliLiter(s) (100 mL/Hr) IV Continuous <Continuous>  lidocaine   4% Patch 1 Patch Transdermal every 24 hours  lidocaine 2% Jelly 20 milliLiter(s) IntraUrethral every 4 hours  methadone    Tablet 20 milliGRAM(s) Oral every 8 hours  mirtazapine 15 milliGRAM(s) Oral at bedtime  multivitamin 1 Tablet(s) Oral every 24 hours  mupirocin 2% Ointment 1 Application(s) Topical three times a day  polyethylene glycol 3350 17 Gram(s) Oral two times a day  senna 2 Tablet(s) Oral at bedtime  thiamine 100 milliGRAM(s) Oral every 24 hours  trimethoprim  160 mG/sulfamethoxazole 800 mG 1 Tablet(s) Oral every 24 hours    MEDICATIONS  (PRN):  HYDROmorphone  Injectable 2 milliGRAM(s) IV Push every 3 hours PRN Severe Pain (7 - 10)  oxyCODONE    IR 20 milliGRAM(s) Oral every 4 hours PRN Moderate Pain (4 - 6)      Analgesic Use (Scheduled and PRNs) for past 24 hours:  acetaminophen     Tablet ..   1000 milliGRAM(s) Oral (08-04-24 @ 21:24)    amphetamine/dextroamphetamine XR   5 milliGRAM(s) Oral (08-05-24 @ 07:19)    escitalopram   20 milliGRAM(s) Oral (08-05-24 @ 07:19)    HYDROmorphone  Injectable   2 milliGRAM(s) IV Push (08-05-24 @ 11:46)   2 milliGRAM(s) IV Push (08-05-24 @ 06:03)   2 milliGRAM(s) IV Push (08-05-24 @ 02:58)   2 milliGRAM(s) IV Push (08-04-24 @ 21:24)    ketorolac   Injectable   15 milliGRAM(s) IV Push (08-05-24 @ 14:23)    lidocaine 2% Jelly   20 milliLiter(s) IntraUrethral (08-05-24 @ 17:42)   20 milliLiter(s) IntraUrethral (08-05-24 @ 14:23)   20 milliLiter(s) IntraUrethral (08-05-24 @ 10:22)   20 milliLiter(s) IntraUrethral (08-04-24 @ 22:22)    methadone    Tablet   20 milliGRAM(s) Oral (08-05-24 @ 14:23)   20 milliGRAM(s) Oral (08-05-24 @ 06:00)   20 milliGRAM(s) Oral (08-04-24 @ 21:25)    mirtazapine   15 milliGRAM(s) Oral (08-04-24 @ 21:25)    oxyCODONE    IR   20 milliGRAM(s) Oral (08-05-24 @ 17:55)    oxyCODONE    IR   15 milliGRAM(s) Oral (08-04-24 @ 23:47)      ITEMS UNCHECKED ARE NOT PRESENT  PRESENT SYMPTOMS/REVIEW OF SYSTEMS: []Unable to obtain due to poor mentation   Source if other than patient:  []Family   []Team         Vital Signs Last 24 Hrs  T(C): 36.8 (05 Aug 2024 15:24), Max: 36.9 (05 Aug 2024 06:19)  T(F): 98.2 (05 Aug 2024 15:24), Max: 98.5 (05 Aug 2024 06:19)  HR: 108 (05 Aug 2024 15:24) (108 - 120)  BP: 110/- (05 Aug 2024 15:24) (103/68 - 116/66)  BP(mean): 74 (05 Aug 2024 15:24) (74 - 74)  RR: 18 (05 Aug 2024 15:24) (17 - 18)  SpO2: 98% (05 Aug 2024 15:24) (94% - 98%)    Parameters below as of 05 Aug 2024 15:24  Patient On (Oxygen Delivery Method): room air        LABS: Personally reviewed and interpreted          RADIOLOGY & ADDITIONAL STUDIES: Personally reviewed and interpreted  None new    DISCUSSION OF CASE: Family - to provide updates and emotional support; Primary Team/RN - to discuss plan of care [facility]  Geo Rodriguez, Palliative Care Attending  Contact Info: Call [phone] (HEAL Line) or message on Microsoft Teams (Geo Rodriguez)    SUBJECTIVE AND OBJECTIVE:  INTERVAL HPI/OVERNIGHT EVENTS: Interval events noted. See patient's PRN use for the past 24hrs noted below. Comprehensive symptom assessment and GOC exploration as noted below. Extensive time spent discussing plan of care with patient/family. No unexpected adverse effects of opiates noted: no sedation/dizziness/nausea.    ALLERGIES:  No Known Allergies    MEDICATIONS  (STANDING):  acetaminophen     Tablet .. 1000 milliGRAM(s) Oral every 8 hours  amphetamine/dextroamphetamine XR 5 milliGRAM(s) Oral with breakfast  bictegravir 50 mG/emtricitabine 200 mG/tenofovir alafenamide 25 mG (BIKTARVY) 1 Tablet(s) Oral daily  calamine/zinc oxide Lotion 1 Application(s) Topical two times a day  enoxaparin Injectable 60 milliGRAM(s) SubCutaneous every 12 hours  escitalopram 20 milliGRAM(s) Oral every 24 hours  ketorolac   Injectable 15 milliGRAM(s) IV Push every 8 hours  lactated ringers. 1000 milliLiter(s) (100 mL/Hr) IV Continuous <Continuous>  lidocaine   4% Patch 1 Patch Transdermal every 24 hours  lidocaine 2% Jelly 20 milliLiter(s) IntraUrethral every 4 hours  methadone    Tablet 20 milliGRAM(s) Oral every 8 hours  mirtazapine 15 milliGRAM(s) Oral at bedtime  multivitamin 1 Tablet(s) Oral every 24 hours  mupirocin 2% Ointment 1 Application(s) Topical three times a day  polyethylene glycol 3350 17 Gram(s) Oral two times a day  senna 2 Tablet(s) Oral at bedtime  thiamine 100 milliGRAM(s) Oral every 24 hours  trimethoprim  160 mG/sulfamethoxazole 800 mG 1 Tablet(s) Oral every 24 hours    MEDICATIONS  (PRN):  HYDROmorphone  Injectable 2 milliGRAM(s) IV Push every 3 hours PRN Severe Pain (7 - 10)  oxyCODONE    IR 20 milliGRAM(s) Oral every 4 hours PRN Moderate Pain (4 - 6)      Analgesic Use (Scheduled and PRNs) for past 24 hours:  acetaminophen     Tablet ..   1000 milliGRAM(s) Oral (08-04-24 @ 21:24)    amphetamine/dextroamphetamine XR   5 milliGRAM(s) Oral (08-05-24 @ 07:19)    escitalopram   20 milliGRAM(s) Oral (08-05-24 @ 07:19)    HYDROmorphone  Injectable   2 milliGRAM(s) IV Push (08-05-24 @ 11:46)   2 milliGRAM(s) IV Push (08-05-24 @ 06:03)   2 milliGRAM(s) IV Push (08-05-24 @ 02:58)   2 milliGRAM(s) IV Push (08-04-24 @ 21:24)    ketorolac   Injectable   15 milliGRAM(s) IV Push (08-05-24 @ 14:23)    lidocaine 2% Jelly   20 milliLiter(s) IntraUrethral (08-05-24 @ 17:42)   20 milliLiter(s) IntraUrethral (08-05-24 @ 14:23)   20 milliLiter(s) IntraUrethral (08-05-24 @ 10:22)   20 milliLiter(s) IntraUrethral (08-04-24 @ 22:22)    methadone    Tablet   20 milliGRAM(s) Oral (08-05-24 @ 14:23)   20 milliGRAM(s) Oral (08-05-24 @ 06:00)   20 milliGRAM(s) Oral (08-04-24 @ 21:25)    mirtazapine   15 milliGRAM(s) Oral (08-04-24 @ 21:25)    oxyCODONE    IR   20 milliGRAM(s) Oral (08-05-24 @ 17:55)    oxyCODONE    IR   15 milliGRAM(s) Oral (08-04-24 @ 23:47)      ITEMS UNCHECKED ARE NOT PRESENT  PRESENT SYMPTOMS/REVIEW OF SYSTEMS: []Unable to obtain due to poor mentation   Source if other than patient:  []Family   []Team     Pain: [x] yes [] no  QOL Impact - bothersome  Location -   rectum                 Aggravating Factors -  Quality - sharp, ache  Radiation -  Timing - constant  Severity (0-10 scale) - 8/10  Minimal Acceptable Level (0-10 scale) -    Dyspnea:                           []Mild  []Moderate []Severe  Anxiety:                             []Mild []Moderate []Severe  Fatigue:                             []Mild []Moderate []Severe  Nausea:                             []Mild []Moderate []Severe  Loss of Appetite:              []Mild []Moderate []Severe  Constipation:                    []Mild []Moderate []Severe    Other Symptoms:  [x]All Other Review Of Systems Negative     PHYSICAL EXAM:  GENERAL:  [x]Alert  [x]Oriented x3   []Lethargic  []Cachexia  []Unarousable  [x]Verbal  []Non-Verbal  Behavioral:   []Anxiety  []Delirium []Agitation [x]Cooperative  HEENT:  [x]Normal   []Dry mouth   []ET Tube/Trach  []Oral lesions  PULMONARY:   [x]Clear []Tachypnea  []Audible excessive secretions  [x]Normal Work of Breathing []Labored Breathing  []Rhonchi []Crackles []Wheezing  CARDIOVASCULAR:    [x]Regular Rate & Rhythm []Irregular []Tachy  []Ethan  GASTROINTESTINAL:  [x]Soft  []Distended   [x]+BS  [x]Non tender []Tender  []PEG []OGT/ NGT  Last BM: Ostomy with stool  GENITOURINARY:  [x]Normal [] Incontinent   []Oliguria/Anuria   []Calderon  MUSCULOSKELETAL:   []Normal   [x]Weakness  []Bed/Wheelchair bound []Edema  NEUROLOGIC:   [x]No focal deficits  []Cognitive impairment  []Dysphagia []Dysarthria []Paresis []Encephalopathic  SKIN:   []Normal   [x]Pressure ulcer(s)  []Rash    Vital Signs Last 24 Hrs  T(C): 36.8 (05 Aug 2024 15:24), Max: 36.9 (05 Aug 2024 06:19)  T(F): 98.2 (05 Aug 2024 15:24), Max: 98.5 (05 Aug 2024 06:19)  HR: 108 (05 Aug 2024 15:24) (108 - 120)  BP: 110/- (05 Aug 2024 15:24) (103/68 - 116/66)  BP(mean): 74 (05 Aug 2024 15:24) (74 - 74)  RR: 18 (05 Aug 2024 15:24) (17 - 18)  SpO2: 98% (05 Aug 2024 15:24) (94% - 98%)    Parameters below as of 05 Aug 2024 15:24  Patient On (Oxygen Delivery Method): room air    LABS: Personally reviewed and interpreted  None new    RADIOLOGY & ADDITIONAL STUDIES: Personally reviewed and interpreted  None new    DISCUSSION OF CASE: Primary Team/RN - to discuss plan of care

## 2024-08-05 NOTE — PROGRESS NOTE ADULT - PROBLEM SELECTOR PROBLEM 9
Body mass index (BMI) of 19 or less in adult
Debility
Body mass index (BMI) of 19 or less in adult
Debility
Cancer cachexia
Debility
Cancer cachexia
Debility

## 2024-08-05 NOTE — PROGRESS NOTE ADULT - PROBLEM SELECTOR PROBLEM 3
Sepsis
Severe protein-calorie malnutrition
Sepsis
Severe protein-calorie malnutrition
Sepsis
Severe protein-calorie malnutrition
Severe protein-calorie malnutrition
Sepsis
Severe protein-calorie malnutrition
Severe protein-calorie malnutrition

## 2024-08-05 NOTE — PROGRESS NOTE ADULT - PROBLEM/PLAN-6
04/07/2022    Pre-operative evaluation for Procedure(s) (LRB):  CLOSED REDUCTION,  PINNING RIGHT 4TH, 5TH METACARPAL FRACTURE (NEEDS C-ARM,K-WIRES, DRILL (Right)    Brian LINDER is a 24 y.o. male     There is no problem list on file for this patient.      Review of patient's allergies indicates:  No Known Allergies    No current facility-administered medications on file prior to encounter.     No current outpatient medications on file prior to encounter.     VITALS  Vitals:    04/07/22 0801   BP:    Pulse:    Resp: 19   Temp:        LABS  CBC:   Recent Labs     04/06/22  1725   WBC 11.05   RBC 4.94   HGB 15.4   HCT 43.6      MCV 88   MCH 31.2*   MCHC 35.3       CMP:   Recent Labs     04/06/22  1725      K 4.0      CO2 27   BUN 8   CREATININE 0.9      CALCIUM 9.0   ALBUMIN 4.6   PROT 7.0   ALKPHOS 59   ALT 13   AST 15   BILITOT 0.6       INR  Recent Labs     04/06/22  1725   INR 1.0   APTT 24.5       Pre-op Assessment          Review of Systems         Anesthesia Plan  Type of Anesthesia, risks & benefits discussed:    Anesthesia Type: Gen Supraglottic Airway  Post Op Pain Control Plan: multimodal analgesia  Informed Consent: Patient consented to blood products? Yes  ASA Score: 3  Anesthesia Plan Notes: npo    Ready For Surgery From Anesthesia Perspective.     .      
DISPLAY PLAN FREE TEXT

## 2024-08-05 NOTE — PROGRESS NOTE ADULT - ASSESSMENT
45yo M with PMH of Metastatic Anorectal CA, HIV, and Depression p/w weakness and found to have wound/disease progression. Palliative consulted for complex symptom management and medical decision making in the setting of life-limiting illness.    ·	Rx's for Methadone, Oxy IR, Methyphenidate, and Bowel Regimen sent to [facility]  ·	restarted Toradol IV while hospitalized  ·	tentative plan for home hospice discharge tomorrow 43yo M with PMH of Metastatic Anorectal CA, HIV, and Depression p/w weakness and found to have wound/disease progression. Palliative consulted for complex symptom management and medical decision making in the setting of life-limiting illness.    ·	Rx's for Methadone, Oxy IR, Methylphenidate, and Bowel Regimen sent to [facility]  ·	restarted Toradol IV while hospitalized  ·	tentative plan for home hospice discharge tomorrow

## 2024-08-05 NOTE — PROGRESS NOTE ADULT - ASSESSMENT
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 Presenting again w/ worsening weakness and generalized pain, pow/ CT scan showing worsening pelvic bony destruction, w/ SIRS 2/4 likey 2/2 OM vs other infectious etiology. Found with progressive disease with poor prognosis and now pending home hospice.

## 2024-08-05 NOTE — PROGRESS NOTE ADULT - PROBLEM SELECTOR PLAN 6
Hgb ~8   - No more Labs due to potential hospice care
Complex medical decision making / symptom management in the setting of metastatic malignancy.    If no further DMT were offered/pursued, then patient would be eligible for home hospice, however patient with significant barriers to this being possible as he is pending eviction from his apartment. Social work following for assistance.     Will continue to follow for ongoing GOC discussion / titration of palliative regimen. Emotional support provided, questions answered.  Active Psychosocial Referrals:  [x]Social Work/Case management []PT/OT []Chaplaincy []Hospice  [x]Patient/Family Support []Holistic RN []Massage Therapy []Music Therapy []Ethics  Coping: [] well [x] with difficulty [] poor coping [] unable to assess  Support system: [] strong [] adequate [x] inadequate
Hgb ~8   - No more Labs due to potential hospice care
Hgb ~8   - No more Labs due to plans for hospice care
.  Patient is DNR/DNI status, following discussion today  -see GOC note above  -ongoing coordination to see if home hospice is logistically feasible
.  Patient is DNR/DNI  -see [facility] note 8/1  -MEWS Exempt
.  - DNR/DNI  -see [facility] note 8/1  -ongoing coordination to see if home hospice is logistically feasible
.  Patient is Full Code but recognizes the value of finalizing DNR/DNI status  -see GOC note
hx of drug abuse with meth, IV drug use, cocaine, ecstasy and marijuana; UTOX positive for meth, THC, opiates, methadone. Is on methadone 10mg x3 daily    - SBIRT  - monitor for symptom withdrawal  - Give narcan kit on dc
Currently on 1mg dilaudid, IV 15mg ketorolac, lidocaine patch, oxycodone 15mg for moderate pain
Hgb ~8   - No more Labs due to potential hospice care
hx of drug abuse with meth, IV drug use, cocaine, ecstasy and marijuana; UTOX positive for meth, THC, opiates, methadone. Is on methadone 10mg x3 daily    - SBIRT  - monitor for symptom withdrawal  - Give narcan kit on dc
Hgb ~8   - No more Labs due to plans for hospice care
.  Patient is Full Code but recognizes the value of finalizing DNR/DNI status  -see GOC note from 7/30  -ongoing coordination to see if home hospice is logistically feasible
Hgb ~8   - Trend CBC  - Type & screen q72
Currently on 1mg dilaudid, IV 15mg ketorolac, lidocaine patch, oxycodone 15mg for moderate pain

## 2024-08-05 NOTE — PROGRESS NOTE ADULT - PROBLEM SELECTOR PROBLEM 6
Advanced care planning/counseling discussion
Neoplasm related pain
Advanced care planning/counseling discussion
Anemia
Neoplasm related pain
Encounter for palliative care
Advanced care planning/counseling discussion
Anemia
Advanced care planning/counseling discussion
Anemia
Anemia
Substance abuse
Substance abuse
Anemia
Anemia
Advanced care planning/counseling discussion

## 2024-08-05 NOTE — PROGRESS NOTE ADULT - PROBLEM SELECTOR PLAN 1
.  -Methadone 20mg PO q8h ATC  -Dilaudid 1.5mg IV q3h PRN for Severe Pain  -Oxycodone 20mg PO q4 PRN for Moderate Pain  -Tylenol 1000mg PO q8h ATC  -Toradol 15mg IV q8h ATC    Tolerating parenteral opiates without overt adverse effects. Anticipated discharge pain regimen is Methadone 15mg PO q8h ATC + Oxy IR 20mg PO q4h PRN for Severe Pain -> Rx's sent to [facility]

## 2024-08-05 NOTE — PROGRESS NOTE ADULT - ATTENDING SUPERVISION STATEMENT
Resident
Fellow
Fellow
Resident
Fellow

## 2024-08-05 NOTE — PROGRESS NOTE ADULT - ATTENDING COMMENTS
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 Presenting again w/ worsening weakness and generalized pain, pow/ CT scan showing worsening pelvic bony destruction, w/ SIRS 2/4 likey 2/2 OM vs other infectious etiology. Admitted for further mgmt.    Problem List  #SCC of Rectum - will consult Heme/Onc and Rads and Surg, ultimately plan discussed by patient does not seem like a feasible reality. Given degree of malnutrition he would not be a surgical candidate even if mass were to shrink at this time. Need to have Interdisciplinary discussion with team regarding prognosis and realistic expectations    #Severe Protein Calorie Malnutrition - Nutrition consult, Ensure with meals, multivitamin, ultimately unable to meet nutrition needs which may preclude surgery especially in setting of frequent infections. Discussion with Pharmacy whether Remeron would interact with any drugs as would help weight gain and sleep    #Sepsis due to superimposed infection - MRI of pelvis to assess OM, will need bone bx vs drainage, patient on abx and by time team can perform biopsy yield will be low. MRI was cancelled ystd due to failure to provide pre-medication. Discussed with nursing staff today    #Cancer related pain/debility - Palliative consulted, need to have full discussion with whole team about realistic expectations and treatment course for patient. Agree with potential symptoms focused approach    Rest as above.
Invasive anorectal squamous cell CA with bony involvement of sacrum/acetabulum s/p colostomy creation (6/15/24), now likely infected tumor in PWA, CD4 47.  MRI showed osseous changes of R ischium and acetabulum adjacent to prior perirectal mass - difficult to distinguish tumor from infection - similar changes in R portion of lower sacrum and coccyx.  Given advanced disease, low CD4 and malnutrition, he is a poor candidate for chemo or surgery, without which eradication of infection will not be possible.  His prognosis is poor.  Can continue vanc and pip-tazo as above for now, as well as BIC/FTC/TAF for HIV and TMP/SX for PJP prophylaxis. Will follow with you, team 1.
44M with HIV, SCC of rectum with bony mets, AARTI, frequent superimposed infections, who presents with acute on chronic weakness likely due to sepsis from spreading superimposed infection on background of debility and cachexia related to malignancy.     Labs and imaging reviewed    Problem List  #SCC of Rectum - will consult Heme/Onc and Rads, ultimately plan discussed by patient does not seem like a feasible reality. Given degree of malnutrition he would not be a surgical candidate even if mass were to shrink at this time. Need to have Interdiscplinary discussion with team regarding prognosis and realistic expectations    #Severe Protein Calorie Malnutrition - Nutrition consult, Ensure with meals, multivitamin, ultimately unable to meet nutrition needs which may preclude surgery especially in setting of frequent infections    #Sepsis due to superimposed infection - MRI of pelvis to assess OM, will need bone bx vs drainage, patient on abx and by time team can perform biopsy yield will be low    #Cancer related pain/debility - Palliative consulted, need to have full discussion with whole team about realistic expectations and treatment course for patient. Agree with potential symptoms focused approach    Rest as above
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 Presenting again w/ worsening weakness and generalized pain, pow/ CT scan showing worsening pelvic bony destruction, w/ SIRS 2/4 likey 2/2 OM vs other infectious etiology. Admitted for further mgmt.    Problem List  #SCC of Rectum - interdisciplinary discussion -- patient not a candidate for chemo, would be a candidate for surgery if following met: improved nutrition, infection control, improved HIV control. Given infection is due to mass it would also be hard to gain control over degree of OM. Likely would only be able to fully gain control with surgery, but all surgeries would be too high risk of infection. This discussion will be had with the patient over the next day in order to allow him to determine what his focus or goals will be. Ultimately, pending further discussion.     #Severe Protein Calorie Malnutrition - Nutrition consult, Ensure with meals, multivitamin, ultimately unable to meet nutrition needs which may preclude surgery especially in setting of frequent infections. Discussion with Pharmacy whether Remeron would interact with any drugs as would help weight gain and sleep    #Sepsis due to superimposed infection - MRI revealed OM, ID on board, IV abx are likely futile given lack of surgical intervention will discuss options with them if patient wishes to seek surgery    #Cancer related pain/debility - Palliative consulted, need to have full discussion with whole team about realistic expectations and treatment course for patient. Agree with potential symptoms focused approach    Rest as above.
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 Presenting again w/ worsening weakness and generalized pain, pow/ CT scan showing worsening pelvic bony destruction, w/ SIRS 2/4 likey 2/2 OM vs other infectious etiology. Admitted for further mgmt.    Problem List  #SCC of Rectum - patient now planned for home hospice.     #Severe Protein Calorie Malnutrition - Nutrition consult, Ensure with meals, multivitamin, ultimately unable to meet nutrition needs which may preclude surgery especially in setting of frequent infections. Discussion with Pharmacy whether Remeron would interact with any drugs as would help weight gain and sleep    #Sepsis due to superimposed infection - Abx futile in this case given non surgical candidate, discussed with patient and they were stopped on 8/1    #Cancer related pain/debility - Palliative consulted, need to have full discussion with whole team about realistic expectations and treatment course for patient. Agree with potential symptoms focused approach
After discussions with Palliative Care and Hospitalist, he is electing hospice.  Would d/c antibiotics, continue BIC/FTC/TAF and TMP/SX.  Please recall if further ID input is desired - team 1.
Plan as above. 44 year old with metastatic anorectal ca, hiv, depression, presented with weakness, and found with progressive disease now referred to home hospice pending dc. prioritize pain control and overall comfort.
Complex medical decision making / symptom management in the setting of metastatic malignancy.    43yo M with PMH of Metastatic Anorectal CA, HIV, and Depression p/w weakness and found to have wound/disease progression. Palliative consulted for complex symptom management and medical decision making in the setting of life-limiting illness. Symptom management recommendations as noted below. Tolerating parenteral controlled substances without unexpected adverse effects/toxicities. Will require continued monitoring and titration of parenteral opiate regimen for adequate symptom management. Awaiting interdisciplinary input to determine whether further invasive/aggressive interventions are possible. Will explore GOC pending clarity on what is being offered.     -Methadone 15mg PO q8h ATC (increased from 10mg)  -Dilaudid 1mg IV q3h PRN for Severe Pain (switched back from scheduled)  -Tylenol 1000mg PO q8h ATC  -Toradol 15mg IV q8h ATC x48hrs (for total of 5 days then would recommend off for a few days)    Will continue to follow for ongoing GOC discussion / titration of palliative regimen. Emotional support provided, questions answered.  Active Psychosocial Referrals:  [x]Social Work/Case management []PT/OT []Chaplaincy []Hospice  [x]Patient/Family Support []Holistic RN [x]Massage Therapy [x]Music Therapy []Ethics  Coping: [] well [x] with difficulty [] poor coping [] unable to assess  Support system: [] strong [x] adequate [] inadequate
Complex medical decision making / symptom management in the setting of metastatic malignancy.    45yo M with PMH of Metastatic Anorectal CA, HIV, and Depression p/w weakness and found to have wound/disease progression. Palliative consulted for complex symptom management and medical decision making in the setting of life-limiting illness. Tolerating parenteral controlled substances without unexpected adverse effects/toxicities. Will require continued monitoring and titration of parenteral opiate regimen for adequate symptom management.     See GOC note from 8/1 regarding code status and home hospice referral. Further coordination for home hospice discharge. Currently anticipated discharge pain regimen is Methadone 20mg PO q8h ATC + Oxy IR 20mg PO q4h PRN for Severe Pain.    -Methadone 20mg PO q8h ATC  -Dilaudid 1.5mg IV q3h PRN for Severe Pain  -Tylenol 1000mg PO q8h ATC  -can restart Toradol 15mg IV q8h ATC x72hrs    Will continue to follow for ongoing GOC discussion / titration of palliative regimen. Emotional support provided, questions answered.  Active Psychosocial Referrals:  [x]Social Work/Case management [x]PT/OT []Chaplaincy [x]Hospice  [x]Patient/Family Support []Holistic RN [x]Massage Therapy [x]Music Therapy []Ethics  Coping: [] well [x] with difficulty [] poor coping [] unable to assess  Support system: [] strong [x] adequate [] inadequate
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 Presenting again w/ worsening weakness and generalized pain, pow/ CT scan showing worsening pelvic bony destruction, w/ SIRS 2/4 likey 2/2 OM vs other infectious etiology. Admitted for further mgmt.    Problem List  #SCC of Rectum - will consult Heme/Onc and Rads and Surg, ultimately plan discussed by patient does not seem like a feasible reality. Given degree of malnutrition he would not be a surgical candidate even if mass were to shrink at this time. Need to have Interdisciplinary discussion with team regarding prognosis and realistic expectations    #Severe Protein Calorie Malnutrition - Nutrition consult, Ensure with meals, multivitamin, ultimately unable to meet nutrition needs which may preclude surgery especially in setting of frequent infections. Discussion with Pharmacy whether Remeron would interact with any drugs as would help weight gain and sleep    #Sepsis due to superimposed infection - MRI of pelvis to assess OM, will need bone bx vs drainage, patient on abx and by time team can perform biopsy yield will be low. MRI was cancelled ystd due to failure to provide pre-medication. Discussed with nursing staff today    #Cancer related pain/debility - Palliative consulted, need to have full discussion with whole team about realistic expectations and treatment course for patient. Agree with potential symptoms focused approach    Rest as above.
Patient with mild tachycardia, no pain, clinically stable.   ,Pending home hospice, appreciate Palliative team follow-up
Patient opted for DNR/DNI status, pain regimen being adjusted by Palliative team  Patient pending home hospice.
Plan as above. 44 year old with metastatic anorectal ca, hiv, depression, presented with weakness, and found with progressive disease now referred to home hospice pending dc. prioritize pain control and overall comfort.   - increase dilaudid to 2 mg.   - monitor bowel regimen
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 Presenting again w/ worsening weakness and generalized pain, pow/ CT scan showing worsening pelvic bony destruction, w/ SIRS 2/4 likey 2/2 OM vs other infectious etiology. Admitted for further mgmt.    Problem List  #SCC of Rectum - interdisciplinary discussion -- patient not a candidate for chemo, would be a candidate for surgery if following met: improved nutrition, infection control, improved HIV control. Given infection is due to mass it would also be hard to gain control over degree of OM. Likely would only be able to fully gain control with surgery, but all surgeries would be too high risk of infection. This discussion will be had with the patient over the next day in order to allow him to determine what his focus or goals will be. Ultimately, pending further discussion.     #Severe Protein Calorie Malnutrition - Nutrition consult, Ensure with meals, multivitamin, ultimately unable to meet nutrition needs which may preclude surgery especially in setting of frequent infections. Discussion with Pharmacy whether Remeron would interact with any drugs as would help weight gain and sleep    #Sepsis due to superimposed infection - MRI revealed OM, ID on board, IV abx are likely futile given lack of surgical intervention will discuss options with them if patient wishes to seek surgery    #Cancer related pain/debility - Palliative consulted, need to have full discussion with whole team about realistic expectations and treatment course for patient. Agree with potential symptoms focused approach    Rest as above.
Complex medical decision making / symptom management in the setting of metastatic malignancy.    45yo M with PMH of Metastatic Anorectal CA, HIV, and Depression p/w weakness and found to have wound/disease progression. Palliative consulted for complex symptom management and medical decision making in the setting of life-limiting illness. Tolerating parenteral controlled substances without unexpected adverse effects/toxicities. Will require continued monitoring and titration of parenteral opiate regimen for adequate symptom management. GOC as noted above: MARY completed and exploring home hospice referral. Currently anticipated discharge pain regimen is Methadone 15mg PO q8h ATC + Oxy IR 20mg PO q4h PRN for Severe Pain    -Methadone 15mg PO q8h ATC  -Dilaudid 1mg IV q3h PRN for Severe Pain  -Tylenol 1000mg PO q8h ATC  -can restart Toradol 15mg IV q8h ATC x72hrs    In addition to the E/M visit, an advance care planning meeting was performed. Start time: 2:00PM; End time: 2:20PM; Total time: 20min. A face to face meeting to discuss advance care planning was held today regarding: RAMBO ALBERTS. Primary decision maker: Patient is able to participate in decision making; Alternate/surrogate: . Discussed advance directives including, but not limited to, healthcare proxy and code status. Decision regarding code status: DNR/DNI; Documentation completed today: [facility] Referral GOC note    Will continue to follow for ongoing GOC discussion / titration of palliative regimen. Emotional support provided, questions answered.  Active Psychosocial Referrals:  [x]Social Work/Case management [x]PT/OT []Chaplaincy [x]Hospice  [x]Patient/Family Support []Holistic RN [x]Massage Therapy [x]Music Therapy []Ethics  Coping: [] well [x] with difficulty [] poor coping [] unable to assess  Support system: [] strong [x] adequate [] inadequate

## 2024-08-05 NOTE — DISCHARGE NOTE NURSING/CASE MANAGEMENT/SOCIAL WORK - NSDCPEEMAIL_GEN_ALL_CORE
[facility] for Tobacco Control email [email or web address]

## 2024-08-05 NOTE — PROGRESS NOTE ADULT - PROBLEM SELECTOR PROBLEM 8
Neoplasm related pain
Cancer cachexia
Neoplasm related pain
Cancer cachexia
Neoplasm related pain
Debility
Neoplasm related pain
Debility

## 2024-08-05 NOTE — PROGRESS NOTE ADULT - PROBLEM SELECTOR PROBLEM 2
Severe protein-calorie malnutrition
Osteomyelitis
Debility
Severe protein-calorie malnutrition
Debility
Severe protein-calorie malnutrition
Debility
Debility
Squamous cell carcinoma of rectum
Debility
Osteomyelitis
Severe protein-calorie malnutrition
Osteomyelitis

## 2024-08-05 NOTE — DISCHARGE NOTE NURSING/CASE MANAGEMENT/SOCIAL WORK - NSDCPEFALRISK_GEN_ALL_CORE
For information on Fall & Injury Prevention, visit: [email or web address] OR  [email or web address] OR  https://www.cdc.gov/steadi/patient.html

## 2024-08-05 NOTE — PROGRESS NOTE ADULT - PROBLEM SELECTOR PLAN 3
.  Concern for osteomyelitis  -antibiotics to be discontinued  -c/w PPX and HIV-related meds
Patient has had decreased PO intake and decreased appetite. Has had increasing generalized weakness which has made him bedbound.    - Nutrition consult
With concern for osteomyelitis.     - On vanc and zosyn IV. ID following.   - Care per primary team.   - Pending MRI.
elevated WBC 22 at admission and tachycardia. MRI to assess for osteomyelitis
.  Concern for osteomyelitis  -antibiotics discontinued  -c/w PPX and HIV-related meds
Patient has had decreased PO intake and decreased appetite. Has had increasing generalized weakness which has made him bedbound.    - Nutrition consult
Patient has had decreased PO intake and decreased appetite. Has had increasing generalized weakness which has made him bedbound.    - Nutrition consult  - encourage po intake with ensure added TID
.  Concern for osteomyelitis  -antibiotics discontinued  -c/w PPX and HIV-related meds
.  Concern for osteomyelitis  -antibiotics discontinued  -c/w PPX and HIV-related meds
elevated WBC 22 at admission and tachycardia.     - MRI with osteomyelitis.  - Currently receiving vancomycin
Patient has had decreased PO intake and decreased appetite. Has had increasing generalized weakness which has made him bedbound.    - Nutrition consult
elevated WBC 22 at admission and tachycardia.     - Pending MRI to assess for osteomyelitis
Patient has had decreased PO intake and decreased appetite. Has had increasing generalized weakness which has made him bedbound.    - Nutrition consult
Patient has had decreased PO intake and decreased appetite. Has had increasing generalized weakness which has made him bedbound.    - Nutrition consult  - encourage po intake with ensure added TID
elevated WBC 22 at admission and tachycardia. MRI to assess for osteomyelitis
.  Concern for osteomyelitis  -c/w IV ABx as per ID  -patient expresses hesitance regarding PICC and long-term Abx, awaiting further evaluation  -prolonged IV Abx would not be possible with home hospice but could be allowed for [facility]

## 2024-08-06 VITALS
HEART RATE: 99 BPM | SYSTOLIC BLOOD PRESSURE: 101 MMHG | OXYGEN SATURATION: 94 % | RESPIRATION RATE: 16 BRPM | DIASTOLIC BLOOD PRESSURE: 63 MMHG | TEMPERATURE: 98 F

## 2024-08-06 PROCEDURE — 99239 HOSP IP/OBS DSCHRG MGMT >30: CPT | Mod: GC

## 2024-08-06 PROCEDURE — 72197 MRI PELVIS W/O & W/DYE: CPT | Mod: MC

## 2024-08-06 PROCEDURE — 73552 X-RAY EXAM OF FEMUR 2/>: CPT

## 2024-08-06 PROCEDURE — 81001 URINALYSIS AUTO W/SCOPE: CPT

## 2024-08-06 PROCEDURE — 96375 TX/PRO/DX INJ NEW DRUG ADDON: CPT

## 2024-08-06 PROCEDURE — 87040 BLOOD CULTURE FOR BACTERIA: CPT

## 2024-08-06 PROCEDURE — 82962 GLUCOSE BLOOD TEST: CPT

## 2024-08-06 PROCEDURE — 93970 EXTREMITY STUDY: CPT

## 2024-08-06 PROCEDURE — 86900 BLOOD TYPING SEROLOGIC ABO: CPT

## 2024-08-06 PROCEDURE — P9040: CPT

## 2024-08-06 PROCEDURE — 83605 ASSAY OF LACTIC ACID: CPT

## 2024-08-06 PROCEDURE — 71045 X-RAY EXAM CHEST 1 VIEW: CPT

## 2024-08-06 PROCEDURE — 84484 ASSAY OF TROPONIN QUANT: CPT

## 2024-08-06 PROCEDURE — 97161 PT EVAL LOW COMPLEX 20 MIN: CPT

## 2024-08-06 PROCEDURE — 85045 AUTOMATED RETICULOCYTE COUNT: CPT

## 2024-08-06 PROCEDURE — 99285 EMERGENCY DEPT VISIT HI MDM: CPT

## 2024-08-06 PROCEDURE — 82728 ASSAY OF FERRITIN: CPT

## 2024-08-06 PROCEDURE — 96376 TX/PRO/DX INJ SAME DRUG ADON: CPT

## 2024-08-06 PROCEDURE — 85027 COMPLETE CBC AUTOMATED: CPT

## 2024-08-06 PROCEDURE — 86923 COMPATIBILITY TEST ELECTRIC: CPT

## 2024-08-06 PROCEDURE — 82746 ASSAY OF FOLIC ACID SERUM: CPT

## 2024-08-06 PROCEDURE — 80202 ASSAY OF VANCOMYCIN: CPT

## 2024-08-06 PROCEDURE — 86360 T CELL ABSOLUTE COUNT/RATIO: CPT

## 2024-08-06 PROCEDURE — 86359 T CELLS TOTAL COUNT: CPT

## 2024-08-06 PROCEDURE — 87637 SARSCOV2&INF A&B&RSV AMP PRB: CPT

## 2024-08-06 PROCEDURE — 82550 ASSAY OF CK (CPK): CPT

## 2024-08-06 PROCEDURE — 74177 CT ABD & PELVIS W/CONTRAST: CPT | Mod: MC

## 2024-08-06 PROCEDURE — 36430 TRANSFUSION BLD/BLD COMPNT: CPT

## 2024-08-06 PROCEDURE — 87536 HIV-1 QUANT&REVRSE TRNSCRPJ: CPT

## 2024-08-06 PROCEDURE — 73502 X-RAY EXAM HIP UNI 2-3 VIEWS: CPT

## 2024-08-06 PROCEDURE — 82607 VITAMIN B-12: CPT

## 2024-08-06 PROCEDURE — 36415 COLL VENOUS BLD VENIPUNCTURE: CPT

## 2024-08-06 PROCEDURE — A9585: CPT

## 2024-08-06 PROCEDURE — 85730 THROMBOPLASTIN TIME PARTIAL: CPT

## 2024-08-06 PROCEDURE — 80307 DRUG TEST PRSMV CHEM ANLYZR: CPT

## 2024-08-06 PROCEDURE — 85025 COMPLETE CBC W/AUTO DIFF WBC: CPT

## 2024-08-06 PROCEDURE — 82610 CYSTATIN C: CPT

## 2024-08-06 PROCEDURE — 86850 RBC ANTIBODY SCREEN: CPT

## 2024-08-06 PROCEDURE — 83690 ASSAY OF LIPASE: CPT

## 2024-08-06 PROCEDURE — 83550 IRON BINDING TEST: CPT

## 2024-08-06 PROCEDURE — 84100 ASSAY OF PHOSPHORUS: CPT

## 2024-08-06 PROCEDURE — 96374 THER/PROPH/DIAG INJ IV PUSH: CPT

## 2024-08-06 PROCEDURE — 85610 PROTHROMBIN TIME: CPT

## 2024-08-06 PROCEDURE — 93005 ELECTROCARDIOGRAM TRACING: CPT

## 2024-08-06 PROCEDURE — 83540 ASSAY OF IRON: CPT

## 2024-08-06 PROCEDURE — 83735 ASSAY OF MAGNESIUM: CPT

## 2024-08-06 PROCEDURE — 86901 BLOOD TYPING SEROLOGIC RH(D): CPT

## 2024-08-06 PROCEDURE — 80053 COMPREHEN METABOLIC PANEL: CPT

## 2024-08-06 RX ADMIN — Medication 1 TABLET(S): at 07:09

## 2024-08-06 RX ADMIN — MUPIROCIN CALCIUM 1 APPLICATION(S): 20 CREAM TOPICAL at 07:30

## 2024-08-06 RX ADMIN — Medication 15 MILLIGRAM(S): at 07:10

## 2024-08-06 RX ADMIN — DEXTROAMPHETAMINE SACCHARATE, AMPHETAMINE ASPARTATE MONOHYDRATE, DEXTROAMPHETAMINE SULFATE AND AMPHETAMINE SULFATE 5 MILLIGRAM(S): 7.5; 7.5; 7.5; 7.5 CAPSULE, EXTENDED RELEASE ORAL at 09:08

## 2024-08-06 RX ADMIN — Medication 20 MILLIGRAM(S): at 07:09

## 2024-08-06 RX ADMIN — LIDOCAINE 5% 1 PATCH: 5 CREAM TOPICAL at 02:02

## 2024-08-06 RX ADMIN — Medication 2 MILLIGRAM(S): at 06:05

## 2024-08-06 RX ADMIN — Medication 20 MILLILITER(S): at 07:30

## 2024-08-06 RX ADMIN — Medication 15 MILLIGRAM(S): at 07:25

## 2024-08-06 RX ADMIN — Medication 2 MILLIGRAM(S): at 06:20

## 2024-08-06 RX ADMIN — CALAMINE 8% AND ZINC OXIDE 8% 1 APPLICATION(S): 160 LOTION TOPICAL at 07:11

## 2024-08-06 RX ADMIN — ENOXAPARIN SODIUM 60 MILLIGRAM(S): 120 INJECTION SUBCUTANEOUS at 07:10

## 2024-08-06 RX ADMIN — SULFAMETHOXAZOLE AND TRIMETHOPRIM 1 TABLET(S): 400; 80 TABLET ORAL at 07:10

## 2024-08-06 RX ADMIN — Medication 20 MILLIGRAM(S): at 07:34

## 2024-08-06 RX ADMIN — Medication 100 MILLIGRAM(S): at 07:10

## 2024-08-06 RX ADMIN — Medication 2 MILLIGRAM(S): at 09:23

## 2024-08-06 NOTE — PROGRESS NOTE ADULT - NUTRITIONAL ASSESSMENT
This patient has been assessed with a concern for Malnutrition and has been determined to have a diagnosis/diagnoses of Severe protein-calorie malnutrition and Underweight (BMI < 19).    This patient is being managed with:   Diet Regular-  Supplement Feeding Modality:  Oral  Ensure Plus High Protein Cans or Servings Per Day:  1       Frequency:  Three Times a day  Entered: Jul 31 2024  2:00PM  
This patient has been assessed with a concern for Malnutrition and has been determined to have a diagnosis/diagnoses of Underweight (BMI < 19) and Severe protein-calorie malnutrition.    This patient is being managed with:   Diet Regular-  Supplement Feeding Modality:  Oral  Ensure Plus High Protein Cans or Servings Per Day:  1       Frequency:  Three Times a day  Entered: Jul 31 2024  2:00PM

## 2024-08-06 NOTE — PROGRESS NOTE ADULT - REASON FOR ADMISSION
Weakness

## 2024-08-19 ENCOUNTER — NON-APPOINTMENT (OUTPATIENT)
Age: 44
End: 2024-08-19

## 2024-08-19 DIAGNOSIS — Z93.3 COLOSTOMY STATUS: ICD-10-CM

## 2024-08-19 DIAGNOSIS — B20 HUMAN IMMUNODEFICIENCY VIRUS [HIV] DISEASE: ICD-10-CM

## 2024-08-19 DIAGNOSIS — C20 MALIGNANT NEOPLASM OF RECTUM: ICD-10-CM

## 2024-08-19 DIAGNOSIS — Z87.19 PERSONAL HISTORY OF OTHER DISEASES OF THE DIGESTIVE SYSTEM: ICD-10-CM

## 2024-08-19 DIAGNOSIS — Z79.899 OTHER LONG TERM (CURRENT) DRUG THERAPY: ICD-10-CM

## 2024-08-19 DIAGNOSIS — Z51.5 ENCOUNTER FOR PALLIATIVE CARE: ICD-10-CM

## 2024-08-19 DIAGNOSIS — F19.19 OTHER PSYCHOACTIVE SUBSTANCE ABUSE WITH UNSPECIFIED PSYCHOACTIVE SUBSTANCE-INDUCED DISORDER: ICD-10-CM

## 2024-08-19 DIAGNOSIS — G89.3 NEOPLASM RELATED PAIN (ACUTE) (CHRONIC): ICD-10-CM

## 2024-08-19 DIAGNOSIS — A41.9 SEPSIS, UNSPECIFIED ORGANISM: ICD-10-CM

## 2024-08-19 DIAGNOSIS — Z92.3 PERSONAL HISTORY OF IRRADIATION: ICD-10-CM

## 2024-08-19 DIAGNOSIS — C79.51 SECONDARY MALIGNANT NEOPLASM OF BONE: ICD-10-CM

## 2024-08-19 DIAGNOSIS — E43 UNSPECIFIED SEVERE PROTEIN-CALORIE MALNUTRITION: ICD-10-CM

## 2024-08-19 DIAGNOSIS — D64.9 ANEMIA, UNSPECIFIED: ICD-10-CM

## 2024-08-19 DIAGNOSIS — Z82.49 FAMILY HISTORY OF ISCHEMIC HEART DISEASE AND OTHER DISEASES OF THE CIRCULATORY SYSTEM: ICD-10-CM

## 2024-08-19 DIAGNOSIS — F32.A DEPRESSION, UNSPECIFIED: ICD-10-CM

## 2024-08-19 DIAGNOSIS — M86.8X8 OTHER OSTEOMYELITIS, OTHER SITE: ICD-10-CM

## 2024-08-19 DIAGNOSIS — E87.6 HYPOKALEMIA: ICD-10-CM

## 2024-08-19 DIAGNOSIS — I82.402 ACUTE EMBOLISM AND THROMBOSIS OF UNSPECIFIED DEEP VEINS OF LEFT LOWER EXTREMITY: ICD-10-CM

## 2024-08-19 DIAGNOSIS — F11.20 OPIOID DEPENDENCE, UNCOMPLICATED: ICD-10-CM

## 2024-09-03 NOTE — ED PROVIDER NOTE - IV ALTEPLASE INCLUSION HIDDEN
Incoming call from Mary. She states that she had questions about her upcoming EGD. Writer informed Mary that she will have to contact . She was given the following number for their office, [phone]. No further concerns.   
show

## 2024-09-06 NOTE — ED PROVIDER NOTE - CONDUCTED A DETAILED DISCUSSION WITH PATIENT AND/OR GUARDIAN REGARDING, MDM
removed. need for outpatient follow-up/return to ED if symptoms worsen, persist or questions arise/lab results/radiology results

## 2024-09-11 NOTE — HISTORY OF PRESENT ILLNESS
[FreeTextEntry1] : Delonte Lua is a 44 year old M with PMH HIV (on biktarvy), polysubstance abuse, now with at least cT4 invasive anorectal squamous cell carcinoma. RT consulted while inpatient to discuss treatment options. He completed 5400cGy to the anus/pelvis on 5/16/24.   9/17/24: RPA no recent imaging  7/3/24: PTE Mr. Rojo reports he is feeling well overall. He continues to have fatigue, relieved by rest. Appetite is good. Colostomy is draining w/o issue. He denies N/V, or abdominal pain. He continues to pass mucus from rectum, no blood. He has some pain in the rectum and is seen by Dr. Taylor for pain management.   5/15/24: Final OTV 5220cGy/5400cGy, 29/30fx to anus/pelvis. He denies N/V/C/D. He states his stools are more formed and easier to pass. His rectal pain is improved greatly and more manageable. He states his appetite is improving. He still has some fatigue but feels well overall. While in office a physical exam was performed, on undressing pt was incontinent of stool. His diaper was saturated in loose stool from a 3cm x 2cm right perianal fistula. Smaller fistulae also present in other perianal areas, skin otherwise in tact with diffuse grade 1 dermatitis. Patient was assisted in santo care. Plan to continue radiation, to be completed 5/16/24 5/8/24:   OTV 4140cGy/5400cGy, 23/30fx to anus/pelvis.. Mr. Rojo continues to have episodes of fatigue and decrease appetite but will maintain and adequate PO intake. + flatus and noticed over the past 2 days he has formed stools with no blood. He continues to have rectal pain and take his pain medications and lay on side for comfort. He will continue current RT treatments as planned. He for  5/1/24: OTV 3600cGy/5400cGy, 20/30fx to anus/pelvis. Mr. Ge Lua reports fatigue. He states his appetite is good. He denies N/V/C/D, or blood in his stools. He states his stools are more formed and easier to pass. he has rectal pain which is relieved with oxycodone. Plan to continue radiation.   4/25/24: OTV 2880cGy/5400cGy, 16/30fx to anus/pelvis. Mr. Ge Lua reports improved pain relief since medications addressed by DR. Taylor. Appetite remains fair and he continues to have mild fatigue. He denies nausea. Bowel movements are slightly easier to pass. Plan to continue radiation.   4/17/24: OTV 1980cGy/5400cGy/11/30fx to anus/pelvis. Mr. Ge Lua reports mild fatigue and fair appetite. States he is having rectal pain but has run out of his oxycodone, email sent to Dr. Taylor on patient's behalf. No nausea or abdominal pain. Bowel movements are still loose but are slowing down. Script sent for Imodium. Plan to continue radiation.   4/10/24: OTV 1260cGy/5400cGy, 7/30fx to anus/pelvis. Mr. Ge Lua reports fatigue and loss of taste. Appetite is fair. He has been started on capecitabine. He denies pain. He is experiencing constipation alternating with diarrhea and he is medicating for both. He continues to have some difficulty having a bowel movement but states that overall he is feeling better. He denies rectal bleeding. Plan to continue radiation.   4/3/24: OTV  180cGy/5400cGy, 1/30fx to anus/pelvis. Mr. Rojo reports feeling well overall. He denies fatigue. Appetite is good. No abdominal pain, N/V/C/D, or rectal bleeding. Some sensitivity when sitting. Plan to continue radiation.   History of Present Illness  _______________________________________  Patient had recent admission 12/9 - 12/12 for spontaneous drainage and mild pain from bilateral gluteal abscesses. Notably he also had progressing R leg  numbness. He had previously had perianal I and D in [facility] and [facility]. Rectoanal mass noted on imaging, and underwent biopsy and EUA 12/13 which confirmed diagnosis of squamous cell carcinoma, moderately differentiated. Patient was recommended for staging imaging at that time, but left AMA. He did not follow up with med onc as scheduled and was unable to be reached. Patient reports this was due to his phone service being shut off.     He presented to the ED after large volume blood per rectum.     1/3/24: MRI Pelvis - limited exam with only 3 sequences obtained. No evidence for a large infiltrative mass centered in the lower rectum with invasion into  the sacrum and musculature     1/9/24: CT Chest - mild paraseptal emphysema. no evidence of metastatic disease     2/2/24: CTA - without signs of local regional metastases     He lives in [facility], current smoker  (trying to cut down). Denies alcohol other/drug use. Endorses significant pain in rectal area     On exam:  NAD, A&Ox3, asking appropriate questions  Anal lesion extending from canal along R and L perianal skin ~4cm. Contour of gluteal skin with ulcers 2/2 prior abscess vs underlying tumor. Oozing, no  delmy bright red blood.

## 2024-09-11 NOTE — DISEASE MANAGEMENT
[Clinical] : TNM Stage: c [TTNM] : 4 [NTNM] : 0 [MTNM] : 0 [IV] : IV [de-identified] : 5400cG [de-identified] : 5400cGy [de-identified] : anus/pelvis

## 2024-09-17 ENCOUNTER — APPOINTMENT (OUTPATIENT)
Dept: RADIATION ONCOLOGY | Facility: CLINIC | Age: 44
End: 2024-09-17

## 2024-09-24 NOTE — PROGRESS NOTE ADULT - PROBLEM/PLAN-1
LVM advising to call back to discuss reschedule to possibly: 10/11/2024 at [place].   Left contact number.   DISPLAY PLAN FREE TEXT

## 2025-01-09 NOTE — ED PROVIDER NOTE - MDM ORDERS SUBMITTED SELECTION
Provider reviewed medical records that were brought to [address] [place] [address]. Copy sent to scanning. Not Applicable Detail Level: Detailed Size Of Lesion In Cm (Optional): 0

## 2025-01-28 NOTE — PROGRESS NOTE ADULT - SUBJECTIVE AND OBJECTIVE BOX
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 Presenting again w/ worsening weakness and generalized pain, pow/ CT scan showing worsening pelvic bony destruction, w/ SIRS 2/4 likely 2/2 OM vs other infectious etiology. Admitted for further mgmt. Found to have DVT above and below increased lovenox BID. Pending MRI. CD and HIV load    OVERNIGHT EVENTS: NAEON     SUBJECTIVE: Patient examined at the bedside this morning. I did have pain in his right ankle and his leg Note some associated numbness and tingling. Patient states that his pain management regiment has improved his pain currently a five out of 10 note. he’s been constipated for two days.  patient notes no fevers, chills, chest pain, shortness of breath, nausea or vomiting, no changes in his ability urinate,     ROS: otherwise negative      T(C): 37.1 (07-28-24 @ 05:00), Max: 37.7 (07-27-24 @ 21:00)  HR: 98 (07-28-24 @ 05:00) (98 - 112)  BP: 100/57 (07-28-24 @ 05:00) (100/57 - 117/71)  RR: 18 (07-28-24 @ 05:00) (16 - 18)  SpO2: 98% (07-28-24 @ 05:00) (98% - 99%)  Wt(kg): --Vital Signs Last 24 Hrs  T(C): 37.1 (28 Jul 2024 05:00), Max: 37.7 (27 Jul 2024 21:00)  T(F): 98.8 (28 Jul 2024 05:00), Max: 99.8 (27 Jul 2024 21:00)  HR: 98 (28 Jul 2024 05:00) (98 - 112)  BP: 100/57 (28 Jul 2024 05:00) (100/57 - 117/71)  BP(mean): --  RR: 18 (28 Jul 2024 05:00) (16 - 18)  SpO2: 98% (28 Jul 2024 05:00) (98% - 99%)    Parameters below as of 28 Jul 2024 05:00  Patient On (Oxygen Delivery Method): room air        PHYSICAL EXAM:  Constitutional: resting comfortably in bed; NAD  Head: NC/AT  Eyes: PERRL, EOMI, anicteric sclera  ENT: no nasal discharge; MMM  Neck: supple; no JVD or thyromegaly  Respiratory: CTA B/L; no W/R/R, no retractions  Cardiac: +S1/S2; RRR; no M/R/G  Gastrointestinal: soft, NT/ND; no rebound or guarding; +BSx4  Back: spine midline, no bony tenderness or step-offs; no CVAT B/L  Extremities: WWP, no clubbing or cyanosis; no peripheral edema. Capillary refill <2 sec  Musculoskeletal: NROM x4; no joint swelling, tenderness or erythema  Vascular: 2+ radial, DP/PT pulses B/L  Dermatologic: skin warm, dry and intact; no rashes, wounds, or scars  Lymphatic: no submandibular or cervical LAD  Neurologic: AAOx3; CNII-XII grossly intact; no focal deficits  Psychiatric: affect and characteristics of appearance, verbalizations, behaviors are appropriate    LABS:                        7.1    11.65 )-----------( 628      ( 28 Jul 2024 05:30 )             24.9     07-28    137  |  97  |  5<L>  ----------------------------<  88  3.5   |  31  |  0.48<L>    Ca    8.6      28 Jul 2024 05:30  Phos  4.3     07-28  Mg     2.1     07-28    TPro  6.6  /  Alb  2.8<L>  /  TBili  0.2  /  DBili  x   /  AST  12  /  ALT  15  /  AlkPhos  223<H>  07-28        Urinalysis Basic - ( 28 Jul 2024 05:30 )    Color: x / Appearance: x / SG: x / pH: x  Gluc: 88 mg/dL / Ketone: x  / Bili: x / Urobili: x   Blood: x / Protein: x / Nitrite: x   Leuk Esterase: x / RBC: x / WBC x   Sq Epi: x / Non Sq Epi: x / Bacteria: x      CAPILLARY BLOOD GLUCOSE            Urinalysis Basic - ( 28 Jul 2024 05:30 )    Color: x / Appearance: x / SG: x / pH: x  Gluc: 88 mg/dL / Ketone: x  / Bili: x / Urobili: x   Blood: x / Protein: x / Nitrite: x   Leuk Esterase: x / RBC: x / WBC x   Sq Epi: x / Non Sq Epi: x / Bacteria: x        MEDICATIONS  (STANDING):  acetaminophen     Tablet .. 1000 milliGRAM(s) Oral every 8 hours  bictegravir 50 mG/emtricitabine 200 mG/tenofovir alafenamide 25 mG (BIKTARVY) 1 Tablet(s) Oral daily  enoxaparin Injectable 60 milliGRAM(s) SubCutaneous every 12 hours  escitalopram 10 milliGRAM(s) Oral daily  ketorolac   Injectable 15 milliGRAM(s) IV Push every 8 hours  lactated ringers. 1000 milliLiter(s) (100 mL/Hr) IV Continuous <Continuous>  lidocaine   4% Patch 1 Patch Transdermal every 24 hours  lidocaine 2% Gel 1 Application(s) Topical every 4 hours  LORazepam   Injectable 1 milliGRAM(s) IV Push once  methadone    Tablet 10 milliGRAM(s) Oral every 8 hours  piperacillin/tazobactam IVPB.. 3.375 Gram(s) IV Intermittent every 8 hours  potassium chloride   Powder 20 milliEquivalent(s) Oral once  vancomycin  IVPB 1000 milliGRAM(s) IV Intermittent every 12 hours    MEDICATIONS  (PRN):  HYDROmorphone  Injectable 1 milliGRAM(s) IV Push every 3 hours PRN Severe Pain (7 - 10)  oxyCODONE    IR 15 milliGRAM(s) Oral every 4 hours PRN Moderate Pain (4 - 6)      RADIOLOGY & ADDITIONAL TESTS: Reviewed

## 2025-06-25 NOTE — DISCHARGE NOTE PROVIDER - DISCHARGING ATTENDING PHYSICIAN:
Detail Level: Simple Detail Level: Generalized Detail Level: Zone Detail Level: Detailed Sharlene Wright

## 2025-07-31 NOTE — PROGRESS NOTE ADULT - ASSESSMENT
MD De Leon notified of patient's episode 6 beats vtach that occurred at 0121. Pt reported no symptoms or changes.     Pt had 5 beats of vtach at 0240 Pt assessed and reported no symptoms or changes. MD notified of this as well. Per MD to notify if over 12 beats vtach or polymorphic. MT and Charge RN notified.    3 M, MSM with PMH HIV (on Biktarvy), polysubstance abuse, syphilis, anemia invasive anorectal squamous cell carcinoma who is admitted to medical service with penile pain and blood per rectum. Febrile to 102, tachycardic, normotensive. Non tender on exam. Labs on admission demonstrate WBC 13.07 K/uL (now 10), Hgb 7.4 g/dL (now 7.1, from 8.3 prior to discharge), BUN/Cr at baseline, Non COVID19 corona positive, Utox positive for amphetamines and THC. UA negative.  Remains clinically non obstructed.     Recommendations:   - No acute surgical intervention  - no current indication for diversion  - f/u heme/onc, rad onc recommendations  - Transfuse as necessary, large bore IV access, active type and screen  - Colorectal Surgery to follow 3 M, MSM with PMH HIV (on Biktarvy), polysubstance abuse, syphilis, anemia invasive anorectal squamous cell carcinoma who is admitted to medical service with penile pain and blood per rectum. Febrile to 102, tachycardic, normotensive. Non tender on exam. Labs on admission demonstrate WBC 13.07 K/uL (now 10), Hgb 7.4 g/dL (now 7.1, from 8.3 prior to discharge), BUN/Cr at baseline, Non COVID19 corona positive, Utox positive for amphetamines and THC. UA negative.  Remains clinically non obstructed.     Recommendations:   - No acute surgical intervention  - no current indication for diversion, as will not resolve pain.  - Patient would likely benefit most from radiation therapy  - Recommend frequent showers/sitz baths for perianal wounds  - f/u heme/onc, rad onc recommendations  - Transfuse as necessary, large bore IV access, active type and screen  - Colorectal Surgery to follow

## (undated) DEVICE — GELPORT LAPAROSCOPIC SYSTEM

## (undated) DEVICE — GLV 8 PROTEXIS (WHITE)

## (undated) DEVICE — LIGASURE BLUNT TIP 37CM

## (undated) DEVICE — VENODYNE/SCD SLEEVE CALF MEDIUM

## (undated) DEVICE — OSTOMY LOOP ROD 2.5"

## (undated) DEVICE — OSTOMY WAFER FLAT CERAPLUS 1.75"

## (undated) DEVICE — SUT CHROMIC 2-0 27" CT-2

## (undated) DEVICE — TROCAR COVIDIEN VERSAPORT BLADELESS OPTICAL 12MM STANDARD

## (undated) DEVICE — SHEARS HARMONIC ACE PLUS 7 5MM X 45CM CURVED TIP

## (undated) DEVICE — MARKING PEN W RULER

## (undated) DEVICE — DRAPE TOWEL BLUE 17" X 24"

## (undated) DEVICE — POSITIONER FOAM EGG CRATE ULNAR 2PCS (PINK)

## (undated) DEVICE — TAPE SILK 3"

## (undated) DEVICE — PACK PERI GYN

## (undated) DEVICE — FOLEY TRAY 16FR LF URINE METER SURESTEP

## (undated) DEVICE — PACK GENERAL MINOR

## (undated) DEVICE — NDL HYPO SAFE 22G X 1.5" (BLACK)

## (undated) DEVICE — DRAPE IOBAN 13" X 13"

## (undated) DEVICE — WARMING BLANKET UPPER ADULT

## (undated) DEVICE — SUT VICRYL 3-0 18" SH (POP-OFF)

## (undated) DEVICE — PACK GENERAL LAPAROSCOPY

## (undated) DEVICE — OSTOMY POUCH DRAINABLE 2-PIECE LOCK N ROLL 1.75"

## (undated) DEVICE — SUT MONOCRYL 4-0 27" PS-2 UNDYED

## (undated) DEVICE — STAPLER COVIDIEN ENDO GIA STANDARD HANDLE

## (undated) DEVICE — OSTOMY WAFER FLAT YELLOW 4"

## (undated) DEVICE — Device

## (undated) DEVICE — SUT VICRYL 0 27" UR-6

## (undated) DEVICE — KIT SIGMOIDOSCOPE NO SWAB SGL USE